# Patient Record
Sex: MALE | Race: WHITE | NOT HISPANIC OR LATINO | Employment: OTHER | URBAN - METROPOLITAN AREA
[De-identification: names, ages, dates, MRNs, and addresses within clinical notes are randomized per-mention and may not be internally consistent; named-entity substitution may affect disease eponyms.]

---

## 2019-07-02 ENCOUNTER — HOSPITAL ENCOUNTER (OUTPATIENT)
Facility: HOSPITAL | Age: 44
Discharge: HOME | DRG: 293 | End: 2019-07-04
Attending: FAMILY MEDICINE | Admitting: INTERNAL MEDICINE
Payer: MEDICAID

## 2019-07-02 DIAGNOSIS — R45.1 AGITATION: ICD-10-CM

## 2019-07-02 DIAGNOSIS — I50.9 ACUTE ON CHRONIC CONGESTIVE HEART FAILURE, UNSPECIFIED HEART FAILURE TYPE: ICD-10-CM

## 2019-07-02 DIAGNOSIS — R09.02 HYPOXEMIA: ICD-10-CM

## 2019-07-02 DIAGNOSIS — I50.9 CHF (CONGESTIVE HEART FAILURE): ICD-10-CM

## 2019-07-02 DIAGNOSIS — R41.0 CONFUSION: Primary | ICD-10-CM

## 2019-07-02 DIAGNOSIS — I50.9 ACUTE ON CHRONIC CONGESTIVE HEART FAILURE: ICD-10-CM

## 2019-07-02 LAB
ALBUMIN SERPL BCP-MCNC: 3.1 G/DL (ref 3.5–5.2)
ALLENS TEST: ABNORMAL
ALP SERPL-CCNC: 85 U/L (ref 55–135)
ALT SERPL W/O P-5'-P-CCNC: 79 U/L (ref 10–44)
AMPHET+METHAMPHET UR QL: NEGATIVE
ANION GAP SERPL CALC-SCNC: 8 MMOL/L (ref 8–16)
AST SERPL-CCNC: 94 U/L (ref 10–40)
BASOPHILS # BLD AUTO: 0.08 K/UL (ref 0–0.2)
BASOPHILS NFR BLD: 0.7 % (ref 0–1.9)
BENZODIAZ UR QL SCN>200 NG/ML: NEGATIVE
BILIRUB SERPL-MCNC: 1.5 MG/DL (ref 0.1–1)
BNP SERPL-MCNC: 525 PG/ML (ref 0–99)
BUN SERPL-MCNC: 27 MG/DL (ref 6–20)
BZE UR QL SCN: NEGATIVE
CALCIUM SERPL-MCNC: 8.5 MG/DL (ref 8.7–10.5)
CANNABINOIDS UR QL SCN: NEGATIVE
CHLORIDE SERPL-SCNC: 102 MMOL/L (ref 95–110)
CO2 SERPL-SCNC: 24 MMOL/L (ref 23–29)
CPAPPEEP: ABNORMAL
CREAT SERPL-MCNC: 1.2 MG/DL (ref 0.5–1.4)
CREAT UR-MCNC: <10 MG/DL (ref 23–375)
DIFFERENTIAL METHOD: ABNORMAL
EOSINOPHIL # BLD AUTO: 0.2 K/UL (ref 0–0.5)
EOSINOPHIL NFR BLD: 1.5 % (ref 0–8)
ERYTHROCYTE [DISTWIDTH] IN BLOOD BY AUTOMATED COUNT: 15.7 % (ref 11.5–14.5)
ERYTHROCYTE [SEDIMENTATION RATE] IN BLOOD BY WESTERGREN METHOD: ABNORMAL MM/H
EST. GFR  (AFRICAN AMERICAN): >60 ML/MIN/1.73 M^2
EST. GFR  (NON AFRICAN AMERICAN): >60 ML/MIN/1.73 M^2
ETHANOL SERPL-MCNC: <5 MG/DL
GLUCOSE SERPL-MCNC: 73 MG/DL (ref 70–110)
HCO3 UR-SCNC: 27.1 MMOL/L (ref 22–26)
HCT VFR BLD AUTO: 43 % (ref 40–54)
HGB BLD-MCNC: 14.2 G/DL (ref 14–18)
IMM GRANULOCYTES # BLD AUTO: 0.03 K/UL (ref 0–0.04)
IMM GRANULOCYTES NFR BLD AUTO: 0.2 % (ref 0–0.5)
LYMPHOCYTES # BLD AUTO: 2.7 K/UL (ref 1–4.8)
LYMPHOCYTES NFR BLD: 22.2 % (ref 18–48)
MCH RBC QN AUTO: 29.2 PG (ref 27–31)
MCHC RBC AUTO-ENTMCNC: 33 G/DL (ref 32–36)
MCV RBC AUTO: 88 FL (ref 82–98)
MODE: ABNORMAL
MONOCYTES # BLD AUTO: 0.9 K/UL (ref 0.3–1)
MONOCYTES NFR BLD: 7.3 % (ref 4–15)
NEUTROPHILS # BLD AUTO: 8.3 K/UL (ref 1.8–7.7)
NEUTROPHILS NFR BLD: 68.1 % (ref 38–73)
NRBC BLD-RTO: 0 /100 WBC
O2DEVICE: ABNORMAL
OPIATES UR QL SCN: NEGATIVE
PCO2 BLDA: 42.7 MMHG (ref 35–45)
PCP UR QL SCN>25 NG/ML: NEGATIVE
PH SMN: 7.42 [PH] (ref 7.35–7.45)
PIP: ABNORMAL
PLATELET # BLD AUTO: 339 K/UL (ref 150–350)
PMV BLD AUTO: 9.7 FL (ref 9.2–12.9)
PO2 BLDA: 59.5 MMHG (ref 75–100)
POC BE: 2.3 MMOL/L (ref -2–2)
POC FIO2: ABNORMAL
POC FLOW: ABNORMAL
POC O2 PERCENT: ABNORMAL %
POC SATURATED O2: 91.9 % (ref 90–100)
POC TCO2: 28.4 MMOL/L (ref 22–28)
POC TEMPERATURE: ABNORMAL C
POTASSIUM SERPL-SCNC: 4.6 MMOL/L (ref 3.5–5.1)
PRESSURE SUPPORT: ABNORMAL
PROT SERPL-MCNC: 7.3 G/DL (ref 6–8.4)
RBC # BLD AUTO: 4.87 M/UL (ref 4.6–6.2)
SITE: ABNORMAL
SODIUM SERPL-SCNC: 134 MMOL/L (ref 136–145)
TOXICOLOGY INFORMATION: ABNORMAL
TROPONIN I SERPL DL<=0.01 NG/ML-MCNC: 0.09 NG/ML (ref 0.02–0.5)
VT: ABNORMAL
WBC # BLD AUTO: 12.26 K/UL (ref 3.9–12.7)

## 2019-07-02 PROCEDURE — 85025 COMPLETE CBC W/AUTO DIFF WBC: CPT

## 2019-07-02 PROCEDURE — 36600 WITHDRAWAL OF ARTERIAL BLOOD: CPT

## 2019-07-02 PROCEDURE — 96374 THER/PROPH/DIAG INJ IV PUSH: CPT

## 2019-07-02 PROCEDURE — 80320 DRUG SCREEN QUANTALCOHOLS: CPT

## 2019-07-02 PROCEDURE — 84484 ASSAY OF TROPONIN QUANT: CPT

## 2019-07-02 PROCEDURE — 71045 XR CHEST AP PORTABLE: ICD-10-PCS | Mod: 26,,, | Performed by: RADIOLOGY

## 2019-07-02 PROCEDURE — 82803 BLOOD GASES ANY COMBINATION: CPT

## 2019-07-02 PROCEDURE — 83880 ASSAY OF NATRIURETIC PEPTIDE: CPT

## 2019-07-02 PROCEDURE — 12000002 HC ACUTE/MED SURGE SEMI-PRIVATE ROOM

## 2019-07-02 PROCEDURE — 25000003 PHARM REV CODE 250: Performed by: FAMILY MEDICINE

## 2019-07-02 PROCEDURE — 99900035 HC TECH TIME PER 15 MIN (STAT)

## 2019-07-02 PROCEDURE — 71045 X-RAY EXAM CHEST 1 VIEW: CPT | Mod: 26,,, | Performed by: RADIOLOGY

## 2019-07-02 PROCEDURE — 82800 BLOOD PH: CPT

## 2019-07-02 PROCEDURE — 71045 X-RAY EXAM CHEST 1 VIEW: CPT | Mod: TC,FY

## 2019-07-02 PROCEDURE — 99291 CRITICAL CARE FIRST HOUR: CPT | Mod: 25

## 2019-07-02 PROCEDURE — 96375 TX/PRO/DX INJ NEW DRUG ADDON: CPT

## 2019-07-02 PROCEDURE — 63600175 PHARM REV CODE 636 W HCPCS: Performed by: FAMILY MEDICINE

## 2019-07-02 PROCEDURE — 96376 TX/PRO/DX INJ SAME DRUG ADON: CPT

## 2019-07-02 PROCEDURE — 80053 COMPREHEN METABOLIC PANEL: CPT

## 2019-07-02 PROCEDURE — G0378 HOSPITAL OBSERVATION PER HR: HCPCS

## 2019-07-02 PROCEDURE — 80307 DRUG TEST PRSMV CHEM ANLYZR: CPT

## 2019-07-02 RX ORDER — FUROSEMIDE 10 MG/ML
60 INJECTION INTRAMUSCULAR; INTRAVENOUS
Status: COMPLETED | OUTPATIENT
Start: 2019-07-02 | End: 2019-07-02

## 2019-07-02 RX ORDER — LORAZEPAM 2 MG/ML
1 INJECTION INTRAMUSCULAR
Status: COMPLETED | OUTPATIENT
Start: 2019-07-02 | End: 2019-07-02

## 2019-07-02 RX ORDER — ENALAPRIL MALEATE 2.5 MG/1
2.5 TABLET ORAL DAILY
Status: ON HOLD | COMMUNITY
End: 2019-08-26 | Stop reason: SDUPTHER

## 2019-07-02 RX ORDER — SPIRONOLACTONE 25 MG/1
25 TABLET ORAL DAILY
Status: ON HOLD | COMMUNITY
End: 2019-08-26 | Stop reason: SDUPTHER

## 2019-07-02 RX ORDER — FUROSEMIDE 20 MG/1
20 TABLET ORAL 2 TIMES DAILY
Status: ON HOLD | COMMUNITY
End: 2019-07-04 | Stop reason: SDUPTHER

## 2019-07-02 RX ORDER — CLONIDINE HYDROCHLORIDE 0.1 MG/1
0.2 TABLET ORAL
Status: COMPLETED | OUTPATIENT
Start: 2019-07-02 | End: 2019-07-02

## 2019-07-02 RX ORDER — CARVEDILOL 3.12 MG/1
3.12 TABLET ORAL 2 TIMES DAILY WITH MEALS
Status: ON HOLD | COMMUNITY
End: 2019-08-26 | Stop reason: SDUPTHER

## 2019-07-02 RX ADMIN — FUROSEMIDE 60 MG: 10 INJECTION, SOLUTION INTRAVENOUS at 08:07

## 2019-07-02 RX ADMIN — LORAZEPAM 1 MG: 2 INJECTION INTRAMUSCULAR; INTRAVENOUS at 10:07

## 2019-07-02 RX ADMIN — LORAZEPAM 1 MG: 2 INJECTION INTRAMUSCULAR; INTRAVENOUS at 09:07

## 2019-07-02 RX ADMIN — CLONIDINE HYDROCHLORIDE 0.2 MG: 0.1 TABLET ORAL at 09:07

## 2019-07-03 PROBLEM — I50.9 ACUTE ON CHRONIC CONGESTIVE HEART FAILURE: Status: ACTIVE | Noted: 2019-07-03

## 2019-07-03 PROBLEM — R41.0 CONFUSION: Status: ACTIVE | Noted: 2019-07-03

## 2019-07-03 LAB
ALBUMIN SERPL BCP-MCNC: 3.2 G/DL (ref 3.5–5.2)
ALP SERPL-CCNC: 84 U/L (ref 55–135)
ALT SERPL W/O P-5'-P-CCNC: 80 U/L (ref 10–44)
AMMONIA PLAS-SCNC: 14 UMOL/L (ref 10–50)
AMPHET+METHAMPHET UR QL: NEGATIVE
ANION GAP SERPL CALC-SCNC: 17 MMOL/L (ref 8–16)
AST SERPL-CCNC: 92 U/L (ref 10–40)
BASOPHILS # BLD AUTO: 0.07 K/UL (ref 0–0.2)
BASOPHILS NFR BLD: 0.5 % (ref 0–1.9)
BENZODIAZ UR QL SCN>200 NG/ML: ABNORMAL
BILIRUB SERPL-MCNC: 1 MG/DL (ref 0.1–1)
BUN SERPL-MCNC: 29 MG/DL (ref 6–20)
BZE UR QL SCN: NEGATIVE
CALCIUM SERPL-MCNC: 8.4 MG/DL (ref 8.7–10.5)
CANNABINOIDS UR QL SCN: NEGATIVE
CHLORIDE SERPL-SCNC: 100 MMOL/L (ref 95–110)
CO2 SERPL-SCNC: 20 MMOL/L (ref 23–29)
CREAT SERPL-MCNC: 1.4 MG/DL (ref 0.5–1.4)
CREAT UR-MCNC: 22.5 MG/DL (ref 23–375)
D DIMER PPP FEU-MCNC: 457 NG/ML (ref 100–400)
DIFFERENTIAL METHOD: ABNORMAL
EOSINOPHIL # BLD AUTO: 0.2 K/UL (ref 0–0.5)
EOSINOPHIL NFR BLD: 1.6 % (ref 0–8)
ERYTHROCYTE [DISTWIDTH] IN BLOOD BY AUTOMATED COUNT: 15.7 % (ref 11.5–14.5)
EST. GFR  (AFRICAN AMERICAN): >60 ML/MIN/1.73 M^2
EST. GFR  (NON AFRICAN AMERICAN): >60 ML/MIN/1.73 M^2
GLUCOSE SERPL-MCNC: 94 MG/DL (ref 70–110)
HCT VFR BLD AUTO: 43.4 % (ref 40–54)
HGB BLD-MCNC: 14.2 G/DL (ref 14–18)
IMM GRANULOCYTES # BLD AUTO: 0.03 K/UL (ref 0–0.04)
IMM GRANULOCYTES NFR BLD AUTO: 0.2 % (ref 0–0.5)
LYMPHOCYTES # BLD AUTO: 3.5 K/UL (ref 1–4.8)
LYMPHOCYTES NFR BLD: 24.3 % (ref 18–48)
MAGNESIUM SERPL-MCNC: 1.9 MG/DL (ref 1.6–2.6)
MCH RBC QN AUTO: 28.9 PG (ref 27–31)
MCHC RBC AUTO-ENTMCNC: 32.7 G/DL (ref 32–36)
MCV RBC AUTO: 88 FL (ref 82–98)
MONOCYTES # BLD AUTO: 0.8 K/UL (ref 0.3–1)
MONOCYTES NFR BLD: 5.9 % (ref 4–15)
NEUTROPHILS # BLD AUTO: 9.6 K/UL (ref 1.8–7.7)
NEUTROPHILS NFR BLD: 67.5 % (ref 38–73)
NRBC BLD-RTO: 0 /100 WBC
OPIATES UR QL SCN: NEGATIVE
PCP UR QL SCN>25 NG/ML: NEGATIVE
PHOSPHATE SERPL-MCNC: 3.6 MG/DL (ref 2.7–4.5)
PLATELET # BLD AUTO: 315 K/UL (ref 150–350)
PMV BLD AUTO: 10.4 FL (ref 9.2–12.9)
POTASSIUM SERPL-SCNC: 4.1 MMOL/L (ref 3.5–5.1)
PROT SERPL-MCNC: 7.4 G/DL (ref 6–8.4)
RBC # BLD AUTO: 4.92 M/UL (ref 4.6–6.2)
SODIUM SERPL-SCNC: 137 MMOL/L (ref 136–145)
TOXICOLOGY INFORMATION: ABNORMAL
TROPONIN I SERPL DL<=0.01 NG/ML-MCNC: 0.08 NG/ML (ref 0.02–0.5)
TROPONIN I SERPL DL<=0.01 NG/ML-MCNC: 0.09 NG/ML (ref 0.02–0.5)
WBC # BLD AUTO: 14.24 K/UL (ref 3.9–12.7)

## 2019-07-03 PROCEDURE — G0378 HOSPITAL OBSERVATION PER HR: HCPCS

## 2019-07-03 PROCEDURE — 25000003 PHARM REV CODE 250: Performed by: FAMILY MEDICINE

## 2019-07-03 PROCEDURE — 21400001 HC TELEMETRY ROOM

## 2019-07-03 PROCEDURE — 94761 N-INVAS EAR/PLS OXIMETRY MLT: CPT

## 2019-07-03 PROCEDURE — 80053 COMPREHEN METABOLIC PANEL: CPT

## 2019-07-03 PROCEDURE — 80307 DRUG TEST PRSMV CHEM ANLYZR: CPT

## 2019-07-03 PROCEDURE — S0028 INJECTION, FAMOTIDINE, 20 MG: HCPCS | Performed by: INTERNAL MEDICINE

## 2019-07-03 PROCEDURE — 63600175 PHARM REV CODE 636 W HCPCS: Performed by: INTERNAL MEDICINE

## 2019-07-03 PROCEDURE — 84484 ASSAY OF TROPONIN QUANT: CPT | Mod: 91

## 2019-07-03 PROCEDURE — 85025 COMPLETE CBC W/AUTO DIFF WBC: CPT

## 2019-07-03 PROCEDURE — 85379 FIBRIN DEGRADATION QUANT: CPT

## 2019-07-03 PROCEDURE — 94760 N-INVAS EAR/PLS OXIMETRY 1: CPT

## 2019-07-03 PROCEDURE — 84484 ASSAY OF TROPONIN QUANT: CPT

## 2019-07-03 PROCEDURE — 27000221 HC OXYGEN, UP TO 24 HOURS

## 2019-07-03 PROCEDURE — 83735 ASSAY OF MAGNESIUM: CPT

## 2019-07-03 PROCEDURE — 36415 COLL VENOUS BLD VENIPUNCTURE: CPT

## 2019-07-03 PROCEDURE — 84100 ASSAY OF PHOSPHORUS: CPT

## 2019-07-03 PROCEDURE — 82140 ASSAY OF AMMONIA: CPT

## 2019-07-03 PROCEDURE — 25000003 PHARM REV CODE 250: Performed by: INTERNAL MEDICINE

## 2019-07-03 RX ORDER — ENOXAPARIN SODIUM 100 MG/ML
40 INJECTION SUBCUTANEOUS EVERY 24 HOURS
Status: DISCONTINUED | OUTPATIENT
Start: 2019-07-03 | End: 2019-07-04 | Stop reason: HOSPADM

## 2019-07-03 RX ORDER — FAMOTIDINE 10 MG/ML
20 INJECTION INTRAVENOUS EVERY 12 HOURS
Status: DISCONTINUED | OUTPATIENT
Start: 2019-07-03 | End: 2019-07-04 | Stop reason: HOSPADM

## 2019-07-03 RX ORDER — CYCLOBENZAPRINE HCL 10 MG
10 TABLET ORAL ONCE
Status: COMPLETED | OUTPATIENT
Start: 2019-07-03 | End: 2019-07-03

## 2019-07-03 RX ORDER — FUROSEMIDE 10 MG/ML
40 INJECTION INTRAMUSCULAR; INTRAVENOUS 2 TIMES DAILY
Status: DISCONTINUED | OUTPATIENT
Start: 2019-07-03 | End: 2019-07-04 | Stop reason: HOSPADM

## 2019-07-03 RX ORDER — CYCLOBENZAPRINE HCL 5 MG
10 TABLET ORAL 2 TIMES DAILY PRN
Status: DISCONTINUED | OUTPATIENT
Start: 2019-07-04 | End: 2019-07-04 | Stop reason: HOSPADM

## 2019-07-03 RX ORDER — SPIRONOLACTONE 25 MG/1
25 TABLET ORAL DAILY
Status: DISCONTINUED | OUTPATIENT
Start: 2019-07-03 | End: 2019-07-04 | Stop reason: HOSPADM

## 2019-07-03 RX ORDER — ACETAMINOPHEN 325 MG/1
650 TABLET ORAL EVERY 4 HOURS PRN
Status: DISCONTINUED | OUTPATIENT
Start: 2019-07-03 | End: 2019-07-04 | Stop reason: HOSPADM

## 2019-07-03 RX ORDER — SODIUM CHLORIDE 0.9 % (FLUSH) 0.9 %
10 SYRINGE (ML) INJECTION
Status: DISCONTINUED | OUTPATIENT
Start: 2019-07-03 | End: 2019-07-04 | Stop reason: HOSPADM

## 2019-07-03 RX ORDER — ONDANSETRON 2 MG/ML
4 INJECTION INTRAMUSCULAR; INTRAVENOUS EVERY 8 HOURS PRN
Status: DISCONTINUED | OUTPATIENT
Start: 2019-07-03 | End: 2019-07-04 | Stop reason: HOSPADM

## 2019-07-03 RX ORDER — OLANZAPINE 5 MG/1
10 TABLET, ORALLY DISINTEGRATING ORAL
Status: COMPLETED | OUTPATIENT
Start: 2019-07-03 | End: 2019-07-03

## 2019-07-03 RX ORDER — CARVEDILOL 3.12 MG/1
3.12 TABLET ORAL 2 TIMES DAILY WITH MEALS
Status: DISCONTINUED | OUTPATIENT
Start: 2019-07-03 | End: 2019-07-04 | Stop reason: HOSPADM

## 2019-07-03 RX ORDER — LISINOPRIL 2.5 MG/1
2.5 TABLET ORAL DAILY
Status: DISCONTINUED | OUTPATIENT
Start: 2019-07-03 | End: 2019-07-04 | Stop reason: HOSPADM

## 2019-07-03 RX ORDER — AMOXICILLIN 250 MG
1 CAPSULE ORAL 2 TIMES DAILY
Status: DISCONTINUED | OUTPATIENT
Start: 2019-07-03 | End: 2019-07-04 | Stop reason: HOSPADM

## 2019-07-03 RX ORDER — CYCLOBENZAPRINE HCL 5 MG
TABLET ORAL
Status: COMPLETED
Start: 2019-07-03 | End: 2019-07-04

## 2019-07-03 RX ADMIN — LISINOPRIL 2.5 MG: 2.5 TABLET ORAL at 02:07

## 2019-07-03 RX ADMIN — CYCLOBENZAPRINE HYDROCHLORIDE 10 MG: 5 TABLET, FILM COATED ORAL at 11:07

## 2019-07-03 RX ADMIN — SPIRONOLACTONE 25 MG: 25 TABLET ORAL at 02:07

## 2019-07-03 RX ADMIN — CARVEDILOL 3.12 MG: 3.12 TABLET, FILM COATED ORAL at 05:07

## 2019-07-03 RX ADMIN — ENOXAPARIN SODIUM 40 MG: 100 INJECTION SUBCUTANEOUS at 05:07

## 2019-07-03 RX ADMIN — SENNOSIDES, DOCUSATE SODIUM 1 TABLET: 50; 8.6 TABLET, FILM COATED ORAL at 09:07

## 2019-07-03 RX ADMIN — FUROSEMIDE 40 MG: 10 INJECTION, SOLUTION INTRAMUSCULAR; INTRAVENOUS at 05:07

## 2019-07-03 RX ADMIN — FAMOTIDINE 20 MG: 10 INJECTION INTRAVENOUS at 09:07

## 2019-07-03 RX ADMIN — OLANZAPINE 10 MG: 5 TABLET, ORALLY DISINTEGRATING ORAL at 12:07

## 2019-07-03 NOTE — ASSESSMENT & PLAN NOTE
07/03/2019:  1.  Admit for congestive heart failure symptoms and continue diuresis.  2.  Repeat labs in the a.m. to include CBC CMP and deep BNP  3.  Treat otherwise symptomatically and as hospital course mandates.  4.  Reportedly this patient has a psychiatric diagnosis and will need monitoring for this for the need for treat

## 2019-07-03 NOTE — ED NOTES
Bernadine, charge RN, brings recliner in ED 2. Patient assisted to recliner. Sitting up in high fowlers, feet elevated. Patient asked to put oxygen mask back on multiple times.

## 2019-07-03 NOTE — PLAN OF CARE
Problem: Adult Inpatient Plan of Care  Goal: Readiness for Transition of Care    Intervention: Mutually Develop Transition Plan     07/03/19 1835 07/03/19 1854   OTHER   Is patient able to care for self after discharge? Yes  --    Who are your caregiver(s) and their phone number(s)? SAM MCCLELLAN [phone]  --    Patient currently receives home health services?  --  No   (RETIRED) Discharge Needs Assessment   How many people do you have in your home that can help with your care after discharge?  --  2   Discharge Needs Assessment   Readmission Within the Last 30 Days previous discharge plan unsuccessful  --    Equipment Currently Used at Home none  --    Transportation Anticipated family or friend will provide  --    Social Work Plan   Patient/Family in Agreement with Plan yes  --    Living Environment   Able to Return to Prior Arrangements yes  --    (RETIRED) Social Work Plan   Patient's perception of discharge disposition home or selfcare  --

## 2019-07-03 NOTE — HPI
Patient is a 43-year-old male that presented to the emergency department complaining of shortness of breath and history of congestive heart failure.  Patient states that he was getting short of breath more as he feels sleep and was dreaming.  He states he would wake up short of breath some very suddenly.  Patient otherwise gives a past medical history significant for acute on chronic congestive heart failure hypertension depression anxiety and osteoarthritis.  Patient denied any fever chills nausea or vomiting but did state that he was very nervous and agitated.  Patient seemed to not be able to keep still while in the emergency department.  Patient required sedation and also treatment for congestive heart failure in the form of diuretics.  Patient recently was admitted to hospital and [place] treated for congestive heart failure.  Patient was noted to have a ejection fraction of 35%.

## 2019-07-03 NOTE — ED NOTES
Patient heard grunting in room. Patient reattached to monitor, nasal cannula reapplied. Dr. Rodriguez informed of patient's condition and ABG results.

## 2019-07-03 NOTE — SUBJECTIVE & OBJECTIVE
Past Medical History:   Diagnosis Date    Anxiety     CHF (congestive heart failure)     Depression     Hypertension        History reviewed. No pertinent surgical history.    Review of patient's allergies indicates:  No Known Allergies    No current facility-administered medications on file prior to encounter.      Current Outpatient Medications on File Prior to Encounter   Medication Sig    carvedilol (COREG) 3.125 MG tablet Take 3.125 mg by mouth 2 (two) times daily with meals.    enalapril (VASOTEC) 2.5 MG tablet Take 2.5 mg by mouth once daily.    furosemide (LASIX) 20 MG tablet Take 20 mg by mouth 2 (two) times daily.    spironolactone (ALDACTONE) 25 MG tablet Take 25 mg by mouth once daily.     Family History     None        Tobacco Use    Smoking status: Former Smoker   Substance and Sexual Activity    Alcohol use: Not Currently    Drug use: Not Currently    Sexual activity: Not Currently     Review of Systems   Constitutional: Positive for activity change and fatigue. Negative for appetite change and fever.   HENT: Positive for ear discharge. Negative for congestion, mouth sores, nosebleeds, rhinorrhea, sinus pressure, sinus pain and tinnitus.    Eyes: Negative.  Negative for pain, redness and itching.   Respiratory: Positive for chest tightness, shortness of breath and wheezing. Negative for apnea, cough, choking and stridor.    Cardiovascular: Positive for palpitations. Negative for chest pain and leg swelling.   Gastrointestinal: Negative for abdominal distention, abdominal pain, anal bleeding, blood in stool, constipation and diarrhea.   Endocrine: Negative.    Genitourinary: Negative for difficulty urinating, flank pain, frequency and urgency.   Musculoskeletal: Positive for arthralgias and myalgias. Negative for back pain and gait problem.   Skin: Positive for pallor. Negative for color change.   Allergic/Immunologic: Negative.    Neurological: Negative for dizziness, facial asymmetry,  weakness, light-headedness and headaches.   Hematological: Negative for adenopathy. Does not bruise/bleed easily.   Psychiatric/Behavioral: The patient is nervous/anxious.      Objective:     Vital Signs (Most Recent):  Temp: (unable to obtain, patient not cooperating ) (07/03/19 1143)  Pulse: (!) 119 (07/03/19 1143)  Resp: 20 (07/03/19 1143)  BP: (!) 118/98 (07/03/19 1143)  SpO2: 100 % (07/03/19 1143) Vital Signs (24h Range):  Temp:  [98.3 °F (36.8 °C)] 98.3 °F (36.8 °C)  Pulse:  [112-133] 119  Resp:  [10-24] 20  SpO2:  [85 %-100 %] 100 %  BP: (108-201)/() 118/98     Weight: 79 kg (174 lb 3.2 oz)  Body mass index is 26.49 kg/m².    Physical Exam   Constitutional: He is oriented to person, place, and time. He appears well-developed and well-nourished.   HENT:   Head: Normocephalic and atraumatic.   Eyes: Pupils are equal, round, and reactive to light. EOM are normal.   Neck: Normal range of motion. Neck supple. No tracheal deviation present. No thyromegaly present.   Cardiovascular: Normal rate and regular rhythm.   Murmur heard.  Pulmonary/Chest: Effort normal. He has wheezes. He has rales.   Abdominal: Soft. Bowel sounds are normal. He exhibits no distension. There is no tenderness. There is no rebound and no guarding.   Musculoskeletal: Normal range of motion.   Lymphadenopathy:     He has no cervical adenopathy.   Neurological: He is alert and oriented to person, place, and time.   Skin: Skin is warm and dry. Capillary refill takes less than 2 seconds.   Psychiatric: He has a normal mood and affect. His behavior is normal. Judgment and thought content normal.   Nursing note and vitals reviewed.        CRANIAL NERVES     CN III, IV, VI   Pupils are equal, round, and reactive to light.  Extraocular motions are normal.        Significant Labs:   Recent Lab Results       07/03/19  0651   07/03/19  0649   07/03/19  0333   07/03/19  0326   07/02/19  2334        POC O2%               Benzodiazepines      Presumptive Positive         Phencyclidine     Negative         Pressure Support               Albumin               Alcohol, Medical, Serum               Alkaline Phosphatase               Allens Test               ALT               Ammonia 14             Amphetamine Screen, Ur     Negative         Anion Gap               AST               Baso #               Basophil%               BILIRUBIN TOTAL               BNP               Site               BUN, Bld               Calcium               Chloride               CO2               Cocaine (Metab.)     Negative         CPAP PEEP               Creatinine               Creatinine, Random Ur     22.5  Comment:  The random urine reference ranges provided were established   for 24 hour urine collections.  No reference ranges exist for  random urine specimens.  Correlate clinically.           D-Dimer   457  Comment:  The quantitative D-dimer assay should be used as an aid in the   diagnosis of   deep vein thrombosis and pulmonary embolism. The result of the test   should   be evaluated in the context of all the clinical and laboratory data   available. In those instances where the laboratory result does not   agree   with the clinical evaluation, additional tests should be performed   accordingly.             Differential Method               eGFR if                eGFR if non                Eos #               Eosinophil%               Glucose               Gran # (ANC)               Gran%               Hematocrit               Hemoglobin               Immature Grans (Abs)               Immature Granulocytes               Lymph #               Lymph%               MCH               MCHC               MCV               Mode               Mono #               Mono%               MPV               nRBC               O2DEVICE         room air     Opiate Scrn, Ur     Negative         PiP               Platelets               POC BE          2.30     POC FIO2         21%     POC Flow               POC HCO3         27.10     POC PCO2         42.7     POC PH         7.42     POC PO2         59.5     POC SATURATED O2         91.9     POC TCO2         28.4     POC Temp               Potassium               PROTEIN TOTAL               Rate               RBC               RDW               Sodium               Marijuana (THC) Metabolite     Negative         Toxicology Information     SEE COMMENT  Comment:  This screen includes the following classes of drugs at the   listed cut-off:  Benzodiazepines                  200 ng/ml  Cocaine metabolite               300 ng/ml  Opiates                         2000 ng/ml  Barbiturates                     200 ng/ml  Amphetamines                    1000 ng/ml  Marijuana metabs (THC)            50 ng/ml  Phencyclidine (PCP)               25 ng/ml  High concentrations of Methylenedioxymethamphetamine (MDMA aka  Ectasy) and other structurally similar compounds may cross-   react with the Amphetamine/Methamphetamine screening   immunoassay giving a false positive result.  Note: This exception list includes only more common   interferants in toxicology screen testing.  Because of many   cross-reactantspositive results on toxicology drug screens   should be confirmed whenever results do not correlate with   clinical presentation.  This report is intended for use in clinical monitoring and  management of patients. It is not intended for use in   employment related drug testing.  Because of any cross-reactants, positive results on toxicology  drug screens should be confirmed whenever results do not  correlate with clinical presentation.  Presumptive positive results are unconfirmed and may be used   only for medical purposes.           Troponin I   0.09   0.08       Vt               WBC                                07/02/19  2236   07/02/19 2038        POC O2%         Benzodiazepines Negative       Phencyclidine Negative        Pressure Support         Albumin   3.1     Alcohol, Medical, Serum   <5     Alkaline Phosphatase   85     Allens Test         ALT   79     Ammonia         Amphetamine Screen, Ur Negative       Anion Gap   8     AST   94     Baso #   0.08     Basophil%   0.7     BILIRUBIN TOTAL   1.5  Comment:  For infants and newborns, interpretation of results should be based  on gestational age, weight and in agreement with clinical  observations.  Premature Infant recommended reference ranges:  Up to 24 hours.............<8.0 mg/dL  Up to 48 hours............<12.0 mg/dL  3-5 days..................<15.0 mg/dL  6-29 days.................<15.0 mg/dL       BNP   525  Comment:  Values of less than 100 pg/ml are consistent with non-CHF populations.     Site         BUN, Bld   27     Calcium   8.5     Chloride   102     CO2   24     Cocaine (Metab.) Negative       CPAP PEEP         Creatinine   1.2     Creatinine, Random Ur <10.0  Comment:  The random urine reference ranges provided were established   for 24 hour urine collections.  No reference ranges exist for  random urine specimens.  Correlate clinically.         D-Dimer         Differential Method   Automated     eGFR if    >60.0     eGFR if non    >60.0  Comment:  Calculation used to obtain the estimated glomerular filtration  rate (eGFR) is the CKD-EPI equation.        Eos #   0.2     Eosinophil%   1.5     Glucose   73     Gran # (ANC)   8.3     Gran%   68.1     Hematocrit   43.0     Hemoglobin   14.2     Immature Grans (Abs)   0.03  Comment:  Mild elevation in immature granulocytes is non specific and   can be seen in a variety of conditions including stress response,   acute inflammation, trauma and pregnancy. Correlation with other   laboratory and clinical findings is essential.       Immature Granulocytes   0.2     Lymph #   2.7     Lymph%   22.2     MCH   29.2     MCHC   33.0     MCV   88     Mode         Mono #   0.9     Mono%   7.3      MPV   9.7     nRBC   0     O2DEVICE         Opiate Scrn, Ur Negative       PiP         Platelets   339     POC BE         POC FIO2         POC Flow         POC HCO3         POC PCO2         POC PH         POC PO2         POC SATURATED O2         POC TCO2         POC Temp         Potassium   4.6     PROTEIN TOTAL   7.3     Rate         RBC   4.87     RDW   15.7     Sodium   134     Marijuana (THC) Metabolite Negative       Toxicology Information SEE COMMENT  Comment:  This screen includes the following classes of drugs at the   listed cut-off:  Benzodiazepines                  200 ng/ml  Cocaine metabolite               300 ng/ml  Opiates                         2000 ng/ml  Barbiturates                     200 ng/ml  Amphetamines                    1000 ng/ml  Marijuana metabs (THC)            50 ng/ml  Phencyclidine (PCP)               25 ng/ml  High concentrations of Methylenedioxymethamphetamine (MDMA aka  Ectasy) and other structurally similar compounds may cross-   react with the Amphetamine/Methamphetamine screening   immunoassay giving a false positive result.  Note: This exception list includes only more common   interferants in toxicology screen testing.  Because of many   cross-reactantspositive results on toxicology drug screens   should be confirmed whenever results do not correlate with   clinical presentation.  This report is intended for use in clinical monitoring and  management of patients. It is not intended for use in   employment related drug testing.  Because of any cross-reactants, positive results on toxicology  drug screens should be confirmed whenever results do not  correlate with clinical presentation.  Presumptive positive results are unconfirmed and may be used   only for medical purposes.         Troponin I   0.09     Vt         WBC   12.26         All pertinent labs within the past 24 hours have been reviewed.    Significant Imaging: I have reviewed and interpreted all pertinent imaging  results/findings within the past 24 hours.

## 2019-07-03 NOTE — NURSING
Pt oriented to person and place only.  Pt mumbling words.  Alert to tactile stimulation only.  Pt is irritable after waking.

## 2019-07-03 NOTE — NURSING
Patient brought to floor via W/C by MAUREEN Holland. Report taken at bedside. Patient in bed at this time with bed alarm placed.  Patient becoming very agitated while vital signs were being taken.  Patient needing tactile stimulation to be awoken.  Patient is oriented to person and place, speech is mumbled.  Respirations even and unlabored at this time.  Will continue to monitor.

## 2019-07-03 NOTE — ED PROVIDER NOTES
Encounter Date: 7/2/2019       History     Chief Complaint   Patient presents with    Shortness of Breath    Congestive Heart Failure     43-year-old male presents ambulatory complaining of feeling short of breath and anxious he was recently discharged from [facility] apparently after a 4 day admission with congestive heart failure, patient is a poor historian and records are not available on line for this admission, fact there is very little information on the patient in the [facility] system, patient will give consent for obtaining discharge instructions and test results from [facility], patient did drive himself here to the hospital and states he thinks he has a follow-up appointment with a cardiologist in [place], [place], patient is quick to deny any psychiatric history        Review of patient's allergies indicates:  No Known Allergies  Past Medical History:   Diagnosis Date    Anxiety     CHF (congestive heart failure)     Depression     Hypertension      History reviewed. No pertinent surgical history.  History reviewed. No pertinent family history.  Social History     Tobacco Use    Smoking status: Former Smoker   Substance Use Topics    Alcohol use: Not Currently    Drug use: Not Currently     Review of Systems   Constitutional: Negative for fever.   HENT: Negative for sore throat.    Respiratory: Positive for choking, chest tightness and shortness of breath. Negative for cough.    Cardiovascular: Positive for leg swelling. Negative for chest pain.   Gastrointestinal: Negative for nausea.   Genitourinary: Negative for dysuria.   Musculoskeletal: Negative for back pain.   Skin: Negative for rash.   Neurological: Negative for weakness.   Hematological: Does not bruise/bleed easily.   Psychiatric/Behavioral: Positive for confusion and decreased concentration. Negative for agitation and behavioral problems. The patient is nervous/anxious.        Physical Exam     Initial Vitals  [07/02/19 2018]   BP Pulse Resp Temp SpO2   (!) 153/114 (!) 122 (!) 24 98.3 °F (36.8 °C) 99 %      MAP       --         Physical Exam    Nursing note and vitals reviewed.  Constitutional: He appears well-developed and well-nourished. He is not diaphoretic. He does not appear ill. No distress.   HENT:   Head: Normocephalic and atraumatic.   Right Ear: External ear normal.   Left Ear: External ear normal.   Nose: Nose normal.   Mouth/Throat: Oropharynx is clear and moist. No oropharyngeal exudate.   Eyes: EOM are normal.   Neck: Normal range of motion. Neck supple. No tracheal deviation present.   Cardiovascular: Normal rate and regular rhythm.   No murmur heard.  Pulmonary/Chest: Effort normal and breath sounds normal. No stridor. No respiratory distress. He has no rales. He exhibits no tenderness.   Abdominal: Soft. He exhibits no distension and no mass. There is no tenderness. There is no rebound.   Musculoskeletal: Normal range of motion. He exhibits no edema.   Lymphadenopathy:     He has no cervical adenopathy.   Neurological: He is alert and oriented to person, place, and time. He has normal strength.   Skin: Skin is warm and dry. Capillary refill takes less than 2 seconds. No pallor.   Psychiatric: He has a normal mood and affect.         ED Course   Procedures  Labs Reviewed   CBC W/ AUTO DIFFERENTIAL - Abnormal; Notable for the following components:       Result Value    RDW 15.7 (*)     Gran # (ANC) 8.3 (*)     All other components within normal limits   COMPREHENSIVE METABOLIC PANEL - Abnormal; Notable for the following components:    Sodium 134 (*)     BUN, Bld 27 (*)     Calcium 8.5 (*)     Albumin 3.1 (*)     Total Bilirubin 1.5 (*)     AST 94 (*)     ALT 79 (*)     All other components within normal limits   B-TYPE NATRIURETIC PEPTIDE - Abnormal; Notable for the following components:     (*)     All other components within normal limits   DRUG SCREEN PANEL, URINE EMERGENCY - Abnormal; Notable  for the following components:    Creatinine, Random Ur <10.0 (*)     All other components within normal limits   TROPONIN I   ALCOHOL,MEDICAL (ETHANOL)   ALCOHOL,MEDICAL (ETHANOL)      EKG Readings: (Independently Interpreted)   Rhythm: Sinus Tachycardia. Heart Rate: 121. ST Segments: Normal ST Segments. T Waves: Normal.      The evaluation, management and treatment of this patient involved Critical Care services  amounting to 70 minutes of direct involvement.  This time was exclusive of any billable procedures.  Imaging Results          X-Ray Chest AP Portable (In process)                  Medical Decision Making:   ED Management:  Patient generally improved here in the ED but seemed to get increasingly intermittently confused and intermittently agitated despite sedation, ABG showed a normal pH 7.4 , CO2  42, O2 sat 60% on room air, chest x-ray showed evidence of cardiomegaly and minimal evidence of CHF, BNP was elevated, drug screen was negative,  Other:   I have discussed this case with another health care provider.       <> Summary of the Discussion: The patient continued to have variable vital signs variable cooperation and was too confused to discharge, case discussed with hospitalist Dr. Esquivel, efforts to obtain prior records [facility] are ongoing, troponin will be repeated here in the ED along with the urine drug screen in case the patient took an identifiable drug just prior to arrival in ED  I have discussed case with incoming ED physician Dr. Gann who will finish care and disposition of patient.                 ED Course as of Jul 03 0431   Wed Jul 03, 2019   0430 Patient's alertness and cooperation continue to cycle; his blood pressure , oxygen saturation also continue to vary    [WK]      ED Course User Index  [WK] Dano Rodriguez MD     Clinical Impression:       ICD-10-CM ICD-9-CM   1. Confusion R41.0 298.9   2. Acute on chronic congestive heart failure, unspecified heart  failure type I50.9 428.0   3. Hypoxemia R09.02 799.02   4. Agitation R45.1 307.9                                Dano Rodriguez MD  07/03/19 0413       Dano Rodriguez MD  07/03/19 0414       Dano Rodriguez MD  07/03/19 0530       Dano Rodriguez MD  07/08/19 0044       Dano Rodriguez MD  07/16/19 1423

## 2019-07-03 NOTE — PLAN OF CARE
Problem: Fall Injury Risk  Goal: Absence of Fall and Fall-Related Injury    Intervention: Identify and Manage Contributors to Fall Injury Risk     07/03/19 1812   Manage Acute Allergic Reaction   Medication Review/Management medications reviewed   Identify and Manage Contributors to Fall Injury Risk   Self-Care Promotion independence encouraged;BADL personal objects within reach;BADL personal routines maintained

## 2019-07-03 NOTE — HOSPITAL COURSE
Will admit to medical-surgical unit for monitoring and treatment of congestive heart failure symptoms.  Patient will also be monitored for psychiatric diagnoses.    07/04/2019:  Patient is doing well having no other signs of mental status change.  Patient is breathing well having no orthopnea no paroxysmal nocturnal dyspnea .  Labs showed normal chemistries sodium 137 potassium 3.7 chloride 103 bicarb 26 GFR greater than 60 and CBC was normal white blood cell count of 9.1 hemoglobin 13.3 hematocrit 40.2 otherwise normal.  Patient is ready for discharge for follow-up with his primary care physician within 1 week.  We will increase Lasix to 3 times daily or 2 in the morning and 1 at in the evening.

## 2019-07-03 NOTE — H&P
[facility] Medicine  History & Physical    Patient Name: Francis Daigle  MRN: [id number]  Admission Date: 7/2/2019  Attending Physician: Ivan Esquivel MD  Primary Care Provider: No primary care provider on file.         Patient information was obtained from patient and ER records.     Subjective:     Principal Problem:Acute on chronic congestive heart failure    Chief Complaint:   Chief Complaint   Patient presents with    Shortness of Breath    Congestive Heart Failure        HPI: Patient is a 43-year-old male that presented to the emergency department complaining of shortness of breath and history of congestive heart failure.  Patient states that he was getting short of breath more as he feels sleep and was dreaming.  He states he would wake up short of breath some very suddenly.  Patient otherwise gives a past medical history significant for acute on chronic congestive heart failure hypertension depression anxiety and osteoarthritis.  Patient denied any fever chills nausea or vomiting but did state that he was very nervous and agitated.  Patient seemed to not be able to keep still while in the emergency department.  Patient required sedation and also treatment for congestive heart failure in the form of diuretics.  Patient recently was admitted to hospital and [place] treated for congestive heart failure.  Patient was noted to have a ejection fraction of 35%.    Past Medical History:   Diagnosis Date    Anxiety     CHF (congestive heart failure)     Depression     Hypertension        History reviewed. No pertinent surgical history.    Review of patient's allergies indicates:  No Known Allergies    No current facility-administered medications on file prior to encounter.      Current Outpatient Medications on File Prior to Encounter   Medication Sig    carvedilol (COREG) 3.125 MG tablet Take 3.125 mg by mouth 2 (two) times daily with meals.    enalapril  (VASOTEC) 2.5 MG tablet Take 2.5 mg by mouth once daily.    furosemide (LASIX) 20 MG tablet Take 20 mg by mouth 2 (two) times daily.    spironolactone (ALDACTONE) 25 MG tablet Take 25 mg by mouth once daily.     Family History     None        Tobacco Use    Smoking status: Former Smoker   Substance and Sexual Activity    Alcohol use: Not Currently    Drug use: Not Currently    Sexual activity: Not Currently     Review of Systems   Constitutional: Positive for activity change and fatigue. Negative for appetite change and fever.   HENT: Positive for ear discharge. Negative for congestion, mouth sores, nosebleeds, rhinorrhea, sinus pressure, sinus pain and tinnitus.    Eyes: Negative.  Negative for pain, redness and itching.   Respiratory: Positive for chest tightness, shortness of breath and wheezing. Negative for apnea, cough, choking and stridor.    Cardiovascular: Positive for palpitations. Negative for chest pain and leg swelling.   Gastrointestinal: Negative for abdominal distention, abdominal pain, anal bleeding, blood in stool, constipation and diarrhea.   Endocrine: Negative.    Genitourinary: Negative for difficulty urinating, flank pain, frequency and urgency.   Musculoskeletal: Positive for arthralgias and myalgias. Negative for back pain and gait problem.   Skin: Positive for pallor. Negative for color change.   Allergic/Immunologic: Negative.    Neurological: Negative for dizziness, facial asymmetry, weakness, light-headedness and headaches.   Hematological: Negative for adenopathy. Does not bruise/bleed easily.   Psychiatric/Behavioral: The patient is nervous/anxious.      Objective:     Vital Signs (Most Recent):  Temp: (unable to obtain, patient not cooperating ) (07/03/19 1143)  Pulse: (!) 119 (07/03/19 1143)  Resp: 20 (07/03/19 1143)  BP: (!) 118/98 (07/03/19 1143)  SpO2: 100 % (07/03/19 1143) Vital Signs (24h Range):  Temp:  [98.3 °F (36.8 °C)] 98.3 °F (36.8 °C)  Pulse:  [112-133] 119  Resp:   [10-24] 20  SpO2:  [85 %-100 %] 100 %  BP: (108-201)/() 118/98     Weight: 79 kg (174 lb 3.2 oz)  Body mass index is 26.49 kg/m².    Physical Exam   Constitutional: He is oriented to person, place, and time. He appears well-developed and well-nourished.   HENT:   Head: Normocephalic and atraumatic.   Eyes: Pupils are equal, round, and reactive to light. EOM are normal.   Neck: Normal range of motion. Neck supple. No tracheal deviation present. No thyromegaly present.   Cardiovascular: Normal rate and regular rhythm.   Murmur heard.  Pulmonary/Chest: Effort normal. He has wheezes. He has rales.   Abdominal: Soft. Bowel sounds are normal. He exhibits no distension. There is no tenderness. There is no rebound and no guarding.   Musculoskeletal: Normal range of motion.   Lymphadenopathy:     He has no cervical adenopathy.   Neurological: He is alert and oriented to person, place, and time.   Skin: Skin is warm and dry. Capillary refill takes less than 2 seconds.   Psychiatric: He has a normal mood and affect. His behavior is normal. Judgment and thought content normal.   Nursing note and vitals reviewed.        CRANIAL NERVES     CN III, IV, VI   Pupils are equal, round, and reactive to light.  Extraocular motions are normal.        Significant Labs:   Recent Lab Results       07/03/19  0651   07/03/19  0649   07/03/19  0333   07/03/19  0326   07/02/19  2334        POC O2%               Benzodiazepines     Presumptive Positive         Phencyclidine     Negative         Pressure Support               Albumin               Alcohol, Medical, Serum               Alkaline Phosphatase               Allens Test               ALT               Ammonia 14             Amphetamine Screen, Ur     Negative         Anion Gap               AST               Baso #               Basophil%               BILIRUBIN TOTAL               BNP               Site               BUN, Bld               Calcium               Chloride                CO2               Cocaine (Metab.)     Negative         CPAP PEEP               Creatinine               Creatinine, Random Ur     22.5  Comment:  The random urine reference ranges provided were established   for 24 hour urine collections.  No reference ranges exist for  random urine specimens.  Correlate clinically.           D-Dimer   457  Comment:  The quantitative D-dimer assay should be used as an aid in the   diagnosis of   deep vein thrombosis and pulmonary embolism. The result of the test   should   be evaluated in the context of all the clinical and laboratory data   available. In those instances where the laboratory result does not   agree   with the clinical evaluation, additional tests should be performed   accordingly.             Differential Method               eGFR if                eGFR if non                Eos #               Eosinophil%               Glucose               Gran # (ANC)               Gran%               Hematocrit               Hemoglobin               Immature Grans (Abs)               Immature Granulocytes               Lymph #               Lymph%               MCH               MCHC               MCV               Mode               Mono #               Mono%               MPV               nRBC               O2DEVICE         room air     Opiate Scrn, Ur     Negative         PiP               Platelets               POC BE         2.30     POC FIO2         21%     POC Flow               POC HCO3         27.10     POC PCO2         42.7     POC PH         7.42     POC PO2         59.5     POC SATURATED O2         91.9     POC TCO2         28.4     POC Temp               Potassium               PROTEIN TOTAL               Rate               RBC               RDW               Sodium               Marijuana (THC) Metabolite     Negative         Toxicology Information     SEE COMMENT  Comment:  This screen includes the following classes of drugs  at the   listed cut-off:  Benzodiazepines                  200 ng/ml  Cocaine metabolite               300 ng/ml  Opiates                         2000 ng/ml  Barbiturates                     200 ng/ml  Amphetamines                    1000 ng/ml  Marijuana metabs (THC)            50 ng/ml  Phencyclidine (PCP)               25 ng/ml  High concentrations of Methylenedioxymethamphetamine (MDMA aka  Ectasy) and other structurally similar compounds may cross-   react with the Amphetamine/Methamphetamine screening   immunoassay giving a false positive result.  Note: This exception list includes only more common   interferants in toxicology screen testing.  Because of many   cross-reactantspositive results on toxicology drug screens   should be confirmed whenever results do not correlate with   clinical presentation.  This report is intended for use in clinical monitoring and  management of patients. It is not intended for use in   employment related drug testing.  Because of any cross-reactants, positive results on toxicology  drug screens should be confirmed whenever results do not  correlate with clinical presentation.  Presumptive positive results are unconfirmed and may be used   only for medical purposes.           Troponin I   0.09   0.08       Vt               WBC                                07/02/19  2236   07/02/19  2038        POC O2%         Benzodiazepines Negative       Phencyclidine Negative       Pressure Support         Albumin   3.1     Alcohol, Medical, Serum   <5     Alkaline Phosphatase   85     Allens Test         ALT   79     Ammonia         Amphetamine Screen, Ur Negative       Anion Gap   8     AST   94     Baso #   0.08     Basophil%   0.7     BILIRUBIN TOTAL   1.5  Comment:  For infants and newborns, interpretation of results should be based  on gestational age, weight and in agreement with clinical  observations.  Premature Infant recommended reference ranges:  Up to 24 hours.............<8.0  mg/dL  Up to 48 hours............<12.0 mg/dL  3-5 days..................<15.0 mg/dL  6-29 days.................<15.0 mg/dL       BNP   525  Comment:  Values of less than 100 pg/ml are consistent with non-CHF populations.     Site         BUN, Bld   27     Calcium   8.5     Chloride   102     CO2   24     Cocaine (Metab.) Negative       CPAP PEEP         Creatinine   1.2     Creatinine, Random Ur <10.0  Comment:  The random urine reference ranges provided were established   for 24 hour urine collections.  No reference ranges exist for  random urine specimens.  Correlate clinically.         D-Dimer         Differential Method   Automated     eGFR if    >60.0     eGFR if non    >60.0  Comment:  Calculation used to obtain the estimated glomerular filtration  rate (eGFR) is the CKD-EPI equation.        Eos #   0.2     Eosinophil%   1.5     Glucose   73     Gran # (ANC)   8.3     Gran%   68.1     Hematocrit   43.0     Hemoglobin   14.2     Immature Grans (Abs)   0.03  Comment:  Mild elevation in immature granulocytes is non specific and   can be seen in a variety of conditions including stress response,   acute inflammation, trauma and pregnancy. Correlation with other   laboratory and clinical findings is essential.       Immature Granulocytes   0.2     Lymph #   2.7     Lymph%   22.2     MCH   29.2     MCHC   33.0     MCV   88     Mode         Mono #   0.9     Mono%   7.3     MPV   9.7     nRBC   0     O2DEVICE         Opiate Scrn, Ur Negative       PiP         Platelets   339     POC BE         POC FIO2         POC Flow         POC HCO3         POC PCO2         POC PH         POC PO2         POC SATURATED O2         POC TCO2         POC Temp         Potassium   4.6     PROTEIN TOTAL   7.3     Rate         RBC   4.87     RDW   15.7     Sodium   134     Marijuana (THC) Metabolite Negative       Toxicology Information SEE COMMENT  Comment:  This screen includes the following classes of  drugs at the   listed cut-off:  Benzodiazepines                  200 ng/ml  Cocaine metabolite               300 ng/ml  Opiates                         2000 ng/ml  Barbiturates                     200 ng/ml  Amphetamines                    1000 ng/ml  Marijuana metabs (THC)            50 ng/ml  Phencyclidine (PCP)               25 ng/ml  High concentrations of Methylenedioxymethamphetamine (MDMA aka  Ectasy) and other structurally similar compounds may cross-   react with the Amphetamine/Methamphetamine screening   immunoassay giving a false positive result.  Note: This exception list includes only more common   interferants in toxicology screen testing.  Because of many   cross-reactantspositive results on toxicology drug screens   should be confirmed whenever results do not correlate with   clinical presentation.  This report is intended for use in clinical monitoring and  management of patients. It is not intended for use in   employment related drug testing.  Because of any cross-reactants, positive results on toxicology  drug screens should be confirmed whenever results do not  correlate with clinical presentation.  Presumptive positive results are unconfirmed and may be used   only for medical purposes.         Troponin I   0.09     Vt         WBC   12.26         All pertinent labs within the past 24 hours have been reviewed.    Significant Imaging: I have reviewed and interpreted all pertinent imaging results/findings within the past 24 hours.    Assessment/Plan:     * Acute on chronic congestive heart failure  07/03/2019:  1.  Admit for congestive heart failure symptoms and continue diuresis.  2.  Repeat labs in the a.m. to include CBC CMP and deep BNP  3.  Treat otherwise symptomatically and as hospital course mandates.  4.  Reportedly this patient has a psychiatric diagnosis and will need monitoring for this for the need for treat        VTE Risk Mitigation (From admission, onward)        Ordered      enoxaparin injection 40 mg  Daily      07/03/19 1204     IP VTE HIGH RISK PATIENT  Once      07/03/19 1204     Place OLIVIA hose  Until discontinued      07/03/19 1131             Ivan Esquivel MD  Department of Hospital Medicine   [facility]

## 2019-07-03 NOTE — ED NOTES
Patient sitting up at bedside. Drowsy, refusing to lay back in bed after being asked multiple times by several members of nursing staff. AAO x4.

## 2019-07-03 NOTE — ED NOTES
Patient noted to have become increasingly anxious. Restless, fidgeting with cardiac monitor cords. MD notified.

## 2019-07-03 NOTE — ED NOTES
Patient resting in recliner. Eyes closed, respirations even and unlabored. Patient tolerating oxygen mask at this time.

## 2019-07-03 NOTE — PLAN OF CARE
07/03/19 1835   Discharge Assessment   Assessment Type Discharge Planning Assessment   Confirmed/corrected address and phone number on facesheet? Yes   Assessment information obtained from? Patient   Prior to hospitilization cognitive status: Alert/Oriented   Prior to hospitalization functional status: Independent   Current cognitive status: Alert/Oriented   Current Functional Status: Needs Assistance   Facility Arrived From: HOME   Lives With other relative(s)   Able to Return to Prior Arrangements yes   Is patient able to care for self after discharge? Yes   Who are your caregiver(s) and their phone number(s)? SAM MCCLELLAN [phone]   Patient's perception of discharge disposition home or selfcare   Readmission Within the Last 30 Days previous discharge plan unsuccessful   If yes, most recent facility name: PT WAS DISCHARGED [facility] ABOUT 3 DAYS AGO WITH THE SAME DIAGNOSIS HE HAS NOW.   Patient currently being followed by outpatient case management? No   Patient currently receives any other outside agency services? No   Equipment Currently Used at Home none   Do you have any problems affording any of your prescribed medications? No  (PT HAS [place] MEDICAID)   Is the patient taking medications as prescribed? yes   Does the patient have transportation home? Yes   Transportation Anticipated family or friend will provide   Dialysis Name and Scheduled days N/A   Does the patient receive services at the Coumadin Clinic? No   Discharge Plan A Home with family   DME Needed Upon Discharge  none   Patient/Family in Agreement with Plan yes   Readmission Questionnaire   At the time of your discharge, did someone talk to you about what your health problems were? Yes   At the time of discharge, did someone talk to you about what to watch out for regarding worsening of your health problem? Yes   At the time of discharge, did someone talk to you about what to do if you experienced worsening of your  health problem? Yes   At the time of discharge, did someone talk to you about which medication to take when you left the hospital and which ones to stop taking? Yes   At the time of discharge, did someone talk to you about when and where to follow up with a doctor after you left the hospital? Yes   What do you believe caused you to be sick enough to be re-admitted? SOB   How often do you need to have someone help you when you read instructions, pamphlets, or other written material from your doctor or pharmacy? Never   Do you have problems taking your medications as prescribed? No   Do you have any problems affording any of  your prescribed medications? No   Do you have problems obtaining/receiving your medications? No   Does the patient have transportation to healthcare appointments? Yes   Living Arrangements house   Does the patient have family/friends to help with healtcare needs after discharge? yes   Does your caregiver provide all the help you need? Yes   Are you currently feeling confused? Yes   Are you currently having problems thinking? Yes   Are you currently having memory problems? No   Have you felt down, depressed, or hopeless? 2   Have you felt little interest or pleasure in doing things? 2   In the last 7 days, my sleep quality was: very poor   PT LIVES IN [facility] WITH HIS COUSIN AND HER , SAM MCCLELLAN. HE MOVED HERE FROM [place] ABOUT A YEAR AGO. HE STILL HAS [place] MEDICAID WHICH HE NEEDS TO CHANGE TO [place] MEDICAID. HE SAYS HE HAS NO DOCTOR HERE. HE HAS 6 SONS IN [place] AND [place]. HE MUMBLES TODAY AND SW CAN BARELY UNDERSTAND HIM. HE DENIES HAVING EVER BEEN IN A PSYCH HOSPITAL OR TAKING PSYCH MEDS. HE DOES NOT GOT TO MENTAL HEALTH HERE. ROBINA WILL CONTACT HIS COUSIN Friday. WILL FOLLOW.

## 2019-07-03 NOTE — ED NOTES
Lyn, house supervisor, notifies RN that a delay in transfer to ICU is required.    Patient sitting up in recliner, continues to refuse to keep mask on.

## 2019-07-03 NOTE — ED NOTES
"RN sees patient falling asleep and leaning forward out of recliner. Immediately goes to bedside, wakes up patient and requests that he sit back in the chair and put his feet up. Patient is now agitated, holding his hand in RN's face. States he needs to use the bedpan. RN brings bedside commode. Patient now refuses bedpan and bedside commode, wanting to walk to the restroom down the [address]. RN explains that he is falling asleep in the chair, confused, unsteady on his feet, etc and is unable to walk to the restroom. Patient replies "fine! Well if I get constipated I'm suing you!" Patient sits back in recliner, puts his feet up, and crosses his arms.   "

## 2019-07-03 NOTE — ED NOTES
Patient noted to be lying on right side. Eyes closed, respirations even and unlabored. Oxygen saturation drops down to 80% while at bedside. Patient placed back on nasal cannula. Patient reporting that he feels better after receiving lasix. Patient slurring words, mumbling, and seems lethargic. Dr. Rodriguez notified.

## 2019-07-04 VITALS
RESPIRATION RATE: 18 BRPM | DIASTOLIC BLOOD PRESSURE: 77 MMHG | SYSTOLIC BLOOD PRESSURE: 109 MMHG | TEMPERATURE: 97 F | OXYGEN SATURATION: 100 % | HEART RATE: 97 BPM | HEIGHT: 68 IN | WEIGHT: 161.13 LBS | BODY MASS INDEX: 24.42 KG/M2

## 2019-07-04 LAB
ANION GAP SERPL CALC-SCNC: 7 MMOL/L (ref 8–16)
AORTIC VALVE CUSP SEPERATION: 2 CM
AV PEAK GRADIENT: 2 MMHG
AV VELOCITY RATIO: 0.86
BASOPHILS # BLD AUTO: 0.06 K/UL (ref 0–0.2)
BASOPHILS NFR BLD: 0.7 % (ref 0–1.9)
BSA FOR ECHO PROCEDURE: 1.94 M2
BUN SERPL-MCNC: 24 MG/DL (ref 6–20)
CALCIUM SERPL-MCNC: 8 MG/DL (ref 8.7–10.5)
CHLORIDE SERPL-SCNC: 103 MMOL/L (ref 95–110)
CO2 SERPL-SCNC: 26 MMOL/L (ref 23–29)
CREAT SERPL-MCNC: 1.2 MG/DL (ref 0.5–1.4)
CV ECHO LV RWT: 0.3 CM
DIFFERENTIAL METHOD: ABNORMAL
DOP CALC AO PEAK VEL: 0.7 M/S
DOP CALC LVOT AREA: 3.8 CM2
DOP CALC LVOT DIAMETER: 2.2 CM
DOP CALC LVOT PEAK VEL: 0.6 M/S
ECHO LV POSTERIOR WALL: 0.9 CM (ref 0.6–1.1)
EOSINOPHIL # BLD AUTO: 0.3 K/UL (ref 0–0.5)
EOSINOPHIL NFR BLD: 3.5 % (ref 0–8)
ERYTHROCYTE [DISTWIDTH] IN BLOOD BY AUTOMATED COUNT: 15.3 % (ref 11.5–14.5)
EST. GFR  (AFRICAN AMERICAN): >60 ML/MIN/1.73 M^2
EST. GFR  (NON AFRICAN AMERICAN): >60 ML/MIN/1.73 M^2
FRACTIONAL SHORTENING: 7 % (ref 28–44)
GLUCOSE SERPL-MCNC: 88 MG/DL (ref 70–110)
HCT VFR BLD AUTO: 40.2 % (ref 40–54)
HGB BLD-MCNC: 13.3 G/DL (ref 14–18)
IMM GRANULOCYTES # BLD AUTO: 0.02 K/UL (ref 0–0.04)
IMM GRANULOCYTES NFR BLD AUTO: 0.2 % (ref 0–0.5)
INTERVENTRICULAR SEPTUM: 1.1 CM (ref 0.6–1.1)
LEFT ATRIUM SIZE: 4.5 CM
LEFT INTERNAL DIMENSION IN SYSTOLE: 5.7 CM (ref 2.1–4)
LEFT VENTRICLE MASS INDEX: 132 G/M2
LEFT VENTRICULAR INTERNAL DIMENSION IN DIASTOLE: 6.1 CM (ref 3.5–6)
LEFT VENTRICULAR MASS: 253.91 G
LYMPHOCYTES # BLD AUTO: 2.5 K/UL (ref 1–4.8)
LYMPHOCYTES NFR BLD: 27.3 % (ref 18–48)
MCH RBC QN AUTO: 28.7 PG (ref 27–31)
MCHC RBC AUTO-ENTMCNC: 33.1 G/DL (ref 32–36)
MCV RBC AUTO: 87 FL (ref 82–98)
MONOCYTES # BLD AUTO: 0.6 K/UL (ref 0.3–1)
MONOCYTES NFR BLD: 7 % (ref 4–15)
MV PEAK GRADIENT: 54 MMHG
MV STENOSIS PRESSURE HALF TIME: 40 MS
MV VALVE AREA P 1/2 METHOD: 5.5 CM2
NEUTROPHILS # BLD AUTO: 5.6 K/UL (ref 1.8–7.7)
NEUTROPHILS NFR BLD: 61.3 % (ref 38–73)
NRBC BLD-RTO: 0 /100 WBC
PISA TR MAX VEL: 2.63 M/S
PLATELET # BLD AUTO: 273 K/UL (ref 150–350)
PMV BLD AUTO: 10.1 FL (ref 9.2–12.9)
POTASSIUM SERPL-SCNC: 3.7 MMOL/L (ref 3.5–5.1)
RA PRESSURE: 3 MMHG
RBC # BLD AUTO: 4.63 M/UL (ref 4.6–6.2)
RIGHT VENTRICULAR END-DIASTOLIC DIMENSION: 4.1 CM
SODIUM SERPL-SCNC: 136 MMOL/L (ref 136–145)
TR MAX PG: 28 MMHG
TV REST PULMONARY ARTERY PRESSURE: 31 MMHG
WBC # BLD AUTO: 9.11 K/UL (ref 3.9–12.7)

## 2019-07-04 PROCEDURE — 63600175 PHARM REV CODE 636 W HCPCS: Performed by: INTERNAL MEDICINE

## 2019-07-04 PROCEDURE — 80048 BASIC METABOLIC PNL TOTAL CA: CPT

## 2019-07-04 PROCEDURE — 97802 MEDICAL NUTRITION INDIV IN: CPT

## 2019-07-04 PROCEDURE — 36415 COLL VENOUS BLD VENIPUNCTURE: CPT

## 2019-07-04 PROCEDURE — G0378 HOSPITAL OBSERVATION PER HR: HCPCS

## 2019-07-04 PROCEDURE — S0028 INJECTION, FAMOTIDINE, 20 MG: HCPCS | Performed by: INTERNAL MEDICINE

## 2019-07-04 PROCEDURE — 85025 COMPLETE CBC W/AUTO DIFF WBC: CPT

## 2019-07-04 PROCEDURE — 25000003 PHARM REV CODE 250: Performed by: INTERNAL MEDICINE

## 2019-07-04 RX ORDER — FUROSEMIDE 20 MG/1
20 TABLET ORAL 3 TIMES DAILY
Qty: 90 TABLET | Refills: 11 | Status: ON HOLD | OUTPATIENT
Start: 2019-07-04 | End: 2019-08-26 | Stop reason: SDUPTHER

## 2019-07-04 RX ADMIN — FAMOTIDINE 20 MG: 10 INJECTION INTRAVENOUS at 08:07

## 2019-07-04 RX ADMIN — CARVEDILOL 3.12 MG: 3.12 TABLET, FILM COATED ORAL at 08:07

## 2019-07-04 RX ADMIN — SPIRONOLACTONE 25 MG: 25 TABLET ORAL at 08:07

## 2019-07-04 RX ADMIN — FUROSEMIDE 40 MG: 10 INJECTION, SOLUTION INTRAMUSCULAR; INTRAVENOUS at 08:07

## 2019-07-04 RX ADMIN — SENNOSIDES, DOCUSATE SODIUM 1 TABLET: 50; 8.6 TABLET, FILM COATED ORAL at 08:07

## 2019-07-04 NOTE — PLAN OF CARE
Problem: Adult Inpatient Plan of Care  Goal: Absence of Hospital-Acquired Illness or Injury    Intervention: Identify and Manage Fall Risk     07/04/19 0133   Optimize Balance and Safe Activity   Safety Promotion/Fall Prevention assistive device/personal item within reach;bed alarm set;side rails raised x 2;nonskid shoes/socks when out of bed;muscle strengthening facilitated;lighting adjusted;medications reviewed;Fall Risk reviewed with patient/family

## 2019-07-04 NOTE — PROGRESS NOTES
"  [facility]  Adult Nutrition  Consult Note    SUMMARY     Recommendations    Recommendation/Intervention: (1) Will provide Cardiac diet  2) Will provide fluids of choice on meal trays)  Goals: (Pt will consume >75% of meals  2) Pt will consume recommended fluid)  Nutrition Goal Status: new    Reason for Assessment    Reason For Assessment: identified at risk by screening criteria  Diagnosis: cardiac disease(CHF)  General Information Comments:     7/4/2019 Pt was discharged from [facility] a few days ago and came here to the ER. Attempted to talk to pt about importance of low na diet and weighing daily. Pt was very distracted. Pt refused written information. I was able to take preferences. Pt stated that he ate most of his  Brk. Pt is from [place] and lives here with cousin and .    Past Medical History:   Diagnosis Date    Anxiety     CHF (congestive heart failure)     Depression     Hypertension      Nutrition Risk Screen    Nutrition Risk Screen: no indicators present    Nutrition/Diet History    Patient Reported Diet/Restrictions/Preferences: heart healthy  Spiritual, Cultural Beliefs, Pentec[employer]alism Practices, Values that Affect Care: no  Food Allergies: NKFA  Factors Affecting Nutritional Intake: None identified at this time    Anthropometrics    Temp: 96.5 °F (35.8 °C)  Height Method: Stated  Height: 5' 8" (172.7 cm)  Height (inches): 68 in  Weight Method: Standard Scale  Weight: 73.1 kg (161 lb 1.6 oz)  Weight (lb): 161.1 lb  Ideal Body Weight (IBW), Male: 154 lb  % Ideal Body Weight, Male (lb): 112.34 lb  BMI (Calculated): 26.4     Lab/Procedures/Meds    Pertinent Labs Reviewed: reviewed  Lab Results   Component Value Date    ALBUMIN 3.2 (L) 07/03/2019     BNP  Recent Labs   Lab 07/02/19 2038   *     Physical Findings/Assessment    Pt appears well nourished  Oswald Score: 23    Estimated/Assessed Needs    Weight Used For Calorie Calculations: 73.1 kg (161 lb 1 " oz)  Energy Calorie Requirements (kcal): 6368-4441 calories  (25-30 kcal/kg)  Energy Need Method: Kcal/kg  Protein Requirements: 73-88 gms  (1.0-1.2)  Weight Used For Protein Calculations: 73.1 kg (161 lb 2.5 oz)     Estimated Fluid Requirement Method: RDA Method  RDA Method (mL): 1826     Nutrition Prescription Ordered    Current Diet Order: Cardiac    Evaluation of Received Nutrient/Fluid Intake    Energy Calories Required: meeting needs  Protein Required: meeting needs  Fluid Required: not meeting needs  Total Fluid Intake (mL/kg): (480 mls)  Tolerance: tolerating  % Intake of Estimated Energy Needs:   % Meal Intake: >75% per pt    Nutrition Risk    Level of Risk/Frequency of Follow-up: low     Assessment and Plan    Nutrition Problem  Difficulty eating    Related to (etiology):   CHF    Signs and Symptoms (as evidenced by):   Shortness of breath affecting intake    Interventions/Recommendations (treatment strategy):  Provide Cardiac Diet/with preferences    Nutrition Diagnosis Status:   Continuing     Monitor and Evaluation    Food and Nutrient Intake: energy intake  Food and Nutrient Adminstration: diet order  Anthropometric Measurements: weight  Nutrition-Focused Physical Findings: overall appearance     Malnutrition Assessment  Malnutrition Type: chronic illness      Nutrition Follow-Up    Yes

## 2019-07-04 NOTE — DISCHARGE SUMMARY
[facility] Hospital Medicine  Discharge Summary      Patient Name: Francis Daigle  MRN: [id number]  Admission Date: 7/2/2019  Hospital Length of Stay: 1 days  Discharge Date and Time:  07/04/2019 12:01 PM  Attending Physician: Ivan Esquivel MD   Discharging Provider: Ivan Esquivel MD  Primary Care Provider: No primary care provider on file.      HPI:   Patient is a 43-year-old male that presented to the emergency department complaining of shortness of breath and history of congestive heart failure.  Patient states that he was getting short of breath more as he feels sleep and was dreaming.  He states he would wake up short of breath some very suddenly.  Patient otherwise gives a past medical history significant for acute on chronic congestive heart failure hypertension depression anxiety and osteoarthritis.  Patient denied any fever chills nausea or vomiting but did state that he was very nervous and agitated.  Patient seemed to not be able to keep still while in the emergency department.  Patient required sedation and also treatment for congestive heart failure in the form of diuretics.  Patient recently was admitted to hospital and [place] treated for congestive heart failure.  Patient was noted to have a ejection fraction of 35%.    * No surgery found *      Hospital Course:   Will admit to medical-surgical unit for monitoring and treatment of congestive heart failure symptoms.  Patient will also be monitored for psychiatric diagnoses.    07/04/2019:  Patient is doing well having no other signs of mental status change.  Patient is breathing well having no orthopnea no paroxysmal nocturnal dyspnea .  Labs showed normal chemistries sodium 137 potassium 3.7 chloride 103 bicarb 26 GFR greater than 60 and CBC was normal white blood cell count of 9.1 hemoglobin 13.3 hematocrit 40.2 otherwise normal.  Patient is ready for discharge for follow-up with his primary care physician  within 1 week.  We will increase Lasix to 3 times daily or 2 in the morning and 1 at in the evening.     Consults:     * Acute on chronic congestive heart failure  07/03/2019:  1.  Admit for congestive heart failure symptoms and continue diuresis.  2.  Repeat labs in the a.m. to include CBC CMP and deep BNP  3.  Treat otherwise symptomatically and as hospital course mandates.  4.  Reportedly this patient has a psychiatric diagnosis and will need monitoring for this for the need for treat    07/04/2019:  1.  Discharge to home  2.  Lasix 20 mg p.o. 3 times daily  3.  Patient may also take Lasix to in the morning and 1 in the evening if more convenient.  4.  Follow-up with primary care physician 1 week discharge.  5.  Patient improved much quicker than expected with exacerbation of CHF.  Patient has no symptoms and states ready to be discharged.        Final Active Diagnoses:    Diagnosis Date Noted POA    PRINCIPAL PROBLEM:  Acute on chronic congestive heart failure [I50.9] 07/03/2019 Unknown    Confusion [R41.0] 07/03/2019 Yes      Problems Resolved During this Admission:       Discharged Condition: good    Disposition:     Follow Up:    Patient Instructions:   No discharge procedures on file.    Significant Diagnostic Studies: Labs: All labs within the past 24 hours have been reviewed    Pending Diagnostic Studies:     None         Medications:  Reconciled Home Medications:      Medication List      CHANGE how you take these medications    furosemide 20 MG tablet  Commonly known as:  LASIX  Take 1 tablet (20 mg total) by mouth 3 (three) times daily.  What changed:  when to take this        CONTINUE taking these medications    carvedilol 3.125 MG tablet  Commonly known as:  COREG  Take 3.125 mg by mouth 2 (two) times daily with meals.     enalapril 2.5 MG tablet  Commonly known as:  VASOTEC  Take 2.5 mg by mouth once daily.     spironolactone 25 MG tablet  Commonly known as:  ALDACTONE  Take 25 mg by mouth once  daily.            Indwelling Lines/Drains at time of discharge:   Lines/Drains/Airways          None          Time spent on the discharge of patient: 45 minutes  Patient was seen and examined on the date of discharge and determined to be suitable for discharge.         Ivan Esquivel MD  Department of Hospital Medicine  [facility]

## 2019-07-04 NOTE — PLAN OF CARE
Problem: Suicide Risk  Goal: Absence of Self-Harm    Intervention: Assess Risk to Self and Maintain Safety     07/04/19 0133   Support Psychosocial Response to Acute Illness   Safety/Security Measures bed alarm set

## 2019-07-04 NOTE — PLAN OF CARE
Problem: Fall Injury Risk  Goal: Absence of Fall and Fall-Related Injury    Intervention: Identify and Manage Contributors to Fall Injury Risk     07/04/19 0132   Manage Acute Allergic Reaction   Medication Review/Management medications reviewed   Identify and Manage Contributors to Fall Injury Risk   Self-Care Promotion independence encouraged

## 2019-07-04 NOTE — NURSING
0800>PT LABILE, AGITATED. WANTS TO GO HOME. TAUGHT PT ABOUT LASIX. IMPULSIVE BUT AGITATED BED ALARM ON.   1245>TRIED TO GO OVER AVS AFTER TAKING OUT PT'S IV. PT USING PROFANITY. REFUSED EDUCATION/NEXT DOSE ON LASIX INFORMATION. ESCORTED BY STAFF TO [facility]

## 2019-07-04 NOTE — ASSESSMENT & PLAN NOTE
07/03/2019:  1.  Admit for congestive heart failure symptoms and continue diuresis.  2.  Repeat labs in the a.m. to include CBC CMP and deep BNP  3.  Treat otherwise symptomatically and as hospital course mandates.  4.  Reportedly this patient has a psychiatric diagnosis and will need monitoring for this for the need for treat    07/04/2019:  1.  Discharge to home  2.  Lasix 20 mg p.o. 3 times daily  3.  Patient may also take Lasix to in the morning and 1 in the evening if more convenient.  4.  Follow-up with primary care physician 1 week discharge.  5.  Patient improved much quicker than expected with exacerbation of CHF.  Patient has no symptoms and states ready to be discharged.

## 2019-07-05 NOTE — PLAN OF CARE
07/05/19 1758   Final Note   Assessment Type Final Discharge Note   Anticipated Discharge Disposition Home   What phone number can be called within the next 1-3 days to see how you are doing after discharge? [phone]   Hospital Follow Up  Appt(s) scheduled?   (PT WILL BE CONTACTED BY ROBINA RE ARRANGING A F/U APPOINTMENT WITH AN MD IN [place] SINCE HE HAS [place] MEDICAID)   Discharge plans and expectations educations in teach back method with documentation complete? Yes   PT DISCHARGED July 4 WHEN ROBINA WAS NOT HERE. ROBINA WILL CONTACT PT ON Monday AND OFFER TO ARRANGE A F/U APPOINTMENT WITH EMILIE WU. PT HAS  [place] MEDICAID. NO OTHER DISCHARGE NEEDS IDENTIFIED AT THIS TIME.

## 2019-07-06 ENCOUNTER — HOSPITAL ENCOUNTER (EMERGENCY)
Facility: HOSPITAL | Age: 44
Discharge: ANOTHER HOSPITAL | End: 2019-07-06
Attending: FAMILY MEDICINE
Payer: MEDICAID

## 2019-07-06 VITALS
SYSTOLIC BLOOD PRESSURE: 119 MMHG | HEART RATE: 112 BPM | BODY MASS INDEX: 25.01 KG/M2 | HEIGHT: 68 IN | WEIGHT: 165 LBS | TEMPERATURE: 98 F | OXYGEN SATURATION: 100 % | DIASTOLIC BLOOD PRESSURE: 90 MMHG | RESPIRATION RATE: 18 BRPM

## 2019-07-06 DIAGNOSIS — R06.02 SOB (SHORTNESS OF BREATH): ICD-10-CM

## 2019-07-06 DIAGNOSIS — R06.00 DYSPNEA, UNSPECIFIED TYPE: ICD-10-CM

## 2019-07-06 DIAGNOSIS — I50.23 ACUTE ON CHRONIC SYSTOLIC CONGESTIVE HEART FAILURE: Primary | ICD-10-CM

## 2019-07-06 DIAGNOSIS — R06.02 SHORTNESS OF BREATH: ICD-10-CM

## 2019-07-06 LAB
ALBUMIN SERPL BCP-MCNC: 3 G/DL (ref 3.5–5.2)
ALP SERPL-CCNC: 75 U/L (ref 55–135)
ALT SERPL W/O P-5'-P-CCNC: 82 U/L (ref 10–44)
AMPHET+METHAMPHET UR QL: NEGATIVE
ANION GAP SERPL CALC-SCNC: 6 MMOL/L (ref 8–16)
AST SERPL-CCNC: 89 U/L (ref 10–40)
BASOPHILS # BLD AUTO: 0.07 K/UL (ref 0–0.2)
BASOPHILS NFR BLD: 0.7 % (ref 0–1.9)
BENZODIAZ UR QL SCN>200 NG/ML: NEGATIVE
BILIRUB SERPL-MCNC: 0.6 MG/DL (ref 0.1–1)
BNP SERPL-MCNC: 397 PG/ML (ref 0–99)
BUN SERPL-MCNC: 22 MG/DL (ref 6–20)
BZE UR QL SCN: NEGATIVE
CALCIUM SERPL-MCNC: 8.2 MG/DL (ref 8.7–10.5)
CANNABINOIDS UR QL SCN: NEGATIVE
CHLORIDE SERPL-SCNC: 108 MMOL/L (ref 95–110)
CO2 SERPL-SCNC: 21 MMOL/L (ref 23–29)
CREAT SERPL-MCNC: 1.1 MG/DL (ref 0.5–1.4)
CREAT UR-MCNC: 91.3 MG/DL (ref 23–375)
DIFFERENTIAL METHOD: ABNORMAL
EOSINOPHIL # BLD AUTO: 0.3 K/UL (ref 0–0.5)
EOSINOPHIL NFR BLD: 2.6 % (ref 0–8)
ERYTHROCYTE [DISTWIDTH] IN BLOOD BY AUTOMATED COUNT: 15.6 % (ref 11.5–14.5)
EST. GFR  (AFRICAN AMERICAN): >60 ML/MIN/1.73 M^2
EST. GFR  (NON AFRICAN AMERICAN): >60 ML/MIN/1.73 M^2
GLUCOSE SERPL-MCNC: 91 MG/DL (ref 70–110)
HCT VFR BLD AUTO: 42.1 % (ref 40–54)
HCT VFR BLD AUTO: 46.1 % (ref 40–54)
HGB BLD-MCNC: 13.6 G/DL (ref 14–18)
HGB BLD-MCNC: 14.8 G/DL (ref 14–18)
IMM GRANULOCYTES # BLD AUTO: 0.03 K/UL (ref 0–0.04)
IMM GRANULOCYTES NFR BLD AUTO: 0.3 % (ref 0–0.5)
LYMPHOCYTES # BLD AUTO: 2.6 K/UL (ref 1–4.8)
LYMPHOCYTES NFR BLD: 24.4 % (ref 18–48)
MCH RBC QN AUTO: 28.6 PG (ref 27–31)
MCHC RBC AUTO-ENTMCNC: 32.3 G/DL (ref 32–36)
MCV RBC AUTO: 89 FL (ref 82–98)
MONOCYTES # BLD AUTO: 0.9 K/UL (ref 0.3–1)
MONOCYTES NFR BLD: 8.2 % (ref 4–15)
NEUTROPHILS # BLD AUTO: 6.8 K/UL (ref 1.8–7.7)
NEUTROPHILS NFR BLD: 63.8 % (ref 38–73)
NRBC BLD-RTO: 0 /100 WBC
OPIATES UR QL SCN: NEGATIVE
PCP UR QL SCN>25 NG/ML: NEGATIVE
PLATELET # BLD AUTO: 281 K/UL (ref 150–350)
PMV BLD AUTO: 10.1 FL (ref 9.2–12.9)
POTASSIUM SERPL-SCNC: 4 MMOL/L (ref 3.5–5.1)
PROT SERPL-MCNC: 7.2 G/DL (ref 6–8.4)
RBC # BLD AUTO: 4.75 M/UL (ref 4.6–6.2)
SODIUM SERPL-SCNC: 135 MMOL/L (ref 136–145)
TOXICOLOGY INFORMATION: NORMAL
TROPONIN I SERPL DL<=0.01 NG/ML-MCNC: 0.04 NG/ML (ref 0.02–0.5)
TROPONIN I SERPL DL<=0.01 NG/ML-MCNC: 0.04 NG/ML (ref 0.02–0.5)
TSH SERPL DL<=0.005 MIU/L-ACNC: 5.5 UIU/ML (ref 0.34–5.6)
WBC # BLD AUTO: 10.72 K/UL (ref 3.9–12.7)

## 2019-07-06 PROCEDURE — 71275 CT ANGIOGRAPHY CHEST: CPT | Mod: TC

## 2019-07-06 PROCEDURE — 83880 ASSAY OF NATRIURETIC PEPTIDE: CPT

## 2019-07-06 PROCEDURE — 80307 DRUG TEST PRSMV CHEM ANLYZR: CPT

## 2019-07-06 PROCEDURE — 71046 X-RAY EXAM CHEST 2 VIEWS: CPT | Mod: 26,,, | Performed by: RADIOLOGY

## 2019-07-06 PROCEDURE — 85018 HEMOGLOBIN: CPT | Mod: 91

## 2019-07-06 PROCEDURE — 96375 TX/PRO/DX INJ NEW DRUG ADDON: CPT | Mod: 59

## 2019-07-06 PROCEDURE — 25000003 PHARM REV CODE 250: Performed by: FAMILY MEDICINE

## 2019-07-06 PROCEDURE — 80053 COMPREHEN METABOLIC PANEL: CPT

## 2019-07-06 PROCEDURE — 25500020 PHARM REV CODE 255: Performed by: FAMILY MEDICINE

## 2019-07-06 PROCEDURE — 71046 X-RAY EXAM CHEST 2 VIEWS: CPT | Mod: TC,FY

## 2019-07-06 PROCEDURE — 71275 CTA CHEST NON CORONARY: ICD-10-PCS | Mod: 26,,, | Performed by: RADIOLOGY

## 2019-07-06 PROCEDURE — 84443 ASSAY THYROID STIM HORMONE: CPT

## 2019-07-06 PROCEDURE — 96374 THER/PROPH/DIAG INJ IV PUSH: CPT

## 2019-07-06 PROCEDURE — 85014 HEMATOCRIT: CPT

## 2019-07-06 PROCEDURE — 63600175 PHARM REV CODE 636 W HCPCS: Performed by: FAMILY MEDICINE

## 2019-07-06 PROCEDURE — 84484 ASSAY OF TROPONIN QUANT: CPT

## 2019-07-06 PROCEDURE — 71275 CT ANGIOGRAPHY CHEST: CPT | Mod: 26,,, | Performed by: RADIOLOGY

## 2019-07-06 PROCEDURE — 99285 EMERGENCY DEPT VISIT HI MDM: CPT | Mod: 25

## 2019-07-06 PROCEDURE — 71046 XR CHEST PA AND LATERAL: ICD-10-PCS | Mod: 26,,, | Performed by: RADIOLOGY

## 2019-07-06 PROCEDURE — 93005 ELECTROCARDIOGRAM TRACING: CPT

## 2019-07-06 PROCEDURE — 85025 COMPLETE CBC W/AUTO DIFF WBC: CPT

## 2019-07-06 RX ORDER — LORAZEPAM 2 MG/ML
1 INJECTION INTRAMUSCULAR
Status: COMPLETED | OUTPATIENT
Start: 2019-07-06 | End: 2019-07-06

## 2019-07-06 RX ORDER — FUROSEMIDE 10 MG/ML
40 INJECTION INTRAMUSCULAR; INTRAVENOUS
Status: COMPLETED | OUTPATIENT
Start: 2019-07-06 | End: 2019-07-06

## 2019-07-06 RX ORDER — METOPROLOL TARTRATE 1 MG/ML
5 INJECTION, SOLUTION INTRAVENOUS
Status: COMPLETED | OUTPATIENT
Start: 2019-07-06 | End: 2019-07-06

## 2019-07-06 RX ADMIN — IOHEXOL 100 ML: 350 INJECTION, SOLUTION INTRAVENOUS at 04:07

## 2019-07-06 RX ADMIN — METOPROLOL TARTRATE 5 MG: 1 INJECTION, SOLUTION INTRAVENOUS at 04:07

## 2019-07-06 RX ADMIN — LORAZEPAM 1 MG: 2 INJECTION INTRAMUSCULAR; INTRAVENOUS at 03:07

## 2019-07-06 RX ADMIN — FUROSEMIDE 40 MG: 10 INJECTION, SOLUTION INTRAMUSCULAR; INTRAVENOUS at 05:07

## 2019-07-06 NOTE — ED PROVIDER NOTES
"Encounter Date: 7/6/2019       History     Chief Complaint   Patient presents with    Shortness of Breath     Patient presents with worsening shortness of breath over the past 12-18 hours.  Patient has history of CHF, recently discharged from hospital here, Lasix was increased to 3 times a day but patient states he has only been taking once a day because he does not like the way the Lasix makes him feel.  Denies any chest pain. Denies any leg swelling. Denies history of coronary artery disease, patient states last angiogram was approximately 1 year ago and "showed no blockage."        Review of patient's allergies indicates:  No Known Allergies  Past Medical History:   Diagnosis Date    Anxiety     CHF (congestive heart failure)     Depression     Hypertension      History reviewed. No pertinent surgical history.  History reviewed. No pertinent family history.  Social History     Tobacco Use    Smoking status: Former Smoker   Substance Use Topics    Alcohol use: Not Currently    Drug use: Not Currently     Review of Systems   Respiratory: Positive for shortness of breath. Negative for cough, chest tightness and wheezing.    Cardiovascular: Negative for chest pain and palpitations.   Gastrointestinal: Negative for nausea.   All other systems reviewed and are negative.      Physical Exam     Initial Vitals [07/06/19 0244]   BP Pulse Resp Temp SpO2   (!) 135/104 (!) 120 (!) 22 97.7 °F (36.5 °C) 98 %      MAP       --         Physical Exam    Nursing note and vitals reviewed.  Constitutional: He appears well-developed and well-nourished. He is not diaphoretic. No distress.   HENT:   Head: Normocephalic and atraumatic.   Eyes: Pupils are equal, round, and reactive to light.   Neck: Normal range of motion. Neck supple.   Cardiovascular: Normal rate, regular rhythm, normal heart sounds and intact distal pulses. Exam reveals no gallop and no friction rub.    No murmur heard.  Pulmonary/Chest: Breath sounds normal. " No respiratory distress. He has no wheezes. He has no rhonchi. He has no rales. He exhibits no tenderness.   Abdominal: Soft. Bowel sounds are normal. He exhibits no distension. There is no tenderness. There is no rebound and no guarding.   Musculoskeletal: Normal range of motion. He exhibits no edema.   Neurological: He is alert and oriented to person, place, and time. He has normal strength.   Skin: Skin is warm and dry. Capillary refill takes less than 2 seconds.   Psychiatric: He has a normal mood and affect. His behavior is normal. Judgment and thought content normal.         ED Course   Procedures  Labs Reviewed   TROPONIN I   B-TYPE NATRIURETIC PEPTIDE   CBC W/ AUTO DIFFERENTIAL   COMPREHENSIVE METABOLIC PANEL     EKG Readings: (Independently Interpreted)   Initial Reading: No STEMI. Rhythm: Sinus Tachycardia. Heart Rate: 119. Ectopy: No Ectopy. Conduction: Normal. Other Findings: Prolonged QT Interval. Other Impression: no old EKG for comparison is available.        Imaging Results          X-Ray Chest PA And Lateral (In process)               Results for orders placed or performed during the hospital encounter of 07/06/19   Troponin I   Result Value Ref Range    Troponin I 0.04 0.02 - 0.50 ng/mL   Brain natriuretic peptide   Result Value Ref Range     (H) 0 - 99 pg/mL   CBC auto differential   Result Value Ref Range    WBC 10.72 3.90 - 12.70 K/uL    RBC 4.75 4.60 - 6.20 M/uL    Hemoglobin 13.6 (L) 14.0 - 18.0 g/dL    Hematocrit 42.1 40.0 - 54.0 %    Mean Corpuscular Volume 89 82 - 98 fL    Mean Corpuscular Hemoglobin 28.6 27.0 - 31.0 pg    Mean Corpuscular Hemoglobin Conc 32.3 32.0 - 36.0 g/dL    RDW 15.6 (H) 11.5 - 14.5 %    Platelets 281 150 - 350 K/uL    MPV 10.1 9.2 - 12.9 fL    Immature Granulocytes 0.3 0.0 - 0.5 %    Gran # (ANC) 6.8 1.8 - 7.7 K/uL    Immature Grans (Abs) 0.03 0.00 - 0.04 K/uL    Lymph # 2.6 1.0 - 4.8 K/uL    Mono # 0.9 0.3 - 1.0 K/uL    Eos # 0.3 0.0 - 0.5 K/uL    Baso # 0.07  0.00 - 0.20 K/uL    nRBC 0 0 /100 WBC    Gran% 63.8 38.0 - 73.0 %    Lymph% 24.4 18.0 - 48.0 %    Mono% 8.2 4.0 - 15.0 %    Eosinophil% 2.6 0.0 - 8.0 %    Basophil% 0.7 0.0 - 1.9 %    Differential Method Automated    Comprehensive metabolic panel   Result Value Ref Range    Sodium 135 (L) 136 - 145 mmol/L    Potassium 4.0 3.5 - 5.1 mmol/L    Chloride 108 95 - 110 mmol/L    CO2 21 (L) 23 - 29 mmol/L    Glucose 91 70 - 110 mg/dL    BUN, Bld 22 (H) 6 - 20 mg/dL    Creatinine 1.1 0.5 - 1.4 mg/dL    Calcium 8.2 (L) 8.7 - 10.5 mg/dL    Total Protein 7.2 6.0 - 8.4 g/dL    Albumin 3.0 (L) 3.5 - 5.2 g/dL    Total Bilirubin 0.6 0.1 - 1.0 mg/dL    Alkaline Phosphatase 75 55 - 135 U/L    AST 89 (H) 10 - 40 U/L    ALT 82 (H) 10 - 44 U/L    Anion Gap 6 (L) 8 - 16 mmol/L    eGFR if African American >60.0 >60 mL/min/1.73 m^2    eGFR if non African American >60.0 >60 mL/min/1.73 m^2   Drug screen panel, emergency   Result Value Ref Range    Benzodiazepines Negative     Cocaine (Metab.) Negative     Opiate Scrn, Ur Negative     Amphetamine Screen, Ur Negative     THC Negative     Phencyclidine Negative     Creatinine, Random Ur 91.3 23.0 - 375.0 mg/dL    Toxicology Information SEE COMMENT    TSH   Result Value Ref Range    TSH 5.502 0.340 - 5.600 uIU/mL   Troponin I   Result Value Ref Range    Troponin I 0.04 0.02 - 0.50 ng/mL   Hematocrit   Result Value Ref Range    Hematocrit 46.1 40.0 - 54.0 %   Hemoglobin   Result Value Ref Range    Hemoglobin 14.8 14.0 - 18.0 g/dL     Imaging Results          CTA Chest Non-Coronary (PE Study) (Final result)  Result time 07/06/19 07:08:07    Final result by Isreal Sanders MD (07/06/19 07:08:07)                 Impression:      1. Motion artifact limits evaluation.  2. No proximal pulmonary embolus.  There is inadequate contrast opacification of the segmental and subsegmental pulmonary arteries to exclude a small pulmonary embolus.  As deemed clinically necessary, further evaluation may be  obtained with a VQ lung scan.  3. Small left pleural effusion with dependent left lower lobe atelectasis.  4. Cardiomegaly.  5. Perihepatic ascites.  The preliminary and final reports are concordant.      Electronically signed by: Isreal Sanders  Date:    07/06/2019  Time:    07:08             Narrative:    EXAMINATION:  CTA CHEST NON CORONARY    CLINICAL HISTORY:  Dyspnea, cardiac origin suspected;    TECHNIQUE:  Low dose axial images, sagittal and coronal reformations were obtained from the thoracic inlet to the lung bases following the IV administration of 100 mL of Omnipaque 350.  Contrast timing was optimized to evaluate the pulmonary arteries.  MIP images were performed.    COMPARISON:  Chest x-ray 07/06/2019.    FINDINGS:  Motion artifact limits evaluation.    The pulmonary trunk and main right and left pulmonary arteries partially opacified with contrast without evidence of intraluminal thrombus.  There is inadequate contrast opacification of the segmental and subsegmental pulmonary arteries to exclude a small pulmonary embolus.    Respiratory motion artifact limits evaluation of the lung parenchyma.  No focal consolidation.  There is a small left pleural effusion measuring to a depth of 1.5 cm.  Dependent discoid atelectasis is present at the left lung base.  There is no right pleural effusion.  No endobronchial lesion.  No significant axillary or intrathoracic lymphadenopathy.  Heart size is enlarged.    Limited evaluation of the upper abdomen demonstrates a small amount of perihepatic ascites.                               X-Ray Chest PA And Lateral (Final result)  Result time 07/06/19 07:00:07    Final result by Isreal Sanders MD (07/06/19 07:00:07)                 Impression:      1. Interval clearing of the interstitial edema.  2. Persistent small left pleural effusion with dependent left lower lobe atelectasis.  3. Cardiomegaly.      Electronically signed by: Isreal  "[place]ange  Date:    07/06/2019  Time:    07:00             Narrative:    EXAMINATION:  XR CHEST PA AND LATERAL    CLINICAL HISTORY:  Shortness of breath    TECHNIQUE:  PA and lateral views of the chest were performed.    COMPARISON:  07/02/2018.    FINDINGS:  The lungs are clear.  There has been interval clearing of the mild interstitial edema.  There is a persistent small left pleural effusion with dependent left lower lobe atelectasis.  No right pleural effusion.    Heart size remains enlarged.  Mediastinal contours unremarkable.  Trachea midline.                                   Medical Decision Making:   Clinical Tests:   Lab Tests: Ordered and Reviewed  Radiological Study: Ordered and Reviewed  Medical Tests: Ordered and Reviewed  ED Management:  0425-pt began to yell out, cannot explain how he is feeling but is very distressed. At this time, heart rate dropped to the 50's with multiple PVC's. Color dusky and distended veins in neck and forehead. Pt inconsolable. Became diaphoretic, urinated on himself. Episode lasted approx. 1-2 minutes then subsided. HR increased back up 10 110's. Denies any chest pain, did state that he had "tightness" in his entire upper body during this episode. EKG repeated, labs sent for repeat troponin and H&H.    0435- patient had a second episode, identical as the previous that lasted about a minute, HR dropped to the 50's then rebounded spontaneously.   Pt to Ct emergently.  Other:   I have discussed this case with another health care provider.       <> Summary of the Discussion: Assumed care from Dr Hare who describes events as recorded above - of episodic self limited shortness of breath with bradycardia, diaphoresis - without noted hypotension     [facility] work up including CTA Chest revealing only CHF   No noted PE or great vessel injury   Cardiac enzymes negative   Reported negative LHC for occlusive disease    Recommend transfer to [facility] for cardiology evaluation   Pt was admitted " for some 4-5 days at [facility] in past two weeks                        ED Course as of Jul 06 0824   Sat Jul 06, 2019   0330 CXR shows no evidence of pulmonary edema, no effusion or infiltrate.     [MD]   0354 Pt still with tachycardia despite medication, diastolic elevated. Will give dose of metoprolol.     [MD]   0517 HR currently 105, pt resting quietly without further incident.    Amphetamine Screen, Ur: Negative [MD]   0553 CTA chest: FINDINGS:  Limitations: Images are affected by patient motion artifact.  Pulmonary arteries: Suboptimal pulmonary artery opacification secondary to decreased cardiac  output and slow flow. No evidence of large or central pulmonary embolism on a technically  suboptimal study.  Aorta: No acute abnormality. No aortic aneurysm or dissection.  Inferior vena cava: Reflux of contrast into IVC and hepatic veins.  Lungs: Minimal to mild interstitial pulmonary edema. Mild left basilar atelectasis or scarring.  Pleural space: Small posterior left pleural effusion.  Heart: Severe cardiomegaly versus cardiomyopathy.  Liver: Small hepatic dome cyst. Heterogeneous liver with periportal edema.  Intraperitoneal space: Upper abdominal ascites. Small contracted gallbladder with pericholecystic  fluid likely on the basis of ascites.  Lymph nodes: No enlarged lymph nodes.  Bones/joints: No acute osseous abnormality. No acute fracture.  Soft tissues: Mild superficial soft tissue edema.    [MD]   0553 IMPRESSION:  1. No evidence of large or central pulmonary embolism on a technically suboptimal study. A small PE  cannot be excluded.  2. Severe cardiomegaly versus cardiomyopathy.  3. Minimal to mild interstitial pulmonary edema.  4. Small posterior left pleural effusion and mild left basilar atelectasis or scarring.  5. Findings suggestive of passive hepatic congestion.  6. Upper abdominal ascites.    [MD]   0663 Regional transfer center contacted regarding patient transfer - preferably to [facility] where recent  work up and care had been performed.       [KG]   0702 RTC reports 0730 will discuss with Hospitalist at [facility]     [KG]      ED Course User Index  [KG] Carlos Paredes MD  [MD] Batsheva Hare MD     Clinical Impression:       ICD-10-CM ICD-9-CM   1. Acute on chronic systolic congestive heart failure I50.23 428.23     428.0   2. SOB (shortness of breath) R06.02 786.05   3. Shortness of breath R06.02 786.05   4. Dyspnea, unspecified type R06.00 786.09         Disposition:   Disposition: Transferred  Condition: Stable                        Carlos Paredes MD  07/06/19 0830

## 2019-07-06 NOTE — ED NOTES
Patient overheard screaming in ED 6. Primary RN, Bernadine aguilar RN, and Dr. Hare to bedside. Patient found sitting up at bedside in tripod position, struggling to breathe. Nasal cannula reapplied, patient assisted back onto stretcher. Patient's oxygen saturation goes from 99% down to 71% and slowly back to high 90s with steady waveform noted. At the same time, the patient's heart rate goes from approximately 118 to 56, then back up to 120. Patient's skin color turns dusky purple then the patient became pale and diaphoretic. Cardiac reassessment performed. Additional ECG performed. Additional IV started. Verbal orders for CT given, primary RN and Dr. Hare go with patient to CT.

## 2019-07-06 NOTE — ED NOTES
Pt here with complaints of shortness of breath. Pt reports recent hospital admission on Tuesday and discharged on Thursday. Hx of CHF. Pt reports only taking Lasix twice yesterday due to it making him feel funny.

## 2019-07-08 NOTE — PHYSICIAN QUERY
"PT Name: Francis Daigle  MR #: [id number]    Physician Query Form - Heart  Condition Clarification     CDS/: Lauren Orantes RN              Contact information:[phone]  This form is a permanent document in the medical record.     Query Date: July 8, 2019    By submitting this query, we are merely seeking further clarification of documentation. Please utilize your independent clinical judgment when addressing the question(s) below.    The medical record contains the following   Indicators     Supporting Clinical Findings Location in Medical Record   x BNP BNP  525->  397   Lab 7/2, 7/6   x EF · Severely decreased left ventricular systolic function. The estimated ejection fraction is 15% TTE 7/3   x Radiology findings 1. Mild congestive heart failure.  2. Small left pleural effusion with dependent left lower lobe atelectasis.   Chest Xray 7/2   x Echo Results · Eccentric left ventricular hypertrophy.  · Mild left ventricular enlargement.  · Severely decreased left ventricular systolic function. The estimated ejection fraction is 15%  · Left ventricular diastolic dysfunction.  · Moderate right ventricular enlargement.  · Mild left atrial enlargement.  · Mild right atrial enlargement.  · Mild-to-moderate mitral regurgitation.  · Mild tricuspid regurgitation.  · Normal central venous pressure (3 mm Hg).  · The estimated PA systolic pressure is 31 mm Hg  · Small pericardial effusion.      TTE 7/3    "Ascites" documented     x "SOB" or "DELACRUZ" documented Patient is a 43-year-old male that presented to  the emergency department complaining of shortness of breath and history of congestive heart failure.   H&P 7/3       Hospital Medicine     "Hypoxia" documented     x Heart Failure documented Acute on chronic congestive heart failure        H&P 7/3       Hospital Medicine    "Edema" documented     x Diuretics/Meds Furosemide 60 mg IV  Furosemide 40 mg IV 2 times daily MAR 7/2 (ED)  MAR 7/3, 7/4    Treatment:      " Other:      Heart failure (HF) can be acute, chronic or both. It is generally further specificed as systolic, diastolic, or combined. Lastly, it is important to identify an underlying etiology if known or suspected.     Common clues to acute exacerbation:  Rapidly progressive symptoms (w/in 2 weeks of presentation), using IV diuretics to treat, using supplemental O2, pulmonary edema on Xray, MI w/in 4 weeks, and/or BNP >500    Systolic Heart Failure: is defined as chart documentation of a left ventricular ejection fraction (LVEF) less than 40%     Diastolic Heart Failure: is defined as a left ventricular ejection fraction (LVEF) greater than 40%   +      Evidence of diastolic dysfunction on echocardiography OR    Right heart catheterization wedge pressure above 12 mm Hg OR    Left heart catheterization left ventricular end diastolic pressure 18 mm Hg or above.    References: *American Heart Association    The clinical guidelines noted below are only system guidelines, and do not replace the providers clinical judgment.     Provider, please specify the diagnosis associated with above clinical findings    Doctor, please specify the type of Acute on chronic congestive heart failure.    [ x  ] Acute on Chronic Combined Systolic and Diastolic Heart Failure                   [   ] Other Type of Heart Failure (please specify type): _________________________  [   ] Other (please specify): ___________________________________  [   ] Clinically Undetermined                          Please document in your progress notes daily for the duration of treatment until resolved and include in your discharge summary.

## 2019-07-09 ENCOUNTER — HOSPITAL ENCOUNTER (OUTPATIENT)
Dept: TELEMEDICINE | Facility: HOSPITAL | Age: 44
Discharge: HOME | End: 2019-07-09
Payer: MEDICAID

## 2019-07-09 PROCEDURE — 99282 EMERGENCY DEPT VISIT SF MDM: CPT | Mod: GT,AF,HB,S$PBB | Performed by: PSYCHIATRY & NEUROLOGY

## 2019-07-09 PROCEDURE — 99282 PR EMERGENCY DEPT VISIT,LEVEL II: ICD-10-PCS | Mod: GT,AF,HB,S$PBB | Performed by: PSYCHIATRY & NEUROLOGY

## 2019-07-10 NOTE — CONSULTS
"[facility]  Psychiatry  Telepsychiatry Consult Note    Please see previous notes:    Patient agreeable to consultation via telepsychiatry.    Tele-Consultation from Psychiatry started: 7/9/2019 at 11:00 PM  The chief complaint leading to psychiatric consultation is: " Depressed and suicidal "  This consultation was requested by Davide Stanley MD, the Emergency Department attending physician.  The location of the consulting psychiatrist is Home.  The patient location is [facility]   The patient arrived at the ED at: See triage notes    Also present with the patient at the time of the consultation: ER Nurse    Patient Identification:   Francis Daigle is a 43 y.o. male.    Patient information was obtained from patient and  ER Staff.  Patient presented involuntarily to the Emergency Department  EMS    Their speakers were not working properly , I was not able to hear patient clearly , was very difficult to interview the patient.    Consults  Subjective:     History of Present Illness:  This is a 43 years old Male with multiple medical problems with depression who was brought into ER for depression, anxiety and SI , admits to feeling depressed ,anxious, tired but denies to SI. Says " I am frustrated ,anxious , tired but I am not suicidal , I have kids , my son doesn't live with me , he has no idea what's going on, he called the police to bring me here. Says he lives with his cousin , and has dog, denies to SI, Intent , any active plans, denies to HI , AVH or paranoia . Says he has six grown-up kids and they have their own lives. 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 Has some sleep issues but eating ok. Says he was given Xanax and Klonopin in the past for anxiety.    Tried to get in touch with hs cousin with no answer .  Psychiatric History:   Previous Psychiatric Hospitalizations: No   Previous Medication Trials: Yes   Previous Suicide " "Attempts: no   History of Violence: No  History of Depression: Yes  History of Mariya: No  History of Auditory/Visual Hallucination No  History of Delusions: No  Outpatient psychiatrist (current & past): No    Substance Abuse History:  Tobacco:Yes  Alcohol: No  Illicit Substances:No  Detox/Rehab: No    Legal History: Past charges/incarcerations: No     Family Psychiatric History:  Unknown      Social History:  Developmental/Childhood:Achieved all developmental milestones timely  *Education:High School Diploma  Employment Status/Finances:Unemployed   Relationship Status/Sexual Orientation: Single:   Children: 6  Housing Status: Home    history:  NO  Access to gun: NO  Sikh: NA  Recreational activities:Time with family    Psychiatric Mental Status Exam:  Arousal: alert  Sensorium/Orientation: oriented to grossly intact  Behavior/Cooperation: normal, cooperative   Speech: normal tone, normal rate, normal pitch, normal volume  Language: grossly intact  Mood: "  Anxious and frustated "   Affect: inappropriate, depressed, anxious and constricted  Thought Process: normal and logical  Thought Content:   Auditory hallucinations: NO  Visual hallucinations: NO  Paranoia: NO  Delusions:  NO  Suicidal ideation: NO  Homicidal ideation: NO  Attention/Concentration:  intact  Memory:    Recent:  Intact   Remote: Intact     Fund of Knowledge: Intact   Insight: Somewhat fair  Judgment: behavior is adequate to circumstances, age appropriate, limited      Past Medical History:   Past Medical History:   Diagnosis Date    Anxiety     CHF (congestive heart failure)     Depression     Hypertension       Laboratory Data: Labs Reviewed - No data to display    Neurological History:  Seizures: No  Head trauma: No    Allergies:   Review of patient's allergies indicates:  No Known Allergies    Medications in ER: Medications - No data to display    Medications at home: Klonopin and Xanax per patient    No new subjective & objective " note has been filed under this hospital service since the last note was generated.      Assessment - Diagnosis - Goals:     Diagnosis/Impression: Depression Unspecified , Anxiety Disorder Unspecified Depressed , anxious but not suicidal , lives with cousin , family was concerned about his safety , was not able to get in touch with his cousin who he claims he lives with , no other contacts, limited family support .      Rec: Will PEC due to DTS, obtain collateral from his cousin , will need to be hospitalized in Inpatient Psych Unit for safety and stabilization unless Cousin takes responsibility to watch him and bring him back if he gets depressed and suicidal.    Time with patient: 30 minutes      More than 50% of the time was spent counseling/coordinating care    Consulting clinician was informed of the encounter and consult note.    Consultation ended: 7/9/2019     Adina Box MD   Psychiatry  [facility]

## 2019-08-22 ENCOUNTER — HOSPITAL ENCOUNTER (OUTPATIENT)
Facility: HOSPITAL | Age: 44
Discharge: HOME | End: 2019-08-26
Attending: EMERGENCY MEDICINE | Admitting: INTERNAL MEDICINE
Payer: MEDICAID

## 2019-08-22 DIAGNOSIS — R07.9 CHEST PAIN: ICD-10-CM

## 2019-08-22 DIAGNOSIS — I50.43 ACUTE ON CHRONIC COMBINED SYSTOLIC AND DIASTOLIC CONGESTIVE HEART FAILURE: Primary | ICD-10-CM

## 2019-08-22 DIAGNOSIS — R06.02 SHORTNESS OF BREATH: ICD-10-CM

## 2019-08-22 LAB
ALBUMIN SERPL BCP-MCNC: 3.1 G/DL (ref 3.5–5.2)
ALP SERPL-CCNC: 93 U/L (ref 55–135)
ALT SERPL W/O P-5'-P-CCNC: 109 U/L (ref 10–44)
ANION GAP SERPL CALC-SCNC: 10 MMOL/L (ref 8–16)
AST SERPL-CCNC: 73 U/L (ref 10–40)
BASOPHILS # BLD AUTO: 0.03 K/UL (ref 0–0.2)
BASOPHILS NFR BLD: 0.2 % (ref 0–1.9)
BILIRUB SERPL-MCNC: 0.7 MG/DL (ref 0.1–1)
BNP SERPL-MCNC: 1635 PG/ML (ref 0–99)
BUN SERPL-MCNC: 29 MG/DL (ref 6–20)
CALCIUM SERPL-MCNC: 9.1 MG/DL (ref 8.7–10.5)
CHLORIDE SERPL-SCNC: 107 MMOL/L (ref 95–110)
CO2 SERPL-SCNC: 22 MMOL/L (ref 23–29)
CREAT SERPL-MCNC: 1 MG/DL (ref 0.5–1.4)
DIFFERENTIAL METHOD: ABNORMAL
EOSINOPHIL # BLD AUTO: 0.4 K/UL (ref 0–0.5)
EOSINOPHIL NFR BLD: 2.7 % (ref 0–8)
ERYTHROCYTE [DISTWIDTH] IN BLOOD BY AUTOMATED COUNT: 17.2 % (ref 11.5–14.5)
EST. GFR  (AFRICAN AMERICAN): >60 ML/MIN/1.73 M^2
EST. GFR  (NON AFRICAN AMERICAN): >60 ML/MIN/1.73 M^2
GLUCOSE SERPL-MCNC: 92 MG/DL (ref 70–110)
HCT VFR BLD AUTO: 40.1 % (ref 40–54)
HGB BLD-MCNC: 13.1 G/DL (ref 14–18)
IMM GRANULOCYTES # BLD AUTO: 0.05 K/UL (ref 0–0.04)
INR PPP: 1.3 (ref 0.8–1.2)
LYMPHOCYTES # BLD AUTO: 2.8 K/UL (ref 1–4.8)
LYMPHOCYTES NFR BLD: 20.5 % (ref 18–48)
MCH RBC QN AUTO: 27.9 PG (ref 27–31)
MCHC RBC AUTO-ENTMCNC: 32.7 G/DL (ref 32–36)
MCV RBC AUTO: 85 FL (ref 82–98)
MONOCYTES # BLD AUTO: 0.9 K/UL (ref 0.3–1)
MONOCYTES NFR BLD: 6.3 % (ref 4–15)
NEUTROPHILS # BLD AUTO: 9.5 K/UL (ref 1.8–7.7)
NEUTROPHILS NFR BLD: 69.9 % (ref 38–73)
NRBC BLD-RTO: 0 /100 WBC
PLATELET # BLD AUTO: 330 K/UL (ref 150–350)
PMV BLD AUTO: 9.7 FL (ref 9.2–12.9)
POTASSIUM SERPL-SCNC: 4.3 MMOL/L (ref 3.5–5.1)
PROT SERPL-MCNC: 7.3 G/DL (ref 6–8.4)
PROTHROMBIN TIME: 13.2 SEC (ref 9–12.5)
RBC # BLD AUTO: 4.7 M/UL (ref 4.6–6.2)
SODIUM SERPL-SCNC: 139 MMOL/L (ref 136–145)
TROPONIN I SERPL DL<=0.01 NG/ML-MCNC: 0.04 NG/ML (ref 0–0.03)
TSH SERPL DL<=0.005 MIU/L-ACNC: 3.17 UIU/ML (ref 0.4–4)
WBC # BLD AUTO: 13.59 K/UL (ref 3.9–12.7)

## 2019-08-22 PROCEDURE — G0378 HOSPITAL OBSERVATION PER HR: HCPCS

## 2019-08-22 PROCEDURE — 83880 ASSAY OF NATRIURETIC PEPTIDE: CPT

## 2019-08-22 PROCEDURE — 96374 THER/PROPH/DIAG INJ IV PUSH: CPT

## 2019-08-22 PROCEDURE — 93005 ELECTROCARDIOGRAM TRACING: CPT

## 2019-08-22 PROCEDURE — 85025 COMPLETE CBC W/AUTO DIFF WBC: CPT

## 2019-08-22 PROCEDURE — 63600175 PHARM REV CODE 636 W HCPCS: Performed by: EMERGENCY MEDICINE

## 2019-08-22 PROCEDURE — 96375 TX/PRO/DX INJ NEW DRUG ADDON: CPT

## 2019-08-22 PROCEDURE — 80053 COMPREHEN METABOLIC PANEL: CPT

## 2019-08-22 PROCEDURE — 84484 ASSAY OF TROPONIN QUANT: CPT

## 2019-08-22 PROCEDURE — 84443 ASSAY THYROID STIM HORMONE: CPT

## 2019-08-22 PROCEDURE — 25000003 PHARM REV CODE 250: Performed by: EMERGENCY MEDICINE

## 2019-08-22 PROCEDURE — 85610 PROTHROMBIN TIME: CPT

## 2019-08-22 PROCEDURE — 99285 EMERGENCY DEPT VISIT HI MDM: CPT | Mod: 25

## 2019-08-22 PROCEDURE — 36415 COLL VENOUS BLD VENIPUNCTURE: CPT

## 2019-08-22 RX ORDER — METOPROLOL TARTRATE 1 MG/ML
5 INJECTION, SOLUTION INTRAVENOUS
Status: COMPLETED | OUTPATIENT
Start: 2019-08-22 | End: 2019-08-22

## 2019-08-22 RX ORDER — FUROSEMIDE 10 MG/ML
40 INJECTION INTRAMUSCULAR; INTRAVENOUS
Status: COMPLETED | OUTPATIENT
Start: 2019-08-22 | End: 2019-08-22

## 2019-08-22 RX ORDER — LORAZEPAM 2 MG/ML
1 INJECTION INTRAMUSCULAR
Status: COMPLETED | OUTPATIENT
Start: 2019-08-22 | End: 2019-08-22

## 2019-08-22 RX ADMIN — METOPROLOL TARTRATE 5 MG: 1 INJECTION, SOLUTION INTRAVENOUS at 10:08

## 2019-08-22 RX ADMIN — FUROSEMIDE 40 MG: 10 INJECTION, SOLUTION INTRAVENOUS at 10:08

## 2019-08-22 RX ADMIN — LORAZEPAM 1 MG: 2 INJECTION INTRAMUSCULAR; INTRAVENOUS at 10:08

## 2019-08-23 PROBLEM — R41.0 CONFUSION: Status: RESOLVED | Noted: 2019-07-03 | Resolved: 2019-08-23

## 2019-08-23 PROBLEM — F41.9 ANXIETY: Status: ACTIVE | Noted: 2019-08-23

## 2019-08-23 PROBLEM — I10 HYPERTENSION: Status: ACTIVE | Noted: 2019-08-23

## 2019-08-23 PROBLEM — I50.43 ACUTE ON CHRONIC COMBINED SYSTOLIC AND DIASTOLIC CONGESTIVE HEART FAILURE: Status: ACTIVE | Noted: 2019-07-03

## 2019-08-23 LAB
ALBUMIN SERPL BCP-MCNC: 3.2 G/DL (ref 3.5–5.2)
ALP SERPL-CCNC: 97 U/L (ref 55–135)
ALT SERPL W/O P-5'-P-CCNC: 110 U/L (ref 10–44)
AMPHET+METHAMPHET UR QL: NEGATIVE
ANION GAP SERPL CALC-SCNC: 11 MMOL/L (ref 8–16)
ANION GAP SERPL CALC-SCNC: 12 MMOL/L (ref 8–16)
AST SERPL-CCNC: 80 U/L (ref 10–40)
BARBITURATES UR QL SCN>200 NG/ML: NEGATIVE
BASOPHILS # BLD AUTO: 0.06 K/UL (ref 0–0.2)
BASOPHILS NFR BLD: 0.4 % (ref 0–1.9)
BENZODIAZ UR QL SCN>200 NG/ML: NEGATIVE
BILIRUB SERPL-MCNC: 0.8 MG/DL (ref 0.1–1)
BUN SERPL-MCNC: 32 MG/DL (ref 6–20)
BUN SERPL-MCNC: 34 MG/DL (ref 6–20)
BZE UR QL SCN: NEGATIVE
CALCIUM SERPL-MCNC: 9.2 MG/DL (ref 8.7–10.5)
CALCIUM SERPL-MCNC: 9.6 MG/DL (ref 8.7–10.5)
CANNABINOIDS UR QL SCN: NEGATIVE
CHLORIDE SERPL-SCNC: 105 MMOL/L (ref 95–110)
CHLORIDE SERPL-SCNC: 106 MMOL/L (ref 95–110)
CHOLEST SERPL-MCNC: 124 MG/DL (ref 120–199)
CHOLEST/HDLC SERPL: 3 {RATIO} (ref 2–5)
CO2 SERPL-SCNC: 21 MMOL/L (ref 23–29)
CO2 SERPL-SCNC: 24 MMOL/L (ref 23–29)
CREAT SERPL-MCNC: 1.1 MG/DL (ref 0.5–1.4)
CREAT SERPL-MCNC: 1.2 MG/DL (ref 0.5–1.4)
CREAT UR-MCNC: 20.3 MG/DL (ref 23–375)
DIFFERENTIAL METHOD: ABNORMAL
EOSINOPHIL # BLD AUTO: 0.3 K/UL (ref 0–0.5)
EOSINOPHIL NFR BLD: 2.3 % (ref 0–8)
ERYTHROCYTE [DISTWIDTH] IN BLOOD BY AUTOMATED COUNT: 17.3 % (ref 11.5–14.5)
EST. GFR  (AFRICAN AMERICAN): >60 ML/MIN/1.73 M^2
EST. GFR  (AFRICAN AMERICAN): >60 ML/MIN/1.73 M^2
EST. GFR  (NON AFRICAN AMERICAN): >60 ML/MIN/1.73 M^2
EST. GFR  (NON AFRICAN AMERICAN): >60 ML/MIN/1.73 M^2
GLUCOSE SERPL-MCNC: 101 MG/DL (ref 70–110)
GLUCOSE SERPL-MCNC: 98 MG/DL (ref 70–110)
HCT VFR BLD AUTO: 42.2 % (ref 40–54)
HDLC SERPL-MCNC: 42 MG/DL (ref 40–75)
HDLC SERPL: 33.9 % (ref 20–50)
HGB BLD-MCNC: 13.7 G/DL (ref 14–18)
IMM GRANULOCYTES # BLD AUTO: 0.04 K/UL (ref 0–0.04)
LDLC SERPL CALC-MCNC: 66.4 MG/DL (ref 63–159)
LYMPHOCYTES # BLD AUTO: 2.2 K/UL (ref 1–4.8)
LYMPHOCYTES NFR BLD: 15 % (ref 18–48)
MAGNESIUM SERPL-MCNC: 1.8 MG/DL (ref 1.6–2.6)
MCH RBC QN AUTO: 28.8 PG (ref 27–31)
MCHC RBC AUTO-ENTMCNC: 32.5 G/DL (ref 32–36)
MCV RBC AUTO: 89 FL (ref 82–98)
METHADONE UR QL SCN>300 NG/ML: NEGATIVE
MONOCYTES # BLD AUTO: 0.9 K/UL (ref 0.3–1)
MONOCYTES NFR BLD: 6.2 % (ref 4–15)
NEUTROPHILS # BLD AUTO: 11.1 K/UL (ref 1.8–7.7)
NEUTROPHILS NFR BLD: 75.8 % (ref 38–73)
NONHDLC SERPL-MCNC: 82 MG/DL
NRBC BLD-RTO: 0 /100 WBC
OPIATES UR QL SCN: NEGATIVE
PCP UR QL SCN>25 NG/ML: NEGATIVE
PHOSPHATE SERPL-MCNC: 4.3 MG/DL (ref 2.7–4.5)
PLATELET # BLD AUTO: 313 K/UL (ref 150–350)
PMV BLD AUTO: 10.2 FL (ref 9.2–12.9)
POTASSIUM SERPL-SCNC: 4.1 MMOL/L (ref 3.5–5.1)
POTASSIUM SERPL-SCNC: 4.6 MMOL/L (ref 3.5–5.1)
PROT SERPL-MCNC: 7.3 G/DL (ref 6–8.4)
RBC # BLD AUTO: 4.75 M/UL (ref 4.6–6.2)
SODIUM SERPL-SCNC: 139 MMOL/L (ref 136–145)
SODIUM SERPL-SCNC: 140 MMOL/L (ref 136–145)
TOXICOLOGY INFORMATION: ABNORMAL
TRIGL SERPL-MCNC: 78 MG/DL (ref 30–150)
TROPONIN I SERPL DL<=0.01 NG/ML-MCNC: 0.04 NG/ML (ref 0–0.03)
TROPONIN I SERPL DL<=0.01 NG/ML-MCNC: 0.04 NG/ML (ref 0–0.03)
WBC # BLD AUTO: 14.65 K/UL (ref 3.9–12.7)

## 2019-08-23 PROCEDURE — 63600175 PHARM REV CODE 636 W HCPCS: Performed by: NURSE PRACTITIONER

## 2019-08-23 PROCEDURE — 63600175 PHARM REV CODE 636 W HCPCS: Performed by: INTERNAL MEDICINE

## 2019-08-23 PROCEDURE — 94761 N-INVAS EAR/PLS OXIMETRY MLT: CPT

## 2019-08-23 PROCEDURE — 80307 DRUG TEST PRSMV CHEM ANLYZR: CPT

## 2019-08-23 PROCEDURE — 96372 THER/PROPH/DIAG INJ SC/IM: CPT

## 2019-08-23 PROCEDURE — G0378 HOSPITAL OBSERVATION PER HR: HCPCS

## 2019-08-23 PROCEDURE — 84484 ASSAY OF TROPONIN QUANT: CPT

## 2019-08-23 PROCEDURE — 96376 TX/PRO/DX INJ SAME DRUG ADON: CPT

## 2019-08-23 PROCEDURE — 85025 COMPLETE CBC W/AUTO DIFF WBC: CPT

## 2019-08-23 PROCEDURE — 83735 ASSAY OF MAGNESIUM: CPT

## 2019-08-23 PROCEDURE — 36415 COLL VENOUS BLD VENIPUNCTURE: CPT

## 2019-08-23 PROCEDURE — 80048 BASIC METABOLIC PNL TOTAL CA: CPT

## 2019-08-23 PROCEDURE — 84100 ASSAY OF PHOSPHORUS: CPT

## 2019-08-23 PROCEDURE — 27000221 HC OXYGEN, UP TO 24 HOURS

## 2019-08-23 PROCEDURE — 25000003 PHARM REV CODE 250: Performed by: NURSE PRACTITIONER

## 2019-08-23 PROCEDURE — 80061 LIPID PANEL: CPT

## 2019-08-23 PROCEDURE — 80053 COMPREHEN METABOLIC PANEL: CPT

## 2019-08-23 RX ORDER — LORAZEPAM 2 MG/ML
1 INJECTION INTRAMUSCULAR ONCE
Status: DISCONTINUED | OUTPATIENT
Start: 2019-08-23 | End: 2019-08-23 | Stop reason: SDUPTHER

## 2019-08-23 RX ORDER — ACETAMINOPHEN 325 MG/1
650 TABLET ORAL EVERY 4 HOURS PRN
Status: DISCONTINUED | OUTPATIENT
Start: 2019-08-23 | End: 2019-08-26 | Stop reason: HOSPADM

## 2019-08-23 RX ORDER — CARVEDILOL 3.12 MG/1
3.12 TABLET ORAL 2 TIMES DAILY WITH MEALS
Status: DISCONTINUED | OUTPATIENT
Start: 2019-08-23 | End: 2019-08-26 | Stop reason: HOSPADM

## 2019-08-23 RX ORDER — FUROSEMIDE 10 MG/ML
40 INJECTION INTRAMUSCULAR; INTRAVENOUS DAILY
Status: DISCONTINUED | OUTPATIENT
Start: 2019-08-23 | End: 2019-08-23

## 2019-08-23 RX ORDER — ONDANSETRON 2 MG/ML
4 INJECTION INTRAMUSCULAR; INTRAVENOUS EVERY 8 HOURS PRN
Status: DISCONTINUED | OUTPATIENT
Start: 2019-08-23 | End: 2019-08-26 | Stop reason: HOSPADM

## 2019-08-23 RX ORDER — ENALAPRIL MALEATE 2.5 MG/1
2.5 TABLET ORAL DAILY
Status: DISCONTINUED | OUTPATIENT
Start: 2019-08-23 | End: 2019-08-26 | Stop reason: HOSPADM

## 2019-08-23 RX ORDER — FUROSEMIDE 10 MG/ML
40 INJECTION INTRAMUSCULAR; INTRAVENOUS 2 TIMES DAILY
Status: DISCONTINUED | OUTPATIENT
Start: 2019-08-23 | End: 2019-08-23

## 2019-08-23 RX ORDER — ENOXAPARIN SODIUM 100 MG/ML
40 INJECTION SUBCUTANEOUS EVERY 24 HOURS
Status: DISCONTINUED | OUTPATIENT
Start: 2019-08-23 | End: 2019-08-26 | Stop reason: HOSPADM

## 2019-08-23 RX ORDER — LORAZEPAM 1 MG/1
1 TABLET ORAL EVERY 4 HOURS PRN
Status: DISCONTINUED | OUTPATIENT
Start: 2019-08-23 | End: 2019-08-26 | Stop reason: HOSPADM

## 2019-08-23 RX ORDER — FUROSEMIDE 10 MG/ML
40 INJECTION INTRAMUSCULAR; INTRAVENOUS DAILY
Status: DISCONTINUED | OUTPATIENT
Start: 2019-08-23 | End: 2019-08-25

## 2019-08-23 RX ORDER — SODIUM CHLORIDE 0.9 % (FLUSH) 0.9 %
10 SYRINGE (ML) INJECTION
Status: DISCONTINUED | OUTPATIENT
Start: 2019-08-23 | End: 2019-08-26 | Stop reason: HOSPADM

## 2019-08-23 RX ORDER — LORAZEPAM 2 MG/ML
1 INJECTION INTRAMUSCULAR
Status: COMPLETED | OUTPATIENT
Start: 2019-08-23 | End: 2019-08-23

## 2019-08-23 RX ORDER — NAPROXEN SODIUM 220 MG/1
81 TABLET, FILM COATED ORAL DAILY
Status: DISCONTINUED | OUTPATIENT
Start: 2019-08-23 | End: 2019-08-26 | Stop reason: HOSPADM

## 2019-08-23 RX ADMIN — ENALAPRIL MALEATE 2.5 MG: 2.5 TABLET ORAL at 08:08

## 2019-08-23 RX ADMIN — FUROSEMIDE 40 MG: 10 INJECTION, SOLUTION INTRAVENOUS at 08:08

## 2019-08-23 RX ADMIN — LORAZEPAM 1 MG: 1 TABLET ORAL at 04:08

## 2019-08-23 RX ADMIN — LORAZEPAM 1 MG: 1 TABLET ORAL at 09:08

## 2019-08-23 RX ADMIN — LORAZEPAM 1 MG: 1 TABLET ORAL at 05:08

## 2019-08-23 RX ADMIN — ASPIRIN 81 MG CHEWABLE TABLET 81 MG: 81 TABLET CHEWABLE at 08:08

## 2019-08-23 RX ADMIN — LORAZEPAM 1 MG: 2 INJECTION, SOLUTION INTRAMUSCULAR; INTRAVENOUS at 11:08

## 2019-08-23 RX ADMIN — LORAZEPAM 1 MG: 1 TABLET ORAL at 08:08

## 2019-08-23 RX ADMIN — CARVEDILOL 3.12 MG: 3.12 TABLET, FILM COATED ORAL at 08:08

## 2019-08-23 RX ADMIN — CARVEDILOL 3.12 MG: 3.12 TABLET, FILM COATED ORAL at 05:08

## 2019-08-23 RX ADMIN — ENOXAPARIN SODIUM 40 MG: 100 INJECTION SUBCUTANEOUS at 05:08

## 2019-08-23 NOTE — ED PROVIDER NOTES
Encounter Date: 8/22/2019    SCRIBE #1 NOTE: I, Margoth Rosado and am scribing for, and in the presence of, German Pimentel MD.       History     Chief Complaint   Patient presents with    Shortness of Breath     Started 5 days ago worse at night, pt reports having anxiety hx     Time seen by provider: 10:19 PM on 08/22/2019    Francis Daigle is a 44 y.o. male with PMHx of HTN, CHF, and anxiety who presents to the ED with an onset of SOB for the past 5-6 days. He reports that the SOB is worse at night. He also reports a pressure on his chest. He reports compliance with his medications and low sodium diet. He denies a history of AFIB or thyroid problems. The patient denies leg swelling or any other symptoms at this time. No PSHx. Patient is a former smoker. No known drug allergies noted.    The history is provided by the patient.     Review of patient's allergies indicates:  No Known Allergies  Past Medical History:   Diagnosis Date    Anxiety     CHF (congestive heart failure)     Depression     Hypertension      No past surgical history on file.  No family history on file.  Social History     Tobacco Use    Smoking status: Former Smoker   Substance Use Topics    Alcohol use: Not Currently    Drug use: Not Currently     Review of Systems   Constitutional: Negative for fever.   HENT: Negative for sore throat.    Respiratory: Positive for shortness of breath.    Cardiovascular: Positive for chest pain (pressure). Negative for leg swelling.   Gastrointestinal: Negative for nausea.   Genitourinary: Negative for dysuria.   Musculoskeletal: Negative for back pain.   Skin: Negative for rash.   Neurological: Negative for weakness.   Hematological: Does not bruise/bleed easily.       Physical Exam     Initial Vitals   BP Pulse Resp Temp SpO2   08/22/19 2205 08/22/19 2256 08/22/19 2205 08/22/19 2205 08/22/19 2205   (!) 142/105 (!) 125 (!) 24 98.6 °F (37 °C) 96 %      MAP       --                Physical  Exam    Nursing note and vitals reviewed.  Constitutional: He appears well-developed and well-nourished. No distress.   HENT:   Head: Normocephalic and atraumatic.   Eyes: Conjunctivae and EOM are normal. Pupils are equal, round, and reactive to light.   Neck: Neck supple. JVD present.   Cardiovascular: Regular rhythm. Tachycardia present.  Exam reveals no friction rub.    Murmur heard.   Systolic murmur is present with a grade of 3/6.  3/6 systolic murmur loudest at the left apex. No peripheral edema.    Pulmonary/Chest: No respiratory distress. He has no wheezes. He has no rhonchi. He has rales.   Subtle crackles at bases.    Abdominal: Soft. Bowel sounds are normal. He exhibits no distension. There is no tenderness.   Musculoskeletal: Normal range of motion. He exhibits no edema or tenderness.   Neurological: He is alert and oriented to person, place, and time.   Skin: Skin is warm and dry.   Psychiatric: He has a normal mood and affect.         ED Course   Procedures  Labs Reviewed   CBC W/ AUTO DIFFERENTIAL - Abnormal; Notable for the following components:       Result Value    WBC 13.59 (*)     Hemoglobin 13.1 (*)     RDW 17.2 (*)     Gran # (ANC) 9.5 (*)     Immature Grans (Abs) 0.05 (*)     All other components within normal limits   COMPREHENSIVE METABOLIC PANEL - Abnormal; Notable for the following components:    CO2 22 (*)     BUN, Bld 29 (*)     Albumin 3.1 (*)     AST 73 (*)      (*)     All other components within normal limits   TROPONIN I - Abnormal; Notable for the following components:    Troponin I 0.037 (*)     All other components within normal limits   B-TYPE NATRIURETIC PEPTIDE - Abnormal; Notable for the following components:    BNP 1,635 (*)     All other components within normal limits   PROTIME-INR - Abnormal; Notable for the following components:    Prothrombin Time 13.2 (*)     INR 1.3 (*)     All other components within normal limits   TSH   DRUG SCREEN PANEL, URINE EMERGENCY      EKG Readings: (Independently Interpreted)   Initial Reading: No STEMI.   Sinus tachycardia at a rate of 127 bpm. Incomplete right bundle branch block. No ST segment elevation or depression.       Imaging Results          X-Ray Chest AP Portable (In process)                  Medical Decision Making:   History:   Old Medical Records: I decided to obtain old medical records.  Independently Interpreted Test(s):   I have ordered and independently interpreted EKG Reading(s) - see prior notes  Clinical Tests:   Lab Tests: Ordered and Reviewed  Radiological Study: Ordered and Reviewed  Medical Tests: Ordered and Reviewed  Will admit for chf given hx of decreased ef to 15% based upon echo from last month and having symptoms.  No severe distress. Elevated troponin likely from htn and chf.  I dont' see signs of acute ischemia on ekg.  Will admit here.             Scribe Attestation:   Scribe #1: I performed the above scribed service and the documentation accurately describes the services I performed. I attest to the accuracy of the note.    I, Dr. German Pimentel personally performed the services described in this documentation. All medical record entries made by the scribe were at my direction and in my presence.  I have reviewed the chart and agree that the record reflects my personal performance and is accurate and complete. German Pimentel MD.  3:29 AM 08/23/2019    DISCLAIMER: This note was prepared with Dragon NaturallySpeaking voice recognition transcription software. Garbled syntax, mangled pronouns, and other bizarre constructions may be attributed to that software system            Clinical Impression:       ICD-10-CM ICD-9-CM   1. Chest pain R07.9 786.50   2. Shortness of breath R06.02 786.05         Disposition:   Disposition: Placed in Observation                        German Pimentel MD  08/23/19 0329

## 2019-08-23 NOTE — ASSESSMENT & PLAN NOTE
Diurese  Continue home medications including: ASA, coreg, enalapril, and aldactone   Start nitrates   Daily weights  Cardiac diet  Consult cardiology  Trend troponin  telemetry

## 2019-08-23 NOTE — NURSING
Pt hollering out instead of using call light. Pt c/o SOB sats 95% on 2L. Pt very anxious. No lung sounds at bases. When asked about pain, pt points to epigastric area.

## 2019-08-23 NOTE — PLAN OF CARE
Problem: Adult Inpatient Plan of Care  Goal: Plan of Care Review  Outcome: Ongoing (interventions implemented as appropriate)  Bed is in low position, call light is within reach, SR up x 2, instructed to use call light for assistance.  Informed patient of fall prevention, fall band placed, patient refused bed alarm. Cardiac monitored ST, VSS, patient complained of chest pain refused Nitroglycerin stated it would give him a headache, informed him I could give him tylenol for the headache, he still refused.  Patient asked for Ativan was feeling anxious, medication given, patient is resting between care, free from falls, safety maintained, will continue to monitor and round.

## 2019-08-23 NOTE — ED NOTES
Pt presents to the ED with complaints of SOB which has been worsening over the past 5 days, primarily at night and requires him to sleep on 3 pillows. Pt also reports chest heaviness. Bed rails are up and call light is within patient reach. Will continue to monitor.

## 2019-08-23 NOTE — ASSESSMENT & PLAN NOTE
Chronic, uncontrolled  Resume home medications of coreg and enalapril  Starting nitrates and diuresing will help as well   Monitor and treat accordingly

## 2019-08-23 NOTE — H&P
[facility] Hospital Medicine  History & Physical    Patient Name: Francis Daigle  MRN: [id number]  Admission Date: 8/22/2019  Attending Physician: Rita James MD   Primary Care Provider: Primary Doctor No         Patient information was obtained from patient, past medical records and ER records.     Subjective:     Principal Problem:Acute on chronic combined systolic and diastolic congestive heart failure    Chief Complaint:   Chief Complaint   Patient presents with    Shortness of Breath     Started 5 days ago worse at night, pt reports having anxiety hx        HPI: Pt presents with c/o SOB, chest pressure and anxiety X 5-6 days. Pt states he is SOB even at rest at times but is especially exacerbated by lying down. Currently reports sleeping on 3 pillows. Also c/o DELACRUZ, recently went to cross a fence and had an acute onset of severe SOB. Chest pressure is constant and substernal in location. No history of CAD. Pt states he has had a LHC in the past in an effort to identify a cause for CHF, but no cause has been found. Chest discomfort does not radiate, denies any nausea or sweating. Denies any cough. Pt attributes anxiety to chest pressure. Denies any palpations or heart racing. Denies any fever or chills. Denies any abdominal pain.     Reviewed echo from July 3, 2019.   · Eccentric left ventricular hypertrophy  · Mild left ventricular enlargement.  · Severely decreased left ventricular systolic function. The estimated ejection fraction is 15%  · Left ventricular diastolic dysfunction.  · Moderate right ventricular enlargement.  · Mild left atrial enlargement.  · Mild right atrial enlargement.  · Mild-to-moderate mitral regurgitation.  · Mild tricuspid regurgitation.  · Normal central venous pressure (3 mm Hg).  · The estimated PA systolic pressure is 31 mm Hg  Small pericardial effusion.    Past Medical History:   Diagnosis Date    Anxiety     CHF (congestive heart failure)      Depression     Hypertension        History reviewed. No pertinent surgical history.    Review of patient's allergies indicates:  No Known Allergies    No current facility-administered medications on file prior to encounter.      Current Outpatient Medications on File Prior to Encounter   Medication Sig    carvedilol (COREG) 3.125 MG tablet Take 3.125 mg by mouth 2 (two) times daily with meals.    enalapril (VASOTEC) 2.5 MG tablet Take 2.5 mg by mouth once daily.    furosemide (LASIX) 20 MG tablet Take 1 tablet (20 mg total) by mouth 3 (three) times daily.    spironolactone (ALDACTONE) 25 MG tablet Take 25 mg by mouth once daily.     Family History     Problem Relation (Age of Onset)    Arthritis Mother    Asthma Sister    Diabetes Sister    Heart disease Mother, Maternal Grandfather    Hyperlipidemia Mother    Hypertension Mother, Father    Kidney disease Mother        Tobacco Use    Smoking status: Former Smoker     Packs/day: 2.00     Years: 20.00     Pack years: 40.00     Types: Cigarettes     Start date: 1999     Last attempt to quit: 2017     Years since quittin.5    Smokeless tobacco: Never Used   Substance and Sexual Activity    Alcohol use: Not Currently    Drug use: Not Currently    Sexual activity: Yes     Partners: Female     Review of Systems   Constitutional: Positive for fatigue. Negative for activity change, appetite change, diaphoresis and fever.   HENT: Negative for congestion and facial swelling.    Eyes: Negative for redness and itching.   Respiratory: Positive for shortness of breath. Negative for cough, chest tightness and wheezing.    Cardiovascular: Positive for chest pain. Negative for palpitations and leg swelling.   Gastrointestinal: Negative for abdominal pain, constipation, diarrhea, nausea and vomiting.   Endocrine: Negative for cold intolerance and heat intolerance.   Genitourinary: Negative for difficulty urinating, dysuria, flank pain, frequency, hematuria and  urgency.   Musculoskeletal: Negative for arthralgias, back pain, joint swelling, myalgias and neck pain.   Neurological: Negative for dizziness, syncope, weakness, light-headedness, numbness and headaches.   Psychiatric/Behavioral: Negative for agitation and confusion. The patient is not nervous/anxious.      Objective:     Vital Signs (Most Recent):  Temp: 97.4 °F (36.3 °C) (08/23/19 0040)  Pulse: (!) 113 (08/23/19 0109)  Resp: 20 (08/23/19 0040)  BP: (!) 138/96 (08/23/19 0109)  SpO2: 95 % (08/23/19 0109) Vital Signs (24h Range):  Temp:  [97.4 °F (36.3 °C)-98.6 °F (37 °C)] 97.4 °F (36.3 °C)  Pulse:  [107-125] 113  Resp:  [20-24] 20  SpO2:  [90 %-100 %] 95 %  BP: (137-197)/() 138/96     Weight: 74.9 kg (165 lb 2 oz)  Body mass index is 25.11 kg/m².    Physical Exam   Constitutional: He is oriented to person, place, and time. He appears well-developed and well-nourished. No distress.   HENT:   Head: Normocephalic and atraumatic.   Mouth/Throat: No oropharyngeal exudate.   Eyes: Conjunctivae are normal. No scleral icterus.   Neck: Normal range of motion. Neck supple. No JVD present. No tracheal deviation present. No thyromegaly present.   Cardiovascular: Normal rate, regular rhythm, normal heart sounds and intact distal pulses. Exam reveals no gallop and no friction rub.   No murmur heard.  Pulmonary/Chest: Effort normal. No respiratory distress. He has no wheezes. He has rales (bibasilar). He exhibits no tenderness.   Abdominal: Soft. Bowel sounds are normal. He exhibits no distension and no mass. There is no tenderness.   Musculoskeletal: He exhibits no edema or tenderness.   Lymphadenopathy:     He has no cervical adenopathy.   Neurological: He is alert and oriented to person, place, and time. No cranial nerve deficit.   Skin: Skin is warm and dry. Capillary refill takes less than 2 seconds. No erythema.   Psychiatric: He has a normal mood and affect. His behavior is normal. Judgment and thought content  normal.   Nursing note and vitals reviewed.          Significant Labs:   CBC:   Recent Labs   Lab 08/22/19 2241 08/23/19  0121   WBC 13.59* 14.65*   HGB 13.1* 13.7*   HCT 40.1 42.2    313     CMP:   Recent Labs   Lab 08/22/19 2241 08/23/19  0121    140   K 4.3 4.1    105   CO2 22* 24   GLU 92 98   BUN 29* 32*   CREATININE 1.0 1.1   CALCIUM 9.1 9.2   PROT 7.3 7.3   ALBUMIN 3.1* 3.2*   BILITOT 0.7 0.8   ALKPHOS 93 97   AST 73* 80*   * 110*   ANIONGAP 10 11   EGFRNONAA >60 >60       Significant Imaging: CXR: I have reviewed all pertinent results/findings within the past 24 hours and my personal findings are:  mild pulmonary congestion     Assessment/Plan:     * Acute on chronic combined systolic and diastolic congestive heart failure  Diurese  Continue home medications including: ASA, coreg, enalapril, and aldactone   Start nitrates   Daily weights  Cardiac diet  Consult cardiology  Trend troponin  telemetry      Anxiety  Acute, associated with SOB and chest pressure  Ativan po prn      Hypertension  Chronic, uncontrolled  Resume home medications of coreg and enalapril  Starting nitrates and diuresing will help as well   Monitor and treat accordingly       Chest pain  Acute  Associated with anxiety   Trend troponin  Start nitro paste   Anxiolytics prn         VTE Risk Mitigation (From admission, onward)        Ordered     enoxaparin injection 40 mg  Daily      08/23/19 0042     IP VTE HIGH RISK PATIENT  Once      08/23/19 0042             Radha Bledsoe NP  Department of Hospital Medicine   [facility]

## 2019-08-23 NOTE — PLAN OF CARE
CM met with pt bedside. Pt verified information as correct on facesheet. Pt denies POA/LW. Pt reports living at listed address alone, reports having friends to help when needed. PCP is Dr. Watson. Pharm is [facility] on [address]. Pt denies any hh/dme. Pt reports being independent with all ADLs and is able to drive himself to appts. DC plan is home with no needs. CM following.     08/23/19 1040   Discharge Assessment   Assessment Type Discharge Planning Assessment   Confirmed/corrected address and phone number on facesheet? Yes   Assessment information obtained from? Patient   Communicated expected length of stay with patient/caregiver yes   Prior to hospitilization cognitive status: Alert/Oriented   Prior to hospitalization functional status: Independent   Current cognitive status: Alert/Oriented   Current Functional Status: Independent   Lives With alone   Able to Return to Prior Arrangements yes   Is patient able to care for self after discharge? Yes   Patient's perception of discharge disposition home or selfcare   Readmission Within the Last 30 Days   ([facility])   Patient currently being followed by outpatient case management? No   Patient currently receives any other outside agency services? No   Equipment Currently Used at Home none   Do you have any problems affording any of your prescribed medications? No   Is the patient taking medications as prescribed? yes   Does the patient have transportation home? Yes   Transportation Anticipated family or friend will provide   Does the patient receive services at the Coumadin Clinic? No   Discharge Plan A Home   DME Needed Upon Discharge  none   Patient/Family in Agreement with Plan yes

## 2019-08-23 NOTE — HPI
Pt presents with c/o SOB, chest pressure and anxiety X 5-6 days. Pt states he is SOB even at rest at times but is especially exacerbated by lying down. Currently reports sleeping on 3 pillows. Also c/o DELACRUZ, recently went to cross a fence and had an acute onset of severe SOB. Chest pressure is constant and substernal in location. No history of CAD. Pt states he has had a LHC in the past in an effort to identify a cause for CHF, but no cause has been found. Chest discomfort does not radiate, denies any nausea or sweating. Denies any cough. Pt attributes anxiety to chest pressure. Denies any palpations or heart racing. Denies any fever or chills. Denies any abdominal pain.     Reviewed echo from July 3, 2019.   · Eccentric left ventricular hypertrophy  · Mild left ventricular enlargement.  · Severely decreased left ventricular systolic function. The estimated ejection fraction is 15%  · Left ventricular diastolic dysfunction.  · Moderate right ventricular enlargement.  · Mild left atrial enlargement.  · Mild right atrial enlargement.  · Mild-to-moderate mitral regurgitation.  · Mild tricuspid regurgitation.  · Normal central venous pressure (3 mm Hg).  · The estimated PA systolic pressure is 31 mm Hg  · Small pericardial effusion.

## 2019-08-23 NOTE — SUBJECTIVE & OBJECTIVE
Past Medical History:   Diagnosis Date    Anxiety     CHF (congestive heart failure)     Depression     Hypertension        History reviewed. No pertinent surgical history.    Review of patient's allergies indicates:  No Known Allergies    No current facility-administered medications on file prior to encounter.      Current Outpatient Medications on File Prior to Encounter   Medication Sig    carvedilol (COREG) 3.125 MG tablet Take 3.125 mg by mouth 2 (two) times daily with meals.    enalapril (VASOTEC) 2.5 MG tablet Take 2.5 mg by mouth once daily.    furosemide (LASIX) 20 MG tablet Take 1 tablet (20 mg total) by mouth 3 (three) times daily.    spironolactone (ALDACTONE) 25 MG tablet Take 25 mg by mouth once daily.     Family History     Problem Relation (Age of Onset)    Arthritis Mother    Asthma Sister    Diabetes Sister    Heart disease Mother, Maternal Grandfather    Hyperlipidemia Mother    Hypertension Mother, Father    Kidney disease Mother        Tobacco Use    Smoking status: Former Smoker     Packs/day: 2.00     Years: 20.00     Pack years: 40.00     Types: Cigarettes     Start date: 1999     Last attempt to quit: 2017     Years since quittin.5    Smokeless tobacco: Never Used   Substance and Sexual Activity    Alcohol use: Not Currently    Drug use: Not Currently    Sexual activity: Yes     Partners: Female     Review of Systems   Constitutional: Positive for fatigue. Negative for activity change, appetite change, diaphoresis and fever.   HENT: Negative for congestion and facial swelling.    Eyes: Negative for redness and itching.   Respiratory: Positive for shortness of breath. Negative for cough, chest tightness and wheezing.    Cardiovascular: Positive for chest pain. Negative for palpitations and leg swelling.   Gastrointestinal: Negative for abdominal pain, constipation, diarrhea, nausea and vomiting.   Endocrine: Negative for cold intolerance and heat intolerance.    Genitourinary: Negative for difficulty urinating, dysuria, flank pain, frequency, hematuria and urgency.   Musculoskeletal: Negative for arthralgias, back pain, joint swelling, myalgias and neck pain.   Neurological: Negative for dizziness, syncope, weakness, light-headedness, numbness and headaches.   Psychiatric/Behavioral: Negative for agitation and confusion. The patient is not nervous/anxious.      Objective:     Vital Signs (Most Recent):  Temp: 97.4 °F (36.3 °C) (08/23/19 0040)  Pulse: (!) 113 (08/23/19 0109)  Resp: 20 (08/23/19 0040)  BP: (!) 138/96 (08/23/19 0109)  SpO2: 95 % (08/23/19 0109) Vital Signs (24h Range):  Temp:  [97.4 °F (36.3 °C)-98.6 °F (37 °C)] 97.4 °F (36.3 °C)  Pulse:  [107-125] 113  Resp:  [20-24] 20  SpO2:  [90 %-100 %] 95 %  BP: (137-197)/() 138/96     Weight: 74.9 kg (165 lb 2 oz)  Body mass index is 25.11 kg/m².    Physical Exam   Constitutional: He is oriented to person, place, and time. He appears well-developed and well-nourished. No distress.   HENT:   Head: Normocephalic and atraumatic.   Mouth/Throat: No oropharyngeal exudate.   Eyes: Conjunctivae are normal. No scleral icterus.   Neck: Normal range of motion. Neck supple. No JVD present. No tracheal deviation present. No thyromegaly present.   Cardiovascular: Normal rate, regular rhythm, normal heart sounds and intact distal pulses. Exam reveals no gallop and no friction rub.   No murmur heard.  Pulmonary/Chest: Effort normal. No respiratory distress. He has no wheezes. He has rales (bibasilar). He exhibits no tenderness.   Abdominal: Soft. Bowel sounds are normal. He exhibits no distension and no mass. There is no tenderness.   Musculoskeletal: He exhibits no edema or tenderness.   Lymphadenopathy:     He has no cervical adenopathy.   Neurological: He is alert and oriented to person, place, and time. No cranial nerve deficit.   Skin: Skin is warm and dry. Capillary refill takes less than 2 seconds. No erythema.    Psychiatric: He has a normal mood and affect. His behavior is normal. Judgment and thought content normal.   Nursing note and vitals reviewed.          Significant Labs:   CBC:   Recent Labs   Lab 08/22/19 2241 08/23/19  0121   WBC 13.59* 14.65*   HGB 13.1* 13.7*   HCT 40.1 42.2    313     CMP:   Recent Labs   Lab 08/22/19 2241 08/23/19  0121    140   K 4.3 4.1    105   CO2 22* 24   GLU 92 98   BUN 29* 32*   CREATININE 1.0 1.1   CALCIUM 9.1 9.2   PROT 7.3 7.3   ALBUMIN 3.1* 3.2*   BILITOT 0.7 0.8   ALKPHOS 93 97   AST 73* 80*   * 110*   ANIONGAP 10 11   EGFRNONAA >60 >60       Significant Imaging: CXR: I have reviewed all pertinent results/findings within the past 24 hours and my personal findings are:  mild pulmonary congestion

## 2019-08-23 NOTE — CONSULTS
Subjective:       Francis Daigle is a 44 y.o. male SOB, chest pressure and anxiety for 5-6 days. Pt states he is SOB even at rest at times but is especially exacerbated by lying down. Currently reports sleeping on 3 pillows.  DELACRUZ. No history of CAD. Pt states he has had LHC 2017 in [facility] found no blockage, CHF was diagnosed since then with EF about 25%, no cause has been found. Chest discomfort does not radiate, denies any nausea or sweating. Denies any cough. Pt attributes anxiety to chest pressure. Denies any palpations or heart racing. Denies any fever or chills, abdominal pain, leg swelling, coughing, n/v, or active bleeding.  Very anxious, received antivan, sleepy.    Past Medical History:   Diagnosis Date    Anxiety     CHF (congestive heart failure)     Depression     Hypertension      Family History   Problem Relation Age of Onset    Arthritis Mother     Heart disease Mother     Hyperlipidemia Mother     Hypertension Mother     Kidney disease Mother     Hypertension Father     Asthma Sister     Diabetes Sister     Heart disease Maternal Grandfather      No current facility-administered medications on file prior to encounter.      Current Outpatient Medications on File Prior to Encounter   Medication Sig Dispense Refill    carvedilol (COREG) 3.125 MG tablet Take 3.125 mg by mouth 2 (two) times daily with meals.      enalapril (VASOTEC) 2.5 MG tablet Take 2.5 mg by mouth once daily.      furosemide (LASIX) 20 MG tablet Take 1 tablet (20 mg total) by mouth 3 (three) times daily. (Patient taking differently: Take 20 mg by mouth 2 (two) times daily. ) 90 tablet 11    spironolactone (ALDACTONE) 25 MG tablet Take 25 mg by mouth once daily.         Review of Systems  12 systems reviewed, negative except mentioned in HPI.     Objective:     Vital Signs (Most Recent):  Temp: 97.7 °F (36.5 °C) (08/23/19 0747)  Pulse: (!) 112 (08/23/19 0813)  Resp: 20 (08/23/19 0813)  BP: (!) 141/105 (08/23/19  0747)  SpO2: 98 % (08/23/19 0813) Vital Signs (24h Range):  Temp:  [97.4 °F (36.3 °C)-98.6 °F (37 °C)] 97.7 °F (36.5 °C)  Pulse:  [107-125] 112  Resp:  [18-24] 20  SpO2:  [90 %-100 %] 98 %  BP: (137-197)/() 141/105       Physical Exam  Gen: Calm, lying on bed, in no distress; drowsy  Skin: warm and dry  Lymph: no lymphadenopathy detected  HEENT: NC/AT  Neck: supple, no JVD/bruit  Chest: no deformity, equal movement b/l  Lung: rales on base b/l  Heart: RRR, S1/S2 +, 3/6 SM, no G/R  Abd: BS+, S, NT/ND  Ext: no deformity, no pretibial edema  Pulse: b/l radial 2+  Neuro: n/a    Cardiographics  ECG 8/22/19: Sinus tachycardia. Possible Left atrial enlargement.   Echo July 3, 2019.   · Eccentric left ventricular hypertrophy  · Mild left ventricular enlargement.  · Severely decreased left ventricular systolic function. The estimated ejection fraction is 15%  · Left ventricular diastolic dysfunction.  · Moderate right ventricular enlargement.  · Mild left atrial enlargement.  · Mild right atrial enlargement.  · Mild-to-moderate mitral regurgitation.  · Mild tricuspid regurgitation.  · The estimated PA systolic pressure is 31 mm Hg   · Small pericardial effusion.    Imaging  Chest x-ray 8/22/19: Mild cardiomegaly.  Blunting of the left lateral costophrenic angle may be secondary to scarring or a trace left pleural effusion.    Lab Review   Lab Results   Component Value Date    WBC 14.65 (H) 08/23/2019    HGB 13.7 (L) 08/23/2019    HCT 42.2 08/23/2019    MCV 89 08/23/2019     08/23/2019     BMP  Lab Results   Component Value Date     08/23/2019    K 4.6 08/23/2019     08/23/2019    CO2 21 (L) 08/23/2019    BUN 34 (H) 08/23/2019    CREATININE 1.2 08/23/2019    CALCIUM 9.6 08/23/2019    ANIONGAP 12 08/23/2019    ESTGFRAFRICA >60 08/23/2019    EGFRNONAA >60 08/23/2019   Results for JANET ANDRADE (MRN [id number]) as of 8/23/2019 09:20   Ref. Range 8/22/2019 22:41 8/23/2019 01:21 8/23/2019 06:27   BNP  Latest Ref Range: 0 - 99 pg/mL 1,635 (H)     Troponin I Latest Ref Range: 0.000 - 0.026 ng/mL 0.037 (H) 0.042 (H) 0.038 (H)       Assessment:   HFrEF, EF15%, NICM per pt (normal coronary via Cath 2017?)  HTN  Anxiety     Plan:   Low salt diet and fluid restriction.  Lasix 40mg iv daily.  Continue Coreg and enalapril.

## 2019-08-24 LAB
ANION GAP SERPL CALC-SCNC: 10 MMOL/L (ref 8–16)
BUN SERPL-MCNC: 35 MG/DL (ref 6–20)
CALCIUM SERPL-MCNC: 9.5 MG/DL (ref 8.7–10.5)
CHLORIDE SERPL-SCNC: 105 MMOL/L (ref 95–110)
CO2 SERPL-SCNC: 26 MMOL/L (ref 23–29)
CREAT SERPL-MCNC: 1.2 MG/DL (ref 0.5–1.4)
ERYTHROCYTE [DISTWIDTH] IN BLOOD BY AUTOMATED COUNT: 17.8 % (ref 11.5–14.5)
EST. GFR  (AFRICAN AMERICAN): >60 ML/MIN/1.73 M^2
EST. GFR  (NON AFRICAN AMERICAN): >60 ML/MIN/1.73 M^2
GLUCOSE SERPL-MCNC: 113 MG/DL (ref 70–110)
HCT VFR BLD AUTO: 44.2 % (ref 40–54)
HGB BLD-MCNC: 14.3 G/DL (ref 14–18)
MCH RBC QN AUTO: 27.6 PG (ref 27–31)
MCHC RBC AUTO-ENTMCNC: 32.4 G/DL (ref 32–36)
MCV RBC AUTO: 85 FL (ref 82–98)
PLATELET # BLD AUTO: 355 K/UL (ref 150–350)
PMV BLD AUTO: 9.5 FL (ref 9.2–12.9)
POTASSIUM SERPL-SCNC: 4.5 MMOL/L (ref 3.5–5.1)
RBC # BLD AUTO: 5.18 M/UL (ref 4.6–6.2)
SODIUM SERPL-SCNC: 141 MMOL/L (ref 136–145)
WBC # BLD AUTO: 13.32 K/UL (ref 3.9–12.7)

## 2019-08-24 PROCEDURE — 27000221 HC OXYGEN, UP TO 24 HOURS

## 2019-08-24 PROCEDURE — 94761 N-INVAS EAR/PLS OXIMETRY MLT: CPT

## 2019-08-24 PROCEDURE — 63600175 PHARM REV CODE 636 W HCPCS: Performed by: NURSE PRACTITIONER

## 2019-08-24 PROCEDURE — 80048 BASIC METABOLIC PNL TOTAL CA: CPT

## 2019-08-24 PROCEDURE — 36415 COLL VENOUS BLD VENIPUNCTURE: CPT

## 2019-08-24 PROCEDURE — G0378 HOSPITAL OBSERVATION PER HR: HCPCS

## 2019-08-24 PROCEDURE — 96376 TX/PRO/DX INJ SAME DRUG ADON: CPT

## 2019-08-24 PROCEDURE — 25000003 PHARM REV CODE 250: Performed by: NURSE PRACTITIONER

## 2019-08-24 PROCEDURE — 96372 THER/PROPH/DIAG INJ SC/IM: CPT

## 2019-08-24 PROCEDURE — 85027 COMPLETE CBC AUTOMATED: CPT

## 2019-08-24 RX ORDER — SPIRONOLACTONE 25 MG/1
25 TABLET ORAL DAILY
Status: DISCONTINUED | OUTPATIENT
Start: 2019-08-24 | End: 2019-08-26 | Stop reason: HOSPADM

## 2019-08-24 RX ADMIN — CARVEDILOL 3.12 MG: 3.12 TABLET, FILM COATED ORAL at 10:08

## 2019-08-24 RX ADMIN — CARVEDILOL 3.12 MG: 3.12 TABLET, FILM COATED ORAL at 05:08

## 2019-08-24 RX ADMIN — ENOXAPARIN SODIUM 40 MG: 100 INJECTION SUBCUTANEOUS at 05:08

## 2019-08-24 RX ADMIN — ASPIRIN 81 MG CHEWABLE TABLET 81 MG: 81 TABLET CHEWABLE at 10:08

## 2019-08-24 RX ADMIN — ENALAPRIL MALEATE 2.5 MG: 2.5 TABLET ORAL at 10:08

## 2019-08-24 RX ADMIN — LORAZEPAM 1 MG: 1 TABLET ORAL at 10:08

## 2019-08-24 RX ADMIN — FUROSEMIDE 40 MG: 10 INJECTION, SOLUTION INTRAVENOUS at 10:08

## 2019-08-24 RX ADMIN — LORAZEPAM 1 MG: 1 TABLET ORAL at 08:08

## 2019-08-24 NOTE — NURSING
Pt stated he needed to discharge today, that he has plans to travel to [place]. Spoke with attending MD who stated if today's CXR looks better and if he is not SOB on ambulation he can discharge home today. Primary nurse informed.

## 2019-08-24 NOTE — PROGRESS NOTES
[facility]  Cardiology  Progress Note    Patient Name: Francis Daigle  MRN: [id number]  Admission Date: 8/22/2019  Hospital Length of Stay: 0 days  Code Status: Full Code   Attending Physician: Ivan Martinez MD   Primary Care Physician: Primary Doctor No  Expected Discharge Date:   Principal Problem:Acute on chronic combined systolic and diastolic congestive heart failure    Subjective:     Hospital Course: Francis Daigle is a 44 y.o. male SOB, chest pressure and anxiety for 5-6 days. Pt states he is SOB even at rest at times but is especially exacerbated by lying down. Currently reports sleeping on 3 pillows.  DELACRUZ.   Pt states he has had LHC 2017 in [facility] found no blockage, CHF was diagnosed since then with EF about 25%, no cause has been found.     Interval History: Reports feeling better, sob improves, no cp. Less anxious.    Review of Systems  Objective:     Vital Signs (Most Recent):  Temp: 97.8 °F (36.6 °C) (08/24/19 0821)  Pulse: (!) 116 (08/24/19 0821)  Resp: 19 (08/24/19 0821)  BP: (!) 128/96 (08/24/19 0821)  SpO2: 95 % (08/24/19 0821) Vital Signs (24h Range):  Temp:  [95 °F (35 °C)-98.1 °F (36.7 °C)] 97.8 °F (36.6 °C)  Pulse:  [107-118] 116  Resp:  [19-24] 19  SpO2:  [95 %-100 %] 95 %  BP: (128-145)/() 128/96   Weight: 70.5 kg (155 lb 6.8 oz)  Body mass index is 23.63 kg/m².  SpO2: 95 %  O2 Device (Oxygen Therapy): (P) nasal cannula    Intake/Output Summary (Last 24 hours) at 8/24/2019 1005  Last data filed at 8/24/2019 0720  Gross per 24 hour   Intake 1200 ml   Output 1250 ml   Net -50 ml     Lines/Drains/Airways     Peripheral Intravenous Line                 Peripheral IV - Single Lumen 08/22/19 2249 18 G Right Antecubital 1 day               Physical Exam  Gen: Comfort, sitting on bed, in no distress  Skin: normal temperature/tugor, no erythema  Lymph: no lymphadenopathy detected  HEENT: NC/AT  Neck: supple, no JVD/bruit  Chest: no deformity, equal movement  b/l  Lung: CTA b/l  Heart: RRR, S1/S2 +, 3/6 SM, no G/R  Abd: BS+, S, NT/ND  Ext: no deformity, no pretibial edema  Pulse: b/l radial 2+, b/l DP 2+  Neuro: AAOx3    Cardiographics  ECG 8/22/19: Sinus tachycardia. Possible Left atrial enlargement.   Echo July 3, 2019.   · Eccentric left ventricular hypertrophy  · Mild left ventricular enlargement.  · Severely decreased left ventricular systolic function. The estimated ejection fraction is 15%  · Left ventricular diastolic dysfunction.  · Moderate right ventricular enlargement.  · Mild left atrial enlargement.  · Mild right atrial enlargement.  · Mild-to-moderate mitral regurgitation.  · Mild tricuspid regurgitation.  · The estimated PA systolic pressure is 31 mm Hg   · Small pericardial effusion.     Imaging  Chest x-ray 8/22/19: Mild cardiomegaly.  Blunting of the left lateral costophrenic angle may be secondary to scarring or a trace left pleural effusion.    Lab Review   Lab Results   Component Value Date    WBC 13.32 (H) 08/24/2019    HGB 14.3 08/24/2019    HCT 44.2 08/24/2019    MCV 85 08/24/2019     (H) 08/24/2019     BMP  Lab Results   Component Value Date     08/24/2019    K 4.5 08/24/2019     08/24/2019    CO2 26 08/24/2019    BUN 35 (H) 08/24/2019    CREATININE 1.2 08/24/2019    CALCIUM 9.5 08/24/2019    ANIONGAP 10 08/24/2019    ESTGFRAFRICA >60 08/24/2019    EGFRNONAA >60 08/24/2019     Recent Labs   Lab 08/23/19  0627   TROPONINI 0.038*     BNP  Recent Labs   Lab 08/22/19  2241   BNP 1,635*         Assessment:   HFrEF, EF15%, NICM per pt (normal coronary via Cath 2017?)  HTN  Anxiety      Plan:   Improving.  Low salt diet and fluid restriction.  Lasix 40mg iv daily; add spironolactone 25mg/d.  Continue Coreg and enalapril.     Neeraj Baugh MD  Cardiology  [facility]

## 2019-08-24 NOTE — PLAN OF CARE
08/24/19 0742   PRE-TX-O2   O2 Device (Oxygen Therapy) nasal cannula w/ humidification   $ Is the patient on Low Flow Oxygen? Yes   Flow (L/min) 2   SpO2 99 %   Pulse Oximetry Type Intermittent   $ Pulse Oximetry - Multiple Charge Pulse Oximetry - Multiple        08/24/19 0742   PRE-TX-O2   O2 Device (Oxygen Therapy) nasal cannula w/ humidification   $ Is the patient on Low Flow Oxygen? Yes   Flow (L/min) 2   SpO2 99 %   Pulse Oximetry Type Intermittent   $ Pulse Oximetry - Multiple Charge Pulse Oximetry - Multiple

## 2019-08-24 NOTE — PLAN OF CARE
Problem: Adult Inpatient Plan of Care  Goal: Plan of Care Review  Outcome: Ongoing (interventions implemented as appropriate)  Pt states he has had no shortness of breath/pain during the night.  Denies needs at this time. Oriented x 4. Pt is very impulsive and refuses to call for assistance to BR or to use urinal at bedside.  Jumps out of bed and stumbles to bathroom.  Bed alarm on and call light within reach. Door open at nurses' station.  Refuses nitro patch.  Instructed on fluid restriction/ low salt diet but needs a lot of reinforcement.  Repeatedly requests more milk.  Will monitor.

## 2019-08-25 LAB
ANION GAP SERPL CALC-SCNC: 9 MMOL/L (ref 8–16)
BUN SERPL-MCNC: 35 MG/DL (ref 6–20)
CALCIUM SERPL-MCNC: 9.1 MG/DL (ref 8.7–10.5)
CHLORIDE SERPL-SCNC: 101 MMOL/L (ref 95–110)
CO2 SERPL-SCNC: 26 MMOL/L (ref 23–29)
CREAT SERPL-MCNC: 1.1 MG/DL (ref 0.5–1.4)
ERYTHROCYTE [DISTWIDTH] IN BLOOD BY AUTOMATED COUNT: 17.2 % (ref 11.5–14.5)
EST. GFR  (AFRICAN AMERICAN): >60 ML/MIN/1.73 M^2
EST. GFR  (NON AFRICAN AMERICAN): >60 ML/MIN/1.73 M^2
GLUCOSE SERPL-MCNC: 96 MG/DL (ref 70–110)
HCT VFR BLD AUTO: 43.9 % (ref 40–54)
HGB BLD-MCNC: 14.2 G/DL (ref 14–18)
MCH RBC QN AUTO: 28 PG (ref 27–31)
MCHC RBC AUTO-ENTMCNC: 32.3 G/DL (ref 32–36)
MCV RBC AUTO: 86 FL (ref 82–98)
PLATELET # BLD AUTO: 375 K/UL (ref 150–350)
PMV BLD AUTO: 9.8 FL (ref 9.2–12.9)
POTASSIUM SERPL-SCNC: 4.2 MMOL/L (ref 3.5–5.1)
RBC # BLD AUTO: 5.08 M/UL (ref 4.6–6.2)
SODIUM SERPL-SCNC: 136 MMOL/L (ref 136–145)
WBC # BLD AUTO: 11 K/UL (ref 3.9–12.7)

## 2019-08-25 PROCEDURE — G0378 HOSPITAL OBSERVATION PER HR: HCPCS

## 2019-08-25 PROCEDURE — 96372 THER/PROPH/DIAG INJ SC/IM: CPT

## 2019-08-25 PROCEDURE — 94761 N-INVAS EAR/PLS OXIMETRY MLT: CPT

## 2019-08-25 PROCEDURE — 80048 BASIC METABOLIC PNL TOTAL CA: CPT

## 2019-08-25 PROCEDURE — 85027 COMPLETE CBC AUTOMATED: CPT

## 2019-08-25 PROCEDURE — 63600175 PHARM REV CODE 636 W HCPCS: Performed by: NURSE PRACTITIONER

## 2019-08-25 PROCEDURE — 25000003 PHARM REV CODE 250: Performed by: NURSE PRACTITIONER

## 2019-08-25 PROCEDURE — 36415 COLL VENOUS BLD VENIPUNCTURE: CPT

## 2019-08-25 PROCEDURE — 96376 TX/PRO/DX INJ SAME DRUG ADON: CPT

## 2019-08-25 PROCEDURE — 27000221 HC OXYGEN, UP TO 24 HOURS

## 2019-08-25 PROCEDURE — 63600175 PHARM REV CODE 636 W HCPCS: Performed by: INTERNAL MEDICINE

## 2019-08-25 RX ORDER — FUROSEMIDE 10 MG/ML
20 INJECTION INTRAMUSCULAR; INTRAVENOUS DAILY
Status: DISCONTINUED | OUTPATIENT
Start: 2019-08-25 | End: 2019-08-25

## 2019-08-25 RX ADMIN — ASPIRIN 81 MG CHEWABLE TABLET 81 MG: 81 TABLET CHEWABLE at 09:08

## 2019-08-25 RX ADMIN — LORAZEPAM 1 MG: 1 TABLET ORAL at 09:08

## 2019-08-25 RX ADMIN — FUROSEMIDE 20 MG: 10 INJECTION, SOLUTION INTRAMUSCULAR; INTRAVENOUS at 09:08

## 2019-08-25 RX ADMIN — CARVEDILOL 3.12 MG: 3.12 TABLET, FILM COATED ORAL at 05:08

## 2019-08-25 RX ADMIN — CARVEDILOL 3.12 MG: 3.12 TABLET, FILM COATED ORAL at 09:08

## 2019-08-25 RX ADMIN — ENOXAPARIN SODIUM 40 MG: 100 INJECTION SUBCUTANEOUS at 05:08

## 2019-08-25 RX ADMIN — ENALAPRIL MALEATE 2.5 MG: 2.5 TABLET ORAL at 09:08

## 2019-08-25 NOTE — SUBJECTIVE & OBJECTIVE
Past Medical History:   Diagnosis Date    Anxiety     CHF (congestive heart failure)     Depression     Hypertension        History reviewed. No pertinent surgical history.    Review of patient's allergies indicates:  No Known Allergies    No current facility-administered medications on file prior to encounter.      Current Outpatient Medications on File Prior to Encounter   Medication Sig    carvedilol (COREG) 3.125 MG tablet Take 3.125 mg by mouth 2 (two) times daily with meals.    enalapril (VASOTEC) 2.5 MG tablet Take 2.5 mg by mouth once daily.    furosemide (LASIX) 20 MG tablet Take 1 tablet (20 mg total) by mouth 3 (three) times daily. (Patient taking differently: Take 20 mg by mouth 2 (two) times daily. )    spironolactone (ALDACTONE) 25 MG tablet Take 25 mg by mouth once daily.     Family History     Problem Relation (Age of Onset)    Arthritis Mother    Asthma Sister    Diabetes Sister    Heart disease Mother, Maternal Grandfather    Hyperlipidemia Mother    Hypertension Mother, Father    Kidney disease Mother        Tobacco Use    Smoking status: Former Smoker     Packs/day: 2.00     Years: 20.00     Pack years: 40.00     Types: Cigarettes     Start date: 1999     Last attempt to quit: 2017     Years since quittin.5    Smokeless tobacco: Never Used   Substance and Sexual Activity    Alcohol use: Not Currently    Drug use: Not Currently    Sexual activity: Yes     Partners: Female     Review of Systems   Constitutional: Positive for fatigue. Negative for activity change, appetite change, diaphoresis and fever.   HENT: Negative for congestion and facial swelling.    Eyes: Negative for redness and itching.   Respiratory: Positive for shortness of breath. Negative for cough, chest tightness and wheezing.    Cardiovascular: Positive for chest pain. Negative for palpitations and leg swelling.   Gastrointestinal: Negative for abdominal pain, constipation, diarrhea, nausea and vomiting.    Endocrine: Negative for cold intolerance and heat intolerance.   Genitourinary: Negative for difficulty urinating, dysuria, flank pain, frequency, hematuria and urgency.   Musculoskeletal: Negative for arthralgias, back pain, joint swelling, myalgias and neck pain.   Neurological: Negative for dizziness, syncope, weakness, light-headedness, numbness and headaches.   Psychiatric/Behavioral: Negative for agitation and confusion. The patient is not nervous/anxious.      Objective:     Vital Signs (Most Recent):  Temp: 96.1 °F (35.6 °C) (08/25/19 0750)  Pulse: 107 (08/25/19 0750)  Resp: 16 (08/25/19 0750)  BP: 131/86 (08/25/19 0750)  SpO2: 100 % (08/25/19 0801) Vital Signs (24h Range):  Temp:  [96 °F (35.6 °C)-98.3 °F (36.8 °C)] 96.1 °F (35.6 °C)  Pulse:  [] 107  Resp:  [16-20] 16  SpO2:  [90 %-100 %] 100 %  BP: (109-131)/(69-89) 131/86     Weight: 70.5 kg (155 lb 6.8 oz)  Body mass index is 23.63 kg/m².    Physical Exam   Constitutional: He is oriented to person, place, and time. He appears well-developed and well-nourished. No distress.   HENT:   Head: Normocephalic and atraumatic.   Mouth/Throat: No oropharyngeal exudate.   Eyes: Conjunctivae are normal. No scleral icterus.   Neck: Normal range of motion. Neck supple. No JVD present. No tracheal deviation present. No thyromegaly present.   Cardiovascular: Normal rate, regular rhythm, normal heart sounds and intact distal pulses. Exam reveals no gallop and no friction rub.   No murmur heard.  Pulmonary/Chest: Effort normal. No respiratory distress. He has no wheezes. He has rales (bibasilar). He exhibits no tenderness.   Abdominal: Soft. Bowel sounds are normal. He exhibits no distension and no mass. There is no tenderness.   Musculoskeletal: He exhibits no edema or tenderness.   Lymphadenopathy:     He has no cervical adenopathy.   Neurological: He is alert and oriented to person, place, and time. No cranial nerve deficit.   Skin: Skin is warm and dry.  Capillary refill takes less than 2 seconds. No erythema.   Psychiatric: He has a normal mood and affect. His behavior is normal. Judgment and thought content normal.   Nursing note and vitals reviewed.          Significant Labs:   CBC:   Recent Labs   Lab 08/24/19  0612 08/25/19  0434   WBC 13.32* 11.00   HGB 14.3 14.2   HCT 44.2 43.9   * 375*     CMP:   Recent Labs   Lab 08/24/19 0612 08/25/19  0434    136   K 4.5 4.2    101   CO2 26 26   * 96   BUN 35* 35*   CREATININE 1.2 1.1   CALCIUM 9.5 9.1   ANIONGAP 10 9   EGFRNONAA >60 >60       Significant Imaging: CXR: I have reviewed all pertinent results/findings within the past 24 hours and my personal findings are:  mild pulmonary congestion

## 2019-08-25 NOTE — PLAN OF CARE
08/25/19 0801   PRE-TX-O2   O2 Device (Oxygen Therapy) room air   SpO2 100 %   Pulse Oximetry Type Intermittent   $ Pulse Oximetry - Multiple Charge Pulse Oximetry - Multiple

## 2019-08-25 NOTE — PROGRESS NOTES
[facility]  Cardiology  Progress Note    Patient Name: Francis Daigle  MRN: [id number]  Admission Date: 8/22/2019  Hospital Length of Stay: 0 days  Code Status: Full Code   Attending Physician: Ivan Martinez MD   Primary Care Physician: Primary Doctor No  Expected Discharge Date:   Principal Problem:Acute on chronic combined systolic and diastolic congestive heart failure    Subjective:     Hospital Course: Francis Daigle is a 44 y.o. male SOB, chest pressure and anxiety for 5-6 days. Pt states he is SOB even at rest at times but is especially exacerbated by lying down. Currently reports sleeping on 3 pillows.  DELACRUZ.   Pt states he has had LHC 2017 in [facility] found no blockage, CHF was diagnosed since then with EF about 25%, no cause has been found.     Interval History: Reports feeling better, sob improves, no cp. UOP 4.9L in 24h.  Review of Systems  Objective:     Vital Signs (Most Recent):  Temp: 96.1 °F (35.6 °C) (08/25/19 0750)  Pulse: 107 (08/25/19 0750)  Resp: 16 (08/25/19 0750)  BP: 131/86 (08/25/19 0750)  SpO2: 100 % (08/25/19 0801) Vital Signs (24h Range):  Temp:  [96 °F (35.6 °C)-98.3 °F (36.8 °C)] 96.1 °F (35.6 °C)  Pulse:  [] 107  Resp:  [16-20] 16  SpO2:  [90 %-100 %] 100 %  BP: (109-131)/(69-89) 131/86   Weight: 70.5 kg (155 lb 6.8 oz)  Body mass index is 23.63 kg/m².  SpO2: 100 %  O2 Device (Oxygen Therapy): room air    Intake/Output Summary (Last 24 hours) at 8/25/2019 1018  Last data filed at 8/25/2019 0600  Gross per 24 hour   Intake 1630 ml   Output 4200 ml   Net -2570 ml     Lines/Drains/Airways     Peripheral Intravenous Line                 Peripheral IV - Single Lumen 08/22/19 2249 18 G Right Antecubital 2 days               Physical Exam  Gen: Comfort, lying on bed, in no distress  Skin: normal temperature/tugor, no erythema  Lymph: no lymphadenopathy detected  HEENT: NC/AT  Neck: supple, no JVD/bruit  Chest: no deformity, equal movement b/l  Lung: CTA  Pt resting, watching TV. Denies any complaints or requests at this time.   b/l  Heart: RRR, S1/S2 +, 3/6 SM, no G/R  Abd: BS+, S, NT/ND  Ext: no deformity, no pretibial edema  Pulse: b/l radial 2+, b/l DP 2+  Neuro: AAOx3    Cardiographics  ECG 8/22/19: Sinus tachycardia. Possible Left atrial enlargement.   Echo July 3, 2019.   · Eccentric left ventricular hypertrophy  · Mild left ventricular enlargement.  · Severely decreased left ventricular systolic function. The estimated ejection fraction is 15%  · Left ventricular diastolic dysfunction.  · Moderate right ventricular enlargement.  · Mild left atrial enlargement.  · Mild right atrial enlargement.  · Mild-to-moderate mitral regurgitation.  · Mild tricuspid regurgitation.  · The estimated PA systolic pressure is 31 mm Hg   · Small pericardial effusion.     Imaging  Chest x-ray 8/22/19: Mild cardiomegaly.  Blunting of the left lateral costophrenic angle may be secondary to scarring or a trace left pleural effusion.    Lab Review   Lab Results   Component Value Date    WBC 11.00 08/25/2019    HGB 14.2 08/25/2019    HCT 43.9 08/25/2019    MCV 86 08/25/2019     (H) 08/25/2019     BMP  Lab Results   Component Value Date     08/25/2019    K 4.2 08/25/2019     08/25/2019    CO2 26 08/25/2019    BUN 35 (H) 08/25/2019    CREATININE 1.1 08/25/2019    CALCIUM 9.1 08/25/2019    ANIONGAP 9 08/25/2019    ESTGFRAFRICA >60 08/25/2019    EGFRNONAA >60 08/25/2019   Results for JANET ANDRADE (MRN [id number]) as of 8/25/2019 10:21   Ref. Range 8/22/2019 22:41 8/23/2019 01:21 8/23/2019 06:27   BNP Latest Ref Range: 0 - 99 pg/mL 1,635 (H)     Troponin I Latest Ref Range: 0.000 - 0.026 ng/mL 0.037 (H) 0.042 (H) 0.038 (H)       Assessment:   HFrEF, EF15%, NICM per pt (normal coronary via Cath 2017?)  HTN  Anxiety      Plan:   Improving.  Low salt diet and fluid restriction.  Hold Lasix today, consider change to po tomorrow; continue spironolactone 25mg/d.  Continue Coreg and enalapril.     Neeraj Baugh MD  Cardiology  [facility]

## 2019-08-25 NOTE — PLAN OF CARE
Problem: Adult Inpatient Plan of Care  Goal: Plan of Care Review  Outcome: Ongoing (interventions implemented as appropriate)  Good urinary output noted during the night.  Pt appears depressed.  Speaks often of family issues and health outlook.  States he was told he only has 3-5 years to live and his children will not come see him.  Anxiety improving but still somber.  Spiritual consult ordered.  Encouraged pt to verbalize feelings and offered emotional support.  Took a long walk with standby assist and pt tolerated well.  Denies shortness of breath or chest pain.  Call light within reach.

## 2019-08-25 NOTE — PROGRESS NOTES
[facility] Medicine  Progress Note    Patient Name: Francis Daigle  MRN: [id number]  Patient Class: OP- Observation   Admission Date: 8/22/2019  Length of Stay: 0 days  Attending Physician: Ivan Martinez MD  Primary Care Provider: Primary Doctor No        Subjective:     Principal Problem:Acute on chronic combined systolic and diastolic congestive heart failure        HPI:  Pt presents with c/o SOB, chest pressure and anxiety X 5-6 days. Pt states he is SOB even at rest at times but is especially exacerbated by lying down. Currently reports sleeping on 3 pillows. Also c/o DELACRUZ, recently went to cross a fence and had an acute onset of severe SOB. Chest pressure is constant and substernal in location. No history of CAD. Pt states he has had a LHC in the past in an effort to identify a cause for CHF, but no cause has been found. Chest discomfort does not radiate, denies any nausea or sweating. Denies any cough. Pt attributes anxiety to chest pressure. Denies any palpations or heart racing. Denies any fever or chills. Denies any abdominal pain.     Reviewed echo from July 3, 2019.   · Eccentric left ventricular hypertrophy  · Mild left ventricular enlargement.  · Severely decreased left ventricular systolic function. The estimated ejection fraction is 15%  · Left ventricular diastolic dysfunction.  · Moderate right ventricular enlargement.  · Mild left atrial enlargement.  · Mild right atrial enlargement.  · Mild-to-moderate mitral regurgitation.  · Mild tricuspid regurgitation.  · Normal central venous pressure (3 mm Hg).  · The estimated PA systolic pressure is 31 mm Hg  Small pericardial effusion.    Overview/Hospital Course:  No notes on file    Past Medical History:   Diagnosis Date    Anxiety     CHF (congestive heart failure)     Depression     Hypertension        History reviewed. No pertinent surgical history.    Review of patient's allergies indicates:  No Known  Allergies    No current facility-administered medications on file prior to encounter.      Current Outpatient Medications on File Prior to Encounter   Medication Sig    carvedilol (COREG) 3.125 MG tablet Take 3.125 mg by mouth 2 (two) times daily with meals.    enalapril (VASOTEC) 2.5 MG tablet Take 2.5 mg by mouth once daily.    furosemide (LASIX) 20 MG tablet Take 1 tablet (20 mg total) by mouth 3 (three) times daily. (Patient taking differently: Take 20 mg by mouth 2 (two) times daily. )    spironolactone (ALDACTONE) 25 MG tablet Take 25 mg by mouth once daily.     Family History     Problem Relation (Age of Onset)    Arthritis Mother    Asthma Sister    Diabetes Sister    Heart disease Mother, Maternal Grandfather    Hyperlipidemia Mother    Hypertension Mother, Father    Kidney disease Mother        Tobacco Use    Smoking status: Former Smoker     Packs/day: 2.00     Years: 20.00     Pack years: 40.00     Types: Cigarettes     Start date: 1999     Last attempt to quit: 2017     Years since quittin.5    Smokeless tobacco: Never Used   Substance and Sexual Activity    Alcohol use: Not Currently    Drug use: Not Currently    Sexual activity: Yes     Partners: Female     Review of Systems   Constitutional: Positive for fatigue. Negative for activity change, appetite change, diaphoresis and fever.   HENT: Negative for congestion and facial swelling.    Eyes: Negative for redness and itching.   Respiratory: Positive for shortness of breath. Negative for cough, chest tightness and wheezing.    Cardiovascular: Positive for chest pain. Negative for palpitations and leg swelling.   Gastrointestinal: Negative for abdominal pain, constipation, diarrhea, nausea and vomiting.   Endocrine: Negative for cold intolerance and heat intolerance.   Genitourinary: Negative for difficulty urinating, dysuria, flank pain, frequency, hematuria and urgency.   Musculoskeletal: Negative for arthralgias, back pain,  joint swelling, myalgias and neck pain.   Neurological: Negative for dizziness, syncope, weakness, light-headedness, numbness and headaches.   Psychiatric/Behavioral: Negative for agitation and confusion. The patient is not nervous/anxious.      Objective:     Vital Signs (Most Recent):  Temp: 96.1 °F (35.6 °C) (08/25/19 0750)  Pulse: 107 (08/25/19 0750)  Resp: 16 (08/25/19 0750)  BP: 131/86 (08/25/19 0750)  SpO2: 100 % (08/25/19 0801) Vital Signs (24h Range):  Temp:  [96 °F (35.6 °C)-98.3 °F (36.8 °C)] 96.1 °F (35.6 °C)  Pulse:  [] 107  Resp:  [16-20] 16  SpO2:  [90 %-100 %] 100 %  BP: (109-131)/(69-89) 131/86     Weight: 70.5 kg (155 lb 6.8 oz)  Body mass index is 23.63 kg/m².    Physical Exam   Constitutional: He is oriented to person, place, and time. He appears well-developed and well-nourished. No distress.   HENT:   Head: Normocephalic and atraumatic.   Mouth/Throat: No oropharyngeal exudate.   Eyes: Conjunctivae are normal. No scleral icterus.   Neck: Normal range of motion. Neck supple. No JVD present. No tracheal deviation present. No thyromegaly present.   Cardiovascular: Normal rate, regular rhythm, normal heart sounds and intact distal pulses. Exam reveals no gallop and no friction rub.   No murmur heard.  Pulmonary/Chest: Effort normal. No respiratory distress. He has no wheezes. He has rales (bibasilar). He exhibits no tenderness.   Abdominal: Soft. Bowel sounds are normal. He exhibits no distension and no mass. There is no tenderness.   Musculoskeletal: He exhibits no edema or tenderness.   Lymphadenopathy:     He has no cervical adenopathy.   Neurological: He is alert and oriented to person, place, and time. No cranial nerve deficit.   Skin: Skin is warm and dry. Capillary refill takes less than 2 seconds. No erythema.   Psychiatric: He has a normal mood and affect. His behavior is normal. Judgment and thought content normal.   Nursing note and vitals reviewed.          Significant Labs:    CBC:   Recent Labs   Lab 08/24/19  0612 08/25/19  0434   WBC 13.32* 11.00   HGB 14.3 14.2   HCT 44.2 43.9   * 375*     CMP:   Recent Labs   Lab 08/24/19  0612 08/25/19  0434    136   K 4.5 4.2    101   CO2 26 26   * 96   BUN 35* 35*   CREATININE 1.2 1.1   CALCIUM 9.5 9.1   ANIONGAP 10 9   EGFRNONAA >60 >60       Significant Imaging: CXR: I have reviewed all pertinent results/findings within the past 24 hours and my personal findings are:  mild pulmonary congestion       Assessment/Plan:      * Acute on chronic combined systolic and diastolic congestive heart failure  Diurese  Continue home medications including: ASA, coreg, enalapril, and aldactone   Start nitrates   Daily weights  Cardiac diet  Consult cardiology  Trend troponin  telemetry      Anxiety  Acute, associated with SOB and chest pressure  Ativan po prn      Hypertension  Chronic, uncontrolled  Resume home medications of coreg and enalapril  Starting nitrates and diuresing will help as well   Monitor and treat accordingly       Chest pain  Acute  Associated with anxiety   Trend troponin  Start nitro paste   Anxiolytics prn         VTE Risk Mitigation (From admission, onward)        Ordered     enoxaparin injection 40 mg  Daily      08/23/19 0042     IP VTE HIGH RISK PATIENT  Once      08/23/19 0042                Ivan Martinez MD  Department of Hospital Medicine   [facility]

## 2019-08-26 VITALS
SYSTOLIC BLOOD PRESSURE: 97 MMHG | RESPIRATION RATE: 18 BRPM | OXYGEN SATURATION: 97 % | HEART RATE: 88 BPM | BODY MASS INDEX: 23.56 KG/M2 | DIASTOLIC BLOOD PRESSURE: 59 MMHG | WEIGHT: 155.44 LBS | TEMPERATURE: 96 F | HEIGHT: 68 IN

## 2019-08-26 LAB
ANION GAP SERPL CALC-SCNC: 9 MMOL/L (ref 8–16)
BUN SERPL-MCNC: 32 MG/DL (ref 6–20)
CALCIUM SERPL-MCNC: 8.8 MG/DL (ref 8.7–10.5)
CHLORIDE SERPL-SCNC: 103 MMOL/L (ref 95–110)
CO2 SERPL-SCNC: 21 MMOL/L (ref 23–29)
CREAT SERPL-MCNC: 1 MG/DL (ref 0.5–1.4)
ERYTHROCYTE [DISTWIDTH] IN BLOOD BY AUTOMATED COUNT: 17 % (ref 11.5–14.5)
EST. GFR  (AFRICAN AMERICAN): >60 ML/MIN/1.73 M^2
EST. GFR  (NON AFRICAN AMERICAN): >60 ML/MIN/1.73 M^2
GLUCOSE SERPL-MCNC: 90 MG/DL (ref 70–110)
HCT VFR BLD AUTO: 43 % (ref 40–54)
HGB BLD-MCNC: 14.2 G/DL (ref 14–18)
MCH RBC QN AUTO: 28.2 PG (ref 27–31)
MCHC RBC AUTO-ENTMCNC: 33 G/DL (ref 32–36)
MCV RBC AUTO: 85 FL (ref 82–98)
PLATELET # BLD AUTO: 393 K/UL (ref 150–350)
PMV BLD AUTO: 9.4 FL (ref 9.2–12.9)
POTASSIUM SERPL-SCNC: 4.7 MMOL/L (ref 3.5–5.1)
RBC # BLD AUTO: 5.04 M/UL (ref 4.6–6.2)
SODIUM SERPL-SCNC: 133 MMOL/L (ref 136–145)
WBC # BLD AUTO: 11.67 K/UL (ref 3.9–12.7)

## 2019-08-26 PROCEDURE — 27000221 HC OXYGEN, UP TO 24 HOURS

## 2019-08-26 PROCEDURE — 25000003 PHARM REV CODE 250: Performed by: NURSE PRACTITIONER

## 2019-08-26 PROCEDURE — 94761 N-INVAS EAR/PLS OXIMETRY MLT: CPT

## 2019-08-26 PROCEDURE — G0378 HOSPITAL OBSERVATION PER HR: HCPCS

## 2019-08-26 PROCEDURE — 36415 COLL VENOUS BLD VENIPUNCTURE: CPT

## 2019-08-26 PROCEDURE — 85027 COMPLETE CBC AUTOMATED: CPT

## 2019-08-26 PROCEDURE — 80048 BASIC METABOLIC PNL TOTAL CA: CPT

## 2019-08-26 RX ORDER — SPIRONOLACTONE 25 MG/1
25 TABLET ORAL DAILY
Qty: 30 TABLET | Refills: 11 | Status: ON HOLD | OUTPATIENT
Start: 2019-08-26 | End: 2019-12-15

## 2019-08-26 RX ORDER — ENALAPRIL MALEATE 2.5 MG/1
2.5 TABLET ORAL DAILY
Qty: 30 TABLET | Refills: 11 | Status: ON HOLD | OUTPATIENT
Start: 2019-08-26 | End: 2019-12-27 | Stop reason: SDUPTHER

## 2019-08-26 RX ORDER — LORAZEPAM 1 MG/1
1 TABLET ORAL EVERY 8 HOURS PRN
Qty: 20 TABLET | Refills: 0 | Status: ON HOLD | OUTPATIENT
Start: 2019-08-26 | End: 2019-12-15

## 2019-08-26 RX ORDER — CARVEDILOL 3.12 MG/1
3.12 TABLET ORAL 2 TIMES DAILY WITH MEALS
Qty: 60 TABLET | Refills: 11 | Status: ON HOLD | OUTPATIENT
Start: 2019-08-26 | End: 2019-12-17 | Stop reason: HOSPADM

## 2019-08-26 RX ORDER — FUROSEMIDE 20 MG/1
20 TABLET ORAL 2 TIMES DAILY
Qty: 60 TABLET | Refills: 11 | Status: ON HOLD | OUTPATIENT
Start: 2019-08-26 | End: 2019-12-17 | Stop reason: HOSPADM

## 2019-08-26 RX ORDER — NAPROXEN SODIUM 220 MG/1
81 TABLET, FILM COATED ORAL DAILY
Refills: 0 | Status: ON HOLD | COMMUNITY
Start: 2019-08-27 | End: 2019-12-15

## 2019-08-26 RX ADMIN — CARVEDILOL 3.12 MG: 3.12 TABLET, FILM COATED ORAL at 08:08

## 2019-08-26 RX ADMIN — LORAZEPAM 1 MG: 1 TABLET ORAL at 05:08

## 2019-08-26 RX ADMIN — ENALAPRIL MALEATE 2.5 MG: 2.5 TABLET ORAL at 08:08

## 2019-08-26 RX ADMIN — ASPIRIN 81 MG CHEWABLE TABLET 81 MG: 81 TABLET CHEWABLE at 08:08

## 2019-08-26 NOTE — ASSESSMENT & PLAN NOTE
Cardiology following.  Held IV Lasix on Sunday based on cardiology recommendations and will reinitiate oral Lasix on Monday.

## 2019-08-26 NOTE — PLAN OF CARE
Problem: Adult Inpatient Plan of Care  Goal: Plan of Care Review  Outcome: Ongoing (interventions implemented as appropriate)  POC reviewed with patient, patient verbalized understanding, AAO X4, up ad obdulia, patient refuses nitro ointment, patient prefers to take ativan instead, tele and safety maintained, no distress noted at this time, will continue to monitor.

## 2019-08-26 NOTE — CARE UPDATE
08/26/19 0726   Patient Assessment/Suction   Level of Consciousness (AVPU) alert   PRE-TX-O2   O2 Device (Oxygen Therapy) nasal cannula   $ Is the patient on Low Flow Oxygen? Yes   Flow (L/min) 2   SpO2 99 %   Pulse Oximetry Type Intermittent   $ Pulse Oximetry - Multiple Charge Pulse Oximetry - Multiple

## 2019-08-26 NOTE — PROGRESS NOTES
[facility]  Cardiology  Progress Note    Patient Name: Francis Daigle  MRN: [id number]  Admission Date: 8/22/2019  Hospital Length of Stay: 0 days  Code Status: Full Code   Attending Physician: Bar Blanton MD   Primary Care Physician: Primary Doctor No  Expected Discharge Date:   Principal Problem:Acute on chronic combined systolic and diastolic congestive heart failure    Subjective:     Hospital Course: Francis Daigle is a 44 y.o. male SOB, chest pressure and anxiety for 5-6 days. Pt states he is SOB even at rest at times but is especially exacerbated by lying down. Currently reports sleeping on 3 pillows.  DELACRUZ.   Pt states he has had LHC 2017 in [facility] found no blockage, CHF was diagnosed since then with EF about 25%, no cause has been found.     Interval History: Having breakfast. SOB resolved, no cp. UOP 3.4L in 24h w/o diuretics.  Review of Systems  Objective:     Vital Signs (Most Recent):  Temp: 97.9 °F (36.6 °C) (08/26/19 0719)  Pulse: 106 (08/26/19 0719)  Resp: 16 (08/26/19 0719)  BP: 122/80 (08/26/19 0719)  SpO2: 97 % (08/26/19 0719) Vital Signs (24h Range):  Temp:  [96.6 °F (35.9 °C)-98.7 °F (37.1 °C)] 97.9 °F (36.6 °C)  Pulse:  [] 106  Resp:  [16-20] 16  SpO2:  [94 %-99 %] 97 %  BP: (112-142)/(72-85) 122/80   Weight: 70.5 kg (155 lb 6.8 oz)  Body mass index is 23.63 kg/m².  SpO2: 97 %  O2 Device (Oxygen Therapy): room air    Intake/Output Summary (Last 24 hours) at 8/26/2019 0854  Last data filed at 8/26/2019 0400  Gross per 24 hour   Intake --   Output 3100 ml   Net -3100 ml     Lines/Drains/Airways     Peripheral Intravenous Line                 Peripheral IV - Single Lumen 08/22/19 2249 18 G Right Antecubital 3 days               Physical Exam  Gen: Comfort, sitting on bed, in no distress  Skin: normal temperature/tugor, no erythema  Lymph: no lymphadenopathy detected  HEENT: NC/AT  Neck: supple, no JVD/bruit  Chest: no deformity, equal movement b/l  Lung: CTA  b/l  Heart: RRR, S1/S2 +, 3/6 SM, no G/R  Abd: BS+, S, NT/ND  Ext: no deformity, no pretibial edema  Pulse: b/l radial 2+  Neuro: AAOx3    Cardiographics  ECG 8/22/19: Sinus tachycardia. Possible Left atrial enlargement.   Echo July 3, 2019.   · Eccentric left ventricular hypertrophy  · Mild left ventricular enlargement.  · Severely decreased left ventricular systolic function. The estimated ejection fraction is 15%  · Left ventricular diastolic dysfunction.  · Moderate right ventricular enlargement.  · Mild left atrial enlargement.  · Mild right atrial enlargement.  · Mild-to-moderate mitral regurgitation.  · Mild tricuspid regurgitation.  · The estimated PA systolic pressure is 31 mm Hg   · Small pericardial effusion.     Imaging  Chest x-ray 8/22/19: Mild cardiomegaly.  Blunting of the left lateral costophrenic angle may be secondary to scarring or a trace left pleural effusion.    Lab Review   Lab Results   Component Value Date    WBC 11.67 08/26/2019    HGB 14.2 08/26/2019    HCT 43.0 08/26/2019    MCV 85 08/26/2019     (H) 08/26/2019     BMP  Lab Results   Component Value Date     (L) 08/26/2019    K 4.7 08/26/2019     08/26/2019    CO2 21 (L) 08/26/2019    BUN 32 (H) 08/26/2019    CREATININE 1.0 08/26/2019    CALCIUM 8.8 08/26/2019    ANIONGAP 9 08/26/2019    ESTGFRAFRICA >60 08/26/2019    EGFRNONAA >60 08/26/2019   Results for AJNET ANDRADE (MRN [id number]) as of 8/25/2019 10:21   Ref. Range 8/22/2019 22:41 8/23/2019 01:21 8/23/2019 06:27   BNP Latest Ref Range: 0 - 99 pg/mL 1,635 (H)     Troponin I Latest Ref Range: 0.000 - 0.026 ng/mL 0.037 (H) 0.042 (H) 0.038 (H)       Assessment:   HFrEF, EF15%, NICM per pt (normal coronary via Cath 2017?)  HTN  Anxiety      Plan:   Asymptomatic.  Low salt diet and fluid restriction.  Consider resume Lasix po tomorrow; continue spironolactone 25mg/d.  Continue Coreg and enalapril.     Neeraj Baugh MD  Cardiology  [facility]

## 2019-08-26 NOTE — NURSING
Discharge instructions reviewed with Pt.Verbalized understanding. PIV d/c'ed, catheter intact. Tele removed and returned. Pt ambulated off unit in stable condition accompanied by staff.

## 2019-08-26 NOTE — PROGRESS NOTES
[facility] Hospital Medicine  Progress Note    Patient Name: Francis Daigle  MRN: [id number]  Patient Class: OP- Observation   Admission Date: 8/22/2019  Length of Stay: 0 days  Attending Physician: Ivan Martinez MD  Primary Care Provider: Primary Doctor No        Subjective:     Principal Problem:Acute on chronic combined systolic and diastolic congestive heart failure        HPI:  Pt presents with c/o SOB, chest pressure and anxiety X 5-6 days. Pt states he is SOB even at rest at times but is especially exacerbated by lying down. Currently reports sleeping on 3 pillows. Also c/o DELACRUZ, recently went to cross a fence and had an acute onset of severe SOB. Chest pressure is constant and substernal in location. No history of CAD. Pt states he has had a LHC in the past in an effort to identify a cause for CHF, but no cause has been found. Chest discomfort does not radiate, denies any nausea or sweating. Denies any cough. Pt attributes anxiety to chest pressure. Denies any palpations or heart racing. Denies any fever or chills. Denies any abdominal pain.     Reviewed echo from July 3, 2019.   · Eccentric left ventricular hypertrophy  · Mild left ventricular enlargement.  · Severely decreased left ventricular systolic function. The estimated ejection fraction is 15%  · Left ventricular diastolic dysfunction.  · Moderate right ventricular enlargement.  · Mild left atrial enlargement.  · Mild right atrial enlargement.  · Mild-to-moderate mitral regurgitation.  · Mild tricuspid regurgitation.  · Normal central venous pressure (3 mm Hg).  · The estimated PA systolic pressure is 31 mm Hg  Small pericardial effusion.    Overview/Hospital Course:  No notes on file    Interval History:  Improvement    Review of Systems   Respiratory: Negative for shortness of breath.    Cardiovascular: Negative for chest pain.   Gastrointestinal: Negative for abdominal pain.   Genitourinary: Negative for dysuria.      Objective:     Vital Signs (Most Recent):  Temp: 96.6 °F (35.9 °C) (08/26/19 0419)  Pulse: 98 (08/25/19 2328)  Resp: 18 (08/26/19 0419)  BP: 115/76 (08/26/19 0419)  SpO2: 96 % (08/26/19 0419) Vital Signs (24h Range):  Temp:  [96.1 °F (35.6 °C)-98.7 °F (37.1 °C)] 96.6 °F (35.9 °C)  Pulse:  [] 98  Resp:  [16-20] 18  SpO2:  [94 %-100 %] 96 %  BP: (112-142)/(72-86) 115/76     Weight: 70.5 kg (155 lb 6.8 oz)  Body mass index is 23.63 kg/m².    Intake/Output Summary (Last 24 hours) at 8/26/2019 0636  Last data filed at 8/25/2019 2300  Gross per 24 hour   Intake --   Output 300 ml   Net -300 ml      Physical Exam   Neck: Neck supple. No tracheal deviation present.   Cardiovascular: Normal rate, regular rhythm and normal heart sounds.   Pulmonary/Chest: Effort normal. No respiratory distress. He has rales.   Musculoskeletal: Normal range of motion. He exhibits no edema.       Significant Labs: All pertinent labs within the past 24 hours have been reviewed.    Significant Imaging: I have reviewed all pertinent imaging results/findings within the past 24 hours.      Assessment/Plan:      * Acute on chronic combined systolic and diastolic congestive heart failure  Cardiology following.  Held IV Lasix on Sunday based on cardiology recommendations and will reinitiate oral Lasix on Monday.    Anxiety  Acute, associated with SOB and chest pressure  Ativan po prn      Hypertension  Chronic, uncontrolled  Resume home medications of coreg and enalapril  Starting nitrates and diuresing will help as well   Monitor and treat accordingly       Chest pain  Acute  Associated with anxiety   Trend troponin  Start nitro paste   Anxiolytics prn         VTE Risk Mitigation (From admission, onward)        Ordered     enoxaparin injection 40 mg  Daily      08/23/19 0042     IP VTE HIGH RISK PATIENT  Once      08/23/19 0042                Ivan Martinez MD  Department of Hospital Medicine   [facility]

## 2019-08-26 NOTE — SUBJECTIVE & OBJECTIVE
Interval History:  Improvement    Review of Systems   Respiratory: Negative for shortness of breath.    Cardiovascular: Negative for chest pain.   Gastrointestinal: Negative for abdominal pain.   Genitourinary: Negative for dysuria.     Objective:     Vital Signs (Most Recent):  Temp: 96.6 °F (35.9 °C) (08/26/19 0419)  Pulse: 98 (08/25/19 2328)  Resp: 18 (08/26/19 0419)  BP: 115/76 (08/26/19 0419)  SpO2: 96 % (08/26/19 0419) Vital Signs (24h Range):  Temp:  [96.1 °F (35.6 °C)-98.7 °F (37.1 °C)] 96.6 °F (35.9 °C)  Pulse:  [] 98  Resp:  [16-20] 18  SpO2:  [94 %-100 %] 96 %  BP: (112-142)/(72-86) 115/76     Weight: 70.5 kg (155 lb 6.8 oz)  Body mass index is 23.63 kg/m².    Intake/Output Summary (Last 24 hours) at 8/26/2019 0636  Last data filed at 8/25/2019 2300  Gross per 24 hour   Intake --   Output 300 ml   Net -300 ml      Physical Exam   Neck: Neck supple. No tracheal deviation present.   Cardiovascular: Normal rate, regular rhythm and normal heart sounds.   Pulmonary/Chest: Effort normal. No respiratory distress. He has rales.   Musculoskeletal: Normal range of motion. He exhibits no edema.       Significant Labs: All pertinent labs within the past 24 hours have been reviewed.    Significant Imaging: I have reviewed all pertinent imaging results/findings within the past 24 hours.

## 2019-08-27 NOTE — HOSPITAL COURSE
Patient was treated for heart failure decompensation.  He was given intravenous furosmide.  He responded well to it.  Cardiologist provided consultation and agree with management.  After losing water weight, the Lasix was switched to oral formulation.  His condition improved, he was then discharged home.  He was educated on heart failure.    Gen:  Alert.  Anxious.  Crying.  Neck: Neck supple. No tracheal deviation present.   Cardiovascular: Normal rate, regular rhythm and normal heart sounds.   Pulmonary/Chest: Effort normal. No respiratory distress. He has no rales.  Musculoskeletal: Normal range of motion. He exhibits no edema.

## 2019-08-27 NOTE — DISCHARGE SUMMARY
[facility] Hospital Medicine  Discharge Summary      Patient Name: Francis Daigle  MRN: [id number]  Admission Date: 8/22/2019  Hospital Length of Stay: 0 days  Discharge Date and Time: 8/26/2019  4:24 PM  Attending Physician: No att. providers found   Discharging Provider: Bar Blanton MD  Primary Care Provider: Primary Doctor No      HPI:   Pt presents with c/o SOB, chest pressure and anxiety X 5-6 days. Pt states he is SOB even at rest at times but is especially exacerbated by lying down. Currently reports sleeping on 3 pillows. Also c/o DELACRUZ, recently went to cross a fence and had an acute onset of severe SOB. Chest pressure is constant and substernal in location. No history of CAD. Pt states he has had a LHC in the past in an effort to identify a cause for CHF, but no cause has been found. Chest discomfort does not radiate, denies any nausea or sweating. Denies any cough. Pt attributes anxiety to chest pressure. Denies any palpations or heart racing. Denies any fever or chills. Denies any abdominal pain.     Reviewed echo from July 3, 2019.   · Eccentric left ventricular hypertrophy  · Mild left ventricular enlargement.  · Severely decreased left ventricular systolic function. The estimated ejection fraction is 15%  · Left ventricular diastolic dysfunction.  · Moderate right ventricular enlargement.  · Mild left atrial enlargement.  · Mild right atrial enlargement.  · Mild-to-moderate mitral regurgitation.  · Mild tricuspid regurgitation.  · Normal central venous pressure (3 mm Hg).  · The estimated PA systolic pressure is 31 mm Hg  Small pericardial effusion.    * No surgery found *      Hospital Course:   Patient was treated for heart failure decompensation.  He was given intravenous furosmide.  He responded well to it.  Cardiologist provided consultation and agree with management.  After losing water weight, the Lasix was switched to oral formulation.  His condition improved, he was  then discharged home.  He was educated on heart failure.    Gen:  Alert.  Anxious.  Crying.  Neck: Neck supple. No tracheal deviation present.   Cardiovascular: Normal rate, regular rhythm and normal heart sounds.   Pulmonary/Chest: Effort normal. No respiratory distress. He has no rales.  Musculoskeletal: Normal range of motion. He exhibits no edema.       Consults:   Consults (From admission, onward)        Status Ordering Provider     Inpatient consult to Cardiology  Once     Provider:  Neeraj Baugh MD    Completed YANA LUNDBERG          No new Assessment & Plan notes have been filed under this hospital service since the last note was generated.  Service: Hospital Medicine    Final Active Diagnoses:    Diagnosis Date Noted POA    PRINCIPAL PROBLEM:  Acute on chronic combined systolic and diastolic congestive heart failure [I50.43] 07/03/2019 Yes    Hypertension [I10] 08/23/2019 Yes    Anxiety [F41.9] 08/23/2019 Yes    Chest pain [R07.9] 08/22/2019 Yes      Problems Resolved During this Admission:       Discharged Condition: stable    Disposition: Home or Self Care    Follow Up:  Follow-up Information     Try seeing your primary doctor in a week or two.               Patient Instructions:      Diet Cardiac   Order Comments: Fluid restriction of 1500 ml per 24 hours (equivalent to 48 ounces)     Activity as tolerated       Significant Diagnostic Studies:   BMP  Lab Results   Component Value Date     (L) 08/26/2019    K 4.7 08/26/2019     08/26/2019    CO2 21 (L) 08/26/2019    BUN 32 (H) 08/26/2019    CREATININE 1.0 08/26/2019    CALCIUM 8.8 08/26/2019    ANIONGAP 9 08/26/2019    ESTGFRAFRICA >60 08/26/2019    EGFRNONAA >60 08/26/2019     Lab Results   Component Value Date    WBC 11.67 08/26/2019    HGB 14.2 08/26/2019    HCT 43.0 08/26/2019    MCV 85 08/26/2019     (H) 08/26/2019         Pending Diagnostic Studies:     None         Medications:  Reconciled Home Medications:       Medication List      START taking these medications    aspirin 81 MG Chew  Take 1 tablet (81 mg total) by mouth once daily.  Start taking on:  8/27/2019     LORazepam 1 MG tablet  Commonly known as:  ATIVAN  Take 1 tablet (1 mg total) by mouth every 8 (eight) hours as needed for Anxiety.        CONTINUE taking these medications    carvedilol 3.125 MG tablet  Commonly known as:  COREG  Take 1 tablet (3.125 mg total) by mouth 2 (two) times daily with meals.     enalapril 2.5 MG tablet  Commonly known as:  VASOTEC  Take 1 tablet (2.5 mg total) by mouth once daily.     furosemide 20 MG tablet  Commonly known as:  LASIX  Take 1 tablet (20 mg total) by mouth 2 (two) times daily.     spironolactone 25 MG tablet  Commonly known as:  ALDACTONE  Take 1 tablet (25 mg total) by mouth once daily.            Indwelling Lines/Drains at time of discharge:   Lines/Drains/Airways          None          Time spent on the discharge of patient: 26 minutes  Patient was seen and examined on the date of discharge and determined to be suitable for discharge.         Bar Balnton MD  Department of Hospital Medicine  [facility]

## 2019-08-27 NOTE — PLAN OF CARE
08/27/19 1144   Final Note   Assessment Type Final Discharge Note   Anticipated Discharge Disposition Home

## 2019-09-23 ENCOUNTER — PATIENT OUTREACH (OUTPATIENT)
Dept: ADMINISTRATIVE | Facility: CLINIC | Age: 44
End: 2019-09-23

## 2019-10-26 ENCOUNTER — HOSPITAL ENCOUNTER (INPATIENT)
Facility: HOSPITAL | Age: 44
LOS: 4 days | Discharge: LEFT BEFORE DISCHARGE | DRG: 291 | End: 2019-10-30
Attending: EMERGENCY MEDICINE | Admitting: INTERNAL MEDICINE
Payer: MEDICAID

## 2019-10-26 DIAGNOSIS — I50.9 CHF (CONGESTIVE HEART FAILURE): ICD-10-CM

## 2019-10-26 DIAGNOSIS — R06.02 SHORTNESS OF BREATH: ICD-10-CM

## 2019-10-26 DIAGNOSIS — R00.0 TACHYCARDIA: ICD-10-CM

## 2019-10-26 PROBLEM — R79.89 TROPONIN LEVEL ELEVATED: Status: ACTIVE | Noted: 2019-10-26

## 2019-10-26 PROBLEM — M54.9 BACK PAIN: Status: ACTIVE | Noted: 2019-10-26

## 2019-10-26 PROBLEM — D72.829 ELEVATED WHITE BLOOD CELL COUNT: Status: ACTIVE | Noted: 2019-10-26

## 2019-10-26 LAB
ALBUMIN SERPL BCP-MCNC: 2.9 G/DL (ref 3.5–5.2)
ALLENS TEST: ABNORMAL
ALP SERPL-CCNC: 130 U/L (ref 55–135)
ALT SERPL W/O P-5'-P-CCNC: 277 U/L (ref 10–44)
AMPHET+METHAMPHET UR QL: NORMAL
ANION GAP SERPL CALC-SCNC: 13 MMOL/L (ref 8–16)
ANION GAP SERPL CALC-SCNC: 8 MMOL/L (ref 8–16)
AST SERPL-CCNC: 167 U/L (ref 10–40)
BARBITURATES UR QL SCN>200 NG/ML: NEGATIVE
BASOPHILS # BLD AUTO: 0.03 K/UL (ref 0–0.2)
BASOPHILS # BLD AUTO: 0.04 K/UL (ref 0–0.2)
BASOPHILS NFR BLD: 0.2 % (ref 0–1.9)
BASOPHILS NFR BLD: 0.2 % (ref 0–1.9)
BENZODIAZ UR QL SCN>200 NG/ML: NEGATIVE
BILIRUB SERPL-MCNC: 1.7 MG/DL (ref 0.1–1)
BNP SERPL-MCNC: 1211 PG/ML (ref 0–99)
BUN SERPL-MCNC: 24 MG/DL (ref 6–20)
BUN SERPL-MCNC: 26 MG/DL (ref 6–20)
BZE UR QL SCN: NEGATIVE
CALCIUM SERPL-MCNC: 8.1 MG/DL (ref 8.7–10.5)
CALCIUM SERPL-MCNC: 8.4 MG/DL (ref 8.7–10.5)
CANNABINOIDS UR QL SCN: NEGATIVE
CHLORIDE SERPL-SCNC: 102 MMOL/L (ref 95–110)
CHLORIDE SERPL-SCNC: 106 MMOL/L (ref 95–110)
CO2 SERPL-SCNC: 16 MMOL/L (ref 23–29)
CO2 SERPL-SCNC: 18 MMOL/L (ref 23–29)
CREAT SERPL-MCNC: 0.8 MG/DL (ref 0.5–1.4)
CREAT SERPL-MCNC: 0.9 MG/DL (ref 0.5–1.4)
CREAT UR-MCNC: 33.7 MG/DL (ref 23–375)
DELSYS: ABNORMAL
DIFFERENTIAL METHOD: ABNORMAL
DIFFERENTIAL METHOD: ABNORMAL
EOSINOPHIL # BLD AUTO: 0.1 K/UL (ref 0–0.5)
EOSINOPHIL # BLD AUTO: 0.2 K/UL (ref 0–0.5)
EOSINOPHIL NFR BLD: 0.5 % (ref 0–8)
EOSINOPHIL NFR BLD: 0.8 % (ref 0–8)
ERYTHROCYTE [DISTWIDTH] IN BLOOD BY AUTOMATED COUNT: 17.7 % (ref 11.5–14.5)
ERYTHROCYTE [DISTWIDTH] IN BLOOD BY AUTOMATED COUNT: 17.7 % (ref 11.5–14.5)
EST. GFR  (AFRICAN AMERICAN): >60 ML/MIN/1.73 M^2
EST. GFR  (AFRICAN AMERICAN): >60 ML/MIN/1.73 M^2
EST. GFR  (NON AFRICAN AMERICAN): >60 ML/MIN/1.73 M^2
EST. GFR  (NON AFRICAN AMERICAN): >60 ML/MIN/1.73 M^2
GLUCOSE SERPL-MCNC: 205 MG/DL (ref 70–110)
GLUCOSE SERPL-MCNC: 82 MG/DL (ref 70–110)
HCO3 UR-SCNC: 18.3 MMOL/L (ref 24–28)
HCT VFR BLD AUTO: 42.6 % (ref 40–54)
HCT VFR BLD AUTO: 43.2 % (ref 40–54)
HGB BLD-MCNC: 14.2 G/DL (ref 14–18)
HGB BLD-MCNC: 14.6 G/DL (ref 14–18)
IMM GRANULOCYTES # BLD AUTO: 0.05 K/UL (ref 0–0.04)
IMM GRANULOCYTES # BLD AUTO: 0.09 K/UL (ref 0–0.04)
LACTATE SERPL-SCNC: 0.9 MMOL/L (ref 0.5–2.2)
LACTATE SERPL-SCNC: 2.7 MMOL/L (ref 0.5–2.2)
LYMPHOCYTES # BLD AUTO: 1 K/UL (ref 1–4.8)
LYMPHOCYTES # BLD AUTO: 1.8 K/UL (ref 1–4.8)
LYMPHOCYTES NFR BLD: 6.3 % (ref 18–48)
LYMPHOCYTES NFR BLD: 8.9 % (ref 18–48)
MAGNESIUM SERPL-MCNC: 1.5 MG/DL (ref 1.6–2.6)
MCH RBC QN AUTO: 27.4 PG (ref 27–31)
MCH RBC QN AUTO: 27.7 PG (ref 27–31)
MCHC RBC AUTO-ENTMCNC: 33.3 G/DL (ref 32–36)
MCHC RBC AUTO-ENTMCNC: 33.8 G/DL (ref 32–36)
MCV RBC AUTO: 82 FL (ref 82–98)
MCV RBC AUTO: 82 FL (ref 82–98)
METHADONE UR QL SCN>300 NG/ML: NEGATIVE
MONOCYTES # BLD AUTO: 0.7 K/UL (ref 0.3–1)
MONOCYTES # BLD AUTO: 0.9 K/UL (ref 0.3–1)
MONOCYTES NFR BLD: 4.4 % (ref 4–15)
MONOCYTES NFR BLD: 4.7 % (ref 4–15)
NEUTROPHILS # BLD AUTO: 14.4 K/UL (ref 1.8–7.7)
NEUTROPHILS # BLD AUTO: 16.8 K/UL (ref 1.8–7.7)
NEUTROPHILS NFR BLD: 84.9 % (ref 38–73)
NEUTROPHILS NFR BLD: 88.3 % (ref 38–73)
NRBC BLD-RTO: 0 /100 WBC
NRBC BLD-RTO: 0 /100 WBC
OPIATES UR QL SCN: NORMAL
PCO2 BLDA: 31.7 MMHG (ref 35–45)
PCP UR QL SCN>25 NG/ML: NEGATIVE
PH SMN: 7.37 [PH] (ref 7.35–7.45)
PHOSPHATE SERPL-MCNC: 4.2 MG/DL (ref 2.7–4.5)
PLATELET # BLD AUTO: 240 K/UL (ref 150–350)
PLATELET # BLD AUTO: 275 K/UL (ref 150–350)
PMV BLD AUTO: 9.2 FL (ref 9.2–12.9)
PMV BLD AUTO: 9.7 FL (ref 9.2–12.9)
PO2 BLDA: 75 MMHG (ref 80–100)
POC BE: -7 MMOL/L
POC SATURATED O2: 95 % (ref 95–100)
POC TCO2: 19 MMOL/L (ref 23–27)
POTASSIUM SERPL-SCNC: 3.8 MMOL/L (ref 3.5–5.1)
POTASSIUM SERPL-SCNC: 4.5 MMOL/L (ref 3.5–5.1)
PROT SERPL-MCNC: 7.8 G/DL (ref 6–8.4)
RBC # BLD AUTO: 5.18 M/UL (ref 4.6–6.2)
RBC # BLD AUTO: 5.28 M/UL (ref 4.6–6.2)
SAMPLE: ABNORMAL
SITE: ABNORMAL
SODIUM SERPL-SCNC: 131 MMOL/L (ref 136–145)
SODIUM SERPL-SCNC: 132 MMOL/L (ref 136–145)
TOXICOLOGY INFORMATION: NORMAL
TROPONIN I SERPL DL<=0.01 NG/ML-MCNC: 0.04 NG/ML (ref 0–0.03)
TROPONIN I SERPL DL<=0.01 NG/ML-MCNC: 0.05 NG/ML (ref 0–0.03)
TROPONIN I SERPL DL<=0.01 NG/ML-MCNC: 0.07 NG/ML (ref 0–0.03)
WBC # BLD AUTO: 16.29 K/UL (ref 3.9–12.7)
WBC # BLD AUTO: 19.74 K/UL (ref 3.9–12.7)

## 2019-10-26 PROCEDURE — 84100 ASSAY OF PHOSPHORUS: CPT

## 2019-10-26 PROCEDURE — 36600 WITHDRAWAL OF ARTERIAL BLOOD: CPT

## 2019-10-26 PROCEDURE — 84484 ASSAY OF TROPONIN QUANT: CPT

## 2019-10-26 PROCEDURE — 96365 THER/PROPH/DIAG IV INF INIT: CPT

## 2019-10-26 PROCEDURE — 85025 COMPLETE CBC W/AUTO DIFF WBC: CPT

## 2019-10-26 PROCEDURE — 96375 TX/PRO/DX INJ NEW DRUG ADDON: CPT

## 2019-10-26 PROCEDURE — 96376 TX/PRO/DX INJ SAME DRUG ADON: CPT

## 2019-10-26 PROCEDURE — 83605 ASSAY OF LACTIC ACID: CPT

## 2019-10-26 PROCEDURE — 11000001 HC ACUTE MED/SURG PRIVATE ROOM

## 2019-10-26 PROCEDURE — 83605 ASSAY OF LACTIC ACID: CPT | Mod: 91

## 2019-10-26 PROCEDURE — 25000003 PHARM REV CODE 250: Performed by: INTERNAL MEDICINE

## 2019-10-26 PROCEDURE — 96366 THER/PROPH/DIAG IV INF ADDON: CPT

## 2019-10-26 PROCEDURE — 96372 THER/PROPH/DIAG INJ SC/IM: CPT | Mod: 59

## 2019-10-26 PROCEDURE — G0378 HOSPITAL OBSERVATION PER HR: HCPCS

## 2019-10-26 PROCEDURE — 82803 BLOOD GASES ANY COMBINATION: CPT

## 2019-10-26 PROCEDURE — 94761 N-INVAS EAR/PLS OXIMETRY MLT: CPT

## 2019-10-26 PROCEDURE — 36415 COLL VENOUS BLD VENIPUNCTURE: CPT

## 2019-10-26 PROCEDURE — 63600175 PHARM REV CODE 636 W HCPCS: Performed by: EMERGENCY MEDICINE

## 2019-10-26 PROCEDURE — 96368 THER/DIAG CONCURRENT INF: CPT

## 2019-10-26 PROCEDURE — 93005 ELECTROCARDIOGRAM TRACING: CPT

## 2019-10-26 PROCEDURE — 80307 DRUG TEST PRSMV CHEM ANLYZR: CPT

## 2019-10-26 PROCEDURE — 25000003 PHARM REV CODE 250: Performed by: EMERGENCY MEDICINE

## 2019-10-26 PROCEDURE — 99285 EMERGENCY DEPT VISIT HI MDM: CPT | Mod: 25

## 2019-10-26 PROCEDURE — 80053 COMPREHEN METABOLIC PANEL: CPT

## 2019-10-26 PROCEDURE — 63600175 PHARM REV CODE 636 W HCPCS: Performed by: INTERNAL MEDICINE

## 2019-10-26 PROCEDURE — 96367 TX/PROPH/DG ADDL SEQ IV INF: CPT

## 2019-10-26 PROCEDURE — 99900035 HC TECH TIME PER 15 MIN (STAT)

## 2019-10-26 PROCEDURE — 83880 ASSAY OF NATRIURETIC PEPTIDE: CPT

## 2019-10-26 PROCEDURE — 83735 ASSAY OF MAGNESIUM: CPT

## 2019-10-26 PROCEDURE — 84484 ASSAY OF TROPONIN QUANT: CPT | Mod: 91

## 2019-10-26 PROCEDURE — 80048 BASIC METABOLIC PNL TOTAL CA: CPT

## 2019-10-26 PROCEDURE — 87040 BLOOD CULTURE FOR BACTERIA: CPT | Mod: 59

## 2019-10-26 RX ORDER — LORAZEPAM 1 MG/1
TABLET ORAL
Status: DISPENSED
Start: 2019-10-26 | End: 2019-10-27

## 2019-10-26 RX ORDER — ENALAPRIL MALEATE 2.5 MG/1
2.5 TABLET ORAL DAILY
Status: DISCONTINUED | OUTPATIENT
Start: 2019-10-27 | End: 2019-10-29

## 2019-10-26 RX ORDER — ACETAMINOPHEN 325 MG/1
650 TABLET ORAL EVERY 6 HOURS PRN
Status: DISCONTINUED | OUTPATIENT
Start: 2019-10-26 | End: 2019-10-27

## 2019-10-26 RX ORDER — SODIUM CHLORIDE 0.9 % (FLUSH) 0.9 %
10 SYRINGE (ML) INJECTION
Status: DISCONTINUED | OUTPATIENT
Start: 2019-10-26 | End: 2019-10-30 | Stop reason: HOSPADM

## 2019-10-26 RX ORDER — ENOXAPARIN SODIUM 100 MG/ML
40 INJECTION SUBCUTANEOUS EVERY 24 HOURS
Status: DISCONTINUED | OUTPATIENT
Start: 2019-10-26 | End: 2019-10-30 | Stop reason: HOSPADM

## 2019-10-26 RX ORDER — MORPHINE SULFATE 2 MG/ML
6 INJECTION, SOLUTION INTRAMUSCULAR; INTRAVENOUS
Status: COMPLETED | OUTPATIENT
Start: 2019-10-26 | End: 2019-10-26

## 2019-10-26 RX ORDER — ENOXAPARIN SODIUM 100 MG/ML
30 INJECTION SUBCUTANEOUS EVERY 24 HOURS
Status: DISCONTINUED | OUTPATIENT
Start: 2019-10-26 | End: 2019-10-26 | Stop reason: DRUGHIGH

## 2019-10-26 RX ORDER — HYDRALAZINE HYDROCHLORIDE 20 MG/ML
20 INJECTION INTRAMUSCULAR; INTRAVENOUS EVERY 6 HOURS PRN
Status: DISCONTINUED | OUTPATIENT
Start: 2019-10-26 | End: 2019-10-30 | Stop reason: HOSPADM

## 2019-10-26 RX ORDER — FUROSEMIDE 10 MG/ML
20 INJECTION INTRAMUSCULAR; INTRAVENOUS
Status: COMPLETED | OUTPATIENT
Start: 2019-10-26 | End: 2019-10-26

## 2019-10-26 RX ORDER — MAGNESIUM SULFATE 500 MG/ML
2 INJECTION, SOLUTION INTRAMUSCULAR; INTRAVENOUS ONCE
Status: DISCONTINUED | OUTPATIENT
Start: 2019-10-26 | End: 2019-10-26 | Stop reason: SDUPTHER

## 2019-10-26 RX ORDER — FUROSEMIDE 10 MG/ML
40 INJECTION INTRAMUSCULAR; INTRAVENOUS ONCE
Status: COMPLETED | OUTPATIENT
Start: 2019-10-26 | End: 2019-10-26

## 2019-10-26 RX ORDER — LORAZEPAM 1 MG/1
1 TABLET ORAL EVERY 8 HOURS PRN
Status: DISCONTINUED | OUTPATIENT
Start: 2019-10-26 | End: 2019-10-30 | Stop reason: HOSPADM

## 2019-10-26 RX ORDER — LORAZEPAM 2 MG/ML
1 INJECTION INTRAMUSCULAR ONCE
Status: COMPLETED | OUTPATIENT
Start: 2019-10-26 | End: 2019-10-26

## 2019-10-26 RX ORDER — MAGNESIUM SULFATE HEPTAHYDRATE 40 MG/ML
2 INJECTION, SOLUTION INTRAVENOUS ONCE
Status: COMPLETED | OUTPATIENT
Start: 2019-10-26 | End: 2019-10-26

## 2019-10-26 RX ORDER — ACETAMINOPHEN 500 MG
1000 TABLET ORAL
Status: COMPLETED | OUTPATIENT
Start: 2019-10-26 | End: 2019-10-26

## 2019-10-26 RX ORDER — NAPROXEN SODIUM 220 MG/1
81 TABLET, FILM COATED ORAL DAILY
Status: DISCONTINUED | OUTPATIENT
Start: 2019-10-27 | End: 2019-10-30 | Stop reason: HOSPADM

## 2019-10-26 RX ADMIN — LORAZEPAM 1 MG: 1 TABLET ORAL at 04:10

## 2019-10-26 RX ADMIN — LORAZEPAM 1 MG: 2 INJECTION, SOLUTION INTRAMUSCULAR; INTRAVENOUS at 05:10

## 2019-10-26 RX ADMIN — FUROSEMIDE 40 MG: 10 INJECTION, SOLUTION INTRAMUSCULAR; INTRAVENOUS at 08:10

## 2019-10-26 RX ADMIN — MORPHINE SULFATE 6 MG: 2 INJECTION, SOLUTION INTRAMUSCULAR; INTRAVENOUS at 02:10

## 2019-10-26 RX ADMIN — ENOXAPARIN SODIUM 40 MG: 100 INJECTION SUBCUTANEOUS at 04:10

## 2019-10-26 RX ADMIN — FUROSEMIDE 20 MG: 10 INJECTION, SOLUTION INTRAMUSCULAR; INTRAVENOUS at 03:10

## 2019-10-26 RX ADMIN — AZITHROMYCIN MONOHYDRATE 500 MG: 500 INJECTION, POWDER, LYOPHILIZED, FOR SOLUTION INTRAVENOUS at 04:10

## 2019-10-26 RX ADMIN — ACETAMINOPHEN 1000 MG: 500 TABLET ORAL at 02:10

## 2019-10-26 RX ADMIN — MAGNESIUM SULFATE 2 G: 2 INJECTION INTRAVENOUS at 08:10

## 2019-10-26 RX ADMIN — CEFTRIAXONE 1 G: 1 INJECTION, SOLUTION INTRAVENOUS at 04:10

## 2019-10-26 NOTE — SIGNIFICANT EVENT
Results for JANET ANDRADE (MRN [id number]) as of 10/26/2019 18:19   Ref. Range 10/26/2019 18:13   POC PH Latest Ref Range: 7.35 - 7.45  7.369   POC PCO2 Latest Ref Range: 35 - 45 mmHg 31.7 (L)   POC PO2 Latest Ref Range: 80 - 100 mmHg 75 (L)   POC BE Latest Ref Range: -2 to 2 mmol/L -7   POC HCO3 Latest Ref Range: 24 - 28 mmol/L 18.3 (L)   POC SATURATED O2 Latest Ref Range: 95 - 100 % 95   POC TCO2 Latest Ref Range: 23 - 27 mmol/L 19 (L)   Sample Unknown ARTERIAL   DelSys Unknown Room Air   Allens Test Unknown Pass   Site Unknown RR

## 2019-10-26 NOTE — NURSING
Pt arrived from ER via wheelchair actively moaning, SOB, and visibly distressed. Pt states he feels like he can't catch his breath because he is having an anxiety attack. Pt stood up from wheelchair refused to sit down. Pt screaming loudly in room. O2 sat on room air 95% . Pt refused to have BP taken or cardiac monitor placed. Pt received PRN PO Ativan around 1600 according to MAR when pt came into the ED.  Contacted Dr. Beckman regarding above and ordered to give IV 0.5mg Ativan once. Ativan administered, pt now allowing telemetry monitor to be placed. HR still elevated 135. Pt now intermittently sleeping and when awake resumes loudly moaning. /88 . Notified Dr. Beckman of elevated HR, and received order to obtain STAT EKG.  1740- STAT EKG obtained, Dr. Beckman received and assessed EKG strip and seen pt at bedside. Pt HR maintaining 130's while asleep. Dr. Beckman aware of 'S said pt is having sinus tachycardia and that we will hold off on treating HR at this time due to pt asymptomatic.

## 2019-10-26 NOTE — ASSESSMENT & PLAN NOTE
White count elevated to 16. With presentation and recent URI symptoms will cover for pneumonia.   Ceftriaxone and azithormycin.  However did have an admission in 08/2019 and if worsens will broaden to Hcap coverage.   As of now not requiring any pressors or ventilatory support . No fluids for now with end stage decompensated cardiomyopathy   Monitor closely\  Repeat lactate

## 2019-10-26 NOTE — ASSESSMENT & PLAN NOTE
Patient presenting with symptoms consistent with heart failure. Though BNP is slightly lower than before , symptomatically has heart failure.   Bedside echo with IVC 2.2 cm and VTI low. This would explain his tachycardic response.   Also has a white count and had runny nose recently . CXR cannot rule out pneumonia. This could be possible trigger.   Received IV ceftriazone and IV azithormycin dose. Blood cultures sent from ED  Got lasix 20 mg IV-> monitor response and if redose with lasix to maintain goal net negative 1.5 litre /day   Fluid restriction to 1500 cc.   Hold beta blocker and aldactone for now. Continue ACE. Will benefit from lower BP for afterload with his Low EF.   If does not improve just with diuresis or worsens will consider inotropic support.  Echo ordered   Will consider cardiology consult and assistance for end stage cardiomyopathy .

## 2019-10-26 NOTE — HPI
Patient is a 43 y/o male with a h/o NICM with Ef < 15 % on the last echo in 07/2019 , RV enlargement , ( reported history of LHC in 2017 which was non obstuctive  But that's based on notes), who presented to the ED with shortness of breath since the last 2-3 days with worsening LE edema and back pain since last night. Did not  endorse any chest pain. Overall he can walk about a block and gets tired and that's his usual exercise capacity. He also endorses orthopnea and PND.   In terms of his cardiovascular history his last echo showed severely reduced Ef with eccentric hypertrophy. Moderate RV enlargement with mild to moderate MR and mild to moderate TR.   Endorses compliance to medications and a low salt diet. Denies active alcohol use or h/o smoking at present. Denies family history of sudden cardiac death.

## 2019-10-26 NOTE — ASSESSMENT & PLAN NOTE
Likely secondary to CHF exacerbation , tachycardia and possible infection   Does not appear to be ACS at this time .   Treatment of heart failure and sepsis.

## 2019-10-26 NOTE — ED NOTES
Patient AAOx4, skin warm/dry, respirations even/unlabored.  Appears in mild distress.  Bilateral LE edema noted.  Found on cardiac/BP/O2 sat monitors.  Narrow-complex tachycardia on monitor.

## 2019-10-26 NOTE — H&P
[facility] Hospital Medicine  History & Physical    Patient Name: Francis Daigle  MRN: [id number]  Admission Date: 10/26/2019  Attending Physician: Carlos Beckman MD   Primary Care Provider: Primary Doctor No         Patient information was obtained from Record review and Patient interview      Subjective:     Principal Problem:<principal problem not specified>    Chief Complaint:   Chief Complaint   Patient presents with    Shortness of Breath    Leg Swelling        HPI: Patient is a 43 y/o male with a h/o NICM with Ef < 15 % on the last echo in 07/2019 , RV enlargement , ( reported history of LHC in 2017 which was non obstuctive  But that's based on notes), who presented to the ED with shortness of breath since the last 2-3 days with worsening LE edema and back pain since last night. Did not  endorse any chest pain. Overall he can walk about a block and gets tired and that's his usual exercise capacity. He also endorses orthopnea and PND.   In terms of his cardiovascular history his last echo showed severely reduced Ef with eccentric hypertrophy. Moderate RV enlargement with mild to moderate MR and mild to moderate TR.   Endorses compliance to medications and a low salt diet. Denies active alcohol use or h/o smoking at present. Denies family history of sudden cardiac death.     No new subjective & objective note has been filed under this hospital service since the last note was generated.    Past Medical History:   Diagnosis Date    Anxiety      CHF (congestive heart failure)      Depression      Hypertension          Family History      Problem Relation (Age of Onset)     Arthritis Mother     Asthma Sister     Diabetes Sister     Heart disease Mother, Maternal Grandfather     Hyperlipidemia Mother     Hypertension Mother, Father     Kidney disease Mother                Tobacco Use    Smoking status: Former Smoker       Packs/day: 2.00       Years: 20.00       Pack years: 40.00        Types: Cigarettes       Start date: 1999       Last attempt to quit: 2017       Years since quittin.5    Smokeless tobacco: Never Used   Substance and Sexual Activity    Alcohol use: Not Currently    Drug use: Not Currently    Sexual activity: Yes       Partners: Female      Review of Systems   Constitutional: Positive for fatigue. Negative for activity change, appetite change, diaphoresis and fever.   HENT: Negative for congestion and facial swelling.    Eyes: Negative for redness and itching.   Respiratory: Positive for shortness of breath and cough , chest tightness and wheezing.    Cardiovascular:. Negative for chest pain, palpitations and leg swelling.   Gastrointestinal: Negative for abdominal pain, constipation, diarrhea, nausea and vomiting.   Endocrine: Negative for cold intolerance and heat intolerance.   Genitourinary: Negative for difficulty urinating, dysuria, flank pain, frequency, hematuria and urgency.   Musculoskeletal: Positive for back pain. Negative for arthralgias, joint swelling, myalgias and neck pain.   Neurological: Negative for dizziness, syncope, weakness, light-headedness, numbness and headaches.   Psychiatric/Behavioral: Negative for agitation and confusion. The patient is not nervous/anxious.       Physical Exam   Constitutional: He is oriented to person, place, and time. He appears well-developed and well-nourished. No distress.   HENT:   Head: Normocephalic and atraumatic.   Mouth/Throat: No oropharyngeal exudate.   Eyes: Conjunctivae are normal. No scleral icterus.   Neck: Normal range of motion. Neck supple. No JVD present. No tracheal deviation present. No thyromegaly present.   Cardiovascular: Tachycardic . Soft systolic murmber at the apex / and intact distal pulses. Exam reveals no gallop and no friction rub.   No murmur heard.  Pulmonary/Chest: Effort normal. No respiratory distress. He has no wheezes. He has rales (bibasilar). He exhibits no tenderness.    Abdominal: Soft. Bowel sounds are normal. He exhibits no distension and no mass. There is no tenderness.   Musculoskeletal: He exhibits no edema or tenderness.   Lymphadenopathy:     He has no cervical adenopathy.   Neurological: He is alert and oriented to person, place, and time. No cranial nerve deficit.   Skin: Skin is warm and dry. Capillary refill takes less than 2 seconds. No erythema.   Psychiatric: He has a normal mood and affect. His behavior is normal. Judgment and thought content normal.     Assessment/Plan:       Acute on chronic combined systolic and diastolic congestive heart failure  Patient presenting with symptoms consistent with heart failure. Though BNP is slightly lower than before , symptomatically has heart failure.   Bedside echo with IVC 2.2 cm and VTI low. This would explain his tachycardic response.   Also has a white count and had runny nose recently . CXR cannot rule out pneumonia. This could be possible trigger.   Received IV ceftriazone and IV azithormycin dose. Blood cultures sent from ED  Got lasix 20 mg IV-> monitor response and if redose with lasix to maintain goal net negative 1.5 litre /day   Fluid restriction to 1500 cc.   Hold beta blocker and aldactone for now. Continue ACE. Will benefit from lower BP for afterload with his Low EF.   If does not improve just with diuresis or worsens will consider inotropic support.  Echo ordered   Will consider cardiology consult and assistance for end stage cardiomyopathy .        Elevated white blood cell count  White count elevated to 16. With presentation and recent URI symptoms will cover for pneumonia.   Ceftriaxone and azithormycin.  However did have an admission in 08/2019 and if worsens will broaden to Hcap coverage.   As of now not requiring any pressors or ventilatory support . No fluids for now with end stage decompensated cardiomyopathy   Monitor closely\  Repeat lactate     Back pain  Appears to be musculoskeletal in nature.    Pain control prn   Check lfts with cmp      Troponin level elevated  Likely secondary to CHF exacerbation , tachycardia and possible infection   Does not appear to be ACS at this time .   Treatment of heart failure and sepsis.         Shortness of breath  Plan as above       Anxiety  Continue lorazepam prn      Hypertension    Continue ACE for now. Can titrate up;. Hold off on beta blocker with concerns of decompensated heart failure and infection   Hydralazine prn for SBP > 160.          VTE Risk Mitigation (From admission, onward)         Ordered     enoxaparin injection 40 mg  Daily      10/26/19 1615     IP VTE HIGH RISK PATIENT  Once      10/26/19 1602                   Carlos Beckman MD  Department of Hospital Medicine   [facility]

## 2019-10-26 NOTE — PROGRESS NOTES
Pharmacist Renal Dose Adjustment Note    Francis Daigle is a 44 y.o. male being treated with the medication Lovenox.     Patient Data:    Vital Signs (Most Recent):  Temp: 99.2 °F (37.3 °C) (10/26/19 1539)  Pulse: (!) 133 (10/26/19 1539)  Resp: (!) 22 (10/26/19 1539)  BP: (!) 147/101 (10/26/19 1539)  SpO2: 96 % (10/26/19 1539)   Vital Signs (72h Range):  Temp:  [96.5 °F (35.8 °C)-99.2 °F (37.3 °C)]   Pulse:  [133-136]   Resp:  [22-24]   BP: (147-164)/(101-109)   SpO2:  [96 %-99 %]      Recent Labs   Lab 10/26/19  1340   CREATININE 0.8     Serum creatinine: 0.8 mg/dL 10/26/19 1340  Estimated creatinine clearance: 111.6 mL/min    Lovenox 30 mg daily will be changed to Lovenox 40 mg daily.     Pharmacist's Name: Jc Brody  Pharmacist's Extension: [phone]

## 2019-10-26 NOTE — ASSESSMENT & PLAN NOTE
Continue ACE for now. Can titrate up;. Hold off on beta blocker with concerns of decompensated heart failure and infection   Hydralazine prn for SBP > 160.

## 2019-10-26 NOTE — ED PROVIDER NOTES
Encounter Date: 10/26/2019    SCRIBE #1 NOTE: I, Dawn Ferris, am scribing for, and in the presence of, Juan Garcia MD.       History     Chief Complaint   Patient presents with    Shortness of Breath    Leg Swelling       Time seen by provider: 12:46 PM on 10/26/2019    Francis Daigle is a 44 y.o. male with a PMHx of lymphadenopathy, CHF, and volume overload who presents to the ED with an onset of bilateral leg swelling and SOB, which began a few days ago. Pt reports he is more SOB than usual and it was worse last night. He states it hurts to breathe. Pt reports a cough and chest wall pain as well. He states he believes he has pneumonia because the chest wall pain is similar to when he had pneumonia. Pt states he has gained weight over the past 3 days. He is compliant with his lasix. Pt denies changes in medicine or diet. He states he has been urinating like normal and that his stools have been light in appearance. The patient denies fever, nausea, vomiting, blood in stool, or any other symptoms at this time. No pertinent PSHx. No known drug allergies.    The history is provided by the patient.     Review of patient's allergies indicates:  No Known Allergies  Past Medical History:   Diagnosis Date    Anxiety     CHF (congestive heart failure)     Depression     Hypertension      No past surgical history on file.  Family History   Problem Relation Age of Onset    Arthritis Mother     Heart disease Mother     Hyperlipidemia Mother     Hypertension Mother     Kidney disease Mother     Hypertension Father     Asthma Sister     Diabetes Sister     Heart disease Maternal Grandfather      Social History     Tobacco Use    Smoking status: Former Smoker     Packs/day: 2.00     Years: 20.00     Pack years: 40.00     Types: Cigarettes     Start date: 1999     Last attempt to quit: 2017     Years since quittin.7    Smokeless tobacco: Never Used   Substance Use Topics    Alcohol  use: Not Currently    Drug use: Not Currently     Review of Systems   Constitutional: Positive for unexpected weight change (increased). Negative for fever.   HENT: Negative for sore throat.    Respiratory: Positive for cough and shortness of breath.    Cardiovascular: Positive for leg swelling. Negative for chest pain.   Gastrointestinal: Negative for blood in stool, nausea and vomiting.   Genitourinary: Negative for dysuria.   Musculoskeletal: Positive for arthralgias (chest wall). Negative for back pain.   Skin: Negative for rash.   Neurological: Negative for weakness.   Hematological: Does not bruise/bleed easily.       Physical Exam     Initial Vitals [10/26/19 1239]   BP Pulse Resp Temp SpO2   (!) 164/107 (!) 136 (!) 24 96.5 °F (35.8 °C) 98 %      MAP       --         Physical Exam    Nursing note and vitals reviewed.  Constitutional: He appears well-developed and well-nourished. He is not diaphoretic. No distress.   HENT:   Head: Normocephalic and atraumatic.   Mouth/Throat: Oropharynx is clear and moist.   Eyes: Conjunctivae are normal.   Neck: Neck supple.   Cardiovascular: Normal rate, regular rhythm, normal heart sounds and intact distal pulses. Exam reveals no gallop and no friction rub.    No murmur heard.  Pulses:       Dorsalis pedis pulses are 2+ on the right side, and 2+ on the left side.        Posterior tibial pulses are 2+ on the right side, and 2+ on the left side.   Pulmonary/Chest: No accessory muscle usage. Tachypnea noted. He has no wheezes. He has no rhonchi. He has rales.   No retractions. Equal breath sounds. Scant rales at the bases. Equal right lateral thoracic and lateral chest wall pain.    Abdominal: Soft. He exhibits no distension. There is no tenderness.   Musculoskeletal: Normal range of motion.        Cervical back: He exhibits no tenderness.        Thoracic back: He exhibits no tenderness.        Lumbar back: He exhibits no tenderness.        Right lower leg: He exhibits no  tenderness.        Left lower leg: He exhibits no tenderness.   No midline verterbral tenderness. No crepitus. 2+ pitting edema to bilateral kness. Legs are symetric and non-tender.   Neurological: He is alert and oriented to person, place, and time.   Skin: No rash noted. No erythema.         ED Course   Procedures  Labs Reviewed   CBC W/ AUTO DIFFERENTIAL - Abnormal; Notable for the following components:       Result Value    WBC 16.29 (*)     RDW 17.7 (*)     Gran # (ANC) 14.4 (*)     Immature Grans (Abs) 0.05 (*)     Gran% 88.3 (*)     Lymph% 6.3 (*)     All other components within normal limits   BASIC METABOLIC PANEL - Abnormal; Notable for the following components:    Sodium 132 (*)     CO2 18 (*)     BUN, Bld 24 (*)     Calcium 8.4 (*)     All other components within normal limits   TROPONIN I - Abnormal; Notable for the following components:    Troponin I 0.041 (*)     All other components within normal limits   LACTIC ACID, PLASMA - Abnormal; Notable for the following components:    Lactate (Lactic Acid) 2.7 (*)     All other components within normal limits   B-TYPE NATRIURETIC PEPTIDE - Abnormal; Notable for the following components:    BNP 1,211 (*)     All other components within normal limits   CULTURE, BLOOD   CULTURE, BLOOD          Imaging Results          X-Ray Chest 1 View (Final result)  Result time 10/26/19 13:38:55    Final result by Fer Rivera MD (10/26/19 13:38:55)                 Impression:      Abnormal chest radiograph.  Differential considerations include pneumonia, aspiration, and cardiac decompensation/heart failure.  Please correlate clinically.      Electronically signed by: Azar Rivera MD  Date:    10/26/2019  Time:    13:38             Narrative:    EXAMINATION:  XR CHEST 1 VIEW    CLINICAL HISTORY:  SOB;    TECHNIQUE:  A portable upright AP view of the chest was acquired.    COMPARISON:  Chest x-ray- 08/24/2019    FINDINGS:  The cardiac silhouette is enlarged.  There is  obscuration of the right hemidiaphragm and there is airspace opacity present in the right lower lung zone, possibly a combination of pleural fluid and associated atelectasis/consolidation.  There is linear atelectasis present in the left midlung zone.  No pneumothorax.  There is minimal blunting of the left costophrenic angle.  There is degenerative change of the left acromioclavicular joint                            (radiology reading, visualized by me)       Medical Decision Making:   History:   Old Medical Records: I decided to obtain old medical records.  Independently Interpreted Test(s):   I have ordered and independently interpreted EKG Reading(s) - see prior notes  Clinical Tests:   Lab Tests: Ordered and Reviewed  Radiological Study: Ordered and Reviewed  Medical Tests: Ordered and Reviewed            Scribe Attestation:   Scribe #1: I performed the above scribed service and the documentation accurately describes the services I performed. I attest to the accuracy of the note.    I, Dr. Juan Garcia, personally performed the services described in this documentation. All medical record entries made by the scribe were at my direction and in my presence.  I have reviewed the chart and agree that the record reflects my personal performance and is accurate and complete. Juan Garcia MD.  11:20 PM 10/26/2019    Francis Daigle is a 44 y.o. male presenting with orthopnea with leg swelling and dyspnea in the setting of known CHF most consistent with CHF exacerbation.  Infiltrate is somewhat asymmetric but consistent with pulmonary edema. Less likely pneumonia.  Patient has persistent tachycardia with mildly elevated lactic acid.  Sepsis is much less likely.  Aggressive fluid resuscitation would be counter to more likely scenario of volume overload.  He does not require BiPAP or intubation at this point.  IV furosemide given in the emergency department.  I discussed with hospital medicine who  requested IV antibiotics that were initiated in case of pneumonia.  I have very low suspicion for PE at this point.  He will be admitted to a monitored bed.  Troponin may be trended for further rule out of ACS although much less likely as well.        ED Course as of Oct 26 1549   Sat Oct 26, 2019   1301 EKG:  Sinus tachycardia, rate of 134, normal intervals and axis.  Old incomplete RBBB morphology similar to prior without significant change from prior.  There are no acute ST or T wave changes suggestive of acute ischemia or infarction.  Normal axis.      [MR]   1452 Aggressive fluid resuscitation not undertaken despite pneumonia/sepsis considered since CHF more likely and this would likely worsen patient's condition, particularly with known hx low EF.    [MR]      ED Course User Index  [MR] Juan Garcia MD     Clinical Impression:       ICD-10-CM ICD-9-CM   1. Shortness of breath R06.02 786.05         Disposition:   Disposition: Admitted                        Juan Garcia MD  10/26/19 6603

## 2019-10-27 LAB
ALBUMIN SERPL BCP-MCNC: 2.6 G/DL (ref 3.5–5.2)
ALP SERPL-CCNC: 114 U/L (ref 55–135)
ALT SERPL W/O P-5'-P-CCNC: 228 U/L (ref 10–44)
ANION GAP SERPL CALC-SCNC: 9 MMOL/L (ref 8–16)
ANION GAP SERPL CALC-SCNC: 9 MMOL/L (ref 8–16)
AST SERPL-CCNC: 133 U/L (ref 10–40)
BASOPHILS # BLD AUTO: 0.03 K/UL (ref 0–0.2)
BASOPHILS NFR BLD: 0.2 % (ref 0–1.9)
BILIRUB SERPL-MCNC: 1.8 MG/DL (ref 0.1–1)
BUN SERPL-MCNC: 21 MG/DL (ref 6–20)
BUN SERPL-MCNC: 21 MG/DL (ref 6–20)
CALCIUM SERPL-MCNC: 8.1 MG/DL (ref 8.7–10.5)
CALCIUM SERPL-MCNC: 8.1 MG/DL (ref 8.7–10.5)
CHLORIDE SERPL-SCNC: 100 MMOL/L (ref 95–110)
CHLORIDE SERPL-SCNC: 100 MMOL/L (ref 95–110)
CO2 SERPL-SCNC: 27 MMOL/L (ref 23–29)
CO2 SERPL-SCNC: 27 MMOL/L (ref 23–29)
CREAT SERPL-MCNC: 0.9 MG/DL (ref 0.5–1.4)
CREAT SERPL-MCNC: 0.9 MG/DL (ref 0.5–1.4)
DIFFERENTIAL METHOD: ABNORMAL
EOSINOPHIL # BLD AUTO: 0.3 K/UL (ref 0–0.5)
EOSINOPHIL NFR BLD: 2.4 % (ref 0–8)
ERYTHROCYTE [DISTWIDTH] IN BLOOD BY AUTOMATED COUNT: 17.6 % (ref 11.5–14.5)
EST. GFR  (AFRICAN AMERICAN): >60 ML/MIN/1.73 M^2
EST. GFR  (AFRICAN AMERICAN): >60 ML/MIN/1.73 M^2
EST. GFR  (NON AFRICAN AMERICAN): >60 ML/MIN/1.73 M^2
EST. GFR  (NON AFRICAN AMERICAN): >60 ML/MIN/1.73 M^2
GLUCOSE SERPL-MCNC: 88 MG/DL (ref 70–110)
GLUCOSE SERPL-MCNC: 88 MG/DL (ref 70–110)
HCT VFR BLD AUTO: 40.3 % (ref 40–54)
HGB BLD-MCNC: 13.2 G/DL (ref 14–18)
IMM GRANULOCYTES # BLD AUTO: 0.04 K/UL (ref 0–0.04)
LYMPHOCYTES # BLD AUTO: 1.4 K/UL (ref 1–4.8)
LYMPHOCYTES NFR BLD: 10.2 % (ref 18–48)
MCH RBC QN AUTO: 27 PG (ref 27–31)
MCHC RBC AUTO-ENTMCNC: 32.8 G/DL (ref 32–36)
MCV RBC AUTO: 82 FL (ref 82–98)
MONOCYTES # BLD AUTO: 1.2 K/UL (ref 0.3–1)
MONOCYTES NFR BLD: 8.2 % (ref 4–15)
NEUTROPHILS # BLD AUTO: 11 K/UL (ref 1.8–7.7)
NEUTROPHILS NFR BLD: 78.7 % (ref 38–73)
NRBC BLD-RTO: 0 /100 WBC
PLATELET # BLD AUTO: 254 K/UL (ref 150–350)
PMV BLD AUTO: 9.6 FL (ref 9.2–12.9)
POTASSIUM SERPL-SCNC: 3.5 MMOL/L (ref 3.5–5.1)
POTASSIUM SERPL-SCNC: 3.5 MMOL/L (ref 3.5–5.1)
PROT SERPL-MCNC: 7 G/DL (ref 6–8.4)
RBC # BLD AUTO: 4.89 M/UL (ref 4.6–6.2)
SODIUM SERPL-SCNC: 136 MMOL/L (ref 136–145)
SODIUM SERPL-SCNC: 136 MMOL/L (ref 136–145)
TROPONIN I SERPL DL<=0.01 NG/ML-MCNC: 0.04 NG/ML (ref 0–0.03)
WBC # BLD AUTO: 13.98 K/UL (ref 3.9–12.7)

## 2019-10-27 PROCEDURE — 94761 N-INVAS EAR/PLS OXIMETRY MLT: CPT

## 2019-10-27 PROCEDURE — 63600175 PHARM REV CODE 636 W HCPCS: Performed by: INTERNAL MEDICINE

## 2019-10-27 PROCEDURE — 84484 ASSAY OF TROPONIN QUANT: CPT

## 2019-10-27 PROCEDURE — 80053 COMPREHEN METABOLIC PANEL: CPT

## 2019-10-27 PROCEDURE — 96372 THER/PROPH/DIAG INJ SC/IM: CPT | Mod: 59

## 2019-10-27 PROCEDURE — 11000001 HC ACUTE MED/SURG PRIVATE ROOM

## 2019-10-27 PROCEDURE — 36415 COLL VENOUS BLD VENIPUNCTURE: CPT

## 2019-10-27 PROCEDURE — G0378 HOSPITAL OBSERVATION PER HR: HCPCS

## 2019-10-27 PROCEDURE — 63600175 PHARM REV CODE 636 W HCPCS: Performed by: EMERGENCY MEDICINE

## 2019-10-27 PROCEDURE — 25000003 PHARM REV CODE 250: Performed by: INTERNAL MEDICINE

## 2019-10-27 PROCEDURE — 96376 TX/PRO/DX INJ SAME DRUG ADON: CPT

## 2019-10-27 PROCEDURE — 25000003 PHARM REV CODE 250: Performed by: NURSE PRACTITIONER

## 2019-10-27 PROCEDURE — 85025 COMPLETE CBC W/AUTO DIFF WBC: CPT

## 2019-10-27 RX ORDER — HYDROCODONE BITARTRATE AND ACETAMINOPHEN 5; 325 MG/1; MG/1
1 TABLET ORAL EVERY 6 HOURS PRN
Status: DISCONTINUED | OUTPATIENT
Start: 2019-10-27 | End: 2019-10-30 | Stop reason: HOSPADM

## 2019-10-27 RX ORDER — FUROSEMIDE 10 MG/ML
40 INJECTION INTRAMUSCULAR; INTRAVENOUS DAILY
Status: DISCONTINUED | OUTPATIENT
Start: 2019-10-27 | End: 2019-10-29

## 2019-10-27 RX ADMIN — LORAZEPAM 1 MG: 1 TABLET ORAL at 05:10

## 2019-10-27 RX ADMIN — ASPIRIN 81 MG CHEWABLE TABLET 81 MG: 81 TABLET CHEWABLE at 07:10

## 2019-10-27 RX ADMIN — HYDROCODONE BITARTRATE AND ACETAMINOPHEN 1 TABLET: 5; 325 TABLET ORAL at 06:10

## 2019-10-27 RX ADMIN — AZITHROMYCIN MONOHYDRATE 500 MG: 500 INJECTION, POWDER, LYOPHILIZED, FOR SOLUTION INTRAVENOUS at 04:10

## 2019-10-27 RX ADMIN — LORAZEPAM 1 MG: 1 TABLET ORAL at 06:10

## 2019-10-27 RX ADMIN — LORAZEPAM 1 MG: 1 TABLET ORAL at 01:10

## 2019-10-27 RX ADMIN — FUROSEMIDE 40 MG: 10 INJECTION, SOLUTION INTRAMUSCULAR; INTRAVENOUS at 10:10

## 2019-10-27 RX ADMIN — ENOXAPARIN SODIUM 40 MG: 100 INJECTION SUBCUTANEOUS at 04:10

## 2019-10-27 RX ADMIN — ENALAPRIL MALEATE 2.5 MG: 2.5 TABLET ORAL at 07:10

## 2019-10-27 RX ADMIN — CEFTRIAXONE 1 G: 1 INJECTION, SOLUTION INTRAVENOUS at 03:10

## 2019-10-27 RX ADMIN — HYDROCODONE BITARTRATE AND ACETAMINOPHEN 1 TABLET: 5; 325 TABLET ORAL at 01:10

## 2019-10-27 NOTE — ASSESSMENT & PLAN NOTE
Likely secondary to CHF exacerbation , tachycardia and possible infection   Peaked and then downtrended.   Does not appear to be ACS at this time .   Treatment of heart failure and sepsis.

## 2019-10-27 NOTE — SUBJECTIVE & OBJECTIVE
Interval History:Feels better this morning. LE edema has improved and breathing is better.     Review of Systems  Objective:     Vital Signs (Most Recent):  Temp: 98.3 °F (36.8 °C) (10/27/19 0726)  Pulse: (!) 125 (10/27/19 0726)  Resp: 15 (10/27/19 0726)  BP: 120/74 (10/27/19 0726)  SpO2: 96 % (10/27/19 0851) Vital Signs (24h Range):  Temp:  [96.4 °F (35.8 °C)-99.2 °F (37.3 °C)] 98.3 °F (36.8 °C)  Pulse:  [115-141] 125  Resp:  [15-24] 15  SpO2:  [92 %-99 %] 96 %  BP: (120-170)/() 120/74     Weight: 80.8 kg (178 lb 2.1 oz)  Body mass index is 27.08 kg/m².    Intake/Output Summary (Last 24 hours) at 10/27/2019 1111  Last data filed at 10/27/2019 0900  Gross per 24 hour   Intake 1180 ml   Output 3200 ml   Net -2020 ml      Physical Exam    Significant Labs: All pertinent labs within the past 24 hours have been reviewed.    Significant Imaging: I have reviewed all pertinent imaging results/findings within the past 24 hours.

## 2019-10-27 NOTE — PROGRESS NOTES
[facility] Hospital Medicine  Progress Note    Patient Name: Francis Daigle  MRN: [id number]  Patient Class: OP- Observation   Admission Date: 10/26/2019  Length of Stay: 0 days  Attending Physician: Carlos Beckman MD  Primary Care Provider: Primary Doctor No        Subjective:     Principal Problem:<principal problem not specified>        HPI:  Patient is a 45 y/o male with a h/o NICM with Ef < 15 % on the last echo in 07/2019 , RV enlargement , ( reported history of LHC in 2017 which was non obstuctive  But that's based on notes), who presented to the ED with shortness of breath since the last 2-3 days with worsening LE edema and back pain since last night. Did not  endorse any chest pain. Overall he can walk about a block and gets tired and that's his usual exercise capacity. He also endorses orthopnea and PND.   In terms of his cardiovascular history his last echo showed severely reduced Ef with eccentric hypertrophy. Moderate RV enlargement with mild to moderate MR and mild to moderate TR.   Endorses compliance to medications and a low salt diet. Denies active alcohol use or h/o smoking at present. Denies family history of sudden cardiac death.     Overview/Hospital Course:  Patient is a 45 y/o male with a h/o NICM with Ef < 15 % on the last echo in 07/2019 , RV enlargement , ( reported history of LHC in 2017 which was non obstuctive  But that's based on notes), who presented to the ED with shortness of breath since the last 2-3 days with worsening LE edema and back pain since last night. Did not  endorse any chest pain. Overall he can walk about a block and gets tired and that's his usual exercise capacity. He also endorses orthopnea and PND.   In terms of his cardiovascular history his last echo showed severely reduced Ef with eccentric hypertrophy. Moderate RV enlargement with mild to moderate MR and mild to moderate TR.   Endorses compliance to medications and a low salt diet.  Denies active alcohol use or h/o smoking at present. Denies family history of sudden cardiac death.   After being diuresed he feels improved today and his breathing is better. His LFT's are mildly elevated. His troponin peaked.     Interval History:Feels better this morning. LE edema has improved and breathing is better.     Review of Systems  Objective:     Vital Signs (Most Recent):  Temp: 98.3 °F (36.8 °C) (10/27/19 0726)  Pulse: (!) 125 (10/27/19 0726)  Resp: 15 (10/27/19 0726)  BP: 120/74 (10/27/19 0726)  SpO2: 96 % (10/27/19 0851) Vital Signs (24h Range):  Temp:  [96.4 °F (35.8 °C)-99.2 °F (37.3 °C)] 98.3 °F (36.8 °C)  Pulse:  [115-141] 125  Resp:  [15-24] 15  SpO2:  [92 %-99 %] 96 %  BP: (120-170)/() 120/74     Weight: 80.8 kg (178 lb 2.1 oz)  Body mass index is 27.08 kg/m².    Intake/Output Summary (Last 24 hours) at 10/27/2019 1111  Last data filed at 10/27/2019 0900  Gross per 24 hour   Intake 1180 ml   Output 3200 ml   Net -2020 ml      Physical Exam   Constitutional: He is oriented to person, place, and time. He appears well-developed and well-nourished. No distress.   HENT:   Head: Normocephalic and atraumatic.   Mouth/Throat: No oropharyngeal exudate.   Eyes: Conjunctivae are normal. No scleral icterus.   Neck: Normal range of motion. Neck supple. No JVD present. No tracheal deviation present. No thyromegaly present.   Cardiovascular: Normal rate, regular rhythm,normal heart sounds and intact distal pulses.   No murmur heard.  Pulmonary/Chest: Effort normal. No respiratory distress. He has no wheezes. He has rales (bibasilar). He exhibits no tenderness.   Abdominal: Soft. Bowel sounds are normal. He exhibits no distension and no mass. There is no tenderness.   Musculoskeletal: He exhibits no edema or tenderness.   Lymphadenopathy:     He has no cervical adenopathy.   Neurological: He is alert and oriented to person, place, and time. No cranial nerve deficit.   Skin: Skin is warm and dry.  Capillary refill takes less than 2 seconds. No erythema.   Psychiatric: He has a normal mood and affect. His behavior is normal. Judgment and thought content normal.   Significant Labs: All pertinent labs within the past 24 hours have been reviewed.    Significant Imaging: I have reviewed all pertinent imaging results/findings within the past 24 hours.      Assessment/Plan:      Back pain  Appears to be musculoskeletal in nature.   Pain control prn   LFts mildly elevated- could be low flow and are improving. Will get liver ultrasound to assess morphologically      Troponin level elevated  Likely secondary to CHF exacerbation , tachycardia and possible infection   Peaked and then downtrended.   Does not appear to be ACS at this time .   Treatment of heart failure and sepsis.       Elevated white blood cell count  White count elevated to 16. With presentation and recent URI symptoms will cover for pneumonia.   Ceftriaxone and azithormycin.Continue antibiotics as cannot rule out pneumonia as possible trigger.   As of now not requiring any pressors or ventilatory support . No fluids for now with end stage decompensated cardiomyopathy   Monitor closely\  Repeat lactate normalized.     Shortness of breath  Plan as above        Anxiety  Continue lorazepam prn       Hypertension    Continue ACE for now. Can titrate up;. Hold off on beta blocker with concerns of decompensated heart failure and infection   Hydralazine prn for SBP > 160.  Pressure improved today.       Acute on chronic combined systolic and diastolic congestive heart failure  Patient presenting with symptoms consistent with heart failure. Though BNP is slightly lower than before , symptomatically has heart failure.   Bedside echo with IVC 2.2 cm and VTI low. This would explain his tachycardic response.   Also has a white count and had runny nose recently . CXR cannot rule out pneumonia. This could be possible trigger.  Received IV ceftriazone and IV  azithormycin dose. Blood cultures sent from ED. NGTD  Responded well to lasix 40 mg IV. Will continue IV diuresis today.   Fluid restriction to 1500 cc.   Hold beta blocker and aldactone for now. Continue ACE. Will benefit from lower BP for afterload with his Low EF. Will restart beta blocker with evening dose.  Echo ordered   consider routine cardiology consult and assistance for end stage cardiomyopathy .          VTE Risk Mitigation (From admission, onward)         Ordered     IP VTE LOW RISK PATIENT  Once      10/26/19 1712     enoxaparin injection 40 mg  Daily      10/26/19 1615                      Carlos Beckman MD  Department of Hospital Medicine   [facility]

## 2019-10-27 NOTE — ASSESSMENT & PLAN NOTE
Patient presenting with symptoms consistent with heart failure. Though BNP is slightly lower than before , symptomatically has heart failure.   Bedside echo with IVC 2.2 cm and VTI low. This would explain his tachycardic response.   Also has a white count and had runny nose recently . CXR cannot rule out pneumonia. This could be possible trigger.  Received IV ceftriazone and IV azithormycin dose. Blood cultures sent from ED. NGTD  Responded well to lasix 40 mg IV. Will continue IV diuresis today.   Fluid restriction to 1500 cc.   Hold beta blocker and aldactone for now. Continue ACE. Will benefit from lower BP for afterload with his Low EF. Will restart beta blocker with evening dose.  Echo ordered   consider routine cardiology consult and assistance for end stage cardiomyopathy .

## 2019-10-27 NOTE — NURSING
Pt getting verry anxious and aggitated, PRN ativan not due for next dose for a couple more hours. Contacted Dr. Beckman whom ordered to give the ordered PRN PO Ativan 1mg dose now.

## 2019-10-27 NOTE — PLAN OF CARE
Pt very anxious. Order noted that pt cannot receive prn ativan at this time. Dr. Beckman notified and stated ok to give dose early. Instructed pt to call nurses before getting up on his own to prevent fall, pt verbalized understanding.

## 2019-10-27 NOTE — ASSESSMENT & PLAN NOTE
Continue ACE for now. Can titrate up;. Hold off on beta blocker with concerns of decompensated heart failure and infection   Hydralazine prn for SBP > 160.  Pressure improved today.

## 2019-10-27 NOTE — ASSESSMENT & PLAN NOTE
Appears to be musculoskeletal in nature.   Pain control prn   LFts mildly elevated- could be low flow and are improving. Will get liver ultrasound to assess morphologically

## 2019-10-27 NOTE — HOSPITAL COURSE
Patient is a 43 y/o male with a h/o NICM with Ef < 15 % on the last echo in 07/2019 , RV enlargement , ( reported history of LHC in 2017 which was non obstuctive  But that's based on notes), who presented to the ED with shortness of breath since the last 2-3 days with worsening LE edema and back pain since last night. Did not  endorse any chest pain. Overall he can walk about a block and gets tired and that's his usual exercise capacity. He also endorses orthopnea and PND.   In terms of his cardiovascular history his last echo showed severely reduced Ef with eccentric hypertrophy. Moderate RV enlargement with mild to moderate MR and mild to moderate TR.   Endorses compliance to medications and a low salt diet. Denies active alcohol use or h/o smoking at present. Denies family history of sudden cardiac death.   After being diuresed he feels improved today and his breathing is better. His LFT's are mildly elevated. His troponin peaked.

## 2019-10-27 NOTE — PROGRESS NOTES
SW met with patient to complete a discharge planning assessment. Patient presents as alert and oriented x 4, pleasant, good eye contact, well groomed, recall good, concentration/judgement good, average intelligence, calm, communicative, cooperative and asking and answering questions appropriately. ROBINA verified all information on demographic sheet is correct. Patient reports living with cousin and that he is independent with ADLs at this time and does drive. Patient reports cousin will transport pt home.    Patient reports does not have home health.  Patient reports that he is not receiving outside resources.  Patient reports having no medical equipment. Patient reports Primary Doctor No as pt's PCP and has [facility] Medicaid as their insurance. Patient reports receiving medications from [facility] on [address] [facility] and reports that he is able to afford medications at this time and at time of discharge. Patient reports that he is not on dialysis at this time.  Patient reports that he is not receiving services with the coumadin clinic. Patient reports has not been admitted to the hospital within the last 30 days.    Patient reports does not have a Living Will or Healthcare Power of . Patient indicates mental health issues. Pt reports current issues with anxiety and depression. Pt reports that he recently lost his significant other.    Patient requested information regarding mental health providers in his area. SW provided pt with information. SW remains available.         10/27/19 1511   Discharge Assessment   Assessment Type Discharge Planning Assessment   Confirmed/corrected address and phone number on facesheet? Yes   Assessment information obtained from? Patient   Prior to hospitilization cognitive status: Alert/Oriented   Prior to hospitalization functional status: Independent   Current cognitive status: Alert/Oriented   Current Functional Status: Independent   Lives With other relative(s)  (Pt reports living  with his cousin)   Able to Return to Prior Arrangements yes   Is patient able to care for self after discharge? Yes   Who are your caregiver(s) and their phone number(s)? Estevan Linares (cousin) [phone]   Patient's perception of discharge disposition home or selfcare   Readmission Within the Last 30 Days no previous admission in last 30 days   Patient currently being followed by outpatient case management? No   Patient currently receives any other outside agency services? No   Equipment Currently Used at Home none   Do you have any problems affording any of your prescribed medications? No   Is the patient taking medications as prescribed? yes   Does the patient have transportation home? Yes   Transportation Anticipated car, drives self;family or friend will provide   Dialysis Name and Scheduled days N/A   Does the patient receive services at the Coumadin Clinic? No   Discharge Plan A Home   DME Needed Upon Discharge  none   Patient/Family in Agreement with Plan yes

## 2019-10-27 NOTE — ASSESSMENT & PLAN NOTE
White count elevated to 16. With presentation and recent URI symptoms will cover for pneumonia.   Ceftriaxone and azithormycin.Continue antibiotics as cannot rule out pneumonia as possible trigger.   As of now not requiring any pressors or ventilatory support . No fluids for now with end stage decompensated cardiomyopathy   Monitor closely\  Repeat lactate normalized.

## 2019-10-27 NOTE — PLAN OF CARE
Plan of care reviewed with pt. Expressed verbal understanding, although remains lethargic from Ativan given earlier by day shift, Is up out of bed frequently since received IV Lasix, setting off bed alarm each time, forgets to call for assistance, Instruct pt each time to use call light and not get up by self, No other needs or c/o, NAD, will cont to monitor.

## 2019-10-28 PROBLEM — R79.89 ABNORMAL LFTS: Status: ACTIVE | Noted: 2019-10-28

## 2019-10-28 LAB
ALBUMIN SERPL BCP-MCNC: 2.5 G/DL (ref 3.5–5.2)
ALP SERPL-CCNC: 108 U/L (ref 55–135)
ALT SERPL W/O P-5'-P-CCNC: 191 U/L (ref 10–44)
ANION GAP SERPL CALC-SCNC: 9 MMOL/L (ref 8–16)
AORTIC ROOT ANNULUS: 3.23 CM
AORTIC VALVE CUSP SEPERATION: 2.46 CM
ASCENDING AORTA: 3.34 CM
AST SERPL-CCNC: 115 U/L (ref 10–40)
AV INDEX (PROSTH): 1.03
AV MEAN GRADIENT: 3 MMHG
AV PEAK GRADIENT: 6 MMHG
AV VALVE AREA: 3.26 CM2
AV VELOCITY RATIO: 0.81
BASOPHILS # BLD AUTO: 0.06 K/UL (ref 0–0.2)
BASOPHILS NFR BLD: 0.4 % (ref 0–1.9)
BILIRUB SERPL-MCNC: 1.4 MG/DL (ref 0.1–1)
BSA FOR ECHO PROCEDURE: 1.88 M2
BUN SERPL-MCNC: 25 MG/DL (ref 6–20)
CALCIUM SERPL-MCNC: 8.4 MG/DL (ref 8.7–10.5)
CHLORIDE SERPL-SCNC: 100 MMOL/L (ref 95–110)
CHOLEST SERPL-MCNC: 105 MG/DL (ref 120–199)
CHOLEST/HDLC SERPL: 3.2 {RATIO} (ref 2–5)
CO2 SERPL-SCNC: 26 MMOL/L (ref 23–29)
CREAT SERPL-MCNC: 1 MG/DL (ref 0.5–1.4)
CV ECHO LV RWT: 0.41 CM
DIFFERENTIAL METHOD: ABNORMAL
DOP CALC AO PEAK VEL: 1.27 M/S
DOP CALC AO VTI: 17.62 CM
DOP CALC LVOT AREA: 3.2 CM2
DOP CALC LVOT DIAMETER: 2.01 CM
DOP CALC LVOT PEAK VEL: 1.03 M/S
DOP CALC LVOT STROKE VOLUME: 57.37 CM3
DOP CALCLVOT PEAK VEL VTI: 18.09 CM
E WAVE DECELERATION TIME: 102.8 MSEC
E/A RATIO: 1.48
E/E' RATIO: 10.92 M/S
ECHO LV POSTERIOR WALL: 1.1 CM (ref 0.6–1.1)
EOSINOPHIL # BLD AUTO: 0.5 K/UL (ref 0–0.5)
EOSINOPHIL NFR BLD: 3.3 % (ref 0–8)
ERYTHROCYTE [DISTWIDTH] IN BLOOD BY AUTOMATED COUNT: 18 % (ref 11.5–14.5)
EST. GFR  (AFRICAN AMERICAN): >60 ML/MIN/1.73 M^2
EST. GFR  (NON AFRICAN AMERICAN): >60 ML/MIN/1.73 M^2
FRACTIONAL SHORTENING: 6 % (ref 28–44)
GLUCOSE SERPL-MCNC: 99 MG/DL (ref 70–110)
HCT VFR BLD AUTO: 41.1 % (ref 40–54)
HDLC SERPL-MCNC: 33 MG/DL (ref 40–75)
HDLC SERPL: 31.4 % (ref 20–50)
HGB BLD-MCNC: 13.8 G/DL (ref 14–18)
IMM GRANULOCYTES # BLD AUTO: 0.05 K/UL (ref 0–0.04)
INTERVENTRICULAR SEPTUM: 1.19 CM (ref 0.6–1.1)
IVRT: 0.11 MSEC
LDLC SERPL CALC-MCNC: 56.6 MG/DL (ref 63–159)
LEFT ATRIUM SIZE: 3.99 CM
LEFT INTERNAL DIMENSION IN SYSTOLE: 5.05 CM (ref 2.1–4)
LEFT VENTRICLE MASS INDEX: 130 G/M2
LEFT VENTRICULAR INTERNAL DIMENSION IN DIASTOLE: 5.35 CM (ref 3.5–6)
LEFT VENTRICULAR MASS: 244.22 G
LV LATERAL E/E' RATIO: 10.14 M/S
LV SEPTAL E/E' RATIO: 11.83 M/S
LYMPHOCYTES # BLD AUTO: 1.6 K/UL (ref 1–4.8)
LYMPHOCYTES NFR BLD: 11 % (ref 18–48)
MCH RBC QN AUTO: 27.4 PG (ref 27–31)
MCHC RBC AUTO-ENTMCNC: 33.6 G/DL (ref 32–36)
MCV RBC AUTO: 82 FL (ref 82–98)
MONOCYTES # BLD AUTO: 1.3 K/UL (ref 0.3–1)
MONOCYTES NFR BLD: 9.5 % (ref 4–15)
MV A" WAVE DURATION": 87 MSEC
MV PEAK A VEL: 0.48 M/S
MV PEAK E VEL: 0.71 M/S
MV STENOSIS PRESSURE HALF TIME: 30 MS
MV VALVE AREA P 1/2 METHOD: 7.33 CM2
NEUTROPHILS # BLD AUTO: 10.6 K/UL (ref 1.8–7.7)
NEUTROPHILS NFR BLD: 75.4 % (ref 38–73)
NONHDLC SERPL-MCNC: 72 MG/DL
NRBC BLD-RTO: 0 /100 WBC
PISA TR MAX VEL: 2.83 M/S
PLATELET # BLD AUTO: 280 K/UL (ref 150–350)
PMV BLD AUTO: 9.6 FL (ref 9.2–12.9)
POTASSIUM SERPL-SCNC: 4.2 MMOL/L (ref 3.5–5.1)
PROT SERPL-MCNC: 7.2 G/DL (ref 6–8.4)
PULM VEIN A" WAVE DURATION": 97 MSEC
PV PEAK VELOCITY: 0.78 CM/S
RA PRESSURE: 3 MMHG
RBC # BLD AUTO: 5.04 M/UL (ref 4.6–6.2)
RIGHT VENTRICULAR END-DIASTOLIC DIMENSION: 4.33 CM
SODIUM SERPL-SCNC: 135 MMOL/L (ref 136–145)
TDI LATERAL: 0.07 M/S
TDI SEPTAL: 0.06 M/S
TDI: 0.07 M/S
TR MAX PG: 32 MMHG
TRICUSPID ANNULAR PLANE SYSTOLIC EXCURSION: 1.6 CM
TRIGL SERPL-MCNC: 77 MG/DL (ref 30–150)
TSH SERPL DL<=0.005 MIU/L-ACNC: 3.67 UIU/ML (ref 0.4–4)
TV REST PULMONARY ARTERY PRESSURE: 35 MMHG
WBC # BLD AUTO: 14.06 K/UL (ref 3.9–12.7)

## 2019-10-28 PROCEDURE — 85025 COMPLETE CBC W/AUTO DIFF WBC: CPT

## 2019-10-28 PROCEDURE — 63600175 PHARM REV CODE 636 W HCPCS: Performed by: INTERNAL MEDICINE

## 2019-10-28 PROCEDURE — 25000003 PHARM REV CODE 250: Performed by: SPECIALIST

## 2019-10-28 PROCEDURE — 80074 ACUTE HEPATITIS PANEL: CPT

## 2019-10-28 PROCEDURE — 80061 LIPID PANEL: CPT

## 2019-10-28 PROCEDURE — 36415 COLL VENOUS BLD VENIPUNCTURE: CPT

## 2019-10-28 PROCEDURE — 80053 COMPREHEN METABOLIC PANEL: CPT

## 2019-10-28 PROCEDURE — 25000003 PHARM REV CODE 250: Performed by: INTERNAL MEDICINE

## 2019-10-28 PROCEDURE — 63600175 PHARM REV CODE 636 W HCPCS: Performed by: EMERGENCY MEDICINE

## 2019-10-28 PROCEDURE — 11000001 HC ACUTE MED/SURG PRIVATE ROOM

## 2019-10-28 PROCEDURE — 25000003 PHARM REV CODE 250: Performed by: NURSE PRACTITIONER

## 2019-10-28 PROCEDURE — 84443 ASSAY THYROID STIM HORMONE: CPT

## 2019-10-28 PROCEDURE — 94761 N-INVAS EAR/PLS OXIMETRY MLT: CPT

## 2019-10-28 PROCEDURE — 96376 TX/PRO/DX INJ SAME DRUG ADON: CPT

## 2019-10-28 RX ORDER — SPIRONOLACTONE 25 MG/1
25 TABLET ORAL DAILY
Status: DISCONTINUED | OUTPATIENT
Start: 2019-10-28 | End: 2019-10-30 | Stop reason: HOSPADM

## 2019-10-28 RX ORDER — METOPROLOL TARTRATE 50 MG/1
50 TABLET ORAL 2 TIMES DAILY
Status: DISCONTINUED | OUTPATIENT
Start: 2019-10-28 | End: 2019-10-29

## 2019-10-28 RX ORDER — CARVEDILOL 3.12 MG/1
3.12 TABLET ORAL 2 TIMES DAILY WITH MEALS
Status: DISCONTINUED | OUTPATIENT
Start: 2019-10-28 | End: 2019-10-28

## 2019-10-28 RX ORDER — METOLAZONE 5 MG/1
5 TABLET ORAL DAILY
Status: COMPLETED | OUTPATIENT
Start: 2019-10-28 | End: 2019-10-28

## 2019-10-28 RX ORDER — THIAMINE HCL 100 MG
100 TABLET ORAL DAILY
Status: DISCONTINUED | OUTPATIENT
Start: 2019-10-28 | End: 2019-10-30 | Stop reason: HOSPADM

## 2019-10-28 RX ORDER — DIGOXIN 125 MCG
0.25 TABLET ORAL DAILY
Status: DISCONTINUED | OUTPATIENT
Start: 2019-10-29 | End: 2019-10-30 | Stop reason: HOSPADM

## 2019-10-28 RX ORDER — DIGOXIN 125 MCG
0.38 TABLET ORAL DAILY
Status: COMPLETED | OUTPATIENT
Start: 2019-10-28 | End: 2019-10-28

## 2019-10-28 RX ADMIN — METOLAZONE 5 MG: 5 TABLET ORAL at 01:10

## 2019-10-28 RX ADMIN — DIGOXIN 0.38 MG: 125 TABLET ORAL at 01:10

## 2019-10-28 RX ADMIN — CEFTRIAXONE 1 G: 1 INJECTION, SOLUTION INTRAVENOUS at 04:10

## 2019-10-28 RX ADMIN — Medication 100 MG: at 01:10

## 2019-10-28 RX ADMIN — LORAZEPAM 1 MG: 1 TABLET ORAL at 08:10

## 2019-10-28 RX ADMIN — FUROSEMIDE 40 MG: 10 INJECTION, SOLUTION INTRAMUSCULAR; INTRAVENOUS at 09:10

## 2019-10-28 RX ADMIN — METOPROLOL TARTRATE 50 MG: 50 TABLET ORAL at 08:10

## 2019-10-28 RX ADMIN — CARVEDILOL 3.12 MG: 3.12 TABLET, FILM COATED ORAL at 10:10

## 2019-10-28 RX ADMIN — SPIRONOLACTONE 25 MG: 25 TABLET ORAL at 10:10

## 2019-10-28 RX ADMIN — AZITHROMYCIN MONOHYDRATE 500 MG: 500 INJECTION, POWDER, LYOPHILIZED, FOR SOLUTION INTRAVENOUS at 06:10

## 2019-10-28 RX ADMIN — LORAZEPAM 1 MG: 1 TABLET ORAL at 07:10

## 2019-10-28 RX ADMIN — MAGNESIUM OXIDE TAB 400 MG (241.3 MG ELEMENTAL MG) 200 MG: 400 (241.3 MG) TAB at 01:10

## 2019-10-28 RX ADMIN — ENOXAPARIN SODIUM 40 MG: 100 INJECTION SUBCUTANEOUS at 05:10

## 2019-10-28 RX ADMIN — ENALAPRIL MALEATE 2.5 MG: 2.5 TABLET ORAL at 09:10

## 2019-10-28 RX ADMIN — HYDROCODONE BITARTRATE AND ACETAMINOPHEN 1 TABLET: 5; 325 TABLET ORAL at 07:10

## 2019-10-28 RX ADMIN — ASPIRIN 81 MG CHEWABLE TABLET 81 MG: 81 TABLET CHEWABLE at 09:10

## 2019-10-28 RX ADMIN — HYDROCODONE BITARTRATE AND ACETAMINOPHEN 1 TABLET: 5; 325 TABLET ORAL at 08:10

## 2019-10-28 NOTE — SUBJECTIVE & OBJECTIVE
Interval History:  Patient is reporting improving shortness of breath with excellent diuretic response.  However patient remains tachycardic this morning.  Patient admits recent use of amphetamine use when his girlfriend accidentally overdosed herself.    Review of Systems   Constitutional: Positive for fatigue.   Respiratory: Positive for cough and shortness of breath.      Objective:     Vital Signs (Most Recent):  Temp: 97.6 °F (36.4 °C) (10/28/19 0750)  Pulse: (!) 120 (10/28/19 0750)  Resp: (!) 22 (10/28/19 0750)  BP: (!) 131/96 (10/28/19 0750)  SpO2: 98 % (10/28/19 0750) Vital Signs (24h Range):  Temp:  [97 °F (36.1 °C)-99.2 °F (37.3 °C)] 97.6 °F (36.4 °C)  Pulse:  [114-134] 120  Resp:  [17-22] 22  SpO2:  [94 %-98 %] 98 %  BP: (118-136)/(75-96) 131/96     Weight: 74.2 kg (163 lb 9.3 oz)  Body mass index is 24.87 kg/m².    Intake/Output Summary (Last 24 hours) at 10/28/2019 0953  Last data filed at 10/28/2019 0600  Gross per 24 hour   Intake 150 ml   Output 3825 ml   Net -3675 ml      Physical Exam   Constitutional: He appears well-developed.   HENT:   Head: Normocephalic and atraumatic.   Nose: Nose normal. No septal deviation.   Mouth/Throat: Oropharynx is clear and moist.   Eyes: Pupils are equal, round, and reactive to light. Conjunctivae are normal.   Neck: No JVD present. No tracheal tenderness present. No tracheal deviation present. No thyroid mass present.   Cardiovascular: Normal rate, regular rhythm, S1 normal and S2 normal. Exam reveals no gallop and no friction rub.   No murmur heard.  Pulmonary/Chest: Effort normal and breath sounds normal. He has no decreased breath sounds. He has no wheezes. He has no rhonchi. He has no rales.   Bibasal inspiratory fine rales.   Abdominal: Soft. Normal appearance and bowel sounds are normal. He exhibits no distension and no mass. There is no hepatosplenomegaly, splenomegaly or hepatomegaly. There is no tenderness.   Neurological: He is alert. He has normal  strength. No cranial nerve deficit or sensory deficit.   Skin: No rash noted. No cyanosis.   Nursing note and vitals reviewed.      Significant Labs:   BMP:   Recent Labs   Lab 10/26/19  1742  10/28/19  0524   *   < > 99   *   < > 135*   K 3.8   < > 4.2      < > 100   CO2 16*   < > 26   BUN 26*   < > 25*   CREATININE 0.9   < > 1.0   CALCIUM 8.1*   < > 8.4*   MG 1.5*  --   --     < > = values in this interval not displayed.     CBC:   Recent Labs   Lab 10/26/19  1742 10/27/19  0514 10/28/19  0524   WBC 19.74* 13.98* 14.06*   HGB 14.6 13.2* 13.8*   HCT 43.2 40.3 41.1    254 280     CMP:   Recent Labs   Lab 10/26/19  1742 10/27/19  0515 10/28/19  0524   * 136  136 135*   K 3.8 3.5  3.5 4.2    100  100 100   CO2 16* 27  27 26   * 88  88 99   BUN 26* 21*  21* 25*   CREATININE 0.9 0.9  0.9 1.0   CALCIUM 8.1* 8.1*  8.1* 8.4*   PROT 7.8 7.0 7.2   ALBUMIN 2.9* 2.6* 2.5*   BILITOT 1.7* 1.8* 1.4*   ALKPHOS 130 114 108   * 133* 115*   * 228* 191*   ANIONGAP 13 9  9 9   EGFRNONAA >60 >60  >60 >60     Coagulation: No results for input(s): PT, INR, APTT in the last 48 hours.    Significant Imaging:  RUQ abdominal US:  1. Hepatomegaly.  No definite fatty infiltration of the liver or evidence of biliary obstruction.  2. Contracted gallbladder with no shadowing gallstones or gallbladder inflammatory change noted.  3. Small volume of right basilar pleural fluid at the right lung base.  4. Apparent mild right hydronephrosis.    CXR:  Abnormal chest radiograph.  Differential considerations include pneumonia, aspiration, and cardiac decompensation/heart failure.  Please correlate clinically.    ECHO ():  · Eccentric left ventricular hypertrophy.  · Mild left ventricular enlargement.  · Severely decreased left ventricular systolic function. The estimated ejection fraction is 15%  · Left ventricular diastolic dysfunction.  · Moderate right ventricular  enlargement.  · Mild left atrial enlargement.  · Mild right atrial enlargement.  · Mild-to-moderate mitral regurgitation.  · Mild tricuspid regurgitation.  · Normal central venous pressure (3 mm Hg).  · The estimated PA systolic pressure is 31 mm Hg  · Small pericardial effusion.

## 2019-10-28 NOTE — PROGRESS NOTES
[facility] Medicine  Progress Note    Patient Name: Francis Daigle  MRN: [id number]  Patient Class: OP- Observation   Admission Date: 10/26/2019  Length of Stay: 0 days  Attending Physician: Jeffrey Porras MD  Primary Care Provider: Primary Doctor No        Subjective:     Principal Problem:<principal problem not specified>        HPI:  Patient is a 45 y/o male with a h/o NICM with Ef < 15 % on the last echo in 07/2019 , RV enlargement , ( reported history of LHC in 2017 which was non obstuctive  But that's based on notes), who presented to the ED with shortness of breath since the last 2-3 days with worsening LE edema and back pain since last night. Did not  endorse any chest pain. Overall he can walk about a block and gets tired and that's his usual exercise capacity. He also endorses orthopnea and PND.   In terms of his cardiovascular history his last echo showed severely reduced Ef with eccentric hypertrophy. Moderate RV enlargement with mild to moderate MR and mild to moderate TR.   Endorses compliance to medications and a low salt diet. Denies active alcohol use or h/o smoking at present. Denies family history of sudden cardiac death.     Overview/Hospital Course:  Patient is a 45 y/o male with a h/o NICM with Ef < 15 % on the last echo in 07/2019 , RV enlargement , ( reported history of LHC in 2017 which was non obstuctive  But that's based on notes), who presented to the ED with shortness of breath since the last 2-3 days with worsening LE edema and back pain since last night. Did not  endorse any chest pain. Overall he can walk about a block and gets tired and that's his usual exercise capacity. He also endorses orthopnea and PND.   In terms of his cardiovascular history his last echo showed severely reduced Ef with eccentric hypertrophy. Moderate RV enlargement with mild to moderate MR and mild to moderate TR.   Endorses compliance to medications and a low salt diet. Denies  active alcohol use or h/o smoking at present. Denies family history of sudden cardiac death.   After being diuresed he feels improved today and his breathing is better. His LFT's are mildly elevated. His troponin peaked.     Interval History:  Patient is reporting improving shortness of breath with excellent diuretic response.  However patient remains tachycardic this morning.  Patient admits recent use of amphetamine use when his girlfriend accidentally overdosed herself.    Review of Systems   Constitutional: Positive for fatigue.   Respiratory: Positive for cough and shortness of breath.      Objective:     Vital Signs (Most Recent):  Temp: 97.6 °F (36.4 °C) (10/28/19 0750)  Pulse: (!) 120 (10/28/19 0750)  Resp: (!) 22 (10/28/19 0750)  BP: (!) 131/96 (10/28/19 0750)  SpO2: 98 % (10/28/19 0750) Vital Signs (24h Range):  Temp:  [97 °F (36.1 °C)-99.2 °F (37.3 °C)] 97.6 °F (36.4 °C)  Pulse:  [114-134] 120  Resp:  [17-22] 22  SpO2:  [94 %-98 %] 98 %  BP: (118-136)/(75-96) 131/96     Weight: 74.2 kg (163 lb 9.3 oz)  Body mass index is 24.87 kg/m².    Intake/Output Summary (Last 24 hours) at 10/28/2019 0953  Last data filed at 10/28/2019 0600  Gross per 24 hour   Intake 150 ml   Output 3825 ml   Net -3675 ml      Physical Exam   Constitutional: He appears well-developed.   HENT:   Head: Normocephalic and atraumatic.   Nose: Nose normal. No septal deviation.   Mouth/Throat: Oropharynx is clear and moist.   Eyes: Pupils are equal, round, and reactive to light. Conjunctivae are normal.   Neck: No JVD present. No tracheal tenderness present. No tracheal deviation present. No thyroid mass present.   Cardiovascular: Normal rate, regular rhythm, S1 normal and S2 normal. Exam reveals no gallop and no friction rub.   No murmur heard.  Pulmonary/Chest: Effort normal and breath sounds normal. He has no decreased breath sounds. He has no wheezes. He has no rhonchi. He has no rales.   Bibasal inspiratory fine rales.   Abdominal:  Soft. Normal appearance and bowel sounds are normal. He exhibits no distension and no mass. There is no hepatosplenomegaly, splenomegaly or hepatomegaly. There is no tenderness.   Neurological: He is alert. He has normal strength. No cranial nerve deficit or sensory deficit.   Skin: No rash noted. No cyanosis.   Nursing note and vitals reviewed.      Significant Labs:   BMP:   Recent Labs   Lab 10/26/19  1742  10/28/19  0524   *   < > 99   *   < > 135*   K 3.8   < > 4.2      < > 100   CO2 16*   < > 26   BUN 26*   < > 25*   CREATININE 0.9   < > 1.0   CALCIUM 8.1*   < > 8.4*   MG 1.5*  --   --     < > = values in this interval not displayed.     CBC:   Recent Labs   Lab 10/26/19  1742 10/27/19  0514 10/28/19  0524   WBC 19.74* 13.98* 14.06*   HGB 14.6 13.2* 13.8*   HCT 43.2 40.3 41.1    254 280     CMP:   Recent Labs   Lab 10/26/19  1742 10/27/19  0515 10/28/19  0524   * 136  136 135*   K 3.8 3.5  3.5 4.2    100  100 100   CO2 16* 27  27 26   * 88  88 99   BUN 26* 21*  21* 25*   CREATININE 0.9 0.9  0.9 1.0   CALCIUM 8.1* 8.1*  8.1* 8.4*   PROT 7.8 7.0 7.2   ALBUMIN 2.9* 2.6* 2.5*   BILITOT 1.7* 1.8* 1.4*   ALKPHOS 130 114 108   * 133* 115*   * 228* 191*   ANIONGAP 13 9  9 9   EGFRNONAA >60 >60  >60 >60     Coagulation: No results for input(s): PT, INR, APTT in the last 48 hours.    Significant Imaging:  RUQ abdominal US:  1. Hepatomegaly.  No definite fatty infiltration of the liver or evidence of biliary obstruction.  2. Contracted gallbladder with no shadowing gallstones or gallbladder inflammatory change noted.  3. Small volume of right basilar pleural fluid at the right lung base.  4. Apparent mild right hydronephrosis.    CXR:  Abnormal chest radiograph.  Differential considerations include pneumonia, aspiration, and cardiac decompensation/heart failure.  Please correlate clinically.    ECHO ():  · Eccentric left ventricular  hypertrophy.  · Mild left ventricular enlargement.  · Severely decreased left ventricular systolic function. The estimated ejection fraction is 15%  · Left ventricular diastolic dysfunction.  · Moderate right ventricular enlargement.  · Mild left atrial enlargement.  · Mild right atrial enlargement.  · Mild-to-moderate mitral regurgitation.  · Mild tricuspid regurgitation.  · Normal central venous pressure (3 mm Hg).  · The estimated PA systolic pressure is 31 mm Hg  · Small pericardial effusion.      Assessment/Plan:      Abnormal LFTs  Likely related to fatty liver and passive congestion of liver.  Trend liver enzymes and check hepatitis panel.      Back pain  Appears to be musculoskeletal in nature.   Pain control prn   LFts mildly elevated- could be low flow and are improving. Will get liver ultrasound to assess morphologically      Troponin level elevated  Likely secondary to CHF exacerbation , tachycardia and possible infection   Peaked and then downtrended.   Does not appear to be ACS at this time .   Treatment of heart failure and sepsis.       Elevated white blood cell count  White count elevated to 16. With presentation and recent URI symptoms will cover for pneumonia.   Ceftriaxone and azithormycin.Continue antibiotics as cannot rule out pneumonia as possible trigger.   As of now not requiring any pressors or ventilatory support . No fluids for now with end stage decompensated cardiomyopathy   Monitor closely.    Shortness of breath  Plan as above        Anxiety  Continue lorazepam prn       Hypertension  Continue ACE for now. Resumed BB.  Hydralazine prn for SBP > 160.        Acute on chronic combined systolic and diastolic congestive heart failure  Patient presenting with symptoms consistent with heart failure. Though BNP is slightly lower than before , symptomatically has heart failure.   Also has a white count and had runny nose recently . CXR cannot rule out pneumonia. This could be possible  trigger.  Received IV ceftriazone and IV azithormycin dose.   Responded well to lasix 40 mg IV. Will continue IV diuresis.  Follow Cardiology recommendations.  Patient counseled to avoid use of stimulants/amphetamine use.  Fluid restriction to 1500 cc.   Resume BB and ACE-I.   Follow ECHO results.           VTE Risk Mitigation (From admission, onward)         Ordered     IP VTE LOW RISK PATIENT  Once      10/26/19 1712     enoxaparin injection 40 mg  Daily      10/26/19 1613                      Jeffrey Porras MD  Department of Hospital Medicine   [facility]

## 2019-10-28 NOTE — ASSESSMENT & PLAN NOTE
Likely related to fatty liver and passive congestion of liver.  Trend liver enzymes and check hepatitis panel.

## 2019-10-28 NOTE — PLAN OF CARE
Mostly uneventful night, At 4am BP did come up to 136/95 and heart rate remains tachycardic in the 120s, and did go up to 130s along with his BP after ambulating to the bathroom. Once back in bed and resting BP back down to 118/77, Went all night without requesting pain meds.

## 2019-10-28 NOTE — PLAN OF CARE
10/27/19 2003   PRE-TX-O2   O2 Device (Oxygen Therapy) room air   SpO2 95 %   Pulse Oximetry Type Intermittent

## 2019-10-28 NOTE — PLAN OF CARE
Alert, appetite is good , fall prevention ongoing, bp and chf management in progress. Med teaching in progress. Ambulates to bathroom independently strict I/o in progress

## 2019-10-28 NOTE — ASSESSMENT & PLAN NOTE
White count elevated to 16. With presentation and recent URI symptoms will cover for pneumonia.   Ceftriaxone and azithormycin.Continue antibiotics as cannot rule out pneumonia as possible trigger.   As of now not requiring any pressors or ventilatory support . No fluids for now with end stage decompensated cardiomyopathy   Monitor closely.

## 2019-10-28 NOTE — ASSESSMENT & PLAN NOTE
Patient presenting with symptoms consistent with heart failure. Though BNP is slightly lower than before , symptomatically has heart failure.   Also has a white count and had runny nose recently . CXR cannot rule out pneumonia. This could be possible trigger.  Received IV ceftriazone and IV azithormycin dose.   Responded well to lasix 40 mg IV. Will continue IV diuresis.  Follow Cardiology recommendations.  Patient counseled to avoid use of stimulants/amphetamine use.  Fluid restriction to 1500 cc.   Resume BB and ACE-I.   Follow ECHO results.

## 2019-10-28 NOTE — CONSULTS
[facility]  Cardiology  Consult Note    Patient Name: Francis Daigle  MRN: [id number]  Admission Date: 10/26/2019  Hospital Length of Stay: 0 days  Code Status: Full Code   Attending Provider: Jeffrey Porras MD   Consulting Provider: Rolando Bustos MD  Primary Care Physician: Primary Doctor No  Principal Problem:<principal problem not specified>    Patient information was obtained from patient and ER records.     Consults  Subjective:     Chief Complaint:  Sob      HPI:   Pt with 3 year hx of Cardiomyopathy  chf ,. He denies ETOH; [place]-  Cath no  Cad, no aicd recommended . Pt on medicaid and does not see cardiologist . He use methamphetamines in  His 30'3.  Hes on  Lo na diet and takes  Meds.     Past Medical History:   Diagnosis Date    Anxiety     CHF (congestive heart failure)     Depression     Hypertension        History reviewed. No pertinent surgical history.    Review of patient's allergies indicates:  No Known Allergies    No current facility-administered medications on file prior to encounter.      Current Outpatient Medications on File Prior to Encounter   Medication Sig    aspirin 81 MG Chew Take 1 tablet (81 mg total) by mouth once daily.    carvedilol (COREG) 3.125 MG tablet Take 1 tablet (3.125 mg total) by mouth 2 (two) times daily with meals.    enalapril (VASOTEC) 2.5 MG tablet Take 1 tablet (2.5 mg total) by mouth once daily.    furosemide (LASIX) 20 MG tablet Take 1 tablet (20 mg total) by mouth 2 (two) times daily.    LORazepam (ATIVAN) 1 MG tablet Take 1 tablet (1 mg total) by mouth every 8 (eight) hours as needed for Anxiety.    spironolactone (ALDACTONE) 25 MG tablet Take 1 tablet (25 mg total) by mouth once daily.     Family History     Problem Relation (Age of Onset)    Arthritis Mother    Asthma Sister    Diabetes Sister    Heart disease Mother, Maternal Grandfather    Hyperlipidemia Mother    Hypertension Mother, Father    Kidney  disease Mother        Tobacco Use    Smoking status: Former Smoker     Packs/day: 2.00     Years: 20.00     Pack years: 40.00     Types: Cigarettes     Start date: 1999     Last attempt to quit: 2017     Years since quittin.7    Smokeless tobacco: Never Used   Substance and Sexual Activity    Alcohol use: Not Currently    Drug use: Not Currently    Sexual activity: Yes     Partners: Female     Review of Systems   Constitution: Positive for weight gain.   HENT: Positive for hoarse voice.         Hoarse with cold meds    Cardiovascular: Positive for dyspnea on exertion, irregular heartbeat, leg swelling, orthopnea and palpitations. Negative for syncope.   Respiratory: Positive for cough, shortness of breath, sleep disturbances due to breathing and wheezing.    Endocrine: Negative.    Musculoskeletal: Negative.    Gastrointestinal: Positive for bloating.          When his heart failure  Worse    Genitourinary: Negative.    Neurological: Negative.    Psychiatric/Behavioral: Positive for depression.     Objective:     Vital Signs (Most Recent):  Temp: 97 °F (36.1 °C) (10/28/19 1114)  Pulse: (!) 124 (10/28/19 1114)  Resp: 16 (10/28/19 1114)  BP: (!) 134/96 (10/28/19 1114)  SpO2: 95 % (10/28/19 1114) Vital Signs (24h Range):  Temp:  [97 °F (36.1 °C)-99.2 °F (37.3 °C)] 97 °F (36.1 °C)  Pulse:  [114-134] 124  Resp:  [16-22] 16  SpO2:  [94 %-98 %] 95 %  BP: (118-136)/(75-96) 134/96     Weight: 74.2 kg (163 lb 9.3 oz)  Body mass index is 24.87 kg/m².    SpO2: 95 %  O2 Device (Oxygen Therapy): room air      Intake/Output Summary (Last 24 hours) at 10/28/2019 1206  Last data filed at 10/28/2019 0600  Gross per 24 hour   Intake --   Output 3400 ml   Net -3400 ml       Lines/Drains/Airways     Peripheral Intravenous Line                 Peripheral IV - Single Lumen 10/26/19 1330 20 G Left Forearm 1 day                Physical Exam    120/80 bilat standing  Hr 120   Lungs decreased bs r post chest  With rales     cor  ++s3  2/6 holosys m  Lsb, summation  Gallop   2/6 holosys m  [place]   abd sligt distend   Legs + 1 fem pulses   Significant Labs:   CMP   Recent Labs   Lab 10/26/19  1742 10/27/19  0515 10/28/19  0524   * 136  136 135*   K 3.8 3.5  3.5 4.2    100  100 100   CO2 16* 27  27 26   * 88  88 99   BUN 26* 21*  21* 25*   CREATININE 0.9 0.9  0.9 1.0   CALCIUM 8.1* 8.1*  8.1* 8.4*   PROT 7.8 7.0 7.2   ALBUMIN 2.9* 2.6* 2.5*   BILITOT 1.7* 1.8* 1.4*   ALKPHOS 130 114 108   * 133* 115*   * 228* 191*   ANIONGAP 13 9  9 9   ESTGFRAFRICA >60 >60  >60 >60   EGFRNONAA >60 >60  >60 >60   , CBC   Recent Labs   Lab 10/26/19  1742 10/27/19  0514 10/28/19  0524   WBC 19.74* 13.98* 14.06*   HGB 14.6 13.2* 13.8*   HCT 43.2 40.3 41.1    254 280    and Troponin   Recent Labs   Lab 10/26/19  1742 10/26/19  1939 10/27/19  0135   TROPONINI 0.072* 0.047* 0.035*   I personally reviewed chart and imaging studies ,examined patient, and discussed with the patient her illness and treatment including diet and follow-up care. This consult was prolonged and required approximately 50% time for review and 50% time examining patient and writing notes and orders     Significant Imaging: EKG: Incomplete right bundle branch block  Assessment and Plan:   1) severe bivent HF due to  Dilated CM and  Tachycardia  With neg cath for  Cad   Plan- HR must be controlled,  Change meds   Active Diagnoses:    Diagnosis Date Noted POA    Abnormal LFTs [R94.5] 10/28/2019 Yes    Shortness of breath [R06.02] 10/26/2019 Yes    Elevated white blood cell count [D72.829] 10/26/2019 Unknown    Troponin level elevated [R79.89] 10/26/2019 Unknown    Back pain [M54.9] 10/26/2019 Unknown    Anxiety [F41.9] 08/23/2019 Yes    Hypertension [I10] 08/23/2019 Yes    Acute on chronic combined systolic and diastolic congestive heart failure [I50.43] 07/03/2019 Yes      Problems Resolved During this Admission:       VTE Risk  Mitigation (From admission, onward)         Ordered     IP VTE LOW RISK PATIENT  Once      10/26/19 1712     enoxaparin injection 40 mg  Daily      10/26/19 1615                Thank you for your consult.     Rolando Bustos MD  Cardiology   [facility]

## 2019-10-29 LAB
ALBUMIN SERPL BCP-MCNC: 2.6 G/DL (ref 3.5–5.2)
ALP SERPL-CCNC: 115 U/L (ref 55–135)
ALT SERPL W/O P-5'-P-CCNC: 220 U/L (ref 10–44)
ANION GAP SERPL CALC-SCNC: 9 MMOL/L (ref 8–16)
AST SERPL-CCNC: 172 U/L (ref 10–40)
BASOPHILS # BLD AUTO: 0.07 K/UL (ref 0–0.2)
BASOPHILS NFR BLD: 0.6 % (ref 0–1.9)
BILIRUB SERPL-MCNC: 1.2 MG/DL (ref 0.1–1)
BUN SERPL-MCNC: 21 MG/DL (ref 6–20)
CALCIUM SERPL-MCNC: 9.1 MG/DL (ref 8.7–10.5)
CHLORIDE SERPL-SCNC: 96 MMOL/L (ref 95–110)
CO2 SERPL-SCNC: 25 MMOL/L (ref 23–29)
CREAT SERPL-MCNC: 0.9 MG/DL (ref 0.5–1.4)
DIFFERENTIAL METHOD: ABNORMAL
EOSINOPHIL # BLD AUTO: 0.4 K/UL (ref 0–0.5)
EOSINOPHIL NFR BLD: 3.5 % (ref 0–8)
ERYTHROCYTE [DISTWIDTH] IN BLOOD BY AUTOMATED COUNT: 17.9 % (ref 11.5–14.5)
EST. GFR  (AFRICAN AMERICAN): >60 ML/MIN/1.73 M^2
EST. GFR  (NON AFRICAN AMERICAN): >60 ML/MIN/1.73 M^2
GLUCOSE SERPL-MCNC: 93 MG/DL (ref 70–110)
HAV IGM SERPL QL IA: NEGATIVE
HBV CORE IGM SERPL QL IA: NEGATIVE
HBV SURFACE AG SERPL QL IA: NEGATIVE
HCT VFR BLD AUTO: 42.8 % (ref 40–54)
HCV AB SERPL QL IA: POSITIVE
HGB BLD-MCNC: 13.8 G/DL (ref 14–18)
IMM GRANULOCYTES # BLD AUTO: 0.05 K/UL (ref 0–0.04)
LYMPHOCYTES # BLD AUTO: 1.7 K/UL (ref 1–4.8)
LYMPHOCYTES NFR BLD: 13.8 % (ref 18–48)
MCH RBC QN AUTO: 26.6 PG (ref 27–31)
MCHC RBC AUTO-ENTMCNC: 32.2 G/DL (ref 32–36)
MCV RBC AUTO: 83 FL (ref 82–98)
MONOCYTES # BLD AUTO: 1.2 K/UL (ref 0.3–1)
MONOCYTES NFR BLD: 9.1 % (ref 4–15)
NEUTROPHILS # BLD AUTO: 9.1 K/UL (ref 1.8–7.7)
NEUTROPHILS NFR BLD: 72.6 % (ref 38–73)
NRBC BLD-RTO: 0 /100 WBC
PLATELET # BLD AUTO: 275 K/UL (ref 150–350)
PMV BLD AUTO: 9.2 FL (ref 9.2–12.9)
POTASSIUM SERPL-SCNC: 4.4 MMOL/L (ref 3.5–5.1)
PROT SERPL-MCNC: 7.7 G/DL (ref 6–8.4)
RBC # BLD AUTO: 5.18 M/UL (ref 4.6–6.2)
SODIUM SERPL-SCNC: 130 MMOL/L (ref 136–145)
WBC # BLD AUTO: 12.59 K/UL (ref 3.9–12.7)

## 2019-10-29 PROCEDURE — 25000003 PHARM REV CODE 250: Performed by: NURSE PRACTITIONER

## 2019-10-29 PROCEDURE — 63600175 PHARM REV CODE 636 W HCPCS: Performed by: EMERGENCY MEDICINE

## 2019-10-29 PROCEDURE — 80053 COMPREHEN METABOLIC PANEL: CPT

## 2019-10-29 PROCEDURE — 25000003 PHARM REV CODE 250: Performed by: SPECIALIST

## 2019-10-29 PROCEDURE — 11000001 HC ACUTE MED/SURG PRIVATE ROOM

## 2019-10-29 PROCEDURE — 25000003 PHARM REV CODE 250: Performed by: INTERNAL MEDICINE

## 2019-10-29 PROCEDURE — 85025 COMPLETE CBC W/AUTO DIFF WBC: CPT

## 2019-10-29 PROCEDURE — 63600175 PHARM REV CODE 636 W HCPCS: Performed by: INTERNAL MEDICINE

## 2019-10-29 PROCEDURE — 36415 COLL VENOUS BLD VENIPUNCTURE: CPT

## 2019-10-29 RX ORDER — METOPROLOL TARTRATE 50 MG/1
100 TABLET ORAL 2 TIMES DAILY
Status: DISCONTINUED | OUTPATIENT
Start: 2019-10-29 | End: 2019-10-30 | Stop reason: HOSPADM

## 2019-10-29 RX ORDER — ENALAPRIL MALEATE 5 MG/1
5 TABLET ORAL DAILY
Status: DISCONTINUED | OUTPATIENT
Start: 2019-10-30 | End: 2019-10-30 | Stop reason: HOSPADM

## 2019-10-29 RX ORDER — FUROSEMIDE 40 MG/1
40 TABLET ORAL DAILY
Status: DISCONTINUED | OUTPATIENT
Start: 2019-10-29 | End: 2019-10-30 | Stop reason: HOSPADM

## 2019-10-29 RX ORDER — MAG HYDROX/ALUMINUM HYD/SIMETH 200-200-20
SUSPENSION, ORAL (FINAL DOSE FORM) ORAL
Status: DISPENSED
Start: 2019-10-29 | End: 2019-10-29

## 2019-10-29 RX ORDER — MAG HYDROX/ALUMINUM HYD/SIMETH 200-200-20
30 SUSPENSION, ORAL (FINAL DOSE FORM) ORAL
Status: DISCONTINUED | OUTPATIENT
Start: 2019-10-29 | End: 2019-10-29

## 2019-10-29 RX ORDER — MAG HYDROX/ALUMINUM HYD/SIMETH 200-200-20
30 SUSPENSION, ORAL (FINAL DOSE FORM) ORAL EVERY 6 HOURS PRN
Status: DISCONTINUED | OUTPATIENT
Start: 2019-10-29 | End: 2019-10-30 | Stop reason: HOSPADM

## 2019-10-29 RX ADMIN — METOPROLOL TARTRATE 100 MG: 50 TABLET ORAL at 11:10

## 2019-10-29 RX ADMIN — LORAZEPAM 1 MG: 1 TABLET ORAL at 08:10

## 2019-10-29 RX ADMIN — ASPIRIN 81 MG CHEWABLE TABLET 81 MG: 81 TABLET CHEWABLE at 11:10

## 2019-10-29 RX ADMIN — Medication 100 MG: at 11:10

## 2019-10-29 RX ADMIN — DIGOXIN 0.25 MG: 125 TABLET ORAL at 11:10

## 2019-10-29 RX ADMIN — MAGNESIUM OXIDE TAB 400 MG (241.3 MG ELEMENTAL MG) 200 MG: 400 (241.3 MG) TAB at 11:10

## 2019-10-29 RX ADMIN — AZITHROMYCIN MONOHYDRATE 500 MG: 500 INJECTION, POWDER, LYOPHILIZED, FOR SOLUTION INTRAVENOUS at 05:10

## 2019-10-29 RX ADMIN — ENOXAPARIN SODIUM 40 MG: 100 INJECTION SUBCUTANEOUS at 05:10

## 2019-10-29 RX ADMIN — HYDROCODONE BITARTRATE AND ACETAMINOPHEN 1 TABLET: 5; 325 TABLET ORAL at 05:10

## 2019-10-29 RX ADMIN — CEFTRIAXONE 1 G: 1 INJECTION, SOLUTION INTRAVENOUS at 04:10

## 2019-10-29 RX ADMIN — METOPROLOL TARTRATE 100 MG: 50 TABLET ORAL at 08:10

## 2019-10-29 RX ADMIN — SPIRONOLACTONE 25 MG: 25 TABLET ORAL at 11:10

## 2019-10-29 RX ADMIN — ALUMINUM HYDROXIDE, MAGNESIUM HYDROXIDE, AND SIMETHICONE 30 ML: 200; 200; 20 SUSPENSION ORAL at 08:10

## 2019-10-29 RX ADMIN — FUROSEMIDE 40 MG: 40 TABLET ORAL at 11:10

## 2019-10-29 RX ADMIN — HYDROCODONE BITARTRATE AND ACETAMINOPHEN 1 TABLET: 5; 325 TABLET ORAL at 08:10

## 2019-10-29 NOTE — SUBJECTIVE & OBJECTIVE
Interval History:  Patient is feeling better.  Patient is less tachycardic than before.  Patient is anxious to go home.  Oral digoxin therapy added by Dr. Bustos.    Review of Systems   Constitutional: Positive for fatigue.   Respiratory: Positive for cough and shortness of breath.      Objective:     Vital Signs (Most Recent):  Temp: 97.6 °F (36.4 °C) (10/29/19 0728)  Pulse: 88 (10/29/19 1333)  Resp: 16 (10/29/19 1333)  BP: 131/81 (10/29/19 1333)  SpO2: 98 % (10/29/19 1333) Vital Signs (24h Range):  Temp:  [96 °F (35.6 °C)-97.6 °F (36.4 °C)] 97.6 °F (36.4 °C)  Pulse:  [] 88  Resp:  [16-18] 16  SpO2:  [93 %-98 %] 98 %  BP: (107-131)/(67-90) 131/81     Weight: 70.7 kg (155 lb 13.8 oz)  Body mass index is 23.7 kg/m².    Intake/Output Summary (Last 24 hours) at 10/29/2019 1531  Last data filed at 10/29/2019 0500  Gross per 24 hour   Intake 1560 ml   Output 4525 ml   Net -2965 ml      Physical Exam   Constitutional: He appears well-developed.   HENT:   Head: Normocephalic and atraumatic.   Nose: Nose normal. No septal deviation.   Mouth/Throat: Oropharynx is clear and moist.   Eyes: Pupils are equal, round, and reactive to light. Conjunctivae are normal.   Neck: No JVD present. No tracheal tenderness present. No tracheal deviation present. No thyroid mass present.   Cardiovascular: Normal rate, regular rhythm, S1 normal and S2 normal. Exam reveals no gallop and no friction rub.   No murmur heard.  Pulmonary/Chest: Effort normal and breath sounds normal. He has no decreased breath sounds. He has no wheezes. He has no rhonchi. He has no rales.   Bibasal inspiratory fine rales.   Abdominal: Soft. Normal appearance and bowel sounds are normal. He exhibits no distension and no mass. There is no hepatosplenomegaly, splenomegaly or hepatomegaly. There is no tenderness.   Neurological: He is alert. He has normal strength. No cranial nerve deficit or sensory deficit.   Skin: No rash noted. No cyanosis.   Nursing note and  vitals reviewed.      Significant Labs:   BMP:   Recent Labs   Lab 10/29/19  0524   GLU 93   *   K 4.4   CL 96   CO2 25   BUN 21*   CREATININE 0.9   CALCIUM 9.1     CBC:   Recent Labs   Lab 10/28/19  0524 10/29/19  0524   WBC 14.06* 12.59   HGB 13.8* 13.8*   HCT 41.1 42.8    275     CMP:   Recent Labs   Lab 10/28/19  0524 10/29/19  0524   * 130*   K 4.2 4.4    96   CO2 26 25   GLU 99 93   BUN 25* 21*   CREATININE 1.0 0.9   CALCIUM 8.4* 9.1   PROT 7.2 7.7   ALBUMIN 2.5* 2.6*   BILITOT 1.4* 1.2*   ALKPHOS 108 115   * 172*   * 220*   ANIONGAP 9 9   EGFRNONAA >60 >60     Coagulation: No results for input(s): PT, INR, APTT in the last 48 hours.    Significant Imaging:  RUQ abdominal US:  1. Hepatomegaly.  No definite fatty infiltration of the liver or evidence of biliary obstruction.  2. Contracted gallbladder with no shadowing gallstones or gallbladder inflammatory change noted.  3. Small volume of right basilar pleural fluid at the right lung base.  4. Apparent mild right hydronephrosis.    CXR:  Abnormal chest radiograph.  Differential considerations include pneumonia, aspiration, and cardiac decompensation/heart failure.  Please correlate clinically.    ECHO ():  · Eccentric left ventricular hypertrophy.  · Mild left ventricular enlargement.  · Severely decreased left ventricular systolic function. The estimated ejection fraction is 15%  · Left ventricular diastolic dysfunction.  · Moderate right ventricular enlargement.  · Mild left atrial enlargement.  · Mild right atrial enlargement.  · Mild-to-moderate mitral regurgitation.  · Mild tricuspid regurgitation.  · Normal central venous pressure (3 mm Hg).  · The estimated PA systolic pressure is 31 mm Hg  · Small pericardial effusion.

## 2019-10-29 NOTE — PLAN OF CARE
Pt sitting up in bed. He reports some pain in his legs during the shift. Pain treated with PRN pain medication. Bed in lowest position with wheels locked, and side rails up x2. Safety maintained. Plan of care reviewed and pt verbalized understanding. Call light in reach. No further requests at this time. Will continue to monitor.

## 2019-10-29 NOTE — CONSULTS
[facility]  Cardiology  Progress Note    Patient Name: Francis Daigle  MRN: [id number]  Admission Date: 10/26/2019  Hospital Length of Stay: 1 days  Code Status: Full Code   Attending Physician: Jeffrey Porras MD   Primary Care Physician: Primary Doctor No  Expected Discharge Date:   Principal Problem:<principal problem not specified>    Subjective:     Hospital Course:feesl better chf better     Interval History:  hrt still > 100  ROS  Objective:     Vital Signs (Most Recent):  Temp: 97.6 °F (36.4 °C) (10/29/19 0728)  Pulse: 106 (10/29/19 0847)  Resp: 18 (10/29/19 0728)  BP: (!) 125/90 (10/29/19 0847)  SpO2: (!) 93 % (10/29/19 0728) Vital Signs (24h Range):  Temp:  [96 °F (35.6 °C)-97.6 °F (36.4 °C)] 97.6 °F (36.4 °C)  Pulse:  [102-129] 106  Resp:  [18] 18  SpO2:  [93 %-95 %] 93 %  BP: (107-125)/(67-90) 125/90     Weight: 70.7 kg (155 lb 13.8 oz)  Body mass index is 23.7 kg/m².    SpO2: (!) 93 %  O2 Device (Oxygen Therapy): room air      Intake/Output Summary (Last 24 hours) at 10/29/2019 1131  Last data filed at 10/29/2019 0500  Gross per 24 hour   Intake 1560 ml   Output 4525 ml   Net -2965 ml       Lines/Drains/Airways     Peripheral Intravenous Line                 Peripheral IV - Single Lumen 10/26/19 1330 20 G Left Forearm 2 days                Physical Exam 100/80 hr 105   Lungs clear er    Cor s3 1/6 sys m lsb     Significant Labs:   CMP   Recent Labs   Lab 10/28/19  0524 10/29/19  0524   * 130*   K 4.2 4.4    96   CO2 26 25   GLU 99 93   BUN 25* 21*   CREATININE 1.0 0.9   CALCIUM 8.4* 9.1   PROT 7.2 7.7   ALBUMIN 2.5* 2.6*   BILITOT 1.4* 1.2*   ALKPHOS 108 115   * 172*   * 220*   ANIONGAP 9 9   ESTGFRAFRICA >60 >60   EGFRNONAA >60 >60   , CBC   Recent Labs   Lab 10/28/19  0524 10/29/19  0524   WBC 14.06* 12.59   HGB 13.8* 13.8*   HCT 41.1 42.8    275    and Troponin No results for input(s): TROPONINI in the last 48 hours.    · Significant  ImagingConcentric left ventricular hypertrophy.  · Moderate left ventricular enlargement.  · Severely decreased left ventricular systolic function. The estimated ejection fraction is 11%  · Grade III (severe) left ventricular diastolic dysfunction consistent with restrictive physiology.  · Moderate global hypokinetic wall motion.  · Normal right ventricular systolic function.  · Mild left atrial enlargement.  · Moderate right atrial enlargement.  · Mild aortic regurgitation.  · Mild-to-moderate mitral regurgitation.  · Moderate tricuspid regurgitation.  · Mild to moderate pulmonary hypertension present.  · Normal central venous pressure (3 mm Hg).  · The estimated PA systolic pressure is 35 mm Hg      Mean left atrial pressure =16 mmhg,   HR of 115 recorded      Assessment and Plan:   Non ischemic dil cm  With chf better   Goal  HR ~ 70   Plan   Increase BB watch 1 more day   May be candidate for ivabradine to  Slow sinus node   Brief HPI:     Active Diagnoses:    Diagnosis Date Noted POA    Abnormal LFTs [R94.5] 10/28/2019 Yes    Shortness of breath [R06.02] 10/26/2019 Yes    Elevated white blood cell count [D72.829] 10/26/2019 Unknown    Troponin level elevated [R79.89] 10/26/2019 Unknown    Back pain [M54.9] 10/26/2019 Unknown    Anxiety [F41.9] 08/23/2019 Yes    Hypertension [I10] 08/23/2019 Yes    Acute on chronic combined systolic and diastolic congestive heart failure [I50.43] 07/03/2019 Yes      Problems Resolved During this Admission:       VTE Risk Mitigation (From admission, onward)         Ordered     IP VTE LOW RISK PATIENT  Once      10/26/19 1712     enoxaparin injection 40 mg  Daily      10/26/19 1610                Rolando Bustos MD  Cardiology  [facility]

## 2019-10-29 NOTE — NURSING
"screaming "Im dying" every breath, then food arrived in room 2nd breakfast tray that  Was ordered he immediately sat up on side of bed and began eating. All yelling and profanity stopped  "

## 2019-10-29 NOTE — NURSING
"Angry, yelling out loud, "im going home" yelling outloud curse words. Complaining about the spice packet he added to his breakfast tray, causing burning maalox administered.   "

## 2019-10-29 NOTE — PROGRESS NOTES
[facility] Hospital Medicine  Progress Note    Patient Name: Francis Daigle  MRN: [id number]  Patient Class: IP- Inpatient   Admission Date: 10/26/2019  Length of Stay: 1 days  Attending Physician: Jeffrey Porras MD  Primary Care Provider: Primary Doctor No        Subjective:     Principal Problem:<principal problem not specified>        HPI:  Patient is a 43 y/o male with a h/o NICM with Ef < 15 % on the last echo in 07/2019 , RV enlargement , ( reported history of LHC in 2017 which was non obstuctive  But that's based on notes), who presented to the ED with shortness of breath since the last 2-3 days with worsening LE edema and back pain since last night. Did not  endorse any chest pain. Overall he can walk about a block and gets tired and that's his usual exercise capacity. He also endorses orthopnea and PND.   In terms of his cardiovascular history his last echo showed severely reduced Ef with eccentric hypertrophy. Moderate RV enlargement with mild to moderate MR and mild to moderate TR.   Endorses compliance to medications and a low salt diet. Denies active alcohol use or h/o smoking at present. Denies family history of sudden cardiac death.     Overview/Hospital Course:  Patient is a 43 y/o male with a h/o NICM with Ef < 15 % on the last echo in 07/2019 , RV enlargement , ( reported history of LHC in 2017 which was non obstuctive  But that's based on notes), who presented to the ED with shortness of breath since the last 2-3 days with worsening LE edema and back pain since last night. Did not  endorse any chest pain. Overall he can walk about a block and gets tired and that's his usual exercise capacity. He also endorses orthopnea and PND.   In terms of his cardiovascular history his last echo showed severely reduced Ef with eccentric hypertrophy. Moderate RV enlargement with mild to moderate MR and mild to moderate TR.   Endorses compliance to medications and a low salt diet. Denies  active alcohol use or h/o smoking at present. Denies family history of sudden cardiac death.   After being diuresed he feels improved today and his breathing is better. His LFT's are mildly elevated. His troponin peaked.     Interval History:  Patient is feeling better.  Patient is less tachycardic than before.  Patient is anxious to go home.  Oral digoxin therapy added by Dr. Bustos.    Review of Systems   Constitutional: Positive for fatigue.   Respiratory: Positive for cough and shortness of breath.      Objective:     Vital Signs (Most Recent):  Temp: 97.6 °F (36.4 °C) (10/29/19 0728)  Pulse: 88 (10/29/19 1333)  Resp: 16 (10/29/19 1333)  BP: 131/81 (10/29/19 1333)  SpO2: 98 % (10/29/19 1333) Vital Signs (24h Range):  Temp:  [96 °F (35.6 °C)-97.6 °F (36.4 °C)] 97.6 °F (36.4 °C)  Pulse:  [] 88  Resp:  [16-18] 16  SpO2:  [93 %-98 %] 98 %  BP: (107-131)/(67-90) 131/81     Weight: 70.7 kg (155 lb 13.8 oz)  Body mass index is 23.7 kg/m².    Intake/Output Summary (Last 24 hours) at 10/29/2019 1531  Last data filed at 10/29/2019 0500  Gross per 24 hour   Intake 1560 ml   Output 4525 ml   Net -2965 ml      Physical Exam   Constitutional: He appears well-developed.   HENT:   Head: Normocephalic and atraumatic.   Nose: Nose normal. No septal deviation.   Mouth/Throat: Oropharynx is clear and moist.   Eyes: Pupils are equal, round, and reactive to light. Conjunctivae are normal.   Neck: No JVD present. No tracheal tenderness present. No tracheal deviation present. No thyroid mass present.   Cardiovascular: Normal rate, regular rhythm, S1 normal and S2 normal. Exam reveals no gallop and no friction rub.   No murmur heard.  Pulmonary/Chest: Effort normal and breath sounds normal. He has no decreased breath sounds. He has no wheezes. He has no rhonchi. He has no rales.   Bibasal inspiratory fine rales.   Abdominal: Soft. Normal appearance and bowel sounds are normal. He exhibits no distension and no mass. There is no  hepatosplenomegaly, splenomegaly or hepatomegaly. There is no tenderness.   Neurological: He is alert. He has normal strength. No cranial nerve deficit or sensory deficit.   Skin: No rash noted. No cyanosis.   Nursing note and vitals reviewed.      Significant Labs:   BMP:   Recent Labs   Lab 10/29/19  0524   GLU 93   *   K 4.4   CL 96   CO2 25   BUN 21*   CREATININE 0.9   CALCIUM 9.1     CBC:   Recent Labs   Lab 10/28/19  0524 10/29/19  0524   WBC 14.06* 12.59   HGB 13.8* 13.8*   HCT 41.1 42.8    275     CMP:   Recent Labs   Lab 10/28/19  0524 10/29/19  0524   * 130*   K 4.2 4.4    96   CO2 26 25   GLU 99 93   BUN 25* 21*   CREATININE 1.0 0.9   CALCIUM 8.4* 9.1   PROT 7.2 7.7   ALBUMIN 2.5* 2.6*   BILITOT 1.4* 1.2*   ALKPHOS 108 115   * 172*   * 220*   ANIONGAP 9 9   EGFRNONAA >60 >60     Coagulation: No results for input(s): PT, INR, APTT in the last 48 hours.    Significant Imaging:  RUQ abdominal US:  1. Hepatomegaly.  No definite fatty infiltration of the liver or evidence of biliary obstruction.  2. Contracted gallbladder with no shadowing gallstones or gallbladder inflammatory change noted.  3. Small volume of right basilar pleural fluid at the right lung base.  4. Apparent mild right hydronephrosis.    CXR:  Abnormal chest radiograph.  Differential considerations include pneumonia, aspiration, and cardiac decompensation/heart failure.  Please correlate clinically.    ECHO ():  · Eccentric left ventricular hypertrophy.  · Mild left ventricular enlargement.  · Severely decreased left ventricular systolic function. The estimated ejection fraction is 15%  · Left ventricular diastolic dysfunction.  · Moderate right ventricular enlargement.  · Mild left atrial enlargement.  · Mild right atrial enlargement.  · Mild-to-moderate mitral regurgitation.  · Mild tricuspid regurgitation.  · Normal central venous pressure (3 mm Hg).  · The estimated PA systolic pressure is 31  mm Hg  · Small pericardial effusion.      Assessment/Plan:      Abnormal LFTs  Likely related to fatty liver and passive congestion of liver.  Trend liver enzymes and acute hepatitis panel is negative.      Back pain  Appears to be musculoskeletal in nature.   Pain control prn   LFts mildly elevated- could be low flow and are improving. Will get liver ultrasound to assess morphologically      Troponin level elevated  Likely secondary to CHF exacerbation , tachycardia and possible infection   Peaked and then downtrended.   Does not appear to be ACS at this time .   Treatment of heart failure and sepsis.       Elevated white blood cell count  White count elevated to 16. With presentation and recent URI symptoms will cover for pneumonia.   Ceftriaxone and azithormycin.Continue antibiotics as cannot rule out pneumonia as possible trigger.   As of now not requiring any pressors or ventilatory support . No fluids for now with end stage decompensated cardiomyopathy   Monitor closely.    Shortness of breath  Plan as above        Anxiety  Continue lorazepam prn       Hypertension  Continue ACE for now. Resumed BB.  Hydralazine prn for SBP > 160.        Acute on chronic combined systolic and diastolic congestive heart failure  Patient presenting with symptoms consistent with heart failure. Though BNP is slightly lower than before , symptomatically has heart failure.   Also has a white count and had runny nose recently . CXR cannot rule out pneumonia. This could be possible trigger.  Received IV ceftriazone and IV azithormycin dose.   Responded well to lasix 40 mg IV. Will continue IV diuresis.  Follow Cardiology recommendations.  Patient counseled to avoid use of stimulants/amphetamine use.  Fluid restriction to 1500 cc.   Resume BB and ACE-I.   Follow ECHO results.     VTE Risk Mitigation (From admission, onward)         Ordered     IP VTE LOW RISK PATIENT  Once      10/26/19 1712     enoxaparin injection 40 mg  Daily       10/26/19 1615                      Jeffrey Porras MD  Department of Hospital Medicine   [facility]

## 2019-10-29 NOTE — PLAN OF CARE
Alert, oriented, very angry and upset today, requesting multiple milks, cokes candy . Fall prevention ongoing. Does not call for help ambulated in hallway and room. Cont of urine. Strict I/o in progress. Daily wt

## 2019-10-29 NOTE — NURSING
After screaming at me and drawing back his hand with cell phone as if he would throw it at me, I asked patient if I cound get him ativan or a pain pill and he said yes I want it all iv. Patient informed I can only administer as ordered by mouth he agreed to take both.

## 2019-10-29 NOTE — NURSING
"Instructed again on fluid restriction, patient got angry stated"I will go ama and drink all the milk I want". Milk provided to patient  "

## 2019-10-30 VITALS
HEART RATE: 101 BPM | SYSTOLIC BLOOD PRESSURE: 107 MMHG | BODY MASS INDEX: 23.32 KG/M2 | RESPIRATION RATE: 20 BRPM | DIASTOLIC BLOOD PRESSURE: 76 MMHG | WEIGHT: 153.88 LBS | OXYGEN SATURATION: 95 % | TEMPERATURE: 99 F | HEIGHT: 68 IN

## 2019-10-30 LAB
ALBUMIN SERPL BCP-MCNC: 2.6 G/DL (ref 3.5–5.2)
ALP SERPL-CCNC: 116 U/L (ref 55–135)
ALT SERPL W/O P-5'-P-CCNC: 196 U/L (ref 10–44)
ANION GAP SERPL CALC-SCNC: 9 MMOL/L (ref 8–16)
AST SERPL-CCNC: 137 U/L (ref 10–40)
BASOPHILS # BLD AUTO: 0.06 K/UL (ref 0–0.2)
BASOPHILS NFR BLD: 0.5 % (ref 0–1.9)
BILIRUB SERPL-MCNC: 1.2 MG/DL (ref 0.1–1)
BUN SERPL-MCNC: 27 MG/DL (ref 6–20)
CALCIUM SERPL-MCNC: 9.3 MG/DL (ref 8.7–10.5)
CHLORIDE SERPL-SCNC: 95 MMOL/L (ref 95–110)
CO2 SERPL-SCNC: 24 MMOL/L (ref 23–29)
CREAT SERPL-MCNC: 0.9 MG/DL (ref 0.5–1.4)
DIFFERENTIAL METHOD: ABNORMAL
EOSINOPHIL # BLD AUTO: 0.3 K/UL (ref 0–0.5)
EOSINOPHIL NFR BLD: 2.2 % (ref 0–8)
ERYTHROCYTE [DISTWIDTH] IN BLOOD BY AUTOMATED COUNT: 17.3 % (ref 11.5–14.5)
EST. GFR  (AFRICAN AMERICAN): >60 ML/MIN/1.73 M^2
EST. GFR  (NON AFRICAN AMERICAN): >60 ML/MIN/1.73 M^2
GLUCOSE SERPL-MCNC: 100 MG/DL (ref 70–110)
HCT VFR BLD AUTO: 40.9 % (ref 40–54)
HGB BLD-MCNC: 13.4 G/DL (ref 14–18)
IMM GRANULOCYTES # BLD AUTO: 0.07 K/UL (ref 0–0.04)
LYMPHOCYTES # BLD AUTO: 1.8 K/UL (ref 1–4.8)
LYMPHOCYTES NFR BLD: 13.9 % (ref 18–48)
MCH RBC QN AUTO: 26.5 PG (ref 27–31)
MCHC RBC AUTO-ENTMCNC: 32.8 G/DL (ref 32–36)
MCV RBC AUTO: 81 FL (ref 82–98)
MONOCYTES # BLD AUTO: 1.2 K/UL (ref 0.3–1)
MONOCYTES NFR BLD: 9.8 % (ref 4–15)
NEUTROPHILS # BLD AUTO: 9.3 K/UL (ref 1.8–7.7)
NEUTROPHILS NFR BLD: 73 % (ref 38–73)
NRBC BLD-RTO: 0 /100 WBC
PLATELET # BLD AUTO: 331 K/UL (ref 150–350)
PMV BLD AUTO: 9.9 FL (ref 9.2–12.9)
POTASSIUM SERPL-SCNC: 4.6 MMOL/L (ref 3.5–5.1)
PROT SERPL-MCNC: 7.9 G/DL (ref 6–8.4)
RBC # BLD AUTO: 5.05 M/UL (ref 4.6–6.2)
SODIUM SERPL-SCNC: 128 MMOL/L (ref 136–145)
WBC # BLD AUTO: 12.71 K/UL (ref 3.9–12.7)

## 2019-10-30 PROCEDURE — 85025 COMPLETE CBC W/AUTO DIFF WBC: CPT

## 2019-10-30 PROCEDURE — 25000003 PHARM REV CODE 250: Performed by: SPECIALIST

## 2019-10-30 PROCEDURE — 25000003 PHARM REV CODE 250: Performed by: INTERNAL MEDICINE

## 2019-10-30 PROCEDURE — 80053 COMPREHEN METABOLIC PANEL: CPT

## 2019-10-30 PROCEDURE — 36415 COLL VENOUS BLD VENIPUNCTURE: CPT

## 2019-10-30 RX ADMIN — FUROSEMIDE 40 MG: 40 TABLET ORAL at 10:10

## 2019-10-30 RX ADMIN — ENALAPRIL MALEATE 5 MG: 5 TABLET ORAL at 10:10

## 2019-10-30 RX ADMIN — LORAZEPAM 1 MG: 1 TABLET ORAL at 10:10

## 2019-10-30 RX ADMIN — ASPIRIN 81 MG CHEWABLE TABLET 81 MG: 81 TABLET CHEWABLE at 10:10

## 2019-10-30 RX ADMIN — METOPROLOL TARTRATE 100 MG: 50 TABLET ORAL at 10:10

## 2019-10-30 RX ADMIN — DIGOXIN 0.25 MG: 125 TABLET ORAL at 10:10

## 2019-10-30 RX ADMIN — MAGNESIUM OXIDE TAB 400 MG (241.3 MG ELEMENTAL MG) 200 MG: 400 (241.3 MG) TAB at 10:10

## 2019-10-30 NOTE — PLAN OF CARE
Pt laying in bed. He reports pain 0/10 during shift. He is not compliant with plan of care. He ambulates with standby assist. Bed in lowest position with wheels locked, and side rails up x2. Safety maintained. Plan of care reviewed. Call light in reach. No further requests at this time. Will continue to monitor.

## 2019-10-30 NOTE — NURSING
Educated pt on fluid restriction. Pt states he does not care and will drink what he wants. Educated pt on fall risk, bed alarm, and call light. Pt refused bed alarm and said he will get up whenever he needs.

## 2019-10-30 NOTE — PLAN OF CARE
Pt last seen in his room at 10:30am when primary nurse SUZANNE Farrell giving morning meds. Pt was wearing navy blue pants and a gray shirt with glasses. Dr. Bustos to see pt around 10:55 and pt not in room. Security notified. Pt still not found. Notified [place] PD of pt being off unit with positive history of methamphetamine use and IV not removed from pt. 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 Primary nurse notified [employer] of above. House Supervisor notified.

## 2019-10-30 NOTE — PROGRESS NOTES
Nurse notified by MAUREEN Pal that Dr. Bustos would like to see pt for cardiology consult but patient not in room or bedroom.  Nurse went to ICU to look for pt in vending machine area; unable to locate pt.  Charge nurse and security notified.

## 2019-10-30 NOTE — PROGRESS NOTES
"Nurse at pt bedside for medication administration.  Pt sitting at edge of bed, looking forward and not establishing eye contact.  Pt states "I need to get out of here; I don't want to lose my job." nurse offered letter for return to work; pt declined.  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 Porras normally rounds between 11 am and lunch time so close to 12 noon , possibly 12:30.  Pt stated "I hope he lets me go." pt asked for anxiety medication to help him wait for doctor. Nurse left room to retrieve PRN anxiety medication.    "

## 2019-10-31 NOTE — CARE UPDATE
"10/31/2019 0950 - Called Mr. Daigle at [phone] to follow up on his AMA departure from hospital with an intact IV.  A male answered.  When asked if this was Mr. Daigle, he replied "yes it is" and then hung up while I was identifying myself as a nurse from [facility].  I called back immediately and the call went directly to voicemail.  Message left for Mr. Daigle to return call.  "

## 2019-10-31 NOTE — DISCHARGE SUMMARY
[facility] Hospital Medicine  Discharge Summary      Patient Name: Francis Daigle  MRN: [id number]  Admission Date: 10/26/2019  Hospital Length of Stay: 2 days  Discharge Date and Time:  10/31/2019 5:17 PM  Attending Physician: No att. providers found   Discharging Provider: Jeffrey Porras MD  Primary Care Provider: Primary Doctor Cece      HPI:   Patient is a 45 y/o male with a h/o NICM with Ef < 15 % on the last echo in 07/2019 , RV enlargement , ( reported history of LHC in 2017 which was non obstuctive  But that's based on notes), who presented to the ED with shortness of breath since the last 2-3 days with worsening LE edema and back pain since last night. Did not  endorse any chest pain. Overall he can walk about a block and gets tired and that's his usual exercise capacity. He also endorses orthopnea and PND.   In terms of his cardiovascular history his last echo showed severely reduced Ef with eccentric hypertrophy. Moderate RV enlargement with mild to moderate MR and mild to moderate TR.   Endorses compliance to medications and a low salt diet. Denies active alcohol use or h/o smoking at present. Denies family history of sudden cardiac death.     * No surgery found *      Hospital Course:   Patient is a 45 y/o male with a h/o NICM with EF < 15 % on the last echo in 07/2019 , RV enlargement , ( reported history of LHC in 2017 which was non obstuctive  But that's based on notes), who presented to the ED with shortness of breath since the last 2-3 days with worsening LE edema and back pain since last night. Did not  endorse any chest pain. Overall he can walk about a block and gets tired and that's his usual exercise capacity. He also endorses orthopnea and PND. In terms of his cardiovascular history his last echo showed severely reduced Ef with eccentric hypertrophy. Moderate RV enlargement with mild to moderate MR and mild to moderate TR.   Endorses compliance to medications and a low salt  diet. Denies active alcohol use or h/o smoking at present. Denied family history of sudden cardiac death.   Patient's symptoms improved with diuretic therapy.  During hospital stay patient was counseled regarding avoidance of using illicit substances such as amphetamine which he admitted using.  Patient was also getting intravenous antibiotic for bronchitis/pneumonitis.  Later patient eloped without any warning.  Patient was noted to have hepatitis-C antibody positive.  Attempted to reach patient to notify him and discuss management plan, prevention therapy of hepatitis-C who is not responding via cell phone.    Consults: Dr. Bustos    Final Active Diagnoses:    Diagnosis Date Noted POA    Abnormal LFTs [R94.5] 10/28/2019 Yes    Shortness of breath [R06.02] 10/26/2019 Yes    Elevated white blood cell count [D72.829] 10/26/2019 Unknown    Troponin level elevated [R79.89] 10/26/2019 Unknown    Back pain [M54.9] 10/26/2019 Unknown    Anxiety [F41.9] 08/23/2019 Yes    Hypertension [I10] 08/23/2019 Yes    Acute on chronic combined systolic and diastolic congestive heart failure [I50.43] 07/03/2019 Yes      Problems Resolved During this Admission:       Discharged Condition: good    Disposition: Eloped    Follow Up:    Patient Instructions:   No discharge procedures on file.    Significant Diagnostic Studies: Labs:   CMP   Recent Labs   Lab 10/30/19  0514   *   K 4.6   CL 95   CO2 24      BUN 27*   CREATININE 0.9   CALCIUM 9.3   PROT 7.9   ALBUMIN 2.6*   BILITOT 1.2*   ALKPHOS 116   *   *   ANIONGAP 9   ESTGFRAFRICA >60   EGFRNONAA >60    and CBC   Recent Labs   Lab 10/30/19  0514   WBC 12.71*   HGB 13.4*   HCT 40.9        Medications:  Reconciled Home Medications:      Medication List      ASK your doctor about these medications    aspirin 81 MG Chew  Take 1 tablet (81 mg total) by mouth once daily.     carvedilol 3.125 MG tablet  Commonly known as:  COREG  Take 1 tablet (3.125 mg  total) by mouth 2 (two) times daily with meals.     enalapril 2.5 MG tablet  Commonly known as:  VASOTEC  Take 1 tablet (2.5 mg total) by mouth once daily.     furosemide 20 MG tablet  Commonly known as:  LASIX  Take 1 tablet (20 mg total) by mouth 2 (two) times daily.     LORazepam 1 MG tablet  Commonly known as:  ATIVAN  Take 1 tablet (1 mg total) by mouth every 8 (eight) hours as needed for Anxiety.     spironolactone 25 MG tablet  Commonly known as:  ALDACTONE  Take 1 tablet (25 mg total) by mouth once daily.          Time spent on the discharge of patient: 25 minutes  Patient was seen and examined on the date of discharge and determined to be suitable for discharge.         Jeffrey Porras MD  Department of Hospital Medicine  [facility]

## 2019-10-31 NOTE — PLAN OF CARE
10/31/19 1203   Final Note   Assessment Type Final Discharge Note   Anticipated Discharge Disposition Eloped

## 2019-11-01 LAB
BACTERIA BLD CULT: NORMAL
BACTERIA BLD CULT: NORMAL

## 2019-12-09 ENCOUNTER — HOSPITAL ENCOUNTER (EMERGENCY)
Facility: HOSPITAL | Age: 44
Discharge: ANOTHER HOSPITAL | End: 2019-12-10
Attending: EMERGENCY MEDICINE
Payer: MEDICAID

## 2019-12-09 DIAGNOSIS — R74.01 TRANSAMINITIS: ICD-10-CM

## 2019-12-09 DIAGNOSIS — E87.20 LACTIC ACIDOSIS: ICD-10-CM

## 2019-12-09 DIAGNOSIS — E80.6 HYPERBILIRUBINEMIA: ICD-10-CM

## 2019-12-09 DIAGNOSIS — A41.9 SEPSIS, DUE TO UNSPECIFIED ORGANISM, UNSPECIFIED WHETHER ACUTE ORGAN DYSFUNCTION PRESENT: ICD-10-CM

## 2019-12-09 DIAGNOSIS — I50.9 ACUTE ON CHRONIC CONGESTIVE HEART FAILURE, UNSPECIFIED HEART FAILURE TYPE: ICD-10-CM

## 2019-12-09 DIAGNOSIS — F19.90 IVDU (INTRAVENOUS DRUG USER): ICD-10-CM

## 2019-12-09 DIAGNOSIS — J18.9 PNEUMONIA OF RIGHT LOWER LOBE DUE TO INFECTIOUS ORGANISM: Primary | ICD-10-CM

## 2019-12-09 DIAGNOSIS — B18.2 CHRONIC HEPATITIS C WITHOUT HEPATIC COMA: ICD-10-CM

## 2019-12-09 DIAGNOSIS — R06.02 SHORTNESS OF BREATH: ICD-10-CM

## 2019-12-09 LAB
ALBUMIN SERPL BCP-MCNC: 2 G/DL (ref 3.5–5.2)
ALP SERPL-CCNC: 120 U/L (ref 55–135)
ALT SERPL W/O P-5'-P-CCNC: 73 U/L (ref 10–44)
AMPHET+METHAMPHET UR QL: NORMAL
ANION GAP SERPL CALC-SCNC: 10 MMOL/L (ref 8–16)
AST SERPL-CCNC: 100 U/L (ref 10–40)
BARBITURATES UR QL SCN>200 NG/ML: NEGATIVE
BASOPHILS # BLD AUTO: 0.07 K/UL (ref 0–0.2)
BASOPHILS NFR BLD: 0.3 % (ref 0–1.9)
BENZODIAZ UR QL SCN>200 NG/ML: NEGATIVE
BILIRUB SERPL-MCNC: 4 MG/DL (ref 0.1–1)
BILIRUB UR QL STRIP: ABNORMAL
BNP SERPL-MCNC: 740 PG/ML (ref 0–99)
BUN SERPL-MCNC: 31 MG/DL (ref 6–20)
BZE UR QL SCN: NEGATIVE
CALCIUM SERPL-MCNC: 7.6 MG/DL (ref 8.7–10.5)
CANNABINOIDS UR QL SCN: NEGATIVE
CHLORIDE SERPL-SCNC: 96 MMOL/L (ref 95–110)
CLARITY UR: CLEAR
CO2 SERPL-SCNC: 20 MMOL/L (ref 23–29)
COLOR UR: YELLOW
CREAT SERPL-MCNC: 0.8 MG/DL (ref 0.5–1.4)
CREAT UR-MCNC: 48.2 MG/DL (ref 23–375)
DIFFERENTIAL METHOD: ABNORMAL
EOSINOPHIL # BLD AUTO: 0.1 K/UL (ref 0–0.5)
EOSINOPHIL NFR BLD: 0.3 % (ref 0–8)
ERYTHROCYTE [DISTWIDTH] IN BLOOD BY AUTOMATED COUNT: 25.3 % (ref 11.5–14.5)
EST. GFR  (AFRICAN AMERICAN): >60 ML/MIN/1.73 M^2
EST. GFR  (NON AFRICAN AMERICAN): >60 ML/MIN/1.73 M^2
GLUCOSE SERPL-MCNC: 107 MG/DL (ref 70–110)
GLUCOSE UR QL STRIP: NEGATIVE
HCT VFR BLD AUTO: 44.6 % (ref 40–54)
HGB BLD-MCNC: 13.8 G/DL (ref 14–18)
HGB UR QL STRIP: NEGATIVE
IMM GRANULOCYTES # BLD AUTO: 0.19 K/UL (ref 0–0.04)
IMM GRANULOCYTES NFR BLD AUTO: 0.7 % (ref 0–0.5)
INFLUENZA A, MOLECULAR: NEGATIVE
INFLUENZA B, MOLECULAR: NEGATIVE
KETONES UR QL STRIP: NEGATIVE
LACTATE SERPL-SCNC: 3.2 MMOL/L (ref 0.5–2.2)
LACTATE SERPL-SCNC: 3.5 MMOL/L (ref 0.5–2.2)
LEUKOCYTE ESTERASE UR QL STRIP: NEGATIVE
LYMPHOCYTES # BLD AUTO: 1.5 K/UL (ref 1–4.8)
LYMPHOCYTES NFR BLD: 6 % (ref 18–48)
MAGNESIUM SERPL-MCNC: 1.7 MG/DL (ref 1.6–2.6)
MCH RBC QN AUTO: 26.1 PG (ref 27–31)
MCHC RBC AUTO-ENTMCNC: 30.9 G/DL (ref 32–36)
MCV RBC AUTO: 84 FL (ref 82–98)
MONOCYTES # BLD AUTO: 1 K/UL (ref 0.3–1)
MONOCYTES NFR BLD: 4.1 % (ref 4–15)
NEUTROPHILS # BLD AUTO: 22.5 K/UL (ref 1.8–7.7)
NEUTROPHILS NFR BLD: 88.6 % (ref 38–73)
NITRITE UR QL STRIP: NEGATIVE
NRBC BLD-RTO: 0 /100 WBC
OPIATES UR QL SCN: NEGATIVE
PCP UR QL SCN>25 NG/ML: NEGATIVE
PH UR STRIP: 6 [PH] (ref 5–8)
PLATELET # BLD AUTO: 183 K/UL (ref 150–350)
PMV BLD AUTO: 10.1 FL (ref 9.2–12.9)
POTASSIUM SERPL-SCNC: 4.7 MMOL/L (ref 3.5–5.1)
PROT SERPL-MCNC: 7.2 G/DL (ref 6–8.4)
PROT UR QL STRIP: NEGATIVE
RBC # BLD AUTO: 5.29 M/UL (ref 4.6–6.2)
SODIUM SERPL-SCNC: 126 MMOL/L (ref 136–145)
SP GR UR STRIP: 1.01 (ref 1–1.03)
SPECIMEN SOURCE: NORMAL
TOXICOLOGY INFORMATION: NORMAL
TROPONIN I SERPL DL<=0.01 NG/ML-MCNC: 0.04 NG/ML (ref 0.02–0.5)
URN SPEC COLLECT METH UR: ABNORMAL
UROBILINOGEN UR STRIP-ACNC: 1 EU/DL
WBC # BLD AUTO: 25.39 K/UL (ref 3.9–12.7)

## 2019-12-09 PROCEDURE — 80307 DRUG TEST PRSMV CHEM ANLYZR: CPT

## 2019-12-09 PROCEDURE — 36410 VNPNXR 3YR/> PHY/QHP DX/THER: CPT | Mod: 59

## 2019-12-09 PROCEDURE — 96365 THER/PROPH/DIAG IV INF INIT: CPT

## 2019-12-09 PROCEDURE — 96361 HYDRATE IV INFUSION ADD-ON: CPT

## 2019-12-09 PROCEDURE — 85025 COMPLETE CBC W/AUTO DIFF WBC: CPT

## 2019-12-09 PROCEDURE — 93005 ELECTROCARDIOGRAM TRACING: CPT

## 2019-12-09 PROCEDURE — 84145 PROCALCITONIN (PCT): CPT

## 2019-12-09 PROCEDURE — 87502 INFLUENZA DNA AMP PROBE: CPT

## 2019-12-09 PROCEDURE — 71045 XR CHEST AP PORTABLE: ICD-10-PCS | Mod: 26,,, | Performed by: RADIOLOGY

## 2019-12-09 PROCEDURE — 96374 THER/PROPH/DIAG INJ IV PUSH: CPT | Mod: XS

## 2019-12-09 PROCEDURE — 36415 COLL VENOUS BLD VENIPUNCTURE: CPT

## 2019-12-09 PROCEDURE — 76705 ECHO EXAM OF ABDOMEN: CPT | Mod: TC

## 2019-12-09 PROCEDURE — 84484 ASSAY OF TROPONIN QUANT: CPT

## 2019-12-09 PROCEDURE — S0030 INJECTION, METRONIDAZOLE: HCPCS | Performed by: EMERGENCY MEDICINE

## 2019-12-09 PROCEDURE — 96367 TX/PROPH/DG ADDL SEQ IV INF: CPT

## 2019-12-09 PROCEDURE — 93010 ELECTROCARDIOGRAM REPORT: CPT | Mod: ,,, | Performed by: INTERNAL MEDICINE

## 2019-12-09 PROCEDURE — 80053 COMPREHEN METABOLIC PANEL: CPT

## 2019-12-09 PROCEDURE — 71045 X-RAY EXAM CHEST 1 VIEW: CPT | Mod: 26,,, | Performed by: RADIOLOGY

## 2019-12-09 PROCEDURE — 63600175 PHARM REV CODE 636 W HCPCS: Performed by: EMERGENCY MEDICINE

## 2019-12-09 PROCEDURE — 76705 ECHO EXAM OF ABDOMEN: CPT | Mod: 26,,, | Performed by: RADIOLOGY

## 2019-12-09 PROCEDURE — 81003 URINALYSIS AUTO W/O SCOPE: CPT | Mod: 59

## 2019-12-09 PROCEDURE — 83605 ASSAY OF LACTIC ACID: CPT | Mod: 91

## 2019-12-09 PROCEDURE — 76705 US ABDOMEN LIMITED: ICD-10-PCS | Mod: 26,,, | Performed by: RADIOLOGY

## 2019-12-09 PROCEDURE — 99291 CRITICAL CARE FIRST HOUR: CPT | Mod: 25

## 2019-12-09 PROCEDURE — 25000003 PHARM REV CODE 250: Performed by: EMERGENCY MEDICINE

## 2019-12-09 PROCEDURE — 71045 X-RAY EXAM CHEST 1 VIEW: CPT | Mod: TC,FY

## 2019-12-09 PROCEDURE — 96366 THER/PROPH/DIAG IV INF ADDON: CPT

## 2019-12-09 PROCEDURE — 99292 CRITICAL CARE ADDL 30 MIN: CPT

## 2019-12-09 PROCEDURE — 93010 EKG 12-LEAD: ICD-10-PCS | Mod: ,,, | Performed by: INTERNAL MEDICINE

## 2019-12-09 PROCEDURE — 83735 ASSAY OF MAGNESIUM: CPT

## 2019-12-09 PROCEDURE — 83880 ASSAY OF NATRIURETIC PEPTIDE: CPT

## 2019-12-09 PROCEDURE — 87040 BLOOD CULTURE FOR BACTERIA: CPT | Mod: 59

## 2019-12-09 RX ORDER — FUROSEMIDE 10 MG/ML
40 INJECTION INTRAMUSCULAR; INTRAVENOUS
Status: COMPLETED | OUTPATIENT
Start: 2019-12-09 | End: 2019-12-09

## 2019-12-09 RX ORDER — METRONIDAZOLE 500 MG/100ML
500 INJECTION, SOLUTION INTRAVENOUS
Status: COMPLETED | OUTPATIENT
Start: 2019-12-09 | End: 2019-12-09

## 2019-12-09 RX ADMIN — SODIUM CHLORIDE 2244 ML: 0.9 INJECTION, SOLUTION INTRAVENOUS at 03:12

## 2019-12-09 RX ADMIN — FUROSEMIDE 40 MG: 10 INJECTION, SOLUTION INTRAMUSCULAR; INTRAVENOUS at 02:12

## 2019-12-09 RX ADMIN — CEFTRIAXONE 1 G: 1 INJECTION, SOLUTION INTRAVENOUS at 03:12

## 2019-12-09 RX ADMIN — METRONIDAZOLE 500 MG: 500 INJECTION, SOLUTION INTRAVENOUS at 05:12

## 2019-12-09 NOTE — ED PROVIDER NOTES
Encounter Date: 2019       History     Chief Complaint   Patient presents with    Leg Swelling     bilateral. history of CHF     43yo male with pmh anxiety/depression, CHF, and HTN presents to ED for emergent evaluation of intermittent dyspnea and bilateral lower extremity swelling. States he takes 20mg lasix daily but has tripled his dose over the last few days due to noted edema and dyspnea. Denies chest pain, palpitations, diaphoresis. Denies recent illness, fever, chills.           Review of patient's allergies indicates:  No Known Allergies  Past Medical History:   Diagnosis Date    Anxiety     CHF (congestive heart failure)     Depression     Hypertension      History reviewed. No pertinent surgical history.  Family History   Problem Relation Age of Onset    Arthritis Mother     Heart disease Mother     Hyperlipidemia Mother     Hypertension Mother     Kidney disease Mother     Hypertension Father     Asthma Sister     Diabetes Sister     Heart disease Maternal Grandfather      Social History     Tobacco Use    Smoking status: Former Smoker     Packs/day: 2.00     Years: 20.00     Pack years: 40.00     Types: Cigarettes     Start date: 1999     Last attempt to quit: 2017     Years since quittin.8    Smokeless tobacco: Never Used   Substance Use Topics    Alcohol use: Not Currently    Drug use: Not Currently     Review of Systems   Constitutional: Negative for appetite change, chills, diaphoresis, fatigue and fever.   HENT: Negative for congestion, ear pain, rhinorrhea, sinus pressure, sinus pain, sore throat and tinnitus.    Eyes: Negative for photophobia and visual disturbance.   Respiratory: Positive for shortness of breath. Negative for cough, chest tightness and wheezing.    Cardiovascular: Positive for leg swelling. Negative for chest pain and palpitations.   Gastrointestinal: Negative for abdominal pain, constipation, diarrhea, nausea and vomiting.   Endocrine:  Negative for cold intolerance, heat intolerance, polydipsia, polyphagia and polyuria.   Genitourinary: Negative for decreased urine volume, difficulty urinating, dysuria, flank pain, frequency, hematuria and urgency.   Musculoskeletal: Negative for arthralgias, back pain, gait problem, joint swelling, myalgias, neck pain and neck stiffness.   Skin: Positive for color change. Negative for pallor, rash and wound.   Allergic/Immunologic: Negative for immunocompromised state.   Neurological: Negative for dizziness, syncope, weakness, light-headedness, numbness and headaches.   Hematological: Negative for adenopathy. Does not bruise/bleed easily.   Psychiatric/Behavioral: Negative for decreased concentration, dysphoric mood and sleep disturbance. The patient is not nervous/anxious.    All other systems reviewed and are negative.      Physical Exam     Initial Vitals   BP Pulse Resp Temp SpO2   12/09/19 1347 12/09/19 1341 12/09/19 1341 12/09/19 1341 12/09/19 1341   (!) 142/83 (!) 127 (!) 26 97.5 °F (36.4 °C) 100 %      MAP       --                Physical Exam    Nursing note and vitals reviewed.  Constitutional: He appears well-developed and well-nourished. He is not diaphoretic. No distress.   HENT:   Head: Normocephalic and atraumatic.   Right Ear: External ear normal.   Left Ear: External ear normal.   Nose: Nose normal.   Mouth/Throat: Oropharynx is clear and moist.   Eyes: Conjunctivae are normal. Pupils are equal, round, and reactive to light. No scleral icterus.   Neck: Normal range of motion. Neck supple. JVD present.   Cardiovascular: Regular rhythm, normal heart sounds and intact distal pulses. Tachycardia present.    Pulmonary/Chest: Breath sounds normal. No respiratory distress. He has no wheezes. He has no rhonchi. He has no rales. He exhibits no tenderness.   Abdominal: Soft. Bowel sounds are normal. He exhibits no distension. There is no tenderness. There is no rebound and no guarding.   Musculoskeletal:  Normal range of motion. He exhibits edema. He exhibits no tenderness.   Pitting edema  Weeping lower extremities   Lymphadenopathy:     He has no cervical adenopathy.   Neurological: He is alert and oriented to person, place, and time. GCS score is 15. GCS eye subscore is 4. GCS verbal subscore is 5. GCS motor subscore is 6.   Skin: Skin is warm and dry. Capillary refill takes less than 2 seconds. No rash and no abscess noted. There is erythema. No pallor.   Bilateral lower extremity weeping   Psychiatric: His speech is normal. Judgment and thought content normal. His mood appears anxious. He is agitated. Cognition and memory are normal.         ED Course   External Jugular IV  Date/Time: 12/9/2019 3:46 PM  Performed by: Sobia Grey MD  Authorized by: Sobia Grey MD   Location (Ext Jugular): Left.  Area Prepped With: Chlorohexidine.  Catheter Size: 18 ga.  Number of attempts: 1  Fixation/Dressing: Tegaderm and Sterile Dressing.  Patient tolerance: Patient tolerated the procedure well with no immediate complications    Critical Care  Date/Time: 12/9/2019 1:45 PM  Performed by: Sobia Grey MD  Authorized by: Sobia Grey MD   Direct patient critical care time: 155 minutes  Additional history critical care time: 20 minutes  Ordering / reviewing critical care time: 30 minutes  Documentation critical care time: 30 minutes  Consulting other physicians critical care time: 25 minutes  Consult with family critical care time: 15 minutes  Total critical care time (exclusive of procedural time) : 275 minutes  Critical care time was exclusive of separately billable procedures and treating other patients and teaching time.  Critical care was necessary to treat or prevent imminent or life-threatening deterioration of the following conditions: cardiac failure, sepsis and shock.  Critical care was time spent personally by me on the following activities: discussions with consultants, development of treatment plan  with patient or surrogate, interpretation of cardiac output measurements, examination of patient, ordering and performing treatments and interventions, ordering and review of radiographic studies, re-evaluation of patient's condition, vascular access procedures, review of old charts, pulse oximetry, ordering and review of laboratory studies, obtaining history from patient or surrogate and evaluation of patient's response to treatment.        Labs Reviewed   CBC W/ AUTO DIFFERENTIAL - Abnormal; Notable for the following components:       Result Value    WBC 25.39 (*)     Hemoglobin 13.8 (*)     Mean Corpuscular Hemoglobin 26.1 (*)     Mean Corpuscular Hemoglobin Conc 30.9 (*)     RDW 25.3 (*)     Immature Granulocytes 0.7 (*)     Gran # (ANC) 22.5 (*)     Immature Grans (Abs) 0.19 (*)     Gran% 88.6 (*)     Lymph% 6.0 (*)     All other components within normal limits   COMPREHENSIVE METABOLIC PANEL - Abnormal; Notable for the following components:    Sodium 126 (*)     CO2 20 (*)     BUN, Bld 31 (*)     Calcium 7.6 (*)     Albumin 2.0 (*)     Total Bilirubin 4.0 (*)      (*)     ALT 73 (*)     All other components within normal limits   B-TYPE NATRIURETIC PEPTIDE - Abnormal; Notable for the following components:     (*)     All other components within normal limits   URINALYSIS, REFLEX TO URINE CULTURE - Abnormal; Notable for the following components:    Bilirubin (UA) 1+ (*)     All other components within normal limits    Narrative:     Preferred Collection Type->Urine, Clean Catch   LACTIC ACID, PLASMA - Abnormal; Notable for the following components:    Lactate (Lactic Acid) 3.5 (*)     All other components within normal limits    Narrative:       result(s) called and verbal readback obtained from   hitesh brady @ 1559 nh by [facility] 12/09/2019 15:52   LACTIC ACID, PLASMA - Abnormal; Notable for the following components:    Lactate (Lactic Acid) 3.2 (*)     All other components within normal limits    INFLUENZA A & B BY MOLECULAR   CULTURE, BLOOD   CULTURE, BLOOD   MAGNESIUM   TROPONIN I   DRUG SCREEN PANEL, URINE EMERGENCY    Narrative:     Preferred Collection Type->Urine, Clean Catch   PROCALCITONIN     EKG Readings: (Independently Interpreted)   Initial Reading: No STEMI. Rhythm: Sinus Tachycardia. Heart Rate: 127. Ectopy: No Ectopy. Conduction: Normal. ST Segments: Normal ST Segments. T Waves: Normal. Axis: Normal. Clinical Impression: Sinus Tachycardia       Imaging Results          X-Ray Chest AP Portable (Final result)  Result time 12/09/19 14:27:42    Final result by Isreal Sanders MD (12/09/19 14:27:42)                 Impression:      1. Increased markings within the right lower lobe consistent with a right lower lobe pulmonary infiltrate.  Correlate clinically with possible fever and/or elevated white count.  This should be followed until clear to exclude underlying suspicious pulmonary lesion.  2. Small right pleural effusion.  3. Cardiomegaly.      Electronically signed by: Isreal Sanders  Date:    12/09/2019  Time:    14:27             Narrative:    EXAMINATION:  XR CHEST AP PORTABLE    CLINICAL HISTORY:  Shortness of breath    TECHNIQUE:  Single frontal view of the chest was performed.    COMPARISON:  10/26/2019.    FINDINGS:  There are increased markings within the right lower lobe consistent with a right lower lobe pulmonary infiltrate.  There is a small right pleural effusion.  Minimal dependent atelectasis is present at the left lung base.    Heart size is enlarged.  Mediastinal contours unremarkable.  Trachea midline.    Bony thorax intact.                              X-Rays:   Independently Interpreted Readings:   Chest X-Ray: Cardiomegaly present.  Increased vascular markings consistent with CHF are present. There is an infiltrate in the RLL.     Medical Decision Making:   Differential Diagnosis:   Sepsis, illicit drug abuse, cellulitis, acute on chronic congestive heart failure,  pneumonia, influenza, acute myocardial infarction, acute cholecystitis  ED Management:  Presents for presumed acute CHF exacerbation with possible cellulitis vs venous stasis of bilateral lower extremities, found to have significantly elevated WBCs. Sepsis workup and aggressive IVF hydration initiated with positive lactic.   Bilirubin elevated at 4, Gallbladder US without CBD dilatation; however, he will require further imaging modality not available at [facility] - MRCP vs ERCP. Not an ideal surgical candidate.   Cardiogenic shock vs sepsis secondary to pneumonia/cellulitis/cholecystitis.     Case discussed with Dr. Devaughn Yoo, who recommends MRCP.    Case then discussed with  hospitalist - Dr. Chow, [place] [facility] [place]                                  Clinical Impression:       ICD-10-CM ICD-9-CM   1. Pneumonia of right lower lobe due to infectious organism J18.1 486   2. Shortness of breath R06.02 786.05   3. Sepsis, due to unspecified organism, unspecified whether acute organ dysfunction present A41.9 038.9     995.91   4. Acute on chronic congestive heart failure, unspecified heart failure type I50.9 428.0   5. Lactic acidosis E87.2 276.2   6. IVDU (intravenous drug user) F19.90 305.90   7. Transaminitis R74.0 790.4   8. Hyperbilirubinemia E80.6 782.4   9. Chronic hepatitis C without hepatic coma B18.2 070.54         Disposition:   Disposition: Transferred  Condition: Serious                     Sobia Grey MD  12/10/19 0706

## 2019-12-10 ENCOUNTER — ANESTHESIA EVENT (OUTPATIENT)
Dept: INTENSIVE CARE | Facility: HOSPITAL | Age: 44
DRG: 871 | End: 2019-12-10
Payer: MEDICAID

## 2019-12-10 ENCOUNTER — HOSPITAL ENCOUNTER (INPATIENT)
Facility: HOSPITAL | Age: 44
LOS: 18 days | Discharge: HOME | DRG: 871 | End: 2019-12-28
Attending: HOSPITALIST | Admitting: HOSPITALIST
Payer: MEDICAID

## 2019-12-10 ENCOUNTER — ANESTHESIA (OUTPATIENT)
Dept: INTENSIVE CARE | Facility: HOSPITAL | Age: 44
DRG: 871 | End: 2019-12-10
Payer: MEDICAID

## 2019-12-10 VITALS
OXYGEN SATURATION: 97 % | DIASTOLIC BLOOD PRESSURE: 98 MMHG | TEMPERATURE: 98 F | RESPIRATION RATE: 24 BRPM | WEIGHT: 165 LBS | HEIGHT: 68 IN | SYSTOLIC BLOOD PRESSURE: 132 MMHG | HEART RATE: 130 BPM | BODY MASS INDEX: 25.01 KG/M2

## 2019-12-10 DIAGNOSIS — I50.43 ACUTE ON CHRONIC COMBINED SYSTOLIC AND DIASTOLIC HEART FAILURE: ICD-10-CM

## 2019-12-10 DIAGNOSIS — I10 ESSENTIAL HYPERTENSION: ICD-10-CM

## 2019-12-10 DIAGNOSIS — R74.01 TRANSAMINITIS: ICD-10-CM

## 2019-12-10 DIAGNOSIS — I42.8 NON-ISCHEMIC CARDIOMYOPATHY: ICD-10-CM

## 2019-12-10 DIAGNOSIS — I46.9 CARDIAC ARREST: ICD-10-CM

## 2019-12-10 DIAGNOSIS — R07.9 CHEST PAIN: ICD-10-CM

## 2019-12-10 DIAGNOSIS — I50.9 HEART FAILURE: ICD-10-CM

## 2019-12-10 DIAGNOSIS — I26.99 ACUTE MASSIVE PULMONARY EMBOLISM: ICD-10-CM

## 2019-12-10 DIAGNOSIS — I82.4Y9 ACUTE DEEP VEIN THROMBOSIS (DVT) OF PROXIMAL VEIN OF LOWER EXTREMITY, UNSPECIFIED LATERALITY: ICD-10-CM

## 2019-12-10 DIAGNOSIS — R10.9 ABDOMINAL PAIN, UNSPECIFIED ABDOMINAL LOCATION: Primary | ICD-10-CM

## 2019-12-10 DIAGNOSIS — Z74.09 IMPAIRED MOBILITY AND ADLS: ICD-10-CM

## 2019-12-10 DIAGNOSIS — B18.2 CHRONIC HEPATITIS C WITHOUT HEPATIC COMA: ICD-10-CM

## 2019-12-10 DIAGNOSIS — R00.0 TACHYCARDIA: ICD-10-CM

## 2019-12-10 DIAGNOSIS — J96.01 ACUTE HYPOXEMIC RESPIRATORY FAILURE: ICD-10-CM

## 2019-12-10 DIAGNOSIS — J90 PLEURAL EFFUSION: ICD-10-CM

## 2019-12-10 DIAGNOSIS — A41.9 SEPSIS DUE TO UNDETERMINED ORGANISM: ICD-10-CM

## 2019-12-10 DIAGNOSIS — Z78.9 IMPAIRED MOBILITY AND ADLS: ICD-10-CM

## 2019-12-10 DIAGNOSIS — R53.81 DEBILITY: ICD-10-CM

## 2019-12-10 DIAGNOSIS — F41.9 ANXIETY: ICD-10-CM

## 2019-12-10 DIAGNOSIS — J18.9 COMMUNITY ACQUIRED PNEUMONIA OF RIGHT LOWER LOBE OF LUNG: ICD-10-CM

## 2019-12-10 DIAGNOSIS — J86.9 EMPYEMA: ICD-10-CM

## 2019-12-10 DIAGNOSIS — R04.2 HEMOPTYSIS: ICD-10-CM

## 2019-12-10 DIAGNOSIS — F41.8 OTHER SPECIFIED ANXIETY DISORDERS: ICD-10-CM

## 2019-12-10 DIAGNOSIS — I82.4Y3 ACUTE DEEP VEIN THROMBOSIS (DVT) OF PROXIMAL VEIN OF BOTH LOWER EXTREMITIES: ICD-10-CM

## 2019-12-10 PROBLEM — R23.9 ALTERATION IN SKIN INTEGRITY: Status: ACTIVE | Noted: 2019-12-10

## 2019-12-10 PROBLEM — E87.20 LACTIC ACIDOSIS: Status: ACTIVE | Noted: 2019-12-10

## 2019-12-10 PROBLEM — E87.1 HYPONATREMIA: Status: ACTIVE | Noted: 2019-12-10

## 2019-12-10 PROBLEM — E80.6 HYPERBILIRUBINEMIA: Status: ACTIVE | Noted: 2019-12-10

## 2019-12-10 PROBLEM — F19.10 INTRAVENOUS DRUG ABUSE: Status: ACTIVE | Noted: 2019-12-10

## 2019-12-10 LAB
ABO + RH BLD: NORMAL
ALBUMIN SERPL BCP-MCNC: 1.6 G/DL (ref 3.5–5.2)
ALBUMIN SERPL BCP-MCNC: 1.7 G/DL (ref 3.5–5.2)
ALBUMIN SERPL BCP-MCNC: 1.7 G/DL (ref 3.5–5.2)
ALBUMIN SERPL BCP-MCNC: 1.9 G/DL (ref 3.5–5.2)
ALBUMIN SERPL BCP-MCNC: 1.9 G/DL (ref 3.5–5.2)
ALLENS TEST: ABNORMAL
ALP SERPL-CCNC: 138 U/L (ref 55–135)
ALP SERPL-CCNC: 149 U/L (ref 55–135)
ALP SERPL-CCNC: 149 U/L (ref 55–135)
ALP SERPL-CCNC: 164 U/L (ref 55–135)
ALP SERPL-CCNC: 166 U/L (ref 55–135)
ALT SERPL W/O P-5'-P-CCNC: 66 U/L (ref 10–44)
ALT SERPL W/O P-5'-P-CCNC: 73 U/L (ref 10–44)
ALT SERPL W/O P-5'-P-CCNC: 74 U/L (ref 10–44)
AMPHET+METHAMPHET UR QL: ABNORMAL
ANION GAP SERPL CALC-SCNC: 10 MMOL/L (ref 8–16)
ANION GAP SERPL CALC-SCNC: 20 MMOL/L (ref 8–16)
ANION GAP SERPL CALC-SCNC: 9 MMOL/L (ref 8–16)
ANISOCYTOSIS BLD QL SMEAR: SLIGHT
ANISOCYTOSIS BLD QL SMEAR: SLIGHT
APTT BLDCRRT: 37.1 SEC (ref 21–32)
APTT BLDCRRT: 41.2 SEC (ref 21–32)
ASCENDING AORTA: 3.02 CM
AST SERPL-CCNC: 101 U/L (ref 10–40)
AST SERPL-CCNC: 103 U/L (ref 10–40)
AST SERPL-CCNC: 107 U/L (ref 10–40)
AST SERPL-CCNC: 107 U/L (ref 10–40)
AST SERPL-CCNC: 94 U/L (ref 10–40)
AV INDEX (PROSTH): 0.74
AV MEAN GRADIENT: 2 MMHG
AV PEAK GRADIENT: 3 MMHG
AV VALVE AREA: 2.93 CM2
AV VELOCITY RATIO: 0.88
BARBITURATES UR QL SCN>200 NG/ML: NEGATIVE
BASO STIPL BLD QL SMEAR: ABNORMAL
BASOPHILS # BLD AUTO: 0.05 K/UL (ref 0–0.2)
BASOPHILS # BLD AUTO: 0.07 K/UL (ref 0–0.2)
BASOPHILS # BLD AUTO: 0.07 K/UL (ref 0–0.2)
BASOPHILS NFR BLD: 0.2 % (ref 0–1.9)
BASOPHILS NFR BLD: 0.3 % (ref 0–1.9)
BASOPHILS NFR BLD: 0.3 % (ref 0–1.9)
BENZODIAZ UR QL SCN>200 NG/ML: NEGATIVE
BILIRUB SERPL-MCNC: 4 MG/DL (ref 0.1–1)
BILIRUB SERPL-MCNC: 4.4 MG/DL (ref 0.1–1)
BILIRUB SERPL-MCNC: 4.7 MG/DL (ref 0.1–1)
BLD GP AB SCN CELLS X3 SERPL QL: NORMAL
BNP SERPL-MCNC: 3300 PG/ML (ref 0–99)
BSA FOR ECHO PROCEDURE: 2 M2
BUN SERPL-MCNC: 30 MG/DL (ref 6–20)
BUN SERPL-MCNC: 34 MG/DL (ref 6–20)
BUN SERPL-MCNC: 35 MG/DL (ref 6–20)
BUN SERPL-MCNC: 35 MG/DL (ref 6–20)
BUN SERPL-MCNC: 38 MG/DL (ref 6–20)
BURR CELLS BLD QL SMEAR: ABNORMAL
BURR CELLS BLD QL SMEAR: ABNORMAL
BZE UR QL SCN: NEGATIVE
CALCIUM SERPL-MCNC: 7.3 MG/DL (ref 8.7–10.5)
CALCIUM SERPL-MCNC: 7.5 MG/DL (ref 8.7–10.5)
CALCIUM SERPL-MCNC: 7.5 MG/DL (ref 8.7–10.5)
CALCIUM SERPL-MCNC: 8 MG/DL (ref 8.7–10.5)
CALCIUM SERPL-MCNC: 8 MG/DL (ref 8.7–10.5)
CANNABINOIDS UR QL SCN: NEGATIVE
CHLORIDE SERPL-SCNC: 100 MMOL/L (ref 95–110)
CHLORIDE SERPL-SCNC: 99 MMOL/L (ref 95–110)
CHLORIDE SERPL-SCNC: 99 MMOL/L (ref 95–110)
CO2 SERPL-SCNC: 20 MMOL/L (ref 23–29)
CO2 SERPL-SCNC: 21 MMOL/L (ref 23–29)
CO2 SERPL-SCNC: 22 MMOL/L (ref 23–29)
CO2 SERPL-SCNC: 22 MMOL/L (ref 23–29)
CO2 SERPL-SCNC: 9 MMOL/L (ref 23–29)
CREAT SERPL-MCNC: 1 MG/DL (ref 0.5–1.4)
CREAT SERPL-MCNC: 1.2 MG/DL (ref 0.5–1.4)
CREAT SERPL-MCNC: 1.2 MG/DL (ref 0.5–1.4)
CREAT SERPL-MCNC: 1.3 MG/DL (ref 0.5–1.4)
CREAT SERPL-MCNC: 1.4 MG/DL (ref 0.5–1.4)
CREAT UR-MCNC: 17 MG/DL (ref 23–375)
CV ECHO LV RWT: 0.36 CM
DELSYS: ABNORMAL
DIFFERENTIAL METHOD: ABNORMAL
DOP CALC AO PEAK VEL: 0.84 M/S
DOP CALC AO VTI: 11.71 CM
DOP CALC LVOT AREA: 3.9 CM2
DOP CALC LVOT DIAMETER: 2.24 CM
DOP CALC LVOT PEAK VEL: 0.74 M/S
DOP CALC LVOT STROKE VOLUME: 34.31 CM3
DOP CALCLVOT PEAK VEL VTI: 8.71 CM
E/E' RATIO: 14.53 M/S
ECHO LV POSTERIOR WALL: 1.11 CM (ref 0.6–1.1)
EOSINOPHIL # BLD AUTO: 0 K/UL (ref 0–0.5)
EOSINOPHIL # BLD AUTO: 0.1 K/UL (ref 0–0.5)
EOSINOPHIL # BLD AUTO: 0.2 K/UL (ref 0–0.5)
EOSINOPHIL NFR BLD: 0 % (ref 0–8)
EOSINOPHIL NFR BLD: 0.4 % (ref 0–8)
EOSINOPHIL NFR BLD: 0.8 % (ref 0–8)
ERYTHROCYTE [DISTWIDTH] IN BLOOD BY AUTOMATED COUNT: 23.9 % (ref 11.5–14.5)
ERYTHROCYTE [DISTWIDTH] IN BLOOD BY AUTOMATED COUNT: 25.2 % (ref 11.5–14.5)
ERYTHROCYTE [DISTWIDTH] IN BLOOD BY AUTOMATED COUNT: 25.2 % (ref 11.5–14.5)
ERYTHROCYTE [SEDIMENTATION RATE] IN BLOOD BY WESTERGREN METHOD: 22 MM/H
EST. GFR  (AFRICAN AMERICAN): >60 ML/MIN/1.73 M^2
EST. GFR  (NON AFRICAN AMERICAN): 59.8 ML/MIN/1.73 M^2
EST. GFR  (NON AFRICAN AMERICAN): >60 ML/MIN/1.73 M^2
ETHANOL UR-MCNC: <10 MG/DL
FIO2: 100
FIO2: 100
FIO2: 30
FIO2: 30
FRACTIONAL SHORTENING: 11 % (ref 28–44)
GIANT PLATELETS BLD QL SMEAR: PRESENT
GLUCOSE SERPL-MCNC: 100 MG/DL (ref 70–110)
GLUCOSE SERPL-MCNC: 100 MG/DL (ref 70–110)
GLUCOSE SERPL-MCNC: 103 MG/DL (ref 70–110)
GLUCOSE SERPL-MCNC: 115 MG/DL (ref 70–110)
GLUCOSE SERPL-MCNC: 122 MG/DL (ref 70–110)
HAV IGM SERPL QL IA: NEGATIVE
HBV CORE IGM SERPL QL IA: NEGATIVE
HBV SURFACE AG SERPL QL IA: NEGATIVE
HCO3 UR-SCNC: 10.8 MMOL/L (ref 24–28)
HCO3 UR-SCNC: 17.6 MMOL/L (ref 24–28)
HCO3 UR-SCNC: 18.7 MMOL/L (ref 24–28)
HCO3 UR-SCNC: 20.4 MMOL/L (ref 24–28)
HCO3 UR-SCNC: 22.9 MMOL/L (ref 24–28)
HCT VFR BLD AUTO: 38.4 % (ref 40–54)
HCT VFR BLD AUTO: 43.4 % (ref 40–54)
HCT VFR BLD AUTO: 44.3 % (ref 40–54)
HCT VFR BLD CALC: 50 %PCV (ref 36–54)
HCV AB SERPL QL IA: POSITIVE
HGB BLD-MCNC: 12.6 G/DL (ref 14–18)
HGB BLD-MCNC: 13.2 G/DL (ref 14–18)
HGB BLD-MCNC: 13.4 G/DL (ref 14–18)
HIV 1+2 AB+HIV1 P24 AG SERPL QL IA: NEGATIVE
HOWELL-JOLLY BOD BLD QL SMEAR: ABNORMAL
HYPOCHROMIA BLD QL SMEAR: ABNORMAL
HYPOCHROMIA BLD QL SMEAR: ABNORMAL
IMM GRANULOCYTES # BLD AUTO: 0.24 K/UL (ref 0–0.04)
IMM GRANULOCYTES # BLD AUTO: 0.43 K/UL (ref 0–0.04)
IMM GRANULOCYTES # BLD AUTO: 0.51 K/UL (ref 0–0.04)
IMM GRANULOCYTES NFR BLD AUTO: 0.9 % (ref 0–0.5)
IMM GRANULOCYTES NFR BLD AUTO: 1.6 % (ref 0–0.5)
IMM GRANULOCYTES NFR BLD AUTO: 1.8 % (ref 0–0.5)
INR PPP: 1.9 (ref 0.8–1.2)
INR PPP: 2.2 (ref 0.8–1.2)
INR PPP: 2.6 (ref 0.8–1.2)
INTERVENTRICULAR SEPTUM: 0.96 CM (ref 0.6–1.1)
LA MAJOR: 6.78 CM
LA MINOR: 6.64 CM
LA WIDTH: 5.16 CM
LACTATE SERPL-SCNC: 2.1 MMOL/L (ref 0.5–2.2)
LACTATE SERPL-SCNC: 3 MMOL/L (ref 0.5–2.2)
LDH SERPL L TO P-CCNC: 12.52 MMOL/L (ref 0.36–1.25)
LEFT ATRIUM SIZE: 3.52 CM
LEFT ATRIUM VOLUME INDEX: 52.6 ML/M2
LEFT ATRIUM VOLUME: 103.58 CM3
LEFT INTERNAL DIMENSION IN SYSTOLE: 5.5 CM (ref 2.1–4)
LEFT VENTRICLE DIASTOLIC VOLUME INDEX: 72.02 ML/M2
LEFT VENTRICLE DIASTOLIC VOLUME: 141.74 ML
LEFT VENTRICLE MASS INDEX: 139 G/M2
LEFT VENTRICLE SYSTOLIC VOLUME INDEX: 57.7 ML/M2
LEFT VENTRICLE SYSTOLIC VOLUME: 113.47 ML
LEFT VENTRICULAR INTERNAL DIMENSION IN DIASTOLE: 6.2 CM (ref 3.5–6)
LEFT VENTRICULAR MASS: 272.9 G
LV LATERAL E/E' RATIO: 13.63 M/S
LV SEPTAL E/E' RATIO: 15.57 M/S
LYMPHOCYTES # BLD AUTO: 1 K/UL (ref 1–4.8)
LYMPHOCYTES # BLD AUTO: 1.9 K/UL (ref 1–4.8)
LYMPHOCYTES # BLD AUTO: 3.7 K/UL (ref 1–4.8)
LYMPHOCYTES NFR BLD: 14 % (ref 18–48)
LYMPHOCYTES NFR BLD: 3.4 % (ref 18–48)
LYMPHOCYTES NFR BLD: 6.9 % (ref 18–48)
MAGNESIUM SERPL-MCNC: 1.8 MG/DL (ref 1.6–2.6)
MAGNESIUM SERPL-MCNC: 2.2 MG/DL (ref 1.6–2.6)
MCH RBC QN AUTO: 25.5 PG (ref 27–31)
MCH RBC QN AUTO: 26.1 PG (ref 27–31)
MCH RBC QN AUTO: 26.4 PG (ref 27–31)
MCHC RBC AUTO-ENTMCNC: 30.2 G/DL (ref 32–36)
MCHC RBC AUTO-ENTMCNC: 30.4 G/DL (ref 32–36)
MCHC RBC AUTO-ENTMCNC: 32.8 G/DL (ref 32–36)
MCV RBC AUTO: 81 FL (ref 82–98)
MCV RBC AUTO: 84 FL (ref 82–98)
MCV RBC AUTO: 86 FL (ref 82–98)
METHADONE UR QL SCN>300 NG/ML: NEGATIVE
MODE: ABNORMAL
MONOCYTES # BLD AUTO: 1.1 K/UL (ref 0.3–1)
MONOCYTES # BLD AUTO: 1.2 K/UL (ref 0.3–1)
MONOCYTES # BLD AUTO: 1.2 K/UL (ref 0.3–1)
MONOCYTES NFR BLD: 3.9 % (ref 4–15)
MONOCYTES NFR BLD: 4.3 % (ref 4–15)
MONOCYTES NFR BLD: 4.4 % (ref 4–15)
MV PEAK E VEL: 1.09 M/S
NEUTROPHILS # BLD AUTO: 21 K/UL (ref 1.8–7.7)
NEUTROPHILS # BLD AUTO: 23.9 K/UL (ref 1.8–7.7)
NEUTROPHILS # BLD AUTO: 25 K/UL (ref 1.8–7.7)
NEUTROPHILS NFR BLD: 79 % (ref 38–73)
NEUTROPHILS NFR BLD: 87.6 % (ref 38–73)
NEUTROPHILS NFR BLD: 90.2 % (ref 38–73)
NRBC BLD-RTO: 0 /100 WBC
OPIATES UR QL SCN: NEGATIVE
OVALOCYTES BLD QL SMEAR: ABNORMAL
PAPPENHEIMER BOD BLD QL SMEAR: PRESENT
PCO2 BLDA: 33.9 MMHG (ref 35–45)
PCO2 BLDA: 39 MMHG (ref 35–45)
PCO2 BLDA: 40.7 MMHG (ref 35–45)
PCO2 BLDA: 46.7 MMHG (ref 35–45)
PCO2 BLDA: 63.5 MMHG (ref 35–45)
PCP UR QL SCN>25 NG/ML: NEGATIVE
PEEP: 5
PH SMN: 6.97 [PH] (ref 7.35–7.45)
PH SMN: 7.05 [PH] (ref 7.35–7.45)
PH SMN: 7.27 [PH] (ref 7.35–7.45)
PH SMN: 7.38 [PH] (ref 7.35–7.45)
PH SMN: 7.39 [PH] (ref 7.35–7.45)
PHOSPHATE SERPL-MCNC: 3.1 MG/DL (ref 2.7–4.5)
PHOSPHATE SERPL-MCNC: 6.7 MG/DL (ref 2.7–4.5)
PISA TR MAX VEL: 2.77 M/S
PLATELET # BLD AUTO: 167 K/UL (ref 150–350)
PLATELET # BLD AUTO: 187 K/UL (ref 150–350)
PLATELET # BLD AUTO: 206 K/UL (ref 150–350)
PLATELET BLD QL SMEAR: ABNORMAL
PLATELET BLD QL SMEAR: ABNORMAL
PMV BLD AUTO: 10.2 FL (ref 9.2–12.9)
PMV BLD AUTO: 10.6 FL (ref 9.2–12.9)
PMV BLD AUTO: 9.4 FL (ref 9.2–12.9)
PO2 BLDA: 119 MMHG (ref 80–100)
PO2 BLDA: 129 MMHG (ref 80–100)
PO2 BLDA: 184 MMHG (ref 80–100)
PO2 BLDA: 368 MMHG (ref 80–100)
PO2 BLDA: 64 MMHG (ref 40–60)
POC BE: -13 MMOL/L
POC BE: -2 MMOL/L
POC BE: -21 MMOL/L
POC BE: -5 MMOL/L
POC BE: -8 MMOL/L
POC IONIZED CALCIUM: 1.05 MMOL/L (ref 1.06–1.42)
POC SATURATED O2: 100 % (ref 95–100)
POC SATURATED O2: 80 % (ref 95–100)
POC SATURATED O2: 99 % (ref 95–100)
POC TCO2: 12 MMOL/L (ref 23–27)
POC TCO2: 20 MMOL/L (ref 23–27)
POC TCO2: 20 MMOL/L (ref 24–29)
POC TCO2: 21 MMOL/L (ref 23–27)
POC TCO2: 24 MMOL/L (ref 23–27)
POCT GLUCOSE: 112 MG/DL (ref 70–110)
POIKILOCYTOSIS BLD QL SMEAR: SLIGHT
POIKILOCYTOSIS BLD QL SMEAR: SLIGHT
POLYCHROMASIA BLD QL SMEAR: ABNORMAL
POLYCHROMASIA BLD QL SMEAR: ABNORMAL
POTASSIUM BLD-SCNC: 5 MMOL/L (ref 3.5–5.1)
POTASSIUM SERPL-SCNC: 3.7 MMOL/L (ref 3.5–5.1)
POTASSIUM SERPL-SCNC: 3.7 MMOL/L (ref 3.5–5.1)
POTASSIUM SERPL-SCNC: 3.8 MMOL/L (ref 3.5–5.1)
POTASSIUM SERPL-SCNC: 4.6 MMOL/L (ref 3.5–5.1)
POTASSIUM SERPL-SCNC: 5.2 MMOL/L (ref 3.5–5.1)
PROCALCITONIN SERPL IA-MCNC: 1.04 NG/ML
PROT SERPL-MCNC: 6.6 G/DL (ref 6–8.4)
PROT SERPL-MCNC: 6.9 G/DL (ref 6–8.4)
PROT SERPL-MCNC: 6.9 G/DL (ref 6–8.4)
PROT SERPL-MCNC: 7.7 G/DL (ref 6–8.4)
PROT SERPL-MCNC: 7.8 G/DL (ref 6–8.4)
PROTHROMBIN TIME: 18.9 SEC (ref 9–12.5)
PROTHROMBIN TIME: 21.5 SEC (ref 9–12.5)
PROTHROMBIN TIME: 25 SEC (ref 9–12.5)
RA MAJOR: 6.54 CM
RA WIDTH: 5.39 CM
RBC # BLD AUTO: 4.77 M/UL (ref 4.6–6.2)
RBC # BLD AUTO: 5.14 M/UL (ref 4.6–6.2)
RBC # BLD AUTO: 5.17 M/UL (ref 4.6–6.2)
RIGHT VENTRICULAR END-DIASTOLIC DIMENSION: 4.99 CM
RV TISSUE DOPPLER FREE WALL SYSTOLIC VELOCITY 1 (APICAL 4 CHAMBER VIEW): 8.65 CM/S
SAMPLE: ABNORMAL
SCHISTOCYTES BLD QL SMEAR: ABNORMAL
SINUS: 3.39 CM
SITE: ABNORMAL
SODIUM BLD-SCNC: 131 MMOL/L (ref 136–145)
SODIUM SERPL-SCNC: 129 MMOL/L (ref 136–145)
SODIUM SERPL-SCNC: 131 MMOL/L (ref 136–145)
SODIUM SERPL-SCNC: 131 MMOL/L (ref 136–145)
SP02: 99
STJ: 2.7 CM
TARGETS BLD QL SMEAR: ABNORMAL
TARGETS BLD QL SMEAR: ABNORMAL
TDI LATERAL: 0.08 M/S
TDI SEPTAL: 0.07 M/S
TDI: 0.08 M/S
TOXICOLOGY INFORMATION: ABNORMAL
TR MAX PG: 31 MMHG
TRICUSPID ANNULAR PLANE SYSTOLIC EXCURSION: 1.47 CM
TROPONIN I SERPL DL<=0.01 NG/ML-MCNC: 0.07 NG/ML (ref 0–0.03)
TROPONIN I SERPL DL<=0.01 NG/ML-MCNC: 0.13 NG/ML (ref 0–0.03)
TROPONIN I SERPL DL<=0.01 NG/ML-MCNC: 0.16 NG/ML (ref 0–0.03)
VT: 420
WBC # BLD AUTO: 26.6 K/UL (ref 3.9–12.7)
WBC # BLD AUTO: 27.3 K/UL (ref 3.9–12.7)
WBC # BLD AUTO: 27.69 K/UL (ref 3.9–12.7)

## 2019-12-10 PROCEDURE — 84484 ASSAY OF TROPONIN QUANT: CPT | Mod: 91

## 2019-12-10 PROCEDURE — 84100 ASSAY OF PHOSPHORUS: CPT

## 2019-12-10 PROCEDURE — 25000003 PHARM REV CODE 250: Performed by: STUDENT IN AN ORGANIZED HEALTH CARE EDUCATION/TRAINING PROGRAM

## 2019-12-10 PROCEDURE — 83605 ASSAY OF LACTIC ACID: CPT

## 2019-12-10 PROCEDURE — 87070 CULTURE OTHR SPECIMN AEROBIC: CPT

## 2019-12-10 PROCEDURE — 87040 BLOOD CULTURE FOR BACTERIA: CPT

## 2019-12-10 PROCEDURE — 84100 ASSAY OF PHOSPHORUS: CPT | Mod: 91

## 2019-12-10 PROCEDURE — 63600175 PHARM REV CODE 636 W HCPCS

## 2019-12-10 PROCEDURE — 80053 COMPREHEN METABOLIC PANEL: CPT

## 2019-12-10 PROCEDURE — 94003 VENT MGMT INPAT SUBQ DAY: CPT

## 2019-12-10 PROCEDURE — 83735 ASSAY OF MAGNESIUM: CPT | Mod: 91

## 2019-12-10 PROCEDURE — 80307 DRUG TEST PRSMV CHEM ANLYZR: CPT

## 2019-12-10 PROCEDURE — 63600175 PHARM REV CODE 636 W HCPCS: Performed by: STUDENT IN AN ORGANIZED HEALTH CARE EDUCATION/TRAINING PROGRAM

## 2019-12-10 PROCEDURE — 63600175 PHARM REV CODE 636 W HCPCS: Mod: JG | Performed by: NURSE PRACTITIONER

## 2019-12-10 PROCEDURE — 86703 HIV-1/HIV-2 1 RESULT ANTBDY: CPT

## 2019-12-10 PROCEDURE — 99900026 HC AIRWAY MAINTENANCE (STAT)

## 2019-12-10 PROCEDURE — 80053 COMPREHEN METABOLIC PANEL: CPT | Mod: 91

## 2019-12-10 PROCEDURE — 86901 BLOOD TYPING SEROLOGIC RH(D): CPT

## 2019-12-10 PROCEDURE — 85610 PROTHROMBIN TIME: CPT

## 2019-12-10 PROCEDURE — 99291 CRITICAL CARE FIRST HOUR: CPT | Mod: 25,,, | Performed by: NURSE PRACTITIONER

## 2019-12-10 PROCEDURE — 85025 COMPLETE CBC W/AUTO DIFF WBC: CPT | Mod: 91

## 2019-12-10 PROCEDURE — 99292 CRITICAL CARE ADDL 30 MIN: CPT | Mod: 25,,, | Performed by: INTERNAL MEDICINE

## 2019-12-10 PROCEDURE — 85610 PROTHROMBIN TIME: CPT | Mod: 91

## 2019-12-10 PROCEDURE — 84132 ASSAY OF SERUM POTASSIUM: CPT

## 2019-12-10 PROCEDURE — 36415 COLL VENOUS BLD VENIPUNCTURE: CPT

## 2019-12-10 PROCEDURE — 85730 THROMBOPLASTIN TIME PARTIAL: CPT

## 2019-12-10 PROCEDURE — 87040 BLOOD CULTURE FOR BACTERIA: CPT | Mod: 59

## 2019-12-10 PROCEDURE — 82330 ASSAY OF CALCIUM: CPT

## 2019-12-10 PROCEDURE — 99292 PR CRITICAL CARE, ADDL 30 MIN: ICD-10-PCS | Mod: 25,,, | Performed by: INTERNAL MEDICINE

## 2019-12-10 PROCEDURE — 94002 VENT MGMT INPAT INIT DAY: CPT

## 2019-12-10 PROCEDURE — 83605 ASSAY OF LACTIC ACID: CPT | Mod: 91

## 2019-12-10 PROCEDURE — 87449 NOS EACH ORGANISM AG IA: CPT

## 2019-12-10 PROCEDURE — C9113 INJ PANTOPRAZOLE SODIUM, VIA: HCPCS | Performed by: INTERNAL MEDICINE

## 2019-12-10 PROCEDURE — 31500 INSERT EMERGENCY AIRWAY: CPT

## 2019-12-10 PROCEDURE — 83880 ASSAY OF NATRIURETIC PEPTIDE: CPT

## 2019-12-10 PROCEDURE — 36620 INSERTION CATHETER ARTERY: CPT | Mod: ,,, | Performed by: NURSE PRACTITIONER

## 2019-12-10 PROCEDURE — 82800 BLOOD PH: CPT

## 2019-12-10 PROCEDURE — 87077 CULTURE AEROBIC IDENTIFY: CPT

## 2019-12-10 PROCEDURE — 85730 THROMBOPLASTIN TIME PARTIAL: CPT | Mod: 91

## 2019-12-10 PROCEDURE — 99291 PR CRITICAL CARE, E/M 30-74 MINUTES: ICD-10-PCS | Mod: 25,,, | Performed by: NURSE PRACTITIONER

## 2019-12-10 PROCEDURE — 31500 INSERT EMERGENCY AIRWAY: CPT | Performed by: STUDENT IN AN ORGANIZED HEALTH CARE EDUCATION/TRAINING PROGRAM

## 2019-12-10 PROCEDURE — 20000000 HC ICU ROOM

## 2019-12-10 PROCEDURE — 94761 N-INVAS EAR/PLS OXIMETRY MLT: CPT

## 2019-12-10 PROCEDURE — 82803 BLOOD GASES ANY COMBINATION: CPT

## 2019-12-10 PROCEDURE — S0030 INJECTION, METRONIDAZOLE: HCPCS | Performed by: STUDENT IN AN ORGANIZED HEALTH CARE EDUCATION/TRAINING PROGRAM

## 2019-12-10 PROCEDURE — 84484 ASSAY OF TROPONIN QUANT: CPT

## 2019-12-10 PROCEDURE — 83735 ASSAY OF MAGNESIUM: CPT

## 2019-12-10 PROCEDURE — 85014 HEMATOCRIT: CPT

## 2019-12-10 PROCEDURE — 84295 ASSAY OF SERUM SODIUM: CPT

## 2019-12-10 PROCEDURE — 99900035 HC TECH TIME PER 15 MIN (STAT)

## 2019-12-10 PROCEDURE — 25000003 PHARM REV CODE 250: Performed by: FAMILY MEDICINE

## 2019-12-10 PROCEDURE — 36620 PR INSERT CATH,ART,PERCUT,SHORTTERM: ICD-10-PCS | Mod: ,,, | Performed by: NURSE PRACTITIONER

## 2019-12-10 PROCEDURE — 87205 SMEAR GRAM STAIN: CPT

## 2019-12-10 PROCEDURE — 87186 SC STD MICRODIL/AGAR DIL: CPT

## 2019-12-10 PROCEDURE — 27000221 HC OXYGEN, UP TO 24 HOURS

## 2019-12-10 PROCEDURE — 37799 UNLISTED PX VASCULAR SURGERY: CPT

## 2019-12-10 PROCEDURE — 63600175 PHARM REV CODE 636 W HCPCS: Performed by: INTERNAL MEDICINE

## 2019-12-10 PROCEDURE — 80074 ACUTE HEPATITIS PANEL: CPT

## 2019-12-10 RX ORDER — LORAZEPAM 0.5 MG/1
1 TABLET ORAL EVERY 8 HOURS PRN
Status: DISCONTINUED | OUTPATIENT
Start: 2019-12-10 | End: 2019-12-10

## 2019-12-10 RX ORDER — MUPIROCIN 20 MG/G
OINTMENT TOPICAL DAILY
Status: DISCONTINUED | OUTPATIENT
Start: 2019-12-11 | End: 2019-12-17

## 2019-12-10 RX ORDER — SODIUM CHLORIDE 0.9 % (FLUSH) 0.9 %
10 SYRINGE (ML) INJECTION
Status: DISCONTINUED | OUTPATIENT
Start: 2019-12-10 | End: 2019-12-14

## 2019-12-10 RX ORDER — NOREPINEPHRINE BITARTRATE/D5W 4MG/250ML
0.02 PLASTIC BAG, INJECTION (ML) INTRAVENOUS CONTINUOUS
Status: DISCONTINUED | OUTPATIENT
Start: 2019-12-10 | End: 2019-12-10

## 2019-12-10 RX ORDER — SPIRONOLACTONE 25 MG/1
25 TABLET ORAL DAILY
Status: DISCONTINUED | OUTPATIENT
Start: 2019-12-10 | End: 2019-12-10

## 2019-12-10 RX ORDER — HYDROCODONE BITARTRATE AND ACETAMINOPHEN 10; 325 MG/1; MG/1
1 TABLET ORAL EVERY 6 HOURS PRN
Status: DISCONTINUED | OUTPATIENT
Start: 2019-12-10 | End: 2019-12-10

## 2019-12-10 RX ORDER — PROPOFOL 10 MG/ML
10 INJECTION, EMULSION INTRAVENOUS CONTINUOUS
Status: DISCONTINUED | OUTPATIENT
Start: 2019-12-10 | End: 2019-12-12

## 2019-12-10 RX ORDER — IPRATROPIUM BROMIDE AND ALBUTEROL SULFATE 2.5; .5 MG/3ML; MG/3ML
3 SOLUTION RESPIRATORY (INHALATION) EVERY 6 HOURS PRN
Status: DISCONTINUED | OUTPATIENT
Start: 2019-12-10 | End: 2019-12-28 | Stop reason: HOSPADM

## 2019-12-10 RX ORDER — FUROSEMIDE 10 MG/ML
60 INJECTION INTRAMUSCULAR; INTRAVENOUS 2 TIMES DAILY
Status: DISCONTINUED | OUTPATIENT
Start: 2019-12-10 | End: 2019-12-10

## 2019-12-10 RX ORDER — SODIUM CHLORIDE 0.9 % (FLUSH) 0.9 %
10 SYRINGE (ML) INJECTION
Status: DISCONTINUED | OUTPATIENT
Start: 2019-12-10 | End: 2019-12-28 | Stop reason: HOSPADM

## 2019-12-10 RX ORDER — PROPOFOL 10 MG/ML
INJECTION, EMULSION INTRAVENOUS
Status: COMPLETED
Start: 2019-12-10 | End: 2019-12-10

## 2019-12-10 RX ORDER — CHLORHEXIDINE GLUCONATE ORAL RINSE 1.2 MG/ML
15 SOLUTION DENTAL 2 TIMES DAILY
Status: DISCONTINUED | OUTPATIENT
Start: 2019-12-10 | End: 2019-12-12

## 2019-12-10 RX ORDER — PANTOPRAZOLE SODIUM 40 MG/10ML
40 INJECTION, POWDER, LYOPHILIZED, FOR SOLUTION INTRAVENOUS DAILY
Status: DISCONTINUED | OUTPATIENT
Start: 2019-12-10 | End: 2019-12-14

## 2019-12-10 RX ORDER — ACETAMINOPHEN 325 MG/1
650 TABLET ORAL EVERY 4 HOURS PRN
Status: DISCONTINUED | OUTPATIENT
Start: 2019-12-10 | End: 2019-12-10

## 2019-12-10 RX ORDER — HYDROCODONE BITARTRATE AND ACETAMINOPHEN 5; 325 MG/1; MG/1
1 TABLET ORAL
Status: COMPLETED | OUTPATIENT
Start: 2019-12-10 | End: 2019-12-10

## 2019-12-10 RX ORDER — METRONIDAZOLE 500 MG/100ML
500 INJECTION, SOLUTION INTRAVENOUS
Status: DISCONTINUED | OUTPATIENT
Start: 2019-12-10 | End: 2019-12-11

## 2019-12-10 RX ORDER — FAMOTIDINE 10 MG/ML
20 INJECTION INTRAVENOUS 2 TIMES DAILY
Status: DISCONTINUED | OUTPATIENT
Start: 2019-12-10 | End: 2019-12-10

## 2019-12-10 RX ORDER — TALC
6 POWDER (GRAM) TOPICAL NIGHTLY PRN
Status: DISCONTINUED | OUTPATIENT
Start: 2019-12-10 | End: 2019-12-10

## 2019-12-10 RX ORDER — CARVEDILOL 3.12 MG/1
3.12 TABLET ORAL 2 TIMES DAILY WITH MEALS
Status: DISCONTINUED | OUTPATIENT
Start: 2019-12-10 | End: 2019-12-10

## 2019-12-10 RX ORDER — HYDROCODONE BITARTRATE AND ACETAMINOPHEN 5; 325 MG/1; MG/1
1 TABLET ORAL EVERY 6 HOURS PRN
Status: DISCONTINUED | OUTPATIENT
Start: 2019-12-10 | End: 2019-12-10

## 2019-12-10 RX ORDER — ENALAPRIL MALEATE 2.5 MG/1
2.5 TABLET ORAL DAILY
Status: DISCONTINUED | OUTPATIENT
Start: 2019-12-10 | End: 2019-12-10

## 2019-12-10 RX ORDER — ONDANSETRON 8 MG/1
8 TABLET, ORALLY DISINTEGRATING ORAL EVERY 8 HOURS PRN
Status: DISCONTINUED | OUTPATIENT
Start: 2019-12-10 | End: 2019-12-17

## 2019-12-10 RX ADMIN — VANCOMYCIN HYDROCHLORIDE 1500 MG: 1.5 INJECTION, POWDER, LYOPHILIZED, FOR SOLUTION INTRAVENOUS at 11:12

## 2019-12-10 RX ADMIN — CEFTRIAXONE 2 G: 2 INJECTION, SOLUTION INTRAVENOUS at 05:12

## 2019-12-10 RX ADMIN — ALTEPLASE 100 MG: KIT at 07:12

## 2019-12-10 RX ADMIN — PROPOFOL 30 MCG/KG/MIN: 10 INJECTION, EMULSION INTRAVENOUS at 07:12

## 2019-12-10 RX ADMIN — FUROSEMIDE 60 MG: 10 INJECTION, SOLUTION INTRAMUSCULAR; INTRAVENOUS at 04:12

## 2019-12-10 RX ADMIN — METRONIDAZOLE 500 MG: 500 INJECTION, SOLUTION INTRAVENOUS at 08:12

## 2019-12-10 RX ADMIN — METRONIDAZOLE 500 MG: 500 INJECTION, SOLUTION INTRAVENOUS at 04:12

## 2019-12-10 RX ADMIN — SPIRONOLACTONE 25 MG: 25 TABLET ORAL at 08:12

## 2019-12-10 RX ADMIN — SODIUM CHLORIDE, SODIUM LACTATE, POTASSIUM CHLORIDE, AND CALCIUM CHLORIDE 500 ML: .6; .31; .03; .02 INJECTION, SOLUTION INTRAVENOUS at 08:12

## 2019-12-10 RX ADMIN — PROPOFOL 40 MCG/KG/MIN: 10 INJECTION, EMULSION INTRAVENOUS at 08:12

## 2019-12-10 RX ADMIN — CHLORHEXIDINE GLUCONATE 0.12% ORAL RINSE 15 ML: 1.2 LIQUID ORAL at 08:12

## 2019-12-10 RX ADMIN — HYDROCODONE BITARTRATE AND ACETAMINOPHEN 1 TABLET: 5; 325 TABLET ORAL at 12:12

## 2019-12-10 RX ADMIN — CHLORHEXIDINE GLUCONATE 0.12% ORAL RINSE 15 ML: 1.2 LIQUID ORAL at 11:12

## 2019-12-10 RX ADMIN — METRONIDAZOLE 500 MG: 500 INJECTION, SOLUTION INTRAVENOUS at 02:12

## 2019-12-10 RX ADMIN — AZITHROMYCIN MONOHYDRATE 500 MG: 500 INJECTION, POWDER, LYOPHILIZED, FOR SOLUTION INTRAVENOUS at 05:12

## 2019-12-10 RX ADMIN — PROPOFOL 40 MCG/KG/MIN: 10 INJECTION, EMULSION INTRAVENOUS at 05:12

## 2019-12-10 RX ADMIN — ENALAPRIL MALEATE 2.5 MG: 2.5 TABLET ORAL at 08:12

## 2019-12-10 RX ADMIN — PANTOPRAZOLE SODIUM 40 MG: 40 INJECTION, POWDER, FOR SOLUTION INTRAVENOUS at 11:12

## 2019-12-10 NOTE — ASSESSMENT & PLAN NOTE
-History of non-ischemic cardiomyopathy diagnosed in 11/2017 at [facility].   -C in 12/2017 with no evidence of angiographically significant coronary artery disease.   -Most recent ECHO in 10/2019 concerning for EF of 35%, grade II diastolic dysfunction, and global hypokinesis with a restrictive physiology.   -Home regimen: Carvedilol, Enalapril, Lasix 20 mg BID, and Spironolactone.   -No outpatient follow-up.   -Exam on admission concerning for bilateral lower extremity edema, abdominal distension with ascites, and JVD, however without evidence of pulmonary edema,  respiratory compromise, or poor perfusion.   -Labs with associated hyponatremia likely from hypervolemia.   -Suspect presentation from worsening right sided heart failure.     Plan:   -Initiate IV diuresis BID.   -Continue home regimen with exception of beta-blocker.   -Strict I/O's and daily standing weights.   -Telemetry ordered.   -Needs evaluation for ICD vs CRT.   -Trend renal function with diuresis; goal K >4 and Mg >2.

## 2019-12-10 NOTE — NURSING
Tele sitter called stating pt was kneeling on the floor banging on the bed with his hands and head, went to room found pt kneeling on the floor screaming. Myself and other staff attempted to help pt to the bed at which pt eyes rolled to back of head and a foam appeared to mouth. This nurse called out to pt with no response sternal rub attempted no response at which time a code was called and initiated.

## 2019-12-10 NOTE — CHAPLAIN
Spiritual Care Encounter  Please contact the Department of Spiritual Care, your Unit  or the On-call  at any time if any needs are brought to your attention. i.e. Anytime you note spiritual, existential or emotional distress or there is a new diagnosis and/or prognosis.      Purpose:  To provide emotional, spiritual and/or existential support to the patient and/or their loved one's during a time of crisis, trauma, or death.  Visit Type: Initial Visit  Visit Category: Critical Care, Code Blue  Visited With: Health care provider, Patient not available  Number of Family Visited: 0  Length of Visit: 66 Minutes  Continue Visiting: Yes  Referral From: Nurse  Referral To:      Assessment/Intervention:  Responded to Code Blue for patient on third floor. Patient regained ROSC and was transferred to CVICU. Took patients belongings to new room. Assisted in looking through patients chart in an effort to find any family that could be contacted. After looking through past admissions found a Social work note stating the friend listed was actually the patients cousin. Passed this information onto care members present and updated demographic information. Encouraged staff to contact spiritual care department should he begin to actively code again and stated I would pass off events that had taken place. Due to the nature of the patients health condition I was unable to complete a spiritual assessment at the time of my visit.           Patient Spiritual Encounters  Care Provided: Compassionate presence  Patient Coping: Other (Comment)(TAMEKA, Patient in critical Condition)  Comments - Patient: Patient in critical condition, ROSC achieved   Family Spiritual Encounters  Care Provided: Other care (specify)(No family/supports present)  Family Coping: Other (Comment)(No family/supports present)  Comments - Family: Family to be contacted    Stated Evangelical: Unable to Assess at This Time  Evangelical Given By:Unable to  Assess   Importance of Celine Values to Patient: Unable to Assess  Importance of Specific Holiness Values to Patient: Unable to Assess   EPIC Charted Latter-day: No Latter-day          OF NOTE:  Spiritual, Cultural Beliefs, Holiness Practices, Values that Affect Care: no                    Plan of Care/Goals:  Follow-up is required due to the patient being, in no way alert/oriented or rousable at the time of attempted visit. Being that there was no family/support(s) available the time of attempted visit, I was therefore unable to complete a spiritual assessment and a follow-up will be attempted at a later time.  Please contact the Department of Spiritual Care, your Unit  or the On-call  at any time when you have concerns brought to your attention prior to our follow-up. i.e. Anytime you note spiritual, existential or emotional distress or there is a new diagnosis and/or prognosis.       Malika Barrientos    Office: [phone]  On-Call 24/7: [phone]  Department of Spiritual Care - [facility] [place]

## 2019-12-10 NOTE — PROGRESS NOTES
Pharmacokinetic Initial Assessment: IV Vancomycin    Assessment/Plan:    Initiate intravenous vancomycin 1250 mg once.   Desired empiric serum trough concentration is 15 to 20 mcg/mL  Draw vancomycin random level on 12/10 with morning labs.  BIENVENIDO with SrCr increased from 0.8 > 1.0 > 1.4  Pharmacy will continue to follow and monitor vancomycin.      Please contact pharmacy at extension [phone] with any questions regarding this assessment.     Thank you for the consult,   Jm Burt       Patient brief summary:  Francis Daigle is a 44 y.o. male initiated on antimicrobial therapy with IV Vancomycin for treatment of suspected lower respiratory infection    Drug Allergies:   Review of patient's allergies indicates:  No Known Allergies    Actual Body Weight:   83.3 kg    Renal Function:   Estimated Creatinine Clearance: 69.4 mL/min (based on SCr of 1.4 mg/dL).,     Dialysis Method (if applicable):  N/A    CBC (last 72 hours):  Recent Labs   Lab Result Units 12/09/19  1440 12/10/19  0435 12/10/19  0705   WBC K/uL 25.39* 27.30* 26.60*   Hemoglobin g/dL 13.8* 13.2* 13.4*   Hematocrit % 44.6 43.4 44.3   Platelets K/uL 183 167 206   Gran% % 88.6* 87.6* 79.0*   Lymph% % 6.0* 6.9* 14.0*   Mono% % 4.1 3.9* 4.4   Eosinophil% % 0.3 0.4 0.8   Basophil% % 0.3 0.3 0.2   Differential Method  Automated Automated Automated       Metabolic Panel (last 72 hours):  Recent Labs   Lab Result Units 12/09/19  1440 12/09/19  1519 12/10/19  0435 12/10/19  0705   Sodium mmol/L 126*  --  129* 129*   Potassium mmol/L 4.7  --  4.6 5.2*   Chloride mmol/L 96  --  99 100   CO2 mmol/L 20*  --  20* 9*   Glucose mg/dL 107  --  103 122*   Glucose, UA   --  Negative  --   --    BUN, Bld mg/dL 31*  --  30* 34*   Creatinine mg/dL 0.8  --  1.0 1.4   Creatinine, Random Ur mg/dL  --  48.2  --   --    Albumin g/dL 2.0*  --  1.9* 1.9*   Total Bilirubin mg/dL 4.0*  --  4.7* 4.4*   Alkaline Phosphatase U/L 120  --  166* 164*   AST U/L 100*  --  101* 103*   ALT  U/L 73*  --  74* 73*   Magnesium mg/dL 1.7  --  1.8 2.2   Phosphorus mg/dL  --   --  3.1 6.7*       Drug levels (last 3 results):  No results for input(s): VANCOMYCINRA, VANCOMYCINPE, VANCOMYCINTR in the last 72 hours.    Microbiologic Results:  Microbiology Results (last 7 days)     Procedure Component Value Units Date/Time    Culture, Respiratory with Gram Stain [[id number]]     Order Status:  No result Specimen:  Respiratory     Blood culture [[id number]] Collected:  12/10/19 0740    Order Status:  Sent Specimen:  Blood from Line, Jugular, Internal Left Updated:  12/10/19 0813    Blood culture [[id number]] Collected:  12/10/19 0720    Order Status:  Sent Specimen:  Blood from Line, Jugular, Internal Right Updated:  12/10/19 0735    Blood culture (site 1) [[id number]] Collected:  12/10/19 0435    Order Status:  Sent Specimen:  Blood Updated:  12/10/19 0513    Blood culture (site 2) [[id number]] Collected:  12/10/19 0435    Order Status:  Sent Specimen:  Blood Updated:  12/10/19 0513

## 2019-12-10 NOTE — CODE/ RAPID DOCUMENTATION
Rapid Response Respiratory Therapy Code Blue Note      Code Status: Full Code   : 1975  Age: 44 y.o.  Weight:   Wt Readings from Last 1 Encounters:   12/10/19 83.3 kg (183 lb 10.3 oz)     Sex: male  Race: White   Bed: 89638 CVICU/85499 CVICU A:   MRN: [id number]  Time page Received:0632  Time Rapid Response RT at Bedside:0635  Time Rapid Response RT left Bedside: 0652  Report given to: Shanice Mccormick, RRT    SITUATION     Evaluated patient for: Code Blue    BACKGROUND     Patient has a past medical history of Anxiety, Arthritis, CHF (congestive heart failure), Coronary artery disease, Depression, and Hypertension.    ASSESSMENT     Pulse: 0  Temperature: Temp: 98.4 °F (36.9 °C) BP: BP: (!) 128/99 SpO2: 88%  Level of Consciousness: Level of Consciousness (AVPU): alert  All Lung Field Breath Sounds: LLL Breath Sounds: wheezes, inspiratory  RLL Breath Sounds: diminished   O2 Device/Concentration: Ambu bag  Most recent blood gas: No results for input(s): PH, PCO2, PO2, HCO3, POCSATURATED, BE in the last 72 hours.  Current Respiratory Care Orders:   Unscheduled  Inhalation Treatment Q6H PRN Every 6 hours PRN (0 of 09201 released)    Release    12/10/19 0408   Unscheduled  POCT ARTERIAL BLOOD GAS Blood Gas Use PRN (0 of 43516 released)    Release   Comments: Notify Physician if: see parameters below.   Question: Component: Answer: Blood Gas    12/10/19 0655     12/10/19 0505  albuterol-ipratropium 2.5 mg-0.5 mg/3 mL nebulizer solution 3 mL (albuterol-ipratropium (DUO-NEB) 0.5 - 3 mg (2.5 mg base)/ 3 mL nebulizer panel)    Discontinue    -- Verified NEBULIZATION Every 6 hours PRN 12/10/19 0408         NIPPV: No  Surgical airway: Yes, Type: ETT Size: 7.5 24cm @ lips  ETCO2 monitored:  24mmHg  Ambu at bedside:  Yes    INTERVENTIONS/RECOMMENDATIONS     When arrived to bedside, pt found coding and on bag mask ventilation. Pt intubated at the bedside with size 7.5 ETT 24 cm @ the lips. Pt easy to ventilate, ROSC  achieved. Pt transferred to SICU.     Please see Code Blue Documentation for more details.    OUTCOME     ROSC obtained at 0648.     PHYSICIAN ESCALATION     Orders received and case discussed with Shanice Mccormick, RRT     Disposition: Tx in ICU bed 34794.    FOLLOW-UP       Please call back the Rapid Response RT, Oralia Torres, ROSALINO at x [phone] for any questions or concerns.

## 2019-12-10 NOTE — NURSING
Patient arrived on unit via stretcher accompanied by EMT staff x2. Appears to be in no distress at this time. Oriented to the room including how to use the call bell fall precautions, aspiration precautions, skin precautions, and how to use the call bell to turn the TV on. Patient verbalized and demonstrated understanding. Noted that bilateral lower extremities severely reddened and warm, areas of skin excoriation noted.     MD contacted via beeper at [phone] for admission orders, waiting on return call, will continue to monitor.

## 2019-12-10 NOTE — ANESTHESIA PROCEDURE NOTES
Ad Hoc Intubation  Performed by: Bk Mccracken MD  Authorized by: Bk Mccracken MD     Indications:  Respiratory distress  Diagnosis:  PEA  Patient Location:  Floor  Timeout:  12/10/2019 6:35 AM  Procedure Start Time:  12/10/2019 6:35 AM  Procedure End Time:  12/10/2019 6:40 AM  Staff:     Other Anesthesia Staff:  Skyler Tapia MD    patient identified, IV checked, site marked, risks and benefits discussed, surgical consent, monitors and equipment checked, pre-op evaluation, timeout performed and anesthesia consent      Anesthesiologist Present: No    Intubation:     Induction:  Intravenous    Intubated:  Postinduction    Mask Ventilation:  Easy mask    Attempts:  1    Attempted By:  Resident anesthesiologist    Method of Intubation:  Video laryngoscopy    Blade:  Gamble 4    Laryngeal View Grade: Grade I - full view of chords      Difficult Airway Encountered?: No      Complications:  None    Airway Device:  Oral endotracheal tube    Airway Device Size:  7.5    Style/Cuff Inflation:  Cuffed    Inflation Amount (mL):  7    Tube secured:  24    Secured at:  The lips    Placement Verified By:  Capnometry    Complicating Factors:  None    Findings Post-Intubation:  BS equal bilateral

## 2019-12-10 NOTE — HPI
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 He has a PMH significant for non-ischemic cardiomyopathy with combined CHF (EF 11% without ICD or CRT device) and on-going IVDU. He reports a two week duration of progressively worsening bilateral lower extremity swelling and redness of the anterior shins. This is associated with bilateral lower extremity pain with ambulation. He also reports noticing symptom association with worsening of baseline SOB with exertion as well as new abdominal distension and pain, within the same time frame. He describes abdominal pain as a generalized soreness that occurs approximately one hour after eating meals and spontaneously resolves without intervention or medication. He is unsure of how long the discomfort lasts before resolving. Additionally, he reports a two week duration of occasional episodes of pale semi-formed stools. He attempted symptom relief with increasing dose of daily Lasix, however without effect, prompting presentation to ED on 12/09.     He denies symptom association with fevers, chills, nausea, vomiting, history of prior abdominal surgeries, ETOH consumption, chest pain, pain with inspiration, lightheadedness, or blurry vision. He reports a on-going non-productive cough intermittent cough since CHF diagnosis that is unchanged. He does not weigh himself daily and is unsure of any weight loss or gain. He does follow with cardiology outpatient.     Of note, he also reports continued daily use of IV methamphetamines with most recent usage on 12/08.     ED course: He initially presented to [facility] ED on 12/09. Lab were significant for leukocytosis (25), hyponatremia (126), lactic acidosis (3.5), transaminitis (, ALT 73), hyperbilirubinemia (4.5), and toxicology positive for amphetamines. CXR was suggestive of right lower lobe pneumonia. Abdominal U/S was suggestive of acalculous cholecystitis. He was given  IVF, Ceftriaxone, and Lasix. Case was discussed with transfer center and AES here; recommended transfer for possible MRCP.

## 2019-12-10 NOTE — CARE UPDATE
Pt arrived from 350 to 87775 intubated being bagged by rapid team. Pt ETT secured, ambu at bedside, and placed on mechanical ventilation at documented settings. Will continue to monitor.

## 2019-12-10 NOTE — SIGNIFICANT EVENT
[facility] Rapid Response Nurse Note     Admit Date: 12/10/2019  LOS: 0  Code Status: Full Code   Date of Consult: 12/10/2019  : 1975  Age: 44 y.o.  Weight:   Wt Readings from Last 1 Encounters:   12/10/19 83.3 kg (183 lb 10.3 oz)     Sex: male  Race: White   Bed: 59903 CVICU/43819 CVICU A:   MRN: [id number]  Time Rapid Response Team page Received: 0640  Time Rapid Response Team at Bedside: 0641  Time Rapid Response Team left Bedside: 0655  Was the patient discharged from an ICU this admission?   no  Was the patient discharged from a PACU within last 24 hours?  no  Did the patient receive conscious sedation/general anesthesia within last 24 hours?  no  Was the patient in the ED within the past 24 hours?  no  Was the patient started on NIPPV within the past 24 hours?  no  Did this progress into an ARC or CPA: Cardio Pulmonary Arrest  Attending Physician: Celeste Van MD  Primary Service: Networked reference to record PCT   Consult Requested By: Celeste Van MD     SITUATION     Reason for Call: Code Blue  Called to evaluate the patient for Circulatory    BACKGROUND     Why is the patient in the hospital?:     Pt transfer from [facility] for LE swelling and dyspnea over past two weeks. Pt also endorsed abdominal pain worsening after eating. Pertinent history of IVDU and non-ischemic cardiomyopathy.     ASSESSMENT     What did you find: On arrival to bedside CPR in progress, board in place, pads on patient. PEA to monitor. Pt being bagged by CSU RNs on arrival. RRNs assumed compressions and code cart. Pt received x2 epi, x1 .4mg narcan. Intubated per anesthesia. ROSC achieved. Tx to SICU.      Time of CPR initiation: 0640  Time of first shock: NA  Time of first Epinephrine dose: 0642  ETCO2 utilized to guide CPR: Yes    Please see Code Blue Documentation for more details.    OUTCOME     ROSC obtained at 0647.     Disposition: Tx in ICU bed 40692.    CODE TEAM MEMBERS     : WILLIS Ramey    RRN:  Patrice/Jazzy    Bedside RN: Ioana Grissom RN: Keiko    RRT: Oralia    Additional staff: blake Preston supervisor

## 2019-12-10 NOTE — HOSPITAL COURSE
Transferred to ICU following cardiac arrest on 12/10/19. Emergently intubated during PEA arrest, ROSC achieved after 1 round of CPR. Central line and arterial line placed. Patient initially requiring pressor support, cyanotic, hypoxic on blood gas. Bedside ultrasound with signs of RV strain. Patient on pressors at the time, requiring 100% FiO2 to oxygenate. Given TPA emergently.  12/11/2019 Patient started on heparin ggt, bronchoscopy today revealing relatively normal airways. Continuing BS Abx.   12/12/2019 Patient with scant hemoptysis x1 occurrence, held heparin ggt overnight. Tolerated extubation well.   12/13/2019 Patient restarted on heparin ggt. Restarted patients diet. Nausea and vomiting with scant 1cc of blood vs flex. Patient stable for stepdown to hospital medicine.

## 2019-12-10 NOTE — NURSING
"Pt hr recorded in the 130s spoke to dede Ramirez Parent told to monitor pt had just received Ativan for what the pt describes as a "Panic Attack"  "

## 2019-12-10 NOTE — PROGRESS NOTES
Wound care consult for BLEs.     PMH: Hep C, CHF    Patient s/p PEA arrest. BLEs cellulitis present. Legs edemtous but family states they are significantly less swollen.   Scattered full thickness excoriations noted the BLEs. Healing blisters to the feet.   Discussed with Dr Dickson, recommend bactroban BID to the open area.  Sacral area and heels are intact. Patient on EHOB overlay. Heel offloading boots in place.     Wound care to follow PRN.  Anupama Carrillo RN CN [facility]   [phone]

## 2019-12-10 NOTE — PLAN OF CARE
CM met with patient's family at the bedside to discuss D/C POC needs. Patient AAO x's 3 and able to verify demographics in the chart are correct. CM name and contact number listed on the patient's white board.  CM provided explanation of discharge plan process. CM left blue folder at the bedside with explanation of qualification for placement and facility resources. Patient's sonItz expressed understanding. CM remains available for any further patient needs or concerns.     Primary Doctor No      [id number] - [place], [place] - [address]  [address]  [place] [address]  Phone: [phone] Fax: [phone]    [facility] #[id number] - [facility] [address] [facility] [address] & [facility]  [address] [facility] [address]  Phone: [phone] Fax: [phone]      Extended Emergency Contact Information  Primary Emergency Contact: Itz Daigle  Mobile Phone: [phone]  Relation: Son   needed? No  Secondary Emergency Contact: Estevan Linares  Mobile Phone: [phone]  Relation: Relative       12/10/19 1420   Discharge Assessment   Assessment Type Discharge Planning Assessment   Confirmed/corrected address and phone number on facesheet? Yes  (son)   Assessment information obtained from? Other  (sonItz)   Prior to hospitilization cognitive status: Alert/Oriented   Prior to hospitalization functional status: Independent   Current cognitive status: Coma/Sedated/Intubated   Current Functional Status: Completely Dependent   Facility Arrived From: [facility] With friend(s)   Able to Return to Prior Arrangements other (see comments)  (TBD)   Is patient able to care for self after discharge? Unable to determine at this time (comments)   Who are your caregiver(s) and their phone number(s)? sonItz [phone]; friendEstevan [phone]   Patient currently being followed by outpatient case management? Unable to  determine (comments)   Patient currently receives any other outside agency services? No   Equipment Currently Used at Home none   Do you have any problems affording any of your prescribed medications? No   Is the patient taking medications as prescribed?   (unknown)   Does the patient have transportation home? Yes   Transportation Anticipated family or friend will provide   Does the patient receive services at the [facility]? No   Discharge Plan A Home with family   Patient/Family in Agreement with Plan unable to assess       Mario Hensley RN MSN  Critical Care-   Ext. [phone]

## 2019-12-10 NOTE — SUBJECTIVE & OBJECTIVE
Past Medical History:   Diagnosis Date    Anxiety     Arthritis     CHF (congestive heart failure)     Coronary artery disease     Depression     Hypertension        History reviewed. No pertinent surgical history.    Review of patient's allergies indicates:  No Known Allergies    Current Facility-Administered Medications on File Prior to Encounter   Medication    [COMPLETED] cefTRIAXone (ROCEPHIN) 1 g in dextrose 5 % 50 mL IVPB    [COMPLETED] furosemide injection 40 mg    [COMPLETED] HYDROcodone-acetaminophen 5-325 mg per tablet 1 tablet    [COMPLETED] metronidazole IVPB 500 mg    [COMPLETED] sodium chloride 0.9% bolus 2,244 mL     Current Outpatient Medications on File Prior to Encounter   Medication Sig    aspirin 81 MG Chew Take 1 tablet (81 mg total) by mouth once daily.    carvedilol (COREG) 3.125 MG tablet Take 1 tablet (3.125 mg total) by mouth 2 (two) times daily with meals.    furosemide (LASIX) 20 MG tablet Take 1 tablet (20 mg total) by mouth 2 (two) times daily.    spironolactone (ALDACTONE) 25 MG tablet Take 1 tablet (25 mg total) by mouth once daily.    enalapril (VASOTEC) 2.5 MG tablet Take 1 tablet (2.5 mg total) by mouth once daily.    LORazepam (ATIVAN) 1 MG tablet Take 1 tablet (1 mg total) by mouth every 8 (eight) hours as needed for Anxiety.     Family History     Problem Relation (Age of Onset)    Arthritis Mother    Asthma Sister    Diabetes Sister    Heart disease Mother, Maternal Grandfather    Hyperlipidemia Mother    Hypertension Mother, Father    Kidney disease Mother        Tobacco Use    Smoking status: Former Smoker     Packs/day: 2.00     Years: 20.00     Pack years: 40.00     Types: Cigarettes     Start date: 1999     Last attempt to quit: 2017     Years since quittin.8    Smokeless tobacco: Never Used   Substance and Sexual Activity    Alcohol use: Not Currently    Drug use: Yes     Frequency: 7.0 times per week     Types: Amphetamines    Sexual  activity: Not Currently     Partners: Female     Review of Systems   Constitutional: Negative for appetite change, chills and fever.   HENT: Negative for congestion, sore throat and trouble swallowing.    Eyes: Negative for photophobia and visual disturbance.   Respiratory: Positive for cough and shortness of breath.    Cardiovascular: Positive for leg swelling. Negative for chest pain and palpitations.   Gastrointestinal: Positive for abdominal distention and abdominal pain. Negative for anal bleeding, blood in stool, constipation, diarrhea, nausea, rectal pain and vomiting.   Genitourinary: Negative for difficulty urinating.   Musculoskeletal: Positive for gait problem and joint swelling. Negative for back pain, myalgias and neck pain.   Skin: Negative for pallor and rash.   Neurological: Negative for dizziness, weakness, light-headedness, numbness and headaches.     Objective:     Vital Signs (Most Recent):  Temp: 98.4 °F (36.9 °C) (12/10/19 0406)  Pulse: (!) 130 (12/10/19 0406)  Resp: 16 (12/10/19 0406)  BP: (!) 128/99 (12/10/19 0406)  SpO2: 97 % (12/10/19 0406) Vital Signs (24h Range):  Temp:  [97.5 °F (36.4 °C)-98.4 °F (36.9 °C)] 98.4 °F (36.9 °C)  Pulse:  [126-135] 130  Resp:  [] 16  SpO2:  [89 %-100 %] 97 %  BP: (120-142)/() 128/99     Weight: 83.3 kg (183 lb 10.3 oz)  Body mass index is 27.92 kg/m².    Physical Exam   Constitutional: He is oriented to person, place, and time. He appears well-developed and well-nourished. He has a sickly appearance. No distress.   HENT:   Head: Normocephalic and atraumatic.   Mouth/Throat: Oropharynx is clear and moist and mucous membranes are normal.   Eyes: Pupils are equal, round, and reactive to light. Conjunctivae are normal. Scleral icterus is present.   Neck: JVD present.   IV line inserted in left neck.    Cardiovascular: Normal rate, regular rhythm and normal pulses. Exam reveals gallop.   No murmur heard.  Pulmonary/Chest: Effort normal. No respiratory  distress. He has decreased breath sounds in the right lower field. He has wheezes in the left lower field.   Abdominal: Soft. Normal appearance and bowel sounds are normal. He exhibits distension and ascites. There is no hepatomegaly. There is no tenderness. There is no CVA tenderness.   Musculoskeletal:   There is significant edema of the bilateral lower extremities with pronounced erythema of the bilateral skins and ankles.    Neurological: He is alert and oriented to person, place, and time. He displays no Babinski's sign on the right side. He displays no Babinski's sign on the left side.   Skin: Skin is warm and dry. No bruising and no rash noted.   There are track marks of the bilateral wrists.          CRANIAL NERVES     CN III, IV, VI   Pupils are equal, round, and reactive to light.       Significant Labs: All pertinent labs within the past 24 hours have been reviewed.    Significant Imaging: I have reviewed all pertinent imaging results/findings within the past 24 hours.

## 2019-12-10 NOTE — PLAN OF CARE
[facility] Transfer Acceptance Note    FOR [facility] ABDON BERGER -  Please call extension [phone] (if nobody answers, this will flip to a beeper, so put in your call back number) upon patient arrival to floor for Hospital Medicine admit team assignment and for additional admit orders for the patient.  Do not page the attending, staff physician associate with the patient on arrival (may not be in-house at the time of arrival).  Rather, always call [phone] to reach the triage physician for orders and team assignment.     Transferring Facility/Hospital: [facility] ED    Referring Provider/Specialty giving report: Dr. Sobia Grey emergency med    Accepting Physician for admission to hospital: Thom Chow MD    Date of acceptance:  12/9/2019   6:25 PM    Patients name: Francis Daigle     Allergies:Review of patient's allergies indicates:  No Known Allergies     Reason for transfer:  higher level of care_subspecialty consults_Cards/AES/Surgery     Overview/ Report from Physician/Mid-Level Provider:    HPI:  43 y/o hx of NICM, last EF 11%, amphetamine abuse, HTN, Anxiety, unclear medication adherence presented to ED today with complaint of bilateral LE swelling, with reported circumferential erythema with weeping edema and dyspnea.  Patient reported taking po 20mg lasix at home and increasing dosages at home w/o improvement in symptoms.   No chest pain.     Patient's lab workup raised concern for sepsis alert as patient with WBC 25, lactic acid 3.5, patient tachycardic and CMP had mild abnormal transaminases but a Bilirubin of 4.0, Alk Phos is normal.   A RUQ u/s performed, commented on wall thickening, no stones, no pericholecystic fluid, no report of biliary ductal abnormality, ?acalculous cholecytitis.   Call to transfer center was originally focused on AES/Biliary consultation for potential ERCP prior to any cholecystectomy that may be needed.  AES recommends an MRCP to help  elucidate biliary anatomy     EKG - Sinus tachycardia, q-waves, T-WI in inferior leads.     Med hx - CHF ef 11% Grade 3 LV diastolic dysfunction with restrictive physiology.  Moderate global hypokinetic    MEDS - Ceftriaxone 1gm IV,  lasix 40mg IV x 1.  30cc/kg bolus - 2.4L     VS: Temp:  [97.5 °F (36.4 °C)]   Pulse:  [126-129]   Resp:  []   BP: (125-142)/(83-99)   SpO2:  [89 %-100 %]     Labs:  Lab Results   Component Value Date    LACTATE 3.2 (H) 12/09/2019    LACTATE 3.5 (HH) 12/09/2019     BMP  Lab Results   Component Value Date     (L) 12/09/2019    K 4.7 12/09/2019    CL 96 12/09/2019    CO2 20 (L) 12/09/2019    BUN 31 (H) 12/09/2019    CREATININE 0.8 12/09/2019    CALCIUM 7.6 (L) 12/09/2019    ANIONGAP 10 12/09/2019    ESTGFRAFRICA >60.0 12/09/2019    EGFRNONAA >60.0 12/09/2019       Results for JANET ANDRADE (MRN [id number]) as of 12/9/2019 18:28   Ref. Range 12/9/2019 14:40   Albumin Latest Ref Range: 3.5 - 5.2 g/dL 2.0 (L)   BILIRUBIN TOTAL Latest Ref Range: 0.1 - 1.0 mg/dL 4.0 (H)   AST Latest Ref Range: 10 - 40 U/L 100 (H)   ALT Latest Ref Range: 10 - 44 U/L 73 (H)       Diagnostic Tests/Radiographs:   RUQ Ultrasound     Impression       1. Ascites.  2. Circumferential gallbladder wall thickening with positive sonographic Reyes's sign without definite cholelithiasis.  These changes may be related to either acute or chronic acalculous cholecystitis.  Differential diagnosis includes gallbladder wall thickening secondary to underlying hepatic disease or ascites.  Further evaluation with nuclear medicine HIDA scan as deemed clinically necessary.  3. Mild right-sided hydronephrosis.  4. Poor evaluation of the pancreas.         Newest xray far Left.     To Do List upon arrival:    1. Obtain UZT-Yheotul-EQHO protocol    >Per Recs with Dr. Gallegos,     2. Keep on Npo or Clear Liquids     3. Was given sepsis fluid bolus 2.2 L but also got lasix. Report that patient feeling better after lasix,   Further chart review shows recent admit for CHF to [facility], at this time patient also had a leukocytosis, had elvated AST/ALT and Bilirubin was at 1.8 improved with diuresis.   Given significant reduction in EF and tox screen last admit and this admit that reveal amphetamine positive.      Suspicion that patients abnormal hepatic markers/elevated lactic acid could be all related or partially related to acute decompensated heart failure and maybe due to pts severe Right heart failure possible he has developed an acalculous cholecystitis.     -Continue to follow blood cultures, can keep patient on empiric antibiotics for cholecystitis     _Would treat CHF more aggresively with diuretics based on morphology described by ED physician - LUIS, circumferential swelling of legs with weeping and edema     _Amphetamine abuse - discuss with patient route of drug use - PO/INhaled/IV? This is likely driving his tachycardia and certainly could precipitate heart failure.       Would Repeat lactic acid_CMP_BNP on arrival and CXR given pt got IV fluids.  IF new O2 requirement or increase work of breathing - get ABG and add non-invasive ventilation, if any hypotension or signs pt is lethargic/cool extremities consult CCU to admit and eval for cardiogenic shock and if pt might need inotropic agents.     -f/u procalcitonin.          Thom Chow M.D.  Attending Physician  Hospital Medicine Dept.  Pager: [phone]

## 2019-12-10 NOTE — SUBJECTIVE & OBJECTIVE
Past Medical History:   Diagnosis Date    Anxiety     Arthritis     CHF (congestive heart failure)     Coronary artery disease     Depression     Hypertension        History reviewed. No pertinent surgical history.    Review of patient's allergies indicates:  No Known Allergies    Family History     Problem Relation (Age of Onset)    Arthritis Mother    Asthma Sister    Diabetes Sister    Heart disease Mother, Maternal Grandfather    Hyperlipidemia Mother    Hypertension Mother, Father    Kidney disease Mother        Tobacco Use    Smoking status: Former Smoker     Packs/day: 2.00     Years: 20.00     Pack years: 40.00     Types: Cigarettes     Start date: 1999     Last attempt to quit: 2017     Years since quittin.8    Smokeless tobacco: Never Used   Substance and Sexual Activity    Alcohol use: Not Currently    Drug use: Yes     Frequency: 7.0 times per week     Types: Amphetamines    Sexual activity: Not Currently     Partners: Female      Review of Systems   Unable to perform ROS: Intubated     Objective:     Vital Signs (Most Recent):  Temp: 97.4 °F (36.3 °C) (12/10/19 1500)  Pulse: 102 (12/10/19 1714)  Resp: (!) 22 (12/10/19 1714)  BP: 118/87 (12/10/19 1700)  SpO2: 98 % (12/10/19 1714) Vital Signs (24h Range):  Temp:  [97.4 °F (36.3 °C)-98.4 °F (36.9 °C)] 97.4 °F (36.3 °C)  Pulse:  [] 102  Resp:  [0-41] 22  SpO2:  [89 %-100 %] 98 %  BP: ()/() 118/87  Arterial Line BP: ()/(65-99) 121/85   Weight: 83 kg (183 lb)  Body mass index is 27.83 kg/m².      Intake/Output Summary (Last 24 hours) at 12/10/2019 1745  Last data filed at 12/10/2019 1700  Gross per 24 hour   Intake 989 ml   Output 965 ml   Net 24 ml       Physical Exam   Constitutional:   Intubated and sedated   HENT:   Head: Normocephalic and atraumatic.   Mouth/Throat: No oropharyngeal exudate.   Eyes: Pupils are equal, round, and reactive to light. EOM are normal. Scleral icterus is present.   Neck: Normal  range of motion. Neck supple. JVD present.   Left EJ line in place  Left IJ site with oozing from site   Cardiovascular: Normal rate, regular rhythm, normal heart sounds and intact distal pulses. Exam reveals no friction rub.   No murmur heard.  Pulmonary/Chest: No stridor. He has no wheezes. He has no rales.   ET tube with bloody secretions in suction tubing  Mechanical breath sounds   Abdominal: Soft. Bowel sounds are normal. He exhibits no distension. There is no tenderness. There is no guarding.   Musculoskeletal: Normal range of motion. He exhibits edema (bilateral lower extremity edema, chronic venous stasis changes noted, skin breakdown with weeping).   Lymphadenopathy:     He has no cervical adenopathy.   Neurological:   Moving all extremities spontaneously, withdrawing from stimuli, purposeful movements observed   Skin: Skin is warm and dry.   Nursing note and vitals reviewed.      Vents:  Vent Mode: A/C (12/10/19 1714)  Ventilator Initiated: Yes (12/10/19 0656)  Set Rate: 22 bmp (12/10/19 1714)  Vt Set: 420 mL (12/10/19 1714)  PEEP/CPAP: 5 cmH20 (12/10/19 1714)  Oxygen Concentration (%): 30 (12/10/19 1714)  Peak Airway Pressure: 20 cmH2O (12/10/19 1714)  Plateau Pressure: 0 cmH20 (12/10/19 1714)  Total Ve: 9.27 mL (12/10/19 1714)  F/VT Ratio<105 (RSBI): (!) 47.93 (12/10/19 1714)  Lines/Drains/Airways     Central Venous Catheter Line                 Percutaneous Central Line Insertion/Assessment - triple lumen  12/10/19 0730 right internal jugular less than 1 day          Drain                 NG/OG Tube 12/10/19 less than 1 day         Urethral Catheter 12/10/19 0743 less than 1 day          Airway                 Airway - Non-Surgical 12/10/19 0600 Endotracheal Tube less than 1 day         Oral Airway 12/10/19 0650 less than 1 day          Arterial Line                 Arterial Line 12/10/19 Right Radial less than 1 day          Peripheral Intravenous Line                 External Jugular IV 12/09/19 1544  1 day              Significant Labs:    CBC/Anemia Profile:  Recent Labs   Lab 12/10/19  0435 12/10/19  0705 12/10/19  0708 12/10/19  1100   WBC 27.30* 26.60*  --  27.69*   HGB 13.2* 13.4*  --  12.6*   HCT 43.4 44.3 50 38.4*    206  --  187   MCV 84 86  --  81*   RDW 25.2* 25.2*  --  23.9*        Chemistries:  Recent Labs   Lab 12/09/19  1440 12/10/19  0435 12/10/19  0705 12/10/19  1100 12/10/19  1648   * 129* 129* 131*  131* 129*   K 4.7 4.6 5.2* 3.7  3.7 3.8   CL 96 99 100 100  100 99   CO2 20* 20* 9* 22*  22* 21*   BUN 31* 30* 34* 35*  35* 38*   CREATININE 0.8 1.0 1.4 1.2  1.2 1.3   CALCIUM 7.6* 8.0* 8.0* 7.5*  7.5* 7.3*   ALBUMIN 2.0* 1.9* 1.9* 1.7*  1.7* 1.6*   PROT 7.2 7.7 7.8 6.9  6.9 6.6   BILITOT 4.0* 4.7* 4.4* 4.4*  4.4* 4.0*   ALKPHOS 120 166* 164* 149*  149* 138*   ALT 73* 74* 73* 73*  73* 66*   * 101* 103* 107*  107* 94*   MG 1.7 1.8 2.2  --   --    PHOS  --  3.1 6.7*  --   --        ABGs:   Recent Labs   Lab 12/10/19  1108   PH 7.377   PCO2 39.0   HCO3 22.9*   POCSATURATED 99   BE -2     Blood Culture:   Recent Labs   Lab 12/10/19  0435 12/10/19  0720 12/10/19  0740   LABBLOO No Growth to date  No Growth to date No Growth to date No Growth to date     Cardiac Markers: No results for input(s): CKMB, TROPONINT, MYOGLOBIN in the last 48 hours.  Coagulation:   Recent Labs   Lab 12/10/19  1545   INR 2.6*   APTT 37.1*     Lactic Acid:   Recent Labs   Lab 12/09/19  1732 12/10/19  0435 12/10/19  1100   LACTATE 3.2* 3.0* 2.1     Troponin:   Recent Labs   Lab 12/10/19  0705 12/10/19  1100 12/10/19  1648   TROPONINI 0.070* 0.131* 0.156*     All pertinent labs within the past 24 hours have been reviewed.    Significant Imaging: I have reviewed all pertinent imaging results/findings within the past 24 hours.     Amilcar Heard III, MD 12/10/2019 STAT      Narrative     EXAMINATION:  XR CHEST 1 VIEW    CLINICAL HISTORY:  line placement;    FINDINGS:  One view: Tubes and lines are  appropriate.  There is cardiomegaly edema right greater than left and right pleural fluid.  There may be a small left pleural effusion lungs appear slightly worse.      Impression       Pulmonary edema pneumonia aspiration or sepsis.      Electronically signed by: Amilcar Heard MD  Date: 12/10/2019  Time: 08:01

## 2019-12-10 NOTE — PROGRESS NOTES
12/10/19 1601        Wound 12/10/19 1400 Other (comment) lower Leg   Date First Assessed/Time First Assessed: 12/10/19 1400   Pre-existing: Yes  Primary Wound Type: (c) Other (comment)  Side: Right  Orientation: lower  Location: Leg   Wound Image    Wound WDL ex   Dressing Appearance Open to air   Drainage Amount Scant   Drainage Characteristics/Odor Serosanguineous   Appearance Red  (excoriated)   Tissue loss description Full thickness   Periwound Area Excoriated  (erythema)        Wound 12/10/19 1400 Other (comment) lower Leg   Date First Assessed/Time First Assessed: 12/10/19 1400   Pre-existing: Yes  Primary Wound Type: (c) Other (comment)  Side: Left  Orientation: lower  Location: Leg   Wound Image    Wound WDL ex   Dressing Appearance Open to air   Drainage Amount Small   Drainage Characteristics/Odor Serous   Appearance Other (see comments)  (Excoriated, )   Tissue loss description Full thickness   Periwound Area   (erythema)

## 2019-12-10 NOTE — H&P
[facility] Hospital Medicine  History & Physical    Patient Name: Francis Daigle  MRN: [id number]  Admission Date: 12/10/2019  Attending Physician: David Potts MD   Primary Care Provider: Primary Doctor [facility] Medicine Team: [facility] HOSP MED 1 Huber Corona MD     Patient information was obtained from patient, past medical records and ER records.     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 He has a PMH significant for non-ischemic cardiomyopathy with combined CHF (EF 11% without ICD or CRT device) and on-going IVDU. He reports a two week duration of progressively worsening bilateral lower extremity swelling and redness of the anterior shins. This is associated with bilateral lower extremity pain with ambulation. He also reports noticing symptom association with worsening of baseline SOB with exertion as well as new abdominal distension and pain, within the same time frame. He describes abdominal pain as a generalized soreness that occurs approximately one hour after eating meals and spontaneously resolves without intervention or medication. He is unsure of how long the discomfort lasts before resolving. Additionally, he reports a two week duration of occasional episodes of pale semi-formed stools. He attempted symptom relief with increasing dose of daily Lasix, however without effect, prompting presentation to ED on 12/09.     He denies symptom association with fevers, chills, nausea, vomiting, history of prior abdominal surgeries, ETOH consumption, chest pain, pain with inspiration, lightheadedness, or blurry vision. He reports a on-going non-productive cough intermittent cough since CHF diagnosis that is unchanged. He does not weigh himself daily and is unsure  of any weight loss or gain. He does follow with cardiology outpatient.     Of note, he also reports continued daily use of IV methamphetamines with most recent usage on 12/08.     ED course: He initially presented to [facility] ED on 12/09. Lab were significant for leukocytosis (25), hyponatremia (126), lactic acidosis (3.5), transaminitis (, ALT 73), hyperbilirubinemia (4.5), and toxicology positive for amphetamines. CXR was suggestive of right lower lobe pneumonia. Abdominal U/S was suggestive of acalculous cholecystitis. He was given IVF, Ceftriaxone, and Lasix. Case was discussed with transfer center and AES here; recommended transfer for possible MRCP.     Past Medical History:   Diagnosis Date    Anxiety     Arthritis     CHF (congestive heart failure)     Coronary artery disease     Depression     Hypertension        Past Surgical History: None    Allergies: No Known Allergies    Current Outpatient Medications on File Prior to Encounter   Medication Sig    aspirin 81 MG Chew Take 1 tablet (81 mg total) by mouth once daily.    carvedilol (COREG) 3.125 MG tablet Take 1 tablet (3.125 mg total) by mouth 2 (two) times daily with meals.    furosemide (LASIX) 20 MG tablet Take 1 tablet (20 mg total) by mouth 2 (two) times daily.    spironolactone (ALDACTONE) 25 MG tablet Take 1 tablet (25 mg total) by mouth once daily.    enalapril (VASOTEC) 2.5 MG tablet Take 1 tablet (2.5 mg total) by mouth once daily.    LORazepam (ATIVAN) 1 MG tablet Take 1 tablet (1 mg total) by mouth every 8 (eight) hours as needed for Anxiety.     Family History     Problem Relation (Age of Onset)    Arthritis Mother    Asthma Sister    Diabetes Sister    Heart disease Mother, Maternal Grandfather    Hyperlipidemia Mother    Hypertension Mother, Father    Kidney disease Mother        Tobacco Use    Smoking status: Former Smoker     Packs/day: 2.00     Years: 20.00     Pack years: 40.00     Types: Cigarettes     Start date:  1999     Last attempt to quit: 2017     Years since quittin.8    Smokeless tobacco: Never Used   Substance and Sexual Activity    Alcohol use: Not Currently    Drug use: Yes     Frequency: 7.0 times per week     Types: Amphetamines    Sexual activity: Not Currently     Partners: Female     Review of Systems   Constitutional: Negative for appetite change, chills and fever.   HENT: Negative for congestion, sore throat and trouble swallowing.    Eyes: Negative for photophobia and visual disturbance.   Respiratory: Positive for cough and shortness of breath.    Cardiovascular: Positive for leg swelling. Negative for chest pain and palpitations.   Gastrointestinal: Positive for abdominal distention and abdominal pain. Negative for anal bleeding, blood in stool, constipation, diarrhea, nausea, rectal pain and vomiting.   Genitourinary: Negative for difficulty urinating.   Musculoskeletal: Positive for gait problem and joint swelling. Negative for back pain, myalgias and neck pain.   Skin: Negative for pallor and rash.   Neurological: Negative for dizziness, weakness, light-headedness, numbness and headaches.     Objective:     Vital Signs (Most Recent):  Temp: 98.4 °F (36.9 °C) (12/10/19 0406)  Pulse: (!) 130 (12/10/19 0406)  Resp: 16 (12/10/19 0406)  BP: (!) 128/99 (12/10/19 0406)  SpO2: 97 % (12/10/19 0406) Vital Signs (24h Range):  Temp:  [97.5 °F (36.4 °C)-98.4 °F (36.9 °C)] 98.4 °F (36.9 °C)  Pulse:  [126-135] 130  Resp:  [] 16  SpO2:  [89 %-100 %] 97 %  BP: (120-142)/() 128/99     Weight: 83.3 kg (183 lb 10.3 oz)  Body mass index is 27.92 kg/m².    Physical Exam   Constitutional: He is oriented to person, place, and time. He appears well-developed and well-nourished. He has a sickly appearance. No distress.   HENT:   Head: Normocephalic and atraumatic.   Mouth/Throat: Oropharynx is clear and moist and mucous membranes are normal.   Eyes: Pupils are equal, round, and reactive to light.  Conjunctivae are normal. Scleral icterus is present.   Neck: JVD present.   IV line inserted in left neck.    Cardiovascular: Normal rate, regular rhythm and normal pulses. Exam reveals gallop.   No murmur heard.  Pulmonary/Chest: Effort normal. No respiratory distress. He has decreased breath sounds in the right lower field. He has wheezes in the left lower field.   Abdominal: Soft. Normal appearance and bowel sounds are normal. He exhibits distension and ascites. There is no hepatomegaly. There is no tenderness. There is no CVA tenderness.   Musculoskeletal:   There is significant edema of the bilateral lower extremities with pronounced erythema of the bilateral skins and ankles.    Neurological: He is alert and oriented to person, place, and time. He displays no Babinski's sign on the right side. He displays no Babinski's sign on the left side.   Skin: Skin is warm and dry. No bruising and no rash noted.   There are track marks of the bilateral wrists.          CRANIAL NERVES     CN III, IV, VI   Pupils are equal, round, and reactive to light.       Significant Labs: All pertinent labs within the past 24 hours have been reviewed.    Significant Imaging: I have reviewed all pertinent imaging results/findings within the past 24 hours.    Assessment/Plan:     * Sepsis due to undetermined organism  This is a 44 year old male with PMH significant for non-ischemic cardiomyopathy (EF 11%), IVDU, and Hepatitis C who is presenting on 12/10 with two week duration of worsening lower extremity swelling and erythema associated with same duration of post-prandial abdominal pain and distension with intermittent pale colored stools with work-up thus far concerning for tachycardia (120's), leukocytosis (25), lactic acidosis, transaminitis, hyperbilirubinemia, CXR suggestive of right lower lobe pneumonia, and abdominal U/S suggestive of possible acalculous cholecystitis. He has no symptoms of underlying respiratory infection and  on chart review CXR abnormalities are similar to those in 08/2019 during admission for CHF exacerbation. UA is unremarkable. Negative for influenza. Suspect bilateral lower extremity edema and erythema more a manifestation of CHF instead of bilateral cellulitis. Differential diagnoses include biliary etiology vs bacteremia in the setting of on-going IVDU vs possible CAP.     Plan:   -Initiate antibiotic coverage for suspected biliary etiology and CAP with Azithromycin, Ceftriaxone, and Flagyl.   -Blood cultures ordered.   -Repeat lactic acid ordered.   -AES consulted for consideration of MRCP.   -Trend WBC and liver function daily.   -Judiciously administer an IVF resuscitation necessary.     Hyponatremia  -See assessment for CHF exacerbation.       Non-ischemic cardiomyopathy  -See assessment for sepsis.       Hyperbilirubinemia  -See assessment for sepsis.       Transaminitis  -See assessment for sepsis.       Intravenous drug abuse  -On-going use of methamphetamines.   -Prior hepatitis screening positive for HepC in 10/2019.     Plan:   -Counseled on importance of cessation.   -Screening for HIV ordered.   -Monitor for signs of withdrawal for other substances.       Community acquired pneumonia of right lower lobe of lung  -See assessment for sepsis.       Acute on chronic combined systolic and diastolic heart failure  -History of non-ischemic cardiomyopathy diagnosed in 11/2017 at [facility].   -[facility] in 12/2017 with no evidence of angiographically significant coronary artery disease.   -Most recent ECHO in 10/2019 concerning for EF of 35%, grade II diastolic dysfunction, and global hypokinesis with a restrictive physiology.   -Home regimen: Carvedilol, Enalapril, Lasix 20 mg BID, and Spironolactone.   -No outpatient follow-up.   -Exam on admission concerning for bilateral lower extremity edema, abdominal distension with ascites, and JVD, however without evidence of pulmonary edema,  respiratory compromise, or poor  perfusion.   -Labs with associated hyponatremia likely from hypervolemia.   -Suspect presentation from worsening right sided heart failure.     Plan:   -Initiate IV diuresis BID.   -Continue home regimen with exception of beta-blocker.   -Strict I/O's and daily standing weights.   -Telemetry ordered.   -Needs evaluation for ICD vs CRT.   -Trend renal function with diuresis; goal K >4 and Mg >2.         Other specified anxiety disorders  -Continue home Ativan PRN.       Essential hypertension  -See assessment for CHF exacerbation.       VTE Risk Mitigation (From admission, onward)         Ordered     IP VTE HIGH RISK PATIENT  Once      12/10/19 0408                   Huber Corona MD  Department of Hospital Medicine   [facility]

## 2019-12-10 NOTE — ASSESSMENT & PLAN NOTE
This is a 44 year old male with PMH significant for non-ischemic cardiomyopathy (EF 11%), IVDU, and Hepatitis C who is presenting on 12/10 with two week duration of worsening lower extremity swelling and erythema associated with same duration of post-prandial abdominal pain and distension with intermittent pale colored stools with work-up thus far concerning for tachycardia (120's), leukocytosis (25), lactic acidosis, transaminitis, hyperbilirubinemia, CXR suggestive of right lower lobe pneumonia, and abdominal U/S suggestive of possible acalculous cholecystitis. He has no symptoms of underlying respiratory infection and on chart review CXR abnormalities are similar to those in 08/2019 during admission for CHF exacerbation. UA is unremarkable. Negative for influenza. Suspect bilateral lower extremity edema and erythema more a manifestation of CHF instead of bilateral cellulitis. Differential diagnoses include biliary etiology vs bacteremia in the setting of on-going IVDU vs possible CAP.     Plan:   -Initiate antibiotic coverage for suspected biliary etiology and CAP with Azithromycin, Ceftriaxone, and Flagyl.   -Blood cultures ordered.   -Repeat lactic acid ordered.   -AES consulted for consideration of MRCP.   -Trend WBC and liver function daily.   -Judiciously administer an IVF resuscitation necessary.

## 2019-12-10 NOTE — NURSING
Pt arrived to unit via strecther  With paramedics from [facility],Pt  is alert and oriented x4. Pt is verbal and has no complaints of pain at this time and appears to be in no distress. Pt v/s stable he was orieinted to room and changed into gown with telemetry placed.

## 2019-12-10 NOTE — HPI
"Mr. Daigle is a 44 year old female with a history significant for non-ischemic cardiomyopathy with combined CHF (EF 11% without ICD or CRT device) and on-going IVDU who presented to [facility] ED with complaints of abdominal pain and bilateral leg swelling. Per chart review: "He reports a two week duration of progressively worsening bilateral lower extremity swelling and redness of the anterior shins. He was accompanied by his cousin whom he lives with who provides history. The abdominal pain had been present for several weeks, improved with milk. He has chronic lower extremity swelling that was worsening over the past few weeks. He reported 2 weeks of skin changes and weeping in bilateral legs which is new. Additionally, he reports a two week duration of occasional episodes of pale semi-formed stools. He attempted symptom relief with increasing dose of daily Lasix, however without effect, prompting presentation to ED on 12/09.   He denied symptom association with fevers, chills, nausea, vomiting, history of prior abdominal surgeries, ETOH consumption, chest pain, pain with inspiration, lightheadedness, or blurry vision. He reports a on-going non-productive cough intermittent cough since CHF diagnosis that is unchanged. He does not weigh himself daily and is unsure of any weight loss or gain. He does follow with cardiology outpatient.   He initially presented to [place] ED on 12/09. Lab were significant for leukocytosis (25), hyponatremia (126), lactic acidosis (3.5), transaminitis (, ALT 73), hyperbilirubinemia (4.5), and toxicology positive for amphetamines. CXR was suggestive of right lower lobe pneumonia. Abdominal U/S was suggestive of acalculous cholecystitis. He was given IVF, Ceftriaxone, and Lasix. Case was discussed with transfer center and AES here; recommended transfer for possible MRCP."     Patient was admitted to hospital medicine, treated for CAP with flagyl for possible intra-abdominal " etiology. Lactic initially elevated, fluid resuscitation was held as patient has baseline cardiomyopathy and reduced EF. Overnight patient agitated, received dose of ativan for anxiety (Per verbal report, not documented in chart as being given) and was found to be cyanotic. Pulse was lost and code blue was called. Rhythm was PEA, patient received 1 round of ACLS with ROSC. He was transferred to the MICU for further care following cardiac arrest.

## 2019-12-10 NOTE — PROCEDURES
"Francis Daigle is a 44 y.o. male patient.    Temp: 98.4 °F (36.9 °C) (12/10/19 0406)  Pulse: (!) 122 (12/10/19 0715)  Resp: (!) 39 (12/10/19 0715)  BP: (!) 134/57 (12/10/19 0715)  SpO2: 97 % (12/10/19 0406)  Weight: 83.3 kg (183 lb 10.3 oz) (12/10/19 0500)  Height: 5' 8" (172.7 cm) (12/10/19 0316)       Arterial Line  Date/Time: 12/10/2019 7:31 AM  Location procedure was performed: [facility] SURGICAL ICU (SICU)  Performed by: Genia Ramey NP  Authorized by: Genia Ramey NP   Pre-op Diagnosis: cardiac arrest  Consent Done: Emergent Situation  Preparation: Patient was prepped and draped in the usual sterile fashion.  Indications: multiple ABGs, respiratory failure and hemodynamic monitoring  Location: right radial  Patient sedated: no  Vinod's test normal: yes  Needle gauge: 20  Seldinger technique: Seldinger technique used  Number of attempts: 1  Complications: No  Estimated blood loss (mL): 0  Specimens: No  Implants: No  Post-procedure: line sutured and dressing applied  Post-procedure CMS: unchanged  Patient tolerance: Patient tolerated the procedure well with no immediate complications          Genia Ramey  12/10/2019  "

## 2019-12-10 NOTE — PROGRESS NOTES
44 yom with PMH of chronic systolic CHF who presented 12/10 with chief complaint of leg swelling & dyspnea. Family reports cough x2 weeks, initially with bloody sputum though this later cleared. He also had been having abdominal pain for 4 months that was relieved by drinking milk. He was admitted to hospital medicine. He was agitated & was administered some ativan. He suffered a PEA cardiac arrest early this morning. CXR shows BNP was 740 yesterday. CXR yesterday RLL infiltrate. CXR today shows haziness throughout the right lung.    1) PEA cardiac arrest. Suspect respiratory failure as the underlying etiology. Considerations include pna, CHF, and PE. TPA was started empirically by the overnight team. Stop TPA now. No STEMI on ECG. Initial troponin is negative. Pt is exhibiting purposeful movements of all extremities on my exam; TTM is not indicated. Follow up stat echo.  2) RLL pneumonia. Tx with vanc, ceftriaxone, & azithromycin.  3) Hepatic coagulopathy. Monitor.  4) Hepatitis C.  5) Suspected hepatic cirrhosis with decompensation. Will see if there is adequate ascites to tap.  6) GB wall thickening vs cholecystitis. History is not very suggestive of cholecystitis. Reyes sign is negative on my exam. Empiric abx for now.  7) Acute hypoxemic & hypercapnic respiratory failure. Support with the ventilator today.  8) Ascites. Pocket is a little too small to tap today. Monitor for now.  9) Acute on chronic systolic & diastolic CHF. Follow up on echo. Hold off on diuresis for now. Trend lactate.  10) Amphetamine abuse. Counseling once recovered from acute illness.    Critical Care Time: 100 minutes  Critical care was time spent personally by me on the following activities: evaluating this patient's organ dysfunction, development of treatment plan, discussing treatment plan with patient or surrogate and bedside caregivers, discussions with consultants, evaluation of patient's response to treatment, examination of  patient, ordering and performing treatments and interventions, ordering and review of laboratory studies, ordering and review of radiographic studies, re-evaluation of patient's condition. This critical care time did not overlap with that of any other provider or involve time for any procedures.    Elijah Strong MD  [facility]

## 2019-12-10 NOTE — PROCEDURES
"Francis Daigle is a 44 y.o. male patient.    Temp: 98.4 °F (36.9 °C) (12/10/19 0406)  Pulse: 96 (12/10/19 0720)  Resp: (!) 26 (12/10/19 0720)  BP: 128/69 (12/10/19 0720)  SpO2: (!) 94 % (12/10/19 0720)  Weight: 83.3 kg (183 lb 10.3 oz) (12/10/19 0500)  Height: 5' 8" (172.7 cm) (12/10/19 0316)       Central Line  Date/Time: 12/10/2019 8:39 AM  Location procedure was performed: [facility] CRITICAL CARE MEDICINE  Performed by: Carlos Newby MD  Pre-operative Diagnosis: Cardiac arrest  Post-operative diagnosis: Cardiac arrest  Consent Done: Emergent Situation  Time out: Immediately prior to procedure a "time out" was called to verify the correct patient, procedure, equipment, support staff and site/side marked as required.  Indications: vascular access and med administration    Anesthesia:  Local anesthesia used: no  Preparation: skin prepped with ChloraPrep  Skin prep agent dried: skin prep agent completely dried prior to procedure  Sterile barriers: all five maximum sterile barriers used - cap, mask, sterile gown, sterile gloves, and large sterile sheet  Hand hygiene: hand hygiene performed prior to central venous catheter insertion  Location details: right internal jugular  Catheter type: triple lumen  Ultrasound guidance: yes  Vessel Caliber: large, patent, compressibility normal  Needle advanced into vessel with real time Ultrasound guidance.  Guidewire confirmed in vessel.  Sterile sheath used.  Manometry: Yes  Number of attempts: 1  Assessment: placement verified by x-ray  Complications: none  Estimated blood loss (mL): 0  Specimens: No  Implants: No  Post-procedure: line sutured,  chlorhexidine patch,  sterile dressing applied and blood return through all ports  Complications: No          Carlos Newby  12/10/2019  "

## 2019-12-10 NOTE — CODE DOCUMENTATION
CODE SUMMARY  Critical Care Medicine    Admit Date: 12/10/2019  LOS: 0    CC: Sepsis due to undetermined organism    Code Status: Full Code   Code Date: 12/10/2019    CODE Metrics:     Location of CODE: 19395 CVICU/49562 CVICU A  Patient Age: 44 y.o.  MRN: [id number]  Was the patient discharged from an ICU this admission (include date)? no  Was the patient discharged from a PACU within last 24 hours? no  Did the patient receive conscious sedation/general anesthesia within last 24 hours? no  Was the patient in the ED within the past 24 hours? no  Was the patient started on NIPPV within the past 24 hours? no  Attending Physician: Elijah Strong MD  Primary Service: [facility] CRITICAL CARE MEDICINE  Primary Service Notified?: yes    CODE Category (Acute Respiratory Compromise or Cardiopulmonary Arrest): 06:38  Time of need for chest compressions or airway support: 06:38  Time CPR initiated: 06:38  Time CODE Page Received: 06:38  Time CODE team arrived: 06:39  First documented rhythm: PEA  Time to first epinephrine given: 06:41  Time to first defibrillation: n/a  Time to intubation: 06:53  ROSC? (if yes provide time): 06:53  Post cardiac arrest hypothermia considered?: yes  Disposition (transferred to ICU, , etc): transferred to ICU    CODE Team Members:  [facility] Resident/Fellow: Robyn [facility] MD  Cardiology Fellow: n/a  Anesthesia Resident: Imtiaz Tapia MD  [facility] APC: Genia Ramey NP  CODE : Genia Ramey NP  CODE Team Recorder: CSU RN  CNICU Nurse #1: Diego Ibrahim RN  CNICU Nurse #2: Mckenna Cuenca RN    SUBJECTIVE:     Events preceding cardiopulmonary arrest: Patient transferred to [facility] for management of pneumonia and cholecystitis. Patient developed anxiety ~ 05:00 and received ativan. Nursing states patient became cyanotic during assessment and went into cardiac arrest.     Objective:     Physical Exam:  Last Vitals:  Vitals:    12/10/19 0715   BP: (!) 134/57   Pulse: (!) 122   Resp: (!) 39   Temp:       GA: Comatose, unresponsive.  HEENT: No scleral icterus or JVD.   Pulmonary: Apneic. Clear to auscultation A/P/L.   Cardiac: Pulseless. No chest deformities.   Abdominal: No visible abdominal lesions. No appreciable hepatosplenomegaly.  Neuro:  --GCS: E1 V1 M1  --Mental Status:  unresponsive    IV Access PTA::           External Jugular IV 12/09/19 1544 (Active)   Site Assessment Clean;Dry;Intact;No redness;No swelling 12/10/2019  3:18 AM   Line Status Blood return noted;Flushed;Saline locked 12/9/2019  3:44 PM   Dressing Status Clean;Dry;Intact 12/10/2019  3:18 AM   Dressing Intervention Other (Comment) 12/10/2019  3:18 AM   Dressing Change Due 12/14/19 12/10/2019  3:18 AM   Reason Not Rotated Not due 12/10/2019  3:18 AM       Labs:  ABG:   Recent Labs   Lab 12/10/19  0704   PH 6.973*   PO2 184*   PCO2 46.7*   HCO3 10.8*   POCSATURATED 99   BE -21     BMP:  Recent Labs   Lab 12/10/19  0435   *   K 4.6   CL 99   CO2 20*   BUN 30*   CREATININE 1.0      MG 1.8   PHOS 3.1     LFT:   Lab Results   Component Value Date     (H) 12/10/2019    ALT 74 (H) 12/10/2019    ALKPHOS 166 (H) 12/10/2019    BILITOT 4.7 (H) 12/10/2019    ALBUMIN 1.9 (L) 12/10/2019    PROT 7.7 12/10/2019     CBC:   Lab Results   Component Value Date    WBC 27.30 (H) 12/10/2019    HGB 13.2 (L) 12/10/2019    HCT 50 12/10/2019    MCV 84 12/10/2019     12/10/2019       CODE Timeline: Time/Event     Please see nursing documentation for specifics regarding time of medications. Patient received 2 doses epi and 1 dose narcan prior to achieving ROSC. Only rhythm noted during code was PEA.     Following transfer to ICU, central and arterial lines were placed. Bedside cardiac US shows septal bowing into LV concerning for PE. Decision made to tx for massive PE and administer alteplase.     Pt's emergency contact Estevan Lemonjose juan (cousin) notified of event. He will notify pt's sons. Family instructed to present to hospital if able as pt  is in critical condition.         Critical Care Time (uninterrupted) 45 minutes    Genia Ramey NP  Critical Care Medicine

## 2019-12-11 PROBLEM — I26.99 ACUTE MASSIVE PULMONARY EMBOLISM: Status: ACTIVE | Noted: 2019-12-11

## 2019-12-11 LAB
ACID FAST MOD KINY STN SPEC: NORMAL
ALBUMIN SERPL BCP-MCNC: 1.6 G/DL (ref 3.5–5.2)
ALLENS TEST: ABNORMAL
ALP SERPL-CCNC: 137 U/L (ref 55–135)
ALT SERPL W/O P-5'-P-CCNC: 66 U/L (ref 10–44)
ANION GAP SERPL CALC-SCNC: 10 MMOL/L (ref 8–16)
ANISOCYTOSIS BLD QL SMEAR: SLIGHT
APPEARANCE FLD: NORMAL
APTT BLDCRRT: 32.6 SEC (ref 21–32)
APTT BLDCRRT: 38.2 SEC (ref 21–32)
APTT BLDCRRT: 97.9 SEC (ref 21–32)
AST SERPL-CCNC: 97 U/L (ref 10–40)
BASOPHILS # BLD AUTO: 0.03 K/UL (ref 0–0.2)
BASOPHILS # BLD AUTO: 0.04 K/UL (ref 0–0.2)
BASOPHILS NFR BLD: 0.1 % (ref 0–1.9)
BASOPHILS NFR BLD: 0.2 % (ref 0–1.9)
BILIRUB SERPL-MCNC: 3.1 MG/DL (ref 0.1–1)
BODY FLD TYPE: NORMAL
BUN SERPL-MCNC: 40 MG/DL (ref 6–20)
BURR CELLS BLD QL SMEAR: ABNORMAL
CALCIUM SERPL-MCNC: 7.6 MG/DL (ref 8.7–10.5)
CHLORIDE SERPL-SCNC: 101 MMOL/L (ref 95–110)
CO2 SERPL-SCNC: 21 MMOL/L (ref 23–29)
COLOR FLD: NORMAL
CREAT SERPL-MCNC: 1.4 MG/DL (ref 0.5–1.4)
DACRYOCYTES BLD QL SMEAR: ABNORMAL
DELSYS: ABNORMAL
DIFFERENTIAL METHOD: ABNORMAL
DIFFERENTIAL METHOD: ABNORMAL
EOSINOPHIL # BLD AUTO: 0 K/UL (ref 0–0.5)
EOSINOPHIL # BLD AUTO: 0.1 K/UL (ref 0–0.5)
EOSINOPHIL NFR BLD: 0.1 % (ref 0–8)
EOSINOPHIL NFR BLD: 0.3 % (ref 0–8)
ERYTHROCYTE [DISTWIDTH] IN BLOOD BY AUTOMATED COUNT: 24.3 % (ref 11.5–14.5)
ERYTHROCYTE [DISTWIDTH] IN BLOOD BY AUTOMATED COUNT: 24.5 % (ref 11.5–14.5)
ERYTHROCYTE [SEDIMENTATION RATE] IN BLOOD BY WESTERGREN METHOD: 22 MM/H
EST. GFR  (AFRICAN AMERICAN): >60 ML/MIN/1.73 M^2
EST. GFR  (NON AFRICAN AMERICAN): 59.8 ML/MIN/1.73 M^2
FIO2: 30
GLUCOSE SERPL-MCNC: 78 MG/DL (ref 70–110)
HCO3 UR-SCNC: 21.1 MMOL/L (ref 24–28)
HCT VFR BLD AUTO: 38.6 % (ref 40–54)
HCT VFR BLD AUTO: 40 % (ref 40–54)
HGB BLD-MCNC: 12.9 G/DL (ref 14–18)
HGB BLD-MCNC: 12.9 G/DL (ref 14–18)
HYPOCHROMIA BLD QL SMEAR: ABNORMAL
IMM GRANULOCYTES # BLD AUTO: 0.19 K/UL (ref 0–0.04)
IMM GRANULOCYTES # BLD AUTO: 0.2 K/UL (ref 0–0.04)
IMM GRANULOCYTES NFR BLD AUTO: 0.9 % (ref 0–0.5)
IMM GRANULOCYTES NFR BLD AUTO: 0.9 % (ref 0–0.5)
INR PPP: 1.9 (ref 0.8–1.2)
INR PPP: 2 (ref 0.8–1.2)
KOH PREP SPEC: NORMAL
LYMPHOCYTES # BLD AUTO: 1.1 K/UL (ref 1–4.8)
LYMPHOCYTES # BLD AUTO: 1.3 K/UL (ref 1–4.8)
LYMPHOCYTES NFR BLD: 5 % (ref 18–48)
LYMPHOCYTES NFR BLD: 5.7 % (ref 18–48)
LYMPHOCYTES NFR FLD MANUAL: 16 %
MAGNESIUM SERPL-MCNC: 1.8 MG/DL (ref 1.6–2.6)
MCH RBC QN AUTO: 25.8 PG (ref 27–31)
MCH RBC QN AUTO: 26.4 PG (ref 27–31)
MCHC RBC AUTO-ENTMCNC: 32.3 G/DL (ref 32–36)
MCHC RBC AUTO-ENTMCNC: 33.4 G/DL (ref 32–36)
MCV RBC AUTO: 79 FL (ref 82–98)
MCV RBC AUTO: 80 FL (ref 82–98)
MODE: ABNORMAL
MONOCYTES # BLD AUTO: 1.2 K/UL (ref 0.3–1)
MONOCYTES # BLD AUTO: 1.3 K/UL (ref 0.3–1)
MONOCYTES NFR BLD: 5.5 % (ref 4–15)
MONOCYTES NFR BLD: 5.7 % (ref 4–15)
MONOS+MACROS NFR FLD MANUAL: 1 %
NEUTROPHILS # BLD AUTO: 19.5 K/UL (ref 1.8–7.7)
NEUTROPHILS # BLD AUTO: 20.4 K/UL (ref 1.8–7.7)
NEUTROPHILS NFR BLD: 87.3 % (ref 38–73)
NEUTROPHILS NFR BLD: 88.3 % (ref 38–73)
NEUTROPHILS NFR FLD MANUAL: 83 %
NRBC BLD-RTO: 0 /100 WBC
NRBC BLD-RTO: 0 /100 WBC
OVALOCYTES BLD QL SMEAR: ABNORMAL
PCO2 BLDA: 34.3 MMHG (ref 35–45)
PEEP: 5
PH SMN: 7.4 [PH] (ref 7.35–7.45)
PHOSPHATE SERPL-MCNC: 4.7 MG/DL (ref 2.7–4.5)
PLATELET # BLD AUTO: 253 K/UL (ref 150–350)
PLATELET # BLD AUTO: 256 K/UL (ref 150–350)
PMV BLD AUTO: 9.3 FL (ref 9.2–12.9)
PMV BLD AUTO: 9.8 FL (ref 9.2–12.9)
PO2 BLDA: 110 MMHG (ref 80–100)
POC BE: -4 MMOL/L
POC SATURATED O2: 98 % (ref 95–100)
POC TCO2: 22 MMOL/L (ref 23–27)
POIKILOCYTOSIS BLD QL SMEAR: SLIGHT
POLYCHROMASIA BLD QL SMEAR: ABNORMAL
POTASSIUM SERPL-SCNC: 4.4 MMOL/L (ref 3.5–5.1)
PROT SERPL-MCNC: 6.7 G/DL (ref 6–8.4)
PROTHROMBIN TIME: 18.8 SEC (ref 9–12.5)
PROTHROMBIN TIME: 19.2 SEC (ref 9–12.5)
RBC # BLD AUTO: 4.89 M/UL (ref 4.6–6.2)
RBC # BLD AUTO: 5 M/UL (ref 4.6–6.2)
SAMPLE: ABNORMAL
SITE: ABNORMAL
SODIUM SERPL-SCNC: 132 MMOL/L (ref 136–145)
TARGETS BLD QL SMEAR: ABNORMAL
VANCOMYCIN SERPL-MCNC: 15.4 UG/ML
VT: 420
WBC # BLD AUTO: 22.12 K/UL (ref 3.9–12.7)
WBC # BLD AUTO: 23.36 K/UL (ref 3.9–12.7)
WBC # FLD: 3060 /CU MM

## 2019-12-11 PROCEDURE — 31624 DX BRONCHOSCOPE/LAVAGE: CPT | Mod: RT,,, | Performed by: INTERNAL MEDICINE

## 2019-12-11 PROCEDURE — 87015 SPECIMEN INFECT AGNT CONCNTJ: CPT

## 2019-12-11 PROCEDURE — 99900026 HC AIRWAY MAINTENANCE (STAT)

## 2019-12-11 PROCEDURE — 87206 SMEAR FLUORESCENT/ACID STAI: CPT

## 2019-12-11 PROCEDURE — 83735 ASSAY OF MAGNESIUM: CPT

## 2019-12-11 PROCEDURE — 87116 MYCOBACTERIA CULTURE: CPT

## 2019-12-11 PROCEDURE — C9113 INJ PANTOPRAZOLE SODIUM, VIA: HCPCS | Performed by: INTERNAL MEDICINE

## 2019-12-11 PROCEDURE — 93005 ELECTROCARDIOGRAM TRACING: CPT

## 2019-12-11 PROCEDURE — 27000221 HC OXYGEN, UP TO 24 HOURS

## 2019-12-11 PROCEDURE — 99900035 HC TECH TIME PER 15 MIN (STAT)

## 2019-12-11 PROCEDURE — S0030 INJECTION, METRONIDAZOLE: HCPCS | Performed by: STUDENT IN AN ORGANIZED HEALTH CARE EDUCATION/TRAINING PROGRAM

## 2019-12-11 PROCEDURE — 85730 THROMBOPLASTIN TIME PARTIAL: CPT | Mod: 91

## 2019-12-11 PROCEDURE — 87102 FUNGUS ISOLATION CULTURE: CPT

## 2019-12-11 PROCEDURE — 87070 CULTURE OTHR SPECIMN AEROBIC: CPT

## 2019-12-11 PROCEDURE — 37799 UNLISTED PX VASCULAR SURGERY: CPT

## 2019-12-11 PROCEDURE — 25000003 PHARM REV CODE 250

## 2019-12-11 PROCEDURE — 87206 SMEAR FLUORESCENT/ACID STAI: CPT | Mod: 91

## 2019-12-11 PROCEDURE — 63600175 PHARM REV CODE 636 W HCPCS: Performed by: STUDENT IN AN ORGANIZED HEALTH CARE EDUCATION/TRAINING PROGRAM

## 2019-12-11 PROCEDURE — 87210 SMEAR WET MOUNT SALINE/INK: CPT

## 2019-12-11 PROCEDURE — 87205 SMEAR GRAM STAIN: CPT

## 2019-12-11 PROCEDURE — 84100 ASSAY OF PHOSPHORUS: CPT

## 2019-12-11 PROCEDURE — 93010 EKG 12-LEAD: ICD-10-PCS | Mod: ,,, | Performed by: INTERNAL MEDICINE

## 2019-12-11 PROCEDURE — 94761 N-INVAS EAR/PLS OXIMETRY MLT: CPT

## 2019-12-11 PROCEDURE — 63600175 PHARM REV CODE 636 W HCPCS: Performed by: INTERNAL MEDICINE

## 2019-12-11 PROCEDURE — 80053 COMPREHEN METABOLIC PANEL: CPT

## 2019-12-11 PROCEDURE — 82800 BLOOD PH: CPT

## 2019-12-11 PROCEDURE — 99291 PR CRITICAL CARE, E/M 30-74 MINUTES: ICD-10-PCS | Mod: 25,,, | Performed by: INTERNAL MEDICINE

## 2019-12-11 PROCEDURE — 85025 COMPLETE CBC W/AUTO DIFF WBC: CPT

## 2019-12-11 PROCEDURE — 87186 SC STD MICRODIL/AGAR DIL: CPT

## 2019-12-11 PROCEDURE — 87106 FUNGI IDENTIFICATION YEAST: CPT

## 2019-12-11 PROCEDURE — 89051 BODY FLUID CELL COUNT: CPT

## 2019-12-11 PROCEDURE — 31624 PR BRONCHOSCOPY,DIAG2STIC W LAVAGE: ICD-10-PCS | Mod: RT,,, | Performed by: INTERNAL MEDICINE

## 2019-12-11 PROCEDURE — 25000003 PHARM REV CODE 250: Performed by: STUDENT IN AN ORGANIZED HEALTH CARE EDUCATION/TRAINING PROGRAM

## 2019-12-11 PROCEDURE — 93010 ELECTROCARDIOGRAM REPORT: CPT | Mod: ,,, | Performed by: INTERNAL MEDICINE

## 2019-12-11 PROCEDURE — 82803 BLOOD GASES ANY COMBINATION: CPT

## 2019-12-11 PROCEDURE — 80202 ASSAY OF VANCOMYCIN: CPT

## 2019-12-11 PROCEDURE — 31622 DX BRONCHOSCOPE/WASH: CPT

## 2019-12-11 PROCEDURE — 87077 CULTURE AEROBIC IDENTIFY: CPT

## 2019-12-11 PROCEDURE — 94003 VENT MGMT INPAT SUBQ DAY: CPT

## 2019-12-11 PROCEDURE — 85610 PROTHROMBIN TIME: CPT

## 2019-12-11 PROCEDURE — 85610 PROTHROMBIN TIME: CPT | Mod: 91

## 2019-12-11 PROCEDURE — 99900025 HC BRONCHOSCOPY-ASST (STAT)

## 2019-12-11 PROCEDURE — 99291 CRITICAL CARE FIRST HOUR: CPT | Mod: 25,,, | Performed by: INTERNAL MEDICINE

## 2019-12-11 PROCEDURE — 25000003 PHARM REV CODE 250: Performed by: INTERNAL MEDICINE

## 2019-12-11 PROCEDURE — 20000000 HC ICU ROOM

## 2019-12-11 RX ORDER — LIDOCAINE HYDROCHLORIDE 10 MG/ML
10 INJECTION INFILTRATION; PERINEURAL ONCE
Status: COMPLETED | OUTPATIENT
Start: 2019-12-11 | End: 2019-12-11

## 2019-12-11 RX ORDER — DEXMEDETOMIDINE HYDROCHLORIDE 4 UG/ML
0.2 INJECTION, SOLUTION INTRAVENOUS CONTINUOUS
Status: DISCONTINUED | OUTPATIENT
Start: 2019-12-11 | End: 2019-12-11

## 2019-12-11 RX ORDER — FENTANYL CITRATE-0.9 % NACL/PF 10 MCG/ML
PLASTIC BAG, INJECTION (ML) INTRAVENOUS CONTINUOUS
Status: DISCONTINUED | OUTPATIENT
Start: 2019-12-11 | End: 2019-12-12

## 2019-12-11 RX ORDER — LIDOCAINE HYDROCHLORIDE 10 MG/ML
INJECTION, SOLUTION EPIDURAL; INFILTRATION; INTRACAUDAL; PERINEURAL
Status: COMPLETED
Start: 2019-12-11 | End: 2019-12-11

## 2019-12-11 RX ORDER — VANCOMYCIN HCL IN 5 % DEXTROSE 1G/250ML
1000 PLASTIC BAG, INJECTION (ML) INTRAVENOUS
Status: DISCONTINUED | OUTPATIENT
Start: 2019-12-11 | End: 2019-12-12

## 2019-12-11 RX ORDER — HEPARIN SODIUM,PORCINE/D5W 25000/250
18 INTRAVENOUS SOLUTION INTRAVENOUS CONTINUOUS
Status: DISCONTINUED | OUTPATIENT
Start: 2019-12-11 | End: 2019-12-12

## 2019-12-11 RX ORDER — SODIUM CHLORIDE, SODIUM LACTATE, POTASSIUM CHLORIDE, CALCIUM CHLORIDE 600; 310; 30; 20 MG/100ML; MG/100ML; MG/100ML; MG/100ML
INJECTION, SOLUTION INTRAVENOUS CONTINUOUS
Status: ACTIVE | OUTPATIENT
Start: 2019-12-11 | End: 2019-12-11

## 2019-12-11 RX ADMIN — MUPIROCIN: 20 OINTMENT TOPICAL at 09:12

## 2019-12-11 RX ADMIN — PIPERACILLIN AND TAZOBACTAM 4.5 G: 4; .5 INJECTION, POWDER, FOR SOLUTION INTRAVENOUS at 01:12

## 2019-12-11 RX ADMIN — METRONIDAZOLE 500 MG: 500 INJECTION, SOLUTION INTRAVENOUS at 04:12

## 2019-12-11 RX ADMIN — PROPOFOL 10 MCG/KG/MIN: 10 INJECTION, EMULSION INTRAVENOUS at 08:12

## 2019-12-11 RX ADMIN — LIDOCAINE HYDROCHLORIDE 10 ML: 10 INJECTION, SOLUTION EPIDURAL; INFILTRATION; INTRACAUDAL; PERINEURAL at 02:12

## 2019-12-11 RX ADMIN — CHLORHEXIDINE GLUCONATE 0.12% ORAL RINSE 15 ML: 1.2 LIQUID ORAL at 09:12

## 2019-12-11 RX ADMIN — SODIUM CHLORIDE, SODIUM LACTATE, POTASSIUM CHLORIDE, AND CALCIUM CHLORIDE: .6; .31; .03; .02 INJECTION, SOLUTION INTRAVENOUS at 01:12

## 2019-12-11 RX ADMIN — LIDOCAINE HYDROCHLORIDE 10 ML: 10 INJECTION INFILTRATION; PERINEURAL at 02:12

## 2019-12-11 RX ADMIN — PROPOFOL 40 MCG/KG/MIN: 10 INJECTION, EMULSION INTRAVENOUS at 08:12

## 2019-12-11 RX ADMIN — HEPARIN SODIUM 18 UNITS/KG/HR: 10000 INJECTION, SOLUTION INTRAVENOUS at 02:12

## 2019-12-11 RX ADMIN — PANTOPRAZOLE SODIUM 40 MG: 40 INJECTION, POWDER, FOR SOLUTION INTRAVENOUS at 09:12

## 2019-12-11 RX ADMIN — AZITHROMYCIN MONOHYDRATE 500 MG: 500 INJECTION, POWDER, LYOPHILIZED, FOR SOLUTION INTRAVENOUS at 04:12

## 2019-12-11 RX ADMIN — VANCOMYCIN HYDROCHLORIDE 1000 MG: 1 INJECTION, POWDER, LYOPHILIZED, FOR SOLUTION INTRAVENOUS at 11:12

## 2019-12-11 RX ADMIN — PROPOFOL 40 MCG/KG/MIN: 10 INJECTION, EMULSION INTRAVENOUS at 07:12

## 2019-12-11 RX ADMIN — CHLORHEXIDINE GLUCONATE 0.12% ORAL RINSE 15 ML: 1.2 LIQUID ORAL at 08:12

## 2019-12-11 RX ADMIN — HEPARIN SODIUM 18 UNITS/KG/HR: 10000 INJECTION, SOLUTION INTRAVENOUS at 09:12

## 2019-12-11 RX ADMIN — DEXMEDETOMIDINE HYDROCHLORIDE 0.2 MCG/KG/HR: 4 INJECTION, SOLUTION INTRAVENOUS at 05:12

## 2019-12-11 RX ADMIN — MAGNESIUM SULFATE HEPTAHYDRATE 1 G: 500 INJECTION, SOLUTION INTRAMUSCULAR; INTRAVENOUS at 05:12

## 2019-12-11 NOTE — ASSESSMENT & PLAN NOTE
Patient with questionable gallbladder wall thickening, prompting transfer for concern for acalculous cholecystitis. Also with focal consolidation on CXR at time of admit. Being treated empirically for CAP.  Bedside ultrasound with some thickening, but this may be related to chronic cirrhosis. Prior U/S with similar findings    - Continuing antibiotic therapy for CAP, with flagyl added for intra-abdominal coverage  - Blood and respiratory cultures drawn, results pending  - US liver doppler ordered to assess gallbladder as well as any clot burden given DVT's and presumed massive PE

## 2019-12-11 NOTE — H&P
"[facility]  Critical Care Medicine  History & Physical    Patient Name: Francis Daigle  MRN: [id number]  Admission Date: 12/10/2019  Hospital Length of Stay: 0 days  Code Status: Full Code  Attending Physician: Elijah Strong MD   Primary Care Provider: Primary Doctor No   Principal Problem: Cardiac arrest    Subjective:     HPI:  Mr. Daigle is a 44 year old female with a history significant for non-ischemic cardiomyopathy with combined CHF (EF 11% without ICD or CRT device) and on-going IVDU who presented to [facility] ED with complaints of abdominal pain and bilateral leg swelling. Per chart review: "He reports a two week duration of progressively worsening bilateral lower extremity swelling and redness of the anterior shins. He was accompanied by his cousin whom he lives with who provides history. The abdominal pain had been present for several weeks, improved with milk. He has chronic lower extremity swelling that was worsening over the past few weeks. He reported 2 weeks of skin changes and weeping in bilateral legs which is new. Additionally, he reports a two week duration of occasional episodes of pale semi-formed stools. He attempted symptom relief with increasing dose of daily Lasix, however without effect, prompting presentation to ED on 12/09.   He denied symptom association with fevers, chills, nausea, vomiting, history of prior abdominal surgeries, ETOH consumption, chest pain, pain with inspiration, lightheadedness, or blurry vision. He reports a on-going non-productive cough intermittent cough since CHF diagnosis that is unchanged. He does not weigh himself daily and is unsure of any weight loss or gain. He does follow with cardiology outpatient.   He initially presented to [facility] ED on 12/09. Lab were significant for leukocytosis (25), hyponatremia (126), lactic acidosis (3.5), transaminitis (, ALT 73), hyperbilirubinemia (4.5), and toxicology positive for " "amphetamines. CXR was suggestive of right lower lobe pneumonia. Abdominal U/S was suggestive of acalculous cholecystitis. He was given IVF, Ceftriaxone, and Lasix. Case was discussed with transfer center and [facility] here; recommended transfer for possible MRCP."     Patient was admitted to hospital medicine, treated for CAP with flagyl for possible intra-abdominal etiology. Lactic initially elevated, fluid resuscitation was held as patient has baseline cardiomyopathy and reduced EF. Overnight patient agitated, received dose of ativan for anxiety (Per verbal report, not documented in chart as being given) and was found to be cyanotic. Pulse was lost and code blue was called. Rhythm was PEA, patient received 1 round of ACLS with ROSC. He was transferred to the MICU for further care following cardiac arrest.         Hospital/ICU Course:  Transferred to ICU following cardiac arrest on 12/10/19. Emergently intubated during PEA arrest, ROSC achieved after 1 round of CPR. Central line and arterial line placed. Patient initially requiring pressor support, cyanotic, hypoxic on blood gas. Bedside ultrasound with signs of RV strain. Patient on pressors at the time, requiring 100% FiO2 to oxygenate. Given TPA emergently.     Past Medical History:   Diagnosis Date    Anxiety     Arthritis     CHF (congestive heart failure)     Coronary artery disease     Depression     Hypertension        History reviewed. No pertinent surgical history.    Review of patient's allergies indicates:  No Known Allergies    Family History     Problem Relation (Age of Onset)    Arthritis Mother    Asthma Sister    Diabetes Sister    Heart disease Mother, Maternal Grandfather    Hyperlipidemia Mother    Hypertension Mother, Father    Kidney disease Mother        Tobacco Use    Smoking status: Former Smoker     Packs/day: 2.00     Years: 20.00     Pack years: 40.00     Types: Cigarettes     Start date: 1/23/1999     Last attempt to quit: 1/23/2017    "  Years since quittin.8    Smokeless tobacco: Never Used   Substance and Sexual Activity    Alcohol use: Not Currently    Drug use: Yes     Frequency: 7.0 times per week     Types: Amphetamines    Sexual activity: Not Currently     Partners: Female      Review of Systems   Unable to perform ROS: Intubated     Objective:     Vital Signs (Most Recent):  Temp: 97.4 °F (36.3 °C) (12/10/19 1500)  Pulse: 102 (12/10/19 1714)  Resp: (!) 22 (12/10/19 171)  BP: 118/87 (12/10/19 1700)  SpO2: 98 % (12/10/19 1714) Vital Signs (24h Range):  Temp:  [97.4 °F (36.3 °C)-98.4 °F (36.9 °C)] 97.4 °F (36.3 °C)  Pulse:  [] 102  Resp:  [0-41] 22  SpO2:  [89 %-100 %] 98 %  BP: ()/() 118/87  Arterial Line BP: ()/(65-99) 121/85   Weight: 83 kg (183 lb)  Body mass index is 27.83 kg/m².      Intake/Output Summary (Last 24 hours) at 12/10/2019 1745  Last data filed at 12/10/2019 1700  Gross per 24 hour   Intake 989 ml   Output 965 ml   Net 24 ml       Physical Exam   Constitutional:   Intubated and sedated   HENT:   Head: Normocephalic and atraumatic.   Mouth/Throat: No oropharyngeal exudate.   Eyes: Pupils are equal, round, and reactive to light. EOM are normal. Scleral icterus is present.   Neck: Normal range of motion. Neck supple. JVD present.   Left EJ line in place  Left IJ site with oozing from site   Cardiovascular: Normal rate, regular rhythm, normal heart sounds and intact distal pulses. Exam reveals no friction rub.   No murmur heard.  Pulmonary/Chest: No stridor. He has no wheezes. He has no rales.   ET tube with bloody secretions in suction tubing  Mechanical breath sounds   Abdominal: Soft. Bowel sounds are normal. He exhibits no distension. There is no tenderness. There is no guarding.   Musculoskeletal: Normal range of motion. He exhibits edema (bilateral lower extremity edema, chronic venous stasis changes noted, skin breakdown with weeping).   Lymphadenopathy:     He has no cervical adenopathy.    Neurological:   Moving all extremities spontaneously, withdrawing from stimuli, purposeful movements observed   Skin: Skin is warm and dry.   Nursing note and vitals reviewed.      Vents:  Vent Mode: A/C (12/10/19 1714)  Ventilator Initiated: Yes (12/10/19 0656)  Set Rate: 22 bmp (12/10/19 1714)  Vt Set: 420 mL (12/10/19 1714)  PEEP/CPAP: 5 cmH20 (12/10/19 1714)  Oxygen Concentration (%): 30 (12/10/19 1714)  Peak Airway Pressure: 20 cmH2O (12/10/19 1714)  Plateau Pressure: 0 cmH20 (12/10/19 1714)  Total Ve: 9.27 mL (12/10/19 1714)  F/VT Ratio<105 (RSBI): (!) 47.93 (12/10/19 1714)  Lines/Drains/Airways     Central Venous Catheter Line                 Percutaneous Central Line Insertion/Assessment - triple lumen  12/10/19 0730 right internal jugular less than 1 day          Drain                 NG/OG Tube 12/10/19 less than 1 day         Urethral Catheter 12/10/19 0743 less than 1 day          Airway                 Airway - Non-Surgical 12/10/19 0600 Endotracheal Tube less than 1 day         Oral Airway 12/10/19 0650 less than 1 day          Arterial Line                 Arterial Line 12/10/19 Right Radial less than 1 day          Peripheral Intravenous Line                 External Jugular IV 12/09/19 1544 1 day              Significant Labs:    CBC/Anemia Profile:  Recent Labs   Lab 12/10/19  0435 12/10/19  0705 12/10/19  0708 12/10/19  1100   WBC 27.30* 26.60*  --  27.69*   HGB 13.2* 13.4*  --  12.6*   HCT 43.4 44.3 50 38.4*    206  --  187   MCV 84 86  --  81*   RDW 25.2* 25.2*  --  23.9*        Chemistries:  Recent Labs   Lab 12/09/19  1440 12/10/19  0435 12/10/19  0705 12/10/19  1100 12/10/19  1648   * 129* 129* 131*  131* 129*   K 4.7 4.6 5.2* 3.7  3.7 3.8   CL 96 99 100 100  100 99   CO2 20* 20* 9* 22*  22* 21*   BUN 31* 30* 34* 35*  35* 38*   CREATININE 0.8 1.0 1.4 1.2  1.2 1.3   CALCIUM 7.6* 8.0* 8.0* 7.5*  7.5* 7.3*   ALBUMIN 2.0* 1.9* 1.9* 1.7*  1.7* 1.6*   PROT 7.2 7.7 7.8 6.9   "6.9 6.6   BILITOT 4.0* 4.7* 4.4* 4.4*  4.4* 4.0*   ALKPHOS 120 166* 164* 149*  149* 138*   ALT 73* 74* 73* 73*  73* 66*   * 101* 103* 107*  107* 94*   MG 1.7 1.8 2.2  --   --    PHOS  --  3.1 6.7*  --   --        ABGs:   Recent Labs   Lab 12/10/19  1108   PH 7.377   PCO2 39.0   HCO3 22.9*   POCSATURATED 99   BE -2     Blood Culture:   Recent Labs   Lab 12/10/19  0435 12/10/19  0720 12/10/19  0740   LABBLOO No Growth to date  No Growth to date No Growth to date No Growth to date     Cardiac Markers: No results for input(s): CKMB, TROPONINT, MYOGLOBIN in the last 48 hours.  Coagulation:   Recent Labs   Lab 12/10/19  1545   INR 2.6*   APTT 37.1*     Lactic Acid:   Recent Labs   Lab 12/09/19  1732 12/10/19  0435 12/10/19  1100   LACTATE 3.2* 3.0* 2.1     Troponin:   Recent Labs   Lab 12/10/19  0705 12/10/19  1100 12/10/19  1648   TROPONINI 0.070* 0.131* 0.156*     All pertinent labs within the past 24 hours have been reviewed.    Significant Imaging: I have reviewed all pertinent imaging results/findings within the past 24 hours.     Amilcar Heard III, MD 12/10/2019 STAT      Narrative     EXAMINATION:  XR CHEST 1 VIEW    CLINICAL HISTORY:  line placement;    FINDINGS:  One view: Tubes and lines are appropriate.  There is cardiomegaly edema right greater than left and right pleural fluid.  There may be a small left pleural effusion lungs appear slightly worse.      Impression       Pulmonary edema pneumonia aspiration or sepsis.      Electronically signed by: Amilcar Heard MD  Date: 12/10/2019  Time: 08:01         Assessment/Plan:     Pulmonary  Acute hypoxemic respiratory failure  Please see "cardiac arrest" above    Community acquired pneumonia of right lower lobe of lung  Please see "sepsis" above    Cardiac/Vascular  * Cardiac arrest  Patient transferred to MICU after PEA arrest on AM of 12/10/19. Patient observed to be cyanotic and acutely hypoxic prior to event. Patient did received dose of ativan " (verbally reported, not documented on MAR) for agitation prior to decompensation. Received ACLS with 2x rounds of epi with ROSC. Following arrest patient not moving right side, however moving all extremities and observed purposeful movements following arrest. No indication for TTM. Patient initially requiring epinephrine for pressor support following arrest however has not required any since.  Bedside ultrasound concerning for RV strain, septal bowing. Patient cyanotic and difficult to oxygenate on 100% FiO2. Decision made to push TPA.  U/S lower extremities positive for significant DVT's.  Patient with acute liver failure, elevated INR    - Patient s/p TPA, holding antocoagulation given blood in ET tube deep suction, will start 12/11/19  - CT head pending, prior CT head without any intracranial pathology  - Intubated and sedated, observed making meaningful movements  - Family updated, desire full code at this time      Acute on chronic combined systolic and diastolic heart failure  Repeat echo with EF of 15% and associated right ventricular failure as well.    - Pending clinical course, patient would likely benefit from ICD placement    Essential hypertension  Holding home BP meds in setting of prior arrest    Renal/  Lactic acidosis  Secondary to sepsis, now s/p cardiac arrest    ID  Sepsis due to undetermined organism  Patient with questionable gallbladder wall thickening, prompting transfer for concern for acalculous cholecystitis. Also with focal consolidation on CXR at time of admit. Being treated empirically for CAP.  Bedside ultrasound with some thickening, but this may be related to chronic cirrhosis. Prior U/S with similar findings    - Continuing antibiotic therapy for CAP, with flagyl added for intra-abdominal coverage  - Blood and respiratory cultures drawn, results pending  - US liver doppler ordered to assess gallbladder as well as any clot burden given DVT's and presumed massive  "PE      Hematology  Acute deep vein thrombosis (DVT) of proximal vein of lower extremity  Please see "cardiac arrest" above    GI  Chronic hepatitis C without hepatic coma  Hep C Ab positive in past    - Never treated, viral load ordered  - Patient will need follow up with hepatology for treatment pending clinical course        Critical Care Daily Checklist:    A: Awake: RASS Goal/Actual Goal:    Actual: Nath Agitation Sedation Scale (RASS): Light sedation   B: Spontaneous Breathing Trial Performed?     C: SAT & SBT Coordinated?  Yes                      D: Delirium: CAM-ICU Overall CAM-ICU: Negative   E: Early Mobility Performed? No   F: Feeding Goal:    Status:     Current Diet Order   Procedures    Diet NPO      AS: Analgesia/Sedation Fentanyl   T: Thromboembolic Prophylaxis Will start heparin gtt in AM   H: HOB > 300 Yes   U: Stress Ulcer Prophylaxis (if needed) Famotidine   G: Glucose Control SSI   B: Bowel Function     I: Indwelling Catheter (Lines & Munoz) Necessity RIJ triple lumen, arterial line, munoz     D: De-escalation of Antimicrobials/Pharmacotherapies Continuing CAP and intra abdominal coverage    Plan for the day/ETD Continue supportive care, CT head    Code Status:  Family/Goals of Care: Full Code  Plan discussed with family at bedside       Critical secondary to Patient has a condition that poses threat to life and bodily function: Cardiac arrest, acute renal failure, shock     Critical care was time spent personally by me on the following activities: development of treatment plan with patient or surrogate and bedside caregivers, discussions with consultants, evaluation of patient's response to treatment, examination of patient, ordering and performing treatments and interventions, ordering and review of laboratory studies, ordering and review of radiographic studies, pulse oximetry, re-evaluation of patient's condition. This critical care time did not overlap with that of any other provider " or involve time for any procedures.     Carlos Newby MD  Critical Care Medicine  [facility] [address]

## 2019-12-11 NOTE — ASSESSMENT & PLAN NOTE
Repeat echo with EF of 15% and associated right ventricular failure as well.    - Pending clinical course, patient would likely benefit from ICD placement

## 2019-12-11 NOTE — PLAN OF CARE
Pt free from falls and injury this shift. VSS at this time. SpO2 @ % on vent settings: AC/VC, 30% and 5. MAPs maintained >65, no issues. Pt remains only on Propofol continuously. Pt increasingly tachycardic throughout shift, Critical care resident made aware. STAT ekg obtained, resulting sinus tach. No other orders received at that time. No BM this shift. Asif remains intact, 30-45cc/hr. No other significant events this shift. Will continue to monitor closely.

## 2019-12-11 NOTE — PROGRESS NOTES
Pharmacokinetic Assessment Follow Up: IV Vancomycin    Vancomycin serum concentration assessment(s):    Vancomycin random level resulted at 15.4 mcg/mL approximately 16 hours after the loading dose. Renal function remains stable.     Drug levels (last 3 results):  Recent Labs   Lab Result Units 12/11/19  0307   Vancomycin, Random ug/mL 15.4     Vancomycin Regimen Plan:    Schedule vancomycin 1000 mg IV every 24 hours with next serum trough concentration measured on 12/13 1030, prior to 3rd dose of new regimen.     Pharmacy will continue to follow and monitor vancomycin.    Please contact pharmacy at extension [phone] for questions regarding this assessment.    Thank you for the consult,   Corina Sandy, PharmD, BCCCP             Patient brief summary:  Francis Daigle is a 44 y.o. male initiated on antimicrobial therapy with IV vancomycin for treatment of lower respiratory infection    Drug Allergies:   Review of patient's allergies indicates:  No Known Allergies    Actual Body Weight:   83 kg     Renal Function:   Estimated Creatinine Clearance: 69.2 mL/min (based on SCr of 1.4 mg/dL).    Dialysis Method (if applicable):  N/A    CBC (last 72 hours):  Recent Labs   Lab Result Units 12/09/19  1440 12/10/19  0435 12/10/19  0705 12/10/19  1100 12/11/19  0307 12/11/19  0915   WBC K/uL 25.39* 27.30* 26.60* 27.69* 23.36* 22.12*   Hemoglobin g/dL 13.8* 13.2* 13.4* 12.6* 12.9* 12.9*   Hematocrit % 44.6 43.4 44.3 38.4* 40.0 38.6*   Platelets K/uL 183 167 206 187 256 253   Gran% % 88.6* 87.6* 79.0* 90.2* 87.3* 88.3*   Lymph% % 6.0* 6.9* 14.0* 3.4* 5.7* 5.0*   Mono% % 4.1 3.9* 4.4 4.3 5.7 5.5   Eosinophil% % 0.3 0.4 0.8 0.0 0.3 0.1   Basophil% % 0.3 0.3 0.2 0.3 0.1 0.2   Differential Method  Automated Automated Automated Automated Automated Automated       Metabolic Panel (last 72 hours):  Recent Labs   Lab Result Units 12/09/19  1440 12/09/19  1519 12/10/19  0435 12/10/19  0705 12/10/19  1100 12/10/19  1648  12/11/19  0307   Sodium mmol/L 126*  --  129* 129* 131*  131* 129* 132*   Potassium mmol/L 4.7  --  4.6 5.2* 3.7  3.7 3.8 4.4   Chloride mmol/L 96  --  99 100 100  100 99 101   CO2 mmol/L 20*  --  20* 9* 22*  22* 21* 21*   Glucose mg/dL 107  --  103 122* 100  100 115* 78   Glucose, UA   --  Negative  --   --   --   --   --    BUN, Bld mg/dL 31*  --  30* 34* 35*  35* 38* 40*   Creatinine mg/dL 0.8  --  1.0 1.4 1.2  1.2 1.3 1.4   Creatinine, Random Ur mg/dL  --  48.2  --   --  17.0*  --   --    Albumin g/dL 2.0*  --  1.9* 1.9* 1.7*  1.7* 1.6* 1.6*   Total Bilirubin mg/dL 4.0*  --  4.7* 4.4* 4.4*  4.4* 4.0* 3.1*   Alkaline Phosphatase U/L 120  --  166* 164* 149*  149* 138* 137*   AST U/L 100*  --  101* 103* 107*  107* 94* 97*   ALT U/L 73*  --  74* 73* 73*  73* 66* 66*   Magnesium mg/dL 1.7  --  1.8 2.2  --   --  1.8   Phosphorus mg/dL  --   --  3.1 6.7*  --   --  4.7*       Vancomycin Administrations:  vancomycin given in the last 96 hours                   vancomycin in dextrose 5 % 1 gram/250 mL IVPB 1,000 mg (mg) 1,000 mg New Bag 12/11/19 1131    vancomycin 1.5 g in dextrose 5 % 250 mL IVPB (ready to mix) (mg) 1,500 mg New Bag 12/10/19 1126                Microbiologic Results:  Microbiology Results (last 7 days)     Procedure Component Value Units Date/Time    JESSENIA prep [[id number]] Collected:  12/11/19 1441    Order Status:  Sent Specimen:  Bronchial Alveolar Lavage from Lung, L Updated:  12/11/19 1454    Fungus culture [[id number]] Collected:  12/11/19 1441    Order Status:  Sent Specimen:  Bronchial Alveolar Lavage from Lung, RML Updated:  12/11/19 1453    Direct AFB stain [[id number]] Collected:  12/11/19 1441    Order Status:  Sent Specimen:  Bronchial Alveolar Lavage from Lung, RML Updated:  12/11/19 1453    AFB Culture & Smear [[id number]] Collected:  12/11/19 1441    Order Status:  Sent Specimen:  Bronchial Alveolar Lavage from Lung, RML Updated:  12/11/19 1452    Culture, Respiratory [[id number]]  Collected:  12/11/19 1441    Order Status:  Sent Specimen:  Respiratory from Bronchial Wash Updated:  12/11/19 1451    Gram stain [[id number]] Collected:  12/11/19 1441    Order Status:  Canceled Specimen:  Bronchial Alveolar Lavage from Lung, RML     Culture, Respiratory with Gram Stain [[id number]]  (Abnormal) Collected:  12/10/19 1130    Order Status:  Completed Specimen:  Respiratory from Endotracheal Aspirate Updated:  12/11/19 1241     Respiratory Culture ENTEROBACTER CLOACAE COMPLEX  Few  Susceptibility pending       Gram Stain (Respiratory) <10 epithelial cells per low power field.     Gram Stain (Respiratory) Rare WBC's     Gram Stain (Respiratory) No organisms seen    Blood culture [[id number]] Collected:  12/10/19 0740    Order Status:  Completed Specimen:  Blood from Line, Jugular, Internal Left Updated:  12/11/19 1012     Blood Culture, Routine No Growth to date      No Growth to date    Blood culture (site 1) [[id number]] Collected:  12/10/19 0435    Order Status:  Completed Specimen:  Blood Updated:  12/11/19 0812     Blood Culture, Routine No Growth to date      No Growth to date    Narrative:       Site # 1, aerobic and anaerobic    Blood culture [[id number]] Collected:  12/10/19 0720    Order Status:  Completed Specimen:  Blood from Line, Jugular, Internal Right Updated:  12/11/19 0812     Blood Culture, Routine No Growth to date      No Growth to date    Blood culture (site 2) [[id number]] Collected:  12/10/19 0435    Order Status:  Completed Specimen:  Blood Updated:  12/11/19 0812     Blood Culture, Routine No Growth to date      No Growth to date    Narrative:       Site # 2, aerobic only

## 2019-12-11 NOTE — PROCEDURES
[facility]  Bronchoscopy   Procedure Note    SUMMARY     Date of Procedure: 12/11/2019    Procedure: Bronchoscopy, Diagnostic  Bronchoscopy, Therapeutic  Bronchoalveolar lavage, BAL    Provider: Yonny García MD    Assisting Provider: Dr. Elijah Strong    Pre-Procedure Diagnosis: Hemoptysis. Acute hypoxemic respiratory failure    Post-Procedure Diagnosis: hemoptysis. Acute hypoxemic respiratory failure    Indication: hemoptysis. Pneumonia.    Anesthesia: Moderate Sedation    Technical Procedures Used: portable bronchoscopy via endotracheal tube    Description of the Findings of the Procedure:     Patient was consented for the procedure with all risk and benefit of the procedure explained in detail.  Patient was given the opportunity to ask questions and all concerns were answered.  The bronchocope was inserted into the main airway via the endotracheal tube. An anatomical survey was done of the main airways and the subsegmental bronchus. There was dried blood at the shashi that was suctions. Left lung was visualized with no acute findings or blood in the LUIS E, lingula nor LLL. Right lung was then visualized also with no acute findings or blood seen. A RML bronchialalveolar lavage was performed using aliquots of normal saline instilled into the airways then aspirated back. 60 cc was instilled and 25 cc aspirated. Lavage was clear, white fluid with a couple small red clots.    Significant Surgical Tasks Conducted by the Assistant(s), if Applicable: none    Complications: None; patient tolerated the procedure well.    Estimated Blood Loss (EBL): Minimal           Implants: none    Specimens: Sent serosanguinous fluid       Condition: Good        Disposition: ICU - intubated and hemodynamically stable.    I saw & examined the patient. I personally reviewed all pertinent data in Epic. I discussed the patient and management with the resident. I reviewed the residents note and agree with the documented  findings and plan of care.     Elijah Strong MD

## 2019-12-11 NOTE — ASSESSMENT & PLAN NOTE
Hep C Ab positive in past    - Never treated, viral load ordered  - Patient will need follow up with hepatology for treatment pending clinical course

## 2019-12-11 NOTE — PLAN OF CARE
"Dx: Cardiac arrest  Shift Events: CT head without contrast, Bilat US extremities, Abdominal US  Neuro: Arouses to Voice, Follows Commands and Moves All Extremities  Vital Signs: BP (!) 126/95   Pulse 101   Temp 97.4 °F (36.3 °C) (Oral)   Resp (!) 22   Ht 5' 8" (1.727 m)   Wt 83 kg (183 lb)   SpO2 98%   BMI 27.83 kg/m²   Afebrile  Vent settings weaned to 30% 5 PEEP  Diet: NPO  Gtts: Propfol  Urine Output: Urinary Catheter  cc/hour  Restraints   Bilat wrist restraints in place- no new redness or breakdown noted   Labs/Accuchecks: Labs sent throughout shift, communicated with ROSALIE WU         "

## 2019-12-11 NOTE — PLAN OF CARE
Critical Care Medicine Plan of Care Update:   Patient with extensive clot bilateral DVTs and suspected PE leading to cardiac arrest on the floor this AM.     Patient received TPA this AM and subsequently had moderate blood upon suctioning of ET tube in addition to supratherapeutic INR.     Plan:  -Re-evaluated bleeding from ET tube this afternoon. Bleeding has persisted (though appears to be a little less). Reassess tomorrow AM, if persisting then IVC filter. If cleared, then heparin infusion.              Ej Dickson MD  Resident Physician - PGY3

## 2019-12-11 NOTE — ASSESSMENT & PLAN NOTE
Patient transferred to MICU after PEA arrest on AM of 12/10/19. Patient observed to be cyanotic and acutely hypoxic prior to event. Patient did received dose of ativan (verbally reported, not documented on MAR) for agitation prior to decompensation. Received ACLS with 2x rounds of epi with ROSC. Following arrest patient not moving right side, however moving all extremities and observed purposeful movements following arrest. No indication for TTM. Patient initially requiring epinephrine for pressor support following arrest however has not required any since.  Bedside ultrasound concerning for RV strain, septal bowing. Patient cyanotic and difficult to oxygenate on 100% FiO2. Decision made to push TPA.  U/S lower extremities positive for significant DVT's.  Patient with acute liver failure, elevated INR    - Patient s/p TPA, holding antocoagulation given blood in ET tube deep suction, will start 12/11/19  - CT head pending, prior CT head without any intracranial pathology  - Intubated and sedated, observed making meaningful movements  - Family updated, desire full code at this time

## 2019-12-12 LAB
ALBUMIN SERPL BCP-MCNC: 1.4 G/DL (ref 3.5–5.2)
ALLENS TEST: ABNORMAL
ALP SERPL-CCNC: 112 U/L (ref 55–135)
ALT SERPL W/O P-5'-P-CCNC: 47 U/L (ref 10–44)
ANION GAP SERPL CALC-SCNC: 6 MMOL/L (ref 8–16)
APTT BLDCRRT: 46.2 SEC (ref 21–32)
APTT BLDCRRT: 54 SEC (ref 21–32)
AST SERPL-CCNC: 63 U/L (ref 10–40)
BACTERIA SPEC AEROBE CULT: ABNORMAL
BASOPHILS # BLD AUTO: 0.02 K/UL (ref 0–0.2)
BASOPHILS # BLD AUTO: 0.02 K/UL (ref 0–0.2)
BASOPHILS NFR BLD: 0.1 % (ref 0–1.9)
BASOPHILS NFR BLD: 0.1 % (ref 0–1.9)
BILIRUB SERPL-MCNC: 2.9 MG/DL (ref 0.1–1)
BUN SERPL-MCNC: 33 MG/DL (ref 6–20)
CALCIUM SERPL-MCNC: 7.5 MG/DL (ref 8.7–10.5)
CHLORIDE SERPL-SCNC: 103 MMOL/L (ref 95–110)
CO2 SERPL-SCNC: 25 MMOL/L (ref 23–29)
CREAT SERPL-MCNC: 1.2 MG/DL (ref 0.5–1.4)
DIFFERENTIAL METHOD: ABNORMAL
DIFFERENTIAL METHOD: ABNORMAL
EOSINOPHIL # BLD AUTO: 0.1 K/UL (ref 0–0.5)
EOSINOPHIL # BLD AUTO: 0.1 K/UL (ref 0–0.5)
EOSINOPHIL NFR BLD: 0.4 % (ref 0–8)
EOSINOPHIL NFR BLD: 0.4 % (ref 0–8)
ERYTHROCYTE [DISTWIDTH] IN BLOOD BY AUTOMATED COUNT: 24.6 % (ref 11.5–14.5)
ERYTHROCYTE [DISTWIDTH] IN BLOOD BY AUTOMATED COUNT: 24.6 % (ref 11.5–14.5)
EST. GFR  (AFRICAN AMERICAN): >60 ML/MIN/1.73 M^2
EST. GFR  (NON AFRICAN AMERICAN): >60 ML/MIN/1.73 M^2
GLUCOSE SERPL-MCNC: 123 MG/DL (ref 70–110)
GRAM STN SPEC: ABNORMAL
HCO3 UR-SCNC: 26.6 MMOL/L (ref 24–28)
HCT VFR BLD AUTO: 37.2 % (ref 40–54)
HCT VFR BLD AUTO: 37.2 % (ref 40–54)
HGB BLD-MCNC: 11.6 G/DL (ref 14–18)
HGB BLD-MCNC: 11.6 G/DL (ref 14–18)
IMM GRANULOCYTES # BLD AUTO: 0.15 K/UL (ref 0–0.04)
IMM GRANULOCYTES # BLD AUTO: 0.15 K/UL (ref 0–0.04)
IMM GRANULOCYTES NFR BLD AUTO: 0.7 % (ref 0–0.5)
IMM GRANULOCYTES NFR BLD AUTO: 0.7 % (ref 0–0.5)
INR PPP: 1.6 (ref 0.8–1.2)
LYMPHOCYTES # BLD AUTO: 1.3 K/UL (ref 1–4.8)
LYMPHOCYTES # BLD AUTO: 1.3 K/UL (ref 1–4.8)
LYMPHOCYTES NFR BLD: 6 % (ref 18–48)
LYMPHOCYTES NFR BLD: 6 % (ref 18–48)
MAGNESIUM SERPL-MCNC: 2 MG/DL (ref 1.6–2.6)
MCH RBC QN AUTO: 25.2 PG (ref 27–31)
MCH RBC QN AUTO: 25.2 PG (ref 27–31)
MCHC RBC AUTO-ENTMCNC: 31.2 G/DL (ref 32–36)
MCHC RBC AUTO-ENTMCNC: 31.2 G/DL (ref 32–36)
MCV RBC AUTO: 81 FL (ref 82–98)
MCV RBC AUTO: 81 FL (ref 82–98)
MONOCYTES # BLD AUTO: 1.1 K/UL (ref 0.3–1)
MONOCYTES # BLD AUTO: 1.1 K/UL (ref 0.3–1)
MONOCYTES NFR BLD: 5.3 % (ref 4–15)
MONOCYTES NFR BLD: 5.3 % (ref 4–15)
NEUTROPHILS # BLD AUTO: 18.5 K/UL (ref 1.8–7.7)
NEUTROPHILS # BLD AUTO: 18.5 K/UL (ref 1.8–7.7)
NEUTROPHILS NFR BLD: 87.5 % (ref 38–73)
NEUTROPHILS NFR BLD: 87.5 % (ref 38–73)
NRBC BLD-RTO: 0 /100 WBC
NRBC BLD-RTO: 0 /100 WBC
PCO2 BLDA: 39.3 MMHG (ref 35–45)
PH SMN: 7.44 [PH] (ref 7.35–7.45)
PHOSPHATE SERPL-MCNC: 3.4 MG/DL (ref 2.7–4.5)
PLATELET # BLD AUTO: 219 K/UL (ref 150–350)
PLATELET # BLD AUTO: 219 K/UL (ref 150–350)
PMV BLD AUTO: 9.3 FL (ref 9.2–12.9)
PMV BLD AUTO: 9.3 FL (ref 9.2–12.9)
PO2 BLDA: 100 MMHG (ref 80–100)
POC BE: 2 MMOL/L
POC SATURATED O2: 98 % (ref 95–100)
POC TCO2: 28 MMOL/L (ref 23–27)
POTASSIUM SERPL-SCNC: 3.6 MMOL/L (ref 3.5–5.1)
PROT SERPL-MCNC: 5.9 G/DL (ref 6–8.4)
PROTHROMBIN TIME: 15.4 SEC (ref 9–12.5)
RBC # BLD AUTO: 4.6 M/UL (ref 4.6–6.2)
RBC # BLD AUTO: 4.6 M/UL (ref 4.6–6.2)
SAMPLE: ABNORMAL
SITE: ABNORMAL
SODIUM SERPL-SCNC: 134 MMOL/L (ref 136–145)
WBC # BLD AUTO: 21.14 K/UL (ref 3.9–12.7)
WBC # BLD AUTO: 21.14 K/UL (ref 3.9–12.7)

## 2019-12-12 PROCEDURE — 99291 PR CRITICAL CARE, E/M 30-74 MINUTES: ICD-10-PCS | Mod: ,,, | Performed by: INTERNAL MEDICINE

## 2019-12-12 PROCEDURE — 25000003 PHARM REV CODE 250: Performed by: STUDENT IN AN ORGANIZED HEALTH CARE EDUCATION/TRAINING PROGRAM

## 2019-12-12 PROCEDURE — 85025 COMPLETE CBC W/AUTO DIFF WBC: CPT

## 2019-12-12 PROCEDURE — 20000000 HC ICU ROOM

## 2019-12-12 PROCEDURE — 99291 CRITICAL CARE FIRST HOUR: CPT | Mod: ,,, | Performed by: INTERNAL MEDICINE

## 2019-12-12 PROCEDURE — 94761 N-INVAS EAR/PLS OXIMETRY MLT: CPT

## 2019-12-12 PROCEDURE — 85730 THROMBOPLASTIN TIME PARTIAL: CPT | Mod: 91

## 2019-12-12 PROCEDURE — 63600175 PHARM REV CODE 636 W HCPCS: Performed by: STUDENT IN AN ORGANIZED HEALTH CARE EDUCATION/TRAINING PROGRAM

## 2019-12-12 PROCEDURE — 99900035 HC TECH TIME PER 15 MIN (STAT)

## 2019-12-12 PROCEDURE — 63600175 PHARM REV CODE 636 W HCPCS: Performed by: INTERNAL MEDICINE

## 2019-12-12 PROCEDURE — 85610 PROTHROMBIN TIME: CPT

## 2019-12-12 PROCEDURE — 99900026 HC AIRWAY MAINTENANCE (STAT)

## 2019-12-12 PROCEDURE — 83735 ASSAY OF MAGNESIUM: CPT

## 2019-12-12 PROCEDURE — 36600 WITHDRAWAL OF ARTERIAL BLOOD: CPT

## 2019-12-12 PROCEDURE — 80053 COMPREHEN METABOLIC PANEL: CPT

## 2019-12-12 PROCEDURE — 84100 ASSAY OF PHOSPHORUS: CPT

## 2019-12-12 PROCEDURE — 82803 BLOOD GASES ANY COMBINATION: CPT

## 2019-12-12 PROCEDURE — 85730 THROMBOPLASTIN TIME PARTIAL: CPT

## 2019-12-12 PROCEDURE — C9113 INJ PANTOPRAZOLE SODIUM, VIA: HCPCS | Performed by: INTERNAL MEDICINE

## 2019-12-12 PROCEDURE — 27000221 HC OXYGEN, UP TO 24 HOURS

## 2019-12-12 RX ORDER — DEXMEDETOMIDINE HYDROCHLORIDE 4 UG/ML
0.2 INJECTION, SOLUTION INTRAVENOUS CONTINUOUS
Status: DISCONTINUED | OUTPATIENT
Start: 2019-12-12 | End: 2019-12-13

## 2019-12-12 RX ORDER — POTASSIUM CHLORIDE 20 MEQ/15ML
40 SOLUTION ORAL ONCE
Status: COMPLETED | OUTPATIENT
Start: 2019-12-12 | End: 2019-12-12

## 2019-12-12 RX ORDER — HEPARIN SODIUM,PORCINE/D5W 25000/250
16 INTRAVENOUS SOLUTION INTRAVENOUS CONTINUOUS
Status: DISCONTINUED | OUTPATIENT
Start: 2019-12-12 | End: 2019-12-14

## 2019-12-12 RX ADMIN — CHLORHEXIDINE GLUCONATE 0.12% ORAL RINSE 15 ML: 1.2 LIQUID ORAL at 08:12

## 2019-12-12 RX ADMIN — VANCOMYCIN HYDROCHLORIDE 1000 MG: 1 INJECTION, POWDER, LYOPHILIZED, FOR SOLUTION INTRAVENOUS at 11:12

## 2019-12-12 RX ADMIN — HEPARIN SODIUM 14 UNITS/KG/HR: 10000 INJECTION, SOLUTION INTRAVENOUS at 08:12

## 2019-12-12 RX ADMIN — PROPOFOL 25 MCG/KG/MIN: 10 INJECTION, EMULSION INTRAVENOUS at 03:12

## 2019-12-12 RX ADMIN — POTASSIUM CHLORIDE 40 MEQ: 20 SOLUTION ORAL at 06:12

## 2019-12-12 RX ADMIN — PIPERACILLIN AND TAZOBACTAM 4.5 G: 4; .5 INJECTION, POWDER, FOR SOLUTION INTRAVENOUS at 08:12

## 2019-12-12 RX ADMIN — SODIUM CHLORIDE, SODIUM LACTATE, POTASSIUM CHLORIDE, AND CALCIUM CHLORIDE 1000 ML: .6; .31; .03; .02 INJECTION, SOLUTION INTRAVENOUS at 01:12

## 2019-12-12 RX ADMIN — PANTOPRAZOLE SODIUM 40 MG: 40 INJECTION, POWDER, FOR SOLUTION INTRAVENOUS at 08:12

## 2019-12-12 RX ADMIN — PIPERACILLIN AND TAZOBACTAM 4.5 G: 4; .5 INJECTION, POWDER, FOR SOLUTION INTRAVENOUS at 05:12

## 2019-12-12 RX ADMIN — PIPERACILLIN AND TAZOBACTAM 4.5 G: 4; .5 INJECTION, POWDER, FOR SOLUTION INTRAVENOUS at 01:12

## 2019-12-12 RX ADMIN — DEXMEDETOMIDINE HYDROCHLORIDE 0.2 MCG/KG/HR: 4 INJECTION, SOLUTION INTRAVENOUS at 11:12

## 2019-12-12 RX ADMIN — HEPARIN SODIUM 18 UNITS/KG/HR: 10000 INJECTION, SOLUTION INTRAVENOUS at 10:12

## 2019-12-12 RX ADMIN — AZITHROMYCIN MONOHYDRATE 500 MG: 500 INJECTION, POWDER, LYOPHILIZED, FOR SOLUTION INTRAVENOUS at 05:12

## 2019-12-12 RX ADMIN — MUPIROCIN: 20 OINTMENT TOPICAL at 08:12

## 2019-12-12 NOTE — ASSESSMENT & PLAN NOTE
Plan:  -Monitor LFTs daily  -will need hepatology eval for HCV once stepped down from ICU  -INR resolved to 1.9 w/o intervention

## 2019-12-12 NOTE — SUBJECTIVE & OBJECTIVE
Interval History/Significant Events: Patient started on heparin ggt, bronchoscopy today revealing relatively normal airways. Continuing BS Abx.     Review of Systems   Unable to perform ROS: Intubated     Objective:     Vital Signs (Most Recent):  Temp: 97.2 °F (36.2 °C) (12/11/19 1920)  Pulse: 107 (12/11/19 2000)  Resp: (!) 24 (12/11/19 2000)  BP: (!) 91/54 (12/11/19 2000)  SpO2: 97 % (12/11/19 2000) Vital Signs (24h Range):  Temp:  [96.3 °F (35.7 °C)-99.3 °F (37.4 °C)] 97.2 °F (36.2 °C)  Pulse:  [102-124] 107  Resp:  [0-34] 24  SpO2:  [96 %-100 %] 97 %  BP: ()/(50-75) 91/54  Arterial Line BP: ()/(62-76) 90/62   Weight: 83 kg (183 lb)  Body mass index is 27.83 kg/m².      Intake/Output Summary (Last 24 hours) at 12/11/2019 2012  Last data filed at 12/11/2019 2000  Gross per 24 hour   Intake 2110 ml   Output 1275 ml   Net 835 ml       Physical Exam   Constitutional: He appears well-developed.   Intubated and sedated   HENT:   Head: Normocephalic.   ET Tube in place   Eyes: No scleral icterus.   Neck:   Triple lumen in place     Cardiovascular:   Sinus tach 120s, S1/S2, no rub or gallop    Pulmonary/Chest:   Coarse b/s bilaterally, mechanically ventilated breath sounds    Abdominal: Soft. Bowel sounds are normal. He exhibits no distension.   Musculoskeletal: He exhibits edema.   Neurological:   Withdrawing to pain, spontaneously moving all 4 extremities, NFND   Skin: Skin is warm and dry.   Vitals reviewed.      Vents:  Vent Mode: A/C (12/11/19 1920)  Ventilator Initiated: Yes (12/10/19 0656)  Set Rate: 18 bmp (12/11/19 1920)  Vt Set: 450 mL (12/11/19 1920)  Pressure Support: 5 cmH20 (12/11/19 1656)  PEEP/CPAP: 5 cmH20 (12/11/19 1920)  Oxygen Concentration (%): 30 (12/11/19 2000)  Peak Airway Pressure: 20 cmH2O (12/11/19 1920)  Plateau Pressure: 19 cmH20 (12/11/19 1920)  Total Ve: 10.6 mL (12/11/19 1920)  F/VT Ratio<105 (RSBI): (!) 49.78 (12/11/19 1920)  Lines/Drains/Airways     Central Venous Catheter  Line                 Percutaneous Central Line Insertion/Assessment - triple lumen  12/10/19 0730 right internal jugular 1 day          Drain                 NG/OG Tube 12/10/19 1 day         Urethral Catheter 12/10/19 0743 1 day          Airway                 Airway - Non-Surgical 12/10/19 0600 Endotracheal Tube 1 day         Oral Airway 12/10/19 0650 1 day          Peripheral Intravenous Line                 External Jugular IV 12/09/19 1544 2 days         Peripheral IV - Single Lumen 12/11/19 1630 18 G Anterior;Left Forearm less than 1 day              Significant Labs:    CBC/Anemia Profile:  Recent Labs   Lab 12/10/19  1100 12/11/19  0307 12/11/19  0915   WBC 27.69* 23.36* 22.12*   HGB 12.6* 12.9* 12.9*   HCT 38.4* 40.0 38.6*    256 253   MCV 81* 80* 79*   RDW 23.9* 24.5* 24.3*        Chemistries:  Recent Labs   Lab 12/10/19  0435 12/10/19  0705 12/10/19  1100 12/10/19  1648 12/11/19  0307   * 129* 131*  131* 129* 132*   K 4.6 5.2* 3.7  3.7 3.8 4.4   CL 99 100 100  100 99 101   CO2 20* 9* 22*  22* 21* 21*   BUN 30* 34* 35*  35* 38* 40*   CREATININE 1.0 1.4 1.2  1.2 1.3 1.4   CALCIUM 8.0* 8.0* 7.5*  7.5* 7.3* 7.6*   ALBUMIN 1.9* 1.9* 1.7*  1.7* 1.6* 1.6*   PROT 7.7 7.8 6.9  6.9 6.6 6.7   BILITOT 4.7* 4.4* 4.4*  4.4* 4.0* 3.1*   ALKPHOS 166* 164* 149*  149* 138* 137*   ALT 74* 73* 73*  73* 66* 66*   * 103* 107*  107* 94* 97*   MG 1.8 2.2  --   --  1.8   PHOS 3.1 6.7*  --   --  4.7*       All pertinent labs within the past 24 hours have been reviewed.    Significant Imaging:  I have reviewed and interpreted all pertinent imaging results/findings within the past 24 hours.

## 2019-12-12 NOTE — PROGRESS NOTES
"[facility]  Critical Care Medicine  Progress Note    Patient Name: Francis Daigle  MRN: [id number]  Admission Date: 12/10/2019  Hospital Length of Stay: 1 days  Code Status: Full Code  Attending Provider: Elijah Strong MD  Primary Care Provider: Primary Doctor No   Principal Problem: Cardiac arrest    Subjective:     HPI:  Mr. Daigle is a 44 year old female with a history significant for non-ischemic cardiomyopathy with combined CHF (EF 11% without ICD or CRT device) and on-going IVDU who presented to [facility] ED with complaints of abdominal pain and bilateral leg swelling. Per chart review: "He reports a two week duration of progressively worsening bilateral lower extremity swelling and redness of the anterior shins. He was accompanied by his cousin whom he lives with who provides history. The abdominal pain had been present for several weeks, improved with milk. He has chronic lower extremity swelling that was worsening over the past few weeks. He reported 2 weeks of skin changes and weeping in bilateral legs which is new. Additionally, he reports a two week duration of occasional episodes of pale semi-formed stools. He attempted symptom relief with increasing dose of daily Lasix, however without effect, prompting presentation to ED on 12/09.   He denied symptom association with fevers, chills, nausea, vomiting, history of prior abdominal surgeries, ETOH consumption, chest pain, pain with inspiration, lightheadedness, or blurry vision. He reports a on-going non-productive cough intermittent cough since CHF diagnosis that is unchanged. He does not weigh himself daily and is unsure of any weight loss or gain. He does follow with cardiology outpatient.   He initially presented to [facility] ED on 12/09. Lab were significant for leukocytosis (25), hyponatremia (126), lactic acidosis (3.5), transaminitis (, ALT 73), hyperbilirubinemia (4.5), and toxicology positive for amphetamines. CXR " "was suggestive of right lower lobe pneumonia. Abdominal U/S was suggestive of acalculous cholecystitis. He was given IVF, Ceftriaxone, and Lasix. Case was discussed with transfer center and AES here; recommended transfer for possible MRCP."     Patient was admitted to hospital medicine, treated for CAP with flagyl for possible intra-abdominal etiology. Lactic initially elevated, fluid resuscitation was held as patient has baseline cardiomyopathy and reduced EF. Overnight patient agitated, received dose of ativan for anxiety (Per verbal report, not documented in chart as being given) and was found to be cyanotic. Pulse was lost and code blue was called. Rhythm was PEA, patient received 1 round of ACLS with ROSC. He was transferred to the MICU for further care following cardiac arrest.         Hospital/ICU Course:  Transferred to ICU following cardiac arrest on 12/10/19. Emergently intubated during PEA arrest, ROSC achieved after 1 round of CPR. Central line and arterial line placed. Patient initially requiring pressor support, cyanotic, hypoxic on blood gas. Bedside ultrasound with signs of RV strain. Patient on pressors at the time, requiring 100% FiO2 to oxygenate. Given TPA emergently.  12/11/2019 Patient started on heparin ggt, bronchoscopy today revealing relatively normal airways. Continuing BS Abx.     Interval History/Significant Events: Patient started on heparin ggt, bronchoscopy today revealing relatively normal airways. Continuing BS Abx.     Review of Systems   Unable to perform ROS: Intubated     Objective:     Vital Signs (Most Recent):  Temp: 97.2 °F (36.2 °C) (12/11/19 1920)  Pulse: 107 (12/11/19 2000)  Resp: (!) 24 (12/11/19 2000)  BP: (!) 91/54 (12/11/19 2000)  SpO2: 97 % (12/11/19 2000) Vital Signs (24h Range):  Temp:  [96.3 °F (35.7 °C)-99.3 °F (37.4 °C)] 97.2 °F (36.2 °C)  Pulse:  [102-124] 107  Resp:  [0-34] 24  SpO2:  [96 %-100 %] 97 %  BP: ()/(50-75) 91/54  Arterial Line BP: " ()/(62-76) 90/62   Weight: 83 kg (183 lb)  Body mass index is 27.83 kg/m².      Intake/Output Summary (Last 24 hours) at 12/11/2019 2012  Last data filed at 12/11/2019 2000  Gross per 24 hour   Intake 2110 ml   Output 1275 ml   Net 835 ml       Physical Exam   Constitutional: He appears well-developed.   Intubated and sedated   HENT:   Head: Normocephalic.   ET Tube in place   Eyes: No scleral icterus.   Neck:   Triple lumen in place     Cardiovascular:   Sinus tach 120s, S1/S2, no rub or gallop    Pulmonary/Chest:   Coarse b/s bilaterally, mechanically ventilated breath sounds    Abdominal: Soft. Bowel sounds are normal. He exhibits no distension.   Musculoskeletal: He exhibits edema.   Neurological:   Withdrawing to pain, spontaneously moving all 4 extremities, NFND   Skin: Skin is warm and dry.   Vitals reviewed.      Vents:  Vent Mode: A/C (12/11/19 1920)  Ventilator Initiated: Yes (12/10/19 0656)  Set Rate: 18 bmp (12/11/19 1920)  Vt Set: 450 mL (12/11/19 1920)  Pressure Support: 5 cmH20 (12/11/19 1656)  PEEP/CPAP: 5 cmH20 (12/11/19 1920)  Oxygen Concentration (%): 30 (12/11/19 2000)  Peak Airway Pressure: 20 cmH2O (12/11/19 1920)  Plateau Pressure: 19 cmH20 (12/11/19 1920)  Total Ve: 10.6 mL (12/11/19 1920)  F/VT Ratio<105 (RSBI): (!) 49.78 (12/11/19 1920)  Lines/Drains/Airways     Central Venous Catheter Line                 Percutaneous Central Line Insertion/Assessment - triple lumen  12/10/19 0730 right internal jugular 1 day          Drain                 NG/OG Tube 12/10/19 1 day         Urethral Catheter 12/10/19 0743 1 day          Airway                 Airway - Non-Surgical 12/10/19 0600 Endotracheal Tube 1 day         Oral Airway 12/10/19 0650 1 day          Peripheral Intravenous Line                 External Jugular IV 12/09/19 1544 2 days         Peripheral IV - Single Lumen 12/11/19 1630 18 G Anterior;Left Forearm less than 1 day              Significant Labs:    CBC/Anemia  "Profile:  Recent Labs   Lab 12/10/19  1100 12/11/19  0307 12/11/19  0915   WBC 27.69* 23.36* 22.12*   HGB 12.6* 12.9* 12.9*   HCT 38.4* 40.0 38.6*    256 253   MCV 81* 80* 79*   RDW 23.9* 24.5* 24.3*        Chemistries:  Recent Labs   Lab 12/10/19  0435 12/10/19  0705 12/10/19  1100 12/10/19  1648 12/11/19  0307   * 129* 131*  131* 129* 132*   K 4.6 5.2* 3.7  3.7 3.8 4.4   CL 99 100 100  100 99 101   CO2 20* 9* 22*  22* 21* 21*   BUN 30* 34* 35*  35* 38* 40*   CREATININE 1.0 1.4 1.2  1.2 1.3 1.4   CALCIUM 8.0* 8.0* 7.5*  7.5* 7.3* 7.6*   ALBUMIN 1.9* 1.9* 1.7*  1.7* 1.6* 1.6*   PROT 7.7 7.8 6.9  6.9 6.6 6.7   BILITOT 4.7* 4.4* 4.4*  4.4* 4.0* 3.1*   ALKPHOS 166* 164* 149*  149* 138* 137*   ALT 74* 73* 73*  73* 66* 66*   * 103* 107*  107* 94* 97*   MG 1.8 2.2  --   --  1.8   PHOS 3.1 6.7*  --   --  4.7*       All pertinent labs within the past 24 hours have been reviewed.    Significant Imaging:  I have reviewed and interpreted all pertinent imaging results/findings within the past 24 hours.      ABG  Recent Labs   Lab 12/11/19  0234   PH 7.397   PO2 110*   PCO2 34.3*   HCO3 21.1*   BE -4     Assessment/Plan:     Psychiatric  Intravenous drug abuse  Plan:  -Addiction psych once appropriate     Pulmonary  Acute hypoxemic respiratory failure  Please see "cardiac arrest" above    Community acquired pneumonia of right lower lobe of lung  Please see "sepsis" above    Cardiac/Vascular  * Cardiac arrest  Patient transferred to MICU after PEA arrest on AM of 12/10/19. Patient observed to be cyanotic and acutely hypoxic prior to event. Patient did received dose of ativan (verbally reported, not documented on MAR) for agitation prior to decompensation. Received ACLS with 2x rounds of epi with ROSC. Following arrest patient not moving right side, however moving all extremities and observed purposeful movements following arrest. No indication for TTM. Patient initially requiring epinephrine for " "pressor support following arrest however has not required any since.  Bedside ultrasound concerning for RV strain, septal bowing. Patient cyanotic and difficult to oxygenate on 100% FiO2. Decision made to push TPA.  U/S lower extremities positive for significant DVT's.  Patient with acute liver failure, elevated INR (now resolved)  - Patient s/p TPA, holding antocoagulation given blood in ET tube deep suction, will start 12/11/19  - CT head (-), prior CT head without any intracranial pathology  - Intubated and sedated, observed making meaningful movements    Plan:  -Heparin ggt w/o loading bolus for DVT/PE  -Will need long term anticoag  -continue w/ mechanical ventilation at this time, SBT in AM        Acute on chronic combined systolic and diastolic heart failure  Repeat echo with EF of 15% and associated right ventricular failure as well.    - Pending clinical course, patient would likely benefit from ICD placement    Essential hypertension  Holding home BP meds in setting borderline hypotension     Renal/  Hyponatremia  Resolving    Lactic acidosis  Secondary to sepsis, now s/p cardiac arrest    ID  Sepsis due to undetermined organism  Patient with questionable gallbladder wall thickening, prompting transfer for concern for acalculous cholecystitis. Also with focal consolidation on CXR at time of admit. Being treated empirically for CAP.  Bedside ultrasound with some thickening, but this may be related to chronic cirrhosis. Prior U/S with similar findings      Plan:  -Vanc/Zosyn   - Blood and respiratory cultures drawn, NGTD  - 500cc LR bolus today      Hematology  Acute massive pulmonary embolism  Patient with acute hypoxic PEA arrest w/ septal bowing on bedside echo and hypoxic.   -received emergent TPA  -US lower extremities showing extensive bilateral DVTs     Plan:  -heparin ggt high intensity       Acute deep vein thrombosis (DVT) of proximal vein of lower extremity  Please see "cardiac arrest" " above    GI  Chronic hepatitis C without hepatic coma  Hep C Ab positive in past    - Never treated, viral load ordered  - Patient will need follow up with hepatology for treatment pending clinical course    Transaminitis  Plan:  -Monitor LFTs daily  -will need hepatology eval for HCV once stepped down from ICU  -INR resolved to 1.9 w/o intervention       Critical care was time spent personally by me on the following activities: development of treatment plan with patient or surrogate and bedside caregivers, discussions with consultants, evaluation of patient's response to treatment, examination of patient, ordering and performing treatments and interventions, ordering and review of laboratory studies, ordering and review of radiographic studies, pulse oximetry, re-evaluation of patient's condition. This critical care time did not overlap with that of any other provider or involve time for any procedures.     Ej Dickson MD  Critical Care Medicine  [facility]

## 2019-12-12 NOTE — ASSESSMENT & PLAN NOTE
Patient with questionable gallbladder wall thickening, prompting transfer for concern for acalculous cholecystitis. Also with focal consolidation on CXR at time of admit. Being treated empirically for CAP.  Bedside ultrasound with some thickening, but this may be related to chronic cirrhosis. Prior U/S with similar findings      Plan:  -Vanc/Zosyn   - Blood and respiratory cultures drawn, NGTD  - 500cc LR bolus today

## 2019-12-12 NOTE — ASSESSMENT & PLAN NOTE
Patient transferred to MICU after PEA arrest on AM of 12/10/19. Patient observed to be cyanotic and acutely hypoxic prior to event. Patient did received dose of ativan (verbally reported, not documented on MAR) for agitation prior to decompensation. Received ACLS with 2x rounds of epi with ROSC. Following arrest patient not moving right side, however moving all extremities and observed purposeful movements following arrest. No indication for TTM. Patient initially requiring epinephrine for pressor support following arrest however has not required any since.  Bedside ultrasound concerning for RV strain, septal bowing. Patient cyanotic and difficult to oxygenate on 100% FiO2. Decision made to push TPA.  U/S lower extremities positive for significant DVT's.  Patient with acute liver failure, elevated INR (now resolved)  - Patient s/p TPA, holding antocoagulation given blood in ET tube deep suction, will start 12/11/19  - CT head (-), prior CT head without any intracranial pathology  - Intubated and sedated, observed making meaningful movements    Plan:  -Heparin ggt w/o loading bolus for DVT/PE  -Will need long term anticoag  -continue w/ mechanical ventilation at this time, SBT in AM

## 2019-12-12 NOTE — PLAN OF CARE
Pt free from falls and injury this shift. VSS at this time. SpO2 @ % on vent settings: AC/VC, 30% and 5. MAPs maintained >65, no issues, SBPs 80s-90s. Pt only on Propofol and heparin continuously. Abdomen remains soft/nontender, TF started @ goal of 20cc/hr per critical care team. No BM this shift. Asif remains intact, uop 40-75cc/hr. BLEs remain reddened and weeping. Labs reviewed, critical care MD notified of findings. No new orders. No other significant events this shift. Will continue to monitor closely.

## 2019-12-12 NOTE — PROGRESS NOTES
Heparin gtt stopped and to remain off for remainder of day d/t blood-tinged mucus suctioned from ET tube per Dr. Strong. Pt extubated to NC, SpO2 98%. Will continue to monitor.

## 2019-12-12 NOTE — PLAN OF CARE
No acute events per shift, all VSS at this time  On vent AC FiO2 30%, PEEP 5, follows commands/moves all extremities  MAP maintained > 65  Remains in sinus tach, team aware  Gtts: propofol, precedex, heparin, MIVF  No BM per shift   UOP 40-75 mL/hr, concentrated  Liver/gallbladder US done today  Bronchoscopy done today, resp cx sent  B/L lower extremities excoriated, wound care per orders, pulses remain weak but palpable   Plan to wean propofol, titrate precedex gtt for sedation to prepare for SBT possible extubation in AM  Plan of care reviewed with pt and family at bedside  Will continue to monitor

## 2019-12-12 NOTE — ASSESSMENT & PLAN NOTE
Patient with acute hypoxic PEA arrest w/ septal bowing on bedside echo and hypoxic.   -received emergent TPA  -US lower extremities showing extensive bilateral DVTs     Plan:  -heparin ggt high intensity

## 2019-12-12 NOTE — CARE UPDATE
Patient extubated to 2L NC per Dr. Strong. Tolerated well, O2 Sat 98% at this time, will continue to monitor.

## 2019-12-13 LAB
ALBUMIN SERPL BCP-MCNC: 1.6 G/DL (ref 3.5–5.2)
ALP SERPL-CCNC: 112 U/L (ref 55–135)
ALT SERPL W/O P-5'-P-CCNC: 47 U/L (ref 10–44)
ANION GAP SERPL CALC-SCNC: 7 MMOL/L (ref 8–16)
ANISOCYTOSIS BLD QL SMEAR: SLIGHT
APTT BLDCRRT: 57.2 SEC (ref 21–32)
APTT BLDCRRT: 57.2 SEC (ref 21–32)
APTT BLDCRRT: 74.7 SEC (ref 21–32)
AST SERPL-CCNC: 71 U/L (ref 10–40)
BASOPHILS # BLD AUTO: 0.03 K/UL (ref 0–0.2)
BASOPHILS NFR BLD: 0.1 % (ref 0–1.9)
BILIRUB SERPL-MCNC: 4.2 MG/DL (ref 0.1–1)
BUN SERPL-MCNC: 25 MG/DL (ref 6–20)
BURR CELLS BLD QL SMEAR: ABNORMAL
CALCIUM SERPL-MCNC: 7.7 MG/DL (ref 8.7–10.5)
CHLORIDE SERPL-SCNC: 99 MMOL/L (ref 95–110)
CO2 SERPL-SCNC: 23 MMOL/L (ref 23–29)
CREAT SERPL-MCNC: 1 MG/DL (ref 0.5–1.4)
DIFFERENTIAL METHOD: ABNORMAL
EOSINOPHIL # BLD AUTO: 0.1 K/UL (ref 0–0.5)
EOSINOPHIL NFR BLD: 0.4 % (ref 0–8)
ERYTHROCYTE [DISTWIDTH] IN BLOOD BY AUTOMATED COUNT: 24.9 % (ref 11.5–14.5)
EST. GFR  (AFRICAN AMERICAN): >60 ML/MIN/1.73 M^2
EST. GFR  (NON AFRICAN AMERICAN): >60 ML/MIN/1.73 M^2
GLUCOSE SERPL-MCNC: 74 MG/DL (ref 70–110)
HCT VFR BLD AUTO: 38.2 % (ref 40–54)
HGB BLD-MCNC: 11.9 G/DL (ref 14–18)
HYPOCHROMIA BLD QL SMEAR: ABNORMAL
IMM GRANULOCYTES # BLD AUTO: 0.14 K/UL (ref 0–0.04)
IMM GRANULOCYTES NFR BLD AUTO: 0.6 % (ref 0–0.5)
INR PPP: 1.6 (ref 0.8–1.2)
L PNEUMO AG UR QL IA: NOT DETECTED
LYMPHOCYTES # BLD AUTO: 1.6 K/UL (ref 1–4.8)
LYMPHOCYTES NFR BLD: 7.1 % (ref 18–48)
MAGNESIUM SERPL-MCNC: 1.8 MG/DL (ref 1.6–2.6)
MCH RBC QN AUTO: 25.2 PG (ref 27–31)
MCHC RBC AUTO-ENTMCNC: 31.2 G/DL (ref 32–36)
MCV RBC AUTO: 81 FL (ref 82–98)
MONOCYTES # BLD AUTO: 1.4 K/UL (ref 0.3–1)
MONOCYTES NFR BLD: 6.2 % (ref 4–15)
NEUTROPHILS # BLD AUTO: 19.2 K/UL (ref 1.8–7.7)
NEUTROPHILS NFR BLD: 85.6 % (ref 38–73)
NRBC BLD-RTO: 0 /100 WBC
PHOSPHATE SERPL-MCNC: 2.6 MG/DL (ref 2.7–4.5)
PLATELET # BLD AUTO: 263 K/UL (ref 150–350)
PLATELET BLD QL SMEAR: ABNORMAL
PMV BLD AUTO: 9.2 FL (ref 9.2–12.9)
POIKILOCYTOSIS BLD QL SMEAR: SLIGHT
POLYCHROMASIA BLD QL SMEAR: ABNORMAL
POTASSIUM SERPL-SCNC: 4.2 MMOL/L (ref 3.5–5.1)
PROT SERPL-MCNC: 6.8 G/DL (ref 6–8.4)
PROTHROMBIN TIME: 16 SEC (ref 9–12.5)
RBC # BLD AUTO: 4.72 M/UL (ref 4.6–6.2)
SODIUM SERPL-SCNC: 129 MMOL/L (ref 136–145)
TARGETS BLD QL SMEAR: ABNORMAL
TOXIC GRANULES BLD QL SMEAR: PRESENT
WBC # BLD AUTO: 22.41 K/UL (ref 3.9–12.7)
WBC TOXIC VACUOLES BLD QL SMEAR: PRESENT

## 2019-12-13 PROCEDURE — 92610 EVALUATE SWALLOWING FUNCTION: CPT

## 2019-12-13 PROCEDURE — 99233 SBSQ HOSP IP/OBS HIGH 50: CPT | Mod: ,,, | Performed by: INTERNAL MEDICINE

## 2019-12-13 PROCEDURE — 84100 ASSAY OF PHOSPHORUS: CPT

## 2019-12-13 PROCEDURE — 85025 COMPLETE CBC W/AUTO DIFF WBC: CPT

## 2019-12-13 PROCEDURE — 94761 N-INVAS EAR/PLS OXIMETRY MLT: CPT

## 2019-12-13 PROCEDURE — 25000003 PHARM REV CODE 250: Performed by: STUDENT IN AN ORGANIZED HEALTH CARE EDUCATION/TRAINING PROGRAM

## 2019-12-13 PROCEDURE — 85610 PROTHROMBIN TIME: CPT

## 2019-12-13 PROCEDURE — 99233 PR SUBSEQUENT HOSPITAL CARE,LEVL III: ICD-10-PCS | Mod: ,,, | Performed by: INTERNAL MEDICINE

## 2019-12-13 PROCEDURE — 20600001 HC STEP DOWN PRIVATE ROOM

## 2019-12-13 PROCEDURE — 63600175 PHARM REV CODE 636 W HCPCS: Performed by: STUDENT IN AN ORGANIZED HEALTH CARE EDUCATION/TRAINING PROGRAM

## 2019-12-13 PROCEDURE — 80053 COMPREHEN METABOLIC PANEL: CPT

## 2019-12-13 PROCEDURE — 83735 ASSAY OF MAGNESIUM: CPT

## 2019-12-13 PROCEDURE — C9113 INJ PANTOPRAZOLE SODIUM, VIA: HCPCS | Performed by: INTERNAL MEDICINE

## 2019-12-13 PROCEDURE — 63600175 PHARM REV CODE 636 W HCPCS: Performed by: INTERNAL MEDICINE

## 2019-12-13 PROCEDURE — 85730 THROMBOPLASTIN TIME PARTIAL: CPT

## 2019-12-13 RX ORDER — PSEUDOEPHEDRINE/ACETAMINOPHEN 30MG-500MG
100 TABLET ORAL
Status: COMPLETED | OUTPATIENT
Start: 2019-12-13 | End: 2019-12-13

## 2019-12-13 RX ORDER — IBUPROFEN 200 MG
800 TABLET ORAL EVERY 6 HOURS PRN
Status: DISCONTINUED | OUTPATIENT
Start: 2019-12-13 | End: 2019-12-28 | Stop reason: HOSPADM

## 2019-12-13 RX ORDER — FUROSEMIDE 10 MG/ML
40 INJECTION INTRAMUSCULAR; INTRAVENOUS ONCE
Status: COMPLETED | OUTPATIENT
Start: 2019-12-13 | End: 2019-12-13

## 2019-12-13 RX ORDER — SYRING-NEEDL,DISP,INSUL,0.3 ML 29 G X1/2"
296 SYRINGE, EMPTY DISPOSABLE MISCELLANEOUS
Status: COMPLETED | OUTPATIENT
Start: 2019-12-13 | End: 2019-12-13

## 2019-12-13 RX ORDER — TALC
6 POWDER (GRAM) TOPICAL NIGHTLY PRN
Status: DISCONTINUED | OUTPATIENT
Start: 2019-12-14 | End: 2019-12-28 | Stop reason: HOSPADM

## 2019-12-13 RX ORDER — OLANZAPINE 5 MG/1
5 TABLET ORAL ONCE
Status: COMPLETED | OUTPATIENT
Start: 2019-12-13 | End: 2019-12-13

## 2019-12-13 RX ORDER — MIRTAZAPINE 15 MG/1
15 TABLET, ORALLY DISINTEGRATING ORAL NIGHTLY
Status: DISCONTINUED | OUTPATIENT
Start: 2019-12-13 | End: 2019-12-19

## 2019-12-13 RX ADMIN — OLANZAPINE 5 MG: 5 TABLET, FILM COATED ORAL at 06:12

## 2019-12-13 RX ADMIN — FUROSEMIDE 40 MG: 10 INJECTION, SOLUTION INTRAMUSCULAR; INTRAVENOUS at 08:12

## 2019-12-13 RX ADMIN — IBUPROFEN 800 MG: 400 TABLET ORAL at 04:12

## 2019-12-13 RX ADMIN — PIPERACILLIN AND TAZOBACTAM 4.5 G: 4; .5 INJECTION, POWDER, FOR SOLUTION INTRAVENOUS at 01:12

## 2019-12-13 RX ADMIN — IBUPROFEN 800 MG: 400 TABLET ORAL at 09:12

## 2019-12-13 RX ADMIN — MUPIROCIN: 20 OINTMENT TOPICAL at 08:12

## 2019-12-13 RX ADMIN — SODIUM CHLORIDE 500 ML: 0.9 INJECTION, SOLUTION INTRAVENOUS at 06:12

## 2019-12-13 RX ADMIN — MAGNESIUM CITRATE 296 ML: 1.75 LIQUID ORAL at 06:12

## 2019-12-13 RX ADMIN — PROMETHAZINE HYDROCHLORIDE 12.5 MG: 25 INJECTION INTRAMUSCULAR; INTRAVENOUS at 03:12

## 2019-12-13 RX ADMIN — PIPERACILLIN AND TAZOBACTAM 4.5 G: 4; .5 INJECTION, POWDER, FOR SOLUTION INTRAVENOUS at 08:12

## 2019-12-13 RX ADMIN — Medication 6 MG: at 11:12

## 2019-12-13 RX ADMIN — PANTOPRAZOLE SODIUM 40 MG: 40 INJECTION, POWDER, FOR SOLUTION INTRAVENOUS at 08:12

## 2019-12-13 RX ADMIN — MIRTAZAPINE 15 MG: 15 TABLET, ORALLY DISINTEGRATING ORAL at 04:12

## 2019-12-13 RX ADMIN — ONDANSETRON 8 MG: 8 TABLET, ORALLY DISINTEGRATING ORAL at 12:12

## 2019-12-13 RX ADMIN — PIPERACILLIN AND TAZOBACTAM 4.5 G: 4; .5 INJECTION, POWDER, FOR SOLUTION INTRAVENOUS at 05:12

## 2019-12-13 RX ADMIN — Medication 100 ML: at 05:12

## 2019-12-13 RX ADMIN — ONDANSETRON 8 MG: 8 TABLET, ORALLY DISINTEGRATING ORAL at 09:12

## 2019-12-13 NOTE — PLAN OF CARE
Problem: SLP Goal  Goal: SLP Goal  Description  Short Term Goals:   1. Pt will tolerate a regular diet and thin liquids with no overt s/s of aspiration.      Outcome: Ongoing, Progressing   Bedside swallow study completed. Recommend: dental soft det, thin liquids, whole po medications. Patient okay to upgrade to regular diet upon presence of dentures. ST will follow up to ensure tolerance of diet and compliance with aspiration precautions.   FALLON Llyod., CCC-SLP  12/13/2019

## 2019-12-13 NOTE — PT/OT/SLP EVAL
Speech Language Pathology Evaluation  Bedside Swallow    Patient Name:  Francis Daigle   MRN:  [id number]   [id number] CVICU/[id number] CVICU A    Admitting Diagnosis: Cardiac arrest    Recommendations:                 General Recommendations:  diet follow up  Diet recommendations:  Dental Soft, Regular((regular upon presence of dentures)), Thin   Aspiration Precautions: 1 bite/sip at a time, HOB to 90 degrees, Monitor for s/s of aspiration and Standard aspiration precautions   General Precautions: Standard, aspiration  Communication strategies:  none    History:     Past Medical History:   Diagnosis Date    Anxiety     Arthritis     CHF (congestive heart failure)     Coronary artery disease     Depression     Hypertension        History reviewed. No pertinent surgical history.      Prior Intubation HX:  12/10-12/13 (extubated ~12 AM per family member)    Chest X-Rays 12/10: Pulmonary edema pneumonia aspiration or sepsis.    Prior diet: reg/thin with dentures present      Subjective     Patient awake and cooperative. Family member present in room. Communicated with RN prior to entry.     Objective:     Oral Musculature Evaluation  · Oral Musculature: WFL  · Dentition: uses dentures to eat, edentulous(family to bring dentures today)  · Mucosal Quality: good  · Mandibular Strength and Mobility: WFL  · Oral Labial Strength and Mobility: WFL  · Lingual Strength and Mobility: WFL  · Velar Elevation: WFL  · Buccal Strength and Mobility: WFL  · Volitional Cough: elicited  · Volitional Swallow: timely  · Voice Prior to PO Intake: clear, hoarse, dysphonic; patient reports inconsistent dysphonia for 'a while.' Family reports hoarse vocal quality for ~2 years    Bedside Swallow Eval:   Consistencies Assessed:  · Thin liquids ice chip x1, teaspoon sip water x1, cup sip of water x2, straw sips of water x4 ounces  · Puree teaspoon bites of apple sauce xentire cup  · Solids bites of mayur cracker x3     Oral Phase:    · WFL    Pharyngeal Phase:   · no overt clinical signs/symptoms of aspiration  · no overt clinical signs/symptoms of pharyngeal dysphagia   · O2 sats remained consistent    Compensatory Strategies  · None    Treatment: SLP provided education on SLP recommendations, SLP role, s/s and risks of aspiration, safe swallow precautions, and POC. Patient v/u of all discussed and is in agreement with POC. SLP communicated recommendations with RN. White board updated.       Assessment:     Francis Daigle is a 44 y.o. male with an SLP diagnosis of Dysphonia.  He presents with no overt s/s of aspiration with all consistencies trialed. ST will follow up to assess tolerance and ensure compliance with safe swallowing precautions.     Goals:   Multidisciplinary Problems     SLP Goals        Problem: SLP Goal    Goal Priority Disciplines Outcome   SLP Goal     SLP Ongoing, Progressing   Description:  Short Term Goals:   1. Pt will tolerate a regular diet and thin liquids with no overt s/s of aspiration.                       Plan:     · Patient to be seen:  4 x/week   · Plan of Care expires:     · Plan of Care reviewed with:  patient, family   · SLP Follow-Up:  Yes       Discharge recommendations:  (tbd)   Barriers to Discharge:  None    Time Tracking:     SLP Treatment Date:   12/13/19  Speech Start Time:  0742  Speech Stop Time:  0757     Speech Total Time (min):  15 min    Billable Minutes: Eval Swallow and Oral Function 15    SCOTT Mcintosh, CCC-SLP  12/13/2019

## 2019-12-13 NOTE — PROGRESS NOTES
Dr. Ocampo notified scant blood tinged sputum noted c occassional coughing. APTT due @ 0500. Will continue to monitor.

## 2019-12-13 NOTE — NURSING
Resident notified of continued abdominal pain, 's, kub results back. New orders received for enema and will put transfer orders in.

## 2019-12-13 NOTE — ASSESSMENT & PLAN NOTE
Patient with questionable gallbladder wall thickening, prompting transfer for concern for acalculous cholecystitis. Also with focal consolidation on CXR at time of admit. Being treated empirically for CAP.  Bedside ultrasound with some thickening, but this may be related to chronic cirrhosis. Prior U/S with similar findings  -suspected PNA as source    Plan:  -Vanc/Zosyn (Azithro x3 days for CAP)  - Blood and respiratory cultures drawn, NGTD  - 500cc LR bolus today

## 2019-12-13 NOTE — Clinical Note
No acute events per shift, all VSS at this time  Extubated today to NC, on 1 L NC, SpO2 > 95%  MAP maintained > 65  Normal sinus to sinus tach  I L LR bolus admin today  No BM per shift  UOP 30-75 mL/hr  B/L lower extremities excoriated/weeping, wound care per orders, pulses remain weak but palpable  Plan of care reviewed with pt  Will continue monitor

## 2019-12-13 NOTE — RESIDENT HANDOFF
Handoff     Primary Team: [facility] CRITICAL CARE MEDICINE Room Number: [id number] CVICU/[id number] CVICU A     Patient Name: Francis Daigle MRN: [id number]     Date of Birth: [birth year] Allergies: Patient has no known allergies.     Age: 44 y.o. Admit Date: 12/10/2019     Sex: male  BMI: Body mass index is 27.83 kg/m².     Code Status: Full Code        Illness Level (current clinical status): Watcher - Yes, stable post extubation for past 24-48 hours. Post arrest patient.      Reason for Admission: Cardiac arrest    Brief HPI (pertinent PMH and diagnosis or differential diagnosis): Mr. Daigle is a 44 year old female with a history significant for non-ischemic cardiomyopathy with combined CHF (EF 11% without ICD or CRT device) and on-going IVDU who presented to [facility] ED with complaints of abdominal pain and bilateral leg swelling. Patient with PEA arrest secondary to DVT/PE. Patient extubated on 12/12.         Hospital Course (updated, brief assessment by system or problem, significant events): Transferred to ICU following cardiac arrest on 12/10/19. Emergently intubated during PEA arrest, ROSC achieved after 1 round of CPR. Central line and arterial line placed. Patient initially requiring pressor support, cyanotic, hypoxic on blood gas. Bedside ultrasound with signs of RV strain. Patient on pressors at the time, requiring 100% FiO2 to oxygenate. Given TPA emergently.  12/11/2019 Patient started on heparin ggt for tx of extensive bilateral DVTs and PE, bronchoscopy revealing relatively normal airways. BS abx for possible aspiration PNA vs CAP.     Tasks (specific, using if-then statements):   -Heparin ggt will need to either be transitioned to oral anticoag vs IVC filter if anticoag not tolerated  -Optimization of heart failure with GDMT  -Addiction psychiatry       Contingency Plan (special circumstances anticipated and plan): re consult critical care if patient were to decompensate     Estimated Discharge Date: tbd    M[place]ored  By: Elijah carey

## 2019-12-13 NOTE — NURSING
"CC Resident notified of nausea and vomiting x3, zofran given no relief, persistent abdominal pain, pt states feels like "a bunch of gas" and points to epigastric region. Blood present in all episodes. New orders received for KUB, phenergan and hold heparin gtt. See orders for details.   "

## 2019-12-13 NOTE — PROGRESS NOTES
Pt c/o bilat feet soreness, rates 5/10, and is unable to sleep, would like something to help. CC MD notified. No signs of bleeding/hemoptysis noted at this time. Will continue to monitor.

## 2019-12-13 NOTE — PLAN OF CARE
No acute events per shift, all VSS at this time, AAOx4  Extubated to NC, currently on 1 L NC, SpO2 > 95%  MAP maintained > 65  UOP 30-75 mL/hr, concentrated  No BM per shift   B/L lower extremities excoriated, wound care done per orders, pulses remain weak but palpable  Remains NPO, ice chips ok   Heparin stopped for remainder of day per Dr. Strong  Plan of care reviewed with pt  Will continue to monitor

## 2019-12-13 NOTE — PROGRESS NOTES
"[facility]  Critical Care Medicine  Progress Note    Patient Name: Francis Daigle  MRN: [id number]  Admission Date: 12/10/2019  Hospital Length of Stay: 3 days  Code Status: Full Code  Attending Provider: Elijah Strong MD  Primary Care Provider: Primary Doctor No   Principal Problem: Cardiac arrest    Subjective:     HPI:  Mr. Daigle is a 44 year old female with a history significant for non-ischemic cardiomyopathy with combined CHF (EF 11% without ICD or CRT device) and on-going IVDU who presented to [facility] ED with complaints of abdominal pain and bilateral leg swelling. Per chart review: "He reports a two week duration of progressively worsening bilateral lower extremity swelling and redness of the anterior shins. He was accompanied by his cousin whom he lives with who provides history. The abdominal pain had been present for several weeks, improved with milk. He has chronic lower extremity swelling that was worsening over the past few weeks. He reported 2 weeks of skin changes and weeping in bilateral legs which is new. Additionally, he reports a two week duration of occasional episodes of pale semi-formed stools. He attempted symptom relief with increasing dose of daily Lasix, however without effect, prompting presentation to ED on 12/09.   He denied symptom association with fevers, chills, nausea, vomiting, history of prior abdominal surgeries, ETOH consumption, chest pain, pain with inspiration, lightheadedness, or blurry vision. He reports a on-going non-productive cough intermittent cough since CHF diagnosis that is unchanged. He does not weigh himself daily and is unsure of any weight loss or gain. He does follow with cardiology outpatient.   He initially presented to [facility] ED on 12/09. Lab were significant for leukocytosis (25), hyponatremia (126), lactic acidosis (3.5), transaminitis (, ALT 73), hyperbilirubinemia (4.5), and toxicology positive for amphetamines. CXR " "was suggestive of right lower lobe pneumonia. Abdominal U/S was suggestive of acalculous cholecystitis. He was given IVF, Ceftriaxone, and Lasix. Case was discussed with transfer center and [facility] here; recommended transfer for possible MRCP."     Patient was admitted to hospital medicine, treated for CAP with flagyl for possible intra-abdominal etiology. Lactic initially elevated, fluid resuscitation was held as patient has baseline cardiomyopathy and reduced EF. Overnight patient agitated, received dose of ativan for anxiety (Per verbal report, not documented in chart as being given) and was found to be cyanotic. Pulse was lost and code blue was called. Rhythm was PEA, patient received 1 round of ACLS with ROSC. He was transferred to the MICU for further care following cardiac arrest.         Hospital/ICU Course:  Transferred to ICU following cardiac arrest on 12/10/19. Emergently intubated during PEA arrest, ROSC achieved after 1 round of CPR. Central line and arterial line placed. Patient initially requiring pressor support, cyanotic, hypoxic on blood gas. Bedside ultrasound with signs of RV strain. Patient on pressors at the time, requiring 100% FiO2 to oxygenate. Given TPA emergently.  12/11/2019 Patient started on heparin ggt, bronchoscopy today revealing relatively normal airways. Continuing BS Abx.     Interval History/Significant Events: Extubated today, doing well. On BS abx.     Review of Systems   Unable to perform ROS: Other     Objective:     Vital Signs (Most Recent):  Temp: 97.1 °F (36.2 °C) (12/13/19 0300)  Pulse: (!) 115 (12/13/19 0515)  Resp: (!) 25 (12/13/19 0515)  BP: 112/77 (12/13/19 0500)  SpO2: 98 % (12/13/19 0515) Vital Signs (24h Range):  Temp:  [97.1 °F (36.2 °C)-100 °F (37.8 °C)] 97.1 °F (36.2 °C)  Pulse:  [] 115  Resp:  [18-37] 25  SpO2:  [92 %-100 %] 98 %  BP: ()/(53-78) 112/77   Weight: 83 kg (183 lb)  Body mass index is 27.83 kg/m².      Intake/Output Summary (Last 24 hours) " at 12/13/2019 0559  Last data filed at 12/13/2019 0500  Gross per 24 hour   Intake 2467.9 ml   Output 1260 ml   Net 1207.9 ml       Physical Exam   Constitutional: He appears well-developed.   NAD   HENT:   Head: Normocephalic.   ET Tube in place   Eyes: No scleral icterus.   Neck:   Triple lumen in place     Cardiovascular:   Sinus tach 120s, S1/S2, no rub or gallop    Pulmonary/Chest: No respiratory distress.   Coarse b/s bilaterally   Abdominal: Soft. Bowel sounds are normal. He exhibits no distension.   Musculoskeletal: He exhibits edema.   Neurological:   Alert, NFND   Skin: Skin is warm and dry.   Vitals reviewed.      Vents:  Vent Mode: Spont (12/12/19 1131)  Ventilator Initiated: Yes (12/10/19 0656)  Set Rate: 18 bmp (12/12/19 0954)  Vt Set: 450 mL (12/12/19 0954)  Pressure Support: 10 cmH20 (12/12/19 1131)  PEEP/CPAP: 5 cmH20 (12/12/19 1131)  Oxygen Concentration (%): 24 (12/12/19 1910)  Peak Airway Pressure: 16 cmH2O (12/12/19 1131)  Plateau Pressure: 19 cmH20 (12/12/19 1131)  Total Ve: 12 mL (12/12/19 1131)  F/VT Ratio<105 (RSBI): (!) 68.24 (12/12/19 1131)  Lines/Drains/Airways     Central Venous Catheter Line                 Percutaneous Central Line Insertion/Assessment - triple lumen  12/10/19 0730 right internal jugular 2 days          Drain                 Urethral Catheter 12/10/19 0743 2 days          Peripheral Intravenous Line                 External Jugular IV 12/09/19 1544 3 days         Peripheral IV - Single Lumen 12/11/19 1630 18 G Anterior;Left Forearm 1 day              Significant Labs:    CBC/Anemia Profile:  Recent Labs   Lab 12/11/19  0915 12/12/19  0348 12/13/19  0347   WBC 22.12* 21.14*  21.14* 22.41*   HGB 12.9* 11.6*  11.6* 11.9*   HCT 38.6* 37.2*  37.2* 38.2*    219  219 263   MCV 79* 81*  81* 81*   RDW 24.3* 24.6*  24.6* 24.9*        Chemistries:  Recent Labs   Lab 12/12/19  0348 12/13/19  0347   * 129*   K 3.6 4.2    99   CO2 25 23   BUN 33* 25*  "  CREATININE 1.2 1.0   CALCIUM 7.5* 7.7*   ALBUMIN 1.4* 1.6*   PROT 5.9* 6.8   BILITOT 2.9* 4.2*   ALKPHOS 112 112   ALT 47* 47*   AST 63* 71*   MG 2.0 1.8   PHOS 3.4 2.6*       All pertinent labs within the past 24 hours have been reviewed.    Significant Imaging:  I have reviewed and interpreted all pertinent imaging results/findings within the past 24 hours.      ABG  Recent Labs   Lab 12/12/19  0603   PH 7.438   PO2 100   PCO2 39.3   HCO3 26.6   BE 2     Assessment/Plan:     Psychiatric  Intravenous drug abuse  Plan:  -Addiction psych once appropriate     Pulmonary  Acute hypoxemic respiratory failure  Please see "cardiac arrest" above    Community acquired pneumonia of right lower lobe of lung  Please see "sepsis" above    Cardiac/Vascular  * Cardiac arrest  Patient transferred to MICU after PEA arrest on AM of 12/10/19. Patient observed to be cyanotic and acutely hypoxic prior to event. Patient did received dose of ativan (verbally reported, not documented on MAR) for agitation prior to decompensation. Received ACLS with 2x rounds of epi with ROSC. Following arrest patient not moving right side, however moving all extremities and observed purposeful movements following arrest. No indication for TTM. Patient initially requiring epinephrine for pressor support following arrest however has not required any since.  Bedside ultrasound concerning for RV strain, septal bowing. Patient cyanotic and difficult to oxygenate on 100% FiO2. Decision made to push TPA.  U/S lower extremities positive for significant DVT's.  Patient with acute liver failure, elevated INR (now resolved)  - Patient s/p TPA  - CT head (-), prior CT head without any intracranial pathology  - Extubated 12/12/19    Plan:  -Heparin ggt w/o loading bolus for DVT/PE, holding 2/2 to intermittent scant bleeding from ET tube  -Will need long term anticoag vs IVC filter. Will eval in AM        Acute on chronic combined systolic and diastolic heart " "failure  Repeat echo with EF of 15% and associated right ventricular failure as well.    - Pending clinical course, patient would likely benefit from ICD placement    Essential hypertension  Holding home BP meds in setting borderline hypotension     Renal/  Hyponatremia  Resolving    Lactic acidosis  Secondary to sepsis, now s/p cardiac arrest    ID  Sepsis due to undetermined organism  Patient with questionable gallbladder wall thickening, prompting transfer for concern for acalculous cholecystitis. Also with focal consolidation on CXR at time of admit. Being treated empirically for CAP.  Bedside ultrasound with some thickening, but this may be related to chronic cirrhosis. Prior U/S with similar findings  -suspected PNA as source    Plan:  -Vanc/Zosyn (Azithro x3 days for CAP)  - Blood and respiratory cultures drawn, NGTD  - 500cc LR bolus today      Hematology  Acute massive pulmonary embolism  Patient with acute hypoxic PEA arrest w/ septal bowing on bedside echo and hypoxic.   -received emergent TPA  -US lower extremities showing extensive bilateral DVTs     Plan:  -heparin ggt high intensity, Holding. re eval in AM as patient had scant bleeding from ET tube today   - if bleeding present will need IVC filter       Acute deep vein thrombosis (DVT) of proximal vein of lower extremity  Please see "cardiac arrest" above    GI  Chronic hepatitis C without hepatic coma  Hep C Ab positive in past    - Never treated, viral load ordered  - Patient will need follow up with hepatology for treatment pending clinical course    Transaminitis  Plan:  -Monitor LFTs daily  -will need hepatology eval for HCV once stepped down from ICU  -INR resolved to 1.9 w/o intervention         Critical care was time spent personally by me on the following activities: development of treatment plan with patient or surrogate and bedside caregivers, discussions with consultants, evaluation of patient's response to treatment, examination of " patient, ordering and performing treatments and interventions, ordering and review of laboratory studies, ordering and review of radiographic studies, pulse oximetry, re-evaluation of patient's condition. This critical care time did not overlap with that of any other provider or involve time for any procedures.     Ej Dickson MD  Critical Care Medicine  [facility]

## 2019-12-13 NOTE — ASSESSMENT & PLAN NOTE
Patient with acute hypoxic PEA arrest w/ septal bowing on bedside echo and hypoxic.   -received emergent TPA  -US lower extremities showing extensive bilateral DVTs     Plan:  -heparin ggt high intensity, Holding. re eval in AM as patient had scant bleeding from ET tube today   - if bleeding present will need IVC filter

## 2019-12-13 NOTE — PLAN OF CARE
Pt in bed awake, oriented x4, agitated, restless, yelling out. Having abdominal pain majority of the day with nausea and vomiting. MD updated several times on pt status. KUB completed showing constipation, zofran and phenergan prn for nausea management. Poor effectiveness, Enema ordered. Pt afebrile, ST on monitor 100-120's, SBP mildly elevated 140's-150's. Difficult to redirect and soothe pt, music offered, cold towel offered. Pt states he his tired and needs to sleep. Order for zyprexa, see MAR for details.  Heparin gtt on hold at this time due to blood in emesis. Wound care saw pt today for BLE edema and weeping, see wound care notes. Pt BLE now wrapped with kerlex. Asif intact and patent, draining dark yellow urine with sediment. Pt began with low urine output this morning and lasix given per order. Output adequate at this time approx 40-60ml/hr. Pt to be transferred to step down room once bed available. Safety maintained, call light in reach.

## 2019-12-13 NOTE — ASSESSMENT & PLAN NOTE
Patient transferred to MICU after PEA arrest on AM of 12/10/19. Patient observed to be cyanotic and acutely hypoxic prior to event. Patient did received dose of ativan (verbally reported, not documented on MAR) for agitation prior to decompensation. Received ACLS with 2x rounds of epi with ROSC. Following arrest patient not moving right side, however moving all extremities and observed purposeful movements following arrest. No indication for TTM. Patient initially requiring epinephrine for pressor support following arrest however has not required any since.  Bedside ultrasound concerning for RV strain, septal bowing. Patient cyanotic and difficult to oxygenate on 100% FiO2. Decision made to push TPA.  U/S lower extremities positive for significant DVT's.  Patient with acute liver failure, elevated INR (now resolved)  - Patient s/p TPA  - CT head (-), prior CT head without any intracranial pathology  - Extubated 12/12/19    Plan:  -Heparin ggt w/o loading bolus for DVT/PE, holding 2/2 to intermittent scant bleeding from ET tube  -Will need long term anticoag vs IVC filter. Will eval in AM

## 2019-12-13 NOTE — SUBJECTIVE & OBJECTIVE
Interval History/Significant Events: Patient restarted on heparin ggt. Restarted patients diet. Patient stable for stepdown to hospital medicine.   Continuing BS Abx.    Review of Systems   Constitutional: Positive for activity change.   HENT: Negative.    Eyes: Negative.    Respiratory: Negative for choking and shortness of breath.    Cardiovascular: Positive for leg swelling. Negative for chest pain.   Gastrointestinal: Positive for nausea. Negative for abdominal distention and abdominal pain.   Genitourinary: Negative.    Musculoskeletal: Negative.    Neurological: Negative.    Psychiatric/Behavioral: Negative.      Objective:     Vital Signs (Most Recent):  Temp: 97.5 °F (36.4 °C) (12/13/19 0730)  Pulse: (!) 116 (12/13/19 1030)  Resp: (!) 29 (12/13/19 1030)  BP: (!) 128/96 (12/13/19 1030)  SpO2: 98 % (12/13/19 1030) Vital Signs (24h Range):  Temp:  [97.1 °F (36.2 °C)-98.1 °F (36.7 °C)] 97.5 °F (36.4 °C)  Pulse:  [] 116  Resp:  [12-37] 29  SpO2:  [92 %-100 %] 98 %  BP: ()/(57-96) 128/96   Weight: 83 kg (183 lb)  Body mass index is 27.83 kg/m².      Intake/Output Summary (Last 24 hours) at 12/13/2019 1256  Last data filed at 12/13/2019 1100  Gross per 24 hour   Intake 2347.9 ml   Output 1140 ml   Net 1207.9 ml       Physical Exam   Constitutional: He is oriented to person, place, and time. No distress.   HENT:   Head: Atraumatic.   Eyes: EOM are normal.   Neck: Normal range of motion. Neck supple. No thyromegaly present.   Cardiovascular: Normal rate and regular rhythm.   Murmur heard.  Pulmonary/Chest: Effort normal and breath sounds normal. No respiratory distress.   Abdominal: Soft. Bowel sounds are normal. He exhibits no distension.   Musculoskeletal: He exhibits edema.   Neurological: He is alert and oriented to person, place, and time. No cranial nerve deficit.   Skin: He is not diaphoretic.   Vitals reviewed.      Vents:  Vent Mode: Spont (12/12/19 1131)  Ventilator Initiated: Yes (12/10/19  0656)  Set Rate: 18 bmp (12/12/19 0954)  Vt Set: 450 mL (12/12/19 0954)  Pressure Support: 10 cmH20 (12/12/19 1131)  PEEP/CPAP: 5 cmH20 (12/12/19 1131)  Oxygen Concentration (%): 24 (12/12/19 1910)  Peak Airway Pressure: 16 cmH2O (12/12/19 1131)  Plateau Pressure: 19 cmH20 (12/12/19 1131)  Total Ve: 12 mL (12/12/19 1131)  F/VT Ratio<105 (RSBI): (!) 68.24 (12/12/19 1131)  Lines/Drains/Airways     Central Venous Catheter Line                 Percutaneous Central Line Insertion/Assessment - triple lumen  12/10/19 0730 right internal jugular 3 days          Drain                 Urethral Catheter 12/10/19 0743 3 days          Peripheral Intravenous Line                 Peripheral IV - Single Lumen 12/11/19 1630 18 G Anterior;Left Forearm 1 day              Significant Labs:    CBC/Anemia Profile:  Recent Labs   Lab 12/12/19 0348 12/13/19 0347   WBC 21.14*  21.14* 22.41*   HGB 11.6*  11.6* 11.9*   HCT 37.2*  37.2* 38.2*     219 263   MCV 81*  81* 81*   RDW 24.6*  24.6* 24.9*        Chemistries:  Recent Labs   Lab 12/12/19 0348 12/13/19 0347   * 129*   K 3.6 4.2    99   CO2 25 23   BUN 33* 25*   CREATININE 1.2 1.0   CALCIUM 7.5* 7.7*   ALBUMIN 1.4* 1.6*   PROT 5.9* 6.8   BILITOT 2.9* 4.2*   ALKPHOS 112 112   ALT 47* 47*   AST 63* 71*   MG 2.0 1.8   PHOS 3.4 2.6*       All pertinent labs within the past 24 hours have been reviewed.    Significant Imaging:  I have reviewed and interpreted all pertinent imaging results/findings within the past 24 hours.

## 2019-12-13 NOTE — PLAN OF CARE
Pt remains AAO x4. Follows commands. RA. O2 sat >95%. BP stable. Diminished breath sounds. Abd soft, nontender. 1 small bm. UO >30 ml/hr. 2+ pulses in BLE. Swelling c tenderness noted. Heparin gtt restarted, scant blood tinged sputum. MD notified. Pt and son updated on POC. See chart and doc flowsheet for details.

## 2019-12-13 NOTE — PROGRESS NOTES
Pharmacokinetic Assessment Follow Up: IV Vancomycin    Therapy with vancomycin complete and consult discontinued by provider.  Pharmacy will sign off, please re-consult as needed.    Corina Sandy, PharmD, BCCCP  [id number]

## 2019-12-13 NOTE — SUBJECTIVE & OBJECTIVE
Interval History/Significant Events: Extubated today, doing well. On BS abx.     Review of Systems   Unable to perform ROS: Other     Objective:     Vital Signs (Most Recent):  Temp: 97.1 °F (36.2 °C) (12/13/19 0300)  Pulse: (!) 115 (12/13/19 0515)  Resp: (!) 25 (12/13/19 0515)  BP: 112/77 (12/13/19 0500)  SpO2: 98 % (12/13/19 0515) Vital Signs (24h Range):  Temp:  [97.1 °F (36.2 °C)-100 °F (37.8 °C)] 97.1 °F (36.2 °C)  Pulse:  [] 115  Resp:  [18-37] 25  SpO2:  [92 %-100 %] 98 %  BP: ()/(53-78) 112/77   Weight: 83 kg (183 lb)  Body mass index is 27.83 kg/m².      Intake/Output Summary (Last 24 hours) at 12/13/2019 0559  Last data filed at 12/13/2019 0500  Gross per 24 hour   Intake 2467.9 ml   Output 1260 ml   Net 1207.9 ml       Physical Exam   Constitutional: He appears well-developed.   NAD   HENT:   Head: Normocephalic.   ET Tube in place   Eyes: No scleral icterus.   Neck:   Triple lumen in place     Cardiovascular:   Sinus tach 120s, S1/S2, no rub or gallop    Pulmonary/Chest: No respiratory distress.   Coarse b/s bilaterally   Abdominal: Soft. Bowel sounds are normal. He exhibits no distension.   Musculoskeletal: He exhibits edema.   Neurological:   Alert, NFND   Skin: Skin is warm and dry.   Vitals reviewed.      Vents:  Vent Mode: Spont (12/12/19 1131)  Ventilator Initiated: Yes (12/10/19 0656)  Set Rate: 18 bmp (12/12/19 0954)  Vt Set: 450 mL (12/12/19 0954)  Pressure Support: 10 cmH20 (12/12/19 1131)  PEEP/CPAP: 5 cmH20 (12/12/19 1131)  Oxygen Concentration (%): 24 (12/12/19 1910)  Peak Airway Pressure: 16 cmH2O (12/12/19 1131)  Plateau Pressure: 19 cmH20 (12/12/19 1131)  Total Ve: 12 mL (12/12/19 1131)  F/VT Ratio<105 (RSBI): (!) 68.24 (12/12/19 1131)  Lines/Drains/Airways     Central Venous Catheter Line                 Percutaneous Central Line Insertion/Assessment - triple lumen  12/10/19 0730 right internal jugular 2 days          Drain                 Urethral Catheter 12/10/19 0743 2  days          Peripheral Intravenous Line                 External Jugular IV 12/09/19 1544 3 days         Peripheral IV - Single Lumen 12/11/19 1630 18 G Anterior;Left Forearm 1 day              Significant Labs:    CBC/Anemia Profile:  Recent Labs   Lab 12/11/19 0915 12/12/19 0348 12/13/19 0347   WBC 22.12* 21.14*  21.14* 22.41*   HGB 12.9* 11.6*  11.6* 11.9*   HCT 38.6* 37.2*  37.2* 38.2*    219  219 263   MCV 79* 81*  81* 81*   RDW 24.3* 24.6*  24.6* 24.9*        Chemistries:  Recent Labs   Lab 12/12/19 0348 12/13/19 0347   * 129*   K 3.6 4.2    99   CO2 25 23   BUN 33* 25*   CREATININE 1.2 1.0   CALCIUM 7.5* 7.7*   ALBUMIN 1.4* 1.6*   PROT 5.9* 6.8   BILITOT 2.9* 4.2*   ALKPHOS 112 112   ALT 47* 47*   AST 63* 71*   MG 2.0 1.8   PHOS 3.4 2.6*       All pertinent labs within the past 24 hours have been reviewed.    Significant Imaging:  I have reviewed and interpreted all pertinent imaging results/findings within the past 24 hours.

## 2019-12-14 PROBLEM — R06.2 WHEEZING: Status: ACTIVE | Noted: 2019-12-14

## 2019-12-14 LAB
ALBUMIN SERPL BCP-MCNC: 1.6 G/DL (ref 3.5–5.2)
ALP SERPL-CCNC: 102 U/L (ref 55–135)
ALT SERPL W/O P-5'-P-CCNC: 44 U/L (ref 10–44)
ANION GAP SERPL CALC-SCNC: 7 MMOL/L (ref 8–16)
APTT BLDCRRT: 53.8 SEC (ref 21–32)
AST SERPL-CCNC: 62 U/L (ref 10–40)
BACTERIA SPEC AEROBE CULT: ABNORMAL
BASOPHILS # BLD AUTO: 0.02 K/UL (ref 0–0.2)
BASOPHILS NFR BLD: 0.1 % (ref 0–1.9)
BILIRUB SERPL-MCNC: 4.1 MG/DL (ref 0.1–1)
BUN SERPL-MCNC: 29 MG/DL (ref 6–20)
CALCIUM SERPL-MCNC: 7.9 MG/DL (ref 8.7–10.5)
CHLORIDE SERPL-SCNC: 99 MMOL/L (ref 95–110)
CO2 SERPL-SCNC: 26 MMOL/L (ref 23–29)
CREAT SERPL-MCNC: 1.2 MG/DL (ref 0.5–1.4)
DIFFERENTIAL METHOD: ABNORMAL
EOSINOPHIL # BLD AUTO: 0.1 K/UL (ref 0–0.5)
EOSINOPHIL NFR BLD: 0.6 % (ref 0–8)
ERYTHROCYTE [DISTWIDTH] IN BLOOD BY AUTOMATED COUNT: 24.2 % (ref 11.5–14.5)
EST. GFR  (AFRICAN AMERICAN): >60 ML/MIN/1.73 M^2
EST. GFR  (NON AFRICAN AMERICAN): >60 ML/MIN/1.73 M^2
GLUCOSE SERPL-MCNC: 71 MG/DL (ref 70–110)
GRAM STN SPEC: ABNORMAL
GRAM STN SPEC: ABNORMAL
HCT VFR BLD AUTO: 38.5 % (ref 40–54)
HGB BLD-MCNC: 12.4 G/DL (ref 14–18)
IMM GRANULOCYTES # BLD AUTO: 0.13 K/UL (ref 0–0.04)
IMM GRANULOCYTES NFR BLD AUTO: 0.6 % (ref 0–0.5)
INR PPP: 1.6 (ref 0.8–1.2)
LYMPHOCYTES # BLD AUTO: 1.9 K/UL (ref 1–4.8)
LYMPHOCYTES NFR BLD: 9.3 % (ref 18–48)
MAGNESIUM SERPL-MCNC: 1.9 MG/DL (ref 1.6–2.6)
MCH RBC QN AUTO: 26.2 PG (ref 27–31)
MCHC RBC AUTO-ENTMCNC: 32.2 G/DL (ref 32–36)
MCV RBC AUTO: 81 FL (ref 82–98)
MONOCYTES # BLD AUTO: 1.4 K/UL (ref 0.3–1)
MONOCYTES NFR BLD: 7.1 % (ref 4–15)
NEUTROPHILS # BLD AUTO: 16.5 K/UL (ref 1.8–7.7)
NEUTROPHILS NFR BLD: 82.3 % (ref 38–73)
NRBC BLD-RTO: 0 /100 WBC
PHOSPHATE SERPL-MCNC: 3.1 MG/DL (ref 2.7–4.5)
PLATELET # BLD AUTO: 305 K/UL (ref 150–350)
PMV BLD AUTO: 9.1 FL (ref 9.2–12.9)
POTASSIUM SERPL-SCNC: 4 MMOL/L (ref 3.5–5.1)
PROT SERPL-MCNC: 6.8 G/DL (ref 6–8.4)
PROTHROMBIN TIME: 15.3 SEC (ref 9–12.5)
RBC # BLD AUTO: 4.73 M/UL (ref 4.6–6.2)
SODIUM SERPL-SCNC: 132 MMOL/L (ref 136–145)
WBC # BLD AUTO: 20.09 K/UL (ref 3.9–12.7)

## 2019-12-14 PROCEDURE — 99233 PR SUBSEQUENT HOSPITAL CARE,LEVL III: ICD-10-PCS | Mod: ,,, | Performed by: HOSPITALIST

## 2019-12-14 PROCEDURE — 84100 ASSAY OF PHOSPHORUS: CPT

## 2019-12-14 PROCEDURE — 63600175 PHARM REV CODE 636 W HCPCS: Performed by: INTERNAL MEDICINE

## 2019-12-14 PROCEDURE — 25000242 PHARM REV CODE 250 ALT 637 W/ HCPCS: Performed by: STUDENT IN AN ORGANIZED HEALTH CARE EDUCATION/TRAINING PROGRAM

## 2019-12-14 PROCEDURE — 25000003 PHARM REV CODE 250: Performed by: STUDENT IN AN ORGANIZED HEALTH CARE EDUCATION/TRAINING PROGRAM

## 2019-12-14 PROCEDURE — 85730 THROMBOPLASTIN TIME PARTIAL: CPT

## 2019-12-14 PROCEDURE — 94761 N-INVAS EAR/PLS OXIMETRY MLT: CPT

## 2019-12-14 PROCEDURE — 85025 COMPLETE CBC W/AUTO DIFF WBC: CPT

## 2019-12-14 PROCEDURE — 94640 AIRWAY INHALATION TREATMENT: CPT

## 2019-12-14 PROCEDURE — 85610 PROTHROMBIN TIME: CPT

## 2019-12-14 PROCEDURE — 83735 ASSAY OF MAGNESIUM: CPT

## 2019-12-14 PROCEDURE — 99233 SBSQ HOSP IP/OBS HIGH 50: CPT | Mod: ,,, | Performed by: HOSPITALIST

## 2019-12-14 PROCEDURE — C9113 INJ PANTOPRAZOLE SODIUM, VIA: HCPCS | Performed by: INTERNAL MEDICINE

## 2019-12-14 PROCEDURE — 63600175 PHARM REV CODE 636 W HCPCS: Performed by: STUDENT IN AN ORGANIZED HEALTH CARE EDUCATION/TRAINING PROGRAM

## 2019-12-14 PROCEDURE — 20600001 HC STEP DOWN PRIVATE ROOM

## 2019-12-14 PROCEDURE — 86703 HIV-1/HIV-2 1 RESULT ANTBDY: CPT

## 2019-12-14 PROCEDURE — 80053 COMPREHEN METABOLIC PANEL: CPT

## 2019-12-14 RX ORDER — CALCIUM CARBONATE 200(500)MG
500 TABLET,CHEWABLE ORAL ONCE
Status: COMPLETED | OUTPATIENT
Start: 2019-12-14 | End: 2019-12-14

## 2019-12-14 RX ORDER — IPRATROPIUM BROMIDE 0.5 MG/2.5ML
0.5 SOLUTION RESPIRATORY (INHALATION) EVERY 6 HOURS
Status: DISCONTINUED | OUTPATIENT
Start: 2019-12-14 | End: 2019-12-20

## 2019-12-14 RX ORDER — SPIRONOLACTONE 25 MG/1
25 TABLET ORAL DAILY
Status: DISCONTINUED | OUTPATIENT
Start: 2019-12-14 | End: 2019-12-28 | Stop reason: HOSPADM

## 2019-12-14 RX ORDER — FUROSEMIDE 10 MG/ML
40 INJECTION INTRAMUSCULAR; INTRAVENOUS ONCE
Status: COMPLETED | OUTPATIENT
Start: 2019-12-14 | End: 2019-12-14

## 2019-12-14 RX ORDER — LEVOFLOXACIN 750 MG/1
750 TABLET ORAL DAILY
Status: DISCONTINUED | OUTPATIENT
Start: 2019-12-14 | End: 2019-12-15

## 2019-12-14 RX ORDER — PANTOPRAZOLE SODIUM 40 MG/1
40 TABLET, DELAYED RELEASE ORAL DAILY
Status: DISCONTINUED | OUTPATIENT
Start: 2019-12-15 | End: 2019-12-28 | Stop reason: HOSPADM

## 2019-12-14 RX ORDER — ENALAPRIL MALEATE 2.5 MG/1
2.5 TABLET ORAL DAILY
Status: DISCONTINUED | OUTPATIENT
Start: 2019-12-14 | End: 2019-12-28 | Stop reason: HOSPADM

## 2019-12-14 RX ORDER — FLUCONAZOLE 100 MG/1
200 TABLET ORAL DAILY
Status: DISCONTINUED | OUTPATIENT
Start: 2019-12-15 | End: 2019-12-15

## 2019-12-14 RX ORDER — FUROSEMIDE 10 MG/ML
40 INJECTION INTRAMUSCULAR; INTRAVENOUS DAILY
Status: DISCONTINUED | OUTPATIENT
Start: 2019-12-15 | End: 2019-12-18

## 2019-12-14 RX ADMIN — PIPERACILLIN AND TAZOBACTAM 4.5 G: 4; .5 INJECTION, POWDER, FOR SOLUTION INTRAVENOUS at 12:12

## 2019-12-14 RX ADMIN — ENALAPRIL MALEATE 2.5 MG: 2.5 TABLET ORAL at 12:12

## 2019-12-14 RX ADMIN — HEPARIN SODIUM 16 UNITS/KG/HR: 10000 INJECTION, SOLUTION INTRAVENOUS at 12:12

## 2019-12-14 RX ADMIN — IPRATROPIUM BROMIDE 0.5 MG: 0.5 SOLUTION RESPIRATORY (INHALATION) at 01:12

## 2019-12-14 RX ADMIN — PANTOPRAZOLE SODIUM 40 MG: 40 INJECTION, POWDER, FOR SOLUTION INTRAVENOUS at 09:12

## 2019-12-14 RX ADMIN — APIXABAN 5 MG: 5 TABLET, FILM COATED ORAL at 11:12

## 2019-12-14 RX ADMIN — SPIRONOLACTONE 25 MG: 25 TABLET ORAL at 11:12

## 2019-12-14 RX ADMIN — APIXABAN 10 MG: 5 TABLET, FILM COATED ORAL at 09:12

## 2019-12-14 RX ADMIN — PIPERACILLIN AND TAZOBACTAM 4.5 G: 4; .5 INJECTION, POWDER, FOR SOLUTION INTRAVENOUS at 09:12

## 2019-12-14 RX ADMIN — FUROSEMIDE 40 MG: 10 INJECTION, SOLUTION INTRAMUSCULAR; INTRAVENOUS at 11:12

## 2019-12-14 RX ADMIN — CALCIUM CARBONATE (ANTACID) CHEW TAB 500 MG 500 MG: 500 CHEW TAB at 09:12

## 2019-12-14 RX ADMIN — ONDANSETRON 8 MG: 8 TABLET, ORALLY DISINTEGRATING ORAL at 04:12

## 2019-12-14 RX ADMIN — LEVOFLOXACIN 750 MG: 750 TABLET, FILM COATED ORAL at 05:12

## 2019-12-14 RX ADMIN — MUPIROCIN: 20 OINTMENT TOPICAL at 12:12

## 2019-12-14 RX ADMIN — IPRATROPIUM BROMIDE 0.5 MG: 0.5 SOLUTION RESPIRATORY (INHALATION) at 07:12

## 2019-12-14 RX ADMIN — MIRTAZAPINE 15 MG: 15 TABLET, ORALLY DISINTEGRATING ORAL at 09:12

## 2019-12-14 NOTE — PLAN OF CARE
I have seen the patient and reviewed the resident's note & assessment and plan. I have personally interviewed and examined the patient at bedside, and I agree with the note as stated above, with the following comments/updates:    44-year-old man with complex medical history briefly admitted to Hospital Medicine, then stepped up to MICU following PEA Arrest with ROSC. Intubated during code blue.  Given tPA for Acute Massive PE due to right heart strain on echo and bilateral lower extremity DVTs on ultrasound. Initially started on heparin drip. Found to have RLL CAP - respiratory culture now growing Enterobacter Cloacae and Candida Albicans  Successfully extubated in MICU and step-down Hospital Medicine.    - Acute Massive PE, Bilateral LE DVTs: Received tPA. Transition from heparin drip to Eliquis p.o. Checking RUE U/S to rule out DVT given swelling on PE. No further episodes of hemoptysis.  - Acute Hypoxic Respiratory Failure:  Patient extubated and successfully weaned to room air  - RLL CAP (Enterobacter Cloacae and Candida Albicans):  ABX narrowed to Levaquin p.o., started fluconazole p.o. Leukocytosis improving, BCxs NGTD  - Acute on Chronic Systolic HF (EF: 11%): Diuresis with Lasix 40 IV daily, closely monitor UOP and daily weights. BNP: 3,300 (though in the setting of massive PE). Restarting OMT - enalapril 2.5 mg daily and Aldactone 25 mg daily. Will add back beta-blocker pending further diuresis. Cardiology consulted for consideration of life vest versus AICD given PEA arrest hx.  - Polysubstance Abuse (Methamphetamines, c/f Alcohol Abuse):  UDS positive for amphetamines. Continuing to monitor closely, will consider addiction Psychiatry consult early next week  - Acute Liver Injury: RUQ U/S repeated and with no evidence of cirrhosis and negative sonographic Reyes's Sign. Patient has ascites on exam, possibly secondary to ADHF?  AST/ALT and INR improving. HCV AB-positive, will follow-up RNA VL.  Follow up  PETH.    Awaiting repeat PT/OT evaluation to determine appropriate placement, pending medical stability    Active Hospital Problems    Diagnosis  POA    *Sepsis due to undetermined organism [A41.9]  Yes    Wheezing [R06.2]  Unknown    Acute massive pulmonary embolism [I26.99]  Unknown    Hemoptysis [R04.2]  No    Acute on chronic combined systolic and diastolic heart failure [I50.43]  Yes    Community acquired pneumonia of right lower lobe of lung [J18.1]  Yes    Intravenous drug abuse [F19.10]  Yes    Lactic acidosis [E87.2]  Yes    Transaminitis [R74.0]  Yes    Hyperbilirubinemia [E80.6]  Yes    Non-ischemic cardiomyopathy [I42.8]  Yes    Chronic hepatitis C without hepatic coma [B18.2]  Yes    Hyponatremia [E87.1]  Yes    Alteration in skin integrity [R23.9]  Yes    Cardiac arrest [I46.9]  No    Acute deep vein thrombosis (DVT) of proximal vein of lower extremity [I82.4Y9]  Yes    Acute hypoxemic respiratory failure [J96.01]  Yes    Essential hypertension [I10]  Yes    Other specified anxiety disorders [F41.8]  No      Resolved Hospital Problems   No resolved problems to display.        David Potts M.D.  Hospital Medicine Staff  [facility]   Pager: [phone]

## 2019-12-14 NOTE — NURSING
Called MD at 2333 to verify Heparin drip. Spoke to Krzysztof Meza MD to verify heparin drip order. MD stated to continue with heparin drip and to call back if pt starts to cough up blood again.

## 2019-12-14 NOTE — PLAN OF CARE
POC reviewed with pt. VSS. A&Ox4. Pt has anxiety and get very anxious about being alone in his room. The anxiety was starting to affect his sleep. MD called and ordered melatonin to help him get some sleep. Pt arrived with heparin gtt running. ICU nurse's report stated the heparin gtt was held due to the pt coughing up blood. However, all of the MD notes stated that the heparin was to be restarted. Called the MD to clarify order, MD was ok with restarting the heparin gtt. Will continue to monitor.

## 2019-12-14 NOTE — NURSING TRANSFER
Nursing Transfer Note      12/13/2019     Transfer To: [id number] from [id number]     Transfer via bed    Transfer with cardiac monitoring    Transported by TATO Caldwell RN and PCT     Medicines sent: yes    Chart send with patient: Yes    Notified: son    Patient reassessed at: 12/13/19 1900   (date, time)    Upon arrival to floor: cardiac monitor applied, patient oriented to room, call bell in reach and bed in lowest position

## 2019-12-14 NOTE — ASSESSMENT & PLAN NOTE
-See assessment for sepsis.   MELD-Na score: 22 at 12/14/2019  6:21 AM  MELD score: 19 at 12/14/2019  6:21 AM  Calculated from:  Serum Creatinine: 1.2 mg/dL at 12/14/2019  6:21 AM  Serum Sodium: 132 mmol/L at 12/14/2019  6:21 AM  Total Bilirubin: 4.1 mg/dL at 12/14/2019  6:21 AM  INR(ratio): 1.6 at 12/14/2019  6:21 AM  Age: 44 years

## 2019-12-14 NOTE — ASSESSMENT & PLAN NOTE
-History of non-ischemic cardiomyopathy diagnosed in 11/2017 at [facility].   -C in 12/2017 with no evidence of angiographically significant coronary artery disease.   -Most recent ECHO in 10/2019 concerning for EF of 35%, grade II diastolic dysfunction, and global hypokinesis with a restrictive physiology.   -Home regimen: Carvedilol, Enalapril, Lasix 20 mg BID, and Spironolactone.   -No outpatient follow-up.   -Exam on admission concerning for bilateral lower extremity edema, abdominal distension with ascites, and JVD, however without evidence of pulmonary edema,  respiratory compromise, or poor perfusion.   -Labs with associated hyponatremia likely from hypervolemia.   -Suspect presentation from worsening right sided heart failure.     Plan:   -Initiate IV diuresis 40mg daily   -Continue home regimen with exception of beta-blocker. On aldactone and ACE  -Strict I/O's and daily standing weights.   -Telemetry ordered.   -Needs evaluation for ICD vs CRT. Need to call back cardiology on Monday   -Trend renal function with diuresis; goal K >4 and Mg >2.

## 2019-12-14 NOTE — SUBJECTIVE & OBJECTIVE
Interval History: NAEON, patient denied hemoptysis. Cough fever nausea vomiting. Patient able to tolerate meal during interview. Patient complain about abdominal pain. Denied fever chill nausea vomiting and urinary difficulty, and constipation. Patient's LFT trending down and US did not reveal acute infectious process or cirrhotic liver. Right arm swelling noticed.      Review of Systems   Constitutional: Negative for appetite change, chills and fever.   HENT: Negative for congestion, sore throat and trouble swallowing.    Eyes: Negative for photophobia and visual disturbance.   Respiratory: Positive for shortness of breath. Negative for cough.    Cardiovascular: Positive for chest pain (2/2 CPR ) and leg swelling. Negative for palpitations.   Gastrointestinal: Positive for abdominal pain. Negative for abdominal distention, anal bleeding, blood in stool, constipation, diarrhea, nausea, rectal pain and vomiting.   Genitourinary: Negative for difficulty urinating.   Musculoskeletal: Positive for gait problem and joint swelling. Negative for back pain, myalgias and neck pain.   Skin: Negative for pallor and rash.   Neurological: Negative for dizziness, weakness, light-headedness, numbness and headaches.   Psychiatric/Behavioral: Positive for decreased concentration. Negative for agitation and confusion.     Objective:     Vital Signs (Most Recent):  Temp: 97.5 °F (36.4 °C) (12/14/19 1141)  Pulse: (!) 118 (12/14/19 1314)  Resp: 16 (12/14/19 1314)  BP: 117/89 (12/14/19 1141)  SpO2: 98 % (12/14/19 1314) Vital Signs (24h Range):  Temp:  [97.4 °F (36.3 °C)-98.2 °F (36.8 °C)] 97.5 °F (36.4 °C)  Pulse:  [118-128] 118  Resp:  [16-33] 16  SpO2:  [93 %-98 %] 98 %  BP: (117-153)/() 117/89     Weight: 83 kg (183 lb)  Body mass index is 27.83 kg/m².    Intake/Output Summary (Last 24 hours) at 12/14/2019 1344  Last data filed at 12/14/2019 1220  Gross per 24 hour   Intake 1064.08 ml   Output 1700 ml   Net -635.92 ml       Physical Exam   Constitutional: He is oriented to person, place, and time. He appears well-developed and well-nourished. He has a sickly appearance. No distress.   HENT:   Head: Normocephalic and atraumatic.   Mouth/Throat: Oropharynx is clear and moist and mucous membranes are normal.   Eyes: Pupils are equal, round, and reactive to light. Conjunctivae are normal. Scleral icterus is present.   Neck: JVD present.   Trialysis line inserted in right neck.    Cardiovascular: Normal rate, regular rhythm and normal pulses. Exam reveals gallop.   No murmur heard.  Pulmonary/Chest: Effort normal. No respiratory distress. He has decreased breath sounds in the right lower field. He has wheezes in the left lower field.   Right lower and mid crackle with wheezing    Abdominal: Soft. Normal appearance and bowel sounds are normal. He exhibits distension. There is no hepatomegaly. There is tenderness in the right upper quadrant, right lower quadrant and epigastric area. There is no CVA tenderness.   Musculoskeletal: He exhibits edema. He exhibits no tenderness or deformity.   There is significant edema of the bilateral lower extremities with pronounced erythema of the bilateral skins and ankles. Right arm swelling compare to left arm.    Neurological: He is alert and oriented to person, place, and time. He displays no Babinski's sign on the right side. He displays no Babinski's sign on the left side.   Skin: Skin is warm and dry. No bruising and no rash noted.   There are track marks of the bilateral wrists.        Significant Labs:   CBC:   Recent Labs   Lab 12/13/19  0347 12/14/19  0621   WBC 22.41* 20.09*   HGB 11.9* 12.4*   HCT 38.2* 38.5*    305     CMP:   Recent Labs   Lab 12/13/19  0347 12/14/19  0621   * 132*   K 4.2 4.0   CL 99 99   CO2 23 26   GLU 74 71   BUN 25* 29*   CREATININE 1.0 1.2   CALCIUM 7.7* 7.9*   PROT 6.8 6.8   ALBUMIN 1.6* 1.6*   BILITOT 4.2* 4.1*   ALKPHOS 112 102   AST 71* 62*   ALT 47* 44    ANIONGAP 7* 7*   EGFRNONAA >60.0 >60.0     All pertinent labs within the past 24 hours have been reviewed.    Significant Imaging: U/S: I have reviewed all pertinent results/findings within the past 24 hours and my personal findings are:   Liver vasculature demonstrates appropriate waveform and direction of flow.  Mild nonspecific gallbladder wall thickening.  No cholelithiasis or sonographic evidence for acute cholecystitis.  Moderate volume abdominopelvic ascites.    I have reviewed all pertinent imaging results/findings within the past 24 hours.

## 2019-12-14 NOTE — PROGRESS NOTES
"[facility]  Critical Care Medicine  Progress Note    Patient Name: Francis Daigle  MRN: [id number]  Admission Date: 12/10/2019  Hospital Length of Stay: 3 days  Code Status: Full Code  Attending Provider: Elijah Strong MD  Primary Care Provider: Primary Doctor No   Principal Problem: Cardiac arrest    Subjective:     HPI:  Mr. Daigle is a 44 year old female with a history significant for non-ischemic cardiomyopathy with combined CHF (EF 11% without ICD or CRT device) and on-going IVDU who presented to [facility] ED with complaints of abdominal pain and bilateral leg swelling. Per chart review: "He reports a two week duration of progressively worsening bilateral lower extremity swelling and redness of the anterior shins. He was accompanied by his cousin whom he lives with who provides history. The abdominal pain had been present for several weeks, improved with milk. He has chronic lower extremity swelling that was worsening over the past few weeks. He reported 2 weeks of skin changes and weeping in bilateral legs which is new. Additionally, he reports a two week duration of occasional episodes of pale semi-formed stools. He attempted symptom relief with increasing dose of daily Lasix, however without effect, prompting presentation to ED on 12/09.   He denied symptom association with fevers, chills, nausea, vomiting, history of prior abdominal surgeries, ETOH consumption, chest pain, pain with inspiration, lightheadedness, or blurry vision. He reports a on-going non-productive cough intermittent cough since CHF diagnosis that is unchanged. He does not weigh himself daily and is unsure of any weight loss or gain. He does follow with cardiology outpatient.   He initially presented to [place] ED on 12/09. Lab were significant for leukocytosis (25), hyponatremia (126), lactic acidosis (3.5), transaminitis (, ALT 73), hyperbilirubinemia (4.5), and toxicology positive for amphetamines. CXR " "was suggestive of right lower lobe pneumonia. Abdominal U/S was suggestive of acalculous cholecystitis. He was given IVF, Ceftriaxone, and Lasix. Case was discussed with transfer center and AES here; recommended transfer for possible MRCP."     Patient was admitted to hospital medicine, treated for CAP with flagyl for possible intra-abdominal etiology. Lactic initially elevated, fluid resuscitation was held as patient has baseline cardiomyopathy and reduced EF. Overnight patient agitated, received dose of ativan for anxiety (Per verbal report, not documented in chart as being given) and was found to be cyanotic. Pulse was lost and code blue was called. Rhythm was PEA, patient received 1 round of ACLS with ROSC. He was transferred to the MICU for further care following cardiac arrest.         Hospital/ICU Course:  Transferred to ICU following cardiac arrest on 12/10/19. Emergently intubated during PEA arrest, ROSC achieved after 1 round of CPR. Central line and arterial line placed. Patient initially requiring pressor support, cyanotic, hypoxic on blood gas. Bedside ultrasound with signs of RV strain. Patient on pressors at the time, requiring 100% FiO2 to oxygenate. Given TPA emergently.  12/11/2019 Patient started on heparin ggt, bronchoscopy today revealing relatively normal airways. Continuing BS Abx.   12/12/2019 Patient with scant hemoptysis x1 occurrence, held heparin ggt overnight. Tolerated extubation well.   12/13/2019 Patient restarted on heparin ggt. Restarted patients diet. Nausea and vomiting with scant 1cc of blood vs flex. Patient stable for stepdown to hospital medicine.     Interval History/Significant Events: Patient restarted on heparin ggt. Restarted patients diet. Patient stable for stepdown to hospital medicine.   Continuing BS Abx.    Review of Systems   Constitutional: Positive for activity change.   HENT: Negative.    Eyes: Negative.    Respiratory: Negative for choking and shortness of " breath.    Cardiovascular: Positive for leg swelling. Negative for chest pain.   Gastrointestinal: Positive for nausea. Negative for abdominal distention and abdominal pain.   Genitourinary: Negative.    Musculoskeletal: Negative.    Neurological: Negative.    Psychiatric/Behavioral: Negative.      Objective:     Vital Signs (Most Recent):  Temp: 97.5 °F (36.4 °C) (12/13/19 0730)  Pulse: (!) 116 (12/13/19 1030)  Resp: (!) 29 (12/13/19 1030)  BP: (!) 128/96 (12/13/19 1030)  SpO2: 98 % (12/13/19 1030) Vital Signs (24h Range):  Temp:  [97.1 °F (36.2 °C)-98.1 °F (36.7 °C)] 97.5 °F (36.4 °C)  Pulse:  [] 116  Resp:  [12-37] 29  SpO2:  [92 %-100 %] 98 %  BP: ()/(57-96) 128/96   Weight: 83 kg (183 lb)  Body mass index is 27.83 kg/m².      Intake/Output Summary (Last 24 hours) at 12/13/2019 1256  Last data filed at 12/13/2019 1100  Gross per 24 hour   Intake 2347.9 ml   Output 1140 ml   Net 1207.9 ml       Physical Exam   Constitutional: He is oriented to person, place, and time. No distress.   HENT:   Head: Atraumatic.   Eyes: EOM are normal.   Neck: Normal range of motion. Neck supple. No thyromegaly present.   Cardiovascular: Normal rate and regular rhythm.   Murmur heard.  Pulmonary/Chest: Effort normal and breath sounds normal. No respiratory distress.   Abdominal: Soft. Bowel sounds are normal. He exhibits no distension.   Musculoskeletal: He exhibits edema.   Neurological: He is alert and oriented to person, place, and time. No cranial nerve deficit.   Skin: He is not diaphoretic.   Vitals reviewed.      Vents:  Vent Mode: Spont (12/12/19 1131)  Ventilator Initiated: Yes (12/10/19 0656)  Set Rate: 18 bmp (12/12/19 0954)  Vt Set: 450 mL (12/12/19 0954)  Pressure Support: 10 cmH20 (12/12/19 1131)  PEEP/CPAP: 5 cmH20 (12/12/19 1131)  Oxygen Concentration (%): 24 (12/12/19 1910)  Peak Airway Pressure: 16 cmH2O (12/12/19 1131)  Plateau Pressure: 19 cmH20 (12/12/19 1131)  Total Ve: 12 mL (12/12/19 1131)  F/VT  "Ratio<105 (RSBI): (!) 68.24 (12/12/19 1131)  Lines/Drains/Airways     Central Venous Catheter Line                 Percutaneous Central Line Insertion/Assessment - triple lumen  12/10/19 0730 right internal jugular 3 days          Drain                 Urethral Catheter 12/10/19 0743 3 days          Peripheral Intravenous Line                 Peripheral IV - Single Lumen 12/11/19 1630 18 G Anterior;Left Forearm 1 day              Significant Labs:    CBC/Anemia Profile:  Recent Labs   Lab 12/12/19 0348 12/13/19  0347   WBC 21.14*  21.14* 22.41*   HGB 11.6*  11.6* 11.9*   HCT 37.2*  37.2* 38.2*     219 263   MCV 81*  81* 81*   RDW 24.6*  24.6* 24.9*        Chemistries:  Recent Labs   Lab 12/12/19 0348 12/13/19  0347   * 129*   K 3.6 4.2    99   CO2 25 23   BUN 33* 25*   CREATININE 1.2 1.0   CALCIUM 7.5* 7.7*   ALBUMIN 1.4* 1.6*   PROT 5.9* 6.8   BILITOT 2.9* 4.2*   ALKPHOS 112 112   ALT 47* 47*   AST 63* 71*   MG 2.0 1.8   PHOS 3.4 2.6*       All pertinent labs within the past 24 hours have been reviewed.    Significant Imaging:  I have reviewed and interpreted all pertinent imaging results/findings within the past 24 hours.      ABG  Recent Labs   Lab 12/12/19  0603   PH 7.438   PO2 100   PCO2 39.3   HCO3 26.6   BE 2     Assessment/Plan:     Psychiatric  Intravenous drug abuse  Plan:  -Addiction psych once appropriate     Pulmonary  Acute hypoxemic respiratory failure  Please see "cardiac arrest" above    Community acquired pneumonia of right lower lobe of lung  Please see "sepsis" above    Cardiac/Vascular  * Cardiac arrest  Patient transferred to MICU after PEA arrest on AM of 12/10/19. Patient observed to be cyanotic and acutely hypoxic prior to event. Patient did received dose of ativan (verbally reported, not documented on MAR) for agitation prior to decompensation. Received ACLS with 2x rounds of epi with ROSC. Following arrest patient not moving right side, however moving all " extremities and observed purposeful movements following arrest. No indication for TTM. Patient initially requiring epinephrine for pressor support following arrest however has not required any since.  Bedside ultrasound concerning for RV strain, septal bowing. Patient cyanotic and difficult to oxygenate on 100% FiO2. Decision made to push TPA.  U/S lower extremities positive for significant DVT's.  Patient with acute liver failure, elevated INR (now resolved)  - Patient s/p TPA  - CT head (-), prior CT head without any intracranial pathology  - Extubated 12/12/19    Plan:  -Heparin ggt w/o loading bolus for DVT/PE, holding 2/2 to intermittent scant bleeding from ET tube  -Will need long term anticoag vs IVC filter. Will eval in AM        Acute on chronic combined systolic and diastolic heart failure  Repeat echo with EF of 15% and associated right ventricular failure as well.    - Pending clinical course, patient would likely benefit from ICD placement    Essential hypertension  Holding home BP meds in setting borderline hypotension     Renal/  Hyponatremia  Resolving    Lactic acidosis  Secondary to sepsis, now s/p cardiac arrest    ID  Sepsis due to undetermined organism  Patient with questionable gallbladder wall thickening, prompting transfer for concern for acalculous cholecystitis. Also with focal consolidation on CXR at time of admit. Being treated empirically for CAP.  Bedside ultrasound with some thickening, but this may be related to chronic cirrhosis. Prior U/S with similar findings  -suspected PNA as source    Plan:  -Vanc/Zosyn (Azithro x3 days for CAP)  - Blood and respiratory cultures drawn, NGTD  - 500cc LR bolus today      Hematology  Acute massive pulmonary embolism  Patient with acute hypoxic PEA arrest w/ septal bowing on bedside echo and hypoxic.   -received emergent TPA  -US lower extremities showing extensive bilateral DVTs     Plan:  - heparin ggt, will need transition to oral agent  - if  "unable to tolerate anticoag, will need IVC filter       Acute deep vein thrombosis (DVT) of proximal vein of lower extremity  Please see "cardiac arrest" above    GI  Chronic hepatitis C without hepatic coma  Hep C Ab positive in past    - Never treated, viral load ordered  - Patient will need follow up with hepatology for treatment pending clinical course    Transaminitis  Plan:  -Monitor LFTs daily  -will need hepatology eval for HCV once stepped down from ICU  -INR resolved to 1.9 w/o intervention      Stepdown to hospital medicine.     Critical care was time spent personally by me on the following activities: development of treatment plan with patient or surrogate and bedside caregivers, discussions with consultants, evaluation of patient's response to treatment, examination of patient, ordering and performing treatments and interventions, ordering and review of laboratory studies, ordering and review of radiographic studies, pulse oximetry, re-evaluation of patient's condition. This critical care time did not overlap with that of any other provider or involve time for any procedures.     Ej Dickson MD  Critical Care Medicine  [facility]  "

## 2019-12-14 NOTE — HOSPITAL COURSE
Francis Daigle 44 y.o male presented from OSH with cholilithiasis and cholecystitis. Patient transfer to [facility] [place] [facility] evaluation. When patient got admitted to hospital medicine team the patient was transferred to ICU following cardiac arrest on 12/10/19. Emergently intubated during PEA arrest, ROSC achieved after 1 round of CPR. Central line and arterial line placed. Patient initially requiring pressor support, cyanotic, hypoxic on blood gas. Bedside ultrasound with signs of RV strain with DVT in lower extremity. Patient on pressors at the time, requiring 100% FiO2 to oxygenate. Given TPA emergently. Patient started on heparin ggt, bronchoscopy today revealing relatively normal airways and extubated. Most recent ultrasound did not show liver cirrhosis or cholilthiasis or cholecystitis. Patient with RLL PNA getting treated with Zosyn and later switch to Levo. Patient transitioned to Heparin gtt to 10mg BID eliquis. Cardiology will need to consulted for possible ICD or lifevest up on discharge on Monday.   Spoke with cardiology who states that he will likely need an ICD but he can follow up outpatient with cardiology for its placement.  Patient continues to state that he feels better each day.  He does endorse increased anxiety over the last day.  He states that he used to be on treatment in the past.  Started patient on citalopram and instructed him that he will need to request follow up with psychiatry once he is discharged. Patient has continued midepigastric pain that is associated with meals. Ultrasound for mesenteric ischemia showed no signs of occlusion or stenosis.  Ordered CT scan of the abdomen with contast showing bilateral PE and empyema vs abcess. Pulmonary consulted and plan to CT tomorrow, NPO at Midnight.  Chest tube was preformed today and fluids sent for cultures and cytology.  Patient noted to be tachycardic overnight up to 130s; ECG in the AM noted sinus tachycardia.  His HR slowed to 98 by lunch  time and a small 250 mL bolus of LR.  Re-started heparin drip following the chest tube placement per pulmonology recommendations.   Patient has become more anxious and uncooperative today; pulling out IV lines and thrashing about room, demanding norco and IV ativan.  Addressed patient's concerns and provided 1g PO ativan q8 hours.  On 12/22, patient noted to have a spike in his WBC (42k.), trending down. Blood cultures were drawn as well as a lactate.  His antibitoics were broadened to Vancomycin and Zosyn IV.  His CT tube was noted to put out an additional 1L. Curiously, his abdominal pain has improved after lactulose gave him a bowel movement, but complains of it all day, CT abdomen with contrast showed no ileus or obstruction, empyema improved per pulm will remove the chest tube when the output less than 150 cc/24hrs. Psych consulted on 12/23, recommending to gabapentin 100mg TID PRN for anxiety and Gabapentin 300mg qhs. WBC continuing to improve. ID recommended continus same Abx until chest tube remove then switch to Augmentin. Chest tube removed 12/27. Patient for discharged today, medications to be delivered at bedside. Referrals placed for Cardiology, pulmonology, hepatology (to assess patient for treatment of confirmed chronic hepatitis-C), and PCP.

## 2019-12-14 NOTE — ASSESSMENT & PLAN NOTE
History of 40PK year. Mild wheeze throughout lung field. Sating well in room air  - Starting Ipotroprium Neb Q8H for wheezing

## 2019-12-14 NOTE — ASSESSMENT & PLAN NOTE
DVT lower extremity probable origin of PE. Patient was started on heparin gtt    Plan  Transition to DVT dosing of 10mg BID eliquis   - F/U right upper extremity arm US

## 2019-12-14 NOTE — ASSESSMENT & PLAN NOTE
This is a 44 year old male with PMH significant for non-ischemic cardiomyopathy (EF 11%), IVDU, and Hepatitis C who is presenting on 12/10 with two week duration of worsening lower extremity swelling and erythema associated with same duration of post-prandial abdominal pain and distension with intermittent pale colored stools with work-up thus far concerning for tachycardia (120's), leukocytosis (25), lactic acidosis, transaminitis, hyperbilirubinemia, CXR suggestive of right lower lobe pneumonia, and abdominal U/S suggestive of possible acalculous cholecystitis. He has no symptoms of underlying respiratory infection and on chart review CXR abnormalities are similar to those in 08/2019 during admission for CHF exacerbation. UA is unremarkable. Negative for influenza. Suspect bilateral lower extremity edema and erythema more a manifestation of CHF instead of bilateral cellulitis. Differential diagnoses include biliary etiology vs bacteremia in the setting of on-going IVDU vs possible CAP.     Plan:   -Initiated antibiotic coverage for suspected biliary etiology and CAP with Azithromycin, Ceftriaxone, and Flagyl then transition to Levo PO for PNA  -Blood cultures NGTD  -Respiratory culture growing GNR and Candida with persistently elevated WBC.   -Trend WBC and liver function daily. Trending down but Clint staying the same   -Judiciously administer an IVF resuscitation necessary.

## 2019-12-14 NOTE — PROGRESS NOTES
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 He has a PMH significant for non-ischemic cardiomyopathy with combined CHF (EF 11% without ICD or CRT device) and on-going IVDU. He reports a two week duration of progressively worsening bilateral lower extremity swelling and redness of the anterior shins. This is associated with bilateral lower extremity pain with ambulation. He also reports noticing symptom association with worsening of baseline SOB with exertion as well as new abdominal distension and pain, within the same time frame. He describes abdominal pain as a generalized soreness that occurs approximately one hour after eating meals and spontaneously resolves without intervention or medication. He is unsure of how long the discomfort lasts before resolving. Additionally, he reports a two week duration of occasional episodes of pale semi-formed stools. He attempted symptom relief with increasing dose of daily Lasix, however without effect, prompting presentation to ED on 12/09.     He denies symptom association with fevers, chills, nausea, vomiting, history of prior abdominal surgeries, ETOH consumption, chest pain, pain with inspiration, lightheadedness, or blurry vision. He reports a on-going non-productive cough intermittent cough since CHF diagnosis that is unchanged. He does not weigh himself daily and is unsure of any weight loss or gain. He does follow with cardiology outpatient.     Of note, he also reports continued daily use of IV  methamphetamines with most recent usage on 12/08.     ED course: He initially presented to [place] ED on 12/09. Lab were significant for leukocytosis (25), hyponatremia (126), lactic acidosis (3.5), transaminitis (, ALT 73), hyperbilirubinemia (4.5), and toxicology positive for amphetamines. CXR was suggestive of right lower lobe pneumonia. Abdominal U/S was suggestive of acalculous cholecystitis. He was given IVF, Ceftriaxone, and Lasix. Case was discussed with transfer center and AES here; recommended transfer for possible MRCP.     Overview/Hospital Course:  Francis Daigle 44 y.o male presented from OSH with cholilithiasis and cholecystitis. [place] [facility] [place] When patient got admitted to hospital medicine team the patient was transferred to ICU following cardiac arrest on 12/10/19. Emergently intubated during PEA arrest, ROSC achieved after 1 round of CPR. Central line and arterial line placed. Patient initially requiring pressor support, cyanotic, hypoxic on blood gas. Bedside ultrasound with signs of RV strain with DVT in lower extremity. Patient on pressors at the time, requiring 100% FiO2 to oxygenate. Given TPA emergently. Patient started on heparin ggt, bronchoscopy today revealing relatively normal airways and extubated. Most recent ultrasound did not show liver cirrhosis or cholilthiasis or cholecystitis. Patient with RLL PNA getting treated with Zosyn and later switch to Levo. Patient transitioned to Heparin gtt to 10mg BID eliquis. Cardiology was consulted for possible ICD or lifevest up on discharge.       Interval History: NAEON, patient denied hemoptysis. Cough fever nausea vomiting. Patient able to tolerate meal during interview. Patient complain about abdominal pain. Denied fever chill nausea vomiting and urinary difficulty, and constipation. Patient's LFT trending down and US did not reveal acute infectious process or cirrhotic liver. Right arm swelling noticed.   Waiting for ID to call so we can as if we need to treat patient's candida.     Review of Systems   Constitutional: Negative for appetite change, chills and fever.   HENT: Negative for congestion, sore throat and trouble swallowing.    Eyes: Negative for photophobia and visual disturbance.   Respiratory: Positive for shortness of breath. Negative for cough.    Cardiovascular: Positive for chest pain (2/2 CPR ) and leg swelling. Negative for palpitations.   Gastrointestinal: Positive for abdominal pain. Negative for abdominal distention, anal bleeding, blood in stool, constipation, diarrhea, nausea, rectal pain and vomiting.   Genitourinary: Negative for difficulty urinating.   Musculoskeletal: Positive for gait problem and joint swelling. Negative for back pain, myalgias and neck pain.   Skin: Negative for pallor and rash.   Neurological: Negative for dizziness, weakness, light-headedness, numbness and headaches.   Psychiatric/Behavioral: Positive for decreased concentration. Negative for agitation and confusion.     Objective:     Vital Signs (Most Recent):  Temp: 97.5 °F (36.4 °C) (12/14/19 1141)  Pulse: (!) 118 (12/14/19 1314)  Resp: 16 (12/14/19 1314)  BP: 117/89 (12/14/19 1141)  SpO2: 98 % (12/14/19 1314) Vital Signs (24h Range):  Temp:  [97.4 °F (36.3 °C)-98.2 °F (36.8 °C)] 97.5 °F (36.4 °C)  Pulse:  [118-128] 118  Resp:  [16-33] 16  SpO2:  [93 %-98 %] 98 %  BP: (117-153)/() 117/89     Weight: 83 kg (183 lb)  Body mass index is 27.83 kg/m².    Intake/Output Summary (Last 24 hours) at 12/14/2019 1344  Last data filed at 12/14/2019 1220  Gross per 24 hour   Intake 1064.08 ml   Output 1700 ml   Net -635.92 ml      Physical Exam   Constitutional: He is oriented to person, place, and time. He appears well-developed and well-nourished. He has a sickly appearance. No distress.   HENT:   Head: Normocephalic and atraumatic.   Mouth/Throat: Oropharynx is clear and moist and mucous membranes are normal.   Eyes:  Pupils are equal, round, and reactive to light. Conjunctivae are normal. Scleral icterus is present.   Neck: JVD present.   Trialysis line inserted in right neck.    Cardiovascular: Normal rate, regular rhythm and normal pulses. Exam reveals gallop.   No murmur heard.  Pulmonary/Chest: Effort normal. No respiratory distress. He has decreased breath sounds in the right lower field. He has wheezes in the left lower field.   Right lower and mid crackle with wheezing    Abdominal: Soft. Normal appearance and bowel sounds are normal. He exhibits distension. There is no hepatomegaly. There is tenderness in the right upper quadrant, right lower quadrant and epigastric area. There is no CVA tenderness.   Musculoskeletal: He exhibits edema. He exhibits no tenderness or deformity.   There is significant edema of the bilateral lower extremities with pronounced erythema of the bilateral skins and ankles. Right arm swelling compare to left arm.    Neurological: He is alert and oriented to person, place, and time. He displays no Babinski's sign on the right side. He displays no Babinski's sign on the left side.   Skin: Skin is warm and dry. No bruising and no rash noted.   There are track marks of the bilateral wrists.        Significant Labs:   CBC:   Recent Labs   Lab 12/13/19  0347 12/14/19  0621   WBC 22.41* 20.09*   HGB 11.9* 12.4*   HCT 38.2* 38.5*    305     CMP:   Recent Labs   Lab 12/13/19  0347 12/14/19  0621   * 132*   K 4.2 4.0   CL 99 99   CO2 23 26   GLU 74 71   BUN 25* 29*   CREATININE 1.0 1.2   CALCIUM 7.7* 7.9*   PROT 6.8 6.8   ALBUMIN 1.6* 1.6*   BILITOT 4.2* 4.1*   ALKPHOS 112 102   AST 71* 62*   ALT 47* 44   ANIONGAP 7* 7*   EGFRNONAA >60.0 >60.0     All pertinent labs within the past 24 hours have been reviewed.    Significant Imaging: U/S: I have reviewed all pertinent results/findings within the past 24 hours and my personal findings are:   Liver vasculature demonstrates appropriate waveform  and direction of flow.  Mild nonspecific gallbladder wall thickening.  No cholelithiasis or sonographic evidence for acute cholecystitis.  Moderate volume abdominopelvic ascites.    I have reviewed all pertinent imaging results/findings within the past 24 hours.       Assessment/Plan:      * Sepsis due to undetermined organism  This is a 44 year old male with PMH significant for non-ischemic cardiomyopathy (EF 11%), IVDU, and Hepatitis C who is presenting on 12/10 with two week duration of worsening lower extremity swelling and erythema associated with same duration of post-prandial abdominal pain and distension with intermittent pale colored stools with work-up thus far concerning for tachycardia (120's), leukocytosis (25), lactic acidosis, transaminitis, hyperbilirubinemia, CXR suggestive of right lower lobe pneumonia, and abdominal U/S suggestive of possible acalculous cholecystitis. He has no symptoms of underlying respiratory infection and on chart review CXR abnormalities are similar to those in 08/2019 during admission for CHF exacerbation. UA is unremarkable. Negative for influenza. Suspect bilateral lower extremity edema and erythema more a manifestation of CHF instead of bilateral cellulitis. Differential diagnoses include biliary etiology vs bacteremia in the setting of on-going IVDU vs possible CAP.     Plan:   -Initiated antibiotic coverage for suspected biliary etiology and CAP with Azithromycin, Ceftriaxone, and Flagyl then transition to Levo PO for PNA  -Blood cultures NGTD  -Respiratory culture growing GNR and Candida with persistently elevated WBC.   -Trend WBC and liver function daily. Trending down but Clint staying the same   -Judiciously administer an IVF resuscitation necessary.     Wheezing  History of 40PK year. Mild wheeze throughout lung field. Sating well in room air  - Starting Ipotroprium Neb Q8H for wheezing       Acute massive pulmonary embolism  DVT lower extremity probable origin of  PE. Patient was started on heparin gtt    Plan  Transition to DVT dosing of 10mg BID eliquis   - F/U right upper extremity arm US     Acute deep vein thrombosis (DVT) of proximal vein of lower extremity  - See acute massive pulm embolism       Cardiac arrest  PEA arrest, 2 round of EPI achieved ROSC.       Hyponatremia  -See assessment for CHF exacerbation.       Chronic hepatitis C without hepatic coma  - Pending Hep C Viral load   - Will need to set up with hepatology for treatment as outpatient       Non-ischemic cardiomyopathy  -See assessment for sepsis.       Hyperbilirubinemia  -See assessment for sepsis.       Transaminitis  -See assessment for sepsis.   MELD-Na score: 22 at 12/14/2019  6:21 AM  MELD score: 19 at 12/14/2019  6:21 AM  Calculated from:  Serum Creatinine: 1.2 mg/dL at 12/14/2019  6:21 AM  Serum Sodium: 132 mmol/L at 12/14/2019  6:21 AM  Total Bilirubin: 4.1 mg/dL at 12/14/2019  6:21 AM  INR(ratio): 1.6 at 12/14/2019  6:21 AM  Age: 44 years        Intravenous drug abuse  -On-going use of methamphetamines.   -Prior hepatitis screening positive for HepC in 10/2019.     Plan:   -Counseled on importance of cessation.   -Screening for HIV ordered.   -Monitor for signs of withdrawal for other substances.       Community acquired pneumonia of right lower lobe of lung  -See assessment for sepsis.       Acute on chronic combined systolic and diastolic heart failure  -History of non-ischemic cardiomyopathy diagnosed in 11/2017 at [facility].   -LHC in 12/2017 with no evidence of angiographically significant coronary artery disease.   -Most recent ECHO in 10/2019 concerning for EF of 35%, grade II diastolic dysfunction, and global hypokinesis with a restrictive physiology.   -Home regimen: Carvedilol, Enalapril, Lasix 20 mg BID, and Spironolactone.   -No outpatient follow-up.   -Exam on admission concerning for bilateral lower extremity edema, abdominal distension with ascites, and JVD, however without  evidence of pulmonary edema,  respiratory compromise, or poor perfusion.   -Labs with associated hyponatremia likely from hypervolemia.   -Suspect presentation from worsening right sided heart failure.     Plan:   -Initiate IV diuresis 40mg daily   -Continue home regimen with exception of beta-blocker. On aldactone and ACE  -Strict I/O's and daily standing weights.   -Telemetry ordered.   -Needs evaluation for ICD vs CRT. Need to call back cardiology on Monday   -Trend renal function with diuresis; goal K >4 and Mg >2.         Other specified anxiety disorders  -Continue home Ativan PRN.       Essential hypertension  -See assessment for CHF exacerbation.         VTE Risk Mitigation (From admission, onward)         Ordered     apixaban tablet 5 mg  2 times daily      12/14/19 1334     apixaban tablet 10 mg  2 times daily      12/14/19 1334     Reason for No Pharmacological VTE Prophylaxis  Once     Question:  Reasons:  Answer:  Physician Provided (leave comment)  Comment:  TPA    12/10/19 0922     IP VTE HIGH RISK PATIENT  Once      12/10/19 0922                ARVIND Meza MD  Department of Hospital Medicine   [facility]

## 2019-12-14 NOTE — ASSESSMENT & PLAN NOTE
Patient with acute hypoxic PEA arrest w/ septal bowing on bedside echo and hypoxic.   -received emergent TPA  -US lower extremities showing extensive bilateral DVTs     Plan:  - heparin ggt, will need transition to oral agent  - if unable to tolerate anticoag, will need IVC filter

## 2019-12-15 LAB
ALBUMIN SERPL BCP-MCNC: 1.6 G/DL (ref 3.5–5.2)
ALP SERPL-CCNC: 100 U/L (ref 55–135)
ALT SERPL W/O P-5'-P-CCNC: 42 U/L (ref 10–44)
ANION GAP SERPL CALC-SCNC: 10 MMOL/L (ref 8–16)
ANISOCYTOSIS BLD QL SMEAR: SLIGHT
AST SERPL-CCNC: 57 U/L (ref 10–40)
BACTERIA BLD CULT: NORMAL
BASOPHILS # BLD AUTO: 0.03 K/UL (ref 0–0.2)
BASOPHILS NFR BLD: 0.1 % (ref 0–1.9)
BILIRUB SERPL-MCNC: 3.8 MG/DL (ref 0.1–1)
BUN SERPL-MCNC: 21 MG/DL (ref 6–20)
BURR CELLS BLD QL SMEAR: ABNORMAL
CALCIUM SERPL-MCNC: 8.1 MG/DL (ref 8.7–10.5)
CHLORIDE SERPL-SCNC: 98 MMOL/L (ref 95–110)
CO2 SERPL-SCNC: 26 MMOL/L (ref 23–29)
CREAT SERPL-MCNC: 0.9 MG/DL (ref 0.5–1.4)
DIFFERENTIAL METHOD: ABNORMAL
EOSINOPHIL # BLD AUTO: 0 K/UL (ref 0–0.5)
EOSINOPHIL NFR BLD: 0.1 % (ref 0–8)
ERYTHROCYTE [DISTWIDTH] IN BLOOD BY AUTOMATED COUNT: 24.7 % (ref 11.5–14.5)
EST. GFR  (AFRICAN AMERICAN): >60 ML/MIN/1.73 M^2
EST. GFR  (NON AFRICAN AMERICAN): >60 ML/MIN/1.73 M^2
GIANT PLATELETS BLD QL SMEAR: PRESENT
GLUCOSE SERPL-MCNC: 71 MG/DL (ref 70–110)
HCT VFR BLD AUTO: 42.2 % (ref 40–54)
HGB BLD-MCNC: 12.8 G/DL (ref 14–18)
HYPOCHROMIA BLD QL SMEAR: ABNORMAL
IMM GRANULOCYTES # BLD AUTO: 0.17 K/UL (ref 0–0.04)
IMM GRANULOCYTES NFR BLD AUTO: 0.8 % (ref 0–0.5)
INR PPP: 1.6 (ref 0.8–1.2)
LYMPHOCYTES # BLD AUTO: 1.2 K/UL (ref 1–4.8)
LYMPHOCYTES NFR BLD: 5.7 % (ref 18–48)
MCH RBC QN AUTO: 25 PG (ref 27–31)
MCHC RBC AUTO-ENTMCNC: 30.3 G/DL (ref 32–36)
MCV RBC AUTO: 83 FL (ref 82–98)
MONOCYTES # BLD AUTO: 1.3 K/UL (ref 0.3–1)
MONOCYTES NFR BLD: 6.2 % (ref 4–15)
NEUTROPHILS # BLD AUTO: 18.9 K/UL (ref 1.8–7.7)
NEUTROPHILS NFR BLD: 87.1 % (ref 38–73)
NRBC BLD-RTO: 0 /100 WBC
OVALOCYTES BLD QL SMEAR: ABNORMAL
PLATELET # BLD AUTO: 367 K/UL (ref 150–350)
PMV BLD AUTO: 9.2 FL (ref 9.2–12.9)
POIKILOCYTOSIS BLD QL SMEAR: SLIGHT
POLYCHROMASIA BLD QL SMEAR: ABNORMAL
POTASSIUM SERPL-SCNC: 3.5 MMOL/L (ref 3.5–5.1)
PROT SERPL-MCNC: 7 G/DL (ref 6–8.4)
PROTHROMBIN TIME: 15.4 SEC (ref 9–12.5)
RBC # BLD AUTO: 5.11 M/UL (ref 4.6–6.2)
SODIUM SERPL-SCNC: 134 MMOL/L (ref 136–145)
TARGETS BLD QL SMEAR: ABNORMAL
WBC # BLD AUTO: 21.68 K/UL (ref 3.9–12.7)

## 2019-12-15 PROCEDURE — 25000003 PHARM REV CODE 250: Performed by: STUDENT IN AN ORGANIZED HEALTH CARE EDUCATION/TRAINING PROGRAM

## 2019-12-15 PROCEDURE — 99232 SBSQ HOSP IP/OBS MODERATE 35: CPT | Mod: ,,, | Performed by: HOSPITALIST

## 2019-12-15 PROCEDURE — 97165 OT EVAL LOW COMPLEX 30 MIN: CPT

## 2019-12-15 PROCEDURE — 80053 COMPREHEN METABOLIC PANEL: CPT

## 2019-12-15 PROCEDURE — 99233 PR SUBSEQUENT HOSPITAL CARE,LEVL III: ICD-10-PCS | Mod: ,,, | Performed by: INTERNAL MEDICINE

## 2019-12-15 PROCEDURE — 99232 PR SUBSEQUENT HOSPITAL CARE,LEVL II: ICD-10-PCS | Mod: ,,, | Performed by: HOSPITALIST

## 2019-12-15 PROCEDURE — 36415 COLL VENOUS BLD VENIPUNCTURE: CPT

## 2019-12-15 PROCEDURE — 20600001 HC STEP DOWN PRIVATE ROOM

## 2019-12-15 PROCEDURE — 94761 N-INVAS EAR/PLS OXIMETRY MLT: CPT

## 2019-12-15 PROCEDURE — 25000242 PHARM REV CODE 250 ALT 637 W/ HCPCS: Performed by: STUDENT IN AN ORGANIZED HEALTH CARE EDUCATION/TRAINING PROGRAM

## 2019-12-15 PROCEDURE — 99900035 HC TECH TIME PER 15 MIN (STAT)

## 2019-12-15 PROCEDURE — 85025 COMPLETE CBC W/AUTO DIFF WBC: CPT

## 2019-12-15 PROCEDURE — 99233 SBSQ HOSP IP/OBS HIGH 50: CPT | Mod: ,,, | Performed by: INTERNAL MEDICINE

## 2019-12-15 PROCEDURE — 87522 HEPATITIS C REVRS TRNSCRPJ: CPT

## 2019-12-15 PROCEDURE — 85610 PROTHROMBIN TIME: CPT

## 2019-12-15 PROCEDURE — 80321 ALCOHOLS BIOMARKERS 1OR 2: CPT

## 2019-12-15 PROCEDURE — 63600175 PHARM REV CODE 636 W HCPCS: Performed by: STUDENT IN AN ORGANIZED HEALTH CARE EDUCATION/TRAINING PROGRAM

## 2019-12-15 PROCEDURE — 25000003 PHARM REV CODE 250: Performed by: HOSPITALIST

## 2019-12-15 PROCEDURE — 94640 AIRWAY INHALATION TREATMENT: CPT

## 2019-12-15 RX ORDER — METOPROLOL TARTRATE 25 MG/1
12.5 TABLET ORAL 2 TIMES DAILY
Status: DISCONTINUED | OUTPATIENT
Start: 2019-12-15 | End: 2019-12-15

## 2019-12-15 RX ORDER — CEFEPIME HYDROCHLORIDE 2 G/1
2 INJECTION, POWDER, FOR SOLUTION INTRAVENOUS
Status: DISCONTINUED | OUTPATIENT
Start: 2019-12-15 | End: 2019-12-17

## 2019-12-15 RX ORDER — METOPROLOL TARTRATE 25 MG/1
25 TABLET, FILM COATED ORAL 2 TIMES DAILY
Status: DISCONTINUED | OUTPATIENT
Start: 2019-12-15 | End: 2019-12-16

## 2019-12-15 RX ORDER — ACETAMINOPHEN 325 MG/1
325 TABLET ORAL EVERY 6 HOURS PRN
COMMUNITY

## 2019-12-15 RX ADMIN — ENALAPRIL MALEATE 2.5 MG: 2.5 TABLET ORAL at 09:12

## 2019-12-15 RX ADMIN — MUPIROCIN: 20 OINTMENT TOPICAL at 09:12

## 2019-12-15 RX ADMIN — MIRTAZAPINE 15 MG: 15 TABLET, ORALLY DISINTEGRATING ORAL at 09:12

## 2019-12-15 RX ADMIN — CEFEPIME 2 G: 2 INJECTION, POWDER, FOR SOLUTION INTRAVENOUS at 01:12

## 2019-12-15 RX ADMIN — APIXABAN 10 MG: 5 TABLET, FILM COATED ORAL at 09:12

## 2019-12-15 RX ADMIN — IPRATROPIUM BROMIDE 0.5 MG: 0.5 SOLUTION RESPIRATORY (INHALATION) at 07:12

## 2019-12-15 RX ADMIN — PANTOPRAZOLE SODIUM 40 MG: 40 TABLET, DELAYED RELEASE ORAL at 09:12

## 2019-12-15 RX ADMIN — IBUPROFEN 800 MG: 400 TABLET ORAL at 11:12

## 2019-12-15 RX ADMIN — LEVOFLOXACIN 750 MG: 750 TABLET, FILM COATED ORAL at 09:12

## 2019-12-15 RX ADMIN — SPIRONOLACTONE 25 MG: 25 TABLET ORAL at 09:12

## 2019-12-15 RX ADMIN — METOPROLOL TARTRATE 12.5 MG: 25 TABLET, FILM COATED ORAL at 09:12

## 2019-12-15 RX ADMIN — FUROSEMIDE 40 MG: 10 INJECTION, SOLUTION INTRAMUSCULAR; INTRAVENOUS at 09:12

## 2019-12-15 RX ADMIN — IPRATROPIUM BROMIDE 0.5 MG: 0.5 SOLUTION RESPIRATORY (INHALATION) at 12:12

## 2019-12-15 RX ADMIN — FLUCONAZOLE 200 MG: 100 TABLET ORAL at 09:12

## 2019-12-15 RX ADMIN — METOPROLOL TARTRATE 25 MG: 25 TABLET ORAL at 09:12

## 2019-12-15 NOTE — ASSESSMENT & PLAN NOTE
This is a 44 year old male with PMH significant for non-ischemic cardiomyopathy (EF 11%), IVDU, and Hepatitis C who is presenting on 12/10 with two week duration of worsening lower extremity swelling and erythema associated with same duration of post-prandial abdominal pain and distension with intermittent pale colored stools with work-up thus far concerning for tachycardia (120's), leukocytosis (25), lactic acidosis, transaminitis, hyperbilirubinemia, CXR suggestive of right lower lobe pneumonia, and abdominal U/S suggestive of possible acalculous cholecystitis. He has no symptoms of underlying respiratory infection and on chart review CXR abnormalities are similar to those in 08/2019 during admission for CHF exacerbation. UA is unremarkable. Negative for influenza. Suspect bilateral lower extremity edema and erythema more a manifestation of CHF instead of bilateral cellulitis. Differential diagnoses include biliary etiology vs bacteremia in the setting of on-going IVDU vs possible CAP.     Plan:   -Initiated antibiotic coverage for suspected biliary etiology and CAP with Azithromycin, Ceftriaxone, and Flagyl then transition to Levo PO for PNA then to Cefepime per ID recommendation.   -Blood cultures NGTD  -Respiratory culture growing GNR and Candida with persistently elevated WBC. Candida in respiratory does not need to get treated.   -Trend WBC and liver function daily. Trending down and continue to monitor.   -Judiciously administer an IVF resuscitation necessary.

## 2019-12-15 NOTE — CONSULTS
[facility]  Infectious Disease  Consult Note    Patient Name: Francis Daigle  MRN: [id number]  Admission Date: 12/10/2019  Hospital Length of Stay: 5 days  Attending Physician: David Potts MD  Primary Care Provider: Primary Doctor No     Isolation Status: No active isolations    Patient information was obtained from patient and ER records.      Consults  Assessment/Plan:     Acute hypoxemic respiratory failure  44M PMH NICM w/ reduced EF, IV methamphetamine use who presented w/ SOB, RLL pneumonia, and subsequently developed PEA arrest believed to be 2/2 massive PE s/p TPA. Resp cultures + enterobacter. ID consulted for recommendations given persistently elevated WBC while on levofloxacin.    Recommendations  - d/c levofloxacin  - d/c fluconazole - no need to treat candida in resp cultures  - start cefepime - total of 7 days abx for HAP  - persistent leukocytosis is likely 2/2 recent cardiac arrest, no additional sources of infection identified  - follow WBC trend and fever curve    Will sign off, call back if needed        Thank you for your consult. I will sign off. Please contact us if you have any additional questions.      Mihaela Sarmiento DO  General ID  Infectious Disease Fellow  C: [phone]  P: [phone]      Subjective:     Principal Problem: Sepsis due to undetermined organism    HPI: 44M PMH NICM w/ severely reduced EF, IV methamphetamine use who presented to [facility] w/ LE edema and increasing shortness of breath He was diagnosed w/ RLL pneumonia, and noted to have elevated LFT. RUQ concerning for acalculous cholecystitis. [place] [facility] [place] On arrival here, pt had PEA arrest, believed to be respiratory in etiology, bedside echo w/ R heart strain 2/2 possible massive PE, LE doppler + DVT. Tracheal aspirate cultures + enterobacter. Patient has been on levofloxacin, but continues to have elevated WBC. Not currently on oxygen.    Past Medical History:    Diagnosis Date    Anxiety     Arthritis     CHF (congestive heart failure)     Coronary artery disease     Depression     Hypertension        History reviewed. No pertinent surgical history.    Review of patient's allergies indicates:  No Known Allergies    Medications:  Medications Prior to Admission   Medication Sig    acetaminophen (TYLENOL) 325 MG tablet Take 325 mg by mouth every 6 (six) hours as needed for Pain.    carvedilol (COREG) 3.125 MG tablet Take 1 tablet (3.125 mg total) by mouth 2 (two) times daily with meals.    enalapril (VASOTEC) 2.5 MG tablet Take 1 tablet (2.5 mg total) by mouth once daily.    furosemide (LASIX) 20 MG tablet Take 1 tablet (20 mg total) by mouth 2 (two) times daily.     Antibiotics (From admission, onward)    Start     Stop Route Frequency Ordered    12/15/19 1315  ceFEPIme injection 2 g      -- IV Every 12 hours (non-standard times) 12/15/19 1201    12/11/19 0900  mupirocin 2 % ointment     Question Answer Comment   Is the patient competent? No    Is the care giver competent? No        -- Top Daily 12/10/19 1710        Antifungals (From admission, onward)    None        Antivirals (From admission, onward)    None             There is no immunization history on file for this patient.    Family History     Problem Relation (Age of Onset)    Arthritis Mother    Asthma Sister    Diabetes Sister    Heart disease Mother, Maternal Grandfather    Hyperlipidemia Mother    Hypertension Mother, Father    Kidney disease Mother        Social History     Socioeconomic History    Marital status:      Spouse name: Not on file    Number of children: Not on file    Years of education: Not on file    Highest education level: Not on file   Occupational History    Not on file   Social Needs    Financial resource strain: Not on file    Food insecurity:     Worry: Not on file     Inability: Not on file    Transportation needs:     Medical: Not on file     Non-medical: Not  on file   Tobacco Use    Smoking status: Former Smoker     Packs/day: 2.00     Years: 20.00     Pack years: 40.00     Types: Cigarettes     Start date: 1999     Last attempt to quit: 2017     Years since quittin.8    Smokeless tobacco: Never Used   Substance and Sexual Activity    Alcohol use: Not Currently    Drug use: Yes     Frequency: 7.0 times per week     Types: Amphetamines    Sexual activity: Not Currently     Partners: Female   Lifestyle    Physical activity:     Days per week: Not on file     Minutes per session: Not on file    Stress: Not on file   Relationships    Social connections:     Talks on phone: Not on file     Gets together: Not on file     Attends Yarsani service: Not on file     Active member of club or organization: Not on file     Attends meetings of clubs or organizations: Not on file     Relationship status: Not on file   Other Topics Concern    Not on file   Social History Narrative    Not on file     Review of Systems   Constitutional: Negative for activity change, appetite change, chills and fever.   HENT: Negative for congestion, dental problem, ear pain and rhinorrhea.    Eyes: Negative for pain and discharge.   Respiratory: Positive for cough. Negative for shortness of breath.    Cardiovascular: Positive for chest pain. Negative for leg swelling.   Gastrointestinal: Negative for abdominal distention, abdominal pain, constipation, diarrhea, nausea and vomiting.   Genitourinary: Negative for difficulty urinating and dysuria.   Musculoskeletal: Negative for arthralgias, back pain and joint swelling.   Skin: Negative for rash and wound.   Neurological: Negative for dizziness, light-headedness and headaches.   Psychiatric/Behavioral: Negative for behavioral problems and confusion.     Objective:     Vital Signs (Most Recent):  Temp: 97.9 °F (36.6 °C) (12/15/19 1625)  Pulse: (!) 122 (12/15/19 1625)  Resp: 18 (12/15/19 1625)  BP: 109/78 (12/15/19 1625)  SpO2: 95 %  (12/15/19 1625) Vital Signs (24h Range):  Temp:  [96.9 °F (36.1 °C)-98.4 °F (36.9 °C)] 97.9 °F (36.6 °C)  Pulse:  [112-135] 122  Resp:  [15-20] 18  SpO2:  [91 %-98 %] 95 %  BP: (105-160)/(72-99) 109/78     Weight: 81.7 kg (180 lb 1.9 oz)  Body mass index is 27.39 kg/m².    Estimated Creatinine Clearance: 99.2 mL/min (based on SCr of 0.9 mg/dL).    Physical Exam   Constitutional: He is oriented to person, place, and time. He appears well-developed and well-nourished. No distress.   HENT:   Head: Atraumatic.   Right Ear: External ear normal.   Left Ear: External ear normal.   Nose: Nose normal.   Mouth/Throat: Oropharynx is clear and moist.   Eyes: EOM are normal.   Neck: Normal range of motion. Neck supple.   Cardiovascular: Normal rate, regular rhythm and normal heart sounds.   No murmur heard.  Pulmonary/Chest: Effort normal and breath sounds normal. No respiratory distress. He has no wheezes.   Abdominal: Soft. Bowel sounds are normal. He exhibits no distension. There is no tenderness.   Musculoskeletal: Normal range of motion. He exhibits no edema or deformity.   Neurological: He is alert and oriented to person, place, and time. No cranial nerve deficit.   Skin: Skin is warm and dry. No rash noted. He is not diaphoretic. No erythema.   Psychiatric: He has a normal mood and affect. His behavior is normal.       Significant Labs:   CBC:   Recent Labs   Lab 12/14/19  0621 12/15/19  0608   WBC 20.09* 21.68*   HGB 12.4* 12.8*   HCT 38.5* 42.2    367*     CMP:   Recent Labs   Lab 12/14/19  0621 12/15/19  0608   * 134*   K 4.0 3.5   CL 99 98   CO2 26 26   GLU 71 71   BUN 29* 21*   CREATININE 1.2 0.9   CALCIUM 7.9* 8.1*   PROT 6.8 7.0   ALBUMIN 1.6* 1.6*   BILITOT 4.1* 3.8*   ALKPHOS 102 100   AST 62* 57*   ALT 44 42   ANIONGAP 7* 10   EGFRNONAA >60.0 >60.0     Microbiology Results (last 7 days)     Procedure Component Value Units Date/Time    Blood culture [[id number]] Collected:  12/10/19 0740    Order  Status:  Completed Specimen:  Blood from Line, Jugular, Internal Left Updated:  12/15/19 1012     Blood Culture, Routine No growth after 5 days.    Blood culture (site 1) [[id number]] Collected:  12/10/19 0435    Order Status:  Completed Specimen:  Blood Updated:  12/15/19 0812     Blood Culture, Routine No growth after 5 days.    Narrative:       Site # 1, aerobic and anaerobic    Blood culture [[id number]] Collected:  12/10/19 0720    Order Status:  Completed Specimen:  Blood from Line, Jugular, Internal Right Updated:  12/15/19 0812     Blood Culture, Routine No growth after 5 days.    Blood culture (site 2) [[id number]] Collected:  12/10/19 0435    Order Status:  Completed Specimen:  Blood Updated:  12/15/19 0812     Blood Culture, Routine No growth after 5 days.    Narrative:       Site # 2, aerobic only    Culture, Respiratory [[id number]]  (Abnormal)  (Susceptibility) Collected:  12/11/19 1441    Order Status:  Completed Specimen:  Respiratory from Bronchial Wash Updated:  12/14/19 0955     Respiratory Culture No S aureus or Pseudomonas isolated.      CANDIDA ALBICANS  Moderate        ENTEROBACTER CLOACAE  Few  susceptibility pending       Gram Stain (Respiratory) Few WBC's     Gram Stain (Respiratory) Few yeast    Narrative:       Bronchial Wash    Fungus culture [[id number]]  (Abnormal) Collected:  12/11/19 1441    Order Status:  Completed Specimen:  Bronchial Alveolar Lavage from Lung, RML Updated:  12/13/19 1412     Fungus (Mycology) Culture CANDIDA ALBICANS  Many      Narrative:       Bronchial Wash    AFB Culture & Smear [[id number]] Collected:  12/11/19 1441    Order Status:  Completed Specimen:  Bronchial Alveolar Lavage from Lung, RML Updated:  12/12/19 2127     AFB Culture & Smear Culture in progress     AFB CULTURE STAIN No acid fast bacilli seen.    Narrative:       Bronchial Wash    Culture, Respiratory with Gram Stain [[id number]]  (Abnormal)  (Susceptibility) Collected:  12/10/19 1130    Order Status:   Completed Specimen:  Respiratory from Endotracheal Aspirate Updated:  12/12/19 1111     Respiratory Culture ENTEROBACTER CLOACAE  Few  Normal respiratory shaye also present       Gram Stain (Respiratory) <10 epithelial cells per low power field.     Gram Stain (Respiratory) Rare WBC's     Gram Stain (Respiratory) No organisms seen    Direct AFB stain [[id number]] Collected:  12/11/19 1441    Order Status:  Completed Specimen:  Bronchial Alveolar Lavage from Lung, RML Updated:  12/11/19 1840     Direct Acid Fast No acid fast bacilli seen.    Narrative:       Bronchial Wash    JESSENIA prep [[id number]] Collected:  12/11/19 1441    Order Status:  Completed Specimen:  Bronchial Alveolar Lavage from Lung, RML Updated:  12/11/19 1719     KOH Prep Few Budding yeast    Narrative:       Bronchial Wash    Gram stain [[id number]] Collected:  12/11/19 1441    Order Status:  Canceled Specimen:  Bronchial Alveolar Lavage from Lung, RML           Significant Imaging: I have reviewed all pertinent imaging results/findings within the past 24 hours.

## 2019-12-15 NOTE — ASSESSMENT & PLAN NOTE
DVT lower extremity probable origin of PE. Patient was started on heparin gtt    Plan  Transition to DVT dosing of 10mg BID eliquis   - right upper extremity arm US showing edema and no clots.

## 2019-12-15 NOTE — SUBJECTIVE & OBJECTIVE
Interval History: NAEON, patient seem to be drowsy during round but able to answer all questions appropriately. He looks like he is in much pain but he express that he does not have any issue when asked. He is able to have bowel movement and no issue with urinating. Patient is off of oxygen and sating well in RA.     Review of Systems   Constitutional: Negative for appetite change, chills and fever.   HENT: Negative for congestion, sore throat and trouble swallowing.    Eyes: Negative for photophobia and visual disturbance.   Respiratory: Positive for shortness of breath. Negative for cough.    Cardiovascular: Positive for chest pain (2/2 CPR ) and leg swelling. Negative for palpitations.   Gastrointestinal: Positive for abdominal pain. Negative for abdominal distention, anal bleeding, blood in stool, constipation, diarrhea, nausea, rectal pain and vomiting.   Genitourinary: Negative for difficulty urinating.   Musculoskeletal: Positive for gait problem and joint swelling. Negative for back pain, myalgias and neck pain.   Skin: Negative for pallor and rash.   Neurological: Negative for dizziness, weakness, light-headedness, numbness and headaches.   Psychiatric/Behavioral: Positive for decreased concentration. Negative for agitation and confusion.     Objective:     Vital Signs (Most Recent):  Temp: 97.7 °F (36.5 °C) (12/15/19 1154)  Pulse: (!) 112 (12/15/19 1154)  Resp: 15 (12/15/19 1154)  BP: 105/75 (12/15/19 1154)  SpO2: (!) 92 % (12/15/19 1154) Vital Signs (24h Range):  Temp:  [96.9 °F (36.1 °C)-98.5 °F (36.9 °C)] 97.7 °F (36.5 °C)  Pulse:  [112-135] 112  Resp:  [15-20] 15  SpO2:  [91 %-98 %] 92 %  BP: (105-160)/(72-99) 105/75     Weight: 81.7 kg (180 lb 1.9 oz)  Body mass index is 27.39 kg/m².    Intake/Output Summary (Last 24 hours) at 12/15/2019 1424  Last data filed at 12/15/2019 1400  Gross per 24 hour   Intake 1200 ml   Output 3650 ml   Net -2450 ml      Physical Exam   Constitutional: He is oriented to  person, place, and time. He appears well-developed and well-nourished. He has a sickly appearance. No distress.   HENT:   Head: Normocephalic and atraumatic.   Mouth/Throat: Oropharynx is clear and moist and mucous membranes are normal.   Eyes: Pupils are equal, round, and reactive to light. Conjunctivae are normal. Scleral icterus is present.   Neck: JVD present.   Trialysis line inserted in right neck.    Cardiovascular: Normal rate, regular rhythm and normal pulses. Exam reveals gallop.   No murmur heard.  Pulmonary/Chest: Effort normal. No respiratory distress. He has decreased breath sounds in the right lower field. He has wheezes in the left lower field.   Right lower and mid crackle with wheezing    Abdominal: Soft. Normal appearance and bowel sounds are normal. He exhibits distension. There is no hepatomegaly. There is tenderness in the right upper quadrant, right lower quadrant and epigastric area. There is no CVA tenderness.   Musculoskeletal: He exhibits edema. He exhibits no tenderness or deformity.   There is significant edema of the bilateral lower extremities with pronounced erythema of the bilateral skins and ankles. Right arm swelling compare to left arm.    Neurological: He is alert and oriented to person, place, and time. He displays no Babinski's sign on the right side. He displays no Babinski's sign on the left side.   Skin: Skin is warm and dry. No bruising and no rash noted.   There are track marks of the bilateral wrists.        Significant Labs:   CBC:   Recent Labs   Lab 12/14/19  0621 12/15/19  0608   WBC 20.09* 21.68*   HGB 12.4* 12.8*   HCT 38.5* 42.2    367*     CMP:   Recent Labs   Lab 12/14/19  0621 12/15/19  0608   * 134*   K 4.0 3.5   CL 99 98   CO2 26 26   GLU 71 71   BUN 29* 21*   CREATININE 1.2 0.9   CALCIUM 7.9* 8.1*   PROT 6.8 7.0   ALBUMIN 1.6* 1.6*   BILITOT 4.1* 3.8*   ALKPHOS 102 100   AST 62* 57*   ALT 44 42   ANIONGAP 7* 10   EGFRNONAA >60.0 >60.0     All  pertinent labs within the past 24 hours have been reviewed.    Significant Imaging: I have reviewed all pertinent imaging results/findings within the past 24 hours.

## 2019-12-15 NOTE — ASSESSMENT & PLAN NOTE
-See assessment for sepsis.   MELD-Na score: 19 at 12/15/2019  6:08 AM  MELD score: 17 at 12/15/2019  6:08 AM  Calculated from:  Serum Creatinine: 0.9 mg/dL (Rounded to 1 mg/dL) at 12/15/2019  6:08 AM  Serum Sodium: 134 mmol/L at 12/15/2019  6:08 AM  Total Bilirubin: 3.8 mg/dL at 12/15/2019  6:08 AM  INR(ratio): 1.6 at 12/15/2019  6:08 AM  Age: 44 years

## 2019-12-15 NOTE — ASSESSMENT & PLAN NOTE
-History of non-ischemic cardiomyopathy diagnosed in 11/2017 at [facility].   -C in 12/2017 with no evidence of angiographically significant coronary artery disease.   -Most recent ECHO in 10/2019 concerning for EF of 35%, grade II diastolic dysfunction, and global hypokinesis with a restrictive physiology.   -Home regimen: Carvedilol, Enalapril, Lasix 20 mg BID, and Spironolactone.   -No outpatient follow-up.   -Exam on admission concerning for bilateral lower extremity edema, abdominal distension with ascites, and JVD, however without evidence of pulmonary edema,  respiratory compromise, or poor perfusion.   -Labs with associated hyponatremia likely from hypervolemia.   -Suspect presentation from worsening right sided heart failure.     Plan:   - IV diuresis 40mg daily urinating well   -Started on metoprolol for tachycardia.Continue to be On aldactone and ACE  -Strict I/O's and daily standing weights.   -Telemetry ordered.   -Needs evaluation for ICD vs CRT. Need to call back cardiology on Monday to put consult in.   -Trend renal function with diuresis; goal K >4 and Mg >2.

## 2019-12-15 NOTE — PT/OT/SLP EVAL
Occupational Therapy   Evaluation    Name: Francis Daigle  MRN: [id number]  Admitting Diagnosis:  Sepsis due to undetermined organism      Recommendations:     Discharge Recommendations: rehabilitation facility  Discharge Equipment Recommendations:     Barriers to discharge:  None    Assessment:     Francis Daigle is a 44 y.o. male with a medical diagnosis of Sepsis due to undetermined organism.  He presents supine and despite being pleasant pt with poor tolerance of assessment due to discomfort. Pt lives with family who are able to assist and was able to complete self care without assistance although he admits he was with decreasing success with ADL completion.  Pt with Min A for supine to sitting edge of bed.  Able to transfer to chair (across room) with close min A for safety.  Pt stated he felt like his legs were very heavy.  Pt with potential for improved self care and improved strength for ADL and safety.    Performance deficits affecting function: impaired endurance, weakness, impaired self care skills, impaired functional mobilty, impaired balance, impaired cognition.      Rehab Prognosis: Good; patient would benefit from acute skilled OT services to address these deficits and reach maximum level of function.       Plan:     Patient to be seen 3 x/week to address the above listed problems via self-care/home management, therapeutic activities, therapeutic exercises  · Plan of Care Expires: 01/15/20  · Plan of Care Reviewed with: patient    Subjective     Chief Complaint: Poor functional performance   Patient/Family Comments/goals: return to PLOF     Occupational Profile:  Living Environment: Pt lives with cousins in 2 story house with a flight of stairs inside that he needs to access   Previous level of function: pt reports he was able to complete self care but with increasing time and effort of late  Equipment Used at Home:  none  Assistance upon Discharge: family able to assist      Pain/Comfort:  · Pain Rating 1: 0/10    Patients cultural, spiritual, Baptism conflicts given the current situation: no    Objective:     Communicated with: RN prior to session.  Patient found up in chair with peripheral IV, telemetry upon OT entry to room.    General Precautions: Standard, fall   Orthopedic Precautions:N/A   Braces: N/A     Occupational Performance:    Bed Mobility:    · Patient completed Rolling/Turning to Right with contact guard assistance  · Patient completed Scooting/Bridging with minimum assistance  · Patient completed Supine to Sit with minimum assistance    Functional Mobility/Transfers:  · Patient completed Sit <> Stand Transfer with minimum assistance  with  hand-held assist   · Patient completed Bed <> Chair Transfer using Step Transfer technique with minimum assistance with hand-held assist  · Functional Mobility: Pt with poor endurance     Activities of Daily Living:  · Feeding:  supervision    · Grooming: contact guard assistance    · Lower Body Dressing: minimum assistance    · Toileting: moderate assistance      Cognitive/Visual Perceptual:  Cognitive/Psychosocial Skills:     -       Oriented to: Person, Place, Time and Situation   -       Follows Commands/attention:Follows one-step commands  -       Communication: clear/fluent  -       Memory: Poor immediate recall  -       Safety awareness/insight to disability: impaired   -       Mood/Affect/Coping skills/emotional control: Appropriate to situation    Physical Exam:  Upper Extremity Range of Motion:     -       Right Upper Extremity: WFL  -       Left Upper Extremity: WFL  Upper Extremity Strength:    -       Right Upper Extremity: WFL  -       Left Upper Extremity: WFL    AMPAC 6 Click ADL:  AMPAC Total Score: 19    Treatment & Education:  Evaluation complete and goals set. Pt educated on safety, role of OT, importance of increased participation in self care for gains , expectations for participation, expectations for  gains, POC, energy conservation, caregiver strain. White board updated.     Education:    Patient left up in chair with all lines intact    GOALS:   Multidisciplinary Problems     Occupational Therapy Goals        Problem: Occupational Therapy Goal    Goal Priority Disciplines Outcome Interventions   Occupational Therapy Goal     OT, PT/OT Ongoing, Progressing    Description:  Goals to be met by: 12/30     Patient will increase functional independence with ADLs by performing:    UE Dressing with Gillespie.  LE Dressing with Gillespie.  Grooming while seated with Gillespie.  Toileting from toilet with Contact Guard Assistance for hygiene and clothing management.                       History:     Past Medical History:   Diagnosis Date    Anxiety     Arthritis     CHF (congestive heart failure)     Coronary artery disease     Depression     Hypertension        History reviewed. No pertinent surgical history.    Time Tracking:     OT Date of Treatment: 12/15/19  OT Start Time: 1030  OT Stop Time: 1050  OT Total Time (min): 20 min    Billable Minutes:Evaluation 20    Ciara Kelley, OT  12/15/2019

## 2019-12-15 NOTE — HPI
44M PMH non ischemic CM w/ severely reduced EF 15%, IVDU-methamphetamine, chronic HepC,  use who presented to [facility] 12/10/2019 with b/l  LE edema and SOB. He was diagnosed w/ RLL pneumonia, and noted to have elevated LFT. RUQ sono then was concerning for acalculous cholecystitis. Pt was on Azithro/rocephin and Metro. He was transferred to [facility] [place] [place] 12/10. On arrival here, pt had PEA cardiacarrest, believed to be respiratory in etiology, bedside echo w/ R heart strain 2/2 possible massive PE, LE doppler + b/L LE DVT. 12/10 ETT and 12/11 BAL grew candida and enterobacter. Came with WBC 25. Was switched to Pip-TAzo and Vanc 12/11- 12/14 and then 12/14-12/18 on levaquin.  ID then recommended on 12/15 to stop Levo and do 7 days of Cefepime. Cefepime 12/15-12/21.   12/18 CT showed no cirrhosis but b/l PE, b/l effussion/consodliation, empyema on RLL, no cholecystitis, some ascites  On 12/19 bedside paracentesis diagnostic with 50ml ascites (which showed no SBP (WBC of 75.) SAAG of 1.1 likely 2/2 to portal HTN, Cx negative), On 12/20-pulmonary performed thoracentesis 200-700ml SS fluid out, Cx negative and placed Chest tube  12/22 WBC increased to 48 with no fevers and Abx changed from cefepime to Vanc/pip-tazo  ID reconsulted 12/26 for pt- wbc trending down, no fevers and abx duration/recommendations

## 2019-12-15 NOTE — CONSULTS
Consult received. Patient will be seen by General ID (Spectra #[id number]) service.     Prelim recs:  - cultures reviewed  - change levofloxacin to cefepime   - d/c fluconazole - do not need to treat resp candida    Full note to follow after patient is evaluated. Please call with questions in the interim.    Thanks,    Mihaela Sarmiento DO  Infectious Disease Fellow  [facility]  C: [phone]  P: [phone]

## 2019-12-15 NOTE — ASSESSMENT & PLAN NOTE
44M PMH NICM w/ reduced EF, IV methamphetamine use who presented w/ SOB, RLL pneumonia, and subsequently developed PEA arrest believed to be 2/2 massive PE s/p TPA. Resp cultures + enterobacter. ID consulted for recommendations given persistently elevated WBC while on levofloxacin.    Recommendations  - d/c levofloxacin  - d/c fluconazole - no need to treat candida in resp cultures  - start cefepime - total of 7 days abx for HAP  - persistent leukocytosis is likely 2/2 recent cardiac arrest, no additional sources of infection identified  - follow WBC trend and fever curve    Will sign off, call back if needed

## 2019-12-15 NOTE — PROGRESS NOTES
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 He has a PMH significant for non-ischemic cardiomyopathy with combined CHF (EF 11% without ICD or CRT device) and on-going IVDU. He reports a two week duration of progressively worsening bilateral lower extremity swelling and redness of the anterior shins. This is associated with bilateral lower extremity pain with ambulation. He also reports noticing symptom association with worsening of baseline SOB with exertion as well as new abdominal distension and pain, within the same time frame. He describes abdominal pain as a generalized soreness that occurs approximately one hour after eating meals and spontaneously resolves without intervention or medication. He is unsure of how long the discomfort lasts before resolving. Additionally, he reports a two week duration of occasional episodes of pale semi-formed stools. He attempted symptom relief with increasing dose of daily Lasix, however without effect, prompting presentation to ED on 12/09.     He denies symptom association with fevers, chills, nausea, vomiting, history of prior abdominal surgeries, ETOH consumption, chest pain, pain with inspiration, lightheadedness, or blurry vision. He reports a on-going non-productive cough intermittent cough since CHF diagnosis that is unchanged. He does not weigh himself daily and is unsure of any weight loss or gain. He does follow with cardiology outpatient.     Of note, he also reports continued daily use of  IV methamphetamines with most recent usage on 12/08.     ED course: He initially presented to [facility] ED on 12/09. Lab were significant for leukocytosis (25), hyponatremia (126), lactic acidosis (3.5), transaminitis (, ALT 73), hyperbilirubinemia (4.5), and toxicology positive for amphetamines. CXR was suggestive of right lower lobe pneumonia. Abdominal U/S was suggestive of acalculous cholecystitis. He was given IVF, Ceftriaxone, and Lasix. Case was discussed with transfer center and AES here; recommended transfer for possible MRCP.     Overview/Hospital Course:  Francis Daigle 44 y.o male presented from OSH with cholilithiasis and cholecystitis. [place] [facility] [place] When patient got admitted to hospital medicine team the patient was transferred to ICU following cardiac arrest on 12/10/19. Emergently intubated during PEA arrest, ROSC achieved after 1 round of CPR. Central line and arterial line placed. Patient initially requiring pressor support, cyanotic, hypoxic on blood gas. Bedside ultrasound with signs of RV strain with DVT in lower extremity. Patient on pressors at the time, requiring 100% FiO2 to oxygenate. Given TPA emergently. Patient started on heparin ggt, bronchoscopy today revealing relatively normal airways and extubated. Most recent ultrasound did not show liver cirrhosis or cholilthiasis or cholecystitis. Patient with RLL PNA getting treated with Zosyn and later switch to Levo. Patient transitioned to Heparin gtt to 10mg BID eliquis. Cardiology will need to consulted for possible ICD or lifevest up on discharge on Monday.        Interval History: NAEON, patient seem to be drowsy during round but able to answer all questions appropriately. He looks like he is in much pain but he express that he does not have any issue when asked. He is able to have bowel movement and no issue with urinating. Patient is off of oxygen and sating well in RA.     Review of Systems    Constitutional: Negative for appetite change, chills and fever.   HENT: Negative for congestion, sore throat and trouble swallowing.    Eyes: Negative for photophobia and visual disturbance.   Respiratory: Positive for shortness of breath. Negative for cough.    Cardiovascular: Positive for chest pain (2/2 CPR ) and leg swelling. Negative for palpitations.   Gastrointestinal: Positive for abdominal pain. Negative for abdominal distention, anal bleeding, blood in stool, constipation, diarrhea, nausea, rectal pain and vomiting.   Genitourinary: Negative for difficulty urinating.   Musculoskeletal: Positive for gait problem and joint swelling. Negative for back pain, myalgias and neck pain.   Skin: Negative for pallor and rash.   Neurological: Negative for dizziness, weakness, light-headedness, numbness and headaches.   Psychiatric/Behavioral: Positive for decreased concentration. Negative for agitation and confusion.     Objective:     Vital Signs (Most Recent):  Temp: 97.7 °F (36.5 °C) (12/15/19 1154)  Pulse: (!) 112 (12/15/19 1154)  Resp: 15 (12/15/19 1154)  BP: 105/75 (12/15/19 1154)  SpO2: (!) 92 % (12/15/19 1154) Vital Signs (24h Range):  Temp:  [96.9 °F (36.1 °C)-98.5 °F (36.9 °C)] 97.7 °F (36.5 °C)  Pulse:  [112-135] 112  Resp:  [15-20] 15  SpO2:  [91 %-98 %] 92 %  BP: (105-160)/(72-99) 105/75     Weight: 81.7 kg (180 lb 1.9 oz)  Body mass index is 27.39 kg/m².    Intake/Output Summary (Last 24 hours) at 12/15/2019 1424  Last data filed at 12/15/2019 1400  Gross per 24 hour   Intake 1200 ml   Output 3650 ml   Net -2450 ml      Physical Exam   Constitutional: He is oriented to person, place, and time. He appears well-developed and well-nourished. He has a sickly appearance. No distress.   HENT:   Head: Normocephalic and atraumatic.   Mouth/Throat: Oropharynx is clear and moist and mucous membranes are normal.   Eyes: Pupils are equal, round, and reactive to light. Conjunctivae are normal. Scleral icterus is  present.   Neck: JVD present.   Trialysis line inserted in right neck.    Cardiovascular: Normal rate, regular rhythm and normal pulses. Exam reveals gallop.   No murmur heard.  Pulmonary/Chest: Effort normal. No respiratory distress. He has decreased breath sounds in the right lower field. He has wheezes in the left lower field.   Right lower and mid crackle with wheezing    Abdominal: Soft. Normal appearance and bowel sounds are normal. He exhibits distension. There is no hepatomegaly. There is tenderness in the right upper quadrant, right lower quadrant and epigastric area. There is no CVA tenderness.   Musculoskeletal: He exhibits edema. He exhibits no tenderness or deformity.   There is significant edema of the bilateral lower extremities with pronounced erythema of the bilateral skins and ankles. Right arm swelling compare to left arm.    Neurological: He is alert and oriented to person, place, and time. He displays no Babinski's sign on the right side. He displays no Babinski's sign on the left side.   Skin: Skin is warm and dry. No bruising and no rash noted.   There are track marks of the bilateral wrists.        Significant Labs:   CBC:   Recent Labs   Lab 12/14/19  0621 12/15/19  0608   WBC 20.09* 21.68*   HGB 12.4* 12.8*   HCT 38.5* 42.2    367*     CMP:   Recent Labs   Lab 12/14/19  0621 12/15/19  0608   * 134*   K 4.0 3.5   CL 99 98   CO2 26 26   GLU 71 71   BUN 29* 21*   CREATININE 1.2 0.9   CALCIUM 7.9* 8.1*   PROT 6.8 7.0   ALBUMIN 1.6* 1.6*   BILITOT 4.1* 3.8*   ALKPHOS 102 100   AST 62* 57*   ALT 44 42   ANIONGAP 7* 10   EGFRNONAA >60.0 >60.0     All pertinent labs within the past 24 hours have been reviewed.    Significant Imaging: I have reviewed all pertinent imaging results/findings within the past 24 hours.      Assessment/Plan:      * Sepsis due to undetermined organism  This is a 44 year old male with PMH significant for non-ischemic cardiomyopathy (EF 11%), IVDU, and Hepatitis  C who is presenting on 12/10 with two week duration of worsening lower extremity swelling and erythema associated with same duration of post-prandial abdominal pain and distension with intermittent pale colored stools with work-up thus far concerning for tachycardia (120's), leukocytosis (25), lactic acidosis, transaminitis, hyperbilirubinemia, CXR suggestive of right lower lobe pneumonia, and abdominal U/S suggestive of possible acalculous cholecystitis. He has no symptoms of underlying respiratory infection and on chart review CXR abnormalities are similar to those in 08/2019 during admission for CHF exacerbation. UA is unremarkable. Negative for influenza. Suspect bilateral lower extremity edema and erythema more a manifestation of CHF instead of bilateral cellulitis. Differential diagnoses include biliary etiology vs bacteremia in the setting of on-going IVDU vs possible CAP.     Plan:   -Initiated antibiotic coverage for suspected biliary etiology and CAP with Azithromycin, Ceftriaxone, and Flagyl then transition to Levo PO for PNA then to Cefepime per ID recommendation.   -Blood cultures NGTD  -Respiratory culture growing GNR and Candida with persistently elevated WBC. Candida in respiratory does not need to get treated.   -Trend WBC and liver function daily. Trending down and continue to monitor.   -Judiciously administer an IVF resuscitation necessary.     Wheezing  History of 40PK year. Mild wheeze throughout lung field. Sating well in room air  - Starting Ipotroprium Neb Q8H for wheezing       Acute massive pulmonary embolism  DVT lower extremity probable origin of PE. Patient was started on heparin gtt    Plan  Transition to DVT dosing of 10mg BID eliquis   - right upper extremity arm US showing edema and no clots.     Acute hypoxemic respiratory failure        Acute deep vein thrombosis (DVT) of proximal vein of lower extremity  - See acute massive pulm embolism       Cardiac arrest  PEA arrest, 2 round  of EPI achieved ROSC.       Hyponatremia  -See assessment for CHF exacerbation.       Chronic hepatitis C without hepatic coma  - F/U Pending Hep C Viral load   - Will need to set up with hepatology for treatment as outpatient       Non-ischemic cardiomyopathy  -See assessment for sepsis.       Hyperbilirubinemia  -See assessment for sepsis.       Transaminitis  -See assessment for sepsis.   MELD-Na score: 19 at 12/15/2019  6:08 AM  MELD score: 17 at 12/15/2019  6:08 AM  Calculated from:  Serum Creatinine: 0.9 mg/dL (Rounded to 1 mg/dL) at 12/15/2019  6:08 AM  Serum Sodium: 134 mmol/L at 12/15/2019  6:08 AM  Total Bilirubin: 3.8 mg/dL at 12/15/2019  6:08 AM  INR(ratio): 1.6 at 12/15/2019  6:08 AM  Age: 44 years        Intravenous drug abuse  -On-going use of methamphetamines.   -Prior hepatitis screening positive for HepC in 10/2019.     Plan:   -Counseled on importance of cessation.   -Screening for HIV ordered.   -Monitor for signs of withdrawal for other substances.       Community acquired pneumonia of right lower lobe of lung  -See assessment for sepsis.       Acute on chronic combined systolic and diastolic heart failure  -History of non-ischemic cardiomyopathy diagnosed in 11/2017 at [facility].   -C in 12/2017 with no evidence of angiographically significant coronary artery disease.   -Most recent ECHO in 10/2019 concerning for EF of 35%, grade II diastolic dysfunction, and global hypokinesis with a restrictive physiology.   -Home regimen: Carvedilol, Enalapril, Lasix 20 mg BID, and Spironolactone.   -No outpatient follow-up.   -Exam on admission concerning for bilateral lower extremity edema, abdominal distension with ascites, and JVD, however without evidence of pulmonary edema,  respiratory compromise, or poor perfusion.   -Labs with associated hyponatremia likely from hypervolemia.   -Suspect presentation from worsening right sided heart failure.     Plan:   - IV diuresis 40mg daily urinating well    -Started on metoprolol for tachycardia.Continue to be On aldactone and ACE  -Strict I/O's and daily standing weights.   -Telemetry ordered.   -Needs evaluation for ICD vs CRT. Need to call back cardiology on Monday to put consult in.   -Trend renal function with diuresis; goal K >4 and Mg >2.         Other specified anxiety disorders  -Continue home Ativan PRN.       Essential hypertension  -See assessment for CHF exacerbation.         VTE Risk Mitigation (From admission, onward)         Ordered     apixaban tablet 5 mg  2 times daily      12/14/19 1334     apixaban tablet 10 mg  2 times daily      12/14/19 1334     Reason for No Pharmacological VTE Prophylaxis  Once     Question:  Reasons:  Answer:  Physician Provided (leave comment)  Comment:  TPA    12/10/19 0922     IP VTE HIGH RISK PATIENT  Once      12/10/19 0922                ARVIND Meza MD  Department of Hospital Medicine   [facility]

## 2019-12-15 NOTE — ASSESSMENT & PLAN NOTE
- F/U Pending Hep C Viral load   - Will need to set up with hepatology for treatment as outpatient

## 2019-12-15 NOTE — PHARMACY MED REC
"Admission Medication Reconciliation - Pharmacy Consult Note    The home medication history was taken by Mikayla Ellis, Pharmacy Technician.  Based on information gathered and subsequent review by the clinical pharmacist, the items below may need attention.     You may go to "Admission" then "Reconcile Home Medications" tabs to review and/or act upon these items.     Potentially problematic discrepancies with current MAR  o Patient IS NOT taking the following which was ordered upon admit  o Aspirin 81 mg PO daily  o Spironolactone 25 mg PO daily     Potential issues to be addressed PRIOR TO DISCHARGE  · Medication non-adherence; last fill date of all medications was 8/26/19 for 30-day supply    Please address this information as you see fit.  Feel free to contact us if you have any questions or require assistance.    Corina Bearden, PharmD, BCPS  [phone]     .    .            "

## 2019-12-15 NOTE — PLAN OF CARE
Evaluation complete and goals set.  Cont with POC  Ciara Kelley OT  12/15/2019      Problem: Occupational Therapy Goal  Goal: Occupational Therapy Goal  Description  Goals to be met by: 12/30     Patient will increase functional independence with ADLs by performing:    UE Dressing with [place].  LE Dressing with [place].  Grooming while seated with [place].  Toileting from toilet with Contact Guard Assistance for hygiene and clothing management.      Outcome: Ongoing, Progressing

## 2019-12-16 PROBLEM — Z78.9 IMPAIRED MOBILITY AND ADLS: Status: ACTIVE | Noted: 2019-12-16

## 2019-12-16 PROBLEM — Z74.09 IMPAIRED MOBILITY AND ADLS: Status: ACTIVE | Noted: 2019-12-16

## 2019-12-16 LAB
ALBUMIN SERPL BCP-MCNC: 1.6 G/DL (ref 3.5–5.2)
ALP SERPL-CCNC: 87 U/L (ref 55–135)
ALT SERPL W/O P-5'-P-CCNC: 34 U/L (ref 10–44)
ANION GAP SERPL CALC-SCNC: 7 MMOL/L (ref 8–16)
AST SERPL-CCNC: 45 U/L (ref 10–40)
BASOPHILS # BLD AUTO: 0.03 K/UL (ref 0–0.2)
BASOPHILS NFR BLD: 0.2 % (ref 0–1.9)
BILIRUB SERPL-MCNC: 3.1 MG/DL (ref 0.1–1)
BUN SERPL-MCNC: 20 MG/DL (ref 6–20)
CALCIUM SERPL-MCNC: 8 MG/DL (ref 8.7–10.5)
CHLORIDE SERPL-SCNC: 97 MMOL/L (ref 95–110)
CO2 SERPL-SCNC: 28 MMOL/L (ref 23–29)
CREAT SERPL-MCNC: 0.9 MG/DL (ref 0.5–1.4)
DIFFERENTIAL METHOD: ABNORMAL
EOSINOPHIL # BLD AUTO: 0.1 K/UL (ref 0–0.5)
EOSINOPHIL NFR BLD: 0.7 % (ref 0–8)
ERYTHROCYTE [DISTWIDTH] IN BLOOD BY AUTOMATED COUNT: 24.6 % (ref 11.5–14.5)
EST. GFR  (AFRICAN AMERICAN): >60 ML/MIN/1.73 M^2
EST. GFR  (NON AFRICAN AMERICAN): >60 ML/MIN/1.73 M^2
GLUCOSE SERPL-MCNC: 86 MG/DL (ref 70–110)
HCT VFR BLD AUTO: 39.4 % (ref 40–54)
HGB BLD-MCNC: 12.1 G/DL (ref 14–18)
HIV 1+2 AB+HIV1 P24 AG SERPL QL IA: NEGATIVE
IMM GRANULOCYTES # BLD AUTO: 0.14 K/UL (ref 0–0.04)
IMM GRANULOCYTES NFR BLD AUTO: 0.8 % (ref 0–0.5)
INR PPP: 1.7 (ref 0.8–1.2)
LIPASE SERPL-CCNC: 26 U/L (ref 4–60)
LYMPHOCYTES # BLD AUTO: 2 K/UL (ref 1–4.8)
LYMPHOCYTES NFR BLD: 11.3 % (ref 18–48)
MCH RBC QN AUTO: 25.2 PG (ref 27–31)
MCHC RBC AUTO-ENTMCNC: 30.7 G/DL (ref 32–36)
MCV RBC AUTO: 82 FL (ref 82–98)
MONOCYTES # BLD AUTO: 1.4 K/UL (ref 0.3–1)
MONOCYTES NFR BLD: 7.5 % (ref 4–15)
NEUTROPHILS # BLD AUTO: 14.3 K/UL (ref 1.8–7.7)
NEUTROPHILS NFR BLD: 79.5 % (ref 38–73)
NRBC BLD-RTO: 0 /100 WBC
PLATELET # BLD AUTO: 365 K/UL (ref 150–350)
PMV BLD AUTO: 9 FL (ref 9.2–12.9)
POTASSIUM SERPL-SCNC: 4.2 MMOL/L (ref 3.5–5.1)
PROT SERPL-MCNC: 6.4 G/DL (ref 6–8.4)
PROTHROMBIN TIME: 16.2 SEC (ref 9–12.5)
RBC # BLD AUTO: 4.81 M/UL (ref 4.6–6.2)
SODIUM SERPL-SCNC: 132 MMOL/L (ref 136–145)
WBC # BLD AUTO: 18.03 K/UL (ref 3.9–12.7)

## 2019-12-16 PROCEDURE — 20600001 HC STEP DOWN PRIVATE ROOM

## 2019-12-16 PROCEDURE — 97116 GAIT TRAINING THERAPY: CPT

## 2019-12-16 PROCEDURE — 99232 PR SUBSEQUENT HOSPITAL CARE,LEVL II: ICD-10-PCS | Mod: ,,, | Performed by: HOSPITALIST

## 2019-12-16 PROCEDURE — 99232 SBSQ HOSP IP/OBS MODERATE 35: CPT | Mod: ,,, | Performed by: NURSE PRACTITIONER

## 2019-12-16 PROCEDURE — 25000003 PHARM REV CODE 250: Performed by: STUDENT IN AN ORGANIZED HEALTH CARE EDUCATION/TRAINING PROGRAM

## 2019-12-16 PROCEDURE — 94640 AIRWAY INHALATION TREATMENT: CPT

## 2019-12-16 PROCEDURE — 25000003 PHARM REV CODE 250: Performed by: HOSPITALIST

## 2019-12-16 PROCEDURE — 99233 SBSQ HOSP IP/OBS HIGH 50: CPT | Mod: ,,, | Performed by: INTERNAL MEDICINE

## 2019-12-16 PROCEDURE — 85025 COMPLETE CBC W/AUTO DIFF WBC: CPT

## 2019-12-16 PROCEDURE — 85610 PROTHROMBIN TIME: CPT

## 2019-12-16 PROCEDURE — 99233 PR SUBSEQUENT HOSPITAL CARE,LEVL III: ICD-10-PCS | Mod: ,,, | Performed by: INTERNAL MEDICINE

## 2019-12-16 PROCEDURE — 94761 N-INVAS EAR/PLS OXIMETRY MLT: CPT

## 2019-12-16 PROCEDURE — 99232 PR SUBSEQUENT HOSPITAL CARE,LEVL II: ICD-10-PCS | Mod: ,,, | Performed by: NURSE PRACTITIONER

## 2019-12-16 PROCEDURE — 99232 SBSQ HOSP IP/OBS MODERATE 35: CPT | Mod: ,,, | Performed by: HOSPITALIST

## 2019-12-16 PROCEDURE — 99900035 HC TECH TIME PER 15 MIN (STAT)

## 2019-12-16 PROCEDURE — 83690 ASSAY OF LIPASE: CPT

## 2019-12-16 PROCEDURE — 80053 COMPREHEN METABOLIC PANEL: CPT

## 2019-12-16 PROCEDURE — 97161 PT EVAL LOW COMPLEX 20 MIN: CPT

## 2019-12-16 PROCEDURE — 97530 THERAPEUTIC ACTIVITIES: CPT

## 2019-12-16 PROCEDURE — 25000242 PHARM REV CODE 250 ALT 637 W/ HCPCS: Performed by: STUDENT IN AN ORGANIZED HEALTH CARE EDUCATION/TRAINING PROGRAM

## 2019-12-16 PROCEDURE — 63600175 PHARM REV CODE 636 W HCPCS: Performed by: STUDENT IN AN ORGANIZED HEALTH CARE EDUCATION/TRAINING PROGRAM

## 2019-12-16 RX ORDER — OXYCODONE HYDROCHLORIDE 5 MG/1
5 TABLET ORAL ONCE AS NEEDED
Status: COMPLETED | OUTPATIENT
Start: 2019-12-16 | End: 2019-12-16

## 2019-12-16 RX ORDER — METOPROLOL TARTRATE 25 MG/1
50 TABLET, FILM COATED ORAL 2 TIMES DAILY
Status: DISCONTINUED | OUTPATIENT
Start: 2019-12-16 | End: 2019-12-18

## 2019-12-16 RX ADMIN — ONDANSETRON 8 MG: 8 TABLET, ORALLY DISINTEGRATING ORAL at 01:12

## 2019-12-16 RX ADMIN — IPRATROPIUM BROMIDE 0.5 MG: 0.5 SOLUTION RESPIRATORY (INHALATION) at 07:12

## 2019-12-16 RX ADMIN — SPIRONOLACTONE 25 MG: 25 TABLET ORAL at 09:12

## 2019-12-16 RX ADMIN — IBUPROFEN 800 MG: 400 TABLET ORAL at 08:12

## 2019-12-16 RX ADMIN — OXYCODONE HYDROCHLORIDE 5 MG: 5 TABLET ORAL at 01:12

## 2019-12-16 RX ADMIN — CEFEPIME 2 G: 2 INJECTION, POWDER, FOR SOLUTION INTRAVENOUS at 01:12

## 2019-12-16 RX ADMIN — ENALAPRIL MALEATE 2.5 MG: 2.5 TABLET ORAL at 09:12

## 2019-12-16 RX ADMIN — Medication 6 MG: at 08:12

## 2019-12-16 RX ADMIN — PANTOPRAZOLE SODIUM 40 MG: 40 TABLET, DELAYED RELEASE ORAL at 09:12

## 2019-12-16 RX ADMIN — METOPROLOL TARTRATE 25 MG: 25 TABLET ORAL at 09:12

## 2019-12-16 RX ADMIN — MUPIROCIN: 20 OINTMENT TOPICAL at 09:12

## 2019-12-16 RX ADMIN — APIXABAN 10 MG: 5 TABLET, FILM COATED ORAL at 09:12

## 2019-12-16 RX ADMIN — FUROSEMIDE 40 MG: 10 INJECTION, SOLUTION INTRAMUSCULAR; INTRAVENOUS at 09:12

## 2019-12-16 RX ADMIN — MIRTAZAPINE 15 MG: 15 TABLET, ORALLY DISINTEGRATING ORAL at 08:12

## 2019-12-16 RX ADMIN — APIXABAN 10 MG: 5 TABLET, FILM COATED ORAL at 08:12

## 2019-12-16 RX ADMIN — METOPROLOL TARTRATE 50 MG: 25 TABLET ORAL at 08:12

## 2019-12-16 NOTE — ASSESSMENT & PLAN NOTE
-See assessment for sepsis.   MELD-Na score: 21 at 12/16/2019  6:15 AM  MELD score: 17 at 12/16/2019  6:15 AM  Calculated from:  Serum Creatinine: 0.9 mg/dL (Rounded to 1 mg/dL) at 12/16/2019  6:15 AM  Serum Sodium: 132 mmol/L at 12/16/2019  6:15 AM  Total Bilirubin: 3.1 mg/dL at 12/16/2019  6:15 AM  INR(ratio): 1.7 at 12/16/2019  6:15 AM  Age: 44 years

## 2019-12-16 NOTE — ASSESSMENT & PLAN NOTE
PEA arrest, 2 round of EPI and CPR achieved ROSC.     Plan:  -obtained CXR on 12/16 as patient c/o mid sternal pain that is tender to palpation.  Likely could be 2/2 to chest compression.  Will also obtain a lipase level to ensure he has developed pancreatitis.

## 2019-12-16 NOTE — CONSULTS
[facility]  Physical Medicine & Rehab  Consult Note    Patient Name: Francis Daigle  MRN: [id number]  Admission Date: 12/10/2019  Hospital Length of Stay: 6 days  Attending Physician: David Potts MD     Inpatient consult to Physical Medicine & Rehabilitation  Consult performed by: Kimberly Snider NP  Consult requested by:  David Potts MD    Collaborating Physician: Marina Brooks MD  Reason for Consult:  Rehab evaluation     Consults  Subjective:     Principal Problem: Sepsis due to undetermined organism    HPI: Francis Daigle is a 44-year-old male with PMHx of HTN, CAD, CHF, non-ischemic cardiomyopathy, chronic hepatitis C, anxiety, depression, arthritis, and polysubstance abuse with IVDU.  Patient presented to [facility] for progressively worsening BLE swelling x 2 weeks with worsening SOB and abdominal pain.  Found to be septic with RLL pneumonia, acute on chronic CHF, acute liver injury, and abdominal US concerning for acalculous cholecystitis.  Transferred to [facility] on 12/10/19 for further evaluation and management.  Following admission, patient developed PEA arrest and acute hypoxemic respiratory failure on 12/10/19 requiring intubation and 1 round of CPR.  Found to have acute massive pulmonary embolism s/p tPA and extensive BLE DVTs treated with heparin infusion with transition to Eliquis.  ID consulted and persistent leukocytosis likely related to recent cardiac arrest.  Cardiology consulted for acute on chronic heart failure s/p cardiac arrest and recommended increasing Lopressor and outpatient follow-up for AICD placement/lifevest.      Functional History: Patient lives in [place] with his cousins in a single story home with flight of stairs to enter.  Prior to admission, he was (I) with ADLs and mobility.  DME: none.    Hospital Course: 12/15/19:  Evaluated by OT.  PT evaluations pending.  Bed mobility CGA-Zachery.  Sit to stand and transfers Zachery.  Grooming CGA, LBD  Zachery, and toileting modA.     Past Medical History:   Diagnosis Date    Anxiety     Arthritis     CHF (congestive heart failure)     Coronary artery disease     Depression     Hypertension      History reviewed. No pertinent surgical history.  Review of patient's allergies indicates:  No Known Allergies    Scheduled Medications:    apixaban  10 mg Oral BID    Followed by    [START ON 2019] apixaban  5 mg Oral BID    ceFEPime (MAXIPIME) IVPB  2 g Intravenous Q12H    enalapril  2.5 mg Oral Daily    furosemide  40 mg Intravenous Daily    ipratropium  0.5 mg Nebulization Q6H    metoprolol tartrate  25 mg Oral BID    mirtazapine  15 mg Oral Nightly    mupirocin   Topical (Top) Daily    pantoprazole  40 mg Oral Daily    spironolactone  25 mg Oral Daily       PRN Medications: albuterol-ipratropium, ibuprofen, melatonin, ondansetron, sodium chloride 0.9%    Family History     Problem Relation (Age of Onset)    Arthritis Mother    Asthma Sister    Diabetes Sister    Heart disease Mother, Maternal Grandfather    Hyperlipidemia Mother    Hypertension Mother, Father    Kidney disease Mother        Tobacco Use    Smoking status: Former Smoker     Packs/day: 2.00     Years: 20.00     Pack years: 40.00     Types: Cigarettes     Start date: 1999     Last attempt to quit: 2017     Years since quittin.8    Smokeless tobacco: Never Used   Substance and Sexual Activity    Alcohol use: Not Currently    Drug use: Yes     Frequency: 7.0 times per week     Types: Amphetamines    Sexual activity: Not Currently     Partners: Female     Review of Systems   Constitutional: Positive for activity change and fatigue. Negative for chills.   HENT: Positive for dental problem. Negative for trouble swallowing and voice change.    Eyes: Negative for pain and visual disturbance.   Respiratory: Negative for cough and shortness of breath.    Cardiovascular: Positive for leg swelling. Negative for chest pain and  palpitations.   Gastrointestinal: Positive for abdominal pain. Negative for nausea and vomiting.   Genitourinary: Negative for difficulty urinating and flank pain.   Musculoskeletal: Positive for gait problem. Negative for arthralgias and myalgias.   Skin: Positive for color change and wound. Negative for rash.   Neurological: Positive for weakness. Negative for numbness and headaches.   Psychiatric/Behavioral: Negative for agitation. The patient is not nervous/anxious.      Objective:     Vital Signs (Most Recent):  Temp: 97.6 °F (36.4 °C) (12/16/19 0737)  Pulse: 103 (12/16/19 1123)  Resp: 20 (12/16/19 0737)  BP: 120/75 (12/16/19 1013)  SpO2: 96 % (12/16/19 1013)    Vital Signs (24h Range):  Temp:  [97.6 °F (36.4 °C)-98.5 °F (36.9 °C)] 97.6 °F (36.4 °C)  Pulse:  [101-124] 103  Resp:  [18-20] 20  SpO2:  [92 %-96 %] 96 %  BP: (109-139)/() 120/75     Body mass index is 26.28 kg/m².    Physical Exam   Constitutional: He is oriented to person, place, and time. He appears ill. No distress.   Appears older than stated age   HENT:   Head: Normocephalic and atraumatic.   Right Ear: External ear normal.   Left Ear: External ear normal.   Nose: Nose normal.   Mouth/Throat: Abnormal dentition.   Eyes: Right eye exhibits no discharge. Left eye exhibits no discharge. Scleral icterus is present.   Neck: Normal range of motion.   Cardiovascular: Intact distal pulses. Tachycardia present.   Pulmonary/Chest: Effort normal. No respiratory distress.   Abdominal: He exhibits distension. There is no tenderness.   Musculoskeletal: He exhibits edema and tenderness.   Neurological: He is alert and oriented to person, place, and time. No sensory deficit. He exhibits normal muscle tone.   Skin: Skin is warm and dry. Bruising noted.   BLE discoloration and edema with dressings CDI   Psychiatric: His behavior is normal. He exhibits a depressed mood.   Vitals reviewed.    Diagnostic Results:   Labs: Reviewed  X-Ray: Reviewed  CT:  Reviewed    Assessment/Plan:     * Sepsis due to undetermined organism  -  Treated with antibiotics for suspected biliary etiology and PNA  -  Management per primary team    Acute massive pulmonary embolism  Acute deep vein thrombosis (DVT) of proximal vein of lower extremity  -  On apixaban  -  Management per primary team    Cardiac arrest  Acute hypoxemic respiratory failure  -  PEA arrest and acute hypoxemic respiratory failure on 12/10/19 requiring intubation and 1 round of CPR  -  Found to have PE and BLE DVT s/p tPA  -  Extubated 12/12/19  -  Cardiology consulted   -  Management per primary team    Impaired mobility and ADLs  -  (I) with mobility and ADLs at baseline   -  Related to prolonged and acute hospitalization, impaired endurance, weakness, impaired self care skills, impaired functional mobilty, impaired balance, impaired cognition  See hospital course for functional status.    Recommendations  -  Encourage mobility, OOB in chair, and early ambulation as appropriate  -  PT/OT evaluate and treat  -  Pain management  -  DVT prophylaxis if appropriate   -  Monitor for and prevent skin breakdown and pressure ulcers  · Early mobility, repositioning/weight shifting every 20-30 minutes when sitting, turn patient every 2 hours, proper mattress/overlay and chair cushioning, pressure relief/heel protector boots    Post-acute recommendation: pending therapy progress and medical stability- PT evaluation pending     Thank you for your consult.     IRIS Lazo  Department of Physical Medicine & Rehab  [facility]

## 2019-12-16 NOTE — HPI
44 year old gentleman with past medical history significant for recent PEA arrest requiring 1 rounds of epi and tPA (presumed massive PE), HFrEF (15%), non-ishcemic cardiomyopathy, IVDU (meth 12/08 last use) and bilateral DVTs on apixiban who presented initally to [facility] ED for shortness of breath with exertion/leg pain/swelling/abdominal pain found to have cholelithiasis/cholecystitis transferred to [facility] [place] [facility] eval. On admission to [facility], patient had a PEA arrest, requiring 1 round before ROSC was achieved. PEA arrest presumed to be from bilateral lower DVTs resulting in VTE.   Cardiology consulted for live vest vs AICD placement.     Investigations:  ECHO 12/10 - EF 15%, right and left ventricular enlargement/ moderately reduced right ventricular systolic function/severe biatrial enlargement.   Trop 0156 on admisison  BNP 3300 on admission

## 2019-12-16 NOTE — ASSESSMENT & PLAN NOTE
-  (I) with mobility and ADLs at baseline   -  Related to prolonged and acute hospitalization, impaired endurance, weakness, impaired self care skills, impaired functional mobilty, impaired balance, impaired cognition  See hospital course for functional status.    Recommendations  -  Encourage mobility, OOB in chair, and early ambulation as appropriate  -  PT/OT evaluate and treat  -  Pain management  -  DVT prophylaxis if appropriate   -  Monitor for and prevent skin breakdown and pressure ulcers  · Early mobility, repositioning/weight shifting every 20-30 minutes when sitting, turn patient every 2 hours, proper mattress/overlay and chair cushioning, pressure relief/heel protector boots

## 2019-12-16 NOTE — ASSESSMENT & PLAN NOTE
-  PEA arrest and acute hypoxemic respiratory failure on 12/10/19 requiring intubation and 1 round of CPR  -  Found to have PE and BLE DVT s/p tPA  -  Extubated 12/12/19  -  Cardiology consulted   -  Management per primary team

## 2019-12-16 NOTE — PT/OT/SLP EVAL
"Physical Therapy Evaluation    Patient Name:  Francis Daigle   MRN:  [id number]    Recommendations:     Discharge Recommendations:  home health PT   Discharge Equipment Recommendations: cane, straight   Barriers to discharge: Inaccessible home , room on 2nd story    Assessment:     Francis Daigle is a 44 y.o. male admitted with a medical diagnosis of Sepsis due to undetermined organism.  He presents with the following impairments/functional limitations:  weakness, impaired endurance, impaired functional mobilty, gait instability, impaired balance, decreased safety awareness, pain, edema . Patient moaning complaining of upon entry to the room. Agreeable to therapy with moderate encouragement. SBA bed mobility. CGA sit to stand, gait and stairs with RW. Patient performed toileting and handwashing with SBA. Patient appeared to be self limiting from fear of pain as mobility and activity tolerance quickly improved once patient realized it was not causing more pain. Patient is safe to return home when medically ready and would benefit from home health physical therapy in order to improve safety and independence in the home environment.  Improved mobility since OT evaluation. Change of rec's discussed with OT and OT in agreement.     Rehab Prognosis: Good; patient would benefit from acute skilled PT services to address these deficits and reach maximum level of function.    Recent Surgery: * No surgery found *      Plan:     During this hospitalization, patient to be seen 3 x/week to address the identified rehab impairments via gait training, therapeutic activities, therapeutic exercises, neuromuscular re-education and progress toward the following goals:    · Plan of Care Expires:  01/16/20    Subjective     Chief Complaint: abdominal pain  Patient/Family Comments/goals: "I am gonna be done for the rest of the day after this"  "I do feel better after walking"  Pain/Comfort:  · Pain Rating 1: 6/10  · Location - " Orientation 1: generalized  · Location 1: abdomen  · Pain Addressed 1: Reposition, Distraction, Nurse notified  · Pain Rating Post-Intervention 1: 5/10    Patients cultural, spiritual, Uatsdin conflicts given the current situation: no    Living Environment:  With cousin and their spouse in a 2SH, bedroom on 2nd floor.   Prior to admission, patients level of function was Ind with ADLs.  Equipment used at home: none.  DME owned (not currently used): none.  Upon discharge, patient will have assistance from cousin.    Objective:     Communicated with nurse prior to session.  Patient found up in chair with telemetry  upon PT entry to room.    General Precautions: Standard, fall   Orthopedic Precautions:N/A   Braces: N/A     Exams:  · RLE ROM: WFL  · RLE Strength: WFL  · LLE ROM: WFL  · LLE Strength: WFL    Functional Mobility:  · Bed Mobility:     · Supine to Sit: stand by assistance  · Sit to Supine: stand by assistance  · Transfers:  Sit to Stand:  contact guard assistance with rolling walker  · Bed to Chair: contact guard assistance with  rolling walker  using  Step Transfer  · Gait: 100', CGA, RW, decreased gait speed, v/t cues to stay within RW and maintain erect posture. No LOB or near LOB  · Stairs:  Pt ascended/descended 5 stair(s) with No Assistive Device with right handrail with Contact Guard Assistance. , reciprocal pattern, increased pain in knees      Therapeutic Activities and Exercises:   Pt educated on importance of OOB activity to decrease the risks associated with bed rest. Patient educated on role of PT in acute care setting. Educated on benefits of AP, walking and mobility to help decrease LE edema. Pt educated on plan and goals with physical therapy.      AM-PAC 6 CLICK MOBILITY  Total Score:20     Patient left up in chair with call button in reach.    GOALS:   Multidisciplinary Problems     Physical Therapy Goals     Not on file                History:     Past Medical History:   Diagnosis Date     Anxiety     Arthritis     CHF (congestive heart failure)     Coronary artery disease     Depression     Hypertension        History reviewed. No pertinent surgical history.    Time Tracking:     PT Received On: 12/16/19  PT Start Time: 0914     PT Stop Time: 0945  PT Total Time (min): 31 min     Billable Minutes: Evaluation 9, Gait Training 10 and Therapeutic Activity 11      Marnie Mcbride, PT  12/16/2019

## 2019-12-16 NOTE — PLAN OF CARE
12/16/19 1410   Post-Acute Status   Post-Acute Authorization Placement  ([facility])   Post-Acute Placement Status Referrals Sent   Discharge Delays None known at this time

## 2019-12-16 NOTE — ASSESSMENT & PLAN NOTE
44 year old man with non-ischemic cardiomyopathy, HFrEF (EF 15%), IVDU presents initially with shortness of breath/leg swelling and abdominal pain. Status post PEA arrest, requiring tPA and one round of resuscitation to achieve ROSC. Patient also found to have     Assessment  Acute on Chronic heart failure    Plan  Recommend increasing lopressor 50 for better goal directed therapy  Will need outpatient follow up with EP for AICD placment/life vest. Do not generally place AICD inpatient and will need to be medically stable before.

## 2019-12-16 NOTE — HOSPITAL COURSE
12/15/19:  Evaluated by OT.  PT evaluations pending.  Bed mobility CGA-Zachery.  Sit to stand and transfers Zachery.  Grooming CGA, LBD Zachery, and toileting modA.   12/16/19:  Evaluated by PT.  Bed mobility SBA.  Sit to stand CGA with RW.  Ambulated 100 ft CGA with RW.  Ascended and descended 5 stairs CGA.

## 2019-12-16 NOTE — CONSULTS
[facility]  Cardiology  Consult Note    Patient Name: Francis Daigle  MRN: [id number]  Admission Date: 12/10/2019  Hospital Length of Stay: 6 days  Code Status: Full Code   Attending Provider: David Potts MD   Consulting Provider: Marito Sierra MD  Primary Care Physician: Primary Doctor No  Principal Problem:Sepsis due to undetermined organism    Patient information was obtained from patient and ER records.     Inpatient consult to Cardiology  Consult performed by: Marito Sierra MD  Consult ordered by: Bradly Guzman MD        Subjective:     Chief Complaint:  Status post PEA arrest, EF of 15%     HPI:   44 year old gentleman with past medical history significant for recent PEA arrest requiring 1 rounds of epi and tPA (presumed massive PE), HFrEF (15%), non-ishcemic cardiomyopathy, IVDU (meth 12/08 last use) and bilateral DVTs on apixiban who presented initally to [facility] ED for shortness of breath with exertion/leg pain/swelling/abdominal pain found to have cholelithiasis/cholecystitis transferred to [facility] for AES eval. On admission to [facility], patient had a PEA arrest, requiring 1 round before ROSC was achieved. PEA arrest presumed to be from bilateral lower DVTs resulting in VTE.   Cardiology consulted for live vest vs AICD placement.     Investigations:  ECHO 12/10 - EF 15%, right and left ventricular enlargement/ moderately reduced right ventricular systolic function/severe biatrial enlargement.   Trop 0156 on admisison  BNP 3300 on admission          Past Medical History:   Diagnosis Date    Anxiety     Arthritis     CHF (congestive heart failure)     Coronary artery disease     Depression     Hypertension        History reviewed. No pertinent surgical history.    Review of patient's allergies indicates:  No Known Allergies    No current facility-administered medications on file prior to encounter.      Current Outpatient Medications on File Prior to Encounter   Medication Sig     acetaminophen (TYLENOL) 325 MG tablet Take 325 mg by mouth every 6 (six) hours as needed for Pain.    carvedilol (COREG) 3.125 MG tablet Take 1 tablet (3.125 mg total) by mouth 2 (two) times daily with meals.    enalapril (VASOTEC) 2.5 MG tablet Take 1 tablet (2.5 mg total) by mouth once daily.    furosemide (LASIX) 20 MG tablet Take 1 tablet (20 mg total) by mouth 2 (two) times daily.     Family History     Problem Relation (Age of Onset)    Arthritis Mother    Asthma Sister    Diabetes Sister    Heart disease Mother, Maternal Grandfather    Hyperlipidemia Mother    Hypertension Mother, Father    Kidney disease Mother        Tobacco Use    Smoking status: Former Smoker     Packs/day: 2.00     Years: 20.00     Pack years: 40.00     Types: Cigarettes     Start date: 1999     Last attempt to quit: 2017     Years since quittin.8    Smokeless tobacco: Never Used   Substance and Sexual Activity    Alcohol use: Not Currently    Drug use: Yes     Frequency: 7.0 times per week     Types: Amphetamines    Sexual activity: Not Currently     Partners: Female     Review of Systems   Reason unable to perform ROS: limited ROS, patient did not want to talk about how he was doing.    Constitution: Positive for malaise/fatigue.   Gastrointestinal: Positive for abdominal pain.     Objective:     Vital Signs (Most Recent):  Temp: 97.6 °F (36.4 °C) (19)  Pulse: (!) 115 (19)  Resp: 20 (19)  BP: (!) 139/101 (19)  SpO2: 95 % (19) Vital Signs (24h Range):  Temp:  [97.6 °F (36.4 °C)-98.5 °F (36.9 °C)] 97.6 °F (36.4 °C)  Pulse:  [101-124] 115  Resp:  [15-20] 20  SpO2:  [92 %-95 %] 95 %  BP: (105-139)/() 139/101     Weight: 78.4 kg (172 lb 13.5 oz)  Body mass index is 26.28 kg/m².    SpO2: 95 %  O2 Device (Oxygen Therapy): room air      Intake/Output Summary (Last 24 hours) at 2019 0923  Last data filed at 2019 0600  Gross per 24 hour   Intake 1080  ml   Output 2750 ml   Net -1670 ml       Lines/Drains/Airways     Peripheral Intravenous Line                 Peripheral IV - Single Lumen 12/16/19 0015 22 G Left;Posterior Hand less than 1 day                Physical Exam   Constitutional: He is oriented to person, place, and time. He appears well-developed. No distress.   HENT:   Head: Normocephalic and atraumatic.   Mouth/Throat: No oropharyngeal exudate.   Eyes: Pupils are equal, round, and reactive to light. Conjunctivae are normal. Right eye exhibits no discharge. Left eye exhibits no discharge. Scleral icterus is present.   Neck: Normal range of motion. Neck supple. No tracheal deviation present. No thyromegaly present.   Cardiovascular:   Murmur heard.  Tachycardic.    Pulmonary/Chest: He has wheezes. He exhibits no tenderness.   Abdominal: Soft. He exhibits distension. There is tenderness.   Musculoskeletal: Normal range of motion. He exhibits edema (2+ right leg worse than left). He exhibits no tenderness.   Neurological: He is alert and oriented to person, place, and time. No cranial nerve deficit. Coordination normal.   Skin: Skin is warm and dry. No rash noted. He is not diaphoretic. No erythema.       Significant Labs'  Recent Results (from the past 24 hour(s))   CBC auto differential    Collection Time: 12/16/19  6:15 AM   Result Value Ref Range    WBC 18.03 (H) 3.90 - 12.70 K/uL    RBC 4.81 4.60 - 6.20 M/uL    Hemoglobin 12.1 (L) 14.0 - 18.0 g/dL    Hematocrit 39.4 (L) 40.0 - 54.0 %    Mean Corpuscular Volume 82 82 - 98 fL    Mean Corpuscular Hemoglobin 25.2 (L) 27.0 - 31.0 pg    Mean Corpuscular Hemoglobin Conc 30.7 (L) 32.0 - 36.0 g/dL    RDW 24.6 (H) 11.5 - 14.5 %    Platelets 365 (H) 150 - 350 K/uL    MPV 9.0 (L) 9.2 - 12.9 fL    Immature Granulocytes 0.8 (H) 0.0 - 0.5 %    Gran # (ANC) 14.3 (H) 1.8 - 7.7 K/uL    Immature Grans (Abs) 0.14 (H) 0.00 - 0.04 K/uL    Lymph # 2.0 1.0 - 4.8 K/uL    Mono # 1.4 (H) 0.3 - 1.0 K/uL    Eos # 0.1 0.0 - 0.5  K/uL    Baso # 0.03 0.00 - 0.20 K/uL    nRBC 0 0 /100 WBC    Gran% 79.5 (H) 38.0 - 73.0 %    Lymph% 11.3 (L) 18.0 - 48.0 %    Mono% 7.5 4.0 - 15.0 %    Eosinophil% 0.7 0.0 - 8.0 %    Basophil% 0.2 0.0 - 1.9 %    Differential Method Automated    Protime-INR    Collection Time: 12/16/19  6:15 AM   Result Value Ref Range    Prothrombin Time 16.2 (H) 9.0 - 12.5 sec    INR 1.7 (H) 0.8 - 1.2   Comprehensive metabolic panel    Collection Time: 12/16/19  6:15 AM   Result Value Ref Range    Sodium 132 (L) 136 - 145 mmol/L    Potassium 4.2 3.5 - 5.1 mmol/L    Chloride 97 95 - 110 mmol/L    CO2 28 23 - 29 mmol/L    Glucose 86 70 - 110 mg/dL    BUN, Bld 20 6 - 20 mg/dL    Creatinine 0.9 0.5 - 1.4 mg/dL    Calcium 8.0 (L) 8.7 - 10.5 mg/dL    Total Protein 6.4 6.0 - 8.4 g/dL    Albumin 1.6 (L) 3.5 - 5.2 g/dL    Total Bilirubin 3.1 (H) 0.1 - 1.0 mg/dL    Alkaline Phosphatase 87 55 - 135 U/L    AST 45 (H) 10 - 40 U/L    ALT 34 10 - 44 U/L    Anion Gap 7 (L) 8 - 16 mmol/L    eGFR if African American >60.0 >60 mL/min/1.73 m^2    eGFR if non African American >60.0 >60 mL/min/1.73 m^2     Microbiology Results (last 7 days)     Procedure Component Value Units Date/Time    Blood culture [[id number]] Collected:  12/10/19 0740    Order Status:  Completed Specimen:  Blood from Line, Jugular, Internal Left Updated:  12/15/19 1012     Blood Culture, Routine No growth after 5 days.    Blood culture (site 1) [[id number]] Collected:  12/10/19 0435    Order Status:  Completed Specimen:  Blood Updated:  12/15/19 0812     Blood Culture, Routine No growth after 5 days.    Narrative:       Site # 1, aerobic and anaerobic    Blood culture [[id number]] Collected:  12/10/19 0720    Order Status:  Completed Specimen:  Blood from Line, Jugular, Internal Right Updated:  12/15/19 0812     Blood Culture, Routine No growth after 5 days.    Blood culture (site 2) [[id number]] Collected:  12/10/19 0435    Order Status:  Completed Specimen:  Blood Updated:  12/15/19  0812     Blood Culture, Routine No growth after 5 days.    Narrative:       Site # 2, aerobic only    Culture, Respiratory [[id number]]  (Abnormal)  (Susceptibility) Collected:  12/11/19 1441    Order Status:  Completed Specimen:  Respiratory from Bronchial Wash Updated:  12/14/19 0955     Respiratory Culture No S aureus or Pseudomonas isolated.      CANDIDA ALBICANS  Moderate        ENTEROBACTER CLOACAE  Few  susceptibility pending       Gram Stain (Respiratory) Few WBC's     Gram Stain (Respiratory) Few yeast    Narrative:       Bronchial Wash    Fungus culture [[id number]]  (Abnormal) Collected:  12/11/19 1441    Order Status:  Completed Specimen:  Bronchial Alveolar Lavage from Lung, L Updated:  12/13/19 1412     Fungus (Mycology) Culture CANDIDA ALBICANS  Many      Narrative:       Bronchial Wash    AFB Culture & Smear [[id number]] Collected:  12/11/19 1441    Order Status:  Completed Specimen:  Bronchial Alveolar Lavage from Lung, L Updated:  12/12/19 2127     AFB Culture & Smear Culture in progress     AFB CULTURE STAIN No acid fast bacilli seen.    Narrative:       Bronchial Wash    Culture, Respiratory with Gram Stain [[id number]]  (Abnormal)  (Susceptibility) Collected:  12/10/19 1130    Order Status:  Completed Specimen:  Respiratory from Endotracheal Aspirate Updated:  12/12/19 1111     Respiratory Culture ENTEROBACTER CLOACAE  Few  Normal respiratory shaye also present       Gram Stain (Respiratory) <10 epithelial cells per low power field.     Gram Stain (Respiratory) Rare WBC's     Gram Stain (Respiratory) No organisms seen    Direct AFB stain [[id number]] Collected:  12/11/19 1441    Order Status:  Completed Specimen:  Bronchial Alveolar Lavage from Lung, L Updated:  12/11/19 1840     Direct Acid Fast No acid fast bacilli seen.    Narrative:       Bronchial Wash    JESSENIA prep [[id number]] Collected:  12/11/19 1441    Order Status:  Completed Specimen:  Bronchial Alveolar Lavage from Lung, [facility] Updated:   12/11/19 1719     KOH Prep Few Budding yeast    Narrative:       Bronchial Wash    Gram stain [[id number]] Collected:  12/11/19 1441    Order Status:  Canceled Specimen:  Bronchial Alveolar Lavage from Lung, RML             Significant Imaging:          Assessment and Plan:     Acute deep vein thrombosis (DVT) of proximal vein of lower extremity  Bilateral DVTs. Presumed source of PEA arrest.     Recommendations  -continue with abixiban for treatment of DVTs. No immediate contradictions     Cardiac arrest  See acute on chronic heart failure.     Acute on chronic combined systolic and diastolic heart failure  44 year old man with non-ischemic cardiomyopathy in 2017- underwent a left heart cath: no evidence of angiographically significant coronary artery disease, elevated right and left heart filling pressure, HFrEF (EF 15%), IVDU presents initially with shortness of breath/leg swelling and abdominal pain. Status post PEA arrest, requiring tPA and one round of resuscitation to achieve ROSC. Patient also found to have     Assessment  Acute on Chronic heart failure    Plan  Recommend increasing lopressor 50 mg bid for better goal directed therapy, with uptitration to keep up with heart rate < 70 bpd. This can be done in the outpatient setting .Would convert to toprol on discharge.    Will need outpatient follow up with cardiology for assessment for AICD placment/life vest.    Essential hypertension  Continue with enalapril 2.5 and sprinolactone 25. Increase metoprolol to 50 mg bid      VTE Risk Mitigation (From admission, onward)         Ordered     apixaban tablet 5 mg  2 times daily      12/14/19 1334     apixaban tablet 10 mg  2 times daily      12/14/19 1334     Reason for No Pharmacological VTE Prophylaxis  Once     Question:  Reasons:  Answer:  Physician Provided (leave comment)  Comment:  TPA    12/10/19 0922     IP VTE HIGH RISK PATIENT  Once      12/10/19 0922                Thank you for your consult. I will  sign off. Please contact us if you have any additional questions.    Marito Sierra MD  Cardiology   [facility]

## 2019-12-16 NOTE — ASSESSMENT & PLAN NOTE
-  Treated with antibiotics for suspected biliary etiology and PNA  -  Management per primary team

## 2019-12-16 NOTE — ASSESSMENT & PLAN NOTE
Bilateral DVTs. Presumed source of PEA arrest.     Recommendations  -continue with abixiban for treatment of DVTs. No immediate contradictions

## 2019-12-16 NOTE — SUBJECTIVE & OBJECTIVE
Interval History: NAEON.  Patient states that he feels the best that he has since admission.   He endorses mild abdominal pain that is substernal.  The pain is tender to palpation but is not associated with any other symptoms.  The pain does not radiate.  It is not associated with meals.     Review of Systems   Constitutional: Negative for chills and fever.   Respiratory: Negative for cough and shortness of breath.    Gastrointestinal: Positive for abdominal pain. Negative for abdominal distention and constipation.   Genitourinary: Negative for dysuria.     Objective:     Vital Signs (Most Recent):  Temp: 97.6 °F (36.4 °C) (12/16/19 1301)  Pulse: 102 (12/16/19 1301)  Resp: 18 (12/16/19 1301)  BP: 108/69 (12/16/19 1301)  SpO2: 96 % (12/16/19 1301) Vital Signs (24h Range):  Temp:  [97.6 °F (36.4 °C)-98.5 °F (36.9 °C)] 97.6 °F (36.4 °C)  Pulse:  [101-124] 102  Resp:  [18-20] 18  SpO2:  [92 %-96 %] 96 %  BP: (108-139)/() 108/69     Weight: 78.4 kg (172 lb 13.5 oz)  Body mass index is 26.28 kg/m².    Intake/Output Summary (Last 24 hours) at 12/16/2019 1348  Last data filed at 12/16/2019 0600  Gross per 24 hour   Intake 600 ml   Output 1350 ml   Net -750 ml      Physical Exam   Constitutional: He is oriented to person, place, and time.   Patient is a 43 yo M who appears alert, anxious in NAD.    HENT:   Head: Normocephalic and atraumatic.   Eyes: Pupils are equal, round, and reactive to light. EOM are normal.   Neck: Normal range of motion.   Cardiovascular: Normal rate, regular rhythm and normal heart sounds.   Pulmonary/Chest: Effort normal and breath sounds normal. No respiratory distress.   Abdominal: Bowel sounds are normal. He exhibits no distension.   Tender to palpation substernally.    Musculoskeletal: Normal range of motion.   Bandages covering venous stasis wounds in BL LE.    Neurological: He is alert and oriented to person, place, and time.   Skin: Skin is warm and dry.   Psychiatric: He has a normal  mood and affect.     Significant Labs: All pertinent labs within the past 24 hours have been reviewed.     Recent Labs   Lab 12/14/19  0621 12/15/19  0608 12/16/19  0615   WBC 20.09* 21.68* 18.03*   HGB 12.4* 12.8* 12.1*   HCT 38.5* 42.2 39.4*    367* 365*   MCV 81* 83 82   RDW 24.2* 24.7* 24.6*   * 134* 132*   K 4.0 3.5 4.2   CL 99 98 97   CO2 26 26 28   BUN 29* 21* 20   CREATININE 1.2 0.9 0.9   GLU 71 71 86   PROT 6.8 7.0 6.4   ALBUMIN 1.6* 1.6* 1.6*   BILITOT 4.1* 3.8* 3.1*   AST 62* 57* 45*   ALKPHOS 102 100 87   ALT 44 42 34       Significant Imaging: I have reviewed all pertinent imaging results/findings within the past 24 hours.

## 2019-12-16 NOTE — ASSESSMENT & PLAN NOTE
History of non-ischemic cardiomyopathy diagnosed in 11/2017 at [facility].  [facility] in 12/2017 with no evidence of angiographically significant coronary artery disease.  Most recent ECHO in 10/2019 concerning for EF of 35%, grade II diastolic dysfunction, and global hypokinesis with a restrictive physiology. HOME regimen: Carvedilol, Enalapril, Lasix 20 mg BID, and Spironolactone.   .-Suspect presentation from worsening right sided heart failure.     Plan:   - continue HOME regimen: Carvedilol, Enalapril, and Spironolactone  - IV diuresis 40mg daily urinating well   - Increased metoprolol to 50mg BID per cardiology's recommendations for best optimization of his CHF medications.   -Strict I/O's and daily standing weights.   -Telemetry ordered.   -Cardiology recommends outpatient EP follow up for ICD placement for primary prevention.  They do not recommending placing a life vest or ICD inpatient as patient's cardiac arrest during hospitalization was due to a PE and not a shockable rhythm.  -Trend renal function with diuresis; goal K >4 and Mg >2.

## 2019-12-16 NOTE — SUBJECTIVE & OBJECTIVE
Past Medical History:   Diagnosis Date    Anxiety     Arthritis     CHF (congestive heart failure)     Coronary artery disease     Depression     Hypertension        History reviewed. No pertinent surgical history.    Review of patient's allergies indicates:  No Known Allergies    No current facility-administered medications on file prior to encounter.      Current Outpatient Medications on File Prior to Encounter   Medication Sig    acetaminophen (TYLENOL) 325 MG tablet Take 325 mg by mouth every 6 (six) hours as needed for Pain.    carvedilol (COREG) 3.125 MG tablet Take 1 tablet (3.125 mg total) by mouth 2 (two) times daily with meals.    enalapril (VASOTEC) 2.5 MG tablet Take 1 tablet (2.5 mg total) by mouth once daily.    furosemide (LASIX) 20 MG tablet Take 1 tablet (20 mg total) by mouth 2 (two) times daily.     Family History     Problem Relation (Age of Onset)    Arthritis Mother    Asthma Sister    Diabetes Sister    Heart disease Mother, Maternal Grandfather    Hyperlipidemia Mother    Hypertension Mother, Father    Kidney disease Mother        Tobacco Use    Smoking status: Former Smoker     Packs/day: 2.00     Years: 20.00     Pack years: 40.00     Types: Cigarettes     Start date: 1999     Last attempt to quit: 2017     Years since quittin.8    Smokeless tobacco: Never Used   Substance and Sexual Activity    Alcohol use: Not Currently    Drug use: Yes     Frequency: 7.0 times per week     Types: Amphetamines    Sexual activity: Not Currently     Partners: Female     Review of Systems   Reason unable to perform ROS: limited ROS, patient did not want to talk about how he was doing.    Constitution: Positive for malaise/fatigue.   Gastrointestinal: Positive for abdominal pain.     Objective:     Vital Signs (Most Recent):  Temp: 97.6 °F (36.4 °C) (19)  Pulse: (!) 115 (19)  Resp: 20 (19)  BP: (!) 139/101 (19)  SpO2: 95 % (19  0737) Vital Signs (24h Range):  Temp:  [97.6 °F (36.4 °C)-98.5 °F (36.9 °C)] 97.6 °F (36.4 °C)  Pulse:  [101-124] 115  Resp:  [15-20] 20  SpO2:  [92 %-95 %] 95 %  BP: (105-139)/() 139/101     Weight: 78.4 kg (172 lb 13.5 oz)  Body mass index is 26.28 kg/m².    SpO2: 95 %  O2 Device (Oxygen Therapy): room air      Intake/Output Summary (Last 24 hours) at 12/16/2019 0923  Last data filed at 12/16/2019 0600  Gross per 24 hour   Intake 1080 ml   Output 2750 ml   Net -1670 ml       Lines/Drains/Airways     Peripheral Intravenous Line                 Peripheral IV - Single Lumen 12/16/19 0015 22 G Left;Posterior Hand less than 1 day                Physical Exam   Constitutional: He is oriented to person, place, and time. He appears well-developed. No distress.   HENT:   Head: Normocephalic and atraumatic.   Mouth/Throat: No oropharyngeal exudate.   Eyes: Pupils are equal, round, and reactive to light. Conjunctivae are normal. Right eye exhibits no discharge. Left eye exhibits no discharge. Scleral icterus is present.   Neck: Normal range of motion. Neck supple. No tracheal deviation present. No thyromegaly present.   Cardiovascular:   Murmur heard.  Tachycardic.    Pulmonary/Chest: He has wheezes. He exhibits no tenderness.   Abdominal: Soft. He exhibits distension. There is tenderness.   Musculoskeletal: Normal range of motion. He exhibits edema (2+ right leg worse than left). He exhibits no tenderness.   Neurological: He is alert and oriented to person, place, and time. No cranial nerve deficit. Coordination normal.   Skin: Skin is warm and dry. No rash noted. He is not diaphoretic. No erythema.       Significant Labs'  Recent Results (from the past 24 hour(s))   CBC auto differential    Collection Time: 12/16/19  6:15 AM   Result Value Ref Range    WBC 18.03 (H) 3.90 - 12.70 K/uL    RBC 4.81 4.60 - 6.20 M/uL    Hemoglobin 12.1 (L) 14.0 - 18.0 g/dL    Hematocrit 39.4 (L) 40.0 - 54.0 %    Mean Corpuscular Volume 82  82 - 98 fL    Mean Corpuscular Hemoglobin 25.2 (L) 27.0 - 31.0 pg    Mean Corpuscular Hemoglobin Conc 30.7 (L) 32.0 - 36.0 g/dL    RDW 24.6 (H) 11.5 - 14.5 %    Platelets 365 (H) 150 - 350 K/uL    MPV 9.0 (L) 9.2 - 12.9 fL    Immature Granulocytes 0.8 (H) 0.0 - 0.5 %    Gran # (ANC) 14.3 (H) 1.8 - 7.7 K/uL    Immature Grans (Abs) 0.14 (H) 0.00 - 0.04 K/uL    Lymph # 2.0 1.0 - 4.8 K/uL    Mono # 1.4 (H) 0.3 - 1.0 K/uL    Eos # 0.1 0.0 - 0.5 K/uL    Baso # 0.03 0.00 - 0.20 K/uL    nRBC 0 0 /100 WBC    Gran% 79.5 (H) 38.0 - 73.0 %    Lymph% 11.3 (L) 18.0 - 48.0 %    Mono% 7.5 4.0 - 15.0 %    Eosinophil% 0.7 0.0 - 8.0 %    Basophil% 0.2 0.0 - 1.9 %    Differential Method Automated    Protime-INR    Collection Time: 12/16/19  6:15 AM   Result Value Ref Range    Prothrombin Time 16.2 (H) 9.0 - 12.5 sec    INR 1.7 (H) 0.8 - 1.2   Comprehensive metabolic panel    Collection Time: 12/16/19  6:15 AM   Result Value Ref Range    Sodium 132 (L) 136 - 145 mmol/L    Potassium 4.2 3.5 - 5.1 mmol/L    Chloride 97 95 - 110 mmol/L    CO2 28 23 - 29 mmol/L    Glucose 86 70 - 110 mg/dL    BUN, Bld 20 6 - 20 mg/dL    Creatinine 0.9 0.5 - 1.4 mg/dL    Calcium 8.0 (L) 8.7 - 10.5 mg/dL    Total Protein 6.4 6.0 - 8.4 g/dL    Albumin 1.6 (L) 3.5 - 5.2 g/dL    Total Bilirubin 3.1 (H) 0.1 - 1.0 mg/dL    Alkaline Phosphatase 87 55 - 135 U/L    AST 45 (H) 10 - 40 U/L    ALT 34 10 - 44 U/L    Anion Gap 7 (L) 8 - 16 mmol/L    eGFR if African American >60.0 >60 mL/min/1.73 m^2    eGFR if non African American >60.0 >60 mL/min/1.73 m^2     Microbiology Results (last 7 days)     Procedure Component Value Units Date/Time    Blood culture [[id number]] Collected:  12/10/19 0740    Order Status:  Completed Specimen:  Blood from Line, Jugular, Internal Left Updated:  12/15/19 1012     Blood Culture, Routine No growth after 5 days.    Blood culture (site 1) [[id number]] Collected:  12/10/19 0435    Order Status:  Completed Specimen:  Blood Updated:  12/15/19  0812     Blood Culture, Routine No growth after 5 days.    Narrative:       Site # 1, aerobic and anaerobic    Blood culture [[id number]] Collected:  12/10/19 0720    Order Status:  Completed Specimen:  Blood from Line, Jugular, Internal Right Updated:  12/15/19 0812     Blood Culture, Routine No growth after 5 days.    Blood culture (site 2) [[id number]] Collected:  12/10/19 0435    Order Status:  Completed Specimen:  Blood Updated:  12/15/19 0812     Blood Culture, Routine No growth after 5 days.    Narrative:       Site # 2, aerobic only    Culture, Respiratory [[id number]]  (Abnormal)  (Susceptibility) Collected:  12/11/19 1441    Order Status:  Completed Specimen:  Respiratory from Bronchial Wash Updated:  12/14/19 0955     Respiratory Culture No S aureus or Pseudomonas isolated.      CANDIDA ALBICANS  Moderate        ENTEROBACTER CLOACAE  Few  susceptibility pending       Gram Stain (Respiratory) Few WBC's     Gram Stain (Respiratory) Few yeast    Narrative:       Bronchial Wash    Fungus culture [[id number]]  (Abnormal) Collected:  12/11/19 1441    Order Status:  Completed Specimen:  Bronchial Alveolar Lavage from Lung, RML Updated:  12/13/19 1412     Fungus (Mycology) Culture CANDIDA ALBICANS  Many      Narrative:       Bronchial Wash    AFB Culture & Smear [[id number]] Collected:  12/11/19 1441    Order Status:  Completed Specimen:  Bronchial Alveolar Lavage from Lung, RML Updated:  12/12/19 2127     AFB Culture & Smear Culture in progress     AFB CULTURE STAIN No acid fast bacilli seen.    Narrative:       Bronchial Wash    Culture, Respiratory with Gram Stain [[id number]]  (Abnormal)  (Susceptibility) Collected:  12/10/19 1130    Order Status:  Completed Specimen:  Respiratory from Endotracheal Aspirate Updated:  12/12/19 1111     Respiratory Culture ENTEROBACTER CLOACAE  Few  Normal respiratory shaye also present       Gram Stain (Respiratory) <10 epithelial cells per low power field.     Gram Stain  (Respiratory) Rare WBC's     Gram Stain (Respiratory) No organisms seen    Direct AFB stain [[id number]] Collected:  12/11/19 1441    Order Status:  Completed Specimen:  Bronchial Alveolar Lavage from Lung, RML Updated:  12/11/19 1840     Direct Acid Fast No acid fast bacilli seen.    Narrative:       Bronchial Wash    JESSENIA prep [[id number]] Collected:  12/11/19 1441    Order Status:  Completed Specimen:  Bronchial Alveolar Lavage from Lung, RML Updated:  12/11/19 1719     KOH Prep Few Budding yeast    Narrative:       Bronchial Wash    Gram stain [[id number]] Collected:  12/11/19 1441    Order Status:  Canceled Specimen:  Bronchial Alveolar Lavage from Lung, RML             Significant Imaging:

## 2019-12-16 NOTE — ASSESSMENT & PLAN NOTE
History of 40PK year. Mild wheeze throughout lung field. Sating well in room air  - continue Ipotroprium Neb Q8H for wheezing

## 2019-12-16 NOTE — HPI
Francis Daigle is a 44-year-old male with PMHx of HTN, CAD, CHF, non-ischemic cardiomyopathy, chronic hepatitis C, anxiety, depression, arthritis, and polysubstance abuse with IVDU.  Patient presented to [facility] for progressively worsening BLE swelling x 2 weeks with worsening SOB and abdominal pain.  Found to be septic with RLL pneumonia, acute on chronic CHF, acute liver injury, and abdominal US concerning for acalculous cholecystitis.  Transferred to [facility] on 12/10/19 for further evaluation and management.  Following admission, patient developed PEA arrest and acute hypoxemic respiratory failure on 12/10/19 requiring intubation and 1 round of CPR.  Found to have acute massive pulmonary embolism s/p tPA and extensive BLE DVTs treated with heparin infusion with transition to Eliquis.  ID consulted and persistent leukocytosis likely related to recent cardiac arrest.  Cardiology consulted for acute on chronic heart failure s/p cardiac arrest and recommended increasing Lopressor and outpatient follow-up for AICD placement/lifevest.      Functional History: Patient lives in [place] with his cousins in a single story home with flight of stairs to enter.  Prior to admission, he was (I) with ADLs and mobility.  DME: none.

## 2019-12-16 NOTE — PLAN OF CARE
Rested comfortably most of night, VSS, NAD. C/o pain to abd and b legs, pain med given. No other change in status

## 2019-12-16 NOTE — CONSULTS
Inpatient consult to Physical Medicine Rehab  Consult performed by: IRIS Soliman  Consult ordered by: David Potts MD  Reason for consult: rehab evaluation       Consult received.  Reviewed patient history and current admission.  Rehab team following.  Full consult to follow.    MARYCHUY Soliman, IRIS-C  Physical Medicine & Rehabilitation   12/16/2019  Spectralink: [id number]

## 2019-12-16 NOTE — PROGRESS NOTES
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 He has a PMH significant for non-ischemic cardiomyopathy with combined CHF (EF 11% without ICD or CRT device) and on-going IVDU. He reports a two week duration of progressively worsening bilateral lower extremity swelling and redness of the anterior shins. This is associated with bilateral lower extremity pain with ambulation. He also reports noticing symptom association with worsening of baseline SOB with exertion as well as new abdominal distension and pain, within the same time frame. He describes abdominal pain as a generalized soreness that occurs approximately one hour after eating meals and spontaneously resolves without intervention or medication. He is unsure of how long the discomfort lasts before resolving. Additionally, he reports a two week duration of occasional episodes of pale semi-formed stools. He attempted symptom relief with increasing dose of daily Lasix, however without effect, prompting presentation to ED on 12/09.     He denies symptom association with fevers, chills, nausea, vomiting, history of prior abdominal surgeries, ETOH consumption, chest pain, pain with inspiration, lightheadedness, or blurry vision. He reports a on-going non-productive cough intermittent cough since CHF diagnosis that is unchanged. He does not weigh himself daily and is unsure of any weight loss or gain. He does follow with cardiology outpatient.     Of note, he also reports continued daily use of  IV methamphetamines with most recent usage on 12/08.     ED course: He initially presented to [place] ED on 12/09. Lab were significant for leukocytosis (25), hyponatremia (126), lactic acidosis (3.5), transaminitis (, ALT 73), hyperbilirubinemia (4.5), and toxicology positive for amphetamines. CXR was suggestive of right lower lobe pneumonia. Abdominal U/S was suggestive of acalculous cholecystitis. He was given IVF, Ceftriaxone, and Lasix. Case was discussed with transfer center and AES here; recommended transfer for possible MRCP.     Overview/Hospital Course:  Francis Daigle 44 y.o male presented from OSH with cholilithiasis and cholecystitis. Patient transfer to [facility] for AES evaluation. When patient got admitted to hospital medicine team the patient was transferred to ICU following cardiac arrest on 12/10/19. Emergently intubated during PEA arrest, ROSC achieved after 1 round of CPR. Central line and arterial line placed. Patient initially requiring pressor support, cyanotic, hypoxic on blood gas. Bedside ultrasound with signs of RV strain with DVT in lower extremity. Patient on pressors at the time, requiring 100% FiO2 to oxygenate. Given TPA emergently. Patient started on heparin ggt, bronchoscopy today revealing relatively normal airways and extubated. Most recent ultrasound did not show liver cirrhosis or cholilthiasis or cholecystitis. Patient with RLL PNA getting treated with Zosyn and later switch to Levo. Patient transitioned to Heparin gtt to 10mg BID eliquis. Cardiology will need to consulted for possible ICD or lifevest up on discharge on Monday.   Spoke with cardiology who states that he will likely need an ICD but he can follow up outpatient with cardiology for its placement.      Interval History: NAEON.  Patient states that he feels the best that he has since admission.   He endorses mild abdominal pain that is substernal.  The pain is tender to palpation but is not associated with any  other symptoms.  The pain does not radiate.  It is not associated with meals.     Review of Systems   Constitutional: Negative for chills and fever.   Respiratory: Negative for cough and shortness of breath.    Gastrointestinal: Positive for abdominal pain. Negative for abdominal distention and constipation.   Genitourinary: Negative for dysuria.     Objective:     Vital Signs (Most Recent):  Temp: 97.6 °F (36.4 °C) (12/16/19 1301)  Pulse: 102 (12/16/19 1301)  Resp: 18 (12/16/19 1301)  BP: 108/69 (12/16/19 1301)  SpO2: 96 % (12/16/19 1301) Vital Signs (24h Range):  Temp:  [97.6 °F (36.4 °C)-98.5 °F (36.9 °C)] 97.6 °F (36.4 °C)  Pulse:  [101-124] 102  Resp:  [18-20] 18  SpO2:  [92 %-96 %] 96 %  BP: (108-139)/() 108/69     Weight: 78.4 kg (172 lb 13.5 oz)  Body mass index is 26.28 kg/m².    Intake/Output Summary (Last 24 hours) at 12/16/2019 1348  Last data filed at 12/16/2019 0600  Gross per 24 hour   Intake 600 ml   Output 1350 ml   Net -750 ml      Physical Exam   Constitutional: He is oriented to person, place, and time.   Patient is a 43 yo M who appears alert, anxious in NAD.    HENT:   Head: Normocephalic and atraumatic.   Eyes: Pupils are equal, round, and reactive to light. EOM are normal.   Neck: Normal range of motion.   Cardiovascular: Normal rate, regular rhythm and normal heart sounds.   Pulmonary/Chest: Effort normal and breath sounds normal. No respiratory distress.   Abdominal: Bowel sounds are normal. He exhibits no distension.   Tender to palpation substernally.    Musculoskeletal: Normal range of motion.   Bandages covering venous stasis wounds in BL LE.    Neurological: He is alert and oriented to person, place, and time.   Skin: Skin is warm and dry.   Psychiatric: He has a normal mood and affect.     Significant Labs: All pertinent labs within the past 24 hours have been reviewed.     Recent Labs   Lab 12/14/19  0621 12/15/19  0608 12/16/19  0615   WBC 20.09* 21.68* 18.03*   HGB 12.4*  12.8* 12.1*   HCT 38.5* 42.2 39.4*    367* 365*   MCV 81* 83 82   RDW 24.2* 24.7* 24.6*   * 134* 132*   K 4.0 3.5 4.2   CL 99 98 97   CO2 26 26 28   BUN 29* 21* 20   CREATININE 1.2 0.9 0.9   GLU 71 71 86   PROT 6.8 7.0 6.4   ALBUMIN 1.6* 1.6* 1.6*   BILITOT 4.1* 3.8* 3.1*   AST 62* 57* 45*   ALKPHOS 102 100 87   ALT 44 42 34       Significant Imaging: I have reviewed all pertinent imaging results/findings within the past 24 hours.         Assessment/Plan:      * Sepsis due to undetermined organism  This is a 44 year old male with PMH significant for non-ischemic cardiomyopathy (EF 11%), IVDU, and Hepatitis C who is presenting on 12/10 with two week duration of worsening lower extremity swelling and erythema associated with same duration of post-prandial abdominal pain and distension with intermittent pale colored stools with work-up thus far concerning for tachycardia (120's), leukocytosis (25), lactic acidosis, transaminitis, hyperbilirubinemia, CXR suggestive of right lower lobe pneumonia, and abdominal U/S suggestive of possible acalculous cholecystitis. He has no symptoms of underlying respiratory infection and on chart review CXR abnormalities are similar to those in 08/2019 during admission for CHF exacerbation. UA is unremarkable. Negative for influenza. Suspect bilateral lower extremity edema and erythema more a manifestation of CHF instead of bilateral cellulitis. Differential diagnoses include biliary etiology vs bacteremia in the setting of on-going IVDU vs possible CAP.     Plan:   -Initiated antibiotic coverage for suspected biliary etiology and CAP with Azithromycin, Ceftriaxone, and Flagyl then transition to Levo PO for PNA then to Cefepime per ID recommendation.   -Blood cultures NGTD  -Respiratory culture growing GNR and Candida with persistently elevated WBC. Candida in respiratory does not need to get treated.   -Trend WBC and liver function daily. Trending down and continue to  monitor.   -Judiciously administer an IVF resuscitation necessary.     Wheezing  History of 40PK year. Mild wheeze throughout lung field. Sating well in room air  - continue Ipotroprium Neb Q8H for wheezing       Acute massive pulmonary embolism  DVT lower extremity probable origin of PE. Patient was started on heparin gtt    Plan  Transition to DVT dosing of 10mg BID eliquis   - right upper extremity arm US showing edema and no clots.     Acute hypoxemic respiratory failure        Acute deep vein thrombosis (DVT) of proximal vein of lower extremity  - See acute massive pulm embolism       Cardiac arrest  PEA arrest, 2 round of EPI and CPR achieved ROSC.     Plan:  -obtained CXR on 12/16 as patient c/o mid sternal pain that is tender to palpation.  Likely could be 2/2 to chest compression.  Will also obtain a lipase level to ensure he has developed pancreatitis.      Hyponatremia  -See assessment for CHF exacerbation.       Chronic hepatitis C without hepatic coma  - F/U Pending Hep C Viral load   - Will need to set up with hepatology for treatment as outpatient       Non-ischemic cardiomyopathy  -See assessment for sepsis.       Hyperbilirubinemia  -See assessment for sepsis.       Transaminitis  -See assessment for sepsis.   MELD-Na score: 21 at 12/16/2019  6:15 AM  MELD score: 17 at 12/16/2019  6:15 AM  Calculated from:  Serum Creatinine: 0.9 mg/dL (Rounded to 1 mg/dL) at 12/16/2019  6:15 AM  Serum Sodium: 132 mmol/L at 12/16/2019  6:15 AM  Total Bilirubin: 3.1 mg/dL at 12/16/2019  6:15 AM  INR(ratio): 1.7 at 12/16/2019  6:15 AM  Age: 44 years        Intravenous drug abuse  -On-going use of methamphetamines.   -Prior hepatitis screening positive for HepC in 10/2019.     Plan:   -Counseled on importance of cessation.   -Screening for HIV ordered.   -Monitor for signs of withdrawal for other substances.       Community acquired pneumonia of right lower lobe of lung  -See assessment for sepsis.       Acute on  chronic combined systolic and diastolic heart failure  History of non-ischemic cardiomyopathy diagnosed in 11/2017 at [facility].  [facility] in 12/2017 with no evidence of angiographically significant coronary artery disease.  Most recent ECHO in 10/2019 concerning for EF of 35%, grade II diastolic dysfunction, and global hypokinesis with a restrictive physiology. HOME regimen: Carvedilol, Enalapril, Lasix 20 mg BID, and Spironolactone.   .-Suspect presentation from worsening right sided heart failure.     Plan:   - continue HOME regimen: Carvedilol, Enalapril, and Spironolactone  - IV diuresis 40mg daily urinating well   - Increased metoprolol to 50mg BID per cardiology's recommendations for best optimization of his CHF medications.   -Strict I/O's and daily standing weights.   -Telemetry ordered.   -Cardiology recommends outpatient EP follow up for ICD placement for primary prevention.  They do not recommending placing a life vest or ICD inpatient as patient's cardiac arrest during hospitalization was due to a PE and not a shockable rhythm.  -Trend renal function with diuresis; goal K >4 and Mg >2.         Other specified anxiety disorders  -Continue home Ativan PRN.       Essential hypertension  -See assessment for CHF exacerbation.         VTE Risk Mitigation (From admission, onward)         Ordered     apixaban tablet 5 mg  2 times daily      12/14/19 1334     apixaban tablet 10 mg  2 times daily      12/14/19 1334     Reason for No Pharmacological VTE Prophylaxis  Once     Question:  Reasons:  Answer:  Physician Provided (leave comment)  Comment:  TPA    12/10/19 0922     IP VTE HIGH RISK PATIENT  Once      12/10/19 0922                      Bradly Guzman MD  Department of Hospital Medicine   [facility]

## 2019-12-16 NOTE — SUBJECTIVE & OBJECTIVE
Past Medical History:   Diagnosis Date    Anxiety     Arthritis     CHF (congestive heart failure)     Coronary artery disease     Depression     Hypertension      History reviewed. No pertinent surgical history.  Review of patient's allergies indicates:  No Known Allergies    Scheduled Medications:    apixaban  10 mg Oral BID    Followed by    [START ON 2019] apixaban  5 mg Oral BID    ceFEPime (MAXIPIME) IVPB  2 g Intravenous Q12H    enalapril  2.5 mg Oral Daily    furosemide  40 mg Intravenous Daily    ipratropium  0.5 mg Nebulization Q6H    metoprolol tartrate  25 mg Oral BID    mirtazapine  15 mg Oral Nightly    mupirocin   Topical (Top) Daily    pantoprazole  40 mg Oral Daily    spironolactone  25 mg Oral Daily       PRN Medications: albuterol-ipratropium, ibuprofen, melatonin, ondansetron, sodium chloride 0.9%    Family History     Problem Relation (Age of Onset)    Arthritis Mother    Asthma Sister    Diabetes Sister    Heart disease Mother, Maternal Grandfather    Hyperlipidemia Mother    Hypertension Mother, Father    Kidney disease Mother        Tobacco Use    Smoking status: Former Smoker     Packs/day: 2.00     Years: 20.00     Pack years: 40.00     Types: Cigarettes     Start date: 1999     Last attempt to quit: 2017     Years since quittin.8    Smokeless tobacco: Never Used   Substance and Sexual Activity    Alcohol use: Not Currently    Drug use: Yes     Frequency: 7.0 times per week     Types: Amphetamines    Sexual activity: Not Currently     Partners: Female     Review of Systems   Constitutional: Positive for activity change and fatigue. Negative for chills.   HENT: Positive for dental problem. Negative for trouble swallowing and voice change.    Eyes: Negative for pain and visual disturbance.   Respiratory: Negative for cough and shortness of breath.    Cardiovascular: Positive for leg swelling. Negative for chest pain and palpitations.    Gastrointestinal: Positive for abdominal pain. Negative for nausea and vomiting.   Genitourinary: Negative for difficulty urinating and flank pain.   Musculoskeletal: Positive for gait problem. Negative for arthralgias and myalgias.   Skin: Positive for color change and wound. Negative for rash.   Neurological: Positive for weakness. Negative for numbness and headaches.   Psychiatric/Behavioral: Negative for agitation. The patient is not nervous/anxious.      Objective:     Vital Signs (Most Recent):  Temp: 97.6 °F (36.4 °C) (12/16/19 0737)  Pulse: 103 (12/16/19 1123)  Resp: 20 (12/16/19 0737)  BP: 120/75 (12/16/19 1013)  SpO2: 96 % (12/16/19 1013)    Vital Signs (24h Range):  Temp:  [97.6 °F (36.4 °C)-98.5 °F (36.9 °C)] 97.6 °F (36.4 °C)  Pulse:  [101-124] 103  Resp:  [18-20] 20  SpO2:  [92 %-96 %] 96 %  BP: (109-139)/() 120/75     Body mass index is 26.28 kg/m².    Physical Exam   Constitutional: He is oriented to person, place, and time. He appears ill. No distress.   Appears older than stated age   HENT:   Head: Normocephalic and atraumatic.   Right Ear: External ear normal.   Left Ear: External ear normal.   Nose: Nose normal.   Mouth/Throat: Abnormal dentition.   Eyes: Right eye exhibits no discharge. Left eye exhibits no discharge. Scleral icterus is present.   Neck: Normal range of motion.   Cardiovascular: Intact distal pulses. Tachycardia present.   Pulmonary/Chest: Effort normal. No respiratory distress.   Abdominal: He exhibits distension. There is no tenderness.   Musculoskeletal: He exhibits edema and tenderness.   Neurological: He is alert and oriented to person, place, and time. No sensory deficit. He exhibits normal muscle tone.   Skin: Skin is warm and dry. Bruising noted.   BLE discoloration and edema with dressings CDI   Psychiatric: His behavior is normal. He exhibits a depressed mood.   Vitals reviewed.    NEUROLOGICAL EXAMINATION:     MENTAL STATUS   Oriented to person, place, and  time.       Diagnostic Results:   Labs: Reviewed  X-Ray: Reviewed  CT: Reviewed

## 2019-12-16 NOTE — PLAN OF CARE
Problem: Physical Therapy Goal  Goal: Physical Therapy Goal  Description  Goals to be met by: 19     Patient will increase functional independence with mobility by performin. Sit to stand transfer with Modified [place]  2. Bed to chair transfer with Modified [place] using Rolling Walker or LRAD  3. Gait  x 250 feet with Stand-by Assistance using Rolling Walker or LRAD  4. Ascend/descend 9 stair with right Handrails Stand-by Assistance using no AD.   5. Lower extremity exercise program x15 reps per handout, with independence     Outcome: Ongoing, Progressing     Marnie Mcbride, PT, DPT  2019

## 2019-12-17 PROBLEM — E80.6 HYPERBILIRUBINEMIA: Status: RESOLVED | Noted: 2019-12-10 | Resolved: 2019-12-17

## 2019-12-17 PROBLEM — F41.9 ANXIETY: Status: ACTIVE | Noted: 2019-12-17

## 2019-12-17 LAB
ALBUMIN SERPL BCP-MCNC: 1.6 G/DL (ref 3.5–5.2)
ALP SERPL-CCNC: 84 U/L (ref 55–135)
ALT SERPL W/O P-5'-P-CCNC: 31 U/L (ref 10–44)
ANION GAP SERPL CALC-SCNC: 8 MMOL/L (ref 8–16)
ANISOCYTOSIS BLD QL SMEAR: SLIGHT
AST SERPL-CCNC: 43 U/L (ref 10–40)
BASO STIPL BLD QL SMEAR: ABNORMAL
BASOPHILS # BLD AUTO: 0.03 K/UL (ref 0–0.2)
BASOPHILS NFR BLD: 0.2 % (ref 0–1.9)
BILIRUB SERPL-MCNC: 2.6 MG/DL (ref 0.1–1)
BUN SERPL-MCNC: 19 MG/DL (ref 6–20)
BURR CELLS BLD QL SMEAR: ABNORMAL
CALCIUM SERPL-MCNC: 8.2 MG/DL (ref 8.7–10.5)
CHLORIDE SERPL-SCNC: 96 MMOL/L (ref 95–110)
CO2 SERPL-SCNC: 27 MMOL/L (ref 23–29)
CREAT SERPL-MCNC: 0.9 MG/DL (ref 0.5–1.4)
DIFFERENTIAL METHOD: ABNORMAL
EOSINOPHIL # BLD AUTO: 0.1 K/UL (ref 0–0.5)
EOSINOPHIL NFR BLD: 0.7 % (ref 0–8)
ERYTHROCYTE [DISTWIDTH] IN BLOOD BY AUTOMATED COUNT: 24.7 % (ref 11.5–14.5)
EST. GFR  (AFRICAN AMERICAN): >60 ML/MIN/1.73 M^2
EST. GFR  (NON AFRICAN AMERICAN): >60 ML/MIN/1.73 M^2
GIANT PLATELETS BLD QL SMEAR: PRESENT
GLUCOSE SERPL-MCNC: 85 MG/DL (ref 70–110)
HCT VFR BLD AUTO: 39.9 % (ref 40–54)
HGB BLD-MCNC: 12.2 G/DL (ref 14–18)
HYPOCHROMIA BLD QL SMEAR: ABNORMAL
IMM GRANULOCYTES # BLD AUTO: 0.18 K/UL (ref 0–0.04)
IMM GRANULOCYTES NFR BLD AUTO: 0.9 % (ref 0–0.5)
INR PPP: 1.7 (ref 0.8–1.2)
LYMPHOCYTES # BLD AUTO: 1.9 K/UL (ref 1–4.8)
LYMPHOCYTES NFR BLD: 9.8 % (ref 18–48)
MCH RBC QN AUTO: 25.4 PG (ref 27–31)
MCHC RBC AUTO-ENTMCNC: 30.6 G/DL (ref 32–36)
MCV RBC AUTO: 83 FL (ref 82–98)
MONOCYTES # BLD AUTO: 1.3 K/UL (ref 0.3–1)
MONOCYTES NFR BLD: 6.8 % (ref 4–15)
NEUTROPHILS # BLD AUTO: 15.8 K/UL (ref 1.8–7.7)
NEUTROPHILS NFR BLD: 81.6 % (ref 38–73)
NRBC BLD-RTO: 0 /100 WBC
OVALOCYTES BLD QL SMEAR: ABNORMAL
PLATELET # BLD AUTO: 354 K/UL (ref 150–350)
PLATELET BLD QL SMEAR: ABNORMAL
PMV BLD AUTO: 9.2 FL (ref 9.2–12.9)
POIKILOCYTOSIS BLD QL SMEAR: SLIGHT
POLYCHROMASIA BLD QL SMEAR: ABNORMAL
POTASSIUM SERPL-SCNC: 4.3 MMOL/L (ref 3.5–5.1)
PROT SERPL-MCNC: 6.4 G/DL (ref 6–8.4)
PROTHROMBIN TIME: 16.2 SEC (ref 9–12.5)
RBC # BLD AUTO: 4.8 M/UL (ref 4.6–6.2)
SCHISTOCYTES BLD QL SMEAR: ABNORMAL
SODIUM SERPL-SCNC: 131 MMOL/L (ref 136–145)
SPHEROCYTES BLD QL SMEAR: ABNORMAL
TARGETS BLD QL SMEAR: ABNORMAL
TOXIC GRANULES BLD QL SMEAR: PRESENT
WBC # BLD AUTO: 19.33 K/UL (ref 3.9–12.7)

## 2019-12-17 PROCEDURE — 97110 THERAPEUTIC EXERCISES: CPT

## 2019-12-17 PROCEDURE — 99232 SBSQ HOSP IP/OBS MODERATE 35: CPT | Mod: ,,, | Performed by: NURSE PRACTITIONER

## 2019-12-17 PROCEDURE — 36415 COLL VENOUS BLD VENIPUNCTURE: CPT

## 2019-12-17 PROCEDURE — 94761 N-INVAS EAR/PLS OXIMETRY MLT: CPT

## 2019-12-17 PROCEDURE — 25000003 PHARM REV CODE 250: Performed by: STUDENT IN AN ORGANIZED HEALTH CARE EDUCATION/TRAINING PROGRAM

## 2019-12-17 PROCEDURE — 99232 SBSQ HOSP IP/OBS MODERATE 35: CPT | Mod: ,,, | Performed by: HOSPITALIST

## 2019-12-17 PROCEDURE — 85610 PROTHROMBIN TIME: CPT

## 2019-12-17 PROCEDURE — 63600175 PHARM REV CODE 636 W HCPCS: Performed by: STUDENT IN AN ORGANIZED HEALTH CARE EDUCATION/TRAINING PROGRAM

## 2019-12-17 PROCEDURE — 80053 COMPREHEN METABOLIC PANEL: CPT

## 2019-12-17 PROCEDURE — 97535 SELF CARE MNGMENT TRAINING: CPT

## 2019-12-17 PROCEDURE — 20600001 HC STEP DOWN PRIVATE ROOM

## 2019-12-17 PROCEDURE — 85025 COMPLETE CBC W/AUTO DIFF WBC: CPT

## 2019-12-17 PROCEDURE — 99232 PR SUBSEQUENT HOSPITAL CARE,LEVL II: ICD-10-PCS | Mod: ,,, | Performed by: HOSPITALIST

## 2019-12-17 PROCEDURE — 94640 AIRWAY INHALATION TREATMENT: CPT

## 2019-12-17 PROCEDURE — 25000242 PHARM REV CODE 250 ALT 637 W/ HCPCS: Performed by: STUDENT IN AN ORGANIZED HEALTH CARE EDUCATION/TRAINING PROGRAM

## 2019-12-17 PROCEDURE — 99232 PR SUBSEQUENT HOSPITAL CARE,LEVL II: ICD-10-PCS | Mod: ,,, | Performed by: NURSE PRACTITIONER

## 2019-12-17 RX ORDER — CITALOPRAM 10 MG/1
10 TABLET ORAL DAILY
Status: DISCONTINUED | OUTPATIENT
Start: 2019-12-17 | End: 2019-12-20

## 2019-12-17 RX ORDER — HYDROCODONE BITARTRATE AND ACETAMINOPHEN 5; 325 MG/1; MG/1
1 TABLET ORAL ONCE
Status: COMPLETED | OUTPATIENT
Start: 2019-12-17 | End: 2019-12-17

## 2019-12-17 RX ORDER — FUROSEMIDE 10 MG/ML
40 INJECTION INTRAMUSCULAR; INTRAVENOUS ONCE
Status: COMPLETED | OUTPATIENT
Start: 2019-12-17 | End: 2019-12-17

## 2019-12-17 RX ORDER — POLYETHYLENE GLYCOL 3350 17 G/17G
17 POWDER, FOR SOLUTION ORAL 2 TIMES DAILY
Status: DISCONTINUED | OUTPATIENT
Start: 2019-12-17 | End: 2019-12-28 | Stop reason: HOSPADM

## 2019-12-17 RX ORDER — CEFEPIME HYDROCHLORIDE 2 G/50ML
2 INJECTION, SOLUTION INTRAVENOUS
Status: DISCONTINUED | OUTPATIENT
Start: 2019-12-18 | End: 2019-12-17

## 2019-12-17 RX ORDER — FUROSEMIDE 40 MG/1
40 TABLET ORAL DAILY
Qty: 30 TABLET | Refills: 0 | Status: SHIPPED | OUTPATIENT
Start: 2019-12-17 | End: 2019-12-18 | Stop reason: HOSPADM

## 2019-12-17 RX ORDER — HYDROXYZINE HYDROCHLORIDE 25 MG/1
25 TABLET, FILM COATED ORAL ONCE
Status: DISCONTINUED | OUTPATIENT
Start: 2019-12-17 | End: 2019-12-18

## 2019-12-17 RX ORDER — ONDANSETRON 2 MG/ML
4 INJECTION INTRAMUSCULAR; INTRAVENOUS ONCE
Status: COMPLETED | OUTPATIENT
Start: 2019-12-17 | End: 2019-12-17

## 2019-12-17 RX ORDER — HYDROXYZINE HYDROCHLORIDE 25 MG/1
25 TABLET, FILM COATED ORAL EVERY MORNING
Status: DISCONTINUED | OUTPATIENT
Start: 2019-12-17 | End: 2019-12-28 | Stop reason: HOSPADM

## 2019-12-17 RX ORDER — SPIRONOLACTONE 25 MG/1
25 TABLET ORAL DAILY
Qty: 30 TABLET | Refills: 0 | Status: SHIPPED | OUTPATIENT
Start: 2019-12-18 | End: 2020-01-27 | Stop reason: CLARIF

## 2019-12-17 RX ORDER — METOPROLOL SUCCINATE 50 MG/1
50 TABLET, EXTENDED RELEASE ORAL DAILY
Qty: 30 TABLET | Refills: 0 | Status: SHIPPED | OUTPATIENT
Start: 2019-12-17 | End: 2019-12-18 | Stop reason: HOSPADM

## 2019-12-17 RX ORDER — CITALOPRAM 10 MG/1
10 TABLET ORAL DAILY
Qty: 30 TABLET | Refills: 0 | Status: SHIPPED | OUTPATIENT
Start: 2019-12-18 | End: 2019-12-27

## 2019-12-17 RX ORDER — HYDROXYZINE HYDROCHLORIDE 25 MG/1
25 TABLET, FILM COATED ORAL DAILY
Qty: 30 TABLET | Refills: 0 | Status: ON HOLD | OUTPATIENT
Start: 2019-12-18 | End: 2020-02-14

## 2019-12-17 RX ORDER — PROMETHAZINE HYDROCHLORIDE 25 MG/1
25 TABLET ORAL EVERY 6 HOURS PRN
Status: DISCONTINUED | OUTPATIENT
Start: 2019-12-17 | End: 2019-12-28 | Stop reason: HOSPADM

## 2019-12-17 RX ORDER — LEVOFLOXACIN 750 MG/1
750 TABLET ORAL DAILY
Status: DISCONTINUED | OUTPATIENT
Start: 2019-12-18 | End: 2019-12-18

## 2019-12-17 RX ORDER — ONDANSETRON 8 MG/1
8 TABLET, ORALLY DISINTEGRATING ORAL EVERY 8 HOURS PRN
Status: DISCONTINUED | OUTPATIENT
Start: 2019-12-17 | End: 2019-12-21

## 2019-12-17 RX ADMIN — POLYETHYLENE GLYCOL 3350 17 G: 17 POWDER, FOR SOLUTION ORAL at 10:12

## 2019-12-17 RX ADMIN — CEFEPIME 2 G: 2 INJECTION, POWDER, FOR SOLUTION INTRAVENOUS at 01:12

## 2019-12-17 RX ADMIN — SPIRONOLACTONE 25 MG: 25 TABLET ORAL at 09:12

## 2019-12-17 RX ADMIN — FUROSEMIDE 40 MG: 10 INJECTION, SOLUTION INTRAMUSCULAR; INTRAVENOUS at 09:12

## 2019-12-17 RX ADMIN — ONDANSETRON 8 MG: 8 TABLET, ORALLY DISINTEGRATING ORAL at 01:12

## 2019-12-17 RX ADMIN — MUPIROCIN: 20 OINTMENT TOPICAL at 09:12

## 2019-12-17 RX ADMIN — IPRATROPIUM BROMIDE 0.5 MG: 0.5 SOLUTION RESPIRATORY (INHALATION) at 06:12

## 2019-12-17 RX ADMIN — ONDANSETRON 8 MG: 8 TABLET, ORALLY DISINTEGRATING ORAL at 12:12

## 2019-12-17 RX ADMIN — FUROSEMIDE 40 MG: 10 INJECTION, SOLUTION INTRAMUSCULAR; INTRAVENOUS at 12:12

## 2019-12-17 RX ADMIN — CEFEPIME 2 G: 2 INJECTION, POWDER, FOR SOLUTION INTRAVENOUS at 12:12

## 2019-12-17 RX ADMIN — APIXABAN 10 MG: 5 TABLET, FILM COATED ORAL at 09:12

## 2019-12-17 RX ADMIN — HYDROXYZINE HYDROCHLORIDE 25 MG: 25 TABLET, FILM COATED ORAL at 12:12

## 2019-12-17 RX ADMIN — APIXABAN 10 MG: 5 TABLET, FILM COATED ORAL at 10:12

## 2019-12-17 RX ADMIN — PANTOPRAZOLE SODIUM 40 MG: 40 TABLET, DELAYED RELEASE ORAL at 09:12

## 2019-12-17 RX ADMIN — IPRATROPIUM BROMIDE 0.5 MG: 0.5 SOLUTION RESPIRATORY (INHALATION) at 12:12

## 2019-12-17 RX ADMIN — METOPROLOL TARTRATE 50 MG: 25 TABLET ORAL at 10:12

## 2019-12-17 RX ADMIN — CITALOPRAM HYDROBROMIDE 10 MG: 10 TABLET ORAL at 12:12

## 2019-12-17 RX ADMIN — MIRTAZAPINE 15 MG: 15 TABLET, ORALLY DISINTEGRATING ORAL at 10:12

## 2019-12-17 RX ADMIN — METOPROLOL TARTRATE 50 MG: 25 TABLET ORAL at 09:12

## 2019-12-17 RX ADMIN — ENALAPRIL MALEATE 2.5 MG: 2.5 TABLET ORAL at 09:12

## 2019-12-17 RX ADMIN — ONDANSETRON 4 MG: 2 INJECTION INTRAMUSCULAR; INTRAVENOUS at 02:12

## 2019-12-17 NOTE — PLAN OF CARE
[facility]    HOME HEALTH ORDERS  FACE TO FACE ENCOUNTER    Patient Name: Francis Daigle  YOB: 1975    PCP: Primary Doctor No   PCP Address: None  PCP Phone Number: None  PCP Fax: None    Encounter Date: 12/17/2019    Admit to Home Health    Diagnoses:  Active Hospital Problems    Diagnosis  POA    *Sepsis due to undetermined organism [A41.9]  Yes    Anxiety [F41.9]  Unknown    Impaired mobility and ADLs [Z74.09]  No    Wheezing [R06.2]  Unknown    Acute massive pulmonary embolism [I26.99]  Unknown    Acute on chronic combined systolic and diastolic heart failure [I50.43]  Yes    Community acquired pneumonia of right lower lobe of lung [J18.1]  Yes    Intravenous drug abuse [F19.10]  Yes    Lactic acidosis [E87.2]  Yes    Transaminitis [R74.0]  Yes    Non-ischemic cardiomyopathy [I42.8]  Yes    Chronic hepatitis C without hepatic coma [B18.2]  Yes    Hyponatremia [E87.1]  Yes    Alteration in skin integrity [R23.9]  Yes    Cardiac arrest [I46.9]  No    Acute deep vein thrombosis (DVT) of proximal vein of lower extremity [I82.4Y9]  Yes    Acute hypoxemic respiratory failure [J96.01]  Yes    Essential hypertension [I10]  Yes    Other specified anxiety disorders [F41.8]  No      Resolved Hospital Problems    Diagnosis Date Resolved POA    Hemoptysis [R04.2] 12/17/2019 No    Hyperbilirubinemia [E80.6] 12/17/2019 Yes       No future appointments.        I have seen and examined this patient face to face today. My clinical findings that support the need for the home health skilled services and home bound status are the following:  Weakness/numbness causing balance and gait disturbance due to Coronary Heart Disease and Weakness/Debility making it taxing to leave home.    Allergies:Review of patient's allergies indicates:  No Known Allergies    Diet: cardiac diet    Activities: activity as tolerated    Nursing:   SN to complete comprehensive assessment including  routine vital signs. Instruct on disease process and s/s of complications to report to MD. Review/verify medication list sent home with the patient at time of discharge  and instruct patient/caregiver as needed. Frequency may be adjusted depending on start of care date.    Notify MD if SBP > 160 or < 90; DBP > 90 or < 50; HR > 120 or < 50; Temp > 101; Other      CONSULTS:    Physical Therapy to evaluate and treat. Evaluate for home safety and equipment needs; Establish/upgrade home exercise program. Perform / instruct on therapeutic exercises, gait training, transfer training, and Range of Motion.  Occupational Therapy to evaluate and treat. Evaluate home environment for safety and equipment needs. Perform/Instruct on transfers, ADL training, ROM, and therapeutic exercises.  Aide to provide assistance with personal care, ADLs, and vital signs.      Medications: Review discharge medications with patient and family and provide education.      Current Discharge Medication List      START taking these medications    Details   apixaban (ELIQUIS) 5 mg Tab Take 2 tablets (10 mg total) by mouth 2 (two) times daily.  Qty: 60 tablet, Refills: 0      citalopram (CELEXA) 10 MG tablet Take 1 tablet (10 mg total) by mouth once daily.  Qty: 30 tablet, Refills: 0      hydrOXYzine HCl (ATARAX) 25 MG tablet Take 1 tablet (25 mg total) by mouth once daily.  Qty: 30 tablet, Refills: 0      metoprolol succinate (TOPROL-XL) 50 MG 24 hr tablet Take 1 tablet (50 mg total) by mouth once daily.  Qty: 30 tablet, Refills: 0      spironolactone (ALDACTONE) 25 MG tablet Take 1 tablet (25 mg total) by mouth once daily.  Qty: 30 tablet, Refills: 0         CONTINUE these medications which have CHANGED    Details   furosemide (LASIX) 40 MG tablet Take 1 tablet (40 mg total) by mouth once daily.  Qty: 30 tablet, Refills: 0         CONTINUE these medications which have NOT CHANGED    Details   acetaminophen (TYLENOL) 325 MG tablet Take 325 mg by mouth  every 6 (six) hours as needed for Pain.      enalapril (VASOTEC) 2.5 MG tablet Take 1 tablet (2.5 mg total) by mouth once daily.  Qty: 30 tablet, Refills: 11         STOP taking these medications       carvedilol (COREG) 3.125 MG tablet Comments:   Reason for Stopping:               I certify that this patient is confined to his home and needs intermittent skilled nursing care, physical therapy and occupational therapy.

## 2019-12-17 NOTE — PLAN OF CARE
Problem: Occupational Therapy Goal  Goal: Occupational Therapy Goal  Description  Goals to be met by: 12/30     Patient will increase functional independence with ADLs by performing:    UE Dressing with [place].  LE Dressing with [place].  Grooming while seated with [place].  Toileting from toilet with Contact Guard Assistance for hygiene and clothing management.      Outcome: Ongoing, Progressing   Continue with POC.   Yessica Santana OT  12/17/2019

## 2019-12-17 NOTE — PROGRESS NOTES
Wound care follow for BLEs.    Wounds to the BLEs improving. Less edema noted. Skin peeling. Little weeping to the posterior left lower leg. Recommend switching to [place] Cream daily to the legs and feet. May use kerlex and non adherents if weeping.    Wound care to follow PRN.  Anupama Carrillo RN [facility]   [phone]

## 2019-12-17 NOTE — PLAN OF CARE
[facility]  FACILITY TRANSFER ORDERS      Patient Name: Francis Daigle  YOB: 1975    PCP: Primary Doctor No   PCP Address: None  PCP Phone Number: None  PCP Fax: None    Encounter Date: 12/18/2019    Admit to: [facility]     Vital Signs:  Routine    Diagnoses:   Active Hospital Problems    Diagnosis  POA    *Sepsis due to undetermined organism [A41.9]  Yes    Abdominal pain [R10.9]  Unknown    Anxiety [F41.9]  Unknown    Impaired mobility and ADLs [Z74.09]  No    Wheezing [R06.2]  Unknown    Acute massive pulmonary embolism [I26.99]  Unknown    Acute on chronic combined systolic and diastolic heart failure [I50.43]  Yes    Community acquired pneumonia of right lower lobe of lung [J18.1]  Yes    Intravenous drug abuse [F19.10]  Yes    Lactic acidosis [E87.2]  Yes    Transaminitis [R74.0]  Yes    Non-ischemic cardiomyopathy [I42.8]  Yes    Chronic hepatitis C without hepatic coma [B18.2]  Yes    Hyponatremia [E87.1]  Yes    Alteration in skin integrity [R23.9]  Yes    Cardiac arrest [I46.9]  No    Acute deep vein thrombosis (DVT) of proximal vein of lower extremity [I82.4Y9]  Yes    Acute hypoxemic respiratory failure [J96.01]  Yes    Essential hypertension [I10]  Yes    Other specified anxiety disorders [F41.8]  No      Resolved Hospital Problems    Diagnosis Date Resolved POA    Hemoptysis [R04.2] 12/17/2019 No    Hyperbilirubinemia [E80.6] 12/17/2019 Yes       Allergies:Review of patient's allergies indicates:  No Known Allergies    Diet: cardiac diet    Activities: Activity as tolerated    Nursing: routine care     CONSULTS:    Physical Therapy to evaluate and treat. , Occupational Therapy to evaluate and treat. and  to evaluate for community resources/long-range planning.    MISCELLANEOUS CARE:  none    WOUND CARE ORDERS  None    Medications: Review discharge medications with patient and family and provide education.       Current Discharge Medication List      START taking these medications    Details   !! apixaban (ELIQUIS) 5 mg Tab Take 2 tablets (10 mg total) by mouth 2 (two) times daily. for 4 days  Qty: 16 tablet, Refills: 0      !! apixaban (ELIQUIS) 5 mg Tab Take 1 tablet (5 mg total) by mouth 2 (two) times daily. Start taking on 12/21/19  Qty: 60 tablet, Refills: 3      citalopram (CELEXA) 10 MG tablet Take 1 tablet (10 mg total) by mouth once daily.  Qty: 30 tablet, Refills: 0      hydrOXYzine HCl (ATARAX) 25 MG tablet Take 1 tablet (25 mg total) by mouth once daily.  Qty: 30 tablet, Refills: 0      metoprolol succinate (TOPROL-XL) 100 MG 24 hr tablet Take 1 tablet (100 mg total) by mouth once daily.  Qty: 30 tablet, Refills: 11      spironolactone (ALDACTONE) 25 MG tablet Take 1 tablet (25 mg total) by mouth once daily.  Qty: 30 tablet, Refills: 0       !! - Potential duplicate medications found. Please discuss with provider.      CONTINUE these medications which have CHANGED    Details   furosemide (LASIX) 40 MG tablet Take 1 tablet (40 mg total) by mouth 2 (two) times daily.  Qty: 60 tablet, Refills: 11         CONTINUE these medications which have NOT CHANGED    Details   acetaminophen (TYLENOL) 325 MG tablet Take 325 mg by mouth every 6 (six) hours as needed for Pain.      enalapril (VASOTEC) 2.5 MG tablet Take 1 tablet (2.5 mg total) by mouth once daily.  Qty: 30 tablet, Refills: 11         STOP taking these medications       carvedilol (COREG) 3.125 MG tablet Comments:   Reason for Stopping:                    _________________________________  Bradly Guzman MD  12/18/2019

## 2019-12-17 NOTE — PROGRESS NOTES
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 He has a PMH significant for non-ischemic cardiomyopathy with combined CHF (EF 11% without ICD or CRT device) and on-going IVDU. He reports a two week duration of progressively worsening bilateral lower extremity swelling and redness of the anterior shins. This is associated with bilateral lower extremity pain with ambulation. He also reports noticing symptom association with worsening of baseline SOB with exertion as well as new abdominal distension and pain, within the same time frame. He describes abdominal pain as a generalized soreness that occurs approximately one hour after eating meals and spontaneously resolves without intervention or medication. He is unsure of how long the discomfort lasts before resolving. Additionally, he reports a two week duration of occasional episodes of pale semi-formed stools. He attempted symptom relief with increasing dose of daily Lasix, however without effect, prompting presentation to ED on 12/09.     He denies symptom association with fevers, chills, nausea, vomiting, history of prior abdominal surgeries, ETOH consumption, chest pain, pain with inspiration, lightheadedness, or blurry vision. He reports a on-going non-productive cough intermittent cough since CHF diagnosis that is unchanged. He does not weigh himself daily and is unsure of any weight loss or gain. He does follow with cardiology outpatient.     Of note, he also reports continued daily use of  IV methamphetamines with most recent usage on 12/08.     ED course: He initially presented to [place] ED on 12/09. Lab were significant for leukocytosis (25), hyponatremia (126), lactic acidosis (3.5), transaminitis (, ALT 73), hyperbilirubinemia (4.5), and toxicology positive for amphetamines. CXR was suggestive of right lower lobe pneumonia. Abdominal U/S was suggestive of acalculous cholecystitis. He was given IVF, Ceftriaxone, and Lasix. Case was discussed with transfer center and AES here; recommended transfer for possible MRCP.     Overview/Hospital Course:  Francis Daigle 44 y.o male presented from OSH with cholilithiasis and cholecystitis. Patient transfer to [facility] for AES evaluation. When patient got admitted to hospital medicine team the patient was transferred to ICU following cardiac arrest on 12/10/19. Emergently intubated during PEA arrest, ROSC achieved after 1 round of CPR. Central line and arterial line placed. Patient initially requiring pressor support, cyanotic, hypoxic on blood gas. Bedside ultrasound with signs of RV strain with DVT in lower extremity. Patient on pressors at the time, requiring 100% FiO2 to oxygenate. Given TPA emergently. Patient started on heparin ggt, bronchoscopy today revealing relatively normal airways and extubated. Most recent ultrasound did not show liver cirrhosis or cholilthiasis or cholecystitis. Patient with RLL PNA getting treated with Zosyn and later switch to Levo. Patient transitioned to Heparin gtt to 10mg BID eliquis. Cardiology will need to consulted for possible ICD or lifevest up on discharge on Monday.   Spoke with cardiology who states that he will likely need an ICD but he can follow up outpatient with cardiology for its placement.  Patient continues to state that he feels better each day.  He does endorse increased anxiety over the last day.  He states that he used to be on treatment in the past.  Started patient on citalopram and instructed  "him that he will need to request follow up with psychiatry once he is discharged.     Interval History: Patient noted some continued pain and anxiety overnight before he went to sleep. He was given 1x norco which provided him relief and allowed him to rest.  Patient endorses "panic attacks" that have been occurring.  He says that this has been an issue for him in the past.  During an episode, he feels short of breath and "really anxious" that oddly passes when he goes to the bathroom to micturate.     Review of Systems   Constitutional: Negative for chills and fever.   Respiratory: Positive for shortness of breath. Negative for cough.    Cardiovascular: Negative for chest pain and leg swelling.   Gastrointestinal: Negative for abdominal distention and abdominal pain.   Psychiatric/Behavioral: Negative for confusion. The patient is nervous/anxious.      Objective:     Vital Signs (Most Recent):  Temp: 97.7 °F (36.5 °C) (12/17/19 1152)  Pulse: 100 (12/17/19 1200)  Resp: 18 (12/17/19 1200)  BP: 105/72 (12/17/19 1152)  SpO2: 95 % (12/17/19 1200) Vital Signs (24h Range):  Temp:  [97.4 °F (36.3 °C)-98.1 °F (36.7 °C)] 97.7 °F (36.5 °C)  Pulse:  [] 100  Resp:  [16-18] 18  SpO2:  [94 %-99 %] 95 %  BP: ()/(65-80) 105/72     Weight: 77.5 kg (170 lb 13.7 oz)  Body mass index is 25.98 kg/m².    Intake/Output Summary (Last 24 hours) at 12/17/2019 1231  Last data filed at 12/16/2019 2351  Gross per 24 hour   Intake 480 ml   Output 950 ml   Net -470 ml      Physical Exam   Constitutional: He is oriented to person, place, and time.   Patient is a 43 yo M who appears alert and acutely anxious.    HENT:   Head: Normocephalic and atraumatic.   Eyes: Pupils are equal, round, and reactive to light. EOM are normal.   Neck: Normal range of motion.   Cardiovascular: Normal rate, regular rhythm and normal heart sounds.   Pulmonary/Chest: Effort normal and breath sounds normal. No respiratory distress.   Abdominal: Bowel sounds " are normal. He exhibits no distension. There is no tenderness.   Musculoskeletal: Normal range of motion.   Bandages covering venous stasis wounds in BL LE.   Swelling of right upper extremity    Neurological: He is alert and oriented to person, place, and time.   Skin: Skin is warm and dry.   Psychiatric: He has a normal mood and affect.       Significant Labs: All pertinent labs within the past 24 hours have been reviewed.     Recent Labs   Lab 12/15/19  0608 12/16/19  0615 12/17/19  0455   WBC 21.68* 18.03* 19.33*   HGB 12.8* 12.1* 12.2*   HCT 42.2 39.4* 39.9*   * 365* 354*   MCV 83 82 83   RDW 24.7* 24.6* 24.7*   * 132* 131*   K 3.5 4.2 4.3   CL 98 97 96   CO2 26 28 27   BUN 21* 20 19   CREATININE 0.9 0.9 0.9   GLU 71 86 85   PROT 7.0 6.4 6.4   ALBUMIN 1.6* 1.6* 1.6*   BILITOT 3.8* 3.1* 2.6*   AST 57* 45* 43*   ALKPHOS 100 87 84   ALT 42 34 31       Significant Imaging: I have reviewed all pertinent imaging results/findings within the past 24 hours.         Assessment/Plan:      * Sepsis due to undetermined organism  This is a 44 year old male with PMH significant for non-ischemic cardiomyopathy (EF 11%), IVDU, and Hepatitis C who is presenting on 12/10 with two week duration of worsening lower extremity swelling and erythema associated with same duration of post-prandial abdominal pain and distension with intermittent pale colored stools with work-up thus far concerning for tachycardia (120's), leukocytosis (25), lactic acidosis, transaminitis, hyperbilirubinemia, CXR suggestive of right lower lobe pneumonia, and abdominal U/S suggestive of possible acalculous cholecystitis. He has no symptoms of underlying respiratory infection and on chart review CXR abnormalities are similar to those in 08/2019 during admission for CHF exacerbation. UA is unremarkable. Negative for influenza. Suspect bilateral lower extremity edema and erythema more a manifestation of CHF instead of bilateral cellulitis.  Differential diagnoses include biliary etiology vs bacteremia in the setting of on-going IVDU vs possible CAP.     Plan:   -Initiated antibiotic coverage for suspected biliary etiology and CAP with Azithromycin, Ceftriaxone, and Flagyl then transition to Levo PO for PNA then to Cefepime per ID recommendation.   -Blood cultures NGTD  -Respiratory culture growing GNR and Candida with persistently elevated WBC. Candida in respiratory does not need to get treated.   -Trend WBC and liver function daily. Trending down and continue to monitor.   -Judiciously administer an IVF resuscitation necessary.     Anxiety  Patient with history of panic attacks in the past.  Patient describes increased worry and anxiety about his current condition and his recovery.     Plan:  -citalopram 10mg PO  -atarax PO daily  -recommend patient make an appointment with psychiatry upon discharge.     Wheezing  History of 40PK year. Mild wheeze throughout lung field. Sating well in room air  - continue Ipotroprium Neb Q8H for wheezing       Acute massive pulmonary embolism  DVT lower extremity probable origin of PE. Patient was started on heparin gtt    Plan  Transition to DVT dosing of 10mg BID eliquis   - right upper extremity arm US showing edema and no clots.     Acute hypoxemic respiratory failure        Acute deep vein thrombosis (DVT) of proximal vein of lower extremity  - See acute massive pulm embolism       Cardiac arrest  PEA arrest, 2 round of EPI and CPR achieved ROSC.     Plan:  -obtained CXR on 12/16 as patient c/o mid sternal pain that is tender to palpation.  Likely could be 2/2 to chest compression.  Will also obtain a lipase level to ensure he has developed pancreatitis.    -CXR negative for rib fractures. Lipase returned WNL.  His upper abdominal pain is decreased.     Hyponatremia  -See assessment for CHF exacerbation.       Chronic hepatitis C without hepatic coma  - F/U Pending Hep C Viral load   - Will need to set up with  hepatology for treatment as outpatient       Non-ischemic cardiomyopathy  -See assessment for sepsis.       Transaminitis  -See assessment for sepsis.   MELD-Na score: 21 at 12/17/2019  4:55 AM  MELD score: 16 at 12/17/2019  4:55 AM  Calculated from:  Serum Creatinine: 0.9 mg/dL (Rounded to 1 mg/dL) at 12/17/2019  4:55 AM  Serum Sodium: 131 mmol/L at 12/17/2019  4:55 AM  Total Bilirubin: 2.6 mg/dL at 12/17/2019  4:55 AM  INR(ratio): 1.7 at 12/17/2019  4:55 AM  Age: 44 years        Intravenous drug abuse  -On-going use of methamphetamines.   -Prior hepatitis screening positive for HepC in 10/2019.     Plan:   -Counseled on importance of cessation.   -Screening for HIV ordered.   -Monitor for signs of withdrawal for other substances.       Community acquired pneumonia of right lower lobe of lung  -See assessment for sepsis.       Acute on chronic combined systolic and diastolic heart failure  History of non-ischemic cardiomyopathy diagnosed in 11/2017 at [facility]  [facility] in 12/2017 with no evidence of angiographically significant coronary artery disease.  Most recent ECHO in 10/2019 concerning for EF of 35%, grade II diastolic dysfunction, and global hypokinesis with a restrictive physiology. HOME regimen: Carvedilol, Enalapril, Lasix 20 mg BID, and Spironolactone.   .-Suspect presentation from worsening right sided heart failure.     Plan:   - continue HOME regimen: Carvedilol, Enalapril, and Spironolactone  - IV diuresis 40mg daily urinating well.  Added additional 1x 40mg IV dose as his urinary output has decreased slightly.   - Increased metoprolol to 50mg BID per cardiology's recommendations for best optimization of his CHF medications.   -Strict I/O's and daily standing weights.   -Telemetry ordered.   -Cardiology recommends outpatient EP follow up for ICD placement for primary prevention.  They do not recommending placing a life vest or ICD inpatient as patient's cardiac arrest during hospitalization was due to a  PE and not a shockable rhythm.  -Trend renal function with diuresis; goal K >4 and Mg >2.         Other specified anxiety disorders  -Continue home Ativan PRN.       Essential hypertension  -See assessment for CHF exacerbation.         VTE Risk Mitigation (From admission, onward)         Ordered     apixaban tablet 5 mg  2 times daily      12/14/19 1334     apixaban tablet 10 mg  2 times daily      12/14/19 1334     IP VTE HIGH RISK PATIENT  Once      12/10/19 0922                      Bradly Guzman MD  Department of Hospital Medicine   [facility]

## 2019-12-17 NOTE — ASSESSMENT & PLAN NOTE
Patient with history of panic attacks in the past.  Patient describes increased worry and anxiety about his current condition and his recovery.     Plan:  -citalopram 10mg PO  -atarax PO daily  -recommend patient make an appointment with psychiatry upon discharge.

## 2019-12-17 NOTE — NURSING
DRESSING CHANGE DONE TO CINDY LOWER EXT. REMOVED OLD DRESSINGS AND CLEANED WITH NS. APPLIED BACTROBAN OINT AND TELFA PADS. WRAPPED WITH KERLEX.

## 2019-12-17 NOTE — SUBJECTIVE & OBJECTIVE
"Interval History: Patient noted some continued pain and anxiety overnight before he went to sleep. He was given 1x norco which provided him relief and allowed him to rest.  Patient endorses "panic attacks" that have been occurring.  He says that this has been an issue for him in the past.  During an episode, he feels short of breath and "really anxious" that oddly passes when he goes to the bathroom to micturate.     Review of Systems   Constitutional: Negative for chills and fever.   Respiratory: Positive for shortness of breath. Negative for cough.    Cardiovascular: Negative for chest pain and leg swelling.   Gastrointestinal: Negative for abdominal distention and abdominal pain.   Psychiatric/Behavioral: Negative for confusion. The patient is nervous/anxious.      Objective:     Vital Signs (Most Recent):  Temp: 97.7 °F (36.5 °C) (12/17/19 1152)  Pulse: 100 (12/17/19 1200)  Resp: 18 (12/17/19 1200)  BP: 105/72 (12/17/19 1152)  SpO2: 95 % (12/17/19 1200) Vital Signs (24h Range):  Temp:  [97.4 °F (36.3 °C)-98.1 °F (36.7 °C)] 97.7 °F (36.5 °C)  Pulse:  [] 100  Resp:  [16-18] 18  SpO2:  [94 %-99 %] 95 %  BP: ()/(65-80) 105/72     Weight: 77.5 kg (170 lb 13.7 oz)  Body mass index is 25.98 kg/m².    Intake/Output Summary (Last 24 hours) at 12/17/2019 1231  Last data filed at 12/16/2019 2351  Gross per 24 hour   Intake 480 ml   Output 950 ml   Net -470 ml      Physical Exam   Constitutional: He is oriented to person, place, and time.   Patient is a 43 yo M who appears alert and acutely anxious.    HENT:   Head: Normocephalic and atraumatic.   Eyes: Pupils are equal, round, and reactive to light. EOM are normal.   Neck: Normal range of motion.   Cardiovascular: Normal rate, regular rhythm and normal heart sounds.   Pulmonary/Chest: Effort normal and breath sounds normal. No respiratory distress.   Abdominal: Bowel sounds are normal. He exhibits no distension. There is no tenderness.   Musculoskeletal: Normal " range of motion.   Bandages covering venous stasis wounds in BL LE.   Swelling of right upper extremity    Neurological: He is alert and oriented to person, place, and time.   Skin: Skin is warm and dry.   Psychiatric: He has a normal mood and affect.       Significant Labs: All pertinent labs within the past 24 hours have been reviewed.     Recent Labs   Lab 12/15/19  0608 12/16/19  0615 12/17/19  0455   WBC 21.68* 18.03* 19.33*   HGB 12.8* 12.1* 12.2*   HCT 42.2 39.4* 39.9*   * 365* 354*   MCV 83 82 83   RDW 24.7* 24.6* 24.7*   * 132* 131*   K 3.5 4.2 4.3   CL 98 97 96   CO2 26 28 27   BUN 21* 20 19   CREATININE 0.9 0.9 0.9   GLU 71 86 85   PROT 7.0 6.4 6.4   ALBUMIN 1.6* 1.6* 1.6*   BILITOT 3.8* 3.1* 2.6*   AST 57* 45* 43*   ALKPHOS 100 87 84   ALT 42 34 31       Significant Imaging: I have reviewed all pertinent imaging results/findings within the past 24 hours.

## 2019-12-17 NOTE — PLAN OF CARE
Problem: SLP Goal  Goal: SLP Goal  Description  Short Term Goals:   1. Pt will tolerate a regular diet and thin liquids with no overt s/s of aspiration.      Outcome: Ongoing, Progressing     Pt with minimal appetite 2/2 persistent nausea.  Ongoing aspiration precautions advised.     FALLON Rodriguez., [facility]-SLP  Speech-Language Pathology  Pager: [phone]  12/17/2019

## 2019-12-17 NOTE — SUBJECTIVE & OBJECTIVE
Interval History 12/17/2019:  Patient is seen for follow-up rehab evaluation and recommendations: Feeling better today.  Participating with therapy and ambulating in room with supervision.      HPI, Past Medical, Family, and Social History remains the same as documented in the initial encounter.    Scheduled Medications:    apixaban  10 mg Oral BID    Followed by    [START ON 12/21/2019] apixaban  5 mg Oral BID    ceFEPime (MAXIPIME) IVPB  2 g Intravenous Q12H    enalapril  2.5 mg Oral Daily    furosemide  40 mg Intravenous Daily    ipratropium  0.5 mg Nebulization Q6H    metoprolol tartrate  50 mg Oral BID    mirtazapine  15 mg Oral Nightly    mupirocin   Topical (Top) Daily    pantoprazole  40 mg Oral Daily    spironolactone  25 mg Oral Daily     PRN Medications: albuterol-ipratropium, ibuprofen, melatonin, ondansetron, sodium chloride 0.9%    Review of Systems   Constitutional: Positive for activity change and fatigue. Negative for chills.   HENT: Positive for dental problem. Negative for trouble swallowing and voice change.    Eyes: Negative for pain and visual disturbance.   Respiratory: Negative for cough and shortness of breath.    Cardiovascular: Positive for leg swelling. Negative for chest pain and palpitations.   Gastrointestinal: Positive for abdominal pain. Negative for nausea and vomiting.   Genitourinary: Negative for difficulty urinating and flank pain.   Musculoskeletal: Positive for gait problem. Negative for arthralgias and myalgias.   Skin: Positive for color change and wound.   Neurological: Positive for weakness. Negative for numbness and headaches.   Psychiatric/Behavioral: Negative for agitation. The patient is not nervous/anxious.      Objective:     Vital Signs (Most Recent):  Temp: 97.9 °F (36.6 °C) (12/17/19 0803)  Pulse: (!) 114 (12/17/19 0803)  Resp: 18 (12/17/19 0803)  BP: 118/80 (12/17/19 0803)  SpO2: 99 % (12/17/19 0803)    Vital Signs (24h Range):  Temp:  [97.4 °F (36.3  °C)-98.1 °F (36.7 °C)] 97.9 °F (36.6 °C)  Pulse:  [] 114  Resp:  [16-18] 18  SpO2:  [94 %-99 %] 99 %  BP: ()/(65-80) 118/80     Physical Exam   Constitutional: He is oriented to person, place, and time. He appears ill. No distress.   Appears older than stated age   HENT:   Head: Normocephalic and atraumatic.   Right Ear: External ear normal.   Left Ear: External ear normal.   Nose: Nose normal.   Mouth/Throat: Abnormal dentition.   Eyes: Right eye exhibits no discharge. Left eye exhibits no discharge. Scleral icterus is present.   Neck: Normal range of motion.   Cardiovascular: Intact distal pulses. Tachycardia present.   Pulmonary/Chest: Effort normal. No respiratory distress.   Abdominal: He exhibits distension. There is no tenderness.   Musculoskeletal: He exhibits edema and tenderness.   Neurological: He is alert and oriented to person, place, and time. No sensory deficit. He exhibits normal muscle tone.   Skin: Skin is warm and dry. Bruising noted.   BLE discoloration, edema, wounds   Psychiatric: He has a normal mood and affect. His behavior is normal.   Vitals reviewed.    Diagnostic Results:   Labs: Reviewed  X-Ray: Reviewed  CT: Reviewed    NEUROLOGICAL EXAMINATION:     MENTAL STATUS   Oriented to person, place, and time.

## 2019-12-17 NOTE — PLAN OF CARE
ROBINA spoke with Hue with [facility] and was told that they have received the referral and will be submitting to [facility] for auth.  ROBINA will continue to F/U.          Magalys Camp LMSW

## 2019-12-17 NOTE — ASSESSMENT & PLAN NOTE
-See assessment for sepsis.   MELD-Na score: 21 at 12/17/2019  4:55 AM  MELD score: 16 at 12/17/2019  4:55 AM  Calculated from:  Serum Creatinine: 0.9 mg/dL (Rounded to 1 mg/dL) at 12/17/2019  4:55 AM  Serum Sodium: 131 mmol/L at 12/17/2019  4:55 AM  Total Bilirubin: 2.6 mg/dL at 12/17/2019  4:55 AM  INR(ratio): 1.7 at 12/17/2019  4:55 AM  Age: 44 years

## 2019-12-17 NOTE — PROGRESS NOTES
[facility]  Physical Medicine & Rehab  Progress Note    Patient Name: Francis Daigle  MRN: [id number]  Admission Date: 12/10/2019  Length of Stay: 7 days  Attending Physician: David Potts MD    Subjective:     Principal Problem:Sepsis due to undetermined organism    Hospital Course:   12/15/19:  Evaluated by OT.  PT evaluations pending.  Bed mobility CGA-Zachery.  Sit to stand and transfers Zachery.  Grooming CGA, LBD Zachery, and toileting modA.   12/16/19:  Evaluated by PT.  Bed mobility SBA.  Sit to stand CGA with RW.  Ambulated 100 ft CGA with RW.  Ascended and descended 5 stairs CGA.    Interval History 12/17/2019:  Patient is seen for follow-up rehab evaluation and recommendations: Feeling better today.  Participating with therapy and ambulating in room with supervision.      HPI, Past Medical, Family, and Social History remains the same as documented in the initial encounter.    Scheduled Medications:    apixaban  10 mg Oral BID    Followed by    [START ON 12/21/2019] apixaban  5 mg Oral BID    ceFEPime (MAXIPIME) IVPB  2 g Intravenous Q12H    enalapril  2.5 mg Oral Daily    furosemide  40 mg Intravenous Daily    ipratropium  0.5 mg Nebulization Q6H    metoprolol tartrate  50 mg Oral BID    mirtazapine  15 mg Oral Nightly    mupirocin   Topical (Top) Daily    pantoprazole  40 mg Oral Daily    spironolactone  25 mg Oral Daily     PRN Medications: albuterol-ipratropium, ibuprofen, melatonin, ondansetron, sodium chloride 0.9%    Review of Systems   Constitutional: Positive for activity change and fatigue. Negative for chills.   HENT: Positive for dental problem. Negative for trouble swallowing and voice change.    Eyes: Negative for pain and visual disturbance.   Respiratory: Negative for cough and shortness of breath.    Cardiovascular: Positive for leg swelling. Negative for chest pain and palpitations.   Gastrointestinal: Positive for abdominal pain. Negative for nausea and  vomiting.   Genitourinary: Negative for difficulty urinating and flank pain.   Musculoskeletal: Positive for gait problem. Negative for arthralgias and myalgias.   Skin: Positive for color change and wound.   Neurological: Positive for weakness. Negative for numbness and headaches.   Psychiatric/Behavioral: Negative for agitation. The patient is not nervous/anxious.      Objective:     Vital Signs (Most Recent):  Temp: 97.9 °F (36.6 °C) (12/17/19 0803)  Pulse: (!) 114 (12/17/19 0803)  Resp: 18 (12/17/19 0803)  BP: 118/80 (12/17/19 0803)  SpO2: 99 % (12/17/19 0803)    Vital Signs (24h Range):  Temp:  [97.4 °F (36.3 °C)-98.1 °F (36.7 °C)] 97.9 °F (36.6 °C)  Pulse:  [] 114  Resp:  [16-18] 18  SpO2:  [94 %-99 %] 99 %  BP: ()/(65-80) 118/80     Physical Exam   Constitutional: He is oriented to person, place, and time. He appears ill. No distress.   Appears older than stated age   HENT:   Head: Normocephalic and atraumatic.   Right Ear: External ear normal.   Left Ear: External ear normal.   Nose: Nose normal.   Mouth/Throat: Abnormal dentition.   Eyes: Right eye exhibits no discharge. Left eye exhibits no discharge. Scleral icterus is present.   Neck: Normal range of motion.   Cardiovascular: Intact distal pulses. Tachycardia present.   Pulmonary/Chest: Effort normal. No respiratory distress.   Abdominal: He exhibits distension. There is no tenderness.   Musculoskeletal: He exhibits edema and tenderness.   Neurological: He is alert and oriented to person, place, and time. No sensory deficit. He exhibits normal muscle tone.   Skin: Skin is warm and dry. Bruising noted.   BLE discoloration, edema, wounds   Psychiatric: He has a normal mood and affect. His behavior is normal.   Vitals reviewed.    Diagnostic Results:   Labs: Reviewed  X-Ray: Reviewed  CT: Reviewed    Assessment/Plan:      * Sepsis due to undetermined organism  -  Treated with antibiotics for suspected biliary etiology and PNA  -  Management per  primary team    Acute massive pulmonary embolism  -  On apixaban  -  Management per primary team    Acute deep vein thrombosis (DVT) of proximal vein of lower extremity  -  On apixaban  -  Management per primary team    Cardiac arrest  Acute hypoxemic respiratory failure  -  PEA arrest and acute hypoxemic respiratory failure on 12/10/19 requiring intubation and 1 round of CPR  -  Found to have PE and BLE DVT s/p tPA  -  Extubated 12/12/19  -  Cardiology consulted   -  Management per primary team     Impaired mobility and ADLs  -  (I) with mobility and ADLs at baseline   -  Related to prolonged and acute hospitalization, impaired endurance, weakness, impaired self care skills, impaired functional mobilty, impaired balance, impaired cognition  See hospital course for functional status.    Recommendations  -  Encourage mobility, OOB in chair, and early ambulation as appropriate  -  PT/OT evaluate and treat  -  Pain management  -  DVT prophylaxis if appropriate   -  Monitor for and prevent skin breakdown and pressure ulcers    Post-acute recommendation: Outpatient therapy (without SNF/HH benefits)- progressing well with therapy and with limited goals for inpatient rehab    IRIS Lazo  Department of Physical Medicine & Rehab   [facility]

## 2019-12-17 NOTE — PT/OT/SLP PROGRESS
"Speech Language Pathology Treatment    Patient Name:  Francis Daigle   MRN:  [id number]  Admitting Diagnosis: Sepsis due to undetermined organism    Recommendations:                 General Recommendations:  ST to follow up to monitor tolerance, educate pt  Diet recommendations:  Dental Soft, Liquid Diet Level: Thin   Aspiration Precautions: 1 bite/sip at a time, Eliminate distractions, Feed only when awake/alert, HOB to 90 degrees, Monitor for s/s of aspiration, Small bites/sips and Standard aspiration precautions   General Precautions: Standard, aspiration, fall  Communication strategies:  go to room if call light pushed    Subjective     SLP reviewed Pt with RN  Pt presents calm  He explains, "that is the most I've eaten in a while" re: partially complete lunch meal tray at bedside      Pain/Comfort:  · Pain Rating 1: 2/10  · Location - Orientation 1: generalized  · Location 1: abdomen  · Pain Addressed 1: Nurse notified, Cessation of Activity, Distraction  · Pain Rating Post-Intervention 1: 2/10    Objective:     Has the patient been evaluated by SLP for swallowing?   Yes  Keep patient NPO? No   Current Respiratory Status: room air    CXR 12/16/19: Mild cardiomegaly.  Opacification at the lung bases more on the right side probably a combination of pleural effusion and atelectatic changes.  The lung apices are clear.  Supporting devices have been removed since the previous study    Pt seen for dysphagia therapy. He was found with partially completed lunch meal tray at bedside.  Pt alert and attentive. He endorsed minimal appetite. He denied difficulty with meals and politely declined additional bites/sips from meal tray 2/2 full and stomach pain following meal. Pt demonstrated a mildly hoarse, clear vocal quality and explained he felt it was closer to baseline. He was educated on SLP Role, aspiration precautions, S/S aspiration for ongoing monitoring. He continued to decline additional bites/sips during ST " visit. Session discontinued.  RN in room to provide medications. RN remained at bedside upon SLP exit from room.     Assessment:     Francis Daigle is a 44 y.o. male with an SLP diagnosis of risk for asporation.  He presents with minimal appetite 2/2 persistent nausea this service day.  Ongoing aspiration precautions advised for safety.     Goals:   Multidisciplinary Problems     SLP Goals        Problem: SLP Goal    Goal Priority Disciplines Outcome   SLP Goal     SLP Ongoing, Progressing   Description:  Short Term Goals:   1. Pt will tolerate a regular diet and thin liquids with no overt s/s of aspiration.                       Plan:     · Patient to be seen:  4 x/week   · Plan of Care expires:     · Plan of Care reviewed with:  patient   · SLP Follow-Up:  Yes       Discharge recommendations:  (tbd)     Time Tracking:     SLP Treatment Date:   12/17/19  Speech Start Time:  1225  Speech Stop Time:  1233     Speech Total Time (min):  8 min    Billable Minutes: Seld Care/Home Management Training 8     APOLONIA Rodriguez, CCC-SLP  Speech-Language Pathology  Pager: [phone]    12/17/2019

## 2019-12-17 NOTE — NURSING
"1400: noted pt moaning and crying in room, stating "my stomach hurts so bad."  Pt requested and drank 120 ml of apple juice, then immediately vomited 100 ml of light brown emesis.  Pt was crying and inconsolable, with noted tenderness and guarding to abdomen during assessment.  Bowel sounds are decreased compared to  this mornings assessment.  Dr. Rhonda WU called, who came to room to assess pt.  Orders put in per Dr. Ellis for NPO status,  US of abd and mesenteric ischemia study.  Pt was also given Zofran IV x 1 dose in hopes of pt getting relief.  New orders reviewed with pt who gives verbal understanding. Will continue to monitor.   "

## 2019-12-17 NOTE — PT/OT/SLP PROGRESS
"Occupational Therapy   Treatment    Name: Francis Daigle  MRN: [id number]  Admitting Diagnosis:  Sepsis due to undetermined organism       Recommendations:     Discharge Recommendations: Rehab  Discharge Equipment Recommendations:  cane, straight, shower chair  Barriers to discharge:  None    Assessment:     Francis Daigle is a 44 y.o. male with a medical diagnosis of Sepsis due to undetermined organism.  He presents alert and willing to participate in therapy session. Pt demo cognitive deficits with attention (sustained/divided),  problem solving, safety awareness, impulsivity, and overall decline in PLOF with cognitive task. Pt displays decline in occupational performance with functional task with decrease in ability to carry out appropriate interactions, sequencing of ADLs/self care and performance of prior tasks.  Upon arrival, pt found supine with no family present. He tolerated treatment session well and progressing with goals. Performance deficits affecting function are weakness, impaired endurance, impaired functional mobilty, impaired self care skills, impaired balance, gait instability, decreased safety awareness.   ** Later during day --- pt found standing in hallway stating " I need to sit.. I need to sit" with increased confusion/frustration. W/c brought to patient to rest. " Im sorry.. I'm so stupid. I just want to go to the vending machine".   Pt has limited support at home and is a high fall risk.   Concerns regarding pt's ability for higher level functioning ( pay bills, initiate self care, cook/clean, manage medications, community mobility, contact safety officials as necessary via 911) and has limited support at home. He would benefit from IP rehab following d/c to continue to progress towards goals and improve quality of life.     Rehab Prognosis:  Good; patient would benefit from acute skilled OT services to address these deficits and reach maximum level of function.       Plan: "     Patient to be seen 3 x/week to address the above listed problems via self-care/home management, therapeutic activities, therapeutic exercises, neuromuscular re-education  · Plan of Care Expires: 01/15/20  · Plan of Care Reviewed with: patient    Subjective     Pain/Comfort:  · Pain Rating 1: 0/10  · Pain Rating Post-Intervention 1: 0/10    Objective:     Communicated with: RN prior to session.  Patient found supine with telemetry upon OT entry to room.    General Precautions: Standard, fall   Orthopedic Precautions:N/A   Braces: N/A     Occupational Performance:     Bed Mobility:    · Patient completed Rolling/Turning to Left with  supervision  · Patient completed Supine to Sit with supervision  · Patient completed Sit to Supine with supervision     Functional Mobility/Transfers:  · Patient completed Sit <> Stand Transfer with stand by assistance  with  no assistive device   · Functional Mobility: Pt completed functional mobility in hallway with SBA and RW. Pt with increase in WOB requiring standing rest breaks 2/2 fatigue.    Activities of Daily Living:  · Upper Body Dressing: minimum assistance to tory gown like jacket while standing  · Lower Body Dressing: moderate assistance to tory B socks while supine    [facility] 6 Click ADL: 20    Treatment & Education:  -Pt edu on OT role/POC  -Importance of OOB activity with staff assistance ( UIC during each meal)  -Safety during functional t/f and mobility  -White board updated  - Multiple self care tasks completed--as noted above  - All questions/concerns answered within OT scope of practice    Patient left supine with all lines intact, call button in reach and RN notifiedEducation:      GOALS:   Multidisciplinary Problems     Occupational Therapy Goals        Problem: Occupational Therapy Goal    Goal Priority Disciplines Outcome Interventions   Occupational Therapy Goal     OT, PT/OT Ongoing, Progressing    Description:  Goals to be met by: 12/30     Patient will  increase functional independence with ADLs by performing:    UE Dressing with [place].  LE Dressing with [place].  Grooming while seated with [place].  Toileting from toilet with Contact Guard Assistance for hygiene and clothing management.                       Time Tracking:     OT Date of Treatment: 12/17/19  OT Start Time: 0821  OT Stop Time: 0844  OT Total Time (min): 23 min    Billable Minutes:Self Care/Home Management 8  Therapeutic Exercise 15    Yessica Santana OT  12/17/2019

## 2019-12-17 NOTE — ASSESSMENT & PLAN NOTE
PEA arrest, 2 round of EPI and CPR achieved ROSC.     Plan:  -obtained CXR on 12/16 as patient c/o mid sternal pain that is tender to palpation.  Likely could be 2/2 to chest compression.  Will also obtain a lipase level to ensure he has developed pancreatitis.    -CXR negative for rib fractures. Lipase returned WNL.  His upper abdominal pain is decreased.

## 2019-12-17 NOTE — ASSESSMENT & PLAN NOTE
History of non-ischemic cardiomyopathy diagnosed in 11/2017 at [facility].  [facility] in 12/2017 with no evidence of angiographically significant coronary artery disease.  Most recent ECHO in 10/2019 concerning for EF of 35%, grade II diastolic dysfunction, and global hypokinesis with a restrictive physiology. HOME regimen: Carvedilol, Enalapril, Lasix 20 mg BID, and Spironolactone.   .-Suspect presentation from worsening right sided heart failure.     Plan:   - continue HOME regimen: Carvedilol, Enalapril, and Spironolactone  - IV diuresis 40mg daily urinating well.  Added additional 1x 40mg IV dose as his urinary output has decreased slightly.   - Increased metoprolol to 50mg BID per cardiology's recommendations for best optimization of his CHF medications.   -Strict I/O's and daily standing weights.   -Telemetry ordered.   -Cardiology recommends outpatient EP follow up for ICD placement for primary prevention.  They do not recommending placing a life vest or ICD inpatient as patient's cardiac arrest during hospitalization was due to a PE and not a shockable rhythm.  -Trend renal function with diuresis; goal K >4 and Mg >2.

## 2019-12-17 NOTE — PT/OT/SLP PROGRESS
"     Physical Therapy  Pt Not Seen    Patient Name:  Francis Daigle   MRN:  [id number]    2:38 pm    Patient not seen today secondary to  Other (Comment)(Pt's nurses (Sujata and Lalo) report "he's having a rough day today.  Right now he's nauseated"  "He's not appropriate for walking right now"). Will follow-up on next scheduled visit.    Lalitha Caballero, PTA  12/17/2019    "

## 2019-12-18 PROBLEM — R10.9 ABDOMINAL PAIN: Status: ACTIVE | Noted: 2019-12-18

## 2019-12-18 LAB
ALBUMIN SERPL BCP-MCNC: 1.7 G/DL (ref 3.5–5.2)
ALP SERPL-CCNC: 90 U/L (ref 55–135)
ALT SERPL W/O P-5'-P-CCNC: 30 U/L (ref 10–44)
ANION GAP SERPL CALC-SCNC: 7 MMOL/L (ref 8–16)
AST SERPL-CCNC: 47 U/L (ref 10–40)
BASOPHILS # BLD AUTO: 0.03 K/UL (ref 0–0.2)
BASOPHILS NFR BLD: 0.1 % (ref 0–1.9)
BILIRUB SERPL-MCNC: 2.8 MG/DL (ref 0.1–1)
BUN SERPL-MCNC: 18 MG/DL (ref 6–20)
CALCIUM SERPL-MCNC: 8 MG/DL (ref 8.7–10.5)
CHLORIDE SERPL-SCNC: 92 MMOL/L (ref 95–110)
CO2 SERPL-SCNC: 29 MMOL/L (ref 23–29)
CREAT SERPL-MCNC: 1 MG/DL (ref 0.5–1.4)
DIFFERENTIAL METHOD: ABNORMAL
EOSINOPHIL # BLD AUTO: 0.1 K/UL (ref 0–0.5)
EOSINOPHIL NFR BLD: 0.3 % (ref 0–8)
ERYTHROCYTE [DISTWIDTH] IN BLOOD BY AUTOMATED COUNT: 24.3 % (ref 11.5–14.5)
EST. GFR  (AFRICAN AMERICAN): >60 ML/MIN/1.73 M^2
EST. GFR  (NON AFRICAN AMERICAN): >60 ML/MIN/1.73 M^2
GLUCOSE SERPL-MCNC: 104 MG/DL (ref 70–110)
HCT VFR BLD AUTO: 39.1 % (ref 40–54)
HCV RNA SERPL NAA+PROBE-LOG IU: 6.14 LOG (10) IU/ML
HCV RNA SERPL QL NAA+PROBE: DETECTED IU/ML
HCV RNA SPEC NAA+PROBE-ACNC: ABNORMAL IU/ML
HGB BLD-MCNC: 12.3 G/DL (ref 14–18)
IMM GRANULOCYTES # BLD AUTO: 0.14 K/UL (ref 0–0.04)
IMM GRANULOCYTES NFR BLD AUTO: 0.6 % (ref 0–0.5)
INR PPP: 1.7 (ref 0.8–1.2)
LYMPHOCYTES # BLD AUTO: 1.8 K/UL (ref 1–4.8)
LYMPHOCYTES NFR BLD: 8.3 % (ref 18–48)
MCH RBC QN AUTO: 25.6 PG (ref 27–31)
MCHC RBC AUTO-ENTMCNC: 31.5 G/DL (ref 32–36)
MCV RBC AUTO: 82 FL (ref 82–98)
MONOCYTES # BLD AUTO: 1.4 K/UL (ref 0.3–1)
MONOCYTES NFR BLD: 6.2 % (ref 4–15)
NEUTROPHILS # BLD AUTO: 18.5 K/UL (ref 1.8–7.7)
NEUTROPHILS NFR BLD: 84.5 % (ref 38–73)
NRBC BLD-RTO: 0 /100 WBC
PLATELET # BLD AUTO: 383 K/UL (ref 150–350)
PMV BLD AUTO: 8.6 FL (ref 9.2–12.9)
POTASSIUM SERPL-SCNC: 4.3 MMOL/L (ref 3.5–5.1)
PROT SERPL-MCNC: 6.8 G/DL (ref 6–8.4)
PROTHROMBIN TIME: 16.6 SEC (ref 9–12.5)
RBC # BLD AUTO: 4.8 M/UL (ref 4.6–6.2)
SODIUM SERPL-SCNC: 128 MMOL/L (ref 136–145)
WBC # BLD AUTO: 21.96 K/UL (ref 3.9–12.7)

## 2019-12-18 PROCEDURE — 94640 AIRWAY INHALATION TREATMENT: CPT

## 2019-12-18 PROCEDURE — 20600001 HC STEP DOWN PRIVATE ROOM

## 2019-12-18 PROCEDURE — 63600175 PHARM REV CODE 636 W HCPCS: Performed by: STUDENT IN AN ORGANIZED HEALTH CARE EDUCATION/TRAINING PROGRAM

## 2019-12-18 PROCEDURE — 99232 PR SUBSEQUENT HOSPITAL CARE,LEVL II: ICD-10-PCS | Mod: ,,, | Performed by: HOSPITALIST

## 2019-12-18 PROCEDURE — 94761 N-INVAS EAR/PLS OXIMETRY MLT: CPT

## 2019-12-18 PROCEDURE — 80053 COMPREHEN METABOLIC PANEL: CPT

## 2019-12-18 PROCEDURE — 25000003 PHARM REV CODE 250: Performed by: STUDENT IN AN ORGANIZED HEALTH CARE EDUCATION/TRAINING PROGRAM

## 2019-12-18 PROCEDURE — 99900035 HC TECH TIME PER 15 MIN (STAT)

## 2019-12-18 PROCEDURE — 63600175 PHARM REV CODE 636 W HCPCS: Performed by: HOSPITALIST

## 2019-12-18 PROCEDURE — 99232 SBSQ HOSP IP/OBS MODERATE 35: CPT | Mod: ,,, | Performed by: HOSPITALIST

## 2019-12-18 PROCEDURE — 85610 PROTHROMBIN TIME: CPT

## 2019-12-18 PROCEDURE — 25500020 PHARM REV CODE 255: Performed by: HOSPITALIST

## 2019-12-18 PROCEDURE — 85025 COMPLETE CBC W/AUTO DIFF WBC: CPT

## 2019-12-18 PROCEDURE — 92526 ORAL FUNCTION THERAPY: CPT

## 2019-12-18 PROCEDURE — 97116 GAIT TRAINING THERAPY: CPT

## 2019-12-18 PROCEDURE — 36415 COLL VENOUS BLD VENIPUNCTURE: CPT

## 2019-12-18 RX ORDER — METOPROLOL TARTRATE 25 MG/1
50 TABLET, FILM COATED ORAL 2 TIMES DAILY
Status: COMPLETED | OUTPATIENT
Start: 2019-12-18 | End: 2019-12-18

## 2019-12-18 RX ORDER — LEVOFLOXACIN 750 MG/1
750 TABLET ORAL DAILY
Status: COMPLETED | OUTPATIENT
Start: 2019-12-18 | End: 2019-12-18

## 2019-12-18 RX ORDER — METOPROLOL SUCCINATE 50 MG/1
100 TABLET, EXTENDED RELEASE ORAL DAILY
Status: DISCONTINUED | OUTPATIENT
Start: 2019-12-19 | End: 2019-12-28 | Stop reason: HOSPADM

## 2019-12-18 RX ORDER — FUROSEMIDE 40 MG/1
40 TABLET ORAL 2 TIMES DAILY
Qty: 60 TABLET | Refills: 11 | Status: SHIPPED | OUTPATIENT
Start: 2019-12-18 | End: 2019-12-27

## 2019-12-18 RX ORDER — METOPROLOL SUCCINATE 100 MG/1
100 TABLET, EXTENDED RELEASE ORAL DAILY
Qty: 30 TABLET | Refills: 11 | Status: ON HOLD | OUTPATIENT
Start: 2019-12-19 | End: 2020-03-18 | Stop reason: HOSPADM

## 2019-12-18 RX ORDER — FUROSEMIDE 40 MG/1
40 TABLET ORAL 2 TIMES DAILY
Status: DISCONTINUED | OUTPATIENT
Start: 2019-12-18 | End: 2019-12-20

## 2019-12-18 RX ADMIN — IBUPROFEN 800 MG: 400 TABLET ORAL at 03:12

## 2019-12-18 RX ADMIN — APIXABAN 10 MG: 5 TABLET, FILM COATED ORAL at 10:12

## 2019-12-18 RX ADMIN — ENALAPRIL MALEATE 2.5 MG: 2.5 TABLET ORAL at 09:12

## 2019-12-18 RX ADMIN — ONDANSETRON 8 MG: 8 TABLET, ORALLY DISINTEGRATING ORAL at 02:12

## 2019-12-18 RX ADMIN — APIXABAN 10 MG: 5 TABLET, FILM COATED ORAL at 09:12

## 2019-12-18 RX ADMIN — FUROSEMIDE 40 MG: 10 INJECTION, SOLUTION INTRAMUSCULAR; INTRAVENOUS at 09:12

## 2019-12-18 RX ADMIN — IOHEXOL 100 ML: 350 INJECTION, SOLUTION INTRAVENOUS at 06:12

## 2019-12-18 RX ADMIN — LEVOFLOXACIN 750 MG: 750 TABLET, FILM COATED ORAL at 09:12

## 2019-12-18 RX ADMIN — METOPROLOL TARTRATE 50 MG: 25 TABLET ORAL at 10:12

## 2019-12-18 RX ADMIN — PANTOPRAZOLE SODIUM 40 MG: 40 TABLET, DELAYED RELEASE ORAL at 09:12

## 2019-12-18 RX ADMIN — CITALOPRAM HYDROBROMIDE 10 MG: 10 TABLET ORAL at 09:12

## 2019-12-18 RX ADMIN — POLYETHYLENE GLYCOL 3350 17 G: 17 POWDER, FOR SOLUTION ORAL at 09:12

## 2019-12-18 RX ADMIN — METOPROLOL TARTRATE 50 MG: 25 TABLET ORAL at 09:12

## 2019-12-18 NOTE — PLAN OF CARE
Plan is to discharge to Rehab.       12/18/19 7549   Discharge Reassessment   Assessment Type Discharge Planning Reassessment   Do you have any problems affording any of your prescribed medications? No   Discharge Plan A Rehab   Discharge Plan B Home with family   DME Needed Upon Discharge  none   Anticipated Discharge Disposition Rehab

## 2019-12-18 NOTE — ASSESSMENT & PLAN NOTE
DVT lower extremity probable origin of PE. Patient was started on heparin gtt  - right upper extremity arm US showing edema and no clots.     Plan  Transition to DVT dosing of 10mg BID eliquis

## 2019-12-18 NOTE — PT/OT/SLP PROGRESS
"Physical Therapy Treatment    Patient Name:  Francis Daigle   MRN:  [id number]    Recommendations:     Discharge Recommendations:  rehabilitation facility   Discharge Equipment Recommendations: walker, rolling, shower chair   Barriers to discharge: Inaccessible home and Decreased caregiver support    Assessment:     Francis Daigle is a 44 y.o. male admitted with a medical diagnosis of Sepsis due to undetermined organism.  He presents with the following impairments/functional limitations:  weakness, impaired endurance, gait instability, impaired functional mobilty, impaired balance, impaired cognition, pain, decreased safety awareness . Patient participated in two session due to anxiety and frustration. Patient continues w/ deficits in safety and endurance. He ambulated w/ RW and SBA; he ascended/descend 7 steps w/ RHR w/ CGA w/ increased time. Patient will benefit from continued physical therapy to address deficits and improve safety and functional mobility. Continue with physical therapy plan of care. .    Rehab Prognosis: Good; patient would benefit from acute skilled PT services to address these deficits and reach maximum level of function.    Recent Surgery: * No surgery found *      Plan:     During this hospitalization, patient to be seen 3 x/week to address the identified rehab impairments via gait training, therapeutic activities, therapeutic exercises, neuromuscular re-education and progress toward the following goals:    · Plan of Care Expires:  01/16/20    Subjective     Chief Complaint: "I want to go [facility]; I want to be home for [place]"  Patient/Family Comments/goals: agreeable to therapy w/ encouragment and divided into two sessions due to anxiety.   Pain/Comfort:  · Pain Rating 1: 5/10  · Location - Orientation 1: generalized  · Location 1: back  · Pain Addressed 1: Nurse notified, Distraction, Reposition  · Pain Rating Post-Intervention 1: 4/10      Objective:     Communicated with " nurse prior to session.  Patient found HOB elevated with telemetry upon PT entry to room. Nurse present w/ patient, providing education and meds.    General Precautions: Standard, aspiration, fall   Orthopedic Precautions:N/A   Braces: N/A     Functional Mobility:  · Bed Mobility:     · Supine to Sit: modified independence  · Sit to Supine: modified independence  · Transfers:     · Sit to Stand:  supervision with no AD  · Bed to Chair: supervision with  no AD  using  Step Transfer  · Gait: ambulates in room from bed to toilet w/ supervision w/o AD, 20 feet; ambulates w/ RW and close  feet, one standing rest break. Ascend/descend 7 steps w/ CGA and w/ use of handrail on the right, extra time, mildly unsteady.      AM-PAC 6 CLICK MOBILITY  Turning over in bed (including adjusting bedclothes, sheets and blankets)?: 4  Sitting down on and standing up from a chair with arms (e.g., wheelchair, bedside commode, etc.): 3  Moving from lying on back to sitting on the side of the bed?: 4  Moving to and from a bed to a chair (including a wheelchair)?: 3  Need to walk in hospital room?: 3  Climbing 3-5 steps with a railing?: 3  Basic Mobility Total Score: 20       Therapeutic Activities and Exercises:   patient educated on PT POC; educated on opportunity to continue therapy in IP Rehab to progress to safe discharge home.     Patient left HOB elevated with all lines intact and call button in reach..    GOALS:   Multidisciplinary Problems     Physical Therapy Goals        Problem: Physical Therapy Goal    Goal Priority Disciplines Outcome Goal Variances Interventions   Physical Therapy Goal     PT, PT/OT Ongoing, Progressing     Description:  Goals to be met by: 19     Patient will increase functional independence with mobility by performin. Sit to stand transfer with Modified [place]  2. Bed to chair transfer with Modified [place] using Rolling Walker or LRAD  3. Gait  x 250 feet with Stand-by  Assistance using Rolling Walker or LRAD  4. Ascend/descend 9 stair with right Handrails Stand-by Assistance using no AD.   5. Lower extremity exercise program x15 reps per handout, with independence                      Time Tracking:     PT Received On: 12/18/19  PT Start Time: 0930     PT Stop Time: 0949  PT Total Time (min): 19 min   + 10 minutes (8540-1823)  Billable Minutes: Gait Training 29    Treatment Type: Treatment  PT/PTA: PT     PTA Visit Number: 0     Chanel Young, PT  12/18/2019

## 2019-12-18 NOTE — PLAN OF CARE
Recommend ongoing dental soft solid diet and thin liquids. Given pt without dentures at bedside this admit, he appears to have met max potential with SLP services at this time. Please re-consult should changes occur.     SCOTT Kidd, CCC-SLP  [phone]  12/18/2019

## 2019-12-18 NOTE — NURSING
Pt returned from US via stretcher per [facility] transport staff.  Ambulated to bed from stretcher with one person standby assist.  Bed in low position, bed alarm set.

## 2019-12-18 NOTE — PLAN OF CARE
Problem: Adult Inpatient Plan of Care  Goal: Plan of Care Review  Outcome: Ongoing, Progressing  Flowsheets (Taken 12/18/2019 1751)  Plan of Care Reviewed With: patient  POC reviewed with pt. Questions invited and answered. Pt verbalized understanding however, he verbalized he is upset and is sad his family has not visited. I stayed with the pt as much as possible to day however, he is upset I cannot stay in there with him. VS stable, A&Ox4. Pt had a 6 beat run of V-tach around 730am and PVC's around 8. MD notified and aware. Monitored pt cardiac strips throughout the day periodically and have not seen any other abnormal rhythm. Will continue to monitor.    Goal: Patient-Specific Goal (Individualization)  Description      Problem: Adjustment to Illness (Sepsis/Septic Shock)  Goal: Optimal Coping  Outcome: Ongoing, Not Progressing   Pt does not appear to be coping well with being in the hospital. Pt has had periods of crying throughout the day and has needed to be calmed down by a nurse or other staff visiting the pt. Pt does not like to be left alone.   Flowsheets (Taken 12/18/2019 1751)  Individualized Care Needs: belly pain  POC reviewed with pt. Pt refuses to take ibuprofen for pain.

## 2019-12-18 NOTE — PLAN OF CARE
Problem: Physical Therapy Goal  Goal: Physical Therapy Goal  Description  Goals to be met by: 19     Patient will increase functional independence with mobility by performin. Sit to stand transfer with Modified [place]  2. Bed to chair transfer with Modified [place] using Rolling Walker or LRAD  3. Gait  x 250 feet with Stand-by Assistance using Rolling Walker or LRAD  4. Ascend/descend 9 stair with right Handrails Stand-by Assistance using no AD.   5. Lower extremity exercise program x15 reps per handout, with independence     Outcome: Ongoing, Progressing

## 2019-12-18 NOTE — PLAN OF CARE
"AAOx4. HR remained elevated throughout night, up to 120 BPM per monitor. Patient reported abdominal pain throughout night, medicine team aware. PRN Ibuprofen administered with no relief. Patient requesting stronger pain meds, medicine team aware. No reports of vomiting overnight. Patient reported nausea but refused PRN Zofran stating "it doesn't work and I feel worst." Patient requested something to help him sleep. One time dose of Hydroxyzine ordered. Patient refused, stating "I hate that stuff. I would rather die." Bed in lowest position, call light and personal items within reach. Will continue to monitor.  "

## 2019-12-18 NOTE — PT/OT/SLP PROGRESS
"Speech Language Pathology Treatment/  Discharge Summary    Patient Name:  Francis Daigle   MRN:  [id number]   [id number] A    Admitting Diagnosis: Sepsis due to undetermined organism    Recommendations:                 General Recommendations:  Follow-up not indicated  Diet recommendations:  Dental Soft, Liquid Diet Level: Thin   Aspiration Precautions: Standard aspiration precautions   General Precautions: Standard, fall, dental soft    Subjective     "I have to suck on it (PO trial regular consistency solid mayur cracker) because I don't have no teeth."     Pain/Comfort:  · Pain Rating 1: 0/10  · Pain Rating Post-Intervention 1: 0/10    Objective:     Has the patient been evaluated by SLP for swallowing?   Yes  Keep patient NPO? No   Current Respiratory Status: room air      Despite SLP recommendation for dental soft solids per results of Clinical Evaluation of Swallow completed 12/13/19, review of pt's medical chart remarkable for pt's diet order as regular consistency solids and thin liquids.     Pt awake and alert upon entry. HOB raised. Per pt, dentures not at bedside this admit. Pt observed with multiple straw sips thin water taken in isolation and as regular consistency solid liquid wash and PO trial bite x2 regular consistency solid mayur cracker. Despite pt observed briefly sucking on regular consistency solid bolus x2 to allow it to soften, reportedly 2/2 edentulous, then to gum bolus, oral phase of swallow remained timely and complete oral cavity clearance appreciated. Therefore ongoing SLP recommendation for dental soft solids. Overt clinical signs aspiration unappreciated across consistencies. Given pt without dentures this admit, he appears to have met max potential with SLP services at this time. Education provided re: ongoing standard aspiration precautions and updated SLP POC. Encouragement provided to request SLP re-consult should he experience swallowing changes. Pt verbalized understanding of " all education provided and agreement with SLP POC. White board updated. No further questions.     Assessment:     Francis Daigle is a 44 y.o. male who appears to have met max potential with SLP services at this time. Please re-consult should changes occur.     Goals:   Multidisciplinary Problems     SLP Goals        Problem: SLP Goal    Goal Priority Disciplines Outcome   SLP Goal     SLP Unable to Meet, Plan Revised   Description:  Short Term Goals:   1. Pt will tolerate a regular diet and thin liquids with no overt s/s of aspiration.                     Plan:     · Plan of Care reviewed with:  patient   · SLP Follow-Up:  No       Discharge recommendations:  rehabilitation facility     Time Tracking:     SLP Treatment Date:   12/18/19  Speech Start Time:  0951  Speech Stop Time:  1001     Speech Total Time (min):  10 min    Billable Minutes: Treatment Swallowing Dysfunction 10    SCOTT Kidd, CCC-SLP  [phone]  12/18/2019

## 2019-12-18 NOTE — PLAN OF CARE
PATIENT REC'D LYING IN BED, AWAKE AND ALERT. THE PATIENT IS VERY AGITATED AND HOLLERING OUT. C/O SUBSTERNAL AND ABDOMINAL PAIN. THE PATIENT IS INCONSOLABLE. MD NOTIFIED AND IS COMING TO ROUND ON THE PATIENT.

## 2019-12-18 NOTE — ASSESSMENT & PLAN NOTE
History of non-ischemic cardiomyopathy diagnosed in 11/2017 at [facility].  [facility] in 12/2017 with no evidence of angiographically significant coronary artery disease.  Most recent ECHO in 10/2019 concerning for EF of 35%, grade II diastolic dysfunction, and global hypokinesis with a restrictive physiology. HOME regimen: Carvedilol, Enalapril, Lasix 20 mg BID, and Spironolactone.   .-Suspect presentation from worsening right sided heart failure.     Plan:   - continue HOME regimen: Carvedilol, Enalapril, and Spironolactone  - changed lasix to 40mg PO BID  - last dose of Lopressor 50mg BID. Will transition to Toprol XL 100mg QD upon discharge    -Strict I/O's and daily standing weights.   -Telemetry ordered.   -Cardiology recommends outpatient EP follow up for ICD placement for primary prevention.  They do not recommending placing a life vest or ICD inpatient as patient's cardiac arrest during hospitalization was due to a PE and not a shockable rhythm.

## 2019-12-18 NOTE — ASSESSMENT & PLAN NOTE
This is a 44 year old male with PMH significant for non-ischemic cardiomyopathy (EF 11%), IVDU, and Hepatitis C who is presenting on 12/10 with two week duration of worsening lower extremity swelling and erythema associated with same duration of post-prandial abdominal pain and distension with intermittent pale colored stools with work-up thus far concerning for tachycardia (120's), leukocytosis (25), lactic acidosis, transaminitis, hyperbilirubinemia, CXR suggestive of right lower lobe pneumonia, and abdominal U/S suggestive of possible acalculous cholecystitis. He has no symptoms of underlying respiratory infection and on chart review CXR abnormalities are similar to those in 08/2019 during admission for CHF exacerbation. UA is unremarkable. Negative for influenza. Suspect bilateral lower extremity edema and erythema more a manifestation of CHF instead of bilateral cellulitis. Differential diagnoses include biliary etiology vs bacteremia in the setting of on-going IVDU vs possible CAP.     Plan:   -continue Levofloxacin PO   -Blood cultures NGTD  -Respiratory culture growing GNR and Candida with persistently elevated WBC. Candida in respiratory does not need to get treated.   -Trend WBC and liver function daily. Trending down and continue to monitor.   -Judiciously administer an IVF resuscitation necessary.

## 2019-12-18 NOTE — SUBJECTIVE & OBJECTIVE
"Interval History: Patient continues to have abdominal pain that is associated with meals.  The pain resolves with time.  He also complains of continued anxiety "all day." He states that he generally feels much better.     Review of Systems   Constitutional: Negative for chills and fever.   Respiratory: Negative for cough and shortness of breath.    Cardiovascular: Negative for chest pain and leg swelling.   Gastrointestinal: Positive for abdominal pain. Negative for abdominal distention.   Psychiatric/Behavioral: Negative for confusion. The patient is nervous/anxious.      Objective:     Vital Signs (Most Recent):  Temp: 97.5 °F (36.4 °C) (12/18/19 1509)  Pulse: 98 (12/18/19 1513)  Resp: 18 (12/18/19 1509)  BP: 97/71 (12/18/19 1509)  SpO2: 97 % (12/18/19 1509) Vital Signs (24h Range):  Temp:  [97.2 °F (36.2 °C)-97.7 °F (36.5 °C)] 97.5 °F (36.4 °C)  Pulse:  [] 98  Resp:  [18] 18  SpO2:  [92 %-97 %] 97 %  BP: ()/(60-93) 97/71     Weight: 77.5 kg (170 lb 13.7 oz)  Body mass index is 25.98 kg/m².  No intake or output data in the 24 hours ending 12/18/19 1702   Physical Exam   Constitutional: He is oriented to person, place, and time.   Patient is a 45 yo M who appears alert and anxious.    HENT:   Head: Normocephalic and atraumatic.   Eyes: Pupils are equal, round, and reactive to light. EOM are normal.   Neck: Normal range of motion.   Cardiovascular: Normal rate, regular rhythm and normal heart sounds.   Pulmonary/Chest: Effort normal and breath sounds normal. No respiratory distress.   Abdominal: Bowel sounds are normal. He exhibits no distension. There is tenderness (mild  diffuse tendernss to very light palpation).   Musculoskeletal: Normal range of motion.   Bandages covering venous stasis wounds in BL LE.   Swelling of right upper extremity    Neurological: He is alert and oriented to person, place, and time.   Skin: Skin is warm and dry.   Psychiatric: He has a normal mood and affect. "       Significant Labs: All pertinent labs within the past 24 hours have been reviewed.     Recent Labs   Lab 12/16/19  0615 12/17/19  0455 12/18/19  0717   WBC 18.03* 19.33* 21.96*   HGB 12.1* 12.2* 12.3*   HCT 39.4* 39.9* 39.1*   * 354* 383*   MCV 82 83 82   RDW 24.6* 24.7* 24.3*   * 131* 128*   K 4.2 4.3 4.3   CL 97 96 92*   CO2 28 27 29   BUN 20 19 18   CREATININE 0.9 0.9 1.0   GLU 86 85 104   PROT 6.4 6.4 6.8   ALBUMIN 1.6* 1.6* 1.7*   BILITOT 3.1* 2.6* 2.8*   AST 45* 43* 47*   ALKPHOS 87 84 90   ALT 34 31 30       Significant Imaging: I have reviewed all pertinent imaging results/findings within the past 24 hours.

## 2019-12-18 NOTE — ASSESSMENT & PLAN NOTE
-See assessment for sepsis.   MELD-Na score: 23 at 12/18/2019  7:17 AM  MELD score: 16 at 12/18/2019  7:17 AM  Calculated from:  Serum Creatinine: 1.0 mg/dL at 12/18/2019  7:17 AM  Serum Sodium: 128 mmol/L at 12/18/2019  7:17 AM  Total Bilirubin: 2.8 mg/dL at 12/18/2019  7:17 AM  INR(ratio): 1.7 at 12/18/2019  7:17 AM  Age: 44 years

## 2019-12-18 NOTE — PROGRESS NOTES
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 He has a PMH significant for non-ischemic cardiomyopathy with combined CHF (EF 11% without ICD or CRT device) and on-going IVDU. He reports a two week duration of progressively worsening bilateral lower extremity swelling and redness of the anterior shins. This is associated with bilateral lower extremity pain with ambulation. He also reports noticing symptom association with worsening of baseline SOB with exertion as well as new abdominal distension and pain, within the same time frame. He describes abdominal pain as a generalized soreness that occurs approximately one hour after eating meals and spontaneously resolves without intervention or medication. He is unsure of how long the discomfort lasts before resolving. Additionally, he reports a two week duration of occasional episodes of pale semi-formed stools. He attempted symptom relief with increasing dose of daily Lasix, however without effect, prompting presentation to ED on 12/09.     He denies symptom association with fevers, chills, nausea, vomiting, history of prior abdominal surgeries, ETOH consumption, chest pain, pain with inspiration, lightheadedness, or blurry vision. He reports a on-going non-productive cough intermittent cough since CHF diagnosis that is unchanged. He does not weigh himself daily and is unsure of any weight loss or gain. He does follow with cardiology outpatient.     Of note, he also reports continued daily use of  IV methamphetamines with most recent usage on 12/08.     ED course: He initially presented to [facility] ED on 12/09. Lab were significant for leukocytosis (25), hyponatremia (126), lactic acidosis (3.5), transaminitis (, ALT 73), hyperbilirubinemia (4.5), and toxicology positive for amphetamines. CXR was suggestive of right lower lobe pneumonia. Abdominal U/S was suggestive of acalculous cholecystitis. He was given IVF, Ceftriaxone, and Lasix. Case was discussed with transfer center and AES here; recommended transfer for possible MRCP.     Overview/Hospital Course:  Francis Daigle 44 y.o male presented from OSH with cholilithiasis and cholecystitis. Patient transfer to [facility] for AES evaluation. When patient got admitted to hospital medicine team the patient was transferred to ICU following cardiac arrest on 12/10/19. Emergently intubated during PEA arrest, ROSC achieved after 1 round of CPR. Central line and arterial line placed. Patient initially requiring pressor support, cyanotic, hypoxic on blood gas. Bedside ultrasound with signs of RV strain with DVT in lower extremity. Patient on pressors at the time, requiring 100% FiO2 to oxygenate. Given TPA emergently. Patient started on heparin ggt, bronchoscopy today revealing relatively normal airways and extubated. Most recent ultrasound did not show liver cirrhosis or cholilthiasis or cholecystitis. Patient with RLL PNA getting treated with Zosyn and later switch to Levo. Patient transitioned to Heparin gtt to 10mg BID eliquis. Cardiology will need to consulted for possible ICD or lifevest up on discharge on Monday.   Spoke with cardiology who states that he will likely need an ICD but he can follow up outpatient with cardiology for its placement.  Patient continues to state that he feels better each day.  He does endorse increased anxiety over the last day.  He states that he used to be on treatment in the past.  Started patient on citalopram and instructed  "him that he will need to request follow up with psychiatry once he is discharged. Patient has continued midepigastric pain that is associated with meals. Ultrasound for mesenteric ischemia showed no signs of occlusion or stenosis.  Ordered CT scan of the abdomen with contast to ensure no acute intraabdominal pathology.     Interval History: Patient continues to have abdominal pain that is associated with meals.  The pain resolves with time.  He also complains of continued anxiety "all day." He states that he generally feels much better.     Review of Systems   Constitutional: Negative for chills and fever.   Respiratory: Negative for cough and shortness of breath.    Cardiovascular: Negative for chest pain and leg swelling.   Gastrointestinal: Positive for abdominal pain. Negative for abdominal distention.   Psychiatric/Behavioral: Negative for confusion. The patient is nervous/anxious.      Objective:     Vital Signs (Most Recent):  Temp: 97.5 °F (36.4 °C) (12/18/19 1509)  Pulse: 98 (12/18/19 1513)  Resp: 18 (12/18/19 1509)  BP: 97/71 (12/18/19 1509)  SpO2: 97 % (12/18/19 1509) Vital Signs (24h Range):  Temp:  [97.2 °F (36.2 °C)-97.7 °F (36.5 °C)] 97.5 °F (36.4 °C)  Pulse:  [] 98  Resp:  [18] 18  SpO2:  [92 %-97 %] 97 %  BP: ()/(60-93) 97/71     Weight: 77.5 kg (170 lb 13.7 oz)  Body mass index is 25.98 kg/m².  No intake or output data in the 24 hours ending 12/18/19 1702   Physical Exam   Constitutional: He is oriented to person, place, and time.   Patient is a 43 yo M who appears alert and anxious.    HENT:   Head: Normocephalic and atraumatic.   Eyes: Pupils are equal, round, and reactive to light. EOM are normal.   Neck: Normal range of motion.   Cardiovascular: Normal rate, regular rhythm and normal heart sounds.   Pulmonary/Chest: Effort normal and breath sounds normal. No respiratory distress.   Abdominal: Bowel sounds are normal. He exhibits no distension. There is tenderness (mild  diffuse " tendernss to very light palpation).   Musculoskeletal: Normal range of motion.   Bandages covering venous stasis wounds in BL LE.   Swelling of right upper extremity    Neurological: He is alert and oriented to person, place, and time.   Skin: Skin is warm and dry.   Psychiatric: He has a normal mood and affect.       Significant Labs: All pertinent labs within the past 24 hours have been reviewed.     Recent Labs   Lab 12/16/19  0615 12/17/19  0455 12/18/19  0717   WBC 18.03* 19.33* 21.96*   HGB 12.1* 12.2* 12.3*   HCT 39.4* 39.9* 39.1*   * 354* 383*   MCV 82 83 82   RDW 24.6* 24.7* 24.3*   * 131* 128*   K 4.2 4.3 4.3   CL 97 96 92*   CO2 28 27 29   BUN 20 19 18   CREATININE 0.9 0.9 1.0   GLU 86 85 104   PROT 6.4 6.4 6.8   ALBUMIN 1.6* 1.6* 1.7*   BILITOT 3.1* 2.6* 2.8*   AST 45* 43* 47*   ALKPHOS 87 84 90   ALT 34 31 30       Significant Imaging: I have reviewed all pertinent imaging results/findings within the past 24 hours.         Assessment/Plan:      * Sepsis due to undetermined organism  This is a 44 year old male with PMH significant for non-ischemic cardiomyopathy (EF 11%), IVDU, and Hepatitis C who is presenting on 12/10 with two week duration of worsening lower extremity swelling and erythema associated with same duration of post-prandial abdominal pain and distension with intermittent pale colored stools with work-up thus far concerning for tachycardia (120's), leukocytosis (25), lactic acidosis, transaminitis, hyperbilirubinemia, CXR suggestive of right lower lobe pneumonia, and abdominal U/S suggestive of possible acalculous cholecystitis. He has no symptoms of underlying respiratory infection and on chart review CXR abnormalities are similar to those in 08/2019 during admission for CHF exacerbation. UA is unremarkable. Negative for influenza. Suspect bilateral lower extremity edema and erythema more a manifestation of CHF instead of bilateral cellulitis. Differential diagnoses include  biliary etiology vs bacteremia in the setting of on-going IVDU vs possible CAP.     Plan:   -continue Levofloxacin PO   -Blood cultures NGTD  -Respiratory culture growing GNR and Candida with persistently elevated WBC. Candida in respiratory does not need to get treated.   -Trend WBC and liver function daily. Trending down and continue to monitor.   -Judiciously administer an IVF resuscitation necessary.     Abdominal pain  Patient has endorsed episodic abdominal pains (8/10) in his his to upper epigastrium over the last few days.  He associates these pains with meals and has avoided food because of it.  Nothing seems to make better other than letting time pass.  Given patient's presentation with DVTs and possible PE.  Concern for possible mesenteric ischemia vs rib fracture vs GERD.   -CXR negative for any fracture  -Ultrasound with mesenteric view negative for any stenosis or occulusion   -Lactic acid WNL    Plan:  -CT scan of abdomen with contrast to ensure no underlying pathology   -Continue pantoprazole PO 40mg   -continue to monitor       Anxiety  Patient with history of panic attacks in the past.  Patient describes increased worry and anxiety about his current condition and his recovery.     Plan:  -citalopram 10mg PO  -atarax PO daily  -recommend patient make an appointment with psychiatry upon discharge.     Wheezing  History of 40PK year. Mild wheeze throughout lung field. Sating well in room air  - continue Ipotroprium Neb Q8H for wheezing       Acute massive pulmonary embolism  DVT lower extremity probable origin of PE. Patient was started on heparin gtt  - right upper extremity arm US showing edema and no clots.     Plan  Transition to DVT dosing of 10mg BID eliquis       Acute hypoxemic respiratory failure        Acute deep vein thrombosis (DVT) of proximal vein of lower extremity  - See acute massive pulm embolism       Cardiac arrest  PEA arrest, 2 round of EPI and CPR achieved ROSC.          Hyponatremia  -See assessment for CHF exacerbation.       Chronic hepatitis C without hepatic coma  - F/U Pending Hep C Viral load   - Will need to set up with hepatology for treatment as outpatient       Non-ischemic cardiomyopathy  -See assessment for sepsis.       Transaminitis  -See assessment for sepsis.   MELD-Na score: 23 at 12/18/2019  7:17 AM  MELD score: 16 at 12/18/2019  7:17 AM  Calculated from:  Serum Creatinine: 1.0 mg/dL at 12/18/2019  7:17 AM  Serum Sodium: 128 mmol/L at 12/18/2019  7:17 AM  Total Bilirubin: 2.8 mg/dL at 12/18/2019  7:17 AM  INR(ratio): 1.7 at 12/18/2019  7:17 AM  Age: 44 years        Intravenous drug abuse  -On-going use of methamphetamines.   -Prior hepatitis screening positive for HepC in 10/2019.     Plan:   -Counseled on importance of cessation.   -Screening for HIV ordered.   -Monitor for signs of withdrawal for other substances.       Community acquired pneumonia of right lower lobe of lung  -See assessment for sepsis.       Acute on chronic combined systolic and diastolic heart failure  History of non-ischemic cardiomyopathy diagnosed in 11/2017 at [facility].  [facility] in 12/2017 with no evidence of angiographically significant coronary artery disease.  Most recent ECHO in 10/2019 concerning for EF of 35%, grade II diastolic dysfunction, and global hypokinesis with a restrictive physiology. HOME regimen: Carvedilol, Enalapril, Lasix 20 mg BID, and Spironolactone.   .-Suspect presentation from worsening right sided heart failure.     Plan:   - continue HOME regimen: Carvedilol, Enalapril, and Spironolactone  - changed lasix to 40mg PO BID  - last dose of Lopressor 50mg BID. Will transition to Toprol XL 100mg QD upon discharge    -Strict I/O's and daily standing weights.   -Telemetry ordered.   -Cardiology recommends outpatient EP follow up for ICD placement for primary prevention.  They do not recommending placing a life vest or ICD inpatient as patient's cardiac arrest  during hospitalization was due to a PE and not a shockable rhythm.          Other specified anxiety disorders  -Continue home Ativan PRN.       Essential hypertension  -See assessment for CHF exacerbation.         VTE Risk Mitigation (From admission, onward)         Ordered     apixaban tablet 5 mg  2 times daily      12/14/19 1334     apixaban tablet 10 mg  2 times daily      12/14/19 1334     IP VTE HIGH RISK PATIENT  Once      12/10/19 0922                      Bradly Guzman MD  Department of Hospital Medicine   [facility]

## 2019-12-19 PROBLEM — J86.9 EMPYEMA: Status: ACTIVE | Noted: 2019-12-19

## 2019-12-19 LAB
ALBUMIN FLD-MCNC: 0.7 G/DL
ALBUMIN SERPL BCP-MCNC: 1.8 G/DL (ref 3.5–5.2)
ALP SERPL-CCNC: 101 U/L (ref 55–135)
ALT SERPL W/O P-5'-P-CCNC: 29 U/L (ref 10–44)
ANION GAP SERPL CALC-SCNC: 8 MMOL/L (ref 8–16)
APPEARANCE FLD: CLEAR
APTT BLDCRRT: 31.2 SEC (ref 21–32)
AST SERPL-CCNC: 48 U/L (ref 10–40)
BASOPHILS # BLD AUTO: 0.04 K/UL (ref 0–0.2)
BASOPHILS NFR BLD: 0.2 % (ref 0–1.9)
BILIRUB SERPL-MCNC: 3 MG/DL (ref 0.1–1)
BODY FLD TYPE: NORMAL
BODY FLUID SOURCE, LDH: NORMAL
BUN SERPL-MCNC: 18 MG/DL (ref 6–20)
CALCIUM SERPL-MCNC: 8.2 MG/DL (ref 8.7–10.5)
CHLORIDE SERPL-SCNC: 90 MMOL/L (ref 95–110)
CO2 SERPL-SCNC: 32 MMOL/L (ref 23–29)
COLOR FLD: YELLOW
CREAT SERPL-MCNC: 1 MG/DL (ref 0.5–1.4)
DIFFERENTIAL METHOD: ABNORMAL
EOSINOPHIL # BLD AUTO: 0.1 K/UL (ref 0–0.5)
EOSINOPHIL NFR BLD: 0.4 % (ref 0–8)
ERYTHROCYTE [DISTWIDTH] IN BLOOD BY AUTOMATED COUNT: 24.5 % (ref 11.5–14.5)
EST. GFR  (AFRICAN AMERICAN): >60 ML/MIN/1.73 M^2
EST. GFR  (NON AFRICAN AMERICAN): >60 ML/MIN/1.73 M^2
GLUCOSE SERPL-MCNC: 112 MG/DL (ref 70–110)
GRAM STN SPEC: NORMAL
HCT VFR BLD AUTO: 42.5 % (ref 40–54)
HGB BLD-MCNC: 12.9 G/DL (ref 14–18)
IMM GRANULOCYTES # BLD AUTO: 0.13 K/UL (ref 0–0.04)
IMM GRANULOCYTES NFR BLD AUTO: 0.6 % (ref 0–0.5)
INR PPP: 1.7 (ref 0.8–1.2)
LDH FLD L TO P-CCNC: 104 U/L
LYMPHOCYTES # BLD AUTO: 1.6 K/UL (ref 1–4.8)
LYMPHOCYTES NFR BLD: 7.9 % (ref 18–48)
LYMPHOCYTES NFR FLD MANUAL: 36 %
MCH RBC QN AUTO: 25.1 PG (ref 27–31)
MCHC RBC AUTO-ENTMCNC: 30.4 G/DL (ref 32–36)
MCV RBC AUTO: 83 FL (ref 82–98)
MESOTHL CELL NFR FLD MANUAL: 6 %
MONOCYTES # BLD AUTO: 1.5 K/UL (ref 0.3–1)
MONOCYTES NFR BLD: 7.1 % (ref 4–15)
MONOS+MACROS NFR FLD MANUAL: 46 %
NEUTROPHILS # BLD AUTO: 17.3 K/UL (ref 1.8–7.7)
NEUTROPHILS NFR BLD: 83.8 % (ref 38–73)
NEUTROPHILS NFR FLD MANUAL: 12 %
NRBC BLD-RTO: 0 /100 WBC
PLATELET # BLD AUTO: 400 K/UL (ref 150–350)
PMV BLD AUTO: 9 FL (ref 9.2–12.9)
POTASSIUM SERPL-SCNC: 3.9 MMOL/L (ref 3.5–5.1)
PROT FLD-MCNC: 2.2 G/DL
PROT SERPL-MCNC: 7.1 G/DL (ref 6–8.4)
PROTHROMBIN TIME: 16.7 SEC (ref 9–12.5)
RBC # BLD AUTO: 5.13 M/UL (ref 4.6–6.2)
SODIUM SERPL-SCNC: 130 MMOL/L (ref 136–145)
SPECIMEN SOURCE: NORMAL
SPECIMEN SOURCE: NORMAL
WBC # BLD AUTO: 20.66 K/UL (ref 3.9–12.7)
WBC # FLD: 72 /CU MM

## 2019-12-19 PROCEDURE — 25000003 PHARM REV CODE 250: Performed by: STUDENT IN AN ORGANIZED HEALTH CARE EDUCATION/TRAINING PROGRAM

## 2019-12-19 PROCEDURE — 82042 OTHER SOURCE ALBUMIN QUAN EA: CPT

## 2019-12-19 PROCEDURE — 63600175 PHARM REV CODE 636 W HCPCS: Performed by: STUDENT IN AN ORGANIZED HEALTH CARE EDUCATION/TRAINING PROGRAM

## 2019-12-19 PROCEDURE — 87205 SMEAR GRAM STAIN: CPT

## 2019-12-19 PROCEDURE — 84157 ASSAY OF PROTEIN OTHER: CPT

## 2019-12-19 PROCEDURE — 83615 LACTATE (LD) (LDH) ENZYME: CPT

## 2019-12-19 PROCEDURE — 99233 SBSQ HOSP IP/OBS HIGH 50: CPT | Mod: ,,, | Performed by: INTERNAL MEDICINE

## 2019-12-19 PROCEDURE — 20600001 HC STEP DOWN PRIVATE ROOM

## 2019-12-19 PROCEDURE — 85730 THROMBOPLASTIN TIME PARTIAL: CPT

## 2019-12-19 PROCEDURE — 63600175 PHARM REV CODE 636 W HCPCS: Performed by: HOSPITALIST

## 2019-12-19 PROCEDURE — 87070 CULTURE OTHR SPECIMN AEROBIC: CPT

## 2019-12-19 PROCEDURE — 89051 BODY FLUID CELL COUNT: CPT

## 2019-12-19 PROCEDURE — 25000242 PHARM REV CODE 250 ALT 637 W/ HCPCS: Performed by: STUDENT IN AN ORGANIZED HEALTH CARE EDUCATION/TRAINING PROGRAM

## 2019-12-19 PROCEDURE — 94640 AIRWAY INHALATION TREATMENT: CPT

## 2019-12-19 PROCEDURE — 99233 SBSQ HOSP IP/OBS HIGH 50: CPT | Mod: ,,, | Performed by: HOSPITALIST

## 2019-12-19 PROCEDURE — 36415 COLL VENOUS BLD VENIPUNCTURE: CPT

## 2019-12-19 PROCEDURE — 99233 PR SUBSEQUENT HOSPITAL CARE,LEVL III: ICD-10-PCS | Mod: ,,, | Performed by: HOSPITALIST

## 2019-12-19 PROCEDURE — 94761 N-INVAS EAR/PLS OXIMETRY MLT: CPT

## 2019-12-19 PROCEDURE — 80053 COMPREHEN METABOLIC PANEL: CPT

## 2019-12-19 PROCEDURE — 85025 COMPLETE CBC W/AUTO DIFF WBC: CPT

## 2019-12-19 PROCEDURE — 49082 ABD PARACENTESIS: CPT

## 2019-12-19 PROCEDURE — 99233 PR SUBSEQUENT HOSPITAL CARE,LEVL III: ICD-10-PCS | Mod: ,,, | Performed by: INTERNAL MEDICINE

## 2019-12-19 PROCEDURE — 87075 CULTR BACTERIA EXCEPT BLOOD: CPT

## 2019-12-19 PROCEDURE — 85610 PROTHROMBIN TIME: CPT

## 2019-12-19 RX ORDER — HEPARIN SODIUM,PORCINE/D5W 25000/250
18 INTRAVENOUS SOLUTION INTRAVENOUS CONTINUOUS
Status: ACTIVE | OUTPATIENT
Start: 2019-12-19 | End: 2019-12-20

## 2019-12-19 RX ORDER — MIRTAZAPINE 15 MG/1
30 TABLET, ORALLY DISINTEGRATING ORAL NIGHTLY
Status: DISCONTINUED | OUTPATIENT
Start: 2019-12-19 | End: 2019-12-20

## 2019-12-19 RX ORDER — LORAZEPAM 1 MG/1
2 TABLET ORAL ONCE
Status: COMPLETED | OUTPATIENT
Start: 2019-12-19 | End: 2019-12-19

## 2019-12-19 RX ORDER — LORAZEPAM 0.5 MG/1
0.5 TABLET ORAL EVERY 12 HOURS PRN
Status: DISCONTINUED | OUTPATIENT
Start: 2019-12-19 | End: 2019-12-21

## 2019-12-19 RX ORDER — CEFEPIME HYDROCHLORIDE 2 G/1
2 INJECTION, POWDER, FOR SOLUTION INTRAVENOUS
Status: DISCONTINUED | OUTPATIENT
Start: 2019-12-19 | End: 2019-12-22

## 2019-12-19 RX ADMIN — HEPARIN SODIUM AND DEXTROSE 18 UNITS/KG/HR: 10000; 5 INJECTION INTRAVENOUS at 05:12

## 2019-12-19 RX ADMIN — PROMETHAZINE HYDROCHLORIDE 25 MG: 25 TABLET ORAL at 11:12

## 2019-12-19 RX ADMIN — CEFEPIME 2 G: 2 INJECTION, POWDER, FOR SOLUTION INTRAVENOUS at 05:12

## 2019-12-19 RX ADMIN — POLYETHYLENE GLYCOL 3350 17 G: 17 POWDER, FOR SOLUTION ORAL at 08:12

## 2019-12-19 RX ADMIN — IPRATROPIUM BROMIDE 0.5 MG: 0.5 SOLUTION RESPIRATORY (INHALATION) at 07:12

## 2019-12-19 RX ADMIN — LORAZEPAM 0.5 MG: 0.5 TABLET ORAL at 08:12

## 2019-12-19 RX ADMIN — LORAZEPAM 2 MG: 1 TABLET ORAL at 09:12

## 2019-12-19 NOTE — NURSING
"I went in the pt room to change the PIV dressing and administer antibiotics. Pt states "leave me alone I just want to sleep". I told him what I was there for and he states "I don't care I just want to sleep". I left the room and notified the pt I would be back. I will attempt again to administer antibiotics and change the dressing when I am able. Pt also states he just wants to go home. MD Potts notified. MD aware.   "

## 2019-12-19 NOTE — SUBJECTIVE & OBJECTIVE
Past Medical History:   Diagnosis Date    Anxiety     Arthritis     CHF (congestive heart failure)     Coronary artery disease     Depression     Hypertension        History reviewed. No pertinent surgical history.    Review of patient's allergies indicates:  No Known Allergies    Family History     Problem Relation (Age of Onset)    Arthritis Mother    Asthma Sister    Diabetes Sister    Heart disease Mother, Maternal Grandfather    Hyperlipidemia Mother    Hypertension Mother, Father    Kidney disease Mother        Tobacco Use    Smoking status: Former Smoker     Packs/day: 2.00     Years: 20.00     Pack years: 40.00     Types: Cigarettes     Start date: 1999     Last attempt to quit: 2017     Years since quittin.9    Smokeless tobacco: Never Used   Substance and Sexual Activity    Alcohol use: Not Currently    Drug use: Yes     Frequency: 7.0 times per week     Types: Amphetamines    Sexual activity: Not Currently     Partners: Female         Review of Systems   Constitutional: Negative for chills and fever.   Respiratory: Negative for cough and shortness of breath.    Cardiovascular: Negative for chest pain and leg swelling.   Gastrointestinal: Positive for abdominal pain. Negative for abdominal distention.   Psychiatric/Behavioral: Negative for confusion. The patient is nervous/anxious.      Objective:     Vital Signs (Most Recent):  Temp: 97.7 °F (36.5 °C) (19 1150)  Pulse: 109 (19 1225)  Resp: 17 (19 1150)  BP: 116/81 (19 1150)  SpO2: 96 % (19 1225) Vital Signs (24h Range):  Temp:  [95.1 °F (35.1 °C)-97.7 °F (36.5 °C)] 97.7 °F (36.5 °C)  Pulse:  [] 109  Resp:  [17-20] 17  SpO2:  [90 %-97 %] 96 %  BP: ()/(55-82) 116/81     Weight: 76.7 kg (169 lb 1.5 oz)  Body mass index is 25.71 kg/m².      Intake/Output Summary (Last 24 hours) at 2019 1355  Last data filed at 2019 0600  Gross per 24 hour   Intake 472 ml   Output --   Net 472 ml        Physical Exam   Constitutional: He is oriented to person, place, and time.   Head: Normocephalic and atraumatic.   Eyes: Pupils are equal, round, and reactive to light. EOM are normal.   Neck: Normal range of motion.   Cardiovascular: Normal rate, regular rhythm and normal heart sounds.   Pulmonary/Chest: Effort normal and breath sounds normal. No respiratory distress.   Abdominal: Bowel sounds are normal. He exhibits no distension. There is tenderness (mild  diffuse tendernss to very light palpation).   Musculoskeletal: Normal range of motion.   Bandages covering venous stasis wounds in BL LE.   Swelling of right upper extremity    Neurological: He is alert and oriented to person, place, and time.   Skin: Skin is warm and dry.   Psychiatric: He has a normal mood and affect.     Vents:  Vent Mode: Spont (12/12/19 1131)  Ventilator Initiated: Yes (12/10/19 0656)  Set Rate: 18 bmp (12/12/19 0954)  Vt Set: 450 mL (12/12/19 0954)  Pressure Support: 10 cmH20 (12/12/19 1131)  PEEP/CPAP: 5 cmH20 (12/12/19 1131)  Oxygen Concentration (%): 24 (12/12/19 1910)  Peak Airway Pressure: 16 cmH2O (12/12/19 1131)  Plateau Pressure: 19 cmH20 (12/12/19 1131)  Total Ve: 12 mL (12/12/19 1131)  F/VT Ratio<105 (RSBI): (!) 68.24 (12/12/19 1131)    Lines/Drains/Airways     Peripheral Intravenous Line                 Peripheral IV - Single Lumen 12/16/19 0015 22 G Left;Posterior Hand 3 days                Significant Labs:    CBC/Anemia Profile:  Recent Labs   Lab 12/18/19  0717 12/19/19  0900   WBC 21.96* 20.66*   HGB 12.3* 12.9*   HCT 39.1* 42.5   * 400*   MCV 82 83   RDW 24.3* 24.5*        Chemistries:  Recent Labs   Lab 12/18/19  0717 12/19/19  0900   * 130*   K 4.3 3.9   CL 92* 90*   CO2 29 32*   BUN 18 18   CREATININE 1.0 1.0   CALCIUM 8.0* 8.2*   ALBUMIN 1.7* 1.8*   PROT 6.8 7.1   BILITOT 2.8* 3.0*   ALKPHOS 90 101   ALT 30 29   AST 47* 48*

## 2019-12-19 NOTE — ASSESSMENT & PLAN NOTE
44 year old male with PMH significant for non-ischemic cardiomyopathy (EF 11%), IVDU, and Hepatitis C who is presenting on 12/10 with two week duration of worsening lower extremity swelling and erythema. Patient with a complicated hospital course up until this point. Found to have a pulmonary embolism. Patient being anticoagulated. CT scan 12/18 revealed possible empyema or abscess around the right lung.     Plan  Will perform thoracentesis with possible chest tube 12/20  Will send samples for culture and lab tests  Hold apixaban  Start patient on Heparin drip and hold it at 2 AM 12/20.

## 2019-12-19 NOTE — PLAN OF CARE
Problem: Adult Inpatient Plan of Care  Goal: Patient-Specific Goal (Individualization)  Description  PmHx: chronic systolic CHF    12/9: Outside facility ED- leg swelling & dyspnea, transfer to [facility]  12/10: Code on Floor- Admit to SICU; Intubated, ECHO, BLE ultrasound, Head CT without contrast  12/11: Bronch, liver/gallbladder US  12/12: extubated to NC    Nursing:  MAP > 65          POC reviewed with pt. Questions invited and answered. Pt verbalized understanding however, voiced his displeasure. VS stable, A&Ox4. Pt had a bedside diagnostic Paracentesis today.   Outcome: Ongoing, Progressing  Flowsheets (Taken 12/19/2019 1524)  Individualized Care Needs: belly pain     Problem: Fall Injury Risk  Goal: Absence of Fall and Fall-Related Injury  Outcome: Ongoing, Progressing   Pt understands plan of care. Pt is independent however will call for help.   Problem: Adult Inpatient Plan of Care  Goal: Plan of Care Review  Outcome: Ongoing, Not Progressing  Flowsheets (Taken 12/19/2019 1524)  Plan of Care Reviewed With: patient

## 2019-12-19 NOTE — HPI
"Hospital course per primary team:    "44 y.o male presented from OSH with cholilithiasis and cholecystitis. Patient transfer to [facility] for [facility] evaluation. When patient got admitted to hospital medicine team the patient was transferred to ICU following cardiac arrest on 12/10/19. Emergently intubated during PEA arrest, ROSC achieved after 1 round of CPR. Central line and arterial line placed. Patient initially requiring pressor support, cyanotic, hypoxic on blood gas. Bedside ultrasound with signs of RV strain with DVT in lower extremity. Patient on pressors at the time, requiring 100% FiO2 to oxygenate. Given TPA emergently. Patient started on heparin ggt, bronchoscopy today revealing relatively normal airways and extubated. Most recent ultrasound did not show liver cirrhosis or cholilthiasis or cholecystitis. Patient with RLL PNA getting treated with Zosyn and later switch to Levo. Patient transitioned to Heparin gtt to 10mg BID eliquis. Patient continues to state that he feels better each day.  He does endorse increased anxiety over the last day.  He states that he used to be on treatment in the past.  Started patient on citalopram and instructed him that he will need to request follow up with psychiatry once he is discharged. Patient has continued midepigastric pain that is associated with meals. Ultrasound for mesenteric ischemia showed no signs of occlusion or stenosis."    CT scan 12/18 revealed possible empyema or abscess around the right lung. Pulmonology consulted for possible thoracentesis or chest tube placement.   "

## 2019-12-19 NOTE — PLAN OF CARE
Pt AAO*4, agitated, uncooperative. Pt refused meds, called MD at the bedside. MD explained treatment plan and importance of meds, pt yelled at the care team. Agreed to take lopressor and eliquis. Slept through night without any disturbance. Refused morning labs, notified MD. No falls or injuries reported. Will continue to monitor.

## 2019-12-19 NOTE — PT/OT/SLP PROGRESS
"Occupational Therapy      Patient Name:  Francis Daigle   MRN:  [id number]    Occupational therapy visit attempted 3x this date. Pt appears anxious and agitated each attempt. Pt declined stating, " no, leave me alone. I  Just want to sleep. My stomach hurts". Offered option to use weighted blanket however stated "maybe later". Unable to follow-up for 4th attempt. Will follow-up as scheduled.     Yessica Santana, OT  12/19/2019  "

## 2019-12-20 PROBLEM — E87.20 LACTIC ACIDOSIS: Status: RESOLVED | Noted: 2019-12-10 | Resolved: 2019-12-20

## 2019-12-20 LAB
ALBUMIN SERPL BCP-MCNC: 1.8 G/DL (ref 3.5–5.2)
ALP SERPL-CCNC: 95 U/L (ref 55–135)
ALT SERPL W/O P-5'-P-CCNC: 26 U/L (ref 10–44)
ANION GAP SERPL CALC-SCNC: 10 MMOL/L (ref 8–16)
APPEARANCE FLD: NORMAL
APTT BLDCRRT: 30.6 SEC (ref 21–32)
APTT BLDCRRT: 44.7 SEC (ref 21–32)
APTT BLDCRRT: 49.9 SEC (ref 21–32)
AST SERPL-CCNC: 40 U/L (ref 10–40)
BASOPHILS # BLD AUTO: 0.04 K/UL (ref 0–0.2)
BASOPHILS NFR BLD: 0.2 % (ref 0–1.9)
BILIRUB SERPL-MCNC: 3.3 MG/DL (ref 0.1–1)
BODY FLD TYPE: NORMAL
BUN SERPL-MCNC: 16 MG/DL (ref 6–20)
CALCIUM SERPL-MCNC: 8.5 MG/DL (ref 8.7–10.5)
CHLORIDE SERPL-SCNC: 93 MMOL/L (ref 95–110)
CO2 SERPL-SCNC: 30 MMOL/L (ref 23–29)
COLOR FLD: YELLOW
CREAT SERPL-MCNC: 0.9 MG/DL (ref 0.5–1.4)
DIFFERENTIAL METHOD: ABNORMAL
EOSINOPHIL # BLD AUTO: 0 K/UL (ref 0–0.5)
EOSINOPHIL NFR BLD: 0.2 % (ref 0–8)
ERYTHROCYTE [DISTWIDTH] IN BLOOD BY AUTOMATED COUNT: 24 % (ref 11.5–14.5)
EST. GFR  (AFRICAN AMERICAN): >60 ML/MIN/1.73 M^2
EST. GFR  (NON AFRICAN AMERICAN): >60 ML/MIN/1.73 M^2
GLUCOSE SERPL-MCNC: 89 MG/DL (ref 70–110)
GRAM STN SPEC: NORMAL
GRAM STN SPEC: NORMAL
HCT VFR BLD AUTO: 39.1 % (ref 40–54)
HGB BLD-MCNC: 11.9 G/DL (ref 14–18)
IMM GRANULOCYTES # BLD AUTO: 0.14 K/UL (ref 0–0.04)
IMM GRANULOCYTES NFR BLD AUTO: 0.7 % (ref 0–0.5)
INR PPP: 1.5 (ref 0.8–1.2)
LDH SERPL L TO P-CCNC: 343 U/L (ref 110–260)
LYMPHOCYTES # BLD AUTO: 1.7 K/UL (ref 1–4.8)
LYMPHOCYTES NFR BLD: 8 % (ref 18–48)
LYMPHOCYTES NFR FLD MANUAL: 30 %
MCH RBC QN AUTO: 25.2 PG (ref 27–31)
MCHC RBC AUTO-ENTMCNC: 30.4 G/DL (ref 32–36)
MCV RBC AUTO: 83 FL (ref 82–98)
MONOCYTES # BLD AUTO: 1.4 K/UL (ref 0.3–1)
MONOCYTES NFR BLD: 6.7 % (ref 4–15)
MONOS+MACROS NFR FLD MANUAL: 20 %
NEUTROPHILS # BLD AUTO: 17.9 K/UL (ref 1.8–7.7)
NEUTROPHILS NFR BLD: 84.2 % (ref 38–73)
NEUTROPHILS NFR FLD MANUAL: 50 %
NRBC BLD-RTO: 0 /100 WBC
PLATELET # BLD AUTO: 365 K/UL (ref 150–350)
PMV BLD AUTO: 9.4 FL (ref 9.2–12.9)
POTASSIUM SERPL-SCNC: 4.2 MMOL/L (ref 3.5–5.1)
PROT FLD-MCNC: 2.3 G/DL
PROT SERPL-MCNC: 6.9 G/DL (ref 6–8.4)
PROTHROMBIN TIME: 15.1 SEC (ref 9–12.5)
RBC # BLD AUTO: 4.73 M/UL (ref 4.6–6.2)
SODIUM SERPL-SCNC: 133 MMOL/L (ref 136–145)
SPECIMEN SOURCE: NORMAL
WBC # BLD AUTO: 21.23 K/UL (ref 3.9–12.7)
WBC # FLD: 2164 /CU MM

## 2019-12-20 PROCEDURE — 83615 LACTATE (LD) (LDH) ENZYME: CPT | Mod: 91

## 2019-12-20 PROCEDURE — 63600175 PHARM REV CODE 636 W HCPCS: Performed by: STUDENT IN AN ORGANIZED HEALTH CARE EDUCATION/TRAINING PROGRAM

## 2019-12-20 PROCEDURE — 88305 TISSUE EXAM BY PATHOLOGIST: CPT | Performed by: PATHOLOGY

## 2019-12-20 PROCEDURE — 80053 COMPREHEN METABOLIC PANEL: CPT

## 2019-12-20 PROCEDURE — 25000003 PHARM REV CODE 250: Performed by: STUDENT IN AN ORGANIZED HEALTH CARE EDUCATION/TRAINING PROGRAM

## 2019-12-20 PROCEDURE — 32551 PR TUBE THORACOSTOMY INCLUDES WATER SEAL: ICD-10-PCS | Mod: RT,,, | Performed by: INTERNAL MEDICINE

## 2019-12-20 PROCEDURE — 97535 SELF CARE MNGMENT TRAINING: CPT

## 2019-12-20 PROCEDURE — 85025 COMPLETE CBC W/AUTO DIFF WBC: CPT

## 2019-12-20 PROCEDURE — 99232 SBSQ HOSP IP/OBS MODERATE 35: CPT | Mod: ,,, | Performed by: HOSPITALIST

## 2019-12-20 PROCEDURE — 83615 LACTATE (LD) (LDH) ENZYME: CPT

## 2019-12-20 PROCEDURE — 99233 SBSQ HOSP IP/OBS HIGH 50: CPT | Mod: 25,,, | Performed by: INTERNAL MEDICINE

## 2019-12-20 PROCEDURE — 97530 THERAPEUTIC ACTIVITIES: CPT

## 2019-12-20 PROCEDURE — 36415 COLL VENOUS BLD VENIPUNCTURE: CPT

## 2019-12-20 PROCEDURE — 87116 MYCOBACTERIA CULTURE: CPT

## 2019-12-20 PROCEDURE — 85730 THROMBOPLASTIN TIME PARTIAL: CPT | Mod: 91

## 2019-12-20 PROCEDURE — 88112 CYTOPATH CELL ENHANCE TECH: CPT | Performed by: PATHOLOGY

## 2019-12-20 PROCEDURE — 87102 FUNGUS ISOLATION CULTURE: CPT

## 2019-12-20 PROCEDURE — 99233 PR SUBSEQUENT HOSPITAL CARE,LEVL III: ICD-10-PCS | Mod: 25,,, | Performed by: INTERNAL MEDICINE

## 2019-12-20 PROCEDURE — 25000242 PHARM REV CODE 250 ALT 637 W/ HCPCS: Performed by: STUDENT IN AN ORGANIZED HEALTH CARE EDUCATION/TRAINING PROGRAM

## 2019-12-20 PROCEDURE — 85730 THROMBOPLASTIN TIME PARTIAL: CPT

## 2019-12-20 PROCEDURE — 20600001 HC STEP DOWN PRIVATE ROOM

## 2019-12-20 PROCEDURE — 88112 PR  CYTOPATH, CELL ENHANCE TECH: ICD-10-PCS | Mod: 26,,, | Performed by: PATHOLOGY

## 2019-12-20 PROCEDURE — 63600175 PHARM REV CODE 636 W HCPCS

## 2019-12-20 PROCEDURE — 87206 SMEAR FLUORESCENT/ACID STAI: CPT

## 2019-12-20 PROCEDURE — 99232 PR SUBSEQUENT HOSPITAL CARE,LEVL II: ICD-10-PCS | Mod: ,,, | Performed by: HOSPITALIST

## 2019-12-20 PROCEDURE — 84157 ASSAY OF PROTEIN OTHER: CPT

## 2019-12-20 PROCEDURE — 93005 ELECTROCARDIOGRAM TRACING: CPT

## 2019-12-20 PROCEDURE — 87070 CULTURE OTHR SPECIMN AEROBIC: CPT

## 2019-12-20 PROCEDURE — 88112 CYTOPATH CELL ENHANCE TECH: CPT | Mod: 26,,, | Performed by: PATHOLOGY

## 2019-12-20 PROCEDURE — 93010 ELECTROCARDIOGRAM REPORT: CPT | Mod: ,,, | Performed by: INTERNAL MEDICINE

## 2019-12-20 PROCEDURE — 99900035 HC TECH TIME PER 15 MIN (STAT)

## 2019-12-20 PROCEDURE — 88305 TISSUE EXAM BY PATHOLOGIST: CPT | Mod: 26,,, | Performed by: PATHOLOGY

## 2019-12-20 PROCEDURE — 88305 TISSUE EXAM BY PATHOLOGIST: ICD-10-PCS | Mod: 26,,, | Performed by: PATHOLOGY

## 2019-12-20 PROCEDURE — 87205 SMEAR GRAM STAIN: CPT

## 2019-12-20 PROCEDURE — 93010 EKG 12-LEAD: ICD-10-PCS | Mod: ,,, | Performed by: INTERNAL MEDICINE

## 2019-12-20 PROCEDURE — 85610 PROTHROMBIN TIME: CPT

## 2019-12-20 PROCEDURE — 82945 GLUCOSE OTHER FLUID: CPT

## 2019-12-20 PROCEDURE — 25000003 PHARM REV CODE 250: Performed by: HOSPITALIST

## 2019-12-20 PROCEDURE — 97116 GAIT TRAINING THERAPY: CPT

## 2019-12-20 PROCEDURE — 32551 INSERTION OF CHEST TUBE: CPT | Mod: RT,,, | Performed by: INTERNAL MEDICINE

## 2019-12-20 PROCEDURE — 94761 N-INVAS EAR/PLS OXIMETRY MLT: CPT

## 2019-12-20 PROCEDURE — 94640 AIRWAY INHALATION TREATMENT: CPT

## 2019-12-20 PROCEDURE — 89051 BODY FLUID CELL COUNT: CPT

## 2019-12-20 RX ORDER — CITALOPRAM 10 MG/1
20 TABLET ORAL DAILY
Status: DISCONTINUED | OUTPATIENT
Start: 2019-12-20 | End: 2019-12-28 | Stop reason: HOSPADM

## 2019-12-20 RX ORDER — HYDROMORPHONE HYDROCHLORIDE 2 MG/ML
INJECTION, SOLUTION INTRAMUSCULAR; INTRAVENOUS; SUBCUTANEOUS
Status: COMPLETED
Start: 2019-12-20 | End: 2019-12-20

## 2019-12-20 RX ORDER — HYDROMORPHONE HYDROCHLORIDE 1 MG/ML
1 INJECTION, SOLUTION INTRAMUSCULAR; INTRAVENOUS; SUBCUTANEOUS ONCE
Status: COMPLETED | OUTPATIENT
Start: 2019-12-20 | End: 2019-12-20

## 2019-12-20 RX ORDER — HEPARIN SODIUM,PORCINE/D5W 25000/250
18 INTRAVENOUS SOLUTION INTRAVENOUS CONTINUOUS
Status: DISCONTINUED | OUTPATIENT
Start: 2019-12-20 | End: 2019-12-21

## 2019-12-20 RX ADMIN — CEFEPIME 2 G: 2 INJECTION, POWDER, FOR SOLUTION INTRAVENOUS at 10:12

## 2019-12-20 RX ADMIN — POLYETHYLENE GLYCOL 3350 17 G: 17 POWDER, FOR SOLUTION ORAL at 09:12

## 2019-12-20 RX ADMIN — HYDROMORPHONE HYDROCHLORIDE 1 MG: 1 INJECTION, SOLUTION INTRAMUSCULAR; INTRAVENOUS; SUBCUTANEOUS at 10:12

## 2019-12-20 RX ADMIN — SPIRONOLACTONE 25 MG: 25 TABLET ORAL at 10:12

## 2019-12-20 RX ADMIN — LORAZEPAM 0.5 MG: 0.5 TABLET ORAL at 11:12

## 2019-12-20 RX ADMIN — HYDROXYZINE HYDROCHLORIDE 25 MG: 25 TABLET, FILM COATED ORAL at 10:12

## 2019-12-20 RX ADMIN — FUROSEMIDE 40 MG: 40 TABLET ORAL at 10:12

## 2019-12-20 RX ADMIN — SODIUM CHLORIDE, POTASSIUM CHLORIDE, SODIUM LACTATE AND CALCIUM CHLORIDE 250 ML: 600; 310; 30; 20 INJECTION, SOLUTION INTRAVENOUS at 12:12

## 2019-12-20 RX ADMIN — POLYETHYLENE GLYCOL 3350 17 G: 17 POWDER, FOR SOLUTION ORAL at 10:12

## 2019-12-20 RX ADMIN — Medication 18 UNITS/KG/HR: at 02:12

## 2019-12-20 RX ADMIN — PANTOPRAZOLE SODIUM 40 MG: 40 TABLET, DELAYED RELEASE ORAL at 10:12

## 2019-12-20 RX ADMIN — IPRATROPIUM BROMIDE 0.5 MG: 0.5 SOLUTION RESPIRATORY (INHALATION) at 07:12

## 2019-12-20 RX ADMIN — HYDROMORPHONE HYDROCHLORIDE 2 MG: 2 INJECTION INTRAMUSCULAR; INTRAVENOUS; SUBCUTANEOUS at 10:12

## 2019-12-20 RX ADMIN — CEFEPIME 2 G: 2 INJECTION, POWDER, FOR SOLUTION INTRAVENOUS at 01:12

## 2019-12-20 RX ADMIN — CITALOPRAM HYDROBROMIDE 20 MG: 10 TABLET ORAL at 10:12

## 2019-12-20 RX ADMIN — IPRATROPIUM BROMIDE 0.5 MG: 0.5 SOLUTION RESPIRATORY (INHALATION) at 12:12

## 2019-12-20 RX ADMIN — METOPROLOL SUCCINATE 100 MG: 50 TABLET, EXTENDED RELEASE ORAL at 10:12

## 2019-12-20 RX ADMIN — ENALAPRIL MALEATE 2.5 MG: 2.5 TABLET ORAL at 10:12

## 2019-12-20 RX ADMIN — CEFEPIME 2 G: 2 INJECTION, POWDER, FOR SOLUTION INTRAVENOUS at 07:12

## 2019-12-20 NOTE — PROCEDURES
"Francis Daigle is a 44 y.o. male patient.    Temp: 97.2 °F (36.2 °C) (19 0758)  Pulse: 105 (19 1214)  Resp: 18 (19 1214)  BP: 116/73 (19 0758)  SpO2: 95 % (19 1214)  Weight: 78.5 kg (173 lb 1 oz) (19 0355)  Height: 5' 8" (172.7 cm) (12/10/19 1400)       Chest Tube Insertion  Date/Time: 2019 12:40 PM  Location procedure was performed: [facility] PULMONARY MEDICINE  Performed by: David Munoz MD  Authorized by: David Munoz MD   Assisting provider: Freda Wakefield MD  Post-operative diagnosis: right pleural effusion  Pre-operative diagnosis: right pleural effusion  Consent Done: Yes  Consent: Verbal consent obtained. Written consent obtained.  Risks and benefits: risks, benefits and alternatives were discussed  Consent given by: patient  Patient understanding: patient states understanding of the procedure being performed  Patient consent: the patient's understanding of the procedure matches consent given  Procedure consent: procedure consent matches procedure scheduled  Relevant documents: relevant documents present and verified  Test results: test results available and properly labeled  Site marked: the operative site was marked  Imaging studies: imaging studies available  Required items: required blood products, implants, devices, and special equipment available  Patient identity confirmed: , MRN and name  Time out: Immediately prior to procedure a "time out" was called to verify the correct patient, procedure, equipment, support staff and site/side marked as required.  Indications: pleural effusion  Patient sedated: no  Anesthesia: local infiltration    Anesthesia:  Local Anesthetic: lidocaine 1% without epinephrine  Anesthetic total: 8 mL  Preparation: skin prepped with ChloraPrep  Placement location: right lateral  Scalpel size: 11  Ultrasound guidance: yes  Tension pneumothorax heard: no  Tube connected to: suction  Drainage characteristics: serosanguinous " and cloudy  Drainage amount: 200 ml  Suture material: 2-0 silk  Dressing: 4x4 sterile gauze  Post-insertion x-ray findings: tube in good position  Patient tolerance: Patient tolerated the procedure well with no immediate complications  Technical procedures used: US  Significant surgical tasks conducted by the assistant(s): n/a  Complications: No  Estimated blood loss (mL): 1  Specimens: No  Implants: No          David Munoz  12/20/2019

## 2019-12-20 NOTE — ASSESSMENT & PLAN NOTE
Patient has endorsed episodic abdominal pains (8/10) in his his to upper epigastrium over the last few days.  He associates these pains with meals and has avoided food because of it.  Nothing seems to make better other than letting time pass.  Given patient's presentation with DVTs and possible PE.  Concern for possible mesenteric ischemia vs rib fracture vs GERD.   -CXR negative for any fracture  -Ultrasound with mesenteric view negative for any stenosis or occulusion   -Lactic acid WNL    Plan:  -f/u peritoneal fluid cx  -Recent CT scan showing moderate ascites  -s/p paracentesis on 12/19, which showed no SBP (WBC of 75.) SAAG of 1.1 likely 2/2 to portal HTN  -Continue pantoprazole PO 40mg   -continue to monitor

## 2019-12-20 NOTE — SUBJECTIVE & OBJECTIVE
Interval History: Per nursing, episode of anxiety overnight, will add ativan PRN for anxiety. CT abdomen showing bilateral PE, multiple clots in Jugular vein and subclavian vein, and possible empyema vs abscess of right lung. Pulmonology consulted with plan to do thora tomorrow. NPO for Midnight.     Review of Systems   Constitutional: Negative for chills and fever.   Respiratory: Negative for cough and shortness of breath.    Cardiovascular: Negative for chest pain and leg swelling.   Gastrointestinal: Positive for abdominal pain. Negative for abdominal distention.   Psychiatric/Behavioral: Negative for confusion. The patient is nervous/anxious.      Objective:     Vital Signs (Most Recent):  Temp: 97.4 °F (36.3 °C) (12/19/19 1526)  Pulse: (!) 125 (12/19/19 1526)  Resp: 17 (12/19/19 1526)  BP: 111/73 (12/19/19 1526)  SpO2: 95 % (12/19/19 1526) Vital Signs (24h Range):  Temp:  [95.1 °F (35.1 °C)-97.7 °F (36.5 °C)] 97.4 °F (36.3 °C)  Pulse:  [] 125  Resp:  [17-20] 17  SpO2:  [90 %-96 %] 95 %  BP: ()/(55-82) 111/73     Weight: 76.7 kg (169 lb 1.5 oz)  Body mass index is 25.71 kg/m².    Intake/Output Summary (Last 24 hours) at 12/19/2019 1812  Last data filed at 12/19/2019 0600  Gross per 24 hour   Intake 472 ml   Output --   Net 472 ml      Physical Exam   Constitutional: He is oriented to person, place, and time.   Patient is a 45 yo M who appears alert and anxious.    HENT:   Head: Normocephalic and atraumatic.   Eyes: Pupils are equal, round, and reactive to light. EOM are normal.   Neck: Normal range of motion.   Cardiovascular: Normal rate, regular rhythm and normal heart sounds.   Pulmonary/Chest: Effort normal and breath sounds normal. No respiratory distress.   Abdominal: Bowel sounds are normal. He exhibits no distension. There is tenderness (mild  diffuse tendernss to very light palpation).   Musculoskeletal: Normal range of motion.   Bandages covering venous stasis wounds in BL LE.   Swelling of  right upper extremity    Neurological: He is alert and oriented to person, place, and time.   Skin: Skin is warm and dry.   Psychiatric: He has a normal mood and affect.       Significant Labs:   BMP:   Recent Labs   Lab 12/19/19  0900   *   *   K 3.9   CL 90*   CO2 32*   BUN 18   CREATININE 1.0   CALCIUM 8.2*     CBC:   Recent Labs   Lab 12/18/19  0717 12/19/19  0900   WBC 21.96* 20.66*   HGB 12.3* 12.9*   HCT 39.1* 42.5   * 400*

## 2019-12-20 NOTE — ASSESSMENT & PLAN NOTE
44 year old male with PMH significant for non-ischemic cardiomyopathy (EF 11%), IVDU, and Hepatitis C who is presenting on 12/10 with two week duration of worsening lower extremity swelling and erythema. Patient with a complicated hospital course up until this point. Found to have a pulmonary embolism. Patient being anticoagulated. CT scan 12/18 revealed possible empyema or abscess around the right lung.     Plan  Chest tube placed 12/20. Connected to suction  Would restart heparin drip   If no bleeding can transition to oral anticoagulation 12/21 AM  Chest x-ray 12/21. Will monitor output. Will make a decision about TPA/Dnase 12/21

## 2019-12-20 NOTE — PLAN OF CARE
Plan is to discharge to [facility] if accepted.       12/20/19 1502   Discharge Reassessment   Assessment Type Discharge Planning Reassessment   Do you have any problems affording any of your prescribed medications? No   Discharge Plan A Long-term acute care facility (LTAC)   Discharge Plan B Rehab   DME Needed Upon Discharge  none   Anticipated Discharge Disposition LTAC

## 2019-12-20 NOTE — ASSESSMENT & PLAN NOTE
Patient with history of panic attacks in the past.  Patient describes increased worry and anxiety about his current condition and his recovery.     Plan:  -citalopram 10mg PO  -CA ativan for anxiety  -recommend patient make an appointment with psychiatry upon discharge.

## 2019-12-20 NOTE — SUBJECTIVE & OBJECTIVE
Interval History: NAEON. Patient had CT placed per pulmonology today without complications. His heparin ggt was restarted following the procedure.      Review of Systems   Constitutional: Negative for chills and fever.   Respiratory: Negative for cough and shortness of breath.    Cardiovascular: Negative for chest pain and leg swelling.   Gastrointestinal: Positive for abdominal pain. Negative for abdominal distention.   Psychiatric/Behavioral: Negative for confusion. The patient is nervous/anxious.      Objective:     Vital Signs (Most Recent):  Temp: 98.2 °F (36.8 °C) (12/20/19 1240)  Pulse: 103 (12/20/19 1240)  Resp: 20 (12/20/19 1240)  BP: 100/68 (12/20/19 1240)  SpO2: (!) 94 % (12/20/19 1240) Vital Signs (24h Range):  Temp:  [97.2 °F (36.2 °C)-98.7 °F (37.1 °C)] 98.2 °F (36.8 °C)  Pulse:  [103-132] 103  Resp:  [16-21] 20  SpO2:  [91 %-96 %] 94 %  BP: (100-119)/(68-85) 100/68     Weight: 78.5 kg (173 lb 1 oz)  Body mass index is 26.31 kg/m².    Intake/Output Summary (Last 24 hours) at 12/20/2019 1558  Last data filed at 12/19/2019 2300  Gross per 24 hour   Intake 716 ml   Output 75 ml   Net 641 ml      Physical Exam   Constitutional: He is oriented to person, place, and time.   Patient is a 45 yo M who appears alert and anxious.    HENT:   Head: Normocephalic and atraumatic.   Eyes: Pupils are equal, round, and reactive to light. EOM are normal.   Neck: Normal range of motion.   Cardiovascular: Normal rate, regular rhythm and normal heart sounds.   Pulmonary/Chest: Effort normal and breath sounds normal. No respiratory distress.   Abdominal: Bowel sounds are normal. He exhibits no distension. There is tenderness (mild tenderness to palpation).   Musculoskeletal: Normal range of motion.   Swelling of right upper extremity    Neurological: He is alert and oriented to person, place, and time.   Skin: Skin is warm and dry.   Psychiatric: He has a normal mood and affect.     Significant Labs: All pertinent labs  within the past 24 hours have been reviewed.     Recent Labs   Lab 12/18/19  0717 12/19/19  0900 12/20/19  0331   WBC 21.96* 20.66* 21.23*   HGB 12.3* 12.9* 11.9*   HCT 39.1* 42.5 39.1*   * 400* 365*   MCV 82 83 83   RDW 24.3* 24.5* 24.0*   * 130* 133*   K 4.3 3.9 4.2   CL 92* 90* 93*   CO2 29 32* 30*   BUN 18 18 16   CREATININE 1.0 1.0 0.9    112* 89   PROT 6.8 7.1 6.9   ALBUMIN 1.7* 1.8* 1.8*   BILITOT 2.8* 3.0* 3.3*   AST 47* 48* 40   ALKPHOS 90 101 95   ALT 30 29 26       Significant Imaging: I have reviewed all pertinent imaging results/findings within the past 24 hours.

## 2019-12-20 NOTE — PT/OT/SLP PROGRESS
"Physical Therapy Treatment  DISCHARGE NOTE    Patient Name:  Francis Daigle   MRN:  [id number]    Recommendations:     Discharge Recommendations:  outpatient PT   Discharge Equipment Recommendations: walker, rolling, shower chair   Barriers to discharge: None    Assessment:     Francis Daigle is a 44 y.o. male admitted with a medical diagnosis of Sepsis due to undetermined organism.  He presents with the following impairments/functional limitations:  weakness, gait instability, impaired balance.    Pt has improved cognitively.  Currently Mod I with amb, with use of RW for support.  Safe for stair negotiation.  Pt no longer requires PT services in the acute setting, however will benefit from skilled PT services in the outpatient setting to address balance deficits.      Rehab Prognosis: Good; patient would benefit from acute skilled PT services to address these deficits and reach maximum level of function.    Recent Surgery: * No surgery found *      Plan:     During this hospitalization, patient to be seen 3 x/week to address the identified rehab impairments via gait training, therapeutic activities, therapeutic exercises, neuromuscular re-education and progress toward the following goals:    · Plan of Care Expires:  01/16/20    Subjective     Chief Complaint: Thirst  Patient/Family Comments/goals: "I'll walk but I don't want to sit."  Pain/Comfort:  · Pain Rating 1: 0/10      Objective:     Communicated with nursing prior to session.  Patient found supine with telemetry, peripheral IV upon PT entry to room.     General Precautions: Standard, fall(dysphagia soft diet)   Orthopedic Precautions:N/A   Braces: N/A     Functional Mobility:  · Bed Mobility:     · Rolling Left:  independence  · Scooting: independence  · Supine to Sit: independence    · Transfers:     · Sit to Stand:  independence with no AD  · Bed to Chair: modified independence with  rolling walker  using  Stand Pivot  · Toilet Transfer: " supervision without AD using  Stand Pivot    · Gait: Pt amb >400', RW, Mod I, no episodes of LOB, mild difficulty with obstacle negotiation though able to self recover    · Balance: Mod I: dynamic standing balance with AD    · Stairs:  Pt ascended/descended 7 stair(s) with No Assistive Device with right handrail with Supervision, reciprocally.       AM-PAC 6 CLICK MOBILITY  Turning over in bed (including adjusting bedclothes, sheets and blankets)?: 4  Sitting down on and standing up from a chair with arms (e.g., wheelchair, bedside commode, etc.): 4  Moving from lying on back to sitting on the side of the bed?: 4  Moving to and from a bed to a chair (including a wheelchair)?: 4  Need to walk in hospital room?: 4  Climbing 3-5 steps with a railing?: 3  Basic Mobility Total Score: 23       Therapeutic Activities and Exercises:   Whiteboard updated  Bladder management: I with hygiene    Patient left sitting EOB with all lines intact, call button in reach and OT present.    GOALS:   Multidisciplinary Problems     Physical Therapy Goals        Problem: Physical Therapy Goal    Goal Priority Disciplines Outcome Goal Variances Interventions   Physical Therapy Goal     PT, PT/OT Ongoing, Progressing     Description:  Goals to be met by: 19     Patient will increase functional independence with mobility by performin. Sit to stand transfer with Modified [place]. - GOAL MET  2. Bed to chair transfer with Modified [place] using Rolling Walker or LRAD. - GOAL MET  3. Gait  x 250 feet with Stand-by Assistance using Rolling Walker or LRAD. - GOAL MET  4. Ascend/descend 9 stair with right Handrails Stand-by Assistance using no AD.   5. Lower extremity exercise program x 15 reps per handout, with independence.                       Time Tracking:     PT Received On: 19  PT Start Time: 1004     PT Stop Time: 1027  PT Total Time (min): 23 min     Billable Minutes: Gait Training 23    Treatment Type:  Treatment  PT/PTA: PT     PTA Visit Number: 0     Lauren Ryan, PT  12/20/2019

## 2019-12-20 NOTE — PROCEDURES
"Francis Daigle is a 44 y.o. male patient.    Temp: 97.4 °F (36.3 °C) (19 1526)  Pulse: (!) 125 (19 1526)  Resp: 17 (19 1526)  BP: 111/73 (19 1526)  SpO2: 95 % (19 1526)  Weight: 76.7 kg (169 lb 1.5 oz) (19 0607)  Height: 5' 8" (172.7 cm) (12/10/19 1400)       Paracentesis  Date/Time: 2019 6:37 PM  Location procedure was performed: [facility] MEDICINE  Performed by: Marlee Mcclendon MD  Authorized by: Marlee Mcclendon MD   Assisting provider: Manjeet Mishra MD  Pre-operative diagnosis: Abdominal Ascites  Post-operative diagnosis: Abdominal Ascites  Consent Done: Yes  Consent: Verbal consent obtained. Written consent obtained.  Consent given by: patient  Patient understanding: patient states understanding of the procedure being performed  Patient consent: the patient's understanding of the procedure matches consent given  Site marked: the operative site was marked  Patient identity confirmed:  and name  Time out: Immediately prior to procedure a "time out" was called to verify the correct patient, procedure, equipment, support staff and site/side marked as required.  Initial or subsequent exam: initial  Procedure purpose: diagnostic  Indications: secondary bacterial peritonitis  Anesthesia: local infiltration    Anesthesia:  Local anesthesia used: yes  Local Anesthetic: lidocaine 1% with epinephrine  Patient sedated: no  Preparation: Patient was prepped and draped in the usual sterile fashion.  Fluid removed: 50(ml)  Fluid appearance: clear  Technical procedures used: Sterile Technique   Complications: No  Estimated blood loss (mL): 5  Specimens: No  Implants: No          Marlee Mcclendon  2019  "

## 2019-12-20 NOTE — ASSESSMENT & PLAN NOTE
Patient with history of panic attacks in the past.  Patient describes increased worry and anxiety about his current condition and his recovery.     Plan:  -citalopram 20mg PO  -RI ativan for anxiety  -recommend patient make an appointment with psychiatry upon discharge.

## 2019-12-20 NOTE — ASSESSMENT & PLAN NOTE
Recent CT chest now showing possible empyema of right lung.    Plan:  -continue cefepime IV  -f/u pleural fluid protein, LDH, cultures, and cell count  -Chest tube placed on 12/20.    -PULMONOLOGY consulted, appreciate recommendations

## 2019-12-20 NOTE — PROGRESS NOTES
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 He has a PMH significant for non-ischemic cardiomyopathy with combined CHF (EF 11% without ICD or CRT device) and on-going IVDU. He reports a two week duration of progressively worsening bilateral lower extremity swelling and redness of the anterior shins. This is associated with bilateral lower extremity pain with ambulation. He also reports noticing symptom association with worsening of baseline SOB with exertion as well as new abdominal distension and pain, within the same time frame. He describes abdominal pain as a generalized soreness that occurs approximately one hour after eating meals and spontaneously resolves without intervention or medication. He is unsure of how long the discomfort lasts before resolving. Additionally, he reports a two week duration of occasional episodes of pale semi-formed stools. He attempted symptom relief with increasing dose of daily Lasix, however without effect, prompting presentation to ED on 12/09.     He denies symptom association with fevers, chills, nausea, vomiting, history of prior abdominal surgeries, ETOH consumption, chest pain, pain with inspiration, lightheadedness, or blurry vision. He reports a on-going non-productive cough intermittent cough since CHF diagnosis that is unchanged. He does not weigh himself daily and is unsure of any weight loss or gain. He does follow with cardiology outpatient.     Of note, he also reports continued daily use  of IV methamphetamines with most recent usage on 12/08.     ED course: He initially presented to [facility] ED on 12/09. Lab were significant for leukocytosis (25), hyponatremia (126), lactic acidosis (3.5), transaminitis (, ALT 73), hyperbilirubinemia (4.5), and toxicology positive for amphetamines. CXR was suggestive of right lower lobe pneumonia. Abdominal U/S was suggestive of acalculous cholecystitis. He was given IVF, Ceftriaxone, and Lasix. Case was discussed with transfer center and AES here; recommended transfer for possible MRCP.     Overview/Hospital Course:  Francis Daigle 44 y.o male presented from OSH with cholilithiasis and cholecystitis. Patient transfer to [facility] for AES evaluation. When patient got admitted to hospital medicine team the patient was transferred to ICU following cardiac arrest on 12/10/19. Emergently intubated during PEA arrest, ROSC achieved after 1 round of CPR. Central line and arterial line placed. Patient initially requiring pressor support, cyanotic, hypoxic on blood gas. Bedside ultrasound with signs of RV strain with DVT in lower extremity. Patient on pressors at the time, requiring 100% FiO2 to oxygenate. Given TPA emergently. Patient started on heparin ggt, bronchoscopy today revealing relatively normal airways and extubated. Most recent ultrasound did not show liver cirrhosis or cholilthiasis or cholecystitis. Patient with RLL PNA getting treated with Zosyn and later switch to Levo. Patient transitioned to Heparin gtt to 10mg BID eliquis. Cardiology will need to consulted for possible ICD or lifevest up on discharge on Monday.   Spoke with cardiology who states that he will likely need an ICD but he can follow up outpatient with cardiology for its placement.  Patient continues to state that he feels better each day.  He does endorse increased anxiety over the last day.  He states that he used to be on treatment in the past.  Started patient on citalopram and instructed  him that he will need to request follow up with psychiatry once he is discharged. Patient has continued midepigastric pain that is associated with meals. Ultrasound for mesenteric ischemia showed no signs of occlusion or stenosis.  Ordered CT scan of the abdomen with contast showing bilateral PE and empyema vs abcess. Pulmonary consulted and plan to thora tomorrow, NPO at Midnight.    Interval History: Per nursing, episode of anxiety overnight, will add ativan PRN for anxiety. CT abdomen showing bilateral PE, multiple clots in Jugular vein and subclavian vein, and possible empyema vs abscess of right lung. Pulmonology consulted with plan to do thora tomorrow. NPO for Midnight. S/p diagnostic paracentesis today to rule out SBP.    Review of Systems   Constitutional: Negative for chills and fever.   Respiratory: Negative for cough and shortness of breath.    Cardiovascular: Negative for chest pain and leg swelling.   Gastrointestinal: Positive for abdominal pain. Negative for abdominal distention.   Psychiatric/Behavioral: Negative for confusion. The patient is nervous/anxious.      Objective:     Vital Signs (Most Recent):  Temp: 97.4 °F (36.3 °C) (12/19/19 1526)  Pulse: (!) 125 (12/19/19 1526)  Resp: 17 (12/19/19 1526)  BP: 111/73 (12/19/19 1526)  SpO2: 95 % (12/19/19 1526) Vital Signs (24h Range):  Temp:  [95.1 °F (35.1 °C)-97.7 °F (36.5 °C)] 97.4 °F (36.3 °C)  Pulse:  [] 125  Resp:  [17-20] 17  SpO2:  [90 %-96 %] 95 %  BP: ()/(55-82) 111/73     Weight: 76.7 kg (169 lb 1.5 oz)  Body mass index is 25.71 kg/m².    Intake/Output Summary (Last 24 hours) at 12/19/2019 1812  Last data filed at 12/19/2019 0600  Gross per 24 hour   Intake 472 ml   Output --   Net 472 ml      Physical Exam   Constitutional: He is oriented to person, place, and time.   Patient is a 43 yo M who appears alert and anxious.    HENT:   Head: Normocephalic and atraumatic.   Eyes: Pupils are equal, round, and reactive to light. EOM are  normal.   Neck: Normal range of motion.   Cardiovascular: Normal rate, regular rhythm and normal heart sounds.   Pulmonary/Chest: Effort normal and breath sounds normal. No respiratory distress.   Abdominal: Bowel sounds are normal. He exhibits no distension. There is tenderness (mild  diffuse tendernss to very light palpation).   Musculoskeletal: Normal range of motion.   Bandages covering venous stasis wounds in BL LE.   Swelling of right upper extremity    Neurological: He is alert and oriented to person, place, and time.   Skin: Skin is warm and dry.   Psychiatric: He has a normal mood and affect.       Significant Labs:   BMP:   Recent Labs   Lab 12/19/19  0900   *   *   K 3.9   CL 90*   CO2 32*   BUN 18   CREATININE 1.0   CALCIUM 8.2*     CBC:   Recent Labs   Lab 12/18/19  0717 12/19/19  0900   WBC 21.96* 20.66*   HGB 12.3* 12.9*   HCT 39.1* 42.5   * 400*       Assessment/Plan:      * Sepsis due to undetermined organism  This is a 44 year old male with PMH significant for non-ischemic cardiomyopathy (EF 11%), IVDU, and Hepatitis C who is presenting on 12/10 with two week duration of worsening lower extremity swelling and erythema associated with same duration of post-prandial abdominal pain and distension with intermittent pale colored stools with work-up thus far concerning for tachycardia (120's), leukocytosis (25), lactic acidosis, transaminitis, hyperbilirubinemia, CXR suggestive of right lower lobe pneumonia, and abdominal U/S suggestive of possible acalculous cholecystitis. He has no symptoms of underlying respiratory infection and on chart review CXR abnormalities are similar to those in 08/2019 during admission for CHF exacerbation. UA is unremarkable. Negative for influenza. Suspect bilateral lower extremity edema and erythema more a manifestation of CHF instead of bilateral cellulitis. Differential diagnoses include biliary etiology vs bacteremia in the setting of on-going IVDU vs  possible CAP.     Plan:   -continue Levofloxacin PO   -Blood cultures NGTD  -Respiratory culture growing GNR and Candida with persistently elevated WBC. Candida in respiratory does not need to get treated.   -Trend WBC and liver function daily. Trending down and continue to monitor.   -Judiciously administer an IVF resuscitation necessary.     Empyema  Recent CT chest now showing possible empyema of right lung.    Plan:  -continue cefepime  -Pulm consulted, plan for thora tomorrow  -NPO, hold heparin at 2AM tomorrow      Abdominal pain  Patient has endorsed episodic abdominal pains (8/10) in his his to upper epigastrium over the last few days.  He associates these pains with meals and has avoided food because of it.  Nothing seems to make better other than letting time pass.  Given patient's presentation with DVTs and possible PE.  Concern for possible mesenteric ischemia vs rib fracture vs GERD.   -CXR negative for any fracture  -Ultrasound with mesenteric view negative for any stenosis or occulusion   -Lactic acid WNL    Plan:  -Recent CT scan showing moderate ascites  -s/p paracentesis on 12/19 to r/o possible SBP  -Continue pantoprazole PO 40mg   -continue to monitor       Anxiety  Patient with history of panic attacks in the past.  Patient describes increased worry and anxiety about his current condition and his recovery.     Plan:  -citalopram 10mg PO  -FL ativan for anxiety  -recommend patient make an appointment with psychiatry upon discharge.     Wheezing  History of 40PK year. Mild wheeze throughout lung field. Sating well in room air  - continue Ipotroprium Neb Q8H for wheezing       Acute massive pulmonary embolism  DVT lower extremity probable origin of PE. Patient was started on heparin gtt  - right upper extremity arm US showing edema and no clots.     Plan  -eliquis transitioned to heparin gtt for thora tomorrow       Acute hypoxemic respiratory failure        Acute deep vein thrombosis (DVT) of  proximal vein of lower extremity  - See acute massive pulm embolism       Cardiac arrest  PEA arrest, 2 round of EPI and CPR achieved ROSC.         Hyponatremia  -See assessment for CHF exacerbation.       Chronic hepatitis C without hepatic coma  - F/U Pending Hep C Viral load   - Will need to set up with hepatology for treatment as outpatient       Non-ischemic cardiomyopathy  -See assessment for sepsis.       Transaminitis  -See assessment for sepsis.   MELD-Na score: 23 at 12/18/2019  7:17 AM  MELD score: 16 at 12/18/2019  7:17 AM  Calculated from:  Serum Creatinine: 1.0 mg/dL at 12/18/2019  7:17 AM  Serum Sodium: 128 mmol/L at 12/18/2019  7:17 AM  Total Bilirubin: 2.8 mg/dL at 12/18/2019  7:17 AM  INR(ratio): 1.7 at 12/18/2019  7:17 AM  Age: 44 years        Intravenous drug abuse  -On-going use of methamphetamines.   -Prior hepatitis screening positive for HepC in 10/2019.     Plan:   -Counseled on importance of cessation.   -Screening for HIV ordered.   -Monitor for signs of withdrawal for other substances.       Community acquired pneumonia of right lower lobe of lung  -See assessment for sepsis.       Acute on chronic combined systolic and diastolic heart failure  History of non-ischemic cardiomyopathy diagnosed in 11/2017 at [facility].  [facility] in 12/2017 with no evidence of angiographically significant coronary artery disease.  Most recent ECHO in 10/2019 concerning for EF of 35%, grade II diastolic dysfunction, and global hypokinesis with a restrictive physiology. HOME regimen: Carvedilol, Enalapril, Lasix 20 mg BID, and Spironolactone.   .-Suspect presentation from worsening right sided heart failure.     Plan:   - continue HOME regimen: Carvedilol, Enalapril, and Spironolactone  - changed lasix to 40mg PO BID  - continue Toprol XL 100mg QD upon discharge    -Strict I/O's and daily standing weights.   -Telemetry ordered.   -Cardiology recommends outpatient EP follow up for ICD placement for primary prevention.   They do not recommending placing a life vest or ICD inpatient as patient's cardiac arrest during hospitalization was due to a PE and not a shockable rhythm.          Other specified anxiety disorders  -Continue home Ativan PRN.       Essential hypertension  -See assessment for CHF exacerbation.       VTE Risk Mitigation (From admission, onward)         Ordered     heparin 25,000 units in dextrose 5% 250 mL (100 units/mL) infusion HIGH INTENSITY nomogram - [facility]  Continuous     Question:  Heparin Infusion Adjustment (DO NOT MODIFY ANSWER)  Answer:  [email or web address]\Images\Pharmacy\HeparinInfusions\heparin HIGH INTENSITY nomogram for [facility] [id number]    12/19/19 0909     heparin 25,000 units in dextrose 5% (100 units/ml) IV bolus from bag - ADDITIONAL PRN BOLUS - 60 units/kg  As needed (PRN)     Question:  Heparin Infusion Adjustment (DO NOT MODIFY ANSWER)  Answer:  [email or web address]\Images\Pharmacy\HeparinInfusions\heparin HIGH INTENSITY nomogram for [facility] [id number]    12/19/19 0909     heparin 25,000 units in dextrose 5% (100 units/ml) IV bolus from bag - ADDITIONAL PRN BOLUS - 30 units/kg  As needed (PRN)     Question:  Heparin Infusion Adjustment (DO NOT MODIFY ANSWER)  Answer:  [email or web address]\Images\Pharmacy\HeparinInfusions\heparin HIGH INTENSITY nomogram for [facility] [id number]    12/19/19 0909     IP VTE HIGH RISK PATIENT  Once      12/10/19 0922                      Marlee Mcclendon MD  Department of Hospital Medicine   [facility]

## 2019-12-20 NOTE — ASSESSMENT & PLAN NOTE
DVT lower extremity probable origin of PE. Patient was started on heparin gtt  - right upper extremity arm US showing edema and no clots.     Plan  -eliquis transitioned to heparin gtt post CT placement

## 2019-12-20 NOTE — ASSESSMENT & PLAN NOTE
DVT lower extremity probable origin of PE. Patient was started on heparin gtt  - right upper extremity arm US showing edema and no clots.     Plan  -eliquis transitioned to heparin gtt for thora tomorrow

## 2019-12-20 NOTE — PROGRESS NOTES
[facility] Hospital Medicine  Progress Note    Patient Name: Francis Daigle  MRN: [id number]  Patient Class: IP- Inpatient   Admission Date: 12/10/2019  Length of Stay: 10 days  Attending Physician: David Potts MD  Primary Care Provider: Primary Doctor [facility] Medicine Team: 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 He has a PMH significant for non-ischemic cardiomyopathy with combined CHF (EF 11% without ICD or CRT device) and on-going IVDU. He reports a two week duration of progressively worsening bilateral lower extremity swelling and redness of the anterior shins. This is associated with bilateral lower extremity pain with ambulation. He also reports noticing symptom association with worsening of baseline SOB with exertion as well as new abdominal distension and pain, within the same time frame. He describes abdominal pain as a generalized soreness that occurs approximately one hour after eating meals and spontaneously resolves without intervention or medication. He is unsure of how long the discomfort lasts before resolving. Additionally, he reports a two week duration of occasional episodes of pale semi-formed stools. He attempted symptom relief with increasing dose of daily Lasix, however without effect, prompting presentation to ED on 12/09.     He denies symptom association with fevers, chills, nausea, vomiting, history of prior abdominal surgeries, ETOH consumption, chest pain, pain with inspiration, lightheadedness, or blurry vision. He reports a on-going non-productive cough intermittent cough since CHF diagnosis that is unchanged. He does not weigh himself daily and is unsure of any weight loss or gain. He does follow with cardiology outpatient.     Of note, he also reports continued daily use of  IV methamphetamines with most recent usage on 12/08.     ED course: He initially presented to [place] ED on 12/09. Lab were significant for leukocytosis (25), hyponatremia (126), lactic acidosis (3.5), transaminitis (, ALT 73), hyperbilirubinemia (4.5), and toxicology positive for amphetamines. CXR was suggestive of right lower lobe pneumonia. Abdominal U/S was suggestive of acalculous cholecystitis. He was given IVF, Ceftriaxone, and Lasix. Case was discussed with transfer center and AES here; recommended transfer for possible MRCP.     Overview/Hospital Course:  Francis Daigle 44 y.o male presented from OSH with cholilithiasis and cholecystitis. Patient transfer to [place] for AES evaluation. When patient got admitted to hospital medicine team the patient was transferred to ICU following cardiac arrest on 12/10/19. Emergently intubated during PEA arrest, ROSC achieved after 1 round of CPR. Central line and arterial line placed. Patient initially requiring pressor support, cyanotic, hypoxic on blood gas. Bedside ultrasound with signs of RV strain with DVT in lower extremity. Patient on pressors at the time, requiring 100% FiO2 to oxygenate. Given TPA emergently. Patient started on heparin ggt, bronchoscopy today revealing relatively normal airways and extubated. Most recent ultrasound did not show liver cirrhosis or cholilthiasis or cholecystitis. Patient with RLL PNA getting treated with Zosyn and later switch to Levo. Patient transitioned to Heparin gtt to 10mg BID eliquis. Cardiology will need to consulted for possible ICD or lifevest up on discharge on Monday.   Spoke with cardiology who states that he will likely need an ICD but he can follow up outpatient with cardiology for its placement.  Patient continues to state that he feels better each day.  He does endorse increased anxiety over the last day.  He states that he used to be on treatment in the past.  Started patient on citalopram and instructed  him that he will need to request follow up with psychiatry once he is discharged. Patient has continued midepigastric pain that is associated with meals. Ultrasound for mesenteric ischemia showed no signs of occlusion or stenosis.  Ordered CT scan of the abdomen with contast showing bilateral PE and empyema vs abcess. Pulmonary consulted and plan to CT tomorrow, NPO at Midnight.  Chest tube was preformed today and fluids sent for cultures and cytology.  Patient noted to be tachycardic overnight up to 130s; ECG in the AM noted sinus tachycardia.  His HR slowed to 98 by lunch time and a small 250 mL bolus of LR.  Re-started heparin drip following the chest tube placement per pulmonology recommendations.     Interval History: NAEON. Patient had CT placed per pulmonology today without complications. His heparin ggt was restarted following the procedure.      Review of Systems   Constitutional: Negative for chills and fever.   Respiratory: Negative for cough and shortness of breath.    Cardiovascular: Negative for chest pain and leg swelling.   Gastrointestinal: Positive for abdominal pain. Negative for abdominal distention.   Psychiatric/Behavioral: Negative for confusion. The patient is nervous/anxious.      Objective:     Vital Signs (Most Recent):  Temp: 98.2 °F (36.8 °C) (12/20/19 1240)  Pulse: 103 (12/20/19 1240)  Resp: 20 (12/20/19 1240)  BP: 100/68 (12/20/19 1240)  SpO2: (!) 94 % (12/20/19 1240) Vital Signs (24h Range):  Temp:  [97.2 °F (36.2 °C)-98.7 °F (37.1 °C)] 98.2 °F (36.8 °C)  Pulse:  [103-132] 103  Resp:  [16-21] 20  SpO2:  [91 %-96 %] 94 %  BP: (100-119)/(68-85) 100/68     Weight: 78.5 kg (173 lb 1 oz)  Body mass index is 26.31 kg/m².    Intake/Output Summary (Last 24 hours) at 12/20/2019 1558  Last data filed at 12/19/2019 2300  Gross per 24 hour   Intake 716 ml   Output 75 ml   Net 641 ml      Physical Exam   Constitutional: He is oriented to person, place, and time.   Patient is a 43 yo M who appears  alert and anxious.    HENT:   Head: Normocephalic and atraumatic.   Eyes: Pupils are equal, round, and reactive to light. EOM are normal.   Neck: Normal range of motion.   Cardiovascular: Normal rate, regular rhythm and normal heart sounds.   Pulmonary/Chest: Effort normal and breath sounds normal. No respiratory distress.   Abdominal: Bowel sounds are normal. He exhibits no distension. There is tenderness (mild tenderness to palpation).   Musculoskeletal: Normal range of motion.   Swelling of right upper extremity    Neurological: He is alert and oriented to person, place, and time.   Skin: Skin is warm and dry.   Psychiatric: He has a normal mood and affect.     Significant Labs: All pertinent labs within the past 24 hours have been reviewed.     Recent Labs   Lab 12/18/19  0717 12/19/19  0900 12/20/19  0331   WBC 21.96* 20.66* 21.23*   HGB 12.3* 12.9* 11.9*   HCT 39.1* 42.5 39.1*   * 400* 365*   MCV 82 83 83   RDW 24.3* 24.5* 24.0*   * 130* 133*   K 4.3 3.9 4.2   CL 92* 90* 93*   CO2 29 32* 30*   BUN 18 18 16   CREATININE 1.0 1.0 0.9    112* 89   PROT 6.8 7.1 6.9   ALBUMIN 1.7* 1.8* 1.8*   BILITOT 2.8* 3.0* 3.3*   AST 47* 48* 40   ALKPHOS 90 101 95   ALT 30 29 26       Significant Imaging: I have reviewed all pertinent imaging results/findings within the past 24 hours.         Assessment/Plan:      * Sepsis due to undetermined organism  This is a 44 year old male with PMH significant for non-ischemic cardiomyopathy (EF 11%), IVDU, and Hepatitis C who is presenting on 12/10 with two week duration of worsening lower extremity swelling and erythema associated with same duration of post-prandial abdominal pain and distension with intermittent pale colored stools with work-up thus far concerning for tachycardia (120's), leukocytosis (25), lactic acidosis, transaminitis, hyperbilirubinemia, CXR suggestive of right lower lobe pneumonia, and abdominal U/S suggestive of possible acalculous  cholecystitis. He has no symptoms of underlying respiratory infection and on chart review CXR abnormalities are similar to those in 08/2019 during admission for CHF exacerbation. UA is unremarkable. Negative for influenza. Suspect bilateral lower extremity edema and erythema more a manifestation of CHF instead of bilateral cellulitis. Differential diagnoses include biliary etiology vs bacteremia in the setting of on-going IVDU vs possible CAP.   -CT scan of chest 12/20: pulmonary abscess involving the right lower lobe, as well as additional areas bilaterally that could represent areas of developing pulmonary abscesses.  Loculated fluid collection.     Plan:   -continue cefepime IV    -Blood cultures NGTD  -Respiratory culture growing GNR and Candida with persistently elevated WBC. Candida in respiratory does not need to get treated.   -Trend WBC and liver function daily.   -Judiciously administer an IVF resuscitation necessary.     Empyema  Recent CT chest now showing possible empyema of right lung.    Plan:  -continue cefepime IV  -f/u pleural fluid protein, LDH, cultures, and cell count  -Chest tube placed on 12/20.    -PULMONOLOGY consulted, appreciate recommendations        Abdominal pain  Patient has endorsed episodic abdominal pains (8/10) in his his to upper epigastrium over the last few days.  He associates these pains with meals and has avoided food because of it.  Nothing seems to make better other than letting time pass.  Given patient's presentation with DVTs and possible PE.  Concern for possible mesenteric ischemia vs rib fracture vs GERD.   -CXR negative for any fracture  -Ultrasound with mesenteric view negative for any stenosis or occulusion   -Lactic acid WNL    Plan:  -f/u peritoneal fluid cx  -Recent CT scan showing moderate ascites  -s/p paracentesis on 12/19, which showed no SBP (WBC of 75.) SAAG of 1.1 likely 2/2 to portal HTN  -Continue pantoprazole PO 40mg   -continue to monitor        Anxiety  Patient with history of panic attacks in the past.  Patient describes increased worry and anxiety about his current condition and his recovery.     Plan:  -citalopram 20mg PO  -DC ativan for anxiety  -recommend patient make an appointment with psychiatry upon discharge.     Wheezing  History of 40PK year. Mild wheeze throughout lung field. Sating well in room air  - continue Ipotroprium Neb Q8H for wheezing       Acute massive pulmonary embolism  DVT lower extremity probable origin of PE. Patient was started on heparin gtt  - right upper extremity arm US showing edema and no clots.     Plan  -eliquis transitioned to heparin gtt post CT placement      Acute deep vein thrombosis (DVT) of proximal vein of lower extremity  - See acute massive pulm embolism       Cardiac arrest  PEA arrest, 2 round of EPI and CPR achieved ROSC.         Hyponatremia  -See assessment for CHF exacerbation.       Chronic hepatitis C without hepatic coma  - F/U Pending Hep C Viral load   - Will need to set up with hepatology for treatment as outpatient       Non-ischemic cardiomyopathy  -See assessment for sepsis.       Transaminitis  -See assessment for sepsis.   MELD-Na score: 18 at 12/20/2019  3:31 AM  MELD score: 15 at 12/20/2019  3:31 AM  Calculated from:  Serum Creatinine: 0.9 mg/dL (Rounded to 1 mg/dL) at 12/20/2019  3:31 AM  Serum Sodium: 133 mmol/L at 12/20/2019  3:31 AM  Total Bilirubin: 3.3 mg/dL at 12/20/2019  3:31 AM  INR(ratio): 1.5 at 12/20/2019  3:31 AM  Age: 44 years        Intravenous drug abuse  -On-going use of methamphetamines.   -Prior hepatitis screening positive for HepC in 10/2019.     Plan:   -Counseled on importance of cessation.   -Screening for HIV ordered.   -Monitor for signs of withdrawal for other substances.       Community acquired pneumonia of right lower lobe of lung  -See assessment for sepsis.       Acute on chronic combined systolic and diastolic heart failure  History of non-ischemic  cardiomyopathy diagnosed in 11/2017 at [facility].  [facility] in 12/2017 with no evidence of angiographically significant coronary artery disease.  Most recent ECHO in 10/2019 concerning for EF of 35%, grade II diastolic dysfunction, and global hypokinesis with a restrictive physiology. HOME regimen: Carvedilol, Enalapril, Lasix 20 mg BID, and Spironolactone.   .-Suspect presentation from worsening right sided heart failure.     Plan:   - continue HOME regimen: Carvedilol, Enalapril, and Spironolactone  - changed lasix to 40mg PO BID  - continue Toprol XL 100mg QD   -Strict I/O's and daily standing weights.   -Telemetry ordered.   -Cardiology recommends outpatient EP follow up for ICD placement for primary prevention.  They do not recommending placing a life vest or ICD inpatient as patient's cardiac arrest during hospitalization was due to a PE and not a shockable rhythm.          Other specified anxiety disorders  -Continue home Ativan PRN.       Essential hypertension  -See assessment for CHF exacerbation.         VTE Risk Mitigation (From admission, onward)         Ordered     heparin 25,000 units in dextrose 5% 250 mL (100 units/mL) infusion HIGH INTENSITY nomogram - [facility]  Continuous     Question:  Heparin Infusion Adjustment (DO NOT MODIFY ANSWER)  Answer:  [email or web address]\Images\Pharmacy\HeparinInfusions\heparin HIGH INTENSITY nomogram for [facility] [id number]    12/20/19 1407     heparin 25,000 units in dextrose 5% 250 mL (100 units/mL) infusion HIGH INTENSITY nomogram - [facility]  Continuous     Question:  Heparin Infusion Adjustment (DO NOT MODIFY ANSWER)  Answer:  \[email or web address]\[email or web address]\Images\Pharmacy\HeparinInfusions\heparin HIGH INTENSITY nomogram for [facility] [id number]    12/19/19 0909     heparin 25,000 units in dextrose 5% (100 units/ml) IV bolus from bag - ADDITIONAL PRN BOLUS - 60 units/kg  As needed (PRN)     Question:  Heparin Infusion Adjustment (DO NOT MODIFY ANSWER)  Answer:   [email or web address]\epic\Images\Pharmacy\HeparinInfusions\heparin HIGH INTENSITY nomogram for [facility] [id number]    12/19/19 0909     heparin 25,000 units in dextrose 5% (100 units/ml) IV bolus from bag - ADDITIONAL PRN BOLUS - 30 units/kg  As needed (PRN)     Question:  Heparin Infusion Adjustment (DO NOT MODIFY ANSWER)  Answer:  [email or web address]\[email or web address]\Images\Pharmacy\HeparinInfusions\heparin HIGH INTENSITY nomogram for [facility] [id number]    12/19/19 0909     IP VTE HIGH RISK PATIENT  Once      12/10/19 0922                      Bradly Guzman MD  Department of Hospital Medicine   [facility]

## 2019-12-20 NOTE — SUBJECTIVE & OBJECTIVE
Interval History: NAEON. Chest tube placed 12/20. Patient tolerated procedure well. Connected to suction. Will see how much it drains.     Objective:     Vital Signs (Most Recent):  Temp: 98.2 °F (36.8 °C) (12/20/19 1240)  Pulse: 103 (12/20/19 1240)  Resp: 20 (12/20/19 1240)  BP: 100/68 (12/20/19 1240)  SpO2: (!) 94 % (12/20/19 1240) Vital Signs (24h Range):  Temp:  [97.2 °F (36.2 °C)-98.7 °F (37.1 °C)] 98.2 °F (36.8 °C)  Pulse:  [103-132] 103  Resp:  [16-21] 20  SpO2:  [91 %-96 %] 94 %  BP: (100-119)/(68-85) 100/68     Weight: 78.5 kg (173 lb 1 oz)  Body mass index is 26.31 kg/m².      Intake/Output Summary (Last 24 hours) at 12/20/2019 1244  Last data filed at 12/19/2019 2300  Gross per 24 hour   Intake 716 ml   Output 75 ml   Net 641 ml       Physical Exam   Constitutional: He is oriented to person, place, and time. He appears well-developed and well-nourished.   HENT:   Head: Normocephalic and atraumatic.   Eyes: Pupils are equal, round, and reactive to light. EOM are normal.   Neck: Normal range of motion.   Cardiovascular: Normal rate, regular rhythm and normal heart sounds.   Pulmonary/Chest: Effort normal.   Chest tube in place. Decreased breath sounds throughout left lung field   Abdominal: Bowel sounds are normal. He exhibits no distension. There is tenderness (mild  diffuse tendernss to very light palpation).   Musculoskeletal: Normal range of motion.   Neurological: He is alert and oriented to person, place, and time.   Skin: Skin is warm and dry.   Psychiatric: He has a normal mood and affect.       Vents:  Vent Mode: Spont (12/12/19 1131)  Ventilator Initiated: Yes (12/10/19 0656)  Set Rate: 18 bmp (12/12/19 0954)  Vt Set: 450 mL (12/12/19 0954)  Pressure Support: 10 cmH20 (12/12/19 1131)  PEEP/CPAP: 5 cmH20 (12/12/19 1131)  Oxygen Concentration (%): 24 (12/12/19 1910)  Peak Airway Pressure: 16 cmH2O (12/12/19 1131)  Plateau Pressure: 19 cmH20 (12/12/19 1131)  Total Ve: 12 mL (12/12/19 1131)  F/VT  Ratio<105 (RSBI): (!) 68.24 (12/12/19 1131)    Lines/Drains/Airways     Peripheral Intravenous Line                 Peripheral IV - Single Lumen 12/16/19 0015 22 G Left;Posterior Hand 4 days                Significant Labs:    CBC/Anemia Profile:  Recent Labs   Lab 12/19/19  0900 12/20/19  0331   WBC 20.66* 21.23*   HGB 12.9* 11.9*   HCT 42.5 39.1*   * 365*   MCV 83 83   RDW 24.5* 24.0*        Chemistries:  Recent Labs   Lab 12/19/19  0900 12/20/19  0331   * 133*   K 3.9 4.2   CL 90* 93*   CO2 32* 30*   BUN 18 16   CREATININE 1.0 0.9   CALCIUM 8.2* 8.5*   ALBUMIN 1.8* 1.8*   PROT 7.1 6.9   BILITOT 3.0* 3.3*   ALKPHOS 101 95   ALT 29 26   AST 48* 40

## 2019-12-20 NOTE — ASSESSMENT & PLAN NOTE
History of non-ischemic cardiomyopathy diagnosed in 11/2017 at [facility].  [facility] in 12/2017 with no evidence of angiographically significant coronary artery disease.  Most recent ECHO in 10/2019 concerning for EF of 35%, grade II diastolic dysfunction, and global hypokinesis with a restrictive physiology. HOME regimen: Carvedilol, Enalapril, Lasix 20 mg BID, and Spironolactone.   .-Suspect presentation from worsening right sided heart failure.     Plan:   - continue HOME regimen: Carvedilol, Enalapril, and Spironolactone  - changed lasix to 40mg PO BID  - continue Toprol XL 100mg QD   -Strict I/O's and daily standing weights.   -Telemetry ordered.   -Cardiology recommends outpatient EP follow up for ICD placement for primary prevention.  They do not recommending placing a life vest or ICD inpatient as patient's cardiac arrest during hospitalization was due to a PE and not a shockable rhythm.

## 2019-12-20 NOTE — ASSESSMENT & PLAN NOTE
History of non-ischemic cardiomyopathy diagnosed in 11/2017 at [facility].  [facility] in 12/2017 with no evidence of angiographically significant coronary artery disease.  Most recent ECHO in 10/2019 concerning for EF of 35%, grade II diastolic dysfunction, and global hypokinesis with a restrictive physiology. HOME regimen: Carvedilol, Enalapril, Lasix 20 mg BID, and Spironolactone.   .-Suspect presentation from worsening right sided heart failure.     Plan:   - continue HOME regimen: Carvedilol, Enalapril, and Spironolactone  - changed lasix to 40mg PO BID  - continue Toprol XL 100mg QD upon discharge    -Strict I/O's and daily standing weights.   -Telemetry ordered.   -Cardiology recommends outpatient EP follow up for ICD placement for primary prevention.  They do not recommending placing a life vest or ICD inpatient as patient's cardiac arrest during hospitalization was due to a PE and not a shockable rhythm.

## 2019-12-20 NOTE — PLAN OF CARE
Problem: Occupational Therapy Goal  Goal: Occupational Therapy Goal  Description  Goals to be met by: 12/30     Patient will increase functional independence with ADLs by performing:    UE Dressing with [facility]. Met 12/20  LE Dressing with [facility].  Grooming while standing at sink with independence.  Toileting from toilet with Contact Guard Assistance for hygiene and clothing management. Met 12/20  Revised: Toileting from toilet with independence.      Outcome: Ongoing, Progressing   Pt near functional baseline. Pt demo's improved overall functional mobility/assistance level with ADL and higher level cognitive function. Pt scored 27/30 ( normal) on MoCA. Not appropriate for IP rehab however would benefit from OPOT upon discharge. Will need a driving evaluation after d/c.  Yessica Santana OT  12/20/2019

## 2019-12-20 NOTE — ASSESSMENT & PLAN NOTE
-See assessment for sepsis.   MELD-Na score: 18 at 12/20/2019  3:31 AM  MELD score: 15 at 12/20/2019  3:31 AM  Calculated from:  Serum Creatinine: 0.9 mg/dL (Rounded to 1 mg/dL) at 12/20/2019  3:31 AM  Serum Sodium: 133 mmol/L at 12/20/2019  3:31 AM  Total Bilirubin: 3.3 mg/dL at 12/20/2019  3:31 AM  INR(ratio): 1.5 at 12/20/2019  3:31 AM  Age: 44 years

## 2019-12-20 NOTE — PLAN OF CARE
12/20/19 1522   Post-Acute Status   Post-Acute Authorization Placement  ([facility])   Post-Acute Placement Status Referrals Sent   Discharge Delays None known at this time

## 2019-12-20 NOTE — PLAN OF CARE
"Pt AAO*4, restless, cooperative at times. Pt refused remeron saying " it gives me bad dreams," pt refused insertion of new IV. NPO from midnight for thoracentesis today. Heparin stopped at 0200 per MD orders. Administered ABX per MD orders. No falls or injuries reported. Heart rate constantly at 130's. Will continue to monitor.   "

## 2019-12-20 NOTE — ASSESSMENT & PLAN NOTE
Recent CT chest now showing possible empyema of right lung.    Plan:  -continue cefepime  -Pulm consulted, plan for thora tomorrow  -NPO, hold heparin at 2AM tomorrow

## 2019-12-20 NOTE — PT/OT/SLP PROGRESS
Occupational Therapy   Treatment / POC CHANGE    Name: Francis Daigle  MRN: [id number]  Admitting Diagnosis:  Sepsis due to undetermined organism       Recommendations:     Discharge Recommendations: outpatient OT  Discharge Equipment Recommendations:  shower chair, walker, rolling  Barriers to discharge:  None    Assessment:     Francis Daigle is a 44 y.o. male with a medical diagnosis of Sepsis due to undetermined organism.  He presents alert and motivated to participate in therapy session. Upon arrival, pt found seated EOB with no complaints of pain this date. Performance deficits affecting function are weakness, impaired endurance. Pt had made significant progress with goals and not appropriate for IP rehab 2/2 high level of functioning. He completed multiple higher level functional tasks and MoCA performed ( 27/30- normal) and displays no need for IP rehab at this time. Pt would benefit from OPOT and driving evaluation upon discharge from [facility] [place].     Rehab Prognosis:  Good; patient would benefit from acute skilled OT services to address these deficits and reach maximum level of function.       Plan:     Patient to be seen 2 x/week to address the above listed problems via self-care/home management, therapeutic activities, therapeutic exercises, neuromuscular re-education  · Plan of Care Expires: 01/15/20  · Plan of Care Reviewed with: patient    Subjective     Pain/Comfort:  · Pain Rating 1: 0/10  · Pain Rating Post-Intervention 1: 0/10    Objective:     Communicated with: RN prior to session.  Patient found seated EOB with chest tube, telemetry upon OT entry to room.    General Precautions: Standard, fall   Orthopedic Precautions:N/A   Braces: N/A     Occupational Performance:     Bed Mobility:    · NT, pt found seated EOB    Functional Mobility/Transfers:  · Patient completed Sit <> Stand Transfer with supervision  with  no assistive device   · Functional Mobility: Pt completed functional mobility in  [facility] ( [facility] distance) with RW and supervision. He demo'd mild increase in WOB however tolerated well. Pt did not appear anxious and did not require cues for overall safety.     Activities of Daily Living:  · Feeding:  independence to feed self while seated EOB  · Upper Body Dressing: stand by assistance to tory gown like jacket while standing at sink  · Toileting: supervision to complete toilting while standing in front of toilet    * Pt able to look up bank account in phone-- pt wanted to see if he had enough money to buy drink from vending machine ( pt completed activity without assistance provided)  * pt able to utilize vending machine without cues for sequencing     OT completed [place] Cognitive Assessment  Form A (MOCA) on this date. Pt was cooperative and completed form placed into paper chart in [facility]. General scores as follows:  - Visuospatial/Executive functionin/5  - Naming: 3/3  - Memory: Able to immediately recall 5/5 (no points)  - Attention: /  - Language: 3/3   - Abstraction: 2/2  - Delayed recall: /5  - Orientation: /  Total: 27/ (2630 = normal)    [facility] 6 Click ADL: 22    Treatment & Education:  -Pt edu on OT role/POC  -Importance of OOB activity with staff assistance  -Safety during functional t/f and mobility  -White board updated  - Multiple self care tasks completed--as noted above  - All questions/concerns answered within OT scope of practice      Patient left seated EOB with all lines intact, call button in reach and RN notifiedEducation:      GOALS:   Multidisciplinary Problems     Occupational Therapy Goals        Problem: Occupational Therapy Goal    Goal Priority Disciplines Outcome Interventions   Occupational Therapy Goal     OT, PT/OT Ongoing, Progressing    Description:  Goals to be met by:      Patient will increase functional independence with ADLs by performing:    UE Dressing with [place]. Met   LE Dressing with [place].  Grooming while  standing at sink with independence.  Toileting from toilet with Contact Guard Assistance for hygiene and clothing management. Met 12/20  Revised: Toileting from toilet with independence.                       Time Tracking:     OT Date of Treatment: 12/20/19  OT Start Time: 1120  OT Stop Time: 1153  OT Total Time (min): 33 min    Billable Minutes:Self Care/Home Management 20  Therapeutic Activity 13    Yessica Santana, OT  12/20/2019

## 2019-12-20 NOTE — PLAN OF CARE
Problem: Physical Therapy Goal  Goal: Physical Therapy Goal  Description  Goals to be met by: 19     Patient will increase functional independence with mobility by performin. Sit to stand transfer with Modified [place]. - GOAL MET  2. Bed to chair transfer with Modified [place] using Rolling Walker or LRAD. - GOAL MET  3. Gait  x 250 feet with Stand-by Assistance using Rolling Walker or LRAD. - GOAL MET  4. Ascend/descend 9 stair with right Handrails Stand-by Assistance using no AD.   5. Lower extremity exercise program x 15 reps per handout, with independence.      Outcome: Ongoing, Progressing     Pt met 3 goals at time of PT discharge.

## 2019-12-20 NOTE — ASSESSMENT & PLAN NOTE
-On-going use of methamphetamines.   -Prior hepatitis screening positive for HepC in 10/2019.     Plan:   -Counseled on importance of cessation.   -Screening for HIV ordered.   -Monitor for signs of withdrawal for other substances.

## 2019-12-20 NOTE — ASSESSMENT & PLAN NOTE
This is a 44 year old male with PMH significant for non-ischemic cardiomyopathy (EF 11%), IVDU, and Hepatitis C who is presenting on 12/10 with two week duration of worsening lower extremity swelling and erythema associated with same duration of post-prandial abdominal pain and distension with intermittent pale colored stools with work-up thus far concerning for tachycardia (120's), leukocytosis (25), lactic acidosis, transaminitis, hyperbilirubinemia, CXR suggestive of right lower lobe pneumonia, and abdominal U/S suggestive of possible acalculous cholecystitis. He has no symptoms of underlying respiratory infection and on chart review CXR abnormalities are similar to those in 08/2019 during admission for CHF exacerbation. UA is unremarkable. Negative for influenza. Suspect bilateral lower extremity edema and erythema more a manifestation of CHF instead of bilateral cellulitis. Differential diagnoses include biliary etiology vs bacteremia in the setting of on-going IVDU vs possible CAP.   -CT scan of chest 12/20: pulmonary abscess involving the right lower lobe, as well as additional areas bilaterally that could represent areas of developing pulmonary abscesses.  Loculated fluid collection.     Plan:   -continue cefepime IV    -Blood cultures NGTD  -Respiratory culture growing GNR and Candida with persistently elevated WBC. Candida in respiratory does not need to get treated.   -Trend WBC and liver function daily.   -Judiciously administer an IVF resuscitation necessary.

## 2019-12-20 NOTE — ASSESSMENT & PLAN NOTE
Patient has endorsed episodic abdominal pains (8/10) in his his to upper epigastrium over the last few days.  He associates these pains with meals and has avoided food because of it.  Nothing seems to make better other than letting time pass.  Given patient's presentation with DVTs and possible PE.  Concern for possible mesenteric ischemia vs rib fracture vs GERD.   -CXR negative for any fracture  -Ultrasound with mesenteric view negative for any stenosis or occulusion   -Lactic acid WNL    Plan:  -Recent CT scan showing moderate ascites  -s/p paracentesis on 12/19 to r/o possible SBP  -Continue pantoprazole PO 40mg   -continue to monitor

## 2019-12-20 NOTE — PROGRESS NOTES
[facility]  Pulmonology  Progress Note    Patient Name: Francis Daigle  MRN: [id number]  Admission Date: 12/10/2019  Hospital Length of Stay: 10 days  Code Status: Full Code  Attending Provider: David Potts MD  Primary Care Provider: Primary Doctor No   Principal Problem: Sepsis due to undetermined organism    Subjective:     Interval History: NAEON. Chest tube placed 12/20. Patient tolerated procedure well. Connected to suction. Will see how much it drains.     Objective:     Vital Signs (Most Recent):  Temp: 98.2 °F (36.8 °C) (12/20/19 1240)  Pulse: 103 (12/20/19 1240)  Resp: 20 (12/20/19 1240)  BP: 100/68 (12/20/19 1240)  SpO2: (!) 94 % (12/20/19 1240) Vital Signs (24h Range):  Temp:  [97.2 °F (36.2 °C)-98.7 °F (37.1 °C)] 98.2 °F (36.8 °C)  Pulse:  [103-132] 103  Resp:  [16-21] 20  SpO2:  [91 %-96 %] 94 %  BP: (100-119)/(68-85) 100/68     Weight: 78.5 kg (173 lb 1 oz)  Body mass index is 26.31 kg/m².      Intake/Output Summary (Last 24 hours) at 12/20/2019 1244  Last data filed at 12/19/2019 2300  Gross per 24 hour   Intake 716 ml   Output 75 ml   Net 641 ml       Physical Exam   Constitutional: He is oriented to person, place, and time. He appears well-developed and well-nourished.   HENT:   Head: Normocephalic and atraumatic.   Eyes: Pupils are equal, round, and reactive to light. EOM are normal.   Neck: Normal range of motion.   Cardiovascular: Normal rate, regular rhythm and normal heart sounds.   Pulmonary/Chest: Effort normal.   Chest tube in place. Decreased breath sounds throughout left lung field   Abdominal: Bowel sounds are normal. He exhibits no distension. There is tenderness (mild  diffuse tendernss to very light palpation).   Musculoskeletal: Normal range of motion.   Neurological: He is alert and oriented to person, place, and time.   Skin: Skin is warm and dry.   Psychiatric: He has a normal mood and affect.       Vents:  Vent Mode: Spont (12/12/19 1131)  Ventilator  Initiated: Yes (12/10/19 0656)  Set Rate: 18 bmp (12/12/19 0954)  Vt Set: 450 mL (12/12/19 0954)  Pressure Support: 10 cmH20 (12/12/19 1131)  PEEP/CPAP: 5 cmH20 (12/12/19 1131)  Oxygen Concentration (%): 24 (12/12/19 1910)  Peak Airway Pressure: 16 cmH2O (12/12/19 1131)  Plateau Pressure: 19 cmH20 (12/12/19 1131)  Total Ve: 12 mL (12/12/19 1131)  F/VT Ratio<105 (RSBI): (!) 68.24 (12/12/19 1131)    Lines/Drains/Airways     Peripheral Intravenous Line                 Peripheral IV - Single Lumen 12/16/19 0015 22 G Left;Posterior Hand 4 days                Significant Labs:    CBC/Anemia Profile:  Recent Labs   Lab 12/19/19  0900 12/20/19  0331   WBC 20.66* 21.23*   HGB 12.9* 11.9*   HCT 42.5 39.1*   * 365*   MCV 83 83   RDW 24.5* 24.0*        Chemistries:  Recent Labs   Lab 12/19/19  0900 12/20/19  0331   * 133*   K 3.9 4.2   CL 90* 93*   CO2 32* 30*   BUN 18 16   CREATININE 1.0 0.9   CALCIUM 8.2* 8.5*   ALBUMIN 1.8* 1.8*   PROT 7.1 6.9   BILITOT 3.0* 3.3*   ALKPHOS 101 95   ALT 29 26   AST 48* 40         Assessment/Plan:     Community acquired pneumonia of right lower lobe of lung  44 year old male with PMH significant for non-ischemic cardiomyopathy (EF 11%), IVDU, and Hepatitis C who is presenting on 12/10 with two week duration of worsening lower extremity swelling and erythema. Patient with a complicated hospital course up until this point. Found to have a pulmonary embolism. Patient being anticoagulated. CT scan 12/18 revealed possible empyema or abscess around the right lung.     Plan  Chest tube placed 12/20. Connected to suction  Would restart heparin drip   If no bleeding can transition to oral anticoagulation 12/21 AM  Chest x-ray 12/21. Will monitor output. Will make a decision about TPA/Dnase 12/21            Richie Vega MD  Pulmonology  [facility]

## 2019-12-20 NOTE — PROGRESS NOTES
"  [facility]  Adult Nutrition  Consult Note    SUMMARY     Recommendations    1. When medically feasible, resume Cardiac, Dysphagia soft diet (fluid restriction per MD).   2. Add Boost Plus ONS if PO intake consistently < 50%.   3. RD to monitor & follow-up.    Goals: Meet % EEN, EPN  Nutrition Goal Status: new  Communication of RD Recs: reviewed with RN    Reason for Assessment    Reason For Assessment: length of stay  Diagnosis: other (see comments)(Sepsis)  Relevant Medical History: CHF, IVDU  Interdisciplinary Rounds: did not attend    General Information Comments: Unable to see patient this AM 2/2 pt having a bedside procedure. NPO for thoracentesis today, paracentesis complete yesterday. Per RN documenation, pt tolerating diet prior to NPO status w/ adequate PO intake. Per chart review, pt w/ stable wt x 6 months (UBW: 73 kg). RD to assess for malnutrition at time of follow-up.  Nutrition Discharge Planning: Unable to determine    Nutrition/Diet History    Spiritual, Cultural Beliefs, Mu-ism Practices, Values that Affect Care: no  Factors Affecting Nutritional Intake: NPO    Anthropometrics    Temp: 98.2 °F (36.8 °C)  Height Method: Stated  Height: 5' 8" (172.7 cm)  Height (inches): 68 in  Weight Method: Bed Scale  Weight: 78.5 kg (173 lb 1 oz)  Weight (lb): 173.06 lb  Ideal Body Weight (IBW), Male: 154 lb  % Ideal Body Weight, Male (lb): 112.38 %  BMI (Calculated): 26.3  BMI Grade: 25 - 29.9 - overweight    Lab/Procedures/Meds    Pertinent Labs Reviewed: reviewed  Pertinent Labs Comments: Na 133  Pertinent Medications Reviewed: reviewed    Estimated/Assessed Needs    Weight Used For Calorie Calculations: 78.5 kg (173 lb 1 oz)     Energy Calorie Requirements (kcal): 2050 kcal/d  Energy Need Method: [place]-St Jeor(1.25 PAL)     Protein Requirements: 79-94 g/d (1-1.2 g/kg)  Weight Used For Protein Calculations: 78.5 kg (173 lb 1 oz)     Estimated Fluid Requirement Method: other (see " comments)(Per MD or 1 mL/kcal)    Nutrition Prescription Ordered    Current Diet Order: NPO  Nutrition Order Comments: Was on Cardiac, Dysphagia soft, Double portions    Evaluation of Received Nutrient/Fluid Intake    Comments: LBM: 12/16    Tolerance: tolerating    Nutrition Risk    Level of Risk/Frequency of Follow-up: (1x/week)     Assessment and Plan    No nutritional dx at this time.     Monitor and Evaluation    Food and Nutrient Intake: energy intake, food and beverage intake  Food and Nutrient Adminstration: diet order  Physical Activity and Function: nutrition-related ADLs and IADLs  Anthropometric Measurements: weight, weight change  Biochemical Data, Medical Tests and Procedures: inflammatory profile, lipid profile, glucose/endocrine profile, electrolyte and renal panel, gastrointestinal profile  Nutrition-Focused Physical Findings: overall appearance     Malnutrition Assessment    RD unable to assess for malnutrition; No nutritional dx at this time.    Nutrition Follow-Up    RD Follow-up?: Yes

## 2019-12-21 PROBLEM — J90 PLEURAL EFFUSION: Status: ACTIVE | Noted: 2019-12-21

## 2019-12-21 LAB
ALBUMIN SERPL BCP-MCNC: 1.6 G/DL (ref 3.5–5.2)
ALP SERPL-CCNC: 90 U/L (ref 55–135)
ALT SERPL W/O P-5'-P-CCNC: 24 U/L (ref 10–44)
ANION GAP SERPL CALC-SCNC: 8 MMOL/L (ref 8–16)
APTT BLDCRRT: 57.5 SEC (ref 21–32)
AST SERPL-CCNC: 40 U/L (ref 10–40)
BASOPHILS # BLD AUTO: 0.06 K/UL (ref 0–0.2)
BASOPHILS NFR BLD: 0.3 % (ref 0–1.9)
BILIRUB SERPL-MCNC: 2.6 MG/DL (ref 0.1–1)
BODY FLUID SOURCE, LDH: NORMAL
BUN SERPL-MCNC: 21 MG/DL (ref 6–20)
CALCIUM SERPL-MCNC: 8.2 MG/DL (ref 8.7–10.5)
CHLORIDE SERPL-SCNC: 96 MMOL/L (ref 95–110)
CO2 SERPL-SCNC: 29 MMOL/L (ref 23–29)
CREAT SERPL-MCNC: 1.1 MG/DL (ref 0.5–1.4)
DIFFERENTIAL METHOD: ABNORMAL
EOSINOPHIL # BLD AUTO: 0.2 K/UL (ref 0–0.5)
EOSINOPHIL NFR BLD: 1.1 % (ref 0–8)
ERYTHROCYTE [DISTWIDTH] IN BLOOD BY AUTOMATED COUNT: 24.1 % (ref 11.5–14.5)
EST. GFR  (AFRICAN AMERICAN): >60 ML/MIN/1.73 M^2
EST. GFR  (NON AFRICAN AMERICAN): >60 ML/MIN/1.73 M^2
GLUCOSE FLD-MCNC: 31 MG/DL
GLUCOSE SERPL-MCNC: 126 MG/DL (ref 70–110)
HCT VFR BLD AUTO: 36.5 % (ref 40–54)
HGB BLD-MCNC: 11.4 G/DL (ref 14–18)
IMM GRANULOCYTES # BLD AUTO: 0.13 K/UL (ref 0–0.04)
IMM GRANULOCYTES NFR BLD AUTO: 0.7 % (ref 0–0.5)
INR PPP: 1.7 (ref 0.8–1.2)
LDH FLD L TO P-CCNC: 445 U/L
LYMPHOCYTES # BLD AUTO: 2.1 K/UL (ref 1–4.8)
LYMPHOCYTES NFR BLD: 11.3 % (ref 18–48)
MCH RBC QN AUTO: 26.3 PG (ref 27–31)
MCHC RBC AUTO-ENTMCNC: 31.2 G/DL (ref 32–36)
MCV RBC AUTO: 84 FL (ref 82–98)
MONOCYTES # BLD AUTO: 1.1 K/UL (ref 0.3–1)
MONOCYTES NFR BLD: 5.9 % (ref 4–15)
NEUTROPHILS # BLD AUTO: 14.6 K/UL (ref 1.8–7.7)
NEUTROPHILS NFR BLD: 80.7 % (ref 38–73)
NRBC BLD-RTO: 0 /100 WBC
PLATELET # BLD AUTO: 331 K/UL (ref 150–350)
PMV BLD AUTO: 9.6 FL (ref 9.2–12.9)
POTASSIUM SERPL-SCNC: 4.4 MMOL/L (ref 3.5–5.1)
PROT SERPL-MCNC: 6.4 G/DL (ref 6–8.4)
PROTHROMBIN TIME: 16.4 SEC (ref 9–12.5)
RBC # BLD AUTO: 4.34 M/UL (ref 4.6–6.2)
SODIUM SERPL-SCNC: 133 MMOL/L (ref 136–145)
SPECIMEN SOURCE: NORMAL
WBC # BLD AUTO: 18.09 K/UL (ref 3.9–12.7)

## 2019-12-21 PROCEDURE — 63600175 PHARM REV CODE 636 W HCPCS: Mod: JG | Performed by: INTERNAL MEDICINE

## 2019-12-21 PROCEDURE — 99233 SBSQ HOSP IP/OBS HIGH 50: CPT | Mod: ,,, | Performed by: INTERNAL MEDICINE

## 2019-12-21 PROCEDURE — 99233 PR SUBSEQUENT HOSPITAL CARE,LEVL III: ICD-10-PCS | Mod: ,,, | Performed by: INTERNAL MEDICINE

## 2019-12-21 PROCEDURE — 25000003 PHARM REV CODE 250: Performed by: HOSPITALIST

## 2019-12-21 PROCEDURE — 25000242 PHARM REV CODE 250 ALT 637 W/ HCPCS: Performed by: INTERNAL MEDICINE

## 2019-12-21 PROCEDURE — 99232 SBSQ HOSP IP/OBS MODERATE 35: CPT | Mod: ,,, | Performed by: HOSPITALIST

## 2019-12-21 PROCEDURE — 25000003 PHARM REV CODE 250: Performed by: STUDENT IN AN ORGANIZED HEALTH CARE EDUCATION/TRAINING PROGRAM

## 2019-12-21 PROCEDURE — 85610 PROTHROMBIN TIME: CPT

## 2019-12-21 PROCEDURE — 63600175 PHARM REV CODE 636 W HCPCS: Performed by: STUDENT IN AN ORGANIZED HEALTH CARE EDUCATION/TRAINING PROGRAM

## 2019-12-21 PROCEDURE — 85730 THROMBOPLASTIN TIME PARTIAL: CPT

## 2019-12-21 PROCEDURE — 99232 PR SUBSEQUENT HOSPITAL CARE,LEVL II: ICD-10-PCS | Mod: ,,, | Performed by: HOSPITALIST

## 2019-12-21 PROCEDURE — 20600001 HC STEP DOWN PRIVATE ROOM

## 2019-12-21 PROCEDURE — 80053 COMPREHEN METABOLIC PANEL: CPT

## 2019-12-21 PROCEDURE — 85025 COMPLETE CBC W/AUTO DIFF WBC: CPT

## 2019-12-21 RX ORDER — ONDANSETRON 2 MG/ML
8 INJECTION INTRAMUSCULAR; INTRAVENOUS EVERY 8 HOURS PRN
Status: DISCONTINUED | OUTPATIENT
Start: 2019-12-21 | End: 2019-12-28 | Stop reason: HOSPADM

## 2019-12-21 RX ORDER — KETOROLAC TROMETHAMINE 15 MG/ML
30 INJECTION, SOLUTION INTRAMUSCULAR; INTRAVENOUS ONCE
Status: COMPLETED | OUTPATIENT
Start: 2019-12-21 | End: 2019-12-21

## 2019-12-21 RX ORDER — LORAZEPAM 1 MG/1
1 TABLET ORAL EVERY 8 HOURS PRN
Status: DISCONTINUED | OUTPATIENT
Start: 2019-12-21 | End: 2019-12-24

## 2019-12-21 RX ORDER — LORAZEPAM 1 MG/1
1 TABLET ORAL ONCE
Status: COMPLETED | OUTPATIENT
Start: 2019-12-21 | End: 2019-12-21

## 2019-12-21 RX ORDER — LACTULOSE 10 G/15ML
10 SOLUTION ORAL ONCE
Status: COMPLETED | OUTPATIENT
Start: 2019-12-21 | End: 2019-12-21

## 2019-12-21 RX ORDER — ONDANSETRON 2 MG/ML
8 INJECTION INTRAMUSCULAR; INTRAVENOUS EVERY 8 HOURS PRN
Status: DISCONTINUED | OUTPATIENT
Start: 2019-12-21 | End: 2019-12-21

## 2019-12-21 RX ADMIN — CEFEPIME 2 G: 2 INJECTION, POWDER, FOR SOLUTION INTRAVENOUS at 10:12

## 2019-12-21 RX ADMIN — PANTOPRAZOLE SODIUM 40 MG: 40 TABLET, DELAYED RELEASE ORAL at 09:12

## 2019-12-21 RX ADMIN — HYDROXYZINE HYDROCHLORIDE 25 MG: 25 TABLET, FILM COATED ORAL at 08:12

## 2019-12-21 RX ADMIN — ENALAPRIL MALEATE 2.5 MG: 2.5 TABLET ORAL at 09:12

## 2019-12-21 RX ADMIN — CEFEPIME 2 G: 2 INJECTION, POWDER, FOR SOLUTION INTRAVENOUS at 06:12

## 2019-12-21 RX ADMIN — ONDANSETRON 8 MG: 2 INJECTION INTRAMUSCULAR; INTRAVENOUS at 01:12

## 2019-12-21 RX ADMIN — LORAZEPAM 0.5 MG: 0.5 TABLET ORAL at 11:12

## 2019-12-21 RX ADMIN — KETOROLAC TROMETHAMINE 30 MG: 15 INJECTION, SOLUTION INTRAMUSCULAR; INTRAVENOUS at 02:12

## 2019-12-21 RX ADMIN — LORAZEPAM 1 MG: 1 TABLET ORAL at 03:12

## 2019-12-21 RX ADMIN — CITALOPRAM HYDROBROMIDE 20 MG: 10 TABLET ORAL at 09:12

## 2019-12-21 RX ADMIN — DORNASE ALFA 5 MG: 1 SOLUTION RESPIRATORY (INHALATION) at 03:12

## 2019-12-21 RX ADMIN — CEFEPIME 2 G: 2 INJECTION, POWDER, FOR SOLUTION INTRAVENOUS at 02:12

## 2019-12-21 RX ADMIN — APIXABAN 10 MG: 5 TABLET, FILM COATED ORAL at 09:12

## 2019-12-21 RX ADMIN — ALTEPLASE: 2.2 INJECTION, POWDER, LYOPHILIZED, FOR SOLUTION INTRAVENOUS at 03:12

## 2019-12-21 RX ADMIN — LACTULOSE 10 G: 20 SOLUTION ORAL at 11:12

## 2019-12-21 RX ADMIN — APIXABAN 10 MG: 5 TABLET, FILM COATED ORAL at 11:12

## 2019-12-21 RX ADMIN — LORAZEPAM 1 MG: 1 TABLET ORAL at 02:12

## 2019-12-21 RX ADMIN — METOPROLOL SUCCINATE 100 MG: 50 TABLET, EXTENDED RELEASE ORAL at 08:12

## 2019-12-21 NOTE — ASSESSMENT & PLAN NOTE
-s/p TPA  -evidence of significant clots in the bilateral LEs  -currently on Heparin gtt; PTT therapeutic  -ok to d/c heparin and resume DOAC  -will likely need lifelong AC

## 2019-12-21 NOTE — ASSESSMENT & PLAN NOTE
History of non-ischemic cardiomyopathy diagnosed in 11/2017 at [facility].  [facility] in 12/2017 with no evidence of angiographically significant coronary artery disease.  Most recent ECHO in 10/2019 concerning for EF of 35%, grade II diastolic dysfunction, and global hypokinesis with a restrictive physiology. HOME regimen: Carvedilol, Enalapril, Lasix 20 mg BID, and Spironolactone.   .-Suspect presentation from worsening right sided heart failure.     Plan:   - continue HOME regimen: Carvedilol, Enalapril, and Spironolactone  - changed lasix to 40mg PO BID  - continue Toprol XL 100mg QD   -Strict I/O's and daily standing weights.   -Telemetry ordered.   -Cardiology recommends outpatient EP follow up for ICD placement for primary prevention.  They do not recommending placing a life vest or ICD inpatient as patient's cardiac arrest during hospitalization was due to a PE and not a shockable rhythm.

## 2019-12-21 NOTE — ASSESSMENT & PLAN NOTE
"Patient has endorsed episodic abdominal pains (8/10) in his his to upper epigastrium over the last few days.  He associates these pains with meals and has avoided food because of it.  Nothing seems to make better other than letting time pass.  Given patient's presentation with DVTs and possible PE.  Concern for possible mesenteric ischemia vs rib fracture vs GERD.   -CXR negative for any fracture  -Ultrasound with mesenteric view negative for any stenosis or occulusion   -Lipase, Lactic acid WNL. Peritoneal cultures negative (likely portal HTN.)      Plan:  -Patient endorses sudden onset 10/10 abdominal pain again this morning following a BM.  KUB was ordered which was negative.  Patient given 1x dose of lactulose as he likely is constipated (no BM per patient for "many days.")  Patient responded well to the lactulose with a BM but began thrashing about his bed and pulling out his IV lines and threatening to leave AMA.  Ordered 1g PO ativan.  Will continue to monitor.   -s/p paracentesis on 12/19, which showed no SBP (WBC of 75.) SAAG of 1.1 likely 2/2 to portal HTN  -Continue pantoprazole PO 40mg   -continue to monitor     "

## 2019-12-21 NOTE — NURSING
"Pt erratic stating he is going to leave. I told him I'd let the doctor know and that we would have to remove the drains and devices before he left. He states "I don't care about that, I'm leaving with them in, I cannot do this anymore, I'm done". I explained to him he is attached to suction with a chest tube and that he cannot leave with medical devices in. He continued to threaten to leave. I contacted the [facility]. I took a verbal order for 1mg of ativan PO once.I asked the pt if he still wanted to leave or would he like to stay and get ativan. Pt states "i'll stay if I get the ativan, but i'd like to have it IV" I explained to the pt he does not have an order for IV ativan.  Once approved by pharmacy I will administer the PO ativan. MD states he will be around to talk with pt. Will continue to monitor pt while partner is at lunch and throughout the shift prn.   "

## 2019-12-21 NOTE — NURSING
Pt flailing around in bed, screaming and getting on the floor on his knees. This nurse concerned that chest tube will accidentally pull out due to pt's behavior. Pt c/o n/v and abdominal pain rated as 11 on 0-10 scale. Hospital medicine notified and will re-evaluate pt's meds. Awaiting new orders at this time. Will continue to monitor.

## 2019-12-21 NOTE — ASSESSMENT & PLAN NOTE
DVT lower extremity probable origin of PE. Patient was started on heparin gtt  - right upper extremity arm US showing edema and no clots.     Plan  -d/c heparin ggt  -continue eliquis

## 2019-12-21 NOTE — ASSESSMENT & PLAN NOTE
Recent CT chest now showing possible empyema of right lung.  -pleural fluid WBC elevated to over 2k, indicative of likely empyema.   -Chest tube placed on 12/20, drained 700mL of SS fluid    Plan:  -continue cefepime IV  -f/u pleural LDH   -PULMONOLOGY consulted, appreciate recommendations

## 2019-12-21 NOTE — ASSESSMENT & PLAN NOTE
- HEP C viral load (+) and elevated at 6  - Will need to set up with hepatology for treatment as outpatient

## 2019-12-21 NOTE — ASSESSMENT & PLAN NOTE
-s/p Chest tube insertion and drainage  -Gram stain negative, Cx pending  -Cell count/diff w/ non-neutrophilic predominance; protein not elevated;  ; glucose not sent  -does not meet definition for empyema, will hold off on TPA/DNAse at this time  -could be secondary to PE vs pneumonia  -will monitor drainage and resolution on CXR  -750cc in atirum canmalvin this AM

## 2019-12-21 NOTE — ASSESSMENT & PLAN NOTE
-See assessment for sepsis.   MELD-Na score: 20 at 12/21/2019  8:25 AM  MELD score: 17 at 12/21/2019  8:25 AM  Calculated from:  Serum Creatinine: 1.1 mg/dL at 12/21/2019  8:25 AM  Serum Sodium: 133 mmol/L at 12/21/2019  8:25 AM  Total Bilirubin: 2.6 mg/dL at 12/21/2019  8:25 AM  INR(ratio): 1.7 at 12/21/2019  8:25 AM  Age: 44 years

## 2019-12-21 NOTE — PROGRESS NOTES
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 He has a PMH significant for non-ischemic cardiomyopathy with combined CHF (EF 11% without ICD or CRT device) and on-going IVDU. He reports a two week duration of progressively worsening bilateral lower extremity swelling and redness of the anterior shins. This is associated with bilateral lower extremity pain with ambulation. He also reports noticing symptom association with worsening of baseline SOB with exertion as well as new abdominal distension and pain, within the same time frame. He describes abdominal pain as a generalized soreness that occurs approximately one hour after eating meals and spontaneously resolves without intervention or medication. He is unsure of how long the discomfort lasts before resolving. Additionally, he reports a two week duration of occasional episodes of pale semi-formed stools. He attempted symptom relief with increasing dose of daily Lasix, however without effect, prompting presentation to ED on 12/09.     He denies symptom association with fevers, chills, nausea, vomiting, history of prior abdominal surgeries, ETOH consumption, chest pain, pain with inspiration, lightheadedness, or blurry vision. He reports a on-going non-productive cough intermittent cough since CHF diagnosis that is unchanged. He does not weigh himself daily and is unsure of any weight loss or gain. He does follow with cardiology outpatient.     Of note, he also reports continued daily use of  IV methamphetamines with most recent usage on 12/08.     ED course: He initially presented to [place] ED on 12/09. Lab were significant for leukocytosis (25), hyponatremia (126), lactic acidosis (3.5), transaminitis (, ALT 73), hyperbilirubinemia (4.5), and toxicology positive for amphetamines. CXR was suggestive of right lower lobe pneumonia. Abdominal U/S was suggestive of acalculous cholecystitis. He was given IVF, Ceftriaxone, and Lasix. Case was discussed with transfer center and AES here; recommended transfer for possible MRCP.     Overview/Hospital Course:  Francis Daigle 44 y.o male presented from OSH with cholilithiasis and cholecystitis. Patient transfer [place] [facility] [place] [facility] [place]. When patient got admitted to hospital medicine team the patient was transferred to ICU following cardiac arrest on 12/10/19. Emergently intubated during PEA arrest, ROSC achieved after 1 round of CPR. Central line and arterial line placed. Patient initially requiring pressor support, cyanotic, hypoxic on blood gas. Bedside ultrasound with signs of RV strain with DVT in lower extremity. Patient on pressors at the time, requiring 100% FiO2 to oxygenate. Given TPA emergently. Patient started on heparin ggt, bronchoscopy today revealing relatively normal airways and extubated. Most recent ultrasound did not show liver cirrhosis or cholilthiasis or cholecystitis. Patient with RLL PNA getting treated with Zosyn and later switch to Levo. Patient transitioned to Heparin gtt to 10mg BID eliquis. Cardiology will need to consulted for possible ICD or lifevest up on discharge on Monday.   Spoke with cardiology who states that he will likely need an ICD but he can follow up outpatient with cardiology for its placement.  Patient continues to state that he feels better each day.  He does endorse increased anxiety over the last day.  He states that he used to be on treatment in the past.  Started patient on citalopram and instructed  him that he will need to request follow up with psychiatry once he is discharged. Patient has continued midepigastric pain that is associated with meals. Ultrasound for mesenteric ischemia showed no signs of occlusion or stenosis.  Ordered CT scan of the abdomen with contast showing bilateral PE and empyema vs abcess. Pulmonary consulted and plan to CT tomorrow, NPO at Midnight.  Chest tube was preformed today and fluids sent for cultures and cytology.  Patient noted to be tachycardic overnight up to 130s; ECG in the AM noted sinus tachycardia.  His HR slowed to 98 by lunch time and a small 250 mL bolus of LR.  Re-started heparin drip following the chest tube placement per pulmonology recommendations.   Patient has become more anxious and uncooperative today; pulling out IV lines and thrashing about room, demanding norco and IV ativan.  Addressed patient's concerns and provided 1g PO ativan q8 hours.      Interval History: Patient noted to become uncooperative and labile today around 10am.  He began thrashing about his bed and pulling out his IV lines as well as disrespecting the nurses.  He endorsed similar abdominal pain following a bowel movement today that has already been fully worked up.     Review of Systems   Constitutional: Negative for chills and fever.   Respiratory: Negative for cough and shortness of breath.    Cardiovascular: Negative for chest pain and leg swelling.   Gastrointestinal: Positive for abdominal pain. Negative for abdominal distention.   Psychiatric/Behavioral: Negative for confusion. The patient is nervous/anxious.      Objective:     Vital Signs (Most Recent):  Temp: 98.2 °F (36.8 °C) (12/21/19 1133)  Pulse: 97 (12/21/19 1133)  Resp: 18 (12/21/19 1133)  BP: 119/82 (12/21/19 1133)  SpO2: 96 % (12/21/19 1133) Vital Signs (24h Range):  Temp:  [97.2 °F (36.2 °C)-98.2 °F (36.8 °C)] 98.2 °F (36.8 °C)  Pulse:  [] 97  Resp:  [16-18] 18  SpO2:  [92 %-99 %] 96 %  BP: (101-119)/(71-82)  119/82     Weight: 78.5 kg (173 lb 1 oz)  Body mass index is 26.31 kg/m².  No intake or output data in the 24 hours ending 12/21/19 1539   Physical Exam   Constitutional: He is oriented to person, place, and time.   Patient is a 43 yo M who appears alert and anxious.    HENT:   Head: Normocephalic and atraumatic.   Eyes: Pupils are equal, round, and reactive to light. EOM are normal.   Neck: Normal range of motion.   Cardiovascular: Normal rate, regular rhythm and normal heart sounds.   Pulmonary/Chest: Effort normal and breath sounds normal. No respiratory distress.   Chest tube in place   Abdominal: Bowel sounds are normal. He exhibits no distension. There is tenderness (mild tenderness to palpation).   Musculoskeletal: Normal range of motion.   Swelling of right upper extremity    Neurological: He is alert and oriented to person, place, and time.   Skin: Skin is warm and dry.   Psychiatric: He has a normal mood and affect.       Significant Labs: All pertinent labs within the past 24 hours have been reviewed.     Recent Labs   Lab 12/19/19  0900 12/20/19  0331 12/21/19  0825   WBC 20.66* 21.23* 18.09*   HGB 12.9* 11.9* 11.4*   HCT 42.5 39.1* 36.5*   * 365* 331   MCV 83 83 84   RDW 24.5* 24.0* 24.1*   * 133* 133*   K 3.9 4.2 4.4   CL 90* 93* 96   CO2 32* 30* 29   BUN 18 16 21*   CREATININE 1.0 0.9 1.1   * 89 126*   PROT 7.1 6.9 6.4   ALBUMIN 1.8* 1.8* 1.6*   BILITOT 3.0* 3.3* 2.6*   AST 48* 40 40   ALKPHOS 101 95 90   ALT 29 26 24       Significant Imaging: I have reviewed all pertinent imaging results/findings within the past 24 hours.         Assessment/Plan:      * Sepsis due to undetermined organism  This is a 44 year old male with PMH significant for non-ischemic cardiomyopathy (EF 11%), IVDU, and Hepatitis C who is presenting on 12/10 with two week duration of worsening lower extremity swelling and erythema associated with same duration of post-prandial abdominal pain and distension with  intermittent pale colored stools with work-up thus far concerning for tachycardia (120's), leukocytosis (25), lactic acidosis, transaminitis, hyperbilirubinemia, CXR suggestive of right lower lobe pneumonia, and abdominal U/S suggestive of possible acalculous cholecystitis. He has no symptoms of underlying respiratory infection and on chart review CXR abnormalities are similar to those in 08/2019 during admission for CHF exacerbation. UA is unremarkable. Negative for influenza. Suspect bilateral lower extremity edema and erythema more a manifestation of CHF instead of bilateral cellulitis. Differential diagnoses include biliary etiology vs bacteremia in the setting of on-going IVDU vs possible CAP.   -CT scan of chest 12/20: pulmonary abscess involving the right lower lobe, as well as additional areas bilaterally that could represent areas of developing pulmonary abscesses.  Loculated fluid collection.     Plan:   -continue cefepime IV    -Blood cultures NGTD  -Respiratory culture growing GNR and Candida with persistently elevated WBC. Candida in respiratory does not need to get treated.   -Trend WBC and liver function daily.   -Judiciously administer an IVF resuscitation necessary.     Empyema  Recent CT chest now showing possible empyema of right lung.  -pleural fluid WBC elevated to over 2k, indicative of likely empyema.   -Chest tube placed on 12/20, drained 700mL of SS fluid    Plan:  -continue cefepime IV  -f/u pleural LDH   -PULMONOLOGY consulted, appreciate recommendations        Abdominal pain  Patient has endorsed episodic abdominal pains (8/10) in his his to upper epigastrium over the last few days.  He associates these pains with meals and has avoided food because of it.  Nothing seems to make better other than letting time pass.  Given patient's presentation with DVTs and possible PE.  Concern for possible mesenteric ischemia vs rib fracture vs GERD.   -CXR negative for any fracture  -Ultrasound with  "mesenteric view negative for any stenosis or occulusion   -Lipase, Lactic acid WNL. Peritoneal cultures negative (likely portal HTN.)      Plan:  -Patient endorses sudden onset 10/10 abdominal pain again this morning following a BM.  KUB was ordered which was negative.  Patient given 1x dose of lactulose as he likely is constipated (no BM per patient for "many days.")  Patient responded well to the lactulose with a BM but began thrashing about his bed and pulling out his IV lines and threatening to leave AMA.  Ordered 1g PO ativan.  Will continue to monitor.   -s/p paracentesis on 12/19, which showed no SBP (WBC of 75.) SAAG of 1.1 likely 2/2 to portal HTN  -Continue pantoprazole PO 40mg   -continue to monitor       Anxiety  Patient with history of panic attacks in the past.  Patient describes increased worry and anxiety about his current condition and his recovery.     Plan:  -citalopram 20mg PO  -HI ativan for anxiety  -recommend patient make an appointment with psychiatry upon discharge.     Wheezing  History of 40PK year. Mild wheeze throughout lung field. Sating well in room air  - continue Ipotroprium Neb Q8H for wheezing       Acute massive pulmonary embolism  DVT lower extremity probable origin of PE. Patient was started on heparin gtt  - right upper extremity arm US showing edema and no clots.     Plan  -d/c heparin ggt  -continue eliquis       Acute deep vein thrombosis (DVT) of proximal vein of lower extremity  - See acute massive pulm embolism       Cardiac arrest  PEA arrest, 2 round of EPI and CPR achieved ROSC.         Hyponatremia  -See assessment for CHF exacerbation.       Chronic hepatitis C without hepatic coma  - HEP C viral load (+) and elevated at 6  - Will need to set up with hepatology for treatment as outpatient       Non-ischemic cardiomyopathy  -See assessment for sepsis.       Transaminitis  -See assessment for sepsis.   MELD-Na score: 20 at 12/21/2019  8:25 AM  MELD score: 17 at " 12/21/2019  8:25 AM  Calculated from:  Serum Creatinine: 1.1 mg/dL at 12/21/2019  8:25 AM  Serum Sodium: 133 mmol/L at 12/21/2019  8:25 AM  Total Bilirubin: 2.6 mg/dL at 12/21/2019  8:25 AM  INR(ratio): 1.7 at 12/21/2019  8:25 AM  Age: 44 years        Intravenous drug abuse  -On-going use of methamphetamines.   -Prior hepatitis screening positive for HepC in 10/2019.     Plan:   -Counseled on importance of cessation.   -Monitor for signs of withdrawal for other substances.       Community acquired pneumonia of right lower lobe of lung  -See assessment for sepsis.       Acute on chronic combined systolic and diastolic heart failure  History of non-ischemic cardiomyopathy diagnosed in 11/2017 at [facility].  [facility] in 12/2017 with no evidence of angiographically significant coronary artery disease.  Most recent ECHO in 10/2019 concerning for EF of 35%, grade II diastolic dysfunction, and global hypokinesis with a restrictive physiology. HOME regimen: Carvedilol, Enalapril, Lasix 20 mg BID, and Spironolactone.   .-Suspect presentation from worsening right sided heart failure.     Plan:   - continue HOME regimen: Carvedilol, Enalapril, and Spironolactone  - changed lasix to 40mg PO BID  - continue Toprol XL 100mg QD   -Strict I/O's and daily standing weights.   -Telemetry ordered.   -Cardiology recommends outpatient EP follow up for ICD placement for primary prevention.  They do not recommending placing a life vest or ICD inpatient as patient's cardiac arrest during hospitalization was due to a PE and not a shockable rhythm.          Other specified anxiety disorders  -Continue home Ativan PRN.       Essential hypertension  -See assessment for CHF exacerbation.         VTE Risk Mitigation (From admission, onward)         Ordered     apixaban tablet 10 mg  2 times daily      12/21/19 1119     heparin 25,000 units in dextrose 5% 250 mL (100 units/mL) infusion HIGH INTENSITY nomogram - [facility]  Continuous     Question:  Heparin  Infusion Adjustment (DO NOT MODIFY ANSWER)  Answer:  \\[email or web address]\heparin HIGH INTENSITY nomogram for [facility] [id number]    12/19/19 0909     IP VTE HIGH RISK PATIENT  Once      12/10/19 0922                      Bradly Guzman MD  Department of Hospital Medicine   [facility]

## 2019-12-21 NOTE — ASSESSMENT & PLAN NOTE
Patient with history of panic attacks in the past.  Patient describes increased worry and anxiety about his current condition and his recovery.     Plan:  -citalopram 20mg PO  -NM ativan for anxiety  -recommend patient make an appointment with psychiatry upon discharge.

## 2019-12-21 NOTE — PLAN OF CARE
Problem: Fall Injury Risk  Goal: Absence of Fall and Fall-Related Injury  Outcome: Ongoing, Progressing     Problem: Skin Injury Risk Increased  Goal: Skin Health and Integrity  Outcome: Ongoing, Progressing     Pt AAOx4 is able to state needs and concerns VVS afebrile tolerated medication without difficulty c/o pain 1x dose of IV Toradol given. Chest tube in place no noted kinks in tubing dressing dry clean and intact. Safety measures maintained call light within reach pt remains free from falls.

## 2019-12-21 NOTE — PLAN OF CARE
Problem: Adult Inpatient Plan of Care  Goal: Plan of Care Review  Outcome: Ongoing, Progressing  Goal: Patient-Specific Goal (Individualization)  Description  PmHx: chronic systolic CHF    12/9: Outside facility ED- leg swelling & dyspnea, transfer to [facility]  12/10: Code on Floor- Admit to SICU; Intubated, ECHO, BLE ultrasound, Head CT without contrast  12/11: Bronch, liver/gallbladder US  12/12: extubated to NC    Nursing:  MAP > 65        Outcome: Ongoing, Progressing  Goal: Optimal Comfort and Wellbeing  Outcome: Ongoing, Progressing   POC reviewed with patient.  Chest Tube placed per MD at bedside with RN assist, pleural fluid sent for testing per MD order.  Pt remains free from falls/injury.  Heparin drip started per MD order.

## 2019-12-21 NOTE — PROGRESS NOTES
[facility]  Pulmonology  Progress Note    Patient Name: Francis Daigle  MRN: [id number]  Admission Date: 12/10/2019  Hospital Length of Stay: 11 days  Code Status: Full Code  Attending Provider: David Potts MD  Primary Care Provider: Primary Doctor No   Principal Problem: Sepsis due to undetermined organism    Subjective:     Interval History: No issues overnight. Chest tube placed yesterday. Continuous air leak noted in canister this AM. Audible leak from the tubing appreciated. Dressing taken down and tubing replaced. CXR with chest tube in place. Leak resolved with changing of tubing.     Objective:     Vital Signs (Most Recent):  Temp: 97.9 °F (36.6 °C) (12/21/19 0446)  Pulse: 94 (12/21/19 0800)  Resp: 16 (12/21/19 0800)  BP: 101/76 (12/21/19 0800)  SpO2: 99 % (12/21/19 0800) Vital Signs (24h Range):  Temp:  [97.2 °F (36.2 °C)-98.2 °F (36.8 °C)] 97.9 °F (36.6 °C)  Pulse:  [] 94  Resp:  [16-20] 16  SpO2:  [92 %-99 %] 99 %  BP: (100-110)/(68-76) 101/76     Weight: 78.5 kg (173 lb 1 oz)  Body mass index is 26.31 kg/m².    No intake or output data in the 24 hours ending 12/21/19 0843    Physical Exam   Constitutional: He is oriented to person, place, and time. He appears well-developed and well-nourished.   HENT:   Head: Normocephalic and atraumatic.   Eyes: Pupils are equal, round, and reactive to light. EOM are normal.   Neck: Normal range of motion.   Cardiovascular: Normal rate, regular rhythm and normal heart sounds.   Pulmonary/Chest: Effort normal.   Chest tube in place. Decreased breath sounds throughout left lung field   Abdominal: Bowel sounds are normal. He exhibits no distension. There is tenderness (mild  diffuse tendernss to very light palpation).   Musculoskeletal: Normal range of motion.   Neurological: He is alert and oriented to person, place, and time.   Skin: Skin is warm and dry.   Psychiatric: He has a normal mood and affect.         Lines/Drains/Airways      Drain                 Chest Tube 12/20/19 Right 1 day          Peripheral Intravenous Line                 Peripheral IV - Single Lumen 12/20/19 1305 20 G Anterior;Left Forearm less than 1 day                Significant Labs:    CBC/Anemia Profile:  Recent Labs   Lab 12/19/19  0900 12/20/19  0331   WBC 20.66* 21.23*   HGB 12.9* 11.9*   HCT 42.5 39.1*   * 365*   MCV 83 83   RDW 24.5* 24.0*        Chemistries:  Recent Labs   Lab 12/19/19  0900 12/20/19  0331   * 133*   K 3.9 4.2   CL 90* 93*   CO2 32* 30*   BUN 18 16   CREATININE 1.0 0.9   CALCIUM 8.2* 8.5*   ALBUMIN 1.8* 1.8*   PROT 7.1 6.9   BILITOT 3.0* 3.3*   ALKPHOS 101 95   ALT 29 26   AST 48* 40       All pertinent labs within the past 24 hours have been reviewed.    Significant Imaging:  I have reviewed and interpreted all pertinent imaging results/findings within the past 24 hours.    Assessment/Plan:     Pleural effusion  -s/p Chest tube insertion and drainage  -Gram stain negative, Cx pending  -Cell count/diff w/  50% neutrophils and 30% lymphs; protein not elevated;  ; glucose not sent  -will hold off on TPA/DNAse at this time  -could be secondary to PE vs pneumonia  -will monitor drainage and resolution on CXR  -750cc in atirum canister this AM  -on bedside u/s still septations and fluid noted; therefore, will instill 10mg tPA/5mg Dornase today    Acute massive pulmonary embolism  -s/p TPA  -evidence of significant clots in the bilateral LEs  -currently on Heparin gtt; PTT therapeutic  -ok to d/c heparin and resume DOAC  -will likely need lifelong AC           Aristides York MD  Pulmonology  [facility]

## 2019-12-21 NOTE — SUBJECTIVE & OBJECTIVE
Interval History: Patient noted to become uncooperative and labile today around 10am.  He began thrashing about his bed and pulling out his IV lines as well as disrespecting the nurses.  He endorsed similar abdominal pain following a bowel movement today that has already been fully worked up.     Review of Systems   Constitutional: Negative for chills and fever.   Respiratory: Negative for cough and shortness of breath.    Cardiovascular: Negative for chest pain and leg swelling.   Gastrointestinal: Positive for abdominal pain. Negative for abdominal distention.   Psychiatric/Behavioral: Negative for confusion. The patient is nervous/anxious.      Objective:     Vital Signs (Most Recent):  Temp: 98.2 °F (36.8 °C) (12/21/19 1133)  Pulse: 97 (12/21/19 1133)  Resp: 18 (12/21/19 1133)  BP: 119/82 (12/21/19 1133)  SpO2: 96 % (12/21/19 1133) Vital Signs (24h Range):  Temp:  [97.2 °F (36.2 °C)-98.2 °F (36.8 °C)] 98.2 °F (36.8 °C)  Pulse:  [] 97  Resp:  [16-18] 18  SpO2:  [92 %-99 %] 96 %  BP: (101-119)/(71-82) 119/82     Weight: 78.5 kg (173 lb 1 oz)  Body mass index is 26.31 kg/m².  No intake or output data in the 24 hours ending 12/21/19 1539   Physical Exam   Constitutional: He is oriented to person, place, and time.   Patient is a 43 yo M who appears alert and anxious.    HENT:   Head: Normocephalic and atraumatic.   Eyes: Pupils are equal, round, and reactive to light. EOM are normal.   Neck: Normal range of motion.   Cardiovascular: Normal rate, regular rhythm and normal heart sounds.   Pulmonary/Chest: Effort normal and breath sounds normal. No respiratory distress.   Chest tube in place   Abdominal: Bowel sounds are normal. He exhibits no distension. There is tenderness (mild tenderness to palpation).   Musculoskeletal: Normal range of motion.   Swelling of right upper extremity    Neurological: He is alert and oriented to person, place, and time.   Skin: Skin is warm and dry.   Psychiatric: He has a normal  mood and affect.       Significant Labs: All pertinent labs within the past 24 hours have been reviewed.     Recent Labs   Lab 12/19/19  0900 12/20/19  0331 12/21/19  0825   WBC 20.66* 21.23* 18.09*   HGB 12.9* 11.9* 11.4*   HCT 42.5 39.1* 36.5*   * 365* 331   MCV 83 83 84   RDW 24.5* 24.0* 24.1*   * 133* 133*   K 3.9 4.2 4.4   CL 90* 93* 96   CO2 32* 30* 29   BUN 18 16 21*   CREATININE 1.0 0.9 1.1   * 89 126*   PROT 7.1 6.9 6.4   ALBUMIN 1.8* 1.8* 1.6*   BILITOT 3.0* 3.3* 2.6*   AST 48* 40 40   ALKPHOS 101 95 90   ALT 29 26 24       Significant Imaging: I have reviewed all pertinent imaging results/findings within the past 24 hours.

## 2019-12-21 NOTE — ASSESSMENT & PLAN NOTE
-On-going use of methamphetamines.   -Prior hepatitis screening positive for HepC in 10/2019.     Plan:   -Counseled on importance of cessation.   -Monitor for signs of withdrawal for other substances.

## 2019-12-21 NOTE — SUBJECTIVE & OBJECTIVE
Interval History: No issues overnight. Chest tube placed yesterday. Continuous air leak noted in canister this AM. Audible leak from the tubing appreciated. Dressing taken down and tubing replaced. CXR with chest tube in place. Leak resolved with changing of tubing.     Objective:     Vital Signs (Most Recent):  Temp: 97.9 °F (36.6 °C) (12/21/19 0446)  Pulse: 94 (12/21/19 0800)  Resp: 16 (12/21/19 0800)  BP: 101/76 (12/21/19 0800)  SpO2: 99 % (12/21/19 0800) Vital Signs (24h Range):  Temp:  [97.2 °F (36.2 °C)-98.2 °F (36.8 °C)] 97.9 °F (36.6 °C)  Pulse:  [] 94  Resp:  [16-20] 16  SpO2:  [92 %-99 %] 99 %  BP: (100-110)/(68-76) 101/76     Weight: 78.5 kg (173 lb 1 oz)  Body mass index is 26.31 kg/m².    No intake or output data in the 24 hours ending 12/21/19 0843    Physical Exam   Constitutional: He is oriented to person, place, and time. He appears well-developed and well-nourished.   HENT:   Head: Normocephalic and atraumatic.   Eyes: Pupils are equal, round, and reactive to light. EOM are normal.   Neck: Normal range of motion.   Cardiovascular: Normal rate, regular rhythm and normal heart sounds.   Pulmonary/Chest: Effort normal.   Chest tube in place. Decreased breath sounds throughout left lung field   Abdominal: Bowel sounds are normal. He exhibits no distension. There is tenderness (mild  diffuse tendernss to very light palpation).   Musculoskeletal: Normal range of motion.   Neurological: He is alert and oriented to person, place, and time.   Skin: Skin is warm and dry.   Psychiatric: He has a normal mood and affect.         Lines/Drains/Airways     Drain                 Chest Tube 12/20/19 Right 1 day          Peripheral Intravenous Line                 Peripheral IV - Single Lumen 12/20/19 1305 20 G Anterior;Left Forearm less than 1 day                Significant Labs:    CBC/Anemia Profile:  Recent Labs   Lab 12/19/19  0900 12/20/19  0331   WBC 20.66* 21.23*   HGB 12.9* 11.9*   HCT 42.5 39.1*   PLT  400* 365*   MCV 83 83   RDW 24.5* 24.0*        Chemistries:  Recent Labs   Lab 12/19/19  0900 12/20/19  0331   * 133*   K 3.9 4.2   CL 90* 93*   CO2 32* 30*   BUN 18 16   CREATININE 1.0 0.9   CALCIUM 8.2* 8.5*   ALBUMIN 1.8* 1.8*   PROT 7.1 6.9   BILITOT 3.0* 3.3*   ALKPHOS 101 95   ALT 29 26   AST 48* 40       All pertinent labs within the past 24 hours have been reviewed.    Significant Imaging:  I have reviewed and interpreted all pertinent imaging results/findings within the past 24 hours.

## 2019-12-22 PROBLEM — F41.8 OTHER SPECIFIED ANXIETY DISORDERS: Status: RESOLVED | Noted: 2019-08-23 | Resolved: 2019-12-22

## 2019-12-22 PROBLEM — I42.8 NON-ISCHEMIC CARDIOMYOPATHY: Status: RESOLVED | Noted: 2019-12-10 | Resolved: 2019-12-22

## 2019-12-22 LAB
ALBUMIN SERPL BCP-MCNC: 1.7 G/DL (ref 3.5–5.2)
ALP SERPL-CCNC: 109 U/L (ref 55–135)
ALT SERPL W/O P-5'-P-CCNC: 24 U/L (ref 10–44)
AMPHET+METHAMPHET UR QL: NEGATIVE
ANION GAP SERPL CALC-SCNC: 9 MMOL/L (ref 8–16)
ANISOCYTOSIS BLD QL SMEAR: SLIGHT
AST SERPL-CCNC: 41 U/L (ref 10–40)
BARBITURATES UR QL SCN>200 NG/ML: NEGATIVE
BASOPHILS # BLD AUTO: 0.03 K/UL (ref 0–0.2)
BASOPHILS # BLD AUTO: 0.1 K/UL (ref 0–0.2)
BASOPHILS NFR BLD: 0.1 % (ref 0–1.9)
BASOPHILS NFR BLD: 0.2 % (ref 0–1.9)
BENZODIAZ UR QL SCN>200 NG/ML: NEGATIVE
BILIRUB SERPL-MCNC: 3.6 MG/DL (ref 0.1–1)
BUN SERPL-MCNC: 19 MG/DL (ref 6–20)
BZE UR QL SCN: NEGATIVE
CALCIUM SERPL-MCNC: 7.8 MG/DL (ref 8.7–10.5)
CANNABINOIDS UR QL SCN: NEGATIVE
CHLORIDE SERPL-SCNC: 94 MMOL/L (ref 95–110)
CO2 SERPL-SCNC: 28 MMOL/L (ref 23–29)
CREAT SERPL-MCNC: 1 MG/DL (ref 0.5–1.4)
CREAT UR-MCNC: 117 MG/DL (ref 23–375)
DIFFERENTIAL METHOD: ABNORMAL
DIFFERENTIAL METHOD: ABNORMAL
EOSINOPHIL # BLD AUTO: 0 K/UL (ref 0–0.5)
EOSINOPHIL # BLD AUTO: 0 K/UL (ref 0–0.5)
EOSINOPHIL NFR BLD: 0 % (ref 0–8)
EOSINOPHIL NFR BLD: 0.1 % (ref 0–8)
ERYTHROCYTE [DISTWIDTH] IN BLOOD BY AUTOMATED COUNT: 23.2 % (ref 11.5–14.5)
ERYTHROCYTE [DISTWIDTH] IN BLOOD BY AUTOMATED COUNT: 23.9 % (ref 11.5–14.5)
EST. GFR  (AFRICAN AMERICAN): >60 ML/MIN/1.73 M^2
EST. GFR  (NON AFRICAN AMERICAN): >60 ML/MIN/1.73 M^2
ETHANOL UR-MCNC: <10 MG/DL
GLUCOSE SERPL-MCNC: 101 MG/DL (ref 70–110)
HCT VFR BLD AUTO: 35.7 % (ref 40–54)
HCT VFR BLD AUTO: 36.9 % (ref 40–54)
HGB BLD-MCNC: 11.5 G/DL (ref 14–18)
HGB BLD-MCNC: 11.8 G/DL (ref 14–18)
HYPOCHROMIA BLD QL SMEAR: ABNORMAL
IMM GRANULOCYTES # BLD AUTO: 0.61 K/UL (ref 0–0.04)
IMM GRANULOCYTES # BLD AUTO: 0.65 K/UL (ref 0–0.04)
IMM GRANULOCYTES NFR BLD AUTO: 1.4 % (ref 0–0.5)
IMM GRANULOCYTES NFR BLD AUTO: 1.4 % (ref 0–0.5)
INR PPP: 2 (ref 0.8–1.2)
LACTATE SERPL-SCNC: 2 MMOL/L (ref 0.5–2.2)
LACTATE SERPL-SCNC: 2.8 MMOL/L (ref 0.5–2.2)
LACTATE SERPL-SCNC: 3.1 MMOL/L (ref 0.5–2.2)
LYMPHOCYTES # BLD AUTO: 1.6 K/UL (ref 1–4.8)
LYMPHOCYTES # BLD AUTO: 1.7 K/UL (ref 1–4.8)
LYMPHOCYTES NFR BLD: 3.4 % (ref 18–48)
LYMPHOCYTES NFR BLD: 3.4 % (ref 18–48)
MAGNESIUM SERPL-MCNC: 1.5 MG/DL (ref 1.6–2.6)
MCH RBC QN AUTO: 26 PG (ref 27–31)
MCH RBC QN AUTO: 26.2 PG (ref 27–31)
MCHC RBC AUTO-ENTMCNC: 32 G/DL (ref 32–36)
MCHC RBC AUTO-ENTMCNC: 32.2 G/DL (ref 32–36)
MCV RBC AUTO: 81 FL (ref 82–98)
MCV RBC AUTO: 82 FL (ref 82–98)
METHADONE UR QL SCN>300 NG/ML: NEGATIVE
MONOCYTES # BLD AUTO: 1.3 K/UL (ref 0.3–1)
MONOCYTES # BLD AUTO: 1.5 K/UL (ref 0.3–1)
MONOCYTES NFR BLD: 2.9 % (ref 4–15)
MONOCYTES NFR BLD: 3.1 % (ref 4–15)
NEUTROPHILS # BLD AUTO: 41.5 K/UL (ref 1.8–7.7)
NEUTROPHILS # BLD AUTO: 44.3 K/UL (ref 1.8–7.7)
NEUTROPHILS NFR BLD: 91.9 % (ref 38–73)
NEUTROPHILS NFR BLD: 92.1 % (ref 38–73)
NRBC BLD-RTO: 0 /100 WBC
NRBC BLD-RTO: 0 /100 WBC
OPIATES UR QL SCN: NEGATIVE
OVALOCYTES BLD QL SMEAR: ABNORMAL
PCP UR QL SCN>25 NG/ML: NEGATIVE
PLATELET # BLD AUTO: 299 K/UL (ref 150–350)
PLATELET # BLD AUTO: 327 K/UL (ref 150–350)
PLATELET BLD QL SMEAR: ABNORMAL
PMV BLD AUTO: 9.1 FL (ref 9.2–12.9)
PMV BLD AUTO: 9.3 FL (ref 9.2–12.9)
POIKILOCYTOSIS BLD QL SMEAR: SLIGHT
POTASSIUM SERPL-SCNC: 4.5 MMOL/L (ref 3.5–5.1)
PROT SERPL-MCNC: 6.6 G/DL (ref 6–8.4)
PROTHROMBIN TIME: 19.3 SEC (ref 9–12.5)
RBC # BLD AUTO: 4.42 M/UL (ref 4.6–6.2)
RBC # BLD AUTO: 4.51 M/UL (ref 4.6–6.2)
SODIUM SERPL-SCNC: 131 MMOL/L (ref 136–145)
TARGETS BLD QL SMEAR: ABNORMAL
TOXICOLOGY INFORMATION: NORMAL
WBC # BLD AUTO: 45.06 K/UL (ref 3.9–12.7)
WBC # BLD AUTO: 48.14 K/UL (ref 3.9–12.7)

## 2019-12-22 PROCEDURE — 63600175 PHARM REV CODE 636 W HCPCS: Mod: JG | Performed by: INTERNAL MEDICINE

## 2019-12-22 PROCEDURE — 85610 PROTHROMBIN TIME: CPT

## 2019-12-22 PROCEDURE — 25000003 PHARM REV CODE 250: Performed by: HOSPITALIST

## 2019-12-22 PROCEDURE — 63600175 PHARM REV CODE 636 W HCPCS: Performed by: HOSPITALIST

## 2019-12-22 PROCEDURE — 93010 ELECTROCARDIOGRAM REPORT: CPT | Mod: ,,, | Performed by: INTERNAL MEDICINE

## 2019-12-22 PROCEDURE — 93005 ELECTROCARDIOGRAM TRACING: CPT

## 2019-12-22 PROCEDURE — 83735 ASSAY OF MAGNESIUM: CPT

## 2019-12-22 PROCEDURE — 99233 SBSQ HOSP IP/OBS HIGH 50: CPT | Mod: ,,, | Performed by: INTERNAL MEDICINE

## 2019-12-22 PROCEDURE — 87040 BLOOD CULTURE FOR BACTERIA: CPT | Mod: 59

## 2019-12-22 PROCEDURE — 83605 ASSAY OF LACTIC ACID: CPT | Mod: 91

## 2019-12-22 PROCEDURE — 25000003 PHARM REV CODE 250: Performed by: STUDENT IN AN ORGANIZED HEALTH CARE EDUCATION/TRAINING PROGRAM

## 2019-12-22 PROCEDURE — 80053 COMPREHEN METABOLIC PANEL: CPT

## 2019-12-22 PROCEDURE — 99233 PR SUBSEQUENT HOSPITAL CARE,LEVL III: ICD-10-PCS | Mod: ,,, | Performed by: INTERNAL MEDICINE

## 2019-12-22 PROCEDURE — 99233 SBSQ HOSP IP/OBS HIGH 50: CPT | Mod: ,,, | Performed by: HOSPITALIST

## 2019-12-22 PROCEDURE — 80307 DRUG TEST PRSMV CHEM ANLYZR: CPT

## 2019-12-22 PROCEDURE — 36415 COLL VENOUS BLD VENIPUNCTURE: CPT

## 2019-12-22 PROCEDURE — 99233 PR SUBSEQUENT HOSPITAL CARE,LEVL III: ICD-10-PCS | Mod: ,,, | Performed by: HOSPITALIST

## 2019-12-22 PROCEDURE — 25000242 PHARM REV CODE 250 ALT 637 W/ HCPCS: Performed by: INTERNAL MEDICINE

## 2019-12-22 PROCEDURE — 20600001 HC STEP DOWN PRIVATE ROOM

## 2019-12-22 PROCEDURE — 87040 BLOOD CULTURE FOR BACTERIA: CPT

## 2019-12-22 PROCEDURE — 63600175 PHARM REV CODE 636 W HCPCS: Performed by: STUDENT IN AN ORGANIZED HEALTH CARE EDUCATION/TRAINING PROGRAM

## 2019-12-22 PROCEDURE — 85025 COMPLETE CBC W/AUTO DIFF WBC: CPT | Mod: 91

## 2019-12-22 PROCEDURE — 93010 EKG 12-LEAD: ICD-10-PCS | Mod: ,,, | Performed by: INTERNAL MEDICINE

## 2019-12-22 RX ORDER — VANCOMYCIN HCL IN 5 % DEXTROSE 1G/250ML
1000 PLASTIC BAG, INJECTION (ML) INTRAVENOUS
Status: DISCONTINUED | OUTPATIENT
Start: 2019-12-22 | End: 2019-12-25

## 2019-12-22 RX ORDER — FUROSEMIDE 40 MG/1
40 TABLET ORAL DAILY
Status: DISCONTINUED | OUTPATIENT
Start: 2019-12-22 | End: 2019-12-22

## 2019-12-22 RX ADMIN — PANTOPRAZOLE SODIUM 40 MG: 40 TABLET, DELAYED RELEASE ORAL at 08:12

## 2019-12-22 RX ADMIN — CITALOPRAM HYDROBROMIDE 20 MG: 10 TABLET ORAL at 08:12

## 2019-12-22 RX ADMIN — VANCOMYCIN HYDROCHLORIDE 1000 MG: 1 INJECTION, POWDER, LYOPHILIZED, FOR SOLUTION INTRAVENOUS at 09:12

## 2019-12-22 RX ADMIN — PIPERACILLIN AND TAZOBACTAM 4.5 G: 4; .5 INJECTION, POWDER, FOR SOLUTION INTRAVENOUS at 02:12

## 2019-12-22 RX ADMIN — LORAZEPAM 1 MG: 1 TABLET ORAL at 11:12

## 2019-12-22 RX ADMIN — PIPERACILLIN AND TAZOBACTAM 4.5 G: 4; .5 INJECTION, POWDER, FOR SOLUTION INTRAVENOUS at 11:12

## 2019-12-22 RX ADMIN — MAGNESIUM SULFATE HEPTAHYDRATE 1 G: 500 INJECTION, SOLUTION INTRAMUSCULAR; INTRAVENOUS at 11:12

## 2019-12-22 RX ADMIN — POLYETHYLENE GLYCOL 3350 17 G: 17 POWDER, FOR SOLUTION ORAL at 08:12

## 2019-12-22 RX ADMIN — ENALAPRIL MALEATE 2.5 MG: 2.5 TABLET ORAL at 08:12

## 2019-12-22 RX ADMIN — APIXABAN 10 MG: 5 TABLET, FILM COATED ORAL at 09:12

## 2019-12-22 RX ADMIN — ALTEPLASE: 2.2 INJECTION, POWDER, LYOPHILIZED, FOR SOLUTION INTRAVENOUS at 01:12

## 2019-12-22 RX ADMIN — CEFEPIME 2 G: 2 INJECTION, POWDER, FOR SOLUTION INTRAVENOUS at 02:12

## 2019-12-22 RX ADMIN — METOPROLOL SUCCINATE 100 MG: 50 TABLET, EXTENDED RELEASE ORAL at 08:12

## 2019-12-22 RX ADMIN — SPIRONOLACTONE 25 MG: 25 TABLET ORAL at 08:12

## 2019-12-22 RX ADMIN — SODIUM CHLORIDE, SODIUM LACTATE, POTASSIUM CHLORIDE, AND CALCIUM CHLORIDE 500 ML: .6; .31; .03; .02 INJECTION, SOLUTION INTRAVENOUS at 12:12

## 2019-12-22 RX ADMIN — HYDROXYZINE HYDROCHLORIDE 25 MG: 25 TABLET, FILM COATED ORAL at 08:12

## 2019-12-22 RX ADMIN — DORNASE ALFA 5 MG: 1 SOLUTION RESPIRATORY (INHALATION) at 01:12

## 2019-12-22 RX ADMIN — FUROSEMIDE 40 MG: 40 TABLET ORAL at 10:12

## 2019-12-22 RX ADMIN — LORAZEPAM 1 MG: 1 TABLET ORAL at 02:12

## 2019-12-22 RX ADMIN — APIXABAN 10 MG: 5 TABLET, FILM COATED ORAL at 08:12

## 2019-12-22 RX ADMIN — VANCOMYCIN HYDROCHLORIDE 1500 MG: 1.5 INJECTION, POWDER, LYOPHILIZED, FOR SOLUTION INTRAVENOUS at 09:12

## 2019-12-22 NOTE — NURSING
Chest tube drainage system changed per pulmonology. Dressing clean, dry, intact. Serosanguinous drainage noted to chest tube.

## 2019-12-22 NOTE — ASSESSMENT & PLAN NOTE
-s/p Chest tube insertion and drainage  -Gram stain negative, Cx NGTD  -Cell count/diff w/ 50% segs; protein not elevated;  ; glucose 30  -could be secondary to PE (pulmonary infarct) vs pneumonia  -s/p 1 dose of tpa/dnase with significant output (~1600cc); drainage is serosanguineous  -bedside ultrasound still with some septations; therefore, will give additional dose today

## 2019-12-22 NOTE — SUBJECTIVE & OBJECTIVE
Interval History: Issues yesterday with abdominal pain. Seems to have relieved with BM. 1 dose of tpa/dornase given with significant output from chest tube.     Objective:     Vital Signs (Most Recent):  Temp: 97.6 °F (36.4 °C) (12/22/19 1205)  Pulse: 100 (12/22/19 1205)  Resp: 18 (12/22/19 1205)  BP: 101/70 (12/22/19 1205)  SpO2: 97 % (12/22/19 1205) Vital Signs (24h Range):  Temp:  [97.5 °F (36.4 °C)-98.6 °F (37 °C)] 97.6 °F (36.4 °C)  Pulse:  [] 100  Resp:  [18] 18  SpO2:  [96 %-100 %] 97 %  BP: (101-116)/(59-79) 101/70     Weight: 78.5 kg (173 lb 1 oz)  Body mass index is 26.31 kg/m².      Intake/Output Summary (Last 24 hours) at 12/22/2019 1235  Last data filed at 12/22/2019 1046  Gross per 24 hour   Intake 480 ml   Output 700 ml   Net -220 ml       Physical Exam   Constitutional: He is oriented to person, place, and time. He appears well-developed and well-nourished.   HENT:   Head: Normocephalic and atraumatic.   Eyes: Pupils are equal, round, and reactive to light. EOM are normal.   Neck: Normal range of motion.   Cardiovascular: Normal rate, regular rhythm and normal heart sounds.   Pulmonary/Chest: Effort normal.   Chest tube in place. Decreased breath sounds throughout left lung field   Abdominal: Bowel sounds are normal. He exhibits no distension. There is tenderness (less tender than yesterday).   Musculoskeletal: Normal range of motion.   Neurological: He is alert and oriented to person, place, and time.   Skin: Skin is warm and dry.   Psychiatric: He has a normal mood and affect.       Vents:  Vent Mode: Spont (12/12/19 1131)  Ventilator Initiated: Yes (12/10/19 0656)  Set Rate: 18 bmp (12/12/19 0954)  Vt Set: 450 mL (12/12/19 0954)  Pressure Support: 10 cmH20 (12/12/19 1131)  PEEP/CPAP: 5 cmH20 (12/12/19 1131)  Oxygen Concentration (%): 24 (12/12/19 1910)  Peak Airway Pressure: 16 cmH2O (12/12/19 1131)  Plateau Pressure: 19 cmH20 (12/12/19 1131)  Total Ve: 12 mL (12/12/19 1131)  F/VT Ratio<105  (RSBI): (!) 68.24 (12/12/19 1131)    Lines/Drains/Airways     Drain                 Chest Tube 12/20/19 Right 2 days          Peripheral Intravenous Line                 Peripheral IV - Single Lumen 12/20/19 1305 20 G Anterior;Left Forearm 1 day                Significant Labs:    CBC/Anemia Profile:  Recent Labs   Lab 12/21/19  0825 12/22/19  0639 12/22/19  0919   WBC 18.09* 45.06* 48.14*   HGB 11.4* 11.8* 11.5*   HCT 36.5* 36.9* 35.7*    327 299   MCV 84 82 81*   RDW 24.1* 23.9* 23.2*        Chemistries:  Recent Labs   Lab 12/21/19  0825 12/22/19  0638   * 131*   K 4.4 4.5   CL 96 94*   CO2 29 28   BUN 21* 19   CREATININE 1.1 1.0   CALCIUM 8.2* 7.8*   ALBUMIN 1.6* 1.7*   PROT 6.4 6.6   BILITOT 2.6* 3.6*   ALKPHOS 90 109   ALT 24 24   AST 40 41*   MG  --  1.5*       All pertinent labs within the past 24 hours have been reviewed.    Significant Imaging:  I have reviewed and interpreted all pertinent imaging results/findings within the past 24 hours.

## 2019-12-22 NOTE — PROGRESS NOTES
[facility]  Pulmonology  Progress Note    Patient Name: Francis Daigle  MRN: [id number]  Admission Date: 12/10/2019  Hospital Length of Stay: 12 days  Code Status: Full Code  Attending Provider: David Potts MD  Primary Care Provider: Primary Doctor No   Principal Problem: Sepsis due to undetermined organism    Subjective:     Interval History: Issues yesterday with abdominal pain. Seems to have relieved with BM. 1 dose of tpa/dornase given with significant output from chest tube.     Objective:     Vital Signs (Most Recent):  Temp: 97.6 °F (36.4 °C) (12/22/19 1205)  Pulse: 100 (12/22/19 1205)  Resp: 18 (12/22/19 1205)  BP: 101/70 (12/22/19 1205)  SpO2: 97 % (12/22/19 1205) Vital Signs (24h Range):  Temp:  [97.5 °F (36.4 °C)-98.6 °F (37 °C)] 97.6 °F (36.4 °C)  Pulse:  [] 100  Resp:  [18] 18  SpO2:  [96 %-100 %] 97 %  BP: (101-116)/(59-79) 101/70     Weight: 78.5 kg (173 lb 1 oz)  Body mass index is 26.31 kg/m².      Intake/Output Summary (Last 24 hours) at 12/22/2019 1235  Last data filed at 12/22/2019 1046  Gross per 24 hour   Intake 480 ml   Output 700 ml   Net -220 ml       Physical Exam   Constitutional: He is oriented to person, place, and time. He appears well-developed and well-nourished.   HENT:   Head: Normocephalic and atraumatic.   Eyes: Pupils are equal, round, and reactive to light. EOM are normal.   Neck: Normal range of motion.   Cardiovascular: Normal rate, regular rhythm and normal heart sounds.   Pulmonary/Chest: Effort normal.   Chest tube in place. Decreased breath sounds throughout left lung field   Abdominal: Bowel sounds are normal. He exhibits no distension. There is tenderness (less tender than yesterday).   Musculoskeletal: Normal range of motion.   Neurological: He is alert and oriented to person, place, and time.   Skin: Skin is warm and dry.   Psychiatric: He has a normal mood and affect.       Vents:  Vent Mode: Spont (12/12/19 1131)  Ventilator Initiated:  Yes (12/10/19 0656)  Set Rate: 18 bmp (12/12/19 0954)  Vt Set: 450 mL (12/12/19 0954)  Pressure Support: 10 cmH20 (12/12/19 1131)  PEEP/CPAP: 5 cmH20 (12/12/19 1131)  Oxygen Concentration (%): 24 (12/12/19 1910)  Peak Airway Pressure: 16 cmH2O (12/12/19 1131)  Plateau Pressure: 19 cmH20 (12/12/19 1131)  Total Ve: 12 mL (12/12/19 1131)  F/VT Ratio<105 (RSBI): (!) 68.24 (12/12/19 1131)    Lines/Drains/Airways     Drain                 Chest Tube 12/20/19 Right 2 days          Peripheral Intravenous Line                 Peripheral IV - Single Lumen 12/20/19 1305 20 G Anterior;Left Forearm 1 day                Significant Labs:    CBC/Anemia Profile:  Recent Labs   Lab 12/21/19  0825 12/22/19  0639 12/22/19 0919   WBC 18.09* 45.06* 48.14*   HGB 11.4* 11.8* 11.5*   HCT 36.5* 36.9* 35.7*    327 299   MCV 84 82 81*   RDW 24.1* 23.9* 23.2*        Chemistries:  Recent Labs   Lab 12/21/19  0825 12/22/19  0638   * 131*   K 4.4 4.5   CL 96 94*   CO2 29 28   BUN 21* 19   CREATININE 1.1 1.0   CALCIUM 8.2* 7.8*   ALBUMIN 1.6* 1.7*   PROT 6.4 6.6   BILITOT 2.6* 3.6*   ALKPHOS 90 109   ALT 24 24   AST 40 41*   MG  --  1.5*       All pertinent labs within the past 24 hours have been reviewed.    Significant Imaging:  I have reviewed and interpreted all pertinent imaging results/findings within the past 24 hours.    Assessment/Plan:     Pleural effusion  -s/p Chest tube insertion and drainage  -Gram stain negative, Cx NGTD  -Cell count/diff w/ 50% segs; protein not elevated;  ; glucose 30  -could be secondary to PE (pulmonary infarct) vs pneumonia  -s/p 1 dose of tpa/dnase with significant output (~1600cc); drainage is serosanguineous  -bedside ultrasound still with some septations; therefore, will give additional dose today           Aristides York MD  Pulmonology  [facility]

## 2019-12-22 NOTE — PLAN OF CARE
Pt AAOx4, VSS, reports pain of 11/10 to center of abdomen. MD notified. Pt very anxious throughout the day. Given 1.5mg of Ativan total and pt still somewhat anxious. Pt jumping out of bed and ambulating to bathroom alone. Bed alarm set and sounding. Pt instructed on need for assistance due to chest tube. Internal medicine  notified and at bedside earlier today. Abdominal x-rays WNL. Will continue to monitor.

## 2019-12-22 NOTE — PROGRESS NOTES
Pharmacokinetic Initial Assessment: IV Vancomycin    Assessment/Plan:    Initiate intravenous vancomycin with loading dose of 1500 mg once followed by a maintenance dose of vancomycin 1000 mg IV every 12 hours  Desired empiric serum trough concentration is 10 to 20 mcg/mL  Draw vancomycin trough level 30 min prior to fourth dose on 12/23/19 at approximately 2100  Pharmacy will continue to follow and monitor vancomycin.      Please contact pharmacy at extension [phone] with any questions regarding this assessment.     Thank you for the consult,   Taylor Winn PharmD       Patient brief summary:  Francis Daigle is a 44 y.o. male initiated on antimicrobial therapy with IV Vancomycin for treatment of suspected sepsis    Drug Allergies:   Review of patient's allergies indicates:  No Known Allergies    Actual Body Weight:   78.5 kg    Renal Function:   Estimated Creatinine Clearance: 89.3 mL/min (based on SCr of 1 mg/dL).,     Dialysis Method (if applicable):  N/A    CBC (last 72 hours):  Recent Labs   Lab Result Units 12/19/19  0900 12/20/19  0331 12/21/19  0825 12/22/19  0639   WBC K/uL 20.66* 21.23* 18.09* 45.06*   Hemoglobin g/dL 12.9* 11.9* 11.4* 11.8*   Hematocrit % 42.5 39.1* 36.5* 36.9*   Platelets K/uL 400* 365* 331 327   Gran% % 83.8* 84.2* 80.7* 92.1*   Lymph% % 7.9* 8.0* 11.3* 3.4*   Mono% % 7.1 6.7 5.9 2.9*   Eosinophil% % 0.4 0.2 1.1 0.0   Basophil% % 0.2 0.2 0.3 0.2   Differential Method  Automated Automated Automated Automated       Metabolic Panel (last 72 hours):  Recent Labs   Lab Result Units 12/19/19  0900 12/20/19  0331 12/20/19  1000 12/21/19  0825 12/22/19  0638   Sodium mmol/L 130* 133*  --  133* 131*   Potassium mmol/L 3.9 4.2  --  4.4 4.5   Chloride mmol/L 90* 93*  --  96 94*   CO2 mmol/L 32* 30*  --  29 28   Glucose mg/dL 112* 89  --  126* 101   Glucose, Fluid mg/dL  --   --  31  --   --    BUN, Bld mg/dL 18 16  --  21* 19   Creatinine mg/dL 1.0 0.9  --  1.1 1.0   Albumin g/dL 1.8* 1.8*   --  1.6* 1.7*   Total Bilirubin mg/dL 3.0* 3.3*  --  2.6* 3.6*   Alkaline Phosphatase U/L 101 95  --  90 109   AST U/L 48* 40  --  40 41*   ALT U/L 29 26  --  24 24   Magnesium mg/dL  --   --   --   --  1.5*       Drug levels (last 3 results):  No results for input(s): VANCOMYCINRA, VANCOMYCINPE, VANCOMYCINTR in the last 72 hours.    Microbiologic Results:  Microbiology Results (last 7 days)     Procedure Component Value Units Date/Time    Blood Culture #1 **CANNOT BE ORDERED STAT** [[id number]]     Order Status:  Sent Specimen:  Blood     Blood Culture #1 **CANNOT BE ORDERED STAT** [[id number]]     Order Status:  Sent Specimen:  Blood     AFB culture (includes stain) [[id number]] Collected:  12/20/19 1000    Order Status:  Completed Specimen:  Body Fluid from Pleural Fluid Updated:  12/21/19 2127     AFB Culture & Smear Culture in progress    Culture, Body Fluid - Bactec [[id number]] Collected:  12/20/19 1000    Order Status:  Completed Specimen:  Body Fluid from Pleural Fluid Updated:  12/21/19 1612     Body Fluid Culture, Sterile No Growth to date      No Growth to date    Gram stain [[id number]] Collected:  12/20/19 1000    Order Status:  Completed Specimen:  Body Fluid from Pleural Fluid Updated:  12/20/19 1628     Gram Stain Result Rare WBC's      No organisms seen    Fungus culture [[id number]] Collected:  12/20/19 1000    Order Status:  Sent Specimen:  Body Fluid from Pleural Fluid Updated:  12/20/19 1152    Culture, Anaerobic [[id number]] Collected:  12/19/19 1506    Order Status:  Completed Specimen:  Body Fluid from Ascites Updated:  12/20/19 0745     Anaerobic Culture Culture in progress    Aerobic culture [[id number]] Collected:  12/19/19 1506    Order Status:  Completed Specimen:  Body Fluid from Ascites Updated:  12/20/19 0744     Aerobic Bacterial Culture No growth    Gram stain [[id number]] Collected:  12/19/19 1506    Order Status:  Completed Specimen:  Body Fluid from Ascites Updated:  12/19/19 2015      Gram Stain Result Cytospin indicates:      Rare WBC's      No organisms seen    Blood culture [[id number]] Collected:  12/10/19 0740    Order Status:  Completed Specimen:  Blood from Line, Jugular, Internal Left Updated:  12/15/19 1012     Blood Culture, Routine No growth after 5 days.

## 2019-12-23 LAB
ALBUMIN SERPL BCP-MCNC: 1.4 G/DL (ref 3.5–5.2)
ALP SERPL-CCNC: 92 U/L (ref 55–135)
ALT SERPL W/O P-5'-P-CCNC: 21 U/L (ref 10–44)
ANION GAP SERPL CALC-SCNC: 7 MMOL/L (ref 8–16)
ANISOCYTOSIS BLD QL SMEAR: SLIGHT
AST SERPL-CCNC: 34 U/L (ref 10–40)
BACTERIA SPEC AEROBE CULT: NO GROWTH
BASOPHILS # BLD AUTO: 0.09 K/UL (ref 0–0.2)
BASOPHILS NFR BLD: 0.3 % (ref 0–1.9)
BILIRUB SERPL-MCNC: 2.9 MG/DL (ref 0.1–1)
BUN SERPL-MCNC: 17 MG/DL (ref 6–20)
BURR CELLS BLD QL SMEAR: ABNORMAL
CALCIUM SERPL-MCNC: 7.6 MG/DL (ref 8.7–10.5)
CHLORIDE SERPL-SCNC: 94 MMOL/L (ref 95–110)
CO2 SERPL-SCNC: 30 MMOL/L (ref 23–29)
CREAT SERPL-MCNC: 0.9 MG/DL (ref 0.5–1.4)
DACRYOCYTES BLD QL SMEAR: ABNORMAL
DIFFERENTIAL METHOD: ABNORMAL
DOHLE BOD BLD QL SMEAR: PRESENT
EOSINOPHIL # BLD AUTO: 0 K/UL (ref 0–0.5)
EOSINOPHIL NFR BLD: 0.1 % (ref 0–8)
ERYTHROCYTE [DISTWIDTH] IN BLOOD BY AUTOMATED COUNT: 23.9 % (ref 11.5–14.5)
EST. GFR  (AFRICAN AMERICAN): >60 ML/MIN/1.73 M^2
EST. GFR  (NON AFRICAN AMERICAN): >60 ML/MIN/1.73 M^2
GLUCOSE SERPL-MCNC: 108 MG/DL (ref 70–110)
HCT VFR BLD AUTO: 37.4 % (ref 40–54)
HGB BLD-MCNC: 11.6 G/DL (ref 14–18)
HYPOCHROMIA BLD QL SMEAR: ABNORMAL
IMM GRANULOCYTES # BLD AUTO: 0.3 K/UL (ref 0–0.04)
IMM GRANULOCYTES NFR BLD AUTO: 1 % (ref 0–0.5)
INR PPP: 1.8 (ref 0.8–1.2)
LIPASE SERPL-CCNC: 22 U/L (ref 4–60)
LYMPHOCYTES # BLD AUTO: 1.8 K/UL (ref 1–4.8)
LYMPHOCYTES NFR BLD: 5.8 % (ref 18–48)
MCH RBC QN AUTO: 25.7 PG (ref 27–31)
MCHC RBC AUTO-ENTMCNC: 31 G/DL (ref 32–36)
MCV RBC AUTO: 83 FL (ref 82–98)
MONOCYTES # BLD AUTO: 1.2 K/UL (ref 0.3–1)
MONOCYTES NFR BLD: 4 % (ref 4–15)
NEUTROPHILS # BLD AUTO: 27.7 K/UL (ref 1.8–7.7)
NEUTROPHILS NFR BLD: 88.8 % (ref 38–73)
NRBC BLD-RTO: 0 /100 WBC
OVALOCYTES BLD QL SMEAR: ABNORMAL
PLATELET # BLD AUTO: 337 K/UL (ref 150–350)
PLATELET BLD QL SMEAR: ABNORMAL
PMV BLD AUTO: 9.1 FL (ref 9.2–12.9)
POIKILOCYTOSIS BLD QL SMEAR: ABNORMAL
POLYCHROMASIA BLD QL SMEAR: ABNORMAL
POTASSIUM SERPL-SCNC: 4.2 MMOL/L (ref 3.5–5.1)
PROT SERPL-MCNC: 5.7 G/DL (ref 6–8.4)
PROTHROMBIN TIME: 17.6 SEC (ref 9–12.5)
RBC # BLD AUTO: 4.52 M/UL (ref 4.6–6.2)
SODIUM SERPL-SCNC: 131 MMOL/L (ref 136–145)
TARGETS BLD QL SMEAR: ABNORMAL
TOXIC GRANULES BLD QL SMEAR: PRESENT
VANCOMYCIN TROUGH SERPL-MCNC: 14.9 UG/ML (ref 10–22)
WBC # BLD AUTO: 31.09 K/UL (ref 3.9–12.7)

## 2019-12-23 PROCEDURE — 99232 SBSQ HOSP IP/OBS MODERATE 35: CPT | Mod: ,,, | Performed by: HOSPITALIST

## 2019-12-23 PROCEDURE — 85610 PROTHROMBIN TIME: CPT

## 2019-12-23 PROCEDURE — 99233 SBSQ HOSP IP/OBS HIGH 50: CPT | Mod: ,,, | Performed by: INTERNAL MEDICINE

## 2019-12-23 PROCEDURE — 99233 PR SUBSEQUENT HOSPITAL CARE,LEVL III: ICD-10-PCS | Mod: ,,, | Performed by: INTERNAL MEDICINE

## 2019-12-23 PROCEDURE — 36415 COLL VENOUS BLD VENIPUNCTURE: CPT

## 2019-12-23 PROCEDURE — 25500020 PHARM REV CODE 255: Performed by: HOSPITALIST

## 2019-12-23 PROCEDURE — 25000003 PHARM REV CODE 250: Performed by: STUDENT IN AN ORGANIZED HEALTH CARE EDUCATION/TRAINING PROGRAM

## 2019-12-23 PROCEDURE — 63600175 PHARM REV CODE 636 W HCPCS: Performed by: STUDENT IN AN ORGANIZED HEALTH CARE EDUCATION/TRAINING PROGRAM

## 2019-12-23 PROCEDURE — 25000242 PHARM REV CODE 250 ALT 637 W/ HCPCS: Performed by: INTERNAL MEDICINE

## 2019-12-23 PROCEDURE — 63600175 PHARM REV CODE 636 W HCPCS: Performed by: HOSPITALIST

## 2019-12-23 PROCEDURE — 63600175 PHARM REV CODE 636 W HCPCS: Performed by: INTERNAL MEDICINE

## 2019-12-23 PROCEDURE — 25000003 PHARM REV CODE 250: Performed by: HOSPITALIST

## 2019-12-23 PROCEDURE — 85025 COMPLETE CBC W/AUTO DIFF WBC: CPT

## 2019-12-23 PROCEDURE — 99232 SBSQ HOSP IP/OBS MODERATE 35: CPT | Mod: AF,HB,, | Performed by: PSYCHIATRY & NEUROLOGY

## 2019-12-23 PROCEDURE — 99232 PR SUBSEQUENT HOSPITAL CARE,LEVL II: ICD-10-PCS | Mod: ,,, | Performed by: HOSPITALIST

## 2019-12-23 PROCEDURE — 80202 ASSAY OF VANCOMYCIN: CPT

## 2019-12-23 PROCEDURE — 20600001 HC STEP DOWN PRIVATE ROOM

## 2019-12-23 PROCEDURE — 83690 ASSAY OF LIPASE: CPT

## 2019-12-23 PROCEDURE — 80053 COMPREHEN METABOLIC PANEL: CPT

## 2019-12-23 PROCEDURE — 25500020 PHARM REV CODE 255: Performed by: STUDENT IN AN ORGANIZED HEALTH CARE EDUCATION/TRAINING PROGRAM

## 2019-12-23 PROCEDURE — 99232 PR SUBSEQUENT HOSPITAL CARE,LEVL II: ICD-10-PCS | Mod: AF,HB,, | Performed by: PSYCHIATRY & NEUROLOGY

## 2019-12-23 RX ORDER — LORAZEPAM 0.5 MG/1
0.5 TABLET ORAL EVERY 6 HOURS PRN
Status: DISCONTINUED | OUTPATIENT
Start: 2019-12-23 | End: 2019-12-23

## 2019-12-23 RX ADMIN — LORAZEPAM 0.5 MG: 0.5 TABLET ORAL at 05:12

## 2019-12-23 RX ADMIN — LORAZEPAM 1 MG: 1 TABLET ORAL at 08:12

## 2019-12-23 RX ADMIN — APIXABAN 10 MG: 5 TABLET, FILM COATED ORAL at 10:12

## 2019-12-23 RX ADMIN — PIPERACILLIN AND TAZOBACTAM 4.5 G: 4; .5 INJECTION, POWDER, FOR SOLUTION INTRAVENOUS at 06:12

## 2019-12-23 RX ADMIN — IOHEXOL 75 ML: 350 INJECTION, SOLUTION INTRAVENOUS at 09:12

## 2019-12-23 RX ADMIN — PANTOPRAZOLE SODIUM 40 MG: 40 TABLET, DELAYED RELEASE ORAL at 10:12

## 2019-12-23 RX ADMIN — IOHEXOL 15 ML: 350 INJECTION, SOLUTION INTRAVENOUS at 08:12

## 2019-12-23 RX ADMIN — Medication 6 MG: at 08:12

## 2019-12-23 RX ADMIN — POLYETHYLENE GLYCOL 3350 17 G: 17 POWDER, FOR SOLUTION ORAL at 10:12

## 2019-12-23 RX ADMIN — ONDANSETRON 8 MG: 2 INJECTION INTRAMUSCULAR; INTRAVENOUS at 12:12

## 2019-12-23 RX ADMIN — ENALAPRIL MALEATE 2.5 MG: 2.5 TABLET ORAL at 10:12

## 2019-12-23 RX ADMIN — PIPERACILLIN AND TAZOBACTAM 4.5 G: 4; .5 INJECTION, POWDER, FOR SOLUTION INTRAVENOUS at 01:12

## 2019-12-23 RX ADMIN — APIXABAN 5 MG: 5 TABLET, FILM COATED ORAL at 08:12

## 2019-12-23 RX ADMIN — VANCOMYCIN HYDROCHLORIDE 1000 MG: 1 INJECTION, POWDER, LYOPHILIZED, FOR SOLUTION INTRAVENOUS at 10:12

## 2019-12-23 RX ADMIN — ALTEPLASE: 2.2 INJECTION, POWDER, LYOPHILIZED, FOR SOLUTION INTRAVENOUS at 04:12

## 2019-12-23 RX ADMIN — HYDROXYZINE HYDROCHLORIDE 25 MG: 25 TABLET, FILM COATED ORAL at 10:12

## 2019-12-23 RX ADMIN — DORNASE ALFA 5 MG: 1 SOLUTION RESPIRATORY (INHALATION) at 04:12

## 2019-12-23 RX ADMIN — VANCOMYCIN HYDROCHLORIDE 1000 MG: 1 INJECTION, POWDER, LYOPHILIZED, FOR SOLUTION INTRAVENOUS at 11:12

## 2019-12-23 RX ADMIN — SPIRONOLACTONE 25 MG: 25 TABLET ORAL at 10:12

## 2019-12-23 RX ADMIN — CITALOPRAM HYDROBROMIDE 20 MG: 10 TABLET ORAL at 10:12

## 2019-12-23 RX ADMIN — METOPROLOL SUCCINATE 100 MG: 50 TABLET, EXTENDED RELEASE ORAL at 10:12

## 2019-12-23 NOTE — PROGRESS NOTES
[facility]  Pulmonology  Progress Note    Patient Name: Francis Daigle  MRN: [id number]  Admission Date: 12/10/2019  Hospital Length of Stay: 13 days  Code Status: Full Code  Attending Provider: David Potts MD  Primary Care Provider: Primary Doctor No   Principal Problem: Sepsis due to undetermined organism    Subjective:     Interval History: No issues overnight. CT remains in place with good output. Received 1 dose of tpa/dnase yesterday. About 1L drainage post instillation.     Objective:     Vital Signs (Most Recent):  Temp: 97.3 °F (36.3 °C) (12/23/19 0735)  Pulse: 107 (12/23/19 0735)  Resp: 18 (12/23/19 0735)  BP: 114/81 (12/23/19 0735)  SpO2: 96 % (12/23/19 0735) Vital Signs (24h Range):  Temp:  [96.9 °F (36.1 °C)-98.9 °F (37.2 °C)] 97.3 °F (36.3 °C)  Pulse:  [] 107  Resp:  [18-20] 18  SpO2:  [94 %-98 %] 96 %  BP: ()/(64-81) 114/81     Weight: 78.5 kg (173 lb 1 oz)  Body mass index is 26.31 kg/m².      Intake/Output Summary (Last 24 hours) at 12/23/2019 0822  Last data filed at 12/23/2019 0601  Gross per 24 hour   Intake 2050 ml   Output 2502 ml   Net -452 ml       Physical Exam   Constitutional: He is oriented to person, place, and time. He appears well-developed and well-nourished.   HENT:   Head: Normocephalic and atraumatic.   Eyes: Pupils are equal, round, and reactive to light. EOM are normal.   Neck: Normal range of motion.   Cardiovascular: Normal rate, regular rhythm and normal heart sounds.   Pulmonary/Chest: Effort normal.   Chest tube in place. Improved aeration bilaterally   Abdominal: Bowel sounds are normal. He exhibits no distension. There is tenderness.   Musculoskeletal: Normal range of motion.   Neurological: He is alert and oriented to person, place, and time.   Skin: Skin is warm and dry.   Psychiatric: He has a normal mood and affect.       Significant Labs:    CBC/Anemia Profile:  Recent Labs   Lab 12/21/19  0825 12/22/19  0639 12/22/19  0919   WBC  18.09* 45.06* 48.14*   HGB 11.4* 11.8* 11.5*   HCT 36.5* 36.9* 35.7*    327 299   MCV 84 82 81*   RDW 24.1* 23.9* 23.2*        Chemistries:  Recent Labs   Lab 12/21/19  0825 12/22/19  0638   * 131*   K 4.4 4.5   CL 96 94*   CO2 29 28   BUN 21* 19   CREATININE 1.1 1.0   CALCIUM 8.2* 7.8*   ALBUMIN 1.6* 1.7*   PROT 6.4 6.6   BILITOT 2.6* 3.6*   ALKPHOS 90 109   ALT 24 24   AST 40 41*   MG  --  1.5*       All pertinent labs within the past 24 hours have been reviewed.    Significant Imaging:  I have reviewed and interpreted all pertinent imaging results/findings within the past 24 hours.    Assessment/Plan:     Pleural effusion  -s/p Chest tube insertion and drainage  -Gram stain negative, Cx NGTD  -Cell count/diff w/ 50% segs; protein not elevated;  ; glucose 30  -could be secondary to PE (pulmonary infarct) vs pneumonia  -s/p 2 dose of tpa/dnase with significant output (~3600cc); drainage is serosanguineous  -will follow up bedside u/s and CXR to see if additional tpa/dnase to be given    Acute massive pulmonary embolism  -s/p TPA  -evidence of significant clots in the bilateral LEs  -will likely need lifelong AC           Aristides York MD  Pulmonology  [facility]

## 2019-12-23 NOTE — ASSESSMENT & PLAN NOTE
DVT lower extremity probable origin of PE. Patient was started on heparin gtt  - right upper extremity arm US showing edema and no clots.     Plan  -continue eliquis

## 2019-12-23 NOTE — PLAN OF CARE
Pt AAOx4, VSS, denies pain today. Pt WBC went from 18 to 48 this am. Septic workup completed. Pt afebrile. IV antibiotics started. Urine drug screen completed today. No other complaints at this time.

## 2019-12-23 NOTE — ASSESSMENT & PLAN NOTE
-See assessment for sepsis.   MELD-Na score: 23 at 12/22/2019  6:39 AM  MELD score: 19 at 12/22/2019  6:39 AM  Calculated from:  Serum Creatinine: 1.0 mg/dL at 12/22/2019  6:38 AM  Serum Sodium: 131 mmol/L at 12/22/2019  6:38 AM  Total Bilirubin: 3.6 mg/dL at 12/22/2019  6:38 AM  INR(ratio): 2.0 at 12/22/2019  6:39 AM  Age: 44 years

## 2019-12-23 NOTE — ASSESSMENT & PLAN NOTE
Patient with history of panic attacks in the past.  Patient describes increased worry and anxiety about his current condition and his recovery.     Plan:  -citalopram 20mg PO  -TN ativan for anxiety  -recommend patient make an appointment with psychiatry upon discharge.

## 2019-12-23 NOTE — ASSESSMENT & PLAN NOTE
-See assessment for sepsis.   MELD-Na score: 22 at 12/23/2019 10:40 AM  MELD score: 17 at 12/23/2019 10:40 AM  Calculated from:  Serum Creatinine: 0.9 mg/dL (Rounded to 1 mg/dL) at 12/23/2019 10:40 AM  Serum Sodium: 131 mmol/L at 12/23/2019 10:40 AM  Total Bilirubin: 2.9 mg/dL at 12/23/2019 10:40 AM  INR(ratio): 1.8 at 12/23/2019 10:40 AM  Age: 44 years

## 2019-12-23 NOTE — PROGRESS NOTES
[facility] Hospital Medicine  Progress Note    Patient Name: Francis Daigle  MRN: [id number]  Patient Class: IP- Inpatient   Admission Date: 12/10/2019  Length of Stay: 12 days  Attending Physician: David Potts MD  Primary Care Provider: Primary Doctor 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 He has a PMH significant for non-ischemic cardiomyopathy with combined CHF (EF 11% without ICD or CRT device) and on-going IVDU. He reports a two week duration of progressively worsening bilateral lower extremity swelling and redness of the anterior shins. This is associated with bilateral lower extremity pain with ambulation. He also reports noticing symptom association with worsening of baseline SOB with exertion as well as new abdominal distension and pain, within the same time frame. He describes abdominal pain as a generalized soreness that occurs approximately one hour after eating meals and spontaneously resolves without intervention or medication. He is unsure of how long the discomfort lasts before resolving. Additionally, he reports a two week duration of occasional episodes of pale semi-formed stools. He attempted symptom relief with increasing dose of daily Lasix, however without effect, prompting presentation to ED on 12/09.     He denies symptom association with fevers, chills, nausea, vomiting, history of prior abdominal surgeries, ETOH consumption, chest pain, pain with inspiration, lightheadedness, or blurry vision. He reports a on-going non-productive cough intermittent cough since CHF diagnosis that is unchanged. He does not weigh himself daily and is unsure of any weight loss or gain. He does follow with cardiology outpatient.     Of note, he also reports continued daily use of  IV methamphetamines with most recent usage on 12/08.     ED course: He initially presented to [facility] ED on 12/09. Lab were significant for leukocytosis (25), hyponatremia (126), lactic acidosis (3.5), transaminitis (, ALT 73), hyperbilirubinemia (4.5), and toxicology positive for amphetamines. CXR was suggestive of right lower lobe pneumonia. Abdominal U/S was suggestive of acalculous cholecystitis. He was given IVF, Ceftriaxone, and Lasix. Case was discussed with transfer center and AES here; recommended transfer for possible MRCP.     Overview/Hospital Course:  Francis Daigle 44 y.o male presented from OSH with cholilithiasis and cholecystitis. [place] [facility] [place]. When patient got admitted to hospital medicine team the patient was transferred to ICU following cardiac arrest on 12/10/19. Emergently intubated during PEA arrest, ROSC achieved after 1 round of CPR. Central line and arterial line placed. Patient initially requiring pressor support, cyanotic, hypoxic on blood gas. Bedside ultrasound with signs of RV strain with DVT in lower extremity. Patient on pressors at the time, requiring 100% FiO2 to oxygenate. Given TPA emergently. Patient started on heparin ggt, bronchoscopy today revealing relatively normal airways and extubated. Most recent ultrasound did not show liver cirrhosis or cholilthiasis or cholecystitis. Patient with RLL PNA getting treated with Zosyn and later switch to Levo. Patient transitioned to Heparin gtt to 10mg BID eliquis. Cardiology will need to consulted for possible ICD or lifevest up on discharge on Monday.   Spoke with cardiology who states that he will likely need an ICD but he can follow up outpatient with cardiology for its placement.  Patient continues to state that he feels better each day.  He does endorse increased anxiety over the last day.  He states that he used to be on treatment in the past.  Started patient on citalopram and instructed  him that he will need to request follow up with psychiatry once he is discharged. Patient has continued midepigastric pain that is associated with meals. Ultrasound for mesenteric ischemia showed no signs of occlusion or stenosis.  Ordered CT scan of the abdomen with contast showing bilateral PE and empyema vs abcess. Pulmonary consulted and plan to CT tomorrow, NPO at Midnight.  Chest tube was preformed today and fluids sent for cultures and cytology.  Patient noted to be tachycardic overnight up to 130s; ECG in the AM noted sinus tachycardia.  His HR slowed to 98 by lunch time and a small 250 mL bolus of LR.  Re-started heparin drip following the chest tube placement per pulmonology recommendations.   Patient has become more anxious and uncooperative today; pulling out IV lines and thrashing about room, demanding norco and IV ativan.  Addressed patient's concerns and provided 1g PO ativan q8 hours.   On 12/22, patient noted to have a spike in his WBC (42k.) Blood cultures were drawn as well as a lactate.  His antibitoics were broadened to Vancomycin and Zosyn IV.  His CT tube was noted to put out an additional 1L. Curiously, his abdominal pain has improved after lactulose gave him a bowel movement and he currently has no complaints.     Interval History: Patient noted to have a spike in his WBC (42k.) Blood cultures were drawn as well as a lactate.  His antibitoics were broadened to Vancomycin and Zosyn IV.  His CT tube was noted to put out an additional 1L.  Curiously, his abdominal pain has improved after lactulose gave him a bowel movement and he currently has no complaints.     Review of Systems   Constitutional: Negative for chills and fever.   Eyes: Negative for visual disturbance.   Respiratory: Negative for cough and shortness of breath.    Cardiovascular: Negative for chest pain and leg swelling.   Gastrointestinal: Negative for abdominal distention, abdominal pain, constipation, diarrhea and vomiting.      Objective:     Vital Signs (Most Recent):  Temp: 98.1 °F (36.7 °C) (12/22/19 1601)  Pulse: 94 (12/22/19 1601)  Resp: 18 (12/22/19 1601)  BP: 96/66 (12/22/19 1601)  SpO2: 95 % (12/22/19 1601) Vital Signs (24h Range):  Temp:  [97.6 °F (36.4 °C)-98.6 °F (37 °C)] 98.1 °F (36.7 °C)  Pulse:  [] 94  Resp:  [18] 18  SpO2:  [95 %-98 %] 95 %  BP: ()/(59-75) 96/66     Weight: 78.5 kg (173 lb 1 oz)  Body mass index is 26.31 kg/m².    Intake/Output Summary (Last 24 hours) at 12/22/2019 1831  Last data filed at 12/22/2019 1424  Gross per 24 hour   Intake 480 ml   Output 1000 ml   Net -520 ml      Physical Exam   Constitutional: He is oriented to person, place, and time.   Patient is a 45 yo M who appears alert and anxious.    HENT:   Head: Normocephalic and atraumatic.   Eyes: Pupils are equal, round, and reactive to light. EOM are normal.   Neck: Normal range of motion.   Cardiovascular: Normal rate, regular rhythm and normal heart sounds.   Pulmonary/Chest: Effort normal and breath sounds normal. No respiratory distress.   Chest tube in place   Abdominal: Bowel sounds are normal. He exhibits no distension. There is no tenderness.   Musculoskeletal: Normal range of motion.   Swelling of right upper extremity    Neurological: He is alert and oriented to person, place, and time.   Skin: Skin is warm and dry.   Psychiatric: He has a normal mood and affect.       Significant Labs: All pertinent labs within the past 24 hours have been reviewed.     Recent Labs   Lab 12/20/19  0331 12/21/19  0825 12/22/19  0638 12/22/19  0639 12/22/19  0919   WBC 21.23* 18.09*  --  45.06* 48.14*   HGB 11.9* 11.4*  --  11.8* 11.5*   HCT 39.1* 36.5*  --  36.9* 35.7*   * 331  --  327 299   MCV 83 84  --  82 81*   RDW 24.0* 24.1*  --  23.9* 23.2*   * 133* 131*  --   --    K 4.2 4.4 4.5  --   --    CL 93* 96 94*  --   --    CO2 30* 29 28  --   --    BUN 16 21* 19  --   --    CREATININE 0.9 1.1 1.0  --   --    GLU 89 126* 101   --   --    PROT 6.9 6.4 6.6  --   --    ALBUMIN 1.8* 1.6* 1.7*  --   --    BILITOT 3.3* 2.6* 3.6*  --   --    AST 40 40 41*  --   --    ALKPHOS 95 90 109  --   --    ALT 26 24 24  --   --        Significant Imaging: I have reviewed all pertinent imaging results/findings within the past 24 hours.         Assessment/Plan:      * Sepsis due to undetermined organism  This is a 44 year old male with PMH significant for non-ischemic cardiomyopathy (EF 11%), IVDU, and Hepatitis C who is presenting on 12/10 with two week duration of worsening lower extremity swelling and erythema associated with same duration of post-prandial abdominal pain and distension with intermittent pale colored stools with work-up thus far concerning for tachycardia (120's), leukocytosis (25), lactic acidosis, transaminitis, hyperbilirubinemia, CXR suggestive of right lower lobe pneumonia, and abdominal U/S suggestive of possible acalculous cholecystitis. He has no symptoms of underlying respiratory infection and on chart review CXR abnormalities are similar to those in 08/2019 during admission for CHF exacerbation. UA is unremarkable. Negative for influenza. Suspect bilateral lower extremity edema and erythema more a manifestation of CHF instead of bilateral cellulitis. Differential diagnoses include biliary etiology vs bacteremia in the setting of on-going IVDU vs possible CAP.   -CT scan of chest 12/20: pulmonary abscess involving the right lower lobe, as well as additional areas bilaterally that could represent areas of developing pulmonary abscesses.  Loculated fluid collection.   -12/22: WBC elevated to 42k.  Afebrile and HDs. Patient clinically feels well.  Broadened abx to IV vanc and zosyn.     Plan:   -Vancomycin and Zosyn IV   -f/u repeat blood cultures and lactate    -Respiratory culture growing GNR and Candida with persistently elevated WBC. Candida in respiratory does not need to get treated.   -Judiciously administer an IVF  resuscitation necessary.     Empyema  Recent CT chest now showing possible empyema of right lung.  -pleural fluid WBC elevated to over 2k and LDH elevated well above normal, indicative of likely empyema.   -Chest tube placed on 12/20, drained 700mL of SS fluid.  12/22 showed output of additional 1L    Plan:  -his WBC elevated to 42k on 12/22 that remained elevated on repeat CBC.  Clinically he feels well and has no complaints.  His antibiotics were broadened to Vanc and Zosyn IV.    -f/u repeat blood culture and lactate   -PULMONOLOGY consulted, appreciate recommendations        Abdominal pain  Patient has endorsed episodic abdominal pains (8/10) in his his to upper epigastrium over the last few days.  He associates these pains with meals and has avoided food because of it.  Nothing seems to make better other than letting time pass.  Given patient's presentation with DVTs and possible PE.  Concern for possible mesenteric ischemia vs rib fracture vs GERD.   -CXR negative for any fracture  -Ultrasound with mesenteric view negative for any stenosis or occulusion   -Lipase, Lactic acid WNL. Peritoneal cultures negative (likely portal HTN.)\    Plan:  -Patient's abdominal pain improved after 1x dose of lactulose.  He was noted to have 1x large BM later that day. Likely 2/2 to constipation   -s/p paracentesis on 12/19, which showed no SBP (WBC of 75.) SAAG of 1.1 likely 2/2 to portal HTN  -Continue pantoprazole PO 40mg   -continue to monitor       Anxiety  Patient with history of panic attacks in the past.  Patient describes increased worry and anxiety about his current condition and his recovery.     Plan:  -citalopram 20mg PO  -AR ativan for anxiety  -recommend patient make an appointment with psychiatry upon discharge.     Wheezing  History of 40PK year. Mild wheeze throughout lung field. Sating well in room air  - continue Ipotroprium Neb Q8H for wheezing       Acute massive pulmonary embolism  DVT lower extremity  probable origin of PE. Patient was started on heparin gtt  - right upper extremity arm US showing edema and no clots.     Plan  -continue eliquis       Acute deep vein thrombosis (DVT) of proximal vein of lower extremity  - See acute massive pulm embolism       Cardiac arrest  PEA arrest, 2 round of EPI and CPR achieved ROSC.         Hyponatremia  -See assessment for CHF exacerbation.       Chronic hepatitis C without hepatic coma  - HEP C viral load (+) and elevated at 6  - Will need to set up with hepatology for treatment as outpatient       Transaminitis  -See assessment for sepsis.   MELD-Na score: 23 at 12/22/2019  6:39 AM  MELD score: 19 at 12/22/2019  6:39 AM  Calculated from:  Serum Creatinine: 1.0 mg/dL at 12/22/2019  6:38 AM  Serum Sodium: 131 mmol/L at 12/22/2019  6:38 AM  Total Bilirubin: 3.6 mg/dL at 12/22/2019  6:38 AM  INR(ratio): 2.0 at 12/22/2019  6:39 AM  Age: 44 years        Intravenous drug abuse  -On-going use of methamphetamines.   -Prior hepatitis screening positive for HepC in 10/2019.     Plan:   -Counseled on importance of cessation.   -Monitor for signs of withdrawal for other substances.       Community acquired pneumonia of right lower lobe of lung  -See assessment for sepsis.       Acute on chronic combined systolic and diastolic heart failure  History of non-ischemic cardiomyopathy diagnosed in 11/2017 at [facility]  [facility] in 12/2017 with no evidence of angiographically significant coronary artery disease.  Most recent ECHO in 10/2019 concerning for EF of 35%, grade II diastolic dysfunction, and global hypokinesis with a restrictive physiology. HOME regimen: Carvedilol, Enalapril, Lasix 20 mg BID, and Spironolactone.   .-Suspect presentation from worsening right sided heart failure.     Plan:   - continue HOME regimen: Carvedilol, Enalapril, and Spironolactone  - HOLD lasix to 40mg PO BID  - continue Toprol XL 100mg QD   -Strict I/O's and daily standing weights.   -Telemetry ordered.    -Cardiology recommends outpatient EP follow up for ICD placement for primary prevention.  They do not recommending placing a life vest or ICD inpatient as patient's cardiac arrest during hospitalization was due to a PE and not a shockable rhythm.          Essential hypertension  -See assessment for CHF exacerbation.         VTE Risk Mitigation (From admission, onward)         Ordered     apixaban tablet 10 mg  2 times daily      12/21/19 1119     heparin 25,000 units in dextrose 5% 250 mL (100 units/mL) infusion HIGH INTENSITY nomogram - [facility]  Continuous     Question:  Heparin Infusion Adjustment (DO NOT MODIFY ANSWER)  Answer:  \\[email or web address]\epic\Images\Pharmacy\HeparinInfusions\heparin HIGH INTENSITY nomogram for [facility] [email or web address]    12/19/19 0909     IP VTE HIGH RISK PATIENT  Once      12/10/19 0922                      Bradly Guzman MD  Department of Hospital Medicine   [facility]

## 2019-12-23 NOTE — SUBJECTIVE & OBJECTIVE
Interval History: No issues overnight. CT remains in place with good output. Received 1 dose of tpa/dnase yesterday. About 1L drainage post instillation.     Objective:     Vital Signs (Most Recent):  Temp: 97.3 °F (36.3 °C) (12/23/19 0735)  Pulse: 107 (12/23/19 0735)  Resp: 18 (12/23/19 0735)  BP: 114/81 (12/23/19 0735)  SpO2: 96 % (12/23/19 0735) Vital Signs (24h Range):  Temp:  [96.9 °F (36.1 °C)-98.9 °F (37.2 °C)] 97.3 °F (36.3 °C)  Pulse:  [] 107  Resp:  [18-20] 18  SpO2:  [94 %-98 %] 96 %  BP: ()/(64-81) 114/81     Weight: 78.5 kg (173 lb 1 oz)  Body mass index is 26.31 kg/m².      Intake/Output Summary (Last 24 hours) at 12/23/2019 0822  Last data filed at 12/23/2019 0601  Gross per 24 hour   Intake 2050 ml   Output 2502 ml   Net -452 ml       Physical Exam   Constitutional: He is oriented to person, place, and time. He appears well-developed and well-nourished.   HENT:   Head: Normocephalic and atraumatic.   Eyes: Pupils are equal, round, and reactive to light. EOM are normal.   Neck: Normal range of motion.   Cardiovascular: Normal rate, regular rhythm and normal heart sounds.   Pulmonary/Chest: Effort normal.   Chest tube in place. Improved aeration bilaterally   Abdominal: Bowel sounds are normal. He exhibits no distension. There is tenderness.   Musculoskeletal: Normal range of motion.   Neurological: He is alert and oriented to person, place, and time.   Skin: Skin is warm and dry.   Psychiatric: He has a normal mood and affect.       Significant Labs:    CBC/Anemia Profile:  Recent Labs   Lab 12/21/19  0825 12/22/19  0639 12/22/19  0919   WBC 18.09* 45.06* 48.14*   HGB 11.4* 11.8* 11.5*   HCT 36.5* 36.9* 35.7*    327 299   MCV 84 82 81*   RDW 24.1* 23.9* 23.2*        Chemistries:  Recent Labs   Lab 12/21/19  0825 12/22/19  0638   * 131*   K 4.4 4.5   CL 96 94*   CO2 29 28   BUN 21* 19   CREATININE 1.1 1.0   CALCIUM 8.2* 7.8*   ALBUMIN 1.6* 1.7*   PROT 6.4 6.6   BILITOT 2.6*  3.6*   ALKPHOS 90 109   ALT 24 24   AST 40 41*   MG  --  1.5*       All pertinent labs within the past 24 hours have been reviewed.    Significant Imaging:  I have reviewed and interpreted all pertinent imaging results/findings within the past 24 hours.

## 2019-12-23 NOTE — CONSULTS
[facility]  Psychiatry  Progress Note    Patient Name: Francis Daigle  MRN: [id number]   Code Status: Full Code  Admission Date: 12/10/2019  Hospital Length of Stay: 13 days  Expected Discharge Date: 12/30/2019  Attending Physician: David Potts MD  Primary Care Provider: Primary Doctor No    Current Legal Status: N/A    Patient information was obtained from patient, past medical records and ER records.     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 He has a PMH significant for non-ischemic cardiomyopathy with combined CHF (EF 11% without ICD or CRT device), Hepatitis C, and on-going IVDU (daily IV methamphetamines most recently on 12/08) who presented on 12/10 with two week duration of worsening lower extremity swelling and erythema associated with same duration of post-prandial abdominal pain and distension with intermittent pale colored stools with work-up thus far concerning for tachycardia (120's), leukocytosis (25), lactic acidosis, transaminitis, hyperbilirubinemia, CXR suggestive of right lower lobe pneumonia, and abdominal U/S suggestive of possible acalculous cholecystitis. He has no symptoms of underlying respiratory infection and on chart review CXR abnormalities are similar to those in 08/2019 during admission for CHF exacerbation. UA is unremarkable. Negative for influenza. Medicine suspects bilateral lower extremity edema and erythema more a manifestation of CHF instead of bilateral cellulitis. Differential diagnoses include biliary etiology vs bacteremia in the setting of on-going IVDU vs possible CAP.        Overview/Hospital Course:  -CT scan of chest 12/20: pulmonary abscess involving the right lower lobe, as well as additional areas bilaterally that could represent areas of  "developing pulmonary abscesses.  Loculated fluid collection.   -12/22: WBC elevated to 42k.  Afebrile and HDs. Patient clinically feels well.  Broadened abx to IV vanc and zosyn.     Per medicine notes:   "Patient has become more anxious and uncooperative today; pulling out IV lines and thrashing about room, demanding norco and IV ativan.  Addressed patient's concerns and provided 1g PO ativan q8 hours.   On 12/22, patient noted to have a spike in his WBC (42k.) Blood cultures were drawn as well as a lactate.  His antibitoics were broadened to Vancomycin and Zosyn IV.  His CT tube was noted to put out an additional 1L. Curiously, his abdominal pain has improved after lactulose gave him a bowel movement and he currently has no complaints."     Subjective:   On evaluation the patient reports that he was saying he would pull out his chest tube and IV because he was in abdominal pain.  States that he knows he is in the hospital for an infection and sepsis and that if he were to leave he could possibly die without treatment.  Reports that he has no intention to leave or refuse care currently. Denies SI/HI/AVH.  He does report anxiety with panic.  Reports that his panic attacks last for up to 1-2 hours.  Discussed concern for medical etiology of panic symptoms (shortness of breath and palpitations) given poor pulmonary and cardiac status.  Patient adamant that klonopin has been the only medication that has been beneficial for his anxiety in the past.  States that his main concern is anxiety associated with insomnia. Reports that he has tried seroquel (increased sensation of RLS), remeron (increased sensation of RLS), and trazodone (dizyy and "out of it").  Denies any issues with appetite, but states what he can eat is limited due to pain.      Patient reports that he started using Adderal after the death of his mother in 10/14.  That escalated to IV meth use. Recently his girlfriend overdosed on fentanyl (unclear if " "intentional or not). He responded to her mother's phone call and started chest compressions on her.  Of note, his late girlfriend had 2 DUIs for driving while on Xanax and was to "do time" for DUI's prior to overdosing. He reports that he is done using meth now but is not interested in substance abuse treatment.  Feels that going to groups that are freely available would be more beneficial for him.       Collateral:  Denies         Psychiatric Review Of Systems - Is patient experiencing or having changes in:  Negative except as noted above      Psychiatric History:  Diagnose(s):  Stimulant Use D/O   Previous Medication Trials:  celexa (trialed for 4-6 weeks), xanax, klonopin, ativan, seroquel, trazodone, remeron  Previous Psychiatric Hospitalizations:  One prior in July of this year for SI   Previous Suicide Attempts:  Denies   History of Violence:  Denies   Outpatient Psychiatrist:Denies      Social History:  Lives with his cousin.  Applying for disability.  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  Has 6 kids, youngest age 20.  Is .  Recent loss of his girlfriend of 1-2 years.       Substance Abuse History:  Smoked 2ppd for 20 years, stopped 11/2015   Daily IV meth use, last on 12/8  Denies EtOH and all other illicit substances currently      Legal History:  Reports minor arrests for traffic infractions.  Denies any care home time/probation/parole/felonies.       Family Psychiatric History:   Denies      Hospital Course: As noted above     Interval History: as noted above     Family History     Problem Relation (Age of Onset)    Arthritis Mother    Asthma Sister    Diabetes Sister    Heart disease Mother, Maternal Grandfather    Hyperlipidemia Mother    Hypertension Mother, Father    Kidney disease Mother        Tobacco Use    Smoking status: Former Smoker     Packs/day: 2.00     Years: 20.00     Pack years: 40.00     Types: Cigarettes     Start date: 1/23/1999     Last " "attempt to quit: 2017     Years since quittin.9    Smokeless tobacco: Never Used   Substance and Sexual Activity    Alcohol use: Not Currently    Drug use: Yes     Frequency: 7.0 times per week     Types: Amphetamines    Sexual activity: Not Currently     Partners: Female     Psychotherapeutics (From admission, onward)    Start     Stop Route Frequency Ordered    19 1400  LORazepam tablet 1 mg      -- Oral Every 8 hours PRN 19 1253    19 0900  citalopram tablet 20 mg      -- Oral Daily 19 0811           Review of Systems  Objective:     Vital Signs (Most Recent):  Temp: 97.4 °F (36.3 °C) (19 1243)  Pulse: 85 (19 1543)  Resp: 17 (19 1243)  BP: (!) 90/50 (19 1252)  SpO2: 96 % (19 1243) Vital Signs (24h Range):  Temp:  [96.9 °F (36.1 °C)-98.9 °F (37.2 °C)] 97.4 °F (36.3 °C)  Pulse:  [] 85  Resp:  [17-20] 17  SpO2:  [94 %-98 %] 96 %  BP: ()/(50-81) 90/50     Height: 5' 8" (172.7 cm)  Weight: 78.5 kg (173 lb 1 oz)  Body mass index is 26.31 kg/m².      Intake/Output Summary (Last 24 hours) at 2019 1616  Last data filed at 2019 1100  Gross per 24 hour   Intake 2700 ml   Output 1902 ml   Net 798 ml       Physical Exam   Psychiatric:   Appearance: Appears stated age, dressed in hospital gown, adequately groomed   Behavior: calm and cooperative with interview, good eye contact  Motor: - PMA/PMR, no tics or tremors  Speech: normal rate, normal volume, normal quantity  Mood: "sucks because I'm tired of hurting"  Affect: mood congruent, no lability noted, not tearful, mild blunting but overall moderately euthymic   Thought Content: -SI, -HI, no delusional content elicited   Thought Process: linear, logical, goal directed   Perceptual Disturbances: -AH, -VH, not actively responding to internal stimuli, does not appear internally preoccupied   Orientation: A&Ox4  Memory: intact recent, intact remote  Attention: vega [facility]  Insight: " limited to fair   Judgement: poor, but apparently improved per chart review         Significant Labs: All pertinent labs within the past 24 hours have been reviewed.    Significant Imaging: I have reviewed all pertinent imaging results/findings within the past 24 hours.    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 He has a PMH significant for non-ischemic cardiomyopathy with combined CHF (EF 11% without ICD or CRT device), Hepatitis C, and on-going IVDU (daily IV methamphetamines most recently on 12/08) who presented on 12/10 with two week duration of worsening lower extremity swelling and erythema.   Patient now with chest tube due to concern for empyema.  Is septic with ongoing work up for infectious etiology with broad spectrum IV abx. On evaluation the patient reports anxiety associated with shortness of breath and palpitations.  Patient with long history of stimulant use that is likely contributing to anxiety in addition to psychosocial stressors of loss of his girlfriend and medical illness.  Would advise against benzos for treatment of anxiety in general, but aung in the patient with potential respiratory compromise and history of substance use disorder.  Discussed with patient potential off label use of gabapentin for insomnia/anxiety and on label use for neuropathic pain (reports that he has pin prick sensations in his feet) with which he was in agreement.  No need for PEC at this time.  Reports understanding of medical condition and risk of potential death if he were to discontinue treatment and leave the hospital.  States that he would like to continue care as recommended currently.     Rec:   - May continue citalopram 20mg PO qD for mood/anxiety   - Start gabapentin 100mg PO TID PRN anxiety/agitation/neuropathic pain    - Start Gabapentin 300mg PO qHS for anxiety/insomnia/neuropathic pain, may increase up to 600mg PO  qHS if only obtaining partial response  - Would avoid use of benzodiazepines given potential for respiratory compromise, tolerance, and delirium     Psychiatry will sign off at this time.  Please contact us with any questions or concerns.        Charline Kohli MD   Psychiatry  [facility]

## 2019-12-23 NOTE — ASSESSMENT & PLAN NOTE
Recent CT chest now showing possible empyema of right lung.  -pleural fluid WBC elevated to over 2k and LDH elevated well above normal, indicative of likely empyema.   -Chest tube placed on 12/20, drained 700mL of SS fluid.  12/22 showed output of additional 1L    Plan:  -his WBC elevated to 42k on 12/22 that remained elevated on repeat CBC.  Clinically he feels well and has no complaints.  His antibiotics were broadened to Vanc and Zosyn IV.    -f/u repeat blood culture and lactate   -PULMONOLOGY on board.

## 2019-12-23 NOTE — SUBJECTIVE & OBJECTIVE
"Interval History: as noted above     Family History     Problem Relation (Age of Onset)    Arthritis Mother    Asthma Sister    Diabetes Sister    Heart disease Mother, Maternal Grandfather    Hyperlipidemia Mother    Hypertension Mother, Father    Kidney disease Mother        Tobacco Use    Smoking status: Former Smoker     Packs/day: 2.00     Years: 20.00     Pack years: 40.00     Types: Cigarettes     Start date: 1999     Last attempt to quit: 2017     Years since quittin.9    Smokeless tobacco: Never Used   Substance and Sexual Activity    Alcohol use: Not Currently    Drug use: Yes     Frequency: 7.0 times per week     Types: Amphetamines    Sexual activity: Not Currently     Partners: Female     Psychotherapeutics (From admission, onward)    Start     Stop Route Frequency Ordered    19 1400  LORazepam tablet 1 mg      -- Oral Every 8 hours PRN 19 1253    19 0900  citalopram tablet 20 mg      -- Oral Daily 19 0811           Review of Systems  Objective:     Vital Signs (Most Recent):  Temp: 97.4 °F (36.3 °C) (19 1243)  Pulse: 85 (19 1543)  Resp: 17 (19 1243)  BP: (!) 90/50 (19 1252)  SpO2: 96 % (19 1243) Vital Signs (24h Range):  Temp:  [96.9 °F (36.1 °C)-98.9 °F (37.2 °C)] 97.4 °F (36.3 °C)  Pulse:  [] 85  Resp:  [17-20] 17  SpO2:  [94 %-98 %] 96 %  BP: ()/(50-81) 90/50     Height: 5' 8" (172.7 cm)  Weight: 78.5 kg (173 lb 1 oz)  Body mass index is 26.31 kg/m².      Intake/Output Summary (Last 24 hours) at 2019 1616  Last data filed at 2019 1100  Gross per 24 hour   Intake 2700 ml   Output 1902 ml   Net 798 ml       Physical Exam   Psychiatric:   Appearance: Appears stated age, dressed in hospital gown, adequately groomed   Behavior: calm and cooperative with interview, good eye contact  Motor: - PMA/PMR, no tics or tremors  Speech: normal rate, normal volume, normal quantity  Mood: "sucks because I'm tired of " "hurting"  Affect: mood congruent, no lability noted, not tearful, mild blunting but overall moderately euthymic   Thought Content: -SI, -HI, no delusional content elicited   Thought Process: linear, logical, goal directed   Perceptual Disturbances: -AH, -VH, not actively responding to internal stimuli, does not appear internally preoccupied   Orientation: A&Ox4  Memory: intact recent, intact remote  Attention: passed [facility]  Insight: limited to fair   Judgement: poor, but apparently improved per chart review         Significant Labs: All pertinent labs within the past 24 hours have been reviewed.    Significant Imaging: I have reviewed all pertinent imaging results/findings within the past 24 hours.  "

## 2019-12-23 NOTE — ASSESSMENT & PLAN NOTE
Patient with history of panic attacks in the past.  Patient describes increased worry and anxiety about his current condition and his recovery.     Plan:  -citalopram 20mg PO  -MN ativan for anxiety  -psychiatry consulted, appreciate recs

## 2019-12-23 NOTE — ASSESSMENT & PLAN NOTE
Recent CT chest now showing possible empyema of right lung.  -pleural fluid WBC elevated to over 2k and LDH elevated well above normal, indicative of likely empyema.   -Chest tube placed on 12/20, drained 700mL of SS fluid.  12/22 showed output of additional 1L    Plan:  -his WBC elevated to 42k on 12/22 that remained elevated on repeat CBC.  Clinically he feels well and has no complaints.  His antibiotics were broadened to Vanc and Zosyn IV.    -f/u repeat blood culture and lactate   -PULMONOLOGY consulted, appreciate recommendations

## 2019-12-23 NOTE — ASSESSMENT & PLAN NOTE
-s/p Chest tube insertion and drainage  -Gram stain negative, Cx NGTD  -Cell count/diff w/ 50% segs; protein not elevated;  ; glucose 30  -could be secondary to PE (pulmonary infarct) vs pneumonia  -s/p 2 dose of tpa/dnase with significant output (~3600cc); drainage is serosanguineous  -will follow up bedside u/s and CXR to see if additional tpa/dnase to be given

## 2019-12-23 NOTE — PLAN OF CARE
Plan is LTAC, denied by [facility] due to not in network with payor.  Must get 20 in network denials before [employer] to do a single case agreement.       12/23/19 7077   Discharge Reassessment   Assessment Type Discharge Planning Reassessment   Do you have any problems affording any of your prescribed medications? No   Discharge Plan A Long-term acute care facility (LTAC)   Discharge Plan B Rehab   DME Needed Upon Discharge  none   Anticipated Discharge Disposition LTAC

## 2019-12-23 NOTE — ASSESSMENT & PLAN NOTE
This is a 44 year old male with PMH significant for non-ischemic cardiomyopathy (EF 11%), IVDU, and Hepatitis C who is presenting on 12/10 with two week duration of worsening lower extremity swelling and erythema associated with same duration of post-prandial abdominal pain and distension with intermittent pale colored stools with work-up thus far concerning for tachycardia (120's), leukocytosis (25), lactic acidosis, transaminitis, hyperbilirubinemia, CXR suggestive of right lower lobe pneumonia, and abdominal U/S suggestive of possible acalculous cholecystitis. He has no symptoms of underlying respiratory infection and on chart review CXR abnormalities are similar to those in 08/2019 during admission for CHF exacerbation. UA is unremarkable. Negative for influenza. Suspect bilateral lower extremity edema and erythema more a manifestation of CHF instead of bilateral cellulitis. Differential diagnoses include biliary etiology vs bacteremia in the setting of on-going IVDU vs possible CAP.   -CT scan of chest 12/20: pulmonary abscess involving the right lower lobe, as well as additional areas bilaterally that could represent areas of developing pulmonary abscesses.  Loculated fluid collection.   -12/22: WBC elevated to 42k.  Afebrile and HDs. Patient clinically feels well.  Broadened abx to IV vanc and zosyn.     Plan:   -Vancomycin and Zosyn IV   -f/u repeat blood cultures and lactate    -Respiratory culture growing GNR and Candida with persistently elevated WBC. Candida in respiratory does not need to get treated.   -Judiciously administer an IVF resuscitation necessary.

## 2019-12-23 NOTE — ASSESSMENT & PLAN NOTE
Patient has endorsed episodic abdominal pains (8/10) in his his to upper epigastrium over the last few days.  He associates these pains with meals and has avoided food because of it.  Nothing seems to make better other than letting time pass.  Given patient's presentation with DVTs and possible PE.  Concern for possible mesenteric ischemia vs rib fracture vs GERD.   -CXR negative for any fracture  -Ultrasound with mesenteric view negative for any stenosis or occulusion   -Lipase, Lactic acid WNL. Peritoneal cultures negative (likely portal HTN.)    Plan:  -Patient's abdominal pain improved after 1x dose of lactulose.  He was noted to have 1x large BM later that day. Likely 2/2 to constipation   -s/p paracentesis on 12/19, which showed no SBP (WBC of 75.) SAAG of 1.1 likely 2/2 to portal HTN  -Continue pantoprazole PO 40mg   -CT abdomen showed no ileus or obstruction. Empyema improved per Pulm reading   -continue to monitor

## 2019-12-23 NOTE — HPI
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 He has a PMH significant for non-ischemic cardiomyopathy with combined CHF (EF 11% without ICD or CRT device), Hepatitis C, and on-going IVDU (daily IV methamphetamines most recently on 12/08) who presented on 12/10 with two week duration of worsening lower extremity swelling and erythema associated with same duration of post-prandial abdominal pain and distension with intermittent pale colored stools with work-up thus far concerning for tachycardia (120's), leukocytosis (25), lactic acidosis, transaminitis, hyperbilirubinemia, CXR suggestive of right lower lobe pneumonia, and abdominal U/S suggestive of possible acalculous cholecystitis. He has no symptoms of underlying respiratory infection and on chart review CXR abnormalities are similar to those in 08/2019 during admission for CHF exacerbation. UA is unremarkable. Negative for influenza. Medicine suspects bilateral lower extremity edema and erythema more a manifestation of CHF instead of bilateral cellulitis. Differential diagnoses include biliary etiology vs bacteremia in the setting of on-going IVDU vs possible CAP.        Overview/Hospital Course:  -CT scan of chest 12/20: pulmonary abscess involving the right lower lobe, as well as additional areas bilaterally that could represent areas of developing pulmonary abscesses.  Loculated fluid collection.   -12/22: WBC elevated to 42k.  Afebrile and HDs. Patient clinically feels well.  Broadened abx to IV vanc and zosyn.     Per medicine notes:   "Patient has become more anxious and uncooperative today; pulling out IV lines and thrashing about room, demanding norco and IV ativan.  Addressed patient's concerns and provided 1g PO ativan q8 hours.   On 12/22, patient noted to have a spike in his WBC (42k.) Blood cultures were drawn as well as a lactate.  His antibitoics were broadened to " "Vancomycin and Zosyn IV.  His CT tube was noted to put out an additional 1L. Curiously, his abdominal pain has improved after lactulose gave him a bowel movement and he currently has no complaints."     Subjective:   On evaluation the patient reports that he was saying he would pull out his chest tube and IV because he was in abdominal pain.  States that he knows he is in the hospital for an infection and sepsis and that if he were to leave he could possibly die without treatment.  Reports that he has no intention to leave or refuse care currently. Denies SI/HI/AVH.  He does report anxiety with panic.  Reports that his panic attacks last for up to 1-2 hours.  Discussed concern for medical etiology of panic symptoms (shortness of breath and palpitations) given poor pulmonary and cardiac status.  Patient adamant that klonopin has been the only medication that has been beneficial for his anxiety in the past.  States that his main concern is anxiety associated with insomnia. Reports that he has tried seroquel (increased sensation of RLS), remeron (increased sensation of RLS), and trazodone (dizyy and "out of it").  Denies any issues with appetite, but states what he can eat is limited due to pain.      Patient reports that he started using Adderal after the death of his mother in 10/14.  That escalated to IV meth use. Recently his girlfriend overdosed on fentanyl (unclear if intentional or not). He responded to her mother's phone call and started chest compressions on her.  Of note, his late girlfriend had 2 DUIs for driving while on Xanax and was to "do time" for DUI's prior to overdosing. He reports that he is done using meth now but is not interested in substance abuse treatment.  Feels that going to groups that are freely available would be more beneficial for him.       Collateral:  Denies         Psychiatric Review Of Systems - Is patient experiencing or having changes in:  Negative except as noted above    "   Psychiatric History:  Diagnose(s): Stimulant Use D/O   Previous Medication Trials: celexa (trialed for 4-6 weeks), xanax, klonopin, ativan, seroquel, trazodone, remeron  Previous Psychiatric Hospitalizations: One prior in July of this year for SI   Previous Suicide Attempts: Denies   History of Violence: Denies   Outpatient Psychiatrist:Denies      Social History:  Lives with his cousin.  Applying for disability.  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  Has 6 kids, youngest age 20.  Is .  Recent loss of his girlfriend of 1-2 years.       Substance Abuse History:  Smoked 2ppd for 20 years, stopped 11/2015   Daily IV meth use, last on 12/8  Denies EtOH and all other illicit substances currently      Legal History:  Reports minor arrests for traffic infractions.  Denies any alf time/probation/parole/felonies.       Family Psychiatric History:   Denies

## 2019-12-23 NOTE — ASSESSMENT & PLAN NOTE
History of non-ischemic cardiomyopathy diagnosed in 11/2017 at [facility].  [facility] in 12/2017 with no evidence of angiographically significant coronary artery disease.  Most recent ECHO in 10/2019 concerning for EF of 35%, grade II diastolic dysfunction, and global hypokinesis with a restrictive physiology. HOME regimen: Carvedilol, Enalapril, Lasix 20 mg BID, and Spironolactone.   .-Suspect presentation from worsening right sided heart failure.     Plan:   - continue HOME regimen: Carvedilol, Enalapril, and Spironolactone  - HOLD lasix to 40mg PO BID  - continue Toprol XL 100mg QD   -Strict I/O's and daily standing weights.   -Telemetry ordered.   -Cardiology recommends outpatient EP follow up for ICD placement for primary prevention.  They do not recommending placing a life vest or ICD inpatient as patient's cardiac arrest during hospitalization was due to a PE and not a shockable rhythm.

## 2019-12-23 NOTE — PLAN OF CARE
AAOx4. VSS. Patient continues to cry out in pain. Medicine team aware. No other significant events overnight. Chest tube remains in place with large output. Bed in lowest position, call light and personal items within reach. TM.

## 2019-12-23 NOTE — ASSESSMENT & PLAN NOTE
-On-going use of methamphetamines.   -Prior hepatitis screening positive for HepC in 10/2019.     Plan:   -Counseled on importance of cessation.   -Monitor for signs of withdrawal for other substances.   -Psychiatry on board, appreciate recs

## 2019-12-23 NOTE — ASSESSMENT & PLAN NOTE
Patient has endorsed episodic abdominal pains (8/10) in his his to upper epigastrium over the last few days.  He associates these pains with meals and has avoided food because of it.  Nothing seems to make better other than letting time pass.  Given patient's presentation with DVTs and possible PE.  Concern for possible mesenteric ischemia vs rib fracture vs GERD.   -CXR negative for any fracture  -Ultrasound with mesenteric view negative for any stenosis or occulusion   -Lipase, Lactic acid WNL. Peritoneal cultures negative (likely portal HTN.)\    Plan:  -Patient's abdominal pain improved after 1x dose of lactulose.  He was noted to have 1x large BM later that day. Likely 2/2 to constipation   -s/p paracentesis on 12/19, which showed no SBP (WBC of 75.) SAAG of 1.1 likely 2/2 to portal HTN  -Continue pantoprazole PO 40mg   -continue to monitor

## 2019-12-23 NOTE — PLAN OF CARE
Pt Aax4, breathing even and unlabored, call light in reach, bed in lowest position, chest tube hooked to suction. Chest tube chamber changed. Strict I and O maintained, neuro checks completed per orders. Administered medication per orders, pt tolerated well. Pt went for abdominal CT. BP low throughout shift, pt asymptomatic, reported findings to med team. Vss, frequent hourly rounding completed. No other significant events throughout shift. Will continue to monitor.

## 2019-12-23 NOTE — ASSESSMENT & PLAN NOTE
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 He has a PMH significant for non-ischemic cardiomyopathy with combined CHF (EF 11% without ICD or CRT device), Hepatitis C, and on-going IVDU (daily IV methamphetamines most recently on 12/08) who presented on 12/10 with two week duration of worsening lower extremity swelling and erythema.   Patient now with chest tube due to concern for empyema.  Is septic with ongoing work up for infectious etiology with broad spectrum IV abx. On evaluation the patient reports anxiety associated with shortness of breath and palpitations.  Patient with long history of stimulant use that is likely contributing to anxiety in addition to psychosocial stressors of loss of his girlfriend and medical illness.  Would advise against benzos for treatment of anxiety in general, but aung in the patient with potential respiratory compromise and history of substance use disorder.  Discussed with patient potential off label use of gabapentin for insomnia/anxiety and on label use for neuropathic pain (reports that he has pin prick sensations in his feet) with which he was in agreement.  No need for PEC at this time.  Reports understanding of medical condition and risk of potential death if he were to discontinue treatment and leave the hospital.  States that he would like to continue care as recommended currently.     Rec:   - May continue citalopram 20mg PO qD for mood/anxiety   - Start gabapentin 100mg PO TID PRN anxiety/agitation/neuropathic pain    - Start Gabapentin 300mg PO qHS for anxiety/insomnia/neuropathic pain, may increase up to 600mg PO qHS if only obtaining partial response  - Would avoid use of benzodiazepines given potential for respiratory compromise, tolerance, and delirium     Psychiatry will sign off at this time.  Please contact us with any questions or concerns.

## 2019-12-23 NOTE — PROGRESS NOTES
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 He has a PMH significant for non-ischemic cardiomyopathy with combined CHF (EF 11% without ICD or CRT device) and on-going IVDU. He reports a two week duration of progressively worsening bilateral lower extremity swelling and redness of the anterior shins. This is associated with bilateral lower extremity pain with ambulation. He also reports noticing symptom association with worsening of baseline SOB with exertion as well as new abdominal distension and pain, within the same time frame. He describes abdominal pain as a generalized soreness that occurs approximately one hour after eating meals and spontaneously resolves without intervention or medication. He is unsure of how long the discomfort lasts before resolving. Additionally, he reports a two week duration of occasional episodes of pale semi-formed stools. He attempted symptom relief with increasing dose of daily Lasix, however without effect, prompting presentation to ED on 12/09.     He denies symptom association with fevers, chills, nausea, vomiting, history of prior abdominal surgeries, ETOH consumption, chest pain, pain with inspiration, lightheadedness, or blurry vision. He reports a on-going non-productive cough intermittent cough since CHF diagnosis that is unchanged. He does not weigh himself daily and is unsure of any weight loss or gain. He does follow with cardiology outpatient.     Of note, he also reports continued daily use of  IV methamphetamines with most recent usage on 12/08.     ED course: He initially presented to [place] ED on 12/09. Lab were significant for leukocytosis (25), hyponatremia (126), lactic acidosis (3.5), transaminitis (, ALT 73), hyperbilirubinemia (4.5), and toxicology positive for amphetamines. CXR was suggestive of right lower lobe pneumonia. Abdominal U/S was suggestive of acalculous cholecystitis. He was given IVF, Ceftriaxone, and Lasix. Case was discussed with transfer center and AES here; recommended transfer for possible MRCP.     Overview/Hospital Course:  Francis Daigle 44 y.o male presented from OSH with cholilithiasis and cholecystitis. [facility] evaluation. When patient got admitted to hospital medicine team the patient was transferred to ICU following cardiac arrest on 12/10/19. Emergently intubated during PEA arrest, ROSC achieved after 1 round of CPR. Central line and arterial line placed. Patient initially requiring pressor support, cyanotic, hypoxic on blood gas. Bedside ultrasound with signs of RV strain with DVT in lower extremity. Patient on pressors at the time, requiring 100% FiO2 to oxygenate. Given TPA emergently. Patient started on heparin ggt, bronchoscopy today revealing relatively normal airways and extubated. Most recent ultrasound did not show liver cirrhosis or cholilthiasis or cholecystitis. Patient with RLL PNA getting treated with Zosyn and later switch to Levo. Patient transitioned to Heparin gtt to 10mg BID eliquis. Cardiology will need to consulted for possible ICD or lifevest up on discharge on Monday.   Spoke with cardiology who states that he will likely need an ICD but he can follow up outpatient with cardiology for its placement.  Patient continues to state that he feels better each day.  He does endorse increased anxiety over the last day.  He states that he used to be on treatment in the past.  Started patient on citalopram and instructed  him that he will need to request follow up with psychiatry once he is discharged. Patient has continued midepigastric pain that is associated with meals. Ultrasound for mesenteric ischemia showed no signs of occlusion or stenosis.  Ordered CT scan of the abdomen with contast showing bilateral PE and empyema vs abcess. Pulmonary consulted and plan to CT tomorrow, NPO at Midnight.  Chest tube was preformed today and fluids sent for cultures and cytology.  Patient noted to be tachycardic overnight up to 130s; ECG in the AM noted sinus tachycardia.  His HR slowed to 98 by lunch time and a small 250 mL bolus of LR.  Re-started heparin drip following the chest tube placement per pulmonology recommendations.   Patient has become more anxious and uncooperative today; pulling out IV lines and thrashing about room, demanding norco and IV ativan.  Addressed patient's concerns and provided 1g PO ativan q8 hours.   On 12/22, patient noted to have a spike in his WBC (42k.), trending down. Blood cultures were drawn as well as a lactate.  His antibitoics were broadened to Vancomycin and Zosyn IV.  His CT tube was noted to put out an additional 1L. Curiously, his abdominal pain has improved after lactulose gave him a bowel movement, but complains of it all day, CT abdomen with contrast showed no ileus or obstruction, empyema improved per pulm. Lipase also was within normal. Psychiatry consulted for further assessment and evaluation.     Interval History: Patient noted to have a spike in his WBC (42k.), trending down in 31. Still complains of abdominal pain. CT abdomen with contrast ordered.      Review of Systems   Constitutional: Positive for activity change and fatigue. Negative for chills and fever.   HENT: Negative for congestion.    Respiratory: Negative for cough and shortness of breath.    Cardiovascular: Negative for chest pain.   Gastrointestinal: Positive for abdominal pain. Negative for abdominal distention, constipation,  diarrhea, nausea and vomiting.   Genitourinary: Negative for dysuria.   Musculoskeletal: Negative for back pain.   Neurological: Negative for headaches.   Psychiatric/Behavioral: The patient is nervous/anxious.      Objective:     Vital Signs (Most Recent):  Temp: 97.4 °F (36.3 °C) (12/23/19 1243)  Pulse: 85 (12/23/19 1243)  Resp: 17 (12/23/19 1243)  BP: (!) 90/50 (12/23/19 1252)  SpO2: 96 % (12/23/19 1243) Vital Signs (24h Range):  Temp:  [96.9 °F (36.1 °C)-98.9 °F (37.2 °C)] 97.4 °F (36.3 °C)  Pulse:  [] 85  Resp:  [17-20] 17  SpO2:  [94 %-98 %] 96 %  BP: ()/(50-81) 90/50     Weight: 78.5 kg (173 lb 1 oz)  Body mass index is 26.31 kg/m².    Intake/Output Summary (Last 24 hours) at 12/23/2019 1517  Last data filed at 12/23/2019 1100  Gross per 24 hour   Intake 2700 ml   Output 1902 ml   Net 798 ml      Physical Exam  Constitutional: He is oriented to person, place, and time.   Patient is a 43 yo M who appears alert and anxious.    HENT:   Head: Normocephalic and atraumatic.   Eyes: Pupils are equal, round, and reactive to light. EOM are normal.   Neck: Normal range of motion.   Cardiovascular: Normal rate, regular rhythm and normal heart sounds.   Pulmonary/Chest: Effort normal and breath sounds normal. No respiratory distress.   Chest tube in place   Abdominal: Bowel sounds are normal. He exhibits no distension. There is no tenderness.   Musculoskeletal: Normal range of motion.   Swelling of right upper extremity    Neurological: He is alert and oriented to person, place, and time.   Skin: Skin is warm and dry.   Psychiatric: He has a normal mood and affect.      Significant Labs:   BMP:   Recent Labs   Lab 12/22/19  0638 12/23/19  1040    108   * 131*   K 4.5 4.2   CL 94* 94*   CO2 28 30*   BUN 19 17   CREATININE 1.0 0.9   CALCIUM 7.8* 7.6*   MG 1.5*  --      CBC:   Recent Labs   Lab 12/22/19  0639 12/22/19  0919 12/23/19  1040   WBC 45.06* 48.14* 31.09*   HGB 11.8* 11.5* 11.6*   HCT  36.9* 35.7* 37.4*    299 337     CMP:   Recent Labs   Lab 12/22/19  0638 12/23/19  1040   * 131*   K 4.5 4.2   CL 94* 94*   CO2 28 30*    108   BUN 19 17   CREATININE 1.0 0.9   CALCIUM 7.8* 7.6*   PROT 6.6 5.7*   ALBUMIN 1.7* 1.4*   BILITOT 3.6* 2.9*   ALKPHOS 109 92   AST 41* 34   ALT 24 21   ANIONGAP 9 7*   EGFRNONAA >60.0 >60.0     Lipase:   Recent Labs   Lab 12/23/19  1040   LIPASE 22       Significant Imaging: I have reviewed and interpreted all pertinent imaging results/findings within the past 24 hours.      Assessment/Plan:      * Sepsis due to undetermined organism  This is a 44 year old male with PMH significant for non-ischemic cardiomyopathy (EF 11%), IVDU, and Hepatitis C who is presenting on 12/10 with two week duration of worsening lower extremity swelling and erythema associated with same duration of post-prandial abdominal pain and distension with intermittent pale colored stools with work-up thus far concerning for tachycardia (120's), leukocytosis (25), lactic acidosis, transaminitis, hyperbilirubinemia, CXR suggestive of right lower lobe pneumonia, and abdominal U/S suggestive of possible acalculous cholecystitis. He has no symptoms of underlying respiratory infection and on chart review CXR abnormalities are similar to those in 08/2019 during admission for CHF exacerbation. UA is unremarkable. Negative for influenza. Suspect bilateral lower extremity edema and erythema more a manifestation of CHF instead of bilateral cellulitis. Differential diagnoses include biliary etiology vs bacteremia in the setting of on-going IVDU vs possible CAP.   -CT scan of chest 12/20: pulmonary abscess involving the right lower lobe, as well as additional areas bilaterally that could represent areas of developing pulmonary abscesses.  Loculated fluid collection.   -12/22: WBC elevated to 42k.  Afebrile and HDs. Patient clinically feels well.  Broadened abx to IV vanc and zosyn.     Plan:    -Vancomycin and Zosyn IV   -f/u repeat blood cultures and lactate    -Respiratory culture growing GNR and Candida with persistently elevated WBC. Candida in respiratory does not need to get treated.   -Judiciously administer an IVF resuscitation necessary.     Pleural effusion        Empyema  Recent CT chest now showing possible empyema of right lung.  -pleural fluid WBC elevated to over 2k and LDH elevated well above normal, indicative of likely empyema.   -Chest tube placed on 12/20, drained 700mL of SS fluid.  12/22 showed output of additional 1L    Plan:  -his WBC elevated to 42k on 12/22 that remained elevated on repeat CBC.  Clinically he feels well and has no complaints.  His antibiotics were broadened to Vanc and Zosyn IV.    -f/u repeat blood culture and lactate   -PULMONOLOGY on board.       Abdominal pain  Patient has endorsed episodic abdominal pains (8/10) in his his to upper epigastrium over the last few days.  He associates these pains with meals and has avoided food because of it.  Nothing seems to make better other than letting time pass.  Given patient's presentation with DVTs and possible PE.  Concern for possible mesenteric ischemia vs rib fracture vs GERD.   -CXR negative for any fracture  -Ultrasound with mesenteric view negative for any stenosis or occulusion   -Lipase, Lactic acid WNL. Peritoneal cultures negative (likely portal HTN.)    Plan:  -Patient's abdominal pain improved after 1x dose of lactulose.  He was noted to have 1x large BM later that day. Likely 2/2 to constipation   -s/p paracentesis on 12/19, which showed no SBP (WBC of 75.) SAAG of 1.1 likely 2/2 to portal HTN  -Continue pantoprazole PO 40mg   -CT abdomen showed no ileus or obstruction. Empyema improved per Pulm reading   -continue to monitor       Anxiety  Patient with history of panic attacks in the past.  Patient describes increased worry and anxiety about his current condition and his recovery.     Plan:  -citalopram  20mg PO  -[place] ativan for anxiety  -psychiatry consulted, appreciate recs       Impaired mobility and ADLs        Wheezing  History of 40PK year. Mild wheeze throughout lung field. Sating well in room air  - continue Ipotroprium Neb Q8H for wheezing       Acute massive pulmonary embolism  DVT lower extremity probable origin of PE. Patient was started on heparin gtt  - right upper extremity arm US showing edema and no clots.     Plan  -continue eliquis       Alteration in skin integrity        Acute deep vein thrombosis (DVT) of proximal vein of lower extremity  - See acute massive pulm embolism       Cardiac arrest  PEA arrest, 2 round of EPI and CPR achieved ROSC.         Hyponatremia  -See assessment for CHF exacerbation.       Chronic hepatitis C without hepatic coma  - HEP C viral load (+) and elevated at 6  - Will need to set up with hepatology for treatment as outpatient       Transaminitis  -See assessment for sepsis.   MELD-Na score: 22 at 12/23/2019 10:40 AM  MELD score: 17 at 12/23/2019 10:40 AM  Calculated from:  Serum Creatinine: 0.9 mg/dL (Rounded to 1 mg/dL) at 12/23/2019 10:40 AM  Serum Sodium: 131 mmol/L at 12/23/2019 10:40 AM  Total Bilirubin: 2.9 mg/dL at 12/23/2019 10:40 AM  INR(ratio): 1.8 at 12/23/2019 10:40 AM  Age: 44 years        Intravenous drug abuse  -On-going use of methamphetamines.   -Prior hepatitis screening positive for HepC in 10/2019.     Plan:   -Counseled on importance of cessation.   -Monitor for signs of withdrawal for other substances.   -Psychiatry on board, appreciate recs    Community acquired pneumonia of right lower lobe of lung  -See assessment for sepsis.       Acute on chronic combined systolic and diastolic heart failure  History of non-ischemic cardiomyopathy diagnosed in 11/2017 at [facility].  [facility] in 12/2017 with no evidence of angiographically significant coronary artery disease.  Most recent ECHO in 10/2019 concerning for EF of 35%, grade II diastolic dysfunction, and  global hypokinesis with a restrictive physiology. HOME regimen: Carvedilol, Enalapril, Lasix 20 mg BID, and Spironolactone.   .-Suspect presentation from worsening right sided heart failure.     Plan:   - continue HOME regimen: Carvedilol, Enalapril, and Spironolactone  - HOLD lasix to 40mg PO BID  - continue Toprol XL 100mg QD   -Strict I/O's and daily standing weights.   -Telemetry ordered.   -Cardiology recommends outpatient EP follow up for ICD placement for primary prevention.  They do not recommending placing a life vest or ICD inpatient as patient's cardiac arrest during hospitalization was due to a PE and not a shockable rhythm.          Essential hypertension  -See assessment for CHF exacerbation.         VTE Risk Mitigation (From admission, onward)         Ordered     apixaban tablet 5 mg  2 times daily      12/23/19 0946     heparin 25,000 units in dextrose 5% 250 mL (100 units/mL) infusion HIGH INTENSITY nomogram - [facility]  Continuous     Question:  Heparin Infusion Adjustment (DO NOT MODIFY ANSWER)  Answer:  [email or web address] HIGH INTENSITY nomogram for [facility] [email or web address]    12/19/19 0909     IP VTE HIGH RISK PATIENT  Once      12/10/19 0922                      Freddy Licona MD  Department of Hospital Medicine   [facility]

## 2019-12-23 NOTE — PROGRESS NOTES
"Patient continuously screaming out in pain, stating "it hurts so bad." When asked what hurts, patient states his stomach. Administered PRN Ativan to calm patient down with no relief. Also administered PRN Zofran with no relief. Patient refused PRN Melatonin, stating "it gives me weird dreams." As of this time, patient has had 2 large bowel movements and has vomited approximately 20cc of undigested food. Medicine team 1 has been notified and made aware of situation. On call doctor states she will "come and see patient in about 10 minutes."  "

## 2019-12-23 NOTE — SUBJECTIVE & OBJECTIVE
Interval History: Patient noted to have a spike in his WBC (42k.) Blood cultures were drawn as well as a lactate.  His antibitoics were broadened to Vancomycin and Zosyn IV.  His CT tube was noted to put out an additional 1L.  Curiously, his abdominal pain has improved after lactulose gave him a bowel movement and he currently has no complaints.     Review of Systems   Constitutional: Negative for chills and fever.   Eyes: Negative for visual disturbance.   Respiratory: Negative for cough and shortness of breath.    Cardiovascular: Negative for chest pain and leg swelling.   Gastrointestinal: Negative for abdominal distention, abdominal pain, constipation, diarrhea and vomiting.     Objective:     Vital Signs (Most Recent):  Temp: 98.1 °F (36.7 °C) (12/22/19 1601)  Pulse: 94 (12/22/19 1601)  Resp: 18 (12/22/19 1601)  BP: 96/66 (12/22/19 1601)  SpO2: 95 % (12/22/19 1601) Vital Signs (24h Range):  Temp:  [97.6 °F (36.4 °C)-98.6 °F (37 °C)] 98.1 °F (36.7 °C)  Pulse:  [] 94  Resp:  [18] 18  SpO2:  [95 %-98 %] 95 %  BP: ()/(59-75) 96/66     Weight: 78.5 kg (173 lb 1 oz)  Body mass index is 26.31 kg/m².    Intake/Output Summary (Last 24 hours) at 12/22/2019 1831  Last data filed at 12/22/2019 1424  Gross per 24 hour   Intake 480 ml   Output 1000 ml   Net -520 ml      Physical Exam   Constitutional: He is oriented to person, place, and time.   Patient is a 43 yo M who appears alert and anxious.    HENT:   Head: Normocephalic and atraumatic.   Eyes: Pupils are equal, round, and reactive to light. EOM are normal.   Neck: Normal range of motion.   Cardiovascular: Normal rate, regular rhythm and normal heart sounds.   Pulmonary/Chest: Effort normal and breath sounds normal. No respiratory distress.   Chest tube in place   Abdominal: Bowel sounds are normal. He exhibits no distension. There is no tenderness.   Musculoskeletal: Normal range of motion.   Swelling of right upper extremity    Neurological: He is alert  and oriented to person, place, and time.   Skin: Skin is warm and dry.   Psychiatric: He has a normal mood and affect.       Significant Labs: All pertinent labs within the past 24 hours have been reviewed.     Recent Labs   Lab 12/20/19  0331 12/21/19  0825 12/22/19  0638 12/22/19  0639 12/22/19  0919   WBC 21.23* 18.09*  --  45.06* 48.14*   HGB 11.9* 11.4*  --  11.8* 11.5*   HCT 39.1* 36.5*  --  36.9* 35.7*   * 331  --  327 299   MCV 83 84  --  82 81*   RDW 24.0* 24.1*  --  23.9* 23.2*   * 133* 131*  --   --    K 4.2 4.4 4.5  --   --    CL 93* 96 94*  --   --    CO2 30* 29 28  --   --    BUN 16 21* 19  --   --    CREATININE 0.9 1.1 1.0  --   --    GLU 89 126* 101  --   --    PROT 6.9 6.4 6.6  --   --    ALBUMIN 1.8* 1.6* 1.7*  --   --    BILITOT 3.3* 2.6* 3.6*  --   --    AST 40 40 41*  --   --    ALKPHOS 95 90 109  --   --    ALT 26 24 24  --   --        Significant Imaging: I have reviewed all pertinent imaging results/findings within the past 24 hours.

## 2019-12-23 NOTE — SUBJECTIVE & OBJECTIVE
Interval History: Patient noted to have a spike in his WBC (42k.), trending down in 31. Still complains of abdominal pain. CT abdomen with contrast ordered.      Review of Systems   Constitutional: Positive for activity change and fatigue. Negative for chills and fever.   HENT: Negative for congestion.    Respiratory: Negative for cough and shortness of breath.    Cardiovascular: Negative for chest pain.   Gastrointestinal: Positive for abdominal pain. Negative for abdominal distention, constipation, diarrhea, nausea and vomiting.   Genitourinary: Negative for dysuria.   Musculoskeletal: Negative for back pain.   Neurological: Negative for headaches.   Psychiatric/Behavioral: The patient is nervous/anxious.      Objective:     Vital Signs (Most Recent):  Temp: 97.4 °F (36.3 °C) (12/23/19 1243)  Pulse: 85 (12/23/19 1243)  Resp: 17 (12/23/19 1243)  BP: (!) 90/50 (12/23/19 1252)  SpO2: 96 % (12/23/19 1243) Vital Signs (24h Range):  Temp:  [96.9 °F (36.1 °C)-98.9 °F (37.2 °C)] 97.4 °F (36.3 °C)  Pulse:  [] 85  Resp:  [17-20] 17  SpO2:  [94 %-98 %] 96 %  BP: ()/(50-81) 90/50     Weight: 78.5 kg (173 lb 1 oz)  Body mass index is 26.31 kg/m².    Intake/Output Summary (Last 24 hours) at 12/23/2019 1517  Last data filed at 12/23/2019 1100  Gross per 24 hour   Intake 2700 ml   Output 1902 ml   Net 798 ml      Physical Exam  Constitutional: He is oriented to person, place, and time.   Patient is a 43 yo M who appears alert and anxious.    HENT:   Head: Normocephalic and atraumatic.   Eyes: Pupils are equal, round, and reactive to light. EOM are normal.   Neck: Normal range of motion.   Cardiovascular: Normal rate, regular rhythm and normal heart sounds.   Pulmonary/Chest: Effort normal and breath sounds normal. No respiratory distress.   Chest tube in place   Abdominal: Bowel sounds are normal. He exhibits no distension. There is no tenderness.   Musculoskeletal: Normal range of motion.   Swelling of right upper  extremity    Neurological: He is alert and oriented to person, place, and time.   Skin: Skin is warm and dry.   Psychiatric: He has a normal mood and affect.      Significant Labs:   BMP:   Recent Labs   Lab 12/22/19  0638 12/23/19  1040    108   * 131*   K 4.5 4.2   CL 94* 94*   CO2 28 30*   BUN 19 17   CREATININE 1.0 0.9   CALCIUM 7.8* 7.6*   MG 1.5*  --      CBC:   Recent Labs   Lab 12/22/19  0639 12/22/19  0919 12/23/19  1040   WBC 45.06* 48.14* 31.09*   HGB 11.8* 11.5* 11.6*   HCT 36.9* 35.7* 37.4*    299 337     CMP:   Recent Labs   Lab 12/22/19  0638 12/23/19  1040   * 131*   K 4.5 4.2   CL 94* 94*   CO2 28 30*    108   BUN 19 17   CREATININE 1.0 0.9   CALCIUM 7.8* 7.6*   PROT 6.6 5.7*   ALBUMIN 1.7* 1.4*   BILITOT 3.6* 2.9*   ALKPHOS 109 92   AST 41* 34   ALT 24 21   ANIONGAP 9 7*   EGFRNONAA >60.0 >60.0     Lipase:   Recent Labs   Lab 12/23/19  1040   LIPASE 22       Significant Imaging: I have reviewed and interpreted all pertinent imaging results/findings within the past 24 hours.

## 2019-12-23 NOTE — ASSESSMENT & PLAN NOTE
History of non-ischemic cardiomyopathy diagnosed in 11/2017 at [facility].  [facility] in 12/2017 with no evidence of angiographically significant coronary artery disease.  Most recent ECHO in 10/2019 concerning for EF of 35%, grade II diastolic dysfunction, and global hypokinesis with a restrictive physiology. HOME regimen: Carvedilol, Enalapril, Lasix 20 mg BID, and Spironolactone.   .-Suspect presentation from worsening right sided heart failure.     Plan:   - continue HOME regimen: Carvedilol, Enalapril, and Spironolactone  - HOLD lasix to 40mg PO BID  - continue Toprol XL 100mg QD   -Strict I/O's and daily standing weights.   -Telemetry ordered.   -Cardiology recommends outpatient EP follow up for ICD placement for primary prevention.  They do not recommending placing a life vest or ICD inpatient as patient's cardiac arrest during hospitalization was due to a PE and not a shockable rhythm.

## 2019-12-24 LAB
ALBUMIN SERPL BCP-MCNC: 1.5 G/DL (ref 3.5–5.2)
ALP SERPL-CCNC: 79 U/L (ref 55–135)
ALT SERPL W/O P-5'-P-CCNC: 17 U/L (ref 10–44)
ANION GAP SERPL CALC-SCNC: 5 MMOL/L (ref 8–16)
ANISOCYTOSIS BLD QL SMEAR: SLIGHT
AST SERPL-CCNC: 28 U/L (ref 10–40)
BASOPHILS # BLD AUTO: 0.05 K/UL (ref 0–0.2)
BASOPHILS NFR BLD: 0.2 % (ref 0–1.9)
BILIRUB SERPL-MCNC: 2.6 MG/DL (ref 0.1–1)
BUN SERPL-MCNC: 15 MG/DL (ref 6–20)
BURR CELLS BLD QL SMEAR: ABNORMAL
CALCIUM SERPL-MCNC: 7.8 MG/DL (ref 8.7–10.5)
CHLORIDE SERPL-SCNC: 96 MMOL/L (ref 95–110)
CO2 SERPL-SCNC: 29 MMOL/L (ref 23–29)
CREAT SERPL-MCNC: 0.8 MG/DL (ref 0.5–1.4)
DIFFERENTIAL METHOD: ABNORMAL
EOSINOPHIL # BLD AUTO: 0 K/UL (ref 0–0.5)
EOSINOPHIL NFR BLD: 0.2 % (ref 0–8)
ERYTHROCYTE [DISTWIDTH] IN BLOOD BY AUTOMATED COUNT: 24 % (ref 11.5–14.5)
EST. GFR  (AFRICAN AMERICAN): >60 ML/MIN/1.73 M^2
EST. GFR  (NON AFRICAN AMERICAN): >60 ML/MIN/1.73 M^2
FINAL PATHOLOGIC DIAGNOSIS: NORMAL
GLUCOSE SERPL-MCNC: 123 MG/DL (ref 70–110)
HCT VFR BLD AUTO: 36.1 % (ref 40–54)
HGB BLD-MCNC: 11.4 G/DL (ref 14–18)
HYPOCHROMIA BLD QL SMEAR: ABNORMAL
IMM GRANULOCYTES # BLD AUTO: 0.2 K/UL (ref 0–0.04)
IMM GRANULOCYTES NFR BLD AUTO: 0.8 % (ref 0–0.5)
INR PPP: 1.7 (ref 0.8–1.2)
LYMPHOCYTES # BLD AUTO: 1.5 K/UL (ref 1–4.8)
LYMPHOCYTES NFR BLD: 6.4 % (ref 18–48)
MCH RBC QN AUTO: 26.1 PG (ref 27–31)
MCHC RBC AUTO-ENTMCNC: 31.6 G/DL (ref 32–36)
MCV RBC AUTO: 83 FL (ref 82–98)
MONOCYTES # BLD AUTO: 1.1 K/UL (ref 0.3–1)
MONOCYTES NFR BLD: 4.6 % (ref 4–15)
NEUTROPHILS # BLD AUTO: 20.8 K/UL (ref 1.8–7.7)
NEUTROPHILS NFR BLD: 87.8 % (ref 38–73)
NRBC BLD-RTO: 0 /100 WBC
OVALOCYTES BLD QL SMEAR: ABNORMAL
PLATELET # BLD AUTO: 362 K/UL (ref 150–350)
PLATELET BLD QL SMEAR: ABNORMAL
PMV BLD AUTO: 9.2 FL (ref 9.2–12.9)
POIKILOCYTOSIS BLD QL SMEAR: SLIGHT
POLYCHROMASIA BLD QL SMEAR: ABNORMAL
POTASSIUM SERPL-SCNC: 4.3 MMOL/L (ref 3.5–5.1)
PROT SERPL-MCNC: 5.8 G/DL (ref 6–8.4)
PROTHROMBIN TIME: 16.3 SEC (ref 9–12.5)
RBC # BLD AUTO: 4.37 M/UL (ref 4.6–6.2)
SODIUM SERPL-SCNC: 130 MMOL/L (ref 136–145)
TARGETS BLD QL SMEAR: ABNORMAL
WBC # BLD AUTO: 23.71 K/UL (ref 3.9–12.7)

## 2019-12-24 PROCEDURE — 25000003 PHARM REV CODE 250: Performed by: STUDENT IN AN ORGANIZED HEALTH CARE EDUCATION/TRAINING PROGRAM

## 2019-12-24 PROCEDURE — 85610 PROTHROMBIN TIME: CPT

## 2019-12-24 PROCEDURE — 63600175 PHARM REV CODE 636 W HCPCS: Performed by: HOSPITALIST

## 2019-12-24 PROCEDURE — 20600001 HC STEP DOWN PRIVATE ROOM

## 2019-12-24 PROCEDURE — 85025 COMPLETE CBC W/AUTO DIFF WBC: CPT

## 2019-12-24 PROCEDURE — 36415 COLL VENOUS BLD VENIPUNCTURE: CPT

## 2019-12-24 PROCEDURE — 63600175 PHARM REV CODE 636 W HCPCS: Performed by: STUDENT IN AN ORGANIZED HEALTH CARE EDUCATION/TRAINING PROGRAM

## 2019-12-24 PROCEDURE — 25000003 PHARM REV CODE 250: Performed by: HOSPITALIST

## 2019-12-24 PROCEDURE — 99233 PR SUBSEQUENT HOSPITAL CARE,LEVL III: ICD-10-PCS | Mod: ,,, | Performed by: HOSPITALIST

## 2019-12-24 PROCEDURE — 99233 SBSQ HOSP IP/OBS HIGH 50: CPT | Mod: ,,, | Performed by: HOSPITALIST

## 2019-12-24 PROCEDURE — 80053 COMPREHEN METABOLIC PANEL: CPT

## 2019-12-24 RX ORDER — HYDROXYZINE HYDROCHLORIDE 25 MG/1
25 TABLET, FILM COATED ORAL ONCE AS NEEDED
Status: COMPLETED | OUTPATIENT
Start: 2019-12-24 | End: 2019-12-24

## 2019-12-24 RX ORDER — GABAPENTIN 100 MG/1
100 CAPSULE ORAL 3 TIMES DAILY PRN
Status: DISCONTINUED | OUTPATIENT
Start: 2019-12-24 | End: 2019-12-28 | Stop reason: HOSPADM

## 2019-12-24 RX ORDER — LORAZEPAM 2 MG/ML
0.5 INJECTION INTRAMUSCULAR ONCE
Status: COMPLETED | OUTPATIENT
Start: 2019-12-24 | End: 2019-12-24

## 2019-12-24 RX ORDER — LACTULOSE 10 G/15ML
10 SOLUTION ORAL 3 TIMES DAILY PRN
Status: DISCONTINUED | OUTPATIENT
Start: 2019-12-24 | End: 2019-12-28 | Stop reason: HOSPADM

## 2019-12-24 RX ORDER — GABAPENTIN 300 MG/1
300 CAPSULE ORAL NIGHTLY
Status: DISCONTINUED | OUTPATIENT
Start: 2019-12-24 | End: 2019-12-28 | Stop reason: HOSPADM

## 2019-12-24 RX ORDER — HYDROXYZINE HYDROCHLORIDE 25 MG/1
25 TABLET, FILM COATED ORAL ONCE
Status: DISCONTINUED | OUTPATIENT
Start: 2019-12-24 | End: 2019-12-24

## 2019-12-24 RX ORDER — LORAZEPAM 1 MG/1
1 TABLET ORAL ONCE
Status: DISCONTINUED | OUTPATIENT
Start: 2019-12-24 | End: 2019-12-24

## 2019-12-24 RX ORDER — FUROSEMIDE 40 MG/1
40 TABLET ORAL 2 TIMES DAILY
Status: DISCONTINUED | OUTPATIENT
Start: 2019-12-24 | End: 2019-12-26

## 2019-12-24 RX ORDER — LORAZEPAM 2 MG/ML
1 INJECTION INTRAMUSCULAR ONCE
Status: COMPLETED | OUTPATIENT
Start: 2019-12-24 | End: 2019-12-24

## 2019-12-24 RX ADMIN — APIXABAN 5 MG: 5 TABLET, FILM COATED ORAL at 09:12

## 2019-12-24 RX ADMIN — GABAPENTIN 300 MG: 300 CAPSULE ORAL at 10:12

## 2019-12-24 RX ADMIN — VANCOMYCIN HYDROCHLORIDE 1000 MG: 1 INJECTION, POWDER, LYOPHILIZED, FOR SOLUTION INTRAVENOUS at 10:12

## 2019-12-24 RX ADMIN — LORAZEPAM 0.5 MG: 2 INJECTION INTRAMUSCULAR; INTRAVENOUS at 07:12

## 2019-12-24 RX ADMIN — POLYETHYLENE GLYCOL 3350 17 G: 17 POWDER, FOR SOLUTION ORAL at 09:12

## 2019-12-24 RX ADMIN — LORAZEPAM 1 MG: 2 INJECTION INTRAMUSCULAR; INTRAVENOUS at 01:12

## 2019-12-24 RX ADMIN — PIPERACILLIN AND TAZOBACTAM 4.5 G: 4; .5 INJECTION, POWDER, FOR SOLUTION INTRAVENOUS at 06:12

## 2019-12-24 RX ADMIN — HYDROXYZINE HYDROCHLORIDE 25 MG: 25 TABLET, FILM COATED ORAL at 09:12

## 2019-12-24 RX ADMIN — PANTOPRAZOLE SODIUM 40 MG: 40 TABLET, DELAYED RELEASE ORAL at 09:12

## 2019-12-24 RX ADMIN — ENALAPRIL MALEATE 2.5 MG: 2.5 TABLET ORAL at 09:12

## 2019-12-24 RX ADMIN — SPIRONOLACTONE 25 MG: 25 TABLET ORAL at 09:12

## 2019-12-24 RX ADMIN — GABAPENTIN 100 MG: 100 CAPSULE ORAL at 05:12

## 2019-12-24 RX ADMIN — CITALOPRAM HYDROBROMIDE 20 MG: 10 TABLET ORAL at 09:12

## 2019-12-24 RX ADMIN — METOPROLOL SUCCINATE 100 MG: 50 TABLET, EXTENDED RELEASE ORAL at 09:12

## 2019-12-24 RX ADMIN — FUROSEMIDE 40 MG: 40 TABLET ORAL at 05:12

## 2019-12-24 RX ADMIN — PIPERACILLIN AND TAZOBACTAM 4.5 G: 4; .5 INJECTION, POWDER, FOR SOLUTION INTRAVENOUS at 02:12

## 2019-12-24 NOTE — ASSESSMENT & PLAN NOTE
-s/p Chest tube insertion and drainage  -Gram stain negative, Cx NGTD  -Cell count/diff w/ 50% segs; protein not elevated;  ; glucose 30  -could be secondary to PE (pulmonary infarct) vs pneumonia  -s/p 3 dose of tpa/dnase with significant output (~3600cc); drainage is serosanguineous  -will follow up bedside u/s and CXR to see if additional tpa/dnase to be given

## 2019-12-24 NOTE — PLAN OF CARE
Pt remained free from falls/injury. No acute events during shift. VSS, NAD. Bed in lowest position, wheels locked, side rails up times 2, call light w/in reach. Room clear of obstacles. Pt. Had several aggressive outbursts throughout the shift. Redirection at times was successful. JENELLE.

## 2019-12-24 NOTE — SUBJECTIVE & OBJECTIVE
Interval History: Seen by yesterday psych, recommending to gabapentin 100mg TID PRN for anxiety and Gabapentin 300mg qhs. WBC decreasing to 23 today. Pulmonology following, chest tube with 550cc output yesterday. Reporting no abdominal pain today on exam.    Review of Systems   Constitutional: Positive for activity change and fatigue. Negative for chills and fever.   HENT: Negative for congestion.    Respiratory: Negative for cough and shortness of breath.    Cardiovascular: Negative for chest pain.   Gastrointestinal: Negative for abdominal distention, abdominal pain, constipation, diarrhea, nausea and vomiting.   Genitourinary: Negative for dysuria.   Musculoskeletal: Negative for back pain.   Neurological: Negative for headaches.   Psychiatric/Behavioral: The patient is nervous/anxious.      Objective:     Vital Signs (Most Recent):  Temp: 98 °F (36.7 °C) (12/24/19 1224)  Pulse: 75 (12/24/19 1224)  Resp: 16 (12/24/19 1224)  BP: 99/73 (12/24/19 1224)  SpO2: 96 % (12/24/19 1224) Vital Signs (24h Range):  Temp:  [97.4 °F (36.3 °C)-98.4 °F (36.9 °C)] 98 °F (36.7 °C)  Pulse:  [75-99] 75  Resp:  [16-18] 16  SpO2:  [95 %-96 %] 96 %  BP: ()/(54-79) 99/73     Weight: 78.5 kg (173 lb 1 oz)  Body mass index is 26.31 kg/m².    Intake/Output Summary (Last 24 hours) at 12/24/2019 1547  Last data filed at 12/24/2019 1100  Gross per 24 hour   Intake 650 ml   Output 1375 ml   Net -725 ml      Physical Exam   Constitutional: He is oriented to person, place, and time.   Patient is a 43 yo M who appears alert and anxious.    HENT:   Head: Normocephalic and atraumatic.   Eyes: Pupils are equal, round, and reactive to light. EOM are normal.   Neck: Normal range of motion.   Cardiovascular: Normal rate, regular rhythm and normal heart sounds.   Pulmonary/Chest: Effort normal and breath sounds normal. No respiratory distress.   Chest tube in place   Abdominal: Bowel sounds are normal. He exhibits no distension. There is no  tenderness.   Musculoskeletal: Normal range of motion.   Neurological: He is alert and oriented to person, place, and time.   Skin: Skin is warm and dry.   Psychiatric: He has a normal mood and affect.       Significant Labs:   BMP:   Recent Labs   Lab 12/24/19  0421   *   *   K 4.3   CL 96   CO2 29   BUN 15   CREATININE 0.8   CALCIUM 7.8*     CBC:   Recent Labs   Lab 12/23/19  1040 12/24/19 0421   WBC 31.09* 23.71*   HGB 11.6* 11.4*   HCT 37.4* 36.1*    362*

## 2019-12-24 NOTE — SUBJECTIVE & OBJECTIVE
Interval History: No issues overnight. CT remains in place with good output. Received 1 dose of tpa/dnase yesterday.     Objective:     Vital Signs (Most Recent):  Temp: 98 °F (36.7 °C) (12/24/19 0801)  Pulse: 93 (12/24/19 0801)  Resp: 18 (12/24/19 0801)  BP: 117/79 (12/24/19 0801)  SpO2: 96 % (12/24/19 0801) Vital Signs (24h Range):  Temp:  [97.4 °F (36.3 °C)-98.4 °F (36.9 °C)] 98 °F (36.7 °C)  Pulse:  [82-99] 93  Resp:  [16-18] 18  SpO2:  [95 %-96 %] 96 %  BP: ()/(50-79) 117/79     Weight: 78.5 kg (173 lb 1 oz)  Body mass index is 26.31 kg/m².      Intake/Output Summary (Last 24 hours) at 12/24/2019 1203  Last data filed at 12/24/2019 1100  Gross per 24 hour   Intake 650 ml   Output 1375 ml   Net -725 ml       Physical Exam   Constitutional: He is oriented to person, place, and time. He appears well-developed and well-nourished.   HENT:   Head: Normocephalic and atraumatic.   Eyes: Pupils are equal, round, and reactive to light. EOM are normal.   Neck: Normal range of motion.   Cardiovascular: Normal rate, regular rhythm and normal heart sounds.   Pulmonary/Chest: Effort normal.   Chest tube in place. Improved aeration bilaterally   Abdominal: Bowel sounds are normal. He exhibits no distension. There is tenderness.   Musculoskeletal: Normal range of motion.   Neurological: He is alert and oriented to person, place, and time.   Skin: Skin is warm and dry.   Psychiatric: He has a normal mood and affect.       Significant Labs:    CBC/Anemia Profile:  Recent Labs   Lab 12/23/19  1040 12/24/19  0421   WBC 31.09* 23.71*   HGB 11.6* 11.4*   HCT 37.4* 36.1*    362*   MCV 83 83   RDW 23.9* 24.0*        Chemistries:  Recent Labs   Lab 12/23/19  1040 12/24/19  0421   * 130*   K 4.2 4.3   CL 94* 96   CO2 30* 29   BUN 17 15   CREATININE 0.9 0.8   CALCIUM 7.6* 7.8*   ALBUMIN 1.4* 1.5*   PROT 5.7* 5.8*   BILITOT 2.9* 2.6*   ALKPHOS 92 79   ALT 21 17   AST 34 28       All pertinent labs within the past 24  hours have been reviewed.    Significant Imaging:  I have reviewed and interpreted all pertinent imaging results/findings within the past 24 hours.

## 2019-12-24 NOTE — PROGRESS NOTES
[facility]  Pulmonology  Progress Note    Patient Name: Francis Daigle  MRN: [id number]  Admission Date: 12/10/2019  Hospital Length of Stay: 14 days  Code Status: Full Code  Attending Provider: David Potts MD  Primary Care Provider: Primary Doctor No   Principal Problem: Sepsis due to undetermined organism    Subjective:     Interval History: No issues overnight. CT remains in place with good output. Received 1 dose of tpa/dnase yesterday.     Objective:     Vital Signs (Most Recent):  Temp: 98 °F (36.7 °C) (12/24/19 0801)  Pulse: 93 (12/24/19 0801)  Resp: 18 (12/24/19 0801)  BP: 117/79 (12/24/19 0801)  SpO2: 96 % (12/24/19 0801) Vital Signs (24h Range):  Temp:  [97.4 °F (36.3 °C)-98.4 °F (36.9 °C)] 98 °F (36.7 °C)  Pulse:  [82-99] 93  Resp:  [16-18] 18  SpO2:  [95 %-96 %] 96 %  BP: ()/(50-79) 117/79     Weight: 78.5 kg (173 lb 1 oz)  Body mass index is 26.31 kg/m².      Intake/Output Summary (Last 24 hours) at 12/24/2019 1203  Last data filed at 12/24/2019 1100  Gross per 24 hour   Intake 650 ml   Output 1375 ml   Net -725 ml       Physical Exam   Constitutional: He is oriented to person, place, and time. He appears well-developed and well-nourished.   HENT:   Head: Normocephalic and atraumatic.   Eyes: Pupils are equal, round, and reactive to light. EOM are normal.   Neck: Normal range of motion.   Cardiovascular: Normal rate, regular rhythm and normal heart sounds.   Pulmonary/Chest: Effort normal.   Chest tube in place. Improved aeration bilaterally   Abdominal: Bowel sounds are normal. He exhibits no distension. There is tenderness.   Musculoskeletal: Normal range of motion.   Neurological: He is alert and oriented to person, place, and time.   Skin: Skin is warm and dry.   Psychiatric: He has a normal mood and affect.       Significant Labs:    CBC/Anemia Profile:  Recent Labs   Lab 12/23/19  1040 12/24/19  0421   WBC 31.09* 23.71*   HGB 11.6* 11.4*   HCT 37.4* 36.1*     362*   MCV 83 83   RDW 23.9* 24.0*        Chemistries:  Recent Labs   Lab 12/23/19  1040 12/24/19  0421   * 130*   K 4.2 4.3   CL 94* 96   CO2 30* 29   BUN 17 15   CREATININE 0.9 0.8   CALCIUM 7.6* 7.8*   ALBUMIN 1.4* 1.5*   PROT 5.7* 5.8*   BILITOT 2.9* 2.6*   ALKPHOS 92 79   ALT 21 17   AST 34 28       All pertinent labs within the past 24 hours have been reviewed.    Significant Imaging:  I have reviewed and interpreted all pertinent imaging results/findings within the past 24 hours.    Assessment/Plan:     Pleural effusion  -s/p Chest tube insertion and drainage  -Gram stain negative, Cx NGTD  -Cell count/diff w/ 50% segs; protein not elevated;  ; glucose 30  -could be secondary to PE (pulmonary infarct) vs pneumonia  -s/p 3 dose of tpa/dnase with significant output (~3600cc); drainage is serosanguineous  -will follow up bedside u/s and CXR to see if additional tpa/dnase to be given           Aristides York MD  Pulmonology  [facility]

## 2019-12-24 NOTE — PROGRESS NOTES
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 He has a PMH significant for non-ischemic cardiomyopathy with combined CHF (EF 11% without ICD or CRT device) and on-going IVDU. He reports a two week duration of progressively worsening bilateral lower extremity swelling and redness of the anterior shins. This is associated with bilateral lower extremity pain with ambulation. He also reports noticing symptom association with worsening of baseline SOB with exertion as well as new abdominal distension and pain, within the same time frame. He describes abdominal pain as a generalized soreness that occurs approximately one hour after eating meals and spontaneously resolves without intervention or medication. He is unsure of how long the discomfort lasts before resolving. Additionally, he reports a two week duration of occasional episodes of pale semi-formed stools. He attempted symptom relief with increasing dose of daily Lasix, however without effect, prompting presentation to ED on 12/09.     He denies symptom association with fevers, chills, nausea, vomiting, history of prior abdominal surgeries, ETOH consumption, chest pain, pain with inspiration, lightheadedness, or blurry vision. He reports a on-going non-productive cough intermittent cough since CHF diagnosis that is unchanged. He does not weigh himself daily and is unsure of any weight loss or gain. He does follow with cardiology outpatient.     Of note, he also reports continued daily  use of IV methamphetamines with most recent usage on 12/08.     ED course: He initially presented to [facility] on 12/09. Lab were significant for leukocytosis (25), hyponatremia (126), lactic acidosis (3.5), transaminitis (, ALT 73), hyperbilirubinemia (4.5), and toxicology positive for amphetamines. CXR was suggestive of right lower lobe pneumonia. Abdominal U/S was suggestive of acalculous cholecystitis. He was given IVF, Ceftriaxone, and Lasix. Case was discussed with transfer center and AES here; recommended transfer for possible MRCP.     Overview/Hospital Course:  Francis Daigle 44 y.o male presented from OSH with cholilithiasis and cholecystitis. [facility] evaluation. When patient got admitted to hospital medicine team the patient was transferred to ICU following cardiac arrest on 12/10/19. Emergently intubated during PEA arrest, ROSC achieved after 1 round of CPR. Central line and arterial line placed. Patient initially requiring pressor support, cyanotic, hypoxic on blood gas. Bedside ultrasound with signs of RV strain with DVT in lower extremity. Patient on pressors at the time, requiring 100% FiO2 to oxygenate. Given TPA emergently. Patient started on heparin ggt, bronchoscopy today revealing relatively normal airways and extubated. Most recent ultrasound did not show liver cirrhosis or cholilthiasis or cholecystitis. Patient with RLL PNA getting treated with Zosyn and later switch to Levo. Patient transitioned to Heparin gtt to 10mg BID eliquis. Cardiology will need to consulted for possible ICD or lifevest up on discharge on Monday.   Spoke with cardiology who states that he will likely need an ICD but he can follow up outpatient with cardiology for its placement.  Patient continues to state that he feels better each day.  He does endorse increased anxiety over the last day.  He states that he used to be on treatment in the past.  Started patient on citalopram and  instructed him that he will need to request follow up with psychiatry once he is discharged. Patient has continued midepigastric pain that is associated with meals. Ultrasound for mesenteric ischemia showed no signs of occlusion or stenosis.  Ordered CT scan of the abdomen with contast showing bilateral PE and empyema vs abcess. Pulmonary consulted and plan to CT tomorrow, NPO at Midnight.  Chest tube was preformed today and fluids sent for cultures and cytology.  Patient noted to be tachycardic overnight up to 130s; ECG in the AM noted sinus tachycardia.  His HR slowed to 98 by lunch time and a small 250 mL bolus of LR.  Re-started heparin drip following the chest tube placement per pulmonology recommendations.   Patient has become more anxious and uncooperative today; pulling out IV lines and thrashing about room, demanding norco and IV ativan.  Addressed patient's concerns and provided 1g PO ativan q8 hours.  On 12/22, patient noted to have a spike in his WBC (42k.), trending down. Blood cultures were drawn as well as a lactate.  His antibitoics were broadened to Vancomycin and Zosyn IV.  His CT tube was noted to put out an additional 1L. Curiously, his abdominal pain has improved after lactulose gave him a bowel movement, but complains of it all day, CT abdomen with contrast showed no ileus or obstruction, empyema improved per pulm. Lipase also was within normal. Psych consulted on 12/23, recommending to gabapentin 100mg TID PRN for anxiety and Gabapentin 300mg qhs. WBC continuing to improve.    Interval History: Seen by yesterday psych, recommending to gabapentin 100mg TID PRN for anxiety and Gabapentin 300mg qhs. WBC decreasing to 23 today. Pulmonology following, chest tube with 550cc output yesterday. Reporting no abdominal pain today on exam.    Review of Systems   Constitutional: Positive for activity change and fatigue. Negative for chills and fever.   HENT: Negative for congestion.    Respiratory:  Negative for cough and shortness of breath.    Cardiovascular: Negative for chest pain.   Gastrointestinal: Negative for abdominal distention, abdominal pain, constipation, diarrhea, nausea and vomiting.   Genitourinary: Negative for dysuria.   Musculoskeletal: Negative for back pain.   Neurological: Negative for headaches.   Psychiatric/Behavioral: The patient is nervous/anxious.      Objective:     Vital Signs (Most Recent):  Temp: 98 °F (36.7 °C) (12/24/19 1224)  Pulse: 75 (12/24/19 1224)  Resp: 16 (12/24/19 1224)  BP: 99/73 (12/24/19 1224)  SpO2: 96 % (12/24/19 1224) Vital Signs (24h Range):  Temp:  [97.4 °F (36.3 °C)-98.4 °F (36.9 °C)] 98 °F (36.7 °C)  Pulse:  [75-99] 75  Resp:  [16-18] 16  SpO2:  [95 %-96 %] 96 %  BP: ()/(54-79) 99/73     Weight: 78.5 kg (173 lb 1 oz)  Body mass index is 26.31 kg/m².    Intake/Output Summary (Last 24 hours) at 12/24/2019 1547  Last data filed at 12/24/2019 1100  Gross per 24 hour   Intake 650 ml   Output 1375 ml   Net -725 ml      Physical Exam   Constitutional: He is oriented to person, place, and time.   Patient is a 43 yo M who appears alert and anxious.    HENT:   Head: Normocephalic and atraumatic.   Eyes: Pupils are equal, round, and reactive to light. EOM are normal.   Neck: Normal range of motion.   Cardiovascular: Normal rate, regular rhythm and normal heart sounds.   Pulmonary/Chest: Effort normal and breath sounds normal. No respiratory distress.   Chest tube in place   Abdominal: Bowel sounds are normal. He exhibits no distension. There is no tenderness.   Musculoskeletal: Normal range of motion.   Neurological: He is alert and oriented to person, place, and time.   Skin: Skin is warm and dry.   Psychiatric: He has a normal mood and affect.       Significant Labs:   BMP:   Recent Labs   Lab 12/24/19  0421   *   *   K 4.3   CL 96   CO2 29   BUN 15   CREATININE 0.8   CALCIUM 7.8*     CBC:   Recent Labs   Lab 12/23/19  1040 12/24/19  0421   WBC  31.09* 23.71*   HGB 11.6* 11.4*   HCT 37.4* 36.1*    362*           Assessment/Plan:      * Sepsis due to undetermined organism  This is a 44 year old male with PMH significant for non-ischemic cardiomyopathy (EF 11%), IVDU, and Hepatitis C who is presenting on 12/10 with two week duration of worsening lower extremity swelling and erythema associated with same duration of post-prandial abdominal pain and distension with intermittent pale colored stools with work-up thus far concerning for tachycardia (120's), leukocytosis (25), lactic acidosis, transaminitis, hyperbilirubinemia, CXR suggestive of right lower lobe pneumonia, and abdominal U/S suggestive of possible acalculous cholecystitis. He has no symptoms of underlying respiratory infection and on chart review CXR abnormalities are similar to those in 08/2019 during admission for CHF exacerbation. UA is unremarkable. Negative for influenza. Suspect bilateral lower extremity edema and erythema more a manifestation of CHF instead of bilateral cellulitis. Differential diagnoses include biliary etiology vs bacteremia in the setting of on-going IVDU vs possible CAP.   -CT scan of chest 12/20: pulmonary abscess involving the right lower lobe, as well as additional areas bilaterally that could represent areas of developing pulmonary abscesses.  Loculated fluid collection.   -12/22: WBC elevated to 42k.  Afebrile and HDs. Patient clinically feels well.  Broadened abx to IV vanc and zosyn.     Plan:   -Vancomycin and Zosyn IV  -f/u repeat blood cultures and lactate    -Respiratory culture growing GNR and Candida with persistently elevated WBC. Candida in respiratory does not need to get treated.   -Judiciously administer an IVF resuscitation necessary.     Empyema  Recent CT chest now showing possible empyema of right lung.  -pleural fluid WBC elevated to over 2k and LDH elevated well above normal, indicative of likely empyema.   -Chest tube placed on 12/20,  drained 700mL of SS fluid.  12/22 showed output of additional 1L    Plan:  -his WBC elevated to 42k on 12/22 that remained elevated on repeat CBC.  Clinically he feels well and has no complaints.  His antibiotics were broadened to Vanc and Zosyn IV.    -f/u repeat blood culture and lactate   -PULMONOLOGY on board.         Abdominal pain  Patient has endorsed episodic abdominal pains (8/10) in his his to upper epigastrium over the last few days.  He associates these pains with meals and has avoided food because of it.  Nothing seems to make better other than letting time pass.  Given patient's presentation with DVTs and possible PE.  Concern for possible mesenteric ischemia vs rib fracture vs GERD.   -CXR negative for any fracture  -Ultrasound with mesenteric view negative for any stenosis or occulusion   -Lipase, Lactic acid WNL. Peritoneal cultures negative (likely portal HTN.)    Plan:  -Patient's abdominal pain improved after 1x dose of lactulose.  He was noted to have 1x large BM later that day. Likely 2/2 to constipation   -s/p paracentesis on 12/19, which showed no SBP (WBC of 75.) SAAG of 1.1 likely 2/2 to portal HTN  -Continue pantoprazole PO 40mg   -CT abdomen showed no ileus or obstruction. Empyema improved per Pulm reading   -continue to monitor       Anxiety  Patient with history of panic attacks in the past.  Patient describes increased worry and anxiety about his current condition and his recovery.     Plan:  -Psych consulted, recommending continued citalopram 20mg PO, gabapentin 100mg TID PRN, and gabapentin 300mg qhs         Wheezing  History of 40PK year. Mild wheeze throughout lung field. Sating well in room air  - continue Ipotroprium Neb Q8H for wheezing       Acute massive pulmonary embolism  DVT lower extremity probable origin of PE. Patient was started on heparin gtt  - right upper extremity arm US showing edema and no clots.     Plan  -continue eliquis         Acute deep vein thrombosis (DVT)  of proximal vein of lower extremity  - See acute massive pulm embolism       Cardiac arrest  PEA arrest, 2 round of EPI and CPR achieved ROSC.         Hyponatremia  -See assessment for CHF exacerbation.       Chronic hepatitis C without hepatic coma  - HEP C viral load (+) and elevated at 6  - Will need to set up with hepatology for treatment as outpatient       Transaminitis  -See assessment for sepsis.   MELD-Na score: 22 at 12/24/2019  4:21 AM  MELD score: 16 at 12/24/2019  4:21 AM  Calculated from:  Serum Creatinine: 0.8 mg/dL (Rounded to 1 mg/dL) at 12/24/2019  4:21 AM  Serum Sodium: 130 mmol/L at 12/24/2019  4:21 AM  Total Bilirubin: 2.6 mg/dL at 12/24/2019  4:21 AM  INR(ratio): 1.7 at 12/24/2019  4:21 AM  Age: 44 years        Intravenous drug abuse  -On-going use of methamphetamines.   -Prior hepatitis screening positive for HepC in 10/2019.     Plan:   -Counseled on importance of cessation.   -Monitor for signs of withdrawal for other substances.   -Psychiatry on board, appreciate recs    Community acquired pneumonia of right lower lobe of lung  -See assessment for sepsis.       Acute on chronic combined systolic and diastolic heart failure  History of non-ischemic cardiomyopathy diagnosed in 11/2017 at [facility].  [facility] in 12/2017 with no evidence of angiographically significant coronary artery disease.  Most recent ECHO in 10/2019 concerning for EF of 35%, grade II diastolic dysfunction, and global hypokinesis with a restrictive physiology. HOME regimen: Carvedilol, Enalapril, Lasix 20 mg BID, and Spironolactone.   .-Suspect presentation from worsening right sided heart failure.     Plan:   - continue HOME regimen: Carvedilol, Enalapril, and Spironolactone  - HOLD lasix to 40mg PO BID  - continue Toprol XL 100mg QD   -Strict I/O's and daily standing weights.   -Telemetry ordered.   -Cardiology recommends outpatient EP follow up for ICD placement for primary prevention.  They do not recommending placing a life  vest or ICD inpatient as patient's cardiac arrest during hospitalization was due to a PE and not a shockable rhythm.      Essential hypertension  -See assessment for CHF exacerbation.         VTE Risk Mitigation (From admission, onward)         Ordered     apixaban tablet 5 mg  2 times daily      12/23/19 0946     heparin 25,000 units in dextrose 5% 250 mL (100 units/mL) infusion HIGH INTENSITY nomogram - [facility]  Continuous     Question:  Heparin Infusion Adjustment (DO NOT MODIFY ANSWER)  Answer:  [email or web address] HIGH INTENSITY nomogram for [facility] [email or web address]    12/19/19 0909     IP VTE HIGH RISK PATIENT  Once      12/10/19 0922                      Marlee Mcclendon MD  Department of Hospital Medicine   [facility]

## 2019-12-24 NOTE — ASSESSMENT & PLAN NOTE
-See assessment for sepsis.   MELD-Na score: 22 at 12/24/2019  4:21 AM  MELD score: 16 at 12/24/2019  4:21 AM  Calculated from:  Serum Creatinine: 0.8 mg/dL (Rounded to 1 mg/dL) at 12/24/2019  4:21 AM  Serum Sodium: 130 mmol/L at 12/24/2019  4:21 AM  Total Bilirubin: 2.6 mg/dL at 12/24/2019  4:21 AM  INR(ratio): 1.7 at 12/24/2019  4:21 AM  Age: 44 years

## 2019-12-24 NOTE — PT/OT/SLP PROGRESS
"Occupational Therapy      Patient Name:  Francis Daigle   MRN:  [id number]    Occupational therapy visit attempted 2x this date. Pt refused participation each attempt despite encouragement provided. Educated pt on the importance of OOB activity. Pt states, " I just want to sleep". Will follow-up as scheduled.     Yessica Santana OT  12/24/2019  "

## 2019-12-24 NOTE — ASSESSMENT & PLAN NOTE
Patient with history of panic attacks in the past.  Patient describes increased worry and anxiety about his current condition and his recovery.     Plan:  -Psych consulted, recommending continued citalopram 20mg PO, gabapentin 100mg TID PRN, and gabapentin 300mg qhs

## 2019-12-24 NOTE — PROGRESS NOTES
Pharmacokinetic Assessment Follow Up: IV Vancomycin    Vancomycin serum concentration assessment(s):    The trough level was drawn correctly and can be used to guide therapy at this time. The measurement is within the desired definitive target range of 10 to 20 mcg/mL.    Vancomycin Regimen Plan:    Continue regimen to Vancomycin 1000 mg IV every 12 hours with next serum trough concentration measured at 1030 prior to fourth dose on 12/25    Drug levels (last 3 results):  Recent Labs   Lab Result Units 12/23/19  2143   Vancomycin-Trough ug/mL 14.9       Pharmacy will continue to follow and monitor vancomycin.    Please contact pharmacy at extension [phone] for questions regarding this assessment.    Thank you for the consult,   Sveta Cuevas       Patient brief summary:  Francis Daigle is a 44 y.o. male initiated on antimicrobial therapy with IV Vancomycin for treatment of sepsis    The patient's current regimen is 1000 q12h    Drug Allergies:   Review of patient's allergies indicates:  No Known Allergies    Actual Body Weight:   78.5kg    Renal Function:   Estimated Creatinine Clearance: 99.2 mL/min (based on SCr of 0.9 mg/dL).,     Dialysis Method (if applicable):  N/A    CBC (last 72 hours):  Recent Labs   Lab Result Units 12/21/19  0825 12/22/19  0639 12/22/19  0919 12/23/19  1040   WBC K/uL 18.09* 45.06* 48.14* 31.09*   Hemoglobin g/dL 11.4* 11.8* 11.5* 11.6*   Hematocrit % 36.5* 36.9* 35.7* 37.4*   Platelets K/uL 331 327 299 337   Gran% % 80.7* 92.1* 91.9* 88.8*   Lymph% % 11.3* 3.4* 3.4* 5.8*   Mono% % 5.9 2.9* 3.1* 4.0   Eosinophil% % 1.1 0.0 0.1 0.1   Basophil% % 0.3 0.2 0.1 0.3   Differential Method  Automated Automated Automated Automated       Metabolic Panel (last 72 hours):  Recent Labs   Lab Result Units 12/21/19  0825 12/22/19  0638 12/22/19  1027 12/23/19  1040   Sodium mmol/L 133* 131*  --  131*   Potassium mmol/L 4.4 4.5  --  4.2   Chloride mmol/L 96 94*  --  94*   CO2 mmol/L 29 28  --  30*    Glucose mg/dL 126* 101  --  108   BUN, Bld mg/dL 21* 19  --  17   Creatinine mg/dL 1.1 1.0  --  0.9   Creatinine, Random Ur mg/dL  --   --  117.0  --    Albumin g/dL 1.6* 1.7*  --  1.4*   Total Bilirubin mg/dL 2.6* 3.6*  --  2.9*   Alkaline Phosphatase U/L 90 109  --  92   AST U/L 40 41*  --  34   ALT U/L 24 24  --  21   Magnesium mg/dL  --  1.5*  --   --        Vancomycin Administrations:  vancomycin given in the last 96 hours                   vancomycin in dextrose 5 % 1 gram/250 mL IVPB 1,000 mg (mg) 1,000 mg New Bag 12/23/19 2343     1,000 mg New Bag  1006     1,000 mg New Bag 12/22/19 2127    vancomycin 1.5 g in dextrose 5 % 250 mL IVPB (ready to mix) (mg) 1,500 mg New Bag 12/22/19 0949                Microbiologic Results:  Microbiology Results (last 7 days)     Procedure Component Value Units Date/Time    Culture, Body Fluid - Bactec [[id number]] Collected:  12/20/19 1000    Order Status:  Completed Specimen:  Body Fluid from Pleural Fluid Updated:  12/23/19 1612     Body Fluid Culture, Sterile No Growth to date      No Growth to date      No Growth to date      No Growth to date    AFB culture (includes stain) [[id number]] Collected:  12/20/19 1000    Order Status:  Completed Specimen:  Body Fluid from Pleural Fluid Updated:  12/23/19 1407     AFB Culture & Smear Culture in progress     AFB CULTURE STAIN No acid fast bacilli seen.    Culture, Anaerobic [[id number]] Collected:  12/19/19 1506    Order Status:  Completed Specimen:  Body Fluid from Ascites Updated:  12/23/19 1259     Anaerobic Culture Culture in progress    Blood Culture #1 **CANNOT BE ORDERED STAT** [[id number]] Collected:  12/22/19 0856    Order Status:  Completed Specimen:  Blood Updated:  12/23/19 1012     Blood Culture, Routine No Growth to date      No Growth to date    Blood culture [[id number]] Collected:  12/22/19 0946    Order Status:  Completed Specimen:  Blood Updated:  12/23/19 1012     Blood Culture, Routine No Growth to date      No  Growth to date    Narrative:       Collection has been rescheduled by LW1 at 12/22/2019 09:21 Reason:   Unable to collect nurse Imelda is trying to get 2nd set of cultures  Collection has been rescheduled by LW1 at 12/22/2019 09:21 Reason:   Unable to collect nurse Imelda is trying to get 2nd set of cultures    Aerobic culture [[id number]] Collected:  12/19/19 1506    Order Status:  Completed Specimen:  Body Fluid from Ascites Updated:  12/23/19 0955     Aerobic Bacterial Culture No growth    Blood Culture #1 **CANNOT BE ORDERED STAT** [[id number]] Collected:  12/22/19 0857    Order Status:  Canceled Specimen:  Blood     Gram stain [[id number]] Collected:  12/20/19 1000    Order Status:  Completed Specimen:  Body Fluid from Pleural Fluid Updated:  12/20/19 1628     Gram Stain Result Rare WBC's      No organisms seen    Fungus culture [[id number]] Collected:  12/20/19 1000    Order Status:  Sent Specimen:  Body Fluid from Pleural Fluid Updated:  12/20/19 1152    Gram stain [[id number]] Collected:  12/19/19 1506    Order Status:  Completed Specimen:  Body Fluid from Ascites Updated:  12/19/19 2015     Gram Stain Result Cytospin indicates:      Rare WBC's      No organisms seen

## 2019-12-25 PROBLEM — R74.01 TRANSAMINITIS: Status: RESOLVED | Noted: 2019-12-10 | Resolved: 2019-12-25

## 2019-12-25 PROBLEM — D72.829 LEUKOCYTOSIS: Status: ACTIVE | Noted: 2019-12-25

## 2019-12-25 LAB
ALBUMIN SERPL BCP-MCNC: 1.5 G/DL (ref 3.5–5.2)
ALP SERPL-CCNC: 83 U/L (ref 55–135)
ALT SERPL W/O P-5'-P-CCNC: 17 U/L (ref 10–44)
ANION GAP SERPL CALC-SCNC: 7 MMOL/L (ref 8–16)
AST SERPL-CCNC: 33 U/L (ref 10–40)
BACTERIA FLD CULT: NORMAL
BASOPHILS # BLD AUTO: 0.06 K/UL (ref 0–0.2)
BASOPHILS NFR BLD: 0.3 % (ref 0–1.9)
BILIRUB SERPL-MCNC: 2.1 MG/DL (ref 0.1–1)
BUN SERPL-MCNC: 15 MG/DL (ref 6–20)
CALCIUM SERPL-MCNC: 7.7 MG/DL (ref 8.7–10.5)
CHLORIDE SERPL-SCNC: 94 MMOL/L (ref 95–110)
CO2 SERPL-SCNC: 28 MMOL/L (ref 23–29)
CREAT SERPL-MCNC: 1.1 MG/DL (ref 0.5–1.4)
DIFFERENTIAL METHOD: ABNORMAL
EOSINOPHIL # BLD AUTO: 0.1 K/UL (ref 0–0.5)
EOSINOPHIL NFR BLD: 0.6 % (ref 0–8)
ERYTHROCYTE [DISTWIDTH] IN BLOOD BY AUTOMATED COUNT: 23.7 % (ref 11.5–14.5)
EST. GFR  (AFRICAN AMERICAN): >60 ML/MIN/1.73 M^2
EST. GFR  (NON AFRICAN AMERICAN): >60 ML/MIN/1.73 M^2
GLUCOSE SERPL-MCNC: 121 MG/DL (ref 70–110)
HCT VFR BLD AUTO: 35.1 % (ref 40–54)
HGB BLD-MCNC: 10.6 G/DL (ref 14–18)
IMM GRANULOCYTES # BLD AUTO: 0.14 K/UL (ref 0–0.04)
IMM GRANULOCYTES NFR BLD AUTO: 0.6 % (ref 0–0.5)
INR PPP: 1.5 (ref 0.8–1.2)
LYMPHOCYTES # BLD AUTO: 2.3 K/UL (ref 1–4.8)
LYMPHOCYTES NFR BLD: 9.9 % (ref 18–48)
MCH RBC QN AUTO: 25.5 PG (ref 27–31)
MCHC RBC AUTO-ENTMCNC: 30.2 G/DL (ref 32–36)
MCV RBC AUTO: 85 FL (ref 82–98)
MONOCYTES # BLD AUTO: 1.2 K/UL (ref 0.3–1)
MONOCYTES NFR BLD: 5.3 % (ref 4–15)
NEUTROPHILS # BLD AUTO: 18.9 K/UL (ref 1.8–7.7)
NEUTROPHILS NFR BLD: 83.3 % (ref 38–73)
NRBC BLD-RTO: 0 /100 WBC
PLATELET # BLD AUTO: 416 K/UL (ref 150–350)
PMV BLD AUTO: 9.5 FL (ref 9.2–12.9)
POTASSIUM SERPL-SCNC: 4.2 MMOL/L (ref 3.5–5.1)
PROT SERPL-MCNC: 5.7 G/DL (ref 6–8.4)
PROTHROMBIN TIME: 14.6 SEC (ref 9–12.5)
RBC # BLD AUTO: 4.15 M/UL (ref 4.6–6.2)
SODIUM SERPL-SCNC: 129 MMOL/L (ref 136–145)
VANCOMYCIN TROUGH SERPL-MCNC: 17.7 UG/ML (ref 10–22)
WBC # BLD AUTO: 22.63 K/UL (ref 3.9–12.7)

## 2019-12-25 PROCEDURE — 99232 SBSQ HOSP IP/OBS MODERATE 35: CPT | Mod: ,,, | Performed by: HOSPITALIST

## 2019-12-25 PROCEDURE — 25000003 PHARM REV CODE 250: Performed by: STUDENT IN AN ORGANIZED HEALTH CARE EDUCATION/TRAINING PROGRAM

## 2019-12-25 PROCEDURE — 63600175 PHARM REV CODE 636 W HCPCS: Performed by: HOSPITALIST

## 2019-12-25 PROCEDURE — 25000003 PHARM REV CODE 250: Performed by: HOSPITALIST

## 2019-12-25 PROCEDURE — 36415 COLL VENOUS BLD VENIPUNCTURE: CPT

## 2019-12-25 PROCEDURE — 80053 COMPREHEN METABOLIC PANEL: CPT

## 2019-12-25 PROCEDURE — 20600001 HC STEP DOWN PRIVATE ROOM

## 2019-12-25 PROCEDURE — 99232 PR SUBSEQUENT HOSPITAL CARE,LEVL II: ICD-10-PCS | Mod: ,,, | Performed by: HOSPITALIST

## 2019-12-25 PROCEDURE — 85025 COMPLETE CBC W/AUTO DIFF WBC: CPT

## 2019-12-25 PROCEDURE — 85610 PROTHROMBIN TIME: CPT

## 2019-12-25 PROCEDURE — 80202 ASSAY OF VANCOMYCIN: CPT

## 2019-12-25 RX ADMIN — GABAPENTIN 300 MG: 300 CAPSULE ORAL at 09:12

## 2019-12-25 RX ADMIN — VANCOMYCIN HYDROCHLORIDE 1000 MG: 1 INJECTION, POWDER, LYOPHILIZED, FOR SOLUTION INTRAVENOUS at 12:12

## 2019-12-25 RX ADMIN — PIPERACILLIN AND TAZOBACTAM 4.5 G: 4; .5 INJECTION, POWDER, FOR SOLUTION INTRAVENOUS at 03:12

## 2019-12-25 RX ADMIN — FUROSEMIDE 40 MG: 40 TABLET ORAL at 05:12

## 2019-12-25 RX ADMIN — PANTOPRAZOLE SODIUM 40 MG: 40 TABLET, DELAYED RELEASE ORAL at 08:12

## 2019-12-25 RX ADMIN — APIXABAN 5 MG: 5 TABLET, FILM COATED ORAL at 09:12

## 2019-12-25 RX ADMIN — CITALOPRAM HYDROBROMIDE 20 MG: 10 TABLET ORAL at 08:12

## 2019-12-25 RX ADMIN — APIXABAN 5 MG: 5 TABLET, FILM COATED ORAL at 08:12

## 2019-12-25 RX ADMIN — Medication 6 MG: at 09:12

## 2019-12-25 RX ADMIN — HYDROXYZINE HYDROCHLORIDE 25 MG: 25 TABLET, FILM COATED ORAL at 08:12

## 2019-12-25 RX ADMIN — FUROSEMIDE 40 MG: 40 TABLET ORAL at 08:12

## 2019-12-25 NOTE — PLAN OF CARE
Pt Aax4, breathing even and unlabored, call light in reach,bed in lowest position, chest tube draining with suction. Pt drowsy and somnolent throughout shift, med team aware. Administered medications per orders, pt tolerated well. POC reviewed with pt. VSS, frequent hourly rounding completed. Prn gabapentin administered for pt anxiety. No other significant events throughout shift. Will continue to monitor.

## 2019-12-25 NOTE — ASSESSMENT & PLAN NOTE
-s/p Chest tube insertion and drainage  -Gram stain negative, Cx NGTD  -Cell count/diff w/ 50% segs; protein not elevated;  ; glucose 30  -could be secondary to PE (pulmonary infarct) vs pneumonia  -s/p 3 dose of tpa/dnase with significant output  -still continues to drain about 500cc per shift;   -will consider pulling once drainage slows to about 100-150/d

## 2019-12-25 NOTE — PROGRESS NOTES
Pharmacokinetic Assessment Follow Up: IV Vancomycin    Vancomycin serum concentration assessment(s):    Vancomycin serum concentration Assessment/Plan:    The random level was drawn correctly and  resulted at 17.7 mcg/mL about 15 hours after previous dose. The measurement is above the desired definitive target range of 15 to 20 mcg/mL. True trough closer to 20.8. CrCl slightly down     Vancomycin Regimen Plan:    Change regimen to Vancomycin 750 mg IV every 12 hours with next serum trough concentration measured at 1230 prior to 4th dose on 12/27    Drug levels (last 3 results):  Recent Labs   Lab Result Units 12/23/19  2143 12/25/19  1248   Vancomycin-Trough ug/mL 14.9 17.7       Pharmacy will continue to follow and monitor vancomycin.    Please contact pharmacy at extension [phone] for questions regarding this assessment.    Thank you for the consult,   Dena Tran       Patient brief summary:  Francis Daigle is a 44 y.o. male initiated on antimicrobial therapy with IV Vancomycin for treatment of lower respiratory infection    The patient's current regimen is 1000 mg IV q 12 h    Drug Allergies:   Review of patient's allergies indicates:  No Known Allergies    Actual Body Weight:   78.5 kg    Renal Function:   Estimated Creatinine Clearance: 81.2 mL/min (based on SCr of 1.1 mg/dL).,     Dialysis Method (if applicable):  N/A    CBC (last 72 hours):  Recent Labs   Lab Result Units 12/23/19  1040 12/24/19  0421 12/25/19  0350   WBC K/uL 31.09* 23.71* 22.63*   Hemoglobin g/dL 11.6* 11.4* 10.6*   Hematocrit % 37.4* 36.1* 35.1*   Platelets K/uL 337 362* 416*   Gran% % 88.8* 87.8* 83.3*   Lymph% % 5.8* 6.4* 9.9*   Mono% % 4.0 4.6 5.3   Eosinophil% % 0.1 0.2 0.6   Basophil% % 0.3 0.2 0.3   Differential Method  Automated Automated Automated       Metabolic Panel (last 72 hours):  Recent Labs   Lab Result Units 12/23/19  1040 12/24/19  0421 12/25/19  0350   Sodium mmol/L 131* 130* 129*   Potassium mmol/L 4.2 4.3 4.2    Chloride mmol/L 94* 96 94*   CO2 mmol/L 30* 29 28   Glucose mg/dL 108 123* 121*   BUN, Bld mg/dL 17 15 15   Creatinine mg/dL 0.9 0.8 1.1   Albumin g/dL 1.4* 1.5* 1.5*   Total Bilirubin mg/dL 2.9* 2.6* 2.1*   Alkaline Phosphatase U/L 92 79 83   AST U/L 34 28 33   ALT U/L 21 17 17       Vancomycin Administrations:  vancomycin given in the last 96 hours                   vancomycin in dextrose 5 % 1 gram/250 mL IVPB 1,000 mg (mg) 1,000 mg New Bag 12/25/19 1258     1,000 mg New Bag 12/24/19 2201     1,000 mg New Bag  1043     1,000 mg New Bag 12/23/19 2343     1,000 mg New Bag  1006     1,000 mg New Bag 12/22/19 2127                Microbiologic Results:  Microbiology Results (last 7 days)     Procedure Component Value Units Date/Time    Blood culture [[id number]] Collected:  12/22/19 0946    Order Status:  Completed Specimen:  Blood Updated:  12/25/19 1012     Blood Culture, Routine No Growth to date      No Growth to date      No Growth to date      No Growth to date    Narrative:       Collection has been rescheduled by LW1 at 12/22/2019 09:21 Reason:   Unable to collect nurse Imelda is trying to get 2nd set of cultures  Collection has been rescheduled by LW1 at 12/22/2019 09:21 Reason:   Unable to collect nurse Imelda is trying to get 2nd set of cultures    Blood Culture #1 **CANNOT BE ORDERED STAT** [[id number]] Collected:  12/22/19 0856    Order Status:  Completed Specimen:  Blood Updated:  12/25/19 1012     Blood Culture, Routine No Growth to date      No Growth to date      No Growth to date      No Growth to date    Culture, Body Fluid - Bactec [[id number]] Collected:  12/20/19 1000    Order Status:  Completed Specimen:  Body Fluid from Pleural Fluid Updated:  12/24/19 1612     Body Fluid Culture, Sterile No Growth to date      No Growth to date      No Growth to date      No Growth to date      No Growth to date    AFB culture (includes stain) [[id number]] Collected:  12/20/19 1000    Order Status:  Completed  Specimen:  Body Fluid from Pleural Fluid Updated:  12/23/19 1407     AFB Culture & Smear Culture in progress     AFB CULTURE STAIN No acid fast bacilli seen.    Culture, Anaerobic [[id number]] Collected:  12/19/19 1506    Order Status:  Completed Specimen:  Body Fluid from Ascites Updated:  12/23/19 1259     Anaerobic Culture Culture in progress    Aerobic culture [[id number]] Collected:  12/19/19 1506    Order Status:  Completed Specimen:  Body Fluid from Ascites Updated:  12/23/19 0955     Aerobic Bacterial Culture No growth    Blood Culture #1 **CANNOT BE ORDERED STAT** [[id number]] Collected:  12/22/19 0857    Order Status:  Canceled Specimen:  Blood     Gram stain [[id number]] Collected:  12/20/19 1000    Order Status:  Completed Specimen:  Body Fluid from Pleural Fluid Updated:  12/20/19 1628     Gram Stain Result Rare WBC's      No organisms seen    Fungus culture [[id number]] Collected:  12/20/19 1000    Order Status:  Sent Specimen:  Body Fluid from Pleural Fluid Updated:  12/20/19 1152    Gram stain [[id number]] Collected:  12/19/19 1506    Order Status:  Completed Specimen:  Body Fluid from Ascites Updated:  12/19/19 2015     Gram Stain Result Cytospin indicates:      Rare WBC's      No organisms seen

## 2019-12-25 NOTE — PROGRESS NOTES
[facility]  Pulmonology  Progress Note    Patient Name: Francis Daigle  MRN: [id number]  Admission Date: 12/10/2019  Hospital Length of Stay: 15 days  Code Status: Full Code  Attending Provider: David Potts MD  Primary Care Provider: Primary Doctor No   Principal Problem: Sepsis due to undetermined organism    Subjective:     Interval History: No issues overnight. CT remains in place with good output.     Objective:     Vital Signs (Most Recent):  Temp: 97.4 °F (36.3 °C) (12/25/19 0809)  Pulse: 78 (12/25/19 0908)  Resp: 18 (12/25/19 0809)  BP: 101/75 (12/25/19 0908)  SpO2: 96 % (12/25/19 0809) Vital Signs (24h Range):  Temp:  [96.2 °F (35.7 °C)-98 °F (36.7 °C)] 97.4 °F (36.3 °C)  Pulse:  [75-85] 78  Resp:  [16-18] 18  SpO2:  [94 %-99 %] 96 %  BP: ()/(56-75) 101/75     Weight: 78.5 kg (173 lb 1 oz)  Body mass index is 26.31 kg/m².      Intake/Output Summary (Last 24 hours) at 12/25/2019 0957  Last data filed at 12/25/2019 0600  Gross per 24 hour   Intake 650 ml   Output 600 ml   Net 50 ml       Physical Exam   Constitutional: He is oriented to person, place, and time. He appears well-developed and well-nourished.   HENT:   Head: Normocephalic and atraumatic.   Eyes: Pupils are equal, round, and reactive to light. EOM are normal.   Neck: Normal range of motion.   Cardiovascular: Normal rate, regular rhythm and normal heart sounds.   Pulmonary/Chest: Effort normal.   Chest tube in place. Improved aeration bilaterally   Abdominal: Bowel sounds are normal. He exhibits no distension. There is tenderness.   Musculoskeletal: Normal range of motion.   Neurological: He is alert and oriented to person, place, and time.   Skin: Skin is warm and dry.   Psychiatric: He has a normal mood and affect.       Significant Labs:    CBC/Anemia Profile:  Recent Labs   Lab 12/23/19  1040 12/24/19  0421 12/25/19  0350   WBC 31.09* 23.71* 22.63*   HGB 11.6* 11.4* 10.6*   HCT 37.4* 36.1* 35.1*    362*  416*   MCV 83 83 85   RDW 23.9* 24.0* 23.7*        Chemistries:  Recent Labs   Lab 12/23/19  1040 12/24/19  0421 12/25/19  0350   * 130* 129*   K 4.2 4.3 4.2   CL 94* 96 94*   CO2 30* 29 28   BUN 17 15 15   CREATININE 0.9 0.8 1.1   CALCIUM 7.6* 7.8* 7.7*   ALBUMIN 1.4* 1.5* 1.5*   PROT 5.7* 5.8* 5.7*   BILITOT 2.9* 2.6* 2.1*   ALKPHOS 92 79 83   ALT 21 17 17   AST 34 28 33       All pertinent labs within the past 24 hours have been reviewed.    Significant Imaging:  I have reviewed and interpreted all pertinent imaging results/findings within the past 24 hours.    Assessment/Plan:     Pleural effusion  -s/p Chest tube insertion and drainage  -Gram stain negative, Cx NGTD  -Cell count/diff w/ 50% segs; protein not elevated;  ; glucose 30  -could be secondary to PE (pulmonary infarct) vs pneumonia  -s/p 3 dose of tpa/dnase with significant output  -still continues to drain about 500cc per shift;   -will consider pulling once drainage slows to about 100-150/d           Aristides York MD  Pulmonology  [facility]

## 2019-12-25 NOTE — PLAN OF CARE
Pt remained free from falls/injury. No acute events during shift. VSS, NAD. Bed in lowest position, wheels locked, side rails up times 2, call light w/in reach. Room clear of obstacles.

## 2019-12-25 NOTE — SUBJECTIVE & OBJECTIVE
Interval History: No acute events overnight. Chest tube in place, draining 50cc per chart yesterday. Pulmonology following. WBC improving from 48 to 21 today with initiation of Vancomycin with zosyn, however no clear source though possibly due in empyema. No culture growth to date. Reporting abdominal intermittently for past week. CT abdomen from 12/23 reporting consolidation at base of lung increased in size but improved per pulmonology. Will consult ID today for further recommendation on antiboitic management. No abdominal pain on exam today.     Review of Systems   Constitutional: Positive for activity change and fatigue. Negative for chills and fever.   HENT: Negative for congestion.    Eyes: Negative for photophobia and visual disturbance.   Respiratory: Negative for cough and shortness of breath.    Cardiovascular: Negative for chest pain.   Gastrointestinal: Negative for abdominal distention, abdominal pain, constipation, diarrhea, nausea and vomiting.   Genitourinary: Negative for dysuria.   Musculoskeletal: Negative for back pain.   Neurological: Negative for headaches.   Psychiatric/Behavioral: The patient is nervous/anxious.      Objective:     Vital Signs (Most Recent):  Temp: 97.3 °F (36.3 °C) (12/25/19 1123)  Pulse: 85 (12/25/19 1123)  Resp: 16 (12/25/19 1123)  BP: 99/69 (12/25/19 1123)  SpO2: 97 % (12/25/19 1123) Vital Signs (24h Range):  Temp:  [96.2 °F (35.7 °C)-97.6 °F (36.4 °C)] 97.3 °F (36.3 °C)  Pulse:  [75-85] 85  Resp:  [16-18] 16  SpO2:  [94 %-99 %] 97 %  BP: ()/(56-75) 99/69     Weight: 78.5 kg (173 lb 1 oz)  Body mass index is 26.31 kg/m².    Intake/Output Summary (Last 24 hours) at 12/25/2019 1453  Last data filed at 12/25/2019 0600  Gross per 24 hour   Intake --   Output 250 ml   Net -250 ml      Physical Exam   Constitutional: He is oriented to person, place, and time.   Patient is a 45 yo M who appears alert and anxious.    HENT:   Head: Normocephalic and atraumatic.   Eyes:  Pupils are equal, round, and reactive to light. EOM are normal.   Neck: Normal range of motion.   Cardiovascular: Normal rate, regular rhythm and normal heart sounds.   Pulmonary/Chest: Effort normal and breath sounds normal. No respiratory distress.   Chest tube in place   Abdominal: Bowel sounds are normal. He exhibits no distension. There is no tenderness.   Musculoskeletal: Normal range of motion.   Neurological: He is alert and oriented to person, place, and time.   Skin: Skin is warm and dry.   Psychiatric: He has a normal mood and affect.       Significant Labs:   BMP:   Recent Labs   Lab 12/25/19  0350   *   *   K 4.2   CL 94*   CO2 28   BUN 15   CREATININE 1.1   CALCIUM 7.7*     CBC:   Recent Labs   Lab 12/24/19  0421 12/25/19  0350   WBC 23.71* 22.63*   HGB 11.4* 10.6*   HCT 36.1* 35.1*   * 416*

## 2019-12-25 NOTE — PROGRESS NOTES
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 He has a PMH significant for non-ischemic cardiomyopathy with combined CHF (EF 11% without ICD or CRT device) and on-going IVDU. He reports a two week duration of progressively worsening bilateral lower extremity swelling and redness of the anterior shins. This is associated with bilateral lower extremity pain with ambulation. He also reports noticing symptom association with worsening of baseline SOB with exertion as well as new abdominal distension and pain, within the same time frame. He describes abdominal pain as a generalized soreness that occurs approximately one hour after eating meals and spontaneously resolves without intervention or medication. He is unsure of how long the discomfort lasts before resolving. Additionally, he reports a two week duration of occasional episodes of pale semi-formed stools. He attempted symptom relief with increasing dose of daily Lasix, however without effect, prompting presentation to ED on 12/09.     He denies symptom association with fevers, chills, nausea, vomiting, history of prior abdominal surgeries, ETOH consumption, chest pain, pain with inspiration, lightheadedness, or blurry vision. He reports a on-going non-productive cough intermittent cough since CHF diagnosis that is unchanged. He does not weigh himself daily and is unsure of any weight loss or gain. He does follow with cardiology outpatient.     Of note, he also reports continued daily use of  IV methamphetamines with most recent usage on 12/08.     ED course: He initially presented to [facility] ED on 12/09. Lab were significant for leukocytosis (25), hyponatremia (126), lactic acidosis (3.5), transaminitis (, ALT 73), hyperbilirubinemia (4.5), and toxicology positive for amphetamines. CXR was suggestive of right lower lobe pneumonia. Abdominal U/S was suggestive of acalculous cholecystitis. He was given IVF, Ceftriaxone, and Lasix. Case was discussed with transfer center and AES here; recommended transfer for possible MRCP.     Overview/Hospital Course:  Francis Daigle 44 y.o male presented from OSH with cholilithiasis and cholecystitis. Patient transfer to [facility] for AES evaluation. When patient got admitted to hospital medicine team the patient was transferred to ICU following cardiac arrest on 12/10/19. Emergently intubated during PEA arrest, ROSC achieved after 1 round of CPR. Central line and arterial line placed. Patient initially requiring pressor support, cyanotic, hypoxic on blood gas. Bedside ultrasound with signs of RV strain with DVT in lower extremity. Patient on pressors at the time, requiring 100% FiO2 to oxygenate. Given TPA emergently. Patient started on heparin ggt, bronchoscopy today revealing relatively normal airways and extubated. Most recent ultrasound did not show liver cirrhosis or cholilthiasis or cholecystitis. Patient with RLL PNA getting treated with Zosyn and later switch to Levo. Patient transitioned to Heparin gtt to 10mg BID eliquis. Cardiology will need to consulted for possible ICD or lifevest up on discharge on Monday.   Spoke with cardiology who states that he will likely need an ICD but he can follow up outpatient with cardiology for its placement.  Patient continues to state that he feels better each day.  He does endorse increased anxiety over the last day.  He states that he used to be on treatment in the past.  Started patient on citalopram and instructed  him that he will need to request follow up with psychiatry once he is discharged. Patient has continued midepigastric pain that is associated with meals. Ultrasound for mesenteric ischemia showed no signs of occlusion or stenosis.  Ordered CT scan of the abdomen with contast showing bilateral PE and empyema vs abcess. Pulmonary consulted and plan to CT tomorrow, NPO at Midnight.  Chest tube was preformed today and fluids sent for cultures and cytology.  Patient noted to be tachycardic overnight up to 130s; ECG in the AM noted sinus tachycardia.  His HR slowed to 98 by lunch time and a small 250 mL bolus of LR.  Re-started heparin drip following the chest tube placement per pulmonology recommendations.   Patient has become more anxious and uncooperative today; pulling out IV lines and thrashing about room, demanding norco and IV ativan.  Addressed patient's concerns and provided 1g PO ativan q8 hours.  On 12/22, patient noted to have a spike in his WBC (42k.), trending down. Blood cultures were drawn as well as a lactate.  His antibitoics were broadened to Vancomycin and Zosyn IV.  His CT tube was noted to put out an additional 1L. Curiously, his abdominal pain has improved after lactulose gave him a bowel movement, but complains of it all day, CT abdomen with contrast showed no ileus or obstruction, empyema improved per pulm. Lipase also was within normal. Psych consulted on 12/23, recommending to gabapentin 100mg TID PRN for anxiety and Gabapentin 300mg qhs. WBC continuing to improve. Will consult ID for recommendations on antibiotic management.     Interval History: No acute events overnight. Chest tube in place, draining 50cc per chart yesterday. Pulmonology following. WBC improving from 48 to 21 today with initiation of Vancomycin with zosyn, however no clear source though possibly due in empyema. No culture growth to date. Reporting abdominal intermittently for past week. CT abdomen from 12/23 reporting  consolidation at base of lung increased in size but improved per pulmonology. Will consult ID today for further recommendation on antiboitic management. No abdominal pain on exam today.     Review of Systems   Constitutional: Positive for activity change and fatigue. Negative for chills and fever.   HENT: Negative for congestion.    Eyes: Negative for photophobia and visual disturbance.   Respiratory: Negative for cough and shortness of breath.    Cardiovascular: Negative for chest pain.   Gastrointestinal: Negative for abdominal distention, abdominal pain, constipation, diarrhea, nausea and vomiting.   Genitourinary: Negative for dysuria.   Musculoskeletal: Negative for back pain.   Neurological: Negative for headaches.   Psychiatric/Behavioral: The patient is nervous/anxious.      Objective:     Vital Signs (Most Recent):  Temp: 97.3 °F (36.3 °C) (12/25/19 1123)  Pulse: 85 (12/25/19 1123)  Resp: 16 (12/25/19 1123)  BP: 99/69 (12/25/19 1123)  SpO2: 97 % (12/25/19 1123) Vital Signs (24h Range):  Temp:  [96.2 °F (35.7 °C)-97.6 °F (36.4 °C)] 97.3 °F (36.3 °C)  Pulse:  [75-85] 85  Resp:  [16-18] 16  SpO2:  [94 %-99 %] 97 %  BP: ()/(56-75) 99/69     Weight: 78.5 kg (173 lb 1 oz)  Body mass index is 26.31 kg/m².    Intake/Output Summary (Last 24 hours) at 12/25/2019 1453  Last data filed at 12/25/2019 0600  Gross per 24 hour   Intake --   Output 250 ml   Net -250 ml      Physical Exam   Constitutional: He is oriented to person, place, and time.   Patient is a 45 yo M who appears alert and anxious.    HENT:   Head: Normocephalic and atraumatic.   Eyes: Pupils are equal, round, and reactive to light. EOM are normal.   Neck: Normal range of motion.   Cardiovascular: Normal rate, regular rhythm and normal heart sounds.   Pulmonary/Chest: Effort normal and breath sounds normal. No respiratory distress.   Chest tube in place   Abdominal: Bowel sounds are normal. He exhibits no distension. There is no tenderness.    Musculoskeletal: Normal range of motion.   Neurological: He is alert and oriented to person, place, and time.   Skin: Skin is warm and dry.   Psychiatric: He has a normal mood and affect.       Significant Labs:   BMP:   Recent Labs   Lab 12/25/19  0350   *   *   K 4.2   CL 94*   CO2 28   BUN 15   CREATININE 1.1   CALCIUM 7.7*     CBC:   Recent Labs   Lab 12/24/19  0421 12/25/19  0350   WBC 23.71* 22.63*   HGB 11.4* 10.6*   HCT 36.1* 35.1*   * 416*       Assessment/Plan:      * Sepsis due to undetermined organism  This is a 44 year old male with PMH significant for non-ischemic cardiomyopathy (EF 11%), IVDU, and Hepatitis C who is presenting on 12/10 with two week duration of worsening lower extremity swelling and erythema associated with same duration of post-prandial abdominal pain and distension with intermittent pale colored stools with work-up thus far concerning for tachycardia (120's), leukocytosis (25), lactic acidosis, transaminitis, hyperbilirubinemia, CXR suggestive of right lower lobe pneumonia, and abdominal U/S suggestive of possible acalculous cholecystitis. He has no symptoms of underlying respiratory infection and on chart review CXR abnormalities are similar to those in 08/2019 during admission for CHF exacerbation. UA is unremarkable. Negative for influenza. Suspect bilateral lower extremity edema and erythema more a manifestation of CHF instead of bilateral cellulitis. Differential diagnoses include biliary etiology vs bacteremia in the setting of on-going IVDU vs possible CAP.   -CT scan of chest 12/20: pulmonary abscess involving the right lower lobe, as well as additional areas bilaterally that could represent areas of developing pulmonary abscesses.  Loculated fluid collection.   -12/22: WBC elevated to 42k.  Afebrile and HDs. Patient clinically feels well.  Broadened abx to IV vanc and zosyn.     Plan:   -Vancomycin and Zosyn IV  -f/u repeat blood cultures and lactate     -Respiratory culture growing GNR and Candida with persistently elevated WBC. Candida in respiratory does not need to get treated.   -Judiciously administer an IVF resuscitation necessary.     Empyema  Recent CT chest now showing possible empyema of right lung.  -pleural fluid WBC elevated to over 2k and LDH elevated well above normal, indicative of likely empyema.   -Chest tube placed on 12/20, drained 700mL of SS fluid.  12/22 showed output of additional 1L    Plan:  -his WBC elevated to 42k on 12/22 that remained elevated on repeat CBC.  Clinically he feels well and has no complaints.  His antibiotics were broadened to Vanc and Zosyn IV.    -f/u repeat blood culture and lactate   -PULMONOLOGY on board.       Leukocytosis  Increase in WBC from 18 to 45 on 12/22  No clear etiology as chest tube for suspected empyema (seen on CT on 12/18) placed on 12/20 with adequate drainage  Started on vancomycin and zosyn at time  WBC improving since: 48->31->23->22    Plan:  -due to unclear etiology of rapid increase in WBC on 12/22 that appears to be responding to vancomycin/zosyn without clear source (cultures NGTD from pleural fluid), will consult ID for further antibiotic recommendations      Abdominal pain  Patient has endorsed episodic abdominal pains (8/10) in his his to upper epigastrium over the last few days.  He associates these pains with meals and has avoided food because of it.  Nothing seems to make better other than letting time pass.  Given patient's presentation with DVTs and possible PE.  Concern for possible mesenteric ischemia vs rib fracture vs GERD.   -CXR negative for any fracture  -Ultrasound with mesenteric view negative for any stenosis or occulusion   -Lipase, Lactic acid WNL. Peritoneal cultures negative (likely portal HTN.)    Plan:  -Patient's abdominal pain improved after 1x dose of lactulose.  He was noted to have 1x large BM later that day. Likely 2/2 to constipation   -s/p paracentesis on  12/19, which showed no SBP (WBC of 75.) SAAG of 1.1 likely 2/2 to portal HTN  -Continue pantoprazole PO 40mg   -CT abdomen showed no ileus or obstruction. Empyema improved per Pulm reading   -continue to monitor       Anxiety  Patient with history of panic attacks in the past.  Patient describes increased worry and anxiety about his current condition and his recovery.     Plan:  -Psych consulted, recommending continued citalopram 20mg PO, gabapentin 100mg TID PRN, and gabapentin 300mg qhs  -Avoid benzos as patient has polysubstance abuse history         Wheezing  History of 40PK year. Mild wheeze throughout lung field. Sating well in room air  - continue Ipotroprium Neb Q8H for wheezing       Acute massive pulmonary embolism  DVT lower extremity probable origin of PE. Patient was started on heparin gtt  - right upper extremity arm US showing edema and no clots.     Plan  -continue eliquis       Acute deep vein thrombosis (DVT) of proximal vein of lower extremity  - See acute massive pulm embolism       Cardiac arrest  PEA arrest, 2 round of EPI and CPR achieved ROSC.         Hyponatremia  -See assessment for CHF exacerbation.       Chronic hepatitis C without hepatic coma  - HEP C viral load (+) and elevated at 6  - Will need to set up with hepatology for treatment as outpatient       Intravenous drug abuse  -On-going use of methamphetamines.   -Prior hepatitis screening positive for HepC in 10/2019.     Plan:   -Counseled on importance of cessation.   -Monitor for signs of withdrawal for other substances.   -Psychiatry on board, appreciate recs    Community acquired pneumonia of right lower lobe of lung  -See assessment for sepsis.       Acute on chronic combined systolic and diastolic heart failure  History of non-ischemic cardiomyopathy diagnosed in 11/2017 at [facility].  [facility] in 12/2017 with no evidence of angiographically significant coronary artery disease.  Most recent ECHO in 10/2019 concerning for EF of 35%,  grade II diastolic dysfunction, and global hypokinesis with a restrictive physiology. HOME regimen: Carvedilol, Enalapril, Lasix 20 mg BID, and Spironolactone.   .-Suspect presentation from worsening right sided heart failure.     Plan:   - continue HOME regimen: Carvedilol, Enalapril, and Spironolactone  - continue lasix to 40mg PO BID  - continue Toprol XL 100mg QD   -Strict I/O's and daily standing weights.   -Telemetry ordered.   -Cardiology recommends outpatient EP follow up for ICD placement for primary prevention.  They do not recommending placing a life vest or ICD inpatient as patient's cardiac arrest during hospitalization was due to a PE and not a shockable rhythm.          Essential hypertension  -See assessment for CHF exacerbation.         VTE Risk Mitigation (From admission, onward)         Ordered     apixaban tablet 5 mg  2 times daily      12/23/19 0946     heparin 25,000 units in dextrose 5% 250 mL (100 units/mL) infusion HIGH INTENSITY nomogram - [facility]  Continuous     Question:  Heparin Infusion Adjustment (DO NOT MODIFY ANSWER)  Answer:  [email or web address] HIGH INTENSITY nomogram for [facility] [email or web address]    12/19/19 0909     IP VTE HIGH RISK PATIENT  Once      12/10/19 0922                      Malree Mcclendon MD  Department of Hospital Medicine   [facility]

## 2019-12-25 NOTE — ASSESSMENT & PLAN NOTE
Increase in WBC from 18 to 45 on 12/22  No clear etiology as chest tube for suspected empyema (seen on CT on 12/18) placed on 12/20 with adequate drainage  Started on vancomycin and zosyn at time  WBC improving since: 48->31->23->22    Plan:  -due to unclear etiology of rapid increase in WBC on 12/22 that appears to be responding to vancomycin/zosyn without clear source (cultures NGTD), will consult ID for further antibiotic recommendations

## 2019-12-25 NOTE — SUBJECTIVE & OBJECTIVE
Interval History: No issues overnight. CT remains in place with good output. Received 1 dose of tpa/dnase yesterday.     Objective:     Vital Signs (Most Recent):  Temp: 97.4 °F (36.3 °C) (12/25/19 0809)  Pulse: 78 (12/25/19 0908)  Resp: 18 (12/25/19 0809)  BP: 101/75 (12/25/19 0908)  SpO2: 96 % (12/25/19 0809) Vital Signs (24h Range):  Temp:  [96.2 °F (35.7 °C)-98 °F (36.7 °C)] 97.4 °F (36.3 °C)  Pulse:  [75-85] 78  Resp:  [16-18] 18  SpO2:  [94 %-99 %] 96 %  BP: ()/(56-75) 101/75     Weight: 78.5 kg (173 lb 1 oz)  Body mass index is 26.31 kg/m².      Intake/Output Summary (Last 24 hours) at 12/25/2019 0957  Last data filed at 12/25/2019 0600  Gross per 24 hour   Intake 650 ml   Output 600 ml   Net 50 ml       Physical Exam   Constitutional: He is oriented to person, place, and time. He appears well-developed and well-nourished.   HENT:   Head: Normocephalic and atraumatic.   Eyes: Pupils are equal, round, and reactive to light. EOM are normal.   Neck: Normal range of motion.   Cardiovascular: Normal rate, regular rhythm and normal heart sounds.   Pulmonary/Chest: Effort normal.   Chest tube in place. Improved aeration bilaterally   Abdominal: Bowel sounds are normal. He exhibits no distension. There is tenderness.   Musculoskeletal: Normal range of motion.   Neurological: He is alert and oriented to person, place, and time.   Skin: Skin is warm and dry.   Psychiatric: He has a normal mood and affect.       Significant Labs:    CBC/Anemia Profile:  Recent Labs   Lab 12/23/19  1040 12/24/19  0421 12/25/19  0350   WBC 31.09* 23.71* 22.63*   HGB 11.6* 11.4* 10.6*   HCT 37.4* 36.1* 35.1*    362* 416*   MCV 83 83 85   RDW 23.9* 24.0* 23.7*        Chemistries:  Recent Labs   Lab 12/23/19  1040 12/24/19  0421 12/25/19  0350   * 130* 129*   K 4.2 4.3 4.2   CL 94* 96 94*   CO2 30* 29 28   BUN 17 15 15   CREATININE 0.9 0.8 1.1   CALCIUM 7.6* 7.8* 7.7*   ALBUMIN 1.4* 1.5* 1.5*   PROT 5.7* 5.8* 5.7*    BILITOT 2.9* 2.6* 2.1*   ALKPHOS 92 79 83   ALT 21 17 17   AST 34 28 33       All pertinent labs within the past 24 hours have been reviewed.    Significant Imaging:  I have reviewed and interpreted all pertinent imaging results/findings within the past 24 hours.

## 2019-12-25 NOTE — ASSESSMENT & PLAN NOTE
History of non-ischemic cardiomyopathy diagnosed in 11/2017 at [facility].  [facility] in 12/2017 with no evidence of angiographically significant coronary artery disease.  Most recent ECHO in 10/2019 concerning for EF of 35%, grade II diastolic dysfunction, and global hypokinesis with a restrictive physiology. HOME regimen: Carvedilol, Enalapril, Lasix 20 mg BID, and Spironolactone.   .-Suspect presentation from worsening right sided heart failure.     Plan:   - continue HOME regimen: Carvedilol, Enalapril, and Spironolactone  - continue lasix to 40mg PO BID  - continue Toprol XL 100mg QD   -Strict I/O's and daily standing weights.   -Telemetry ordered.   -Cardiology recommends outpatient EP follow up for ICD placement for primary prevention.  They do not recommending placing a life vest or ICD inpatient as patient's cardiac arrest during hospitalization was due to a PE and not a shockable rhythm.

## 2019-12-25 NOTE — ASSESSMENT & PLAN NOTE
Patient with history of panic attacks in the past.  Patient describes increased worry and anxiety about his current condition and his recovery.     Plan:  -Psych consulted, recommending continued citalopram 20mg PO, gabapentin 100mg TID PRN, and gabapentin 300mg qhs  -Avoid benzos as patient has polysubstance abuse history

## 2019-12-26 LAB
ALBUMIN SERPL BCP-MCNC: 1.4 G/DL (ref 3.5–5.2)
ALP SERPL-CCNC: 73 U/L (ref 55–135)
ALT SERPL W/O P-5'-P-CCNC: 16 U/L (ref 10–44)
ANION GAP SERPL CALC-SCNC: 8 MMOL/L (ref 8–16)
AST SERPL-CCNC: 31 U/L (ref 10–40)
BACTERIA SPEC ANAEROBE CULT: NORMAL
BASOPHILS # BLD AUTO: 0.05 K/UL (ref 0–0.2)
BASOPHILS NFR BLD: 0.3 % (ref 0–1.9)
BILIRUB SERPL-MCNC: 1.7 MG/DL (ref 0.1–1)
BNP SERPL-MCNC: 985 PG/ML (ref 0–99)
BUN SERPL-MCNC: 14 MG/DL (ref 6–20)
CALCIUM SERPL-MCNC: 7.4 MG/DL (ref 8.7–10.5)
CHLORIDE SERPL-SCNC: 98 MMOL/L (ref 95–110)
CO2 SERPL-SCNC: 28 MMOL/L (ref 23–29)
CREAT SERPL-MCNC: 1 MG/DL (ref 0.5–1.4)
DIFFERENTIAL METHOD: ABNORMAL
EOSINOPHIL # BLD AUTO: 0.3 K/UL (ref 0–0.5)
EOSINOPHIL NFR BLD: 1.9 % (ref 0–8)
ERYTHROCYTE [DISTWIDTH] IN BLOOD BY AUTOMATED COUNT: 23.9 % (ref 11.5–14.5)
EST. GFR  (AFRICAN AMERICAN): >60 ML/MIN/1.73 M^2
EST. GFR  (NON AFRICAN AMERICAN): >60 ML/MIN/1.73 M^2
GLUCOSE SERPL-MCNC: 97 MG/DL (ref 70–110)
HCT VFR BLD AUTO: 34.1 % (ref 40–54)
HGB BLD-MCNC: 10.3 G/DL (ref 14–18)
IMM GRANULOCYTES # BLD AUTO: 0.1 K/UL (ref 0–0.04)
IMM GRANULOCYTES NFR BLD AUTO: 0.7 % (ref 0–0.5)
INR PPP: 1.5 (ref 0.8–1.2)
LYMPHOCYTES # BLD AUTO: 2.3 K/UL (ref 1–4.8)
LYMPHOCYTES NFR BLD: 15.8 % (ref 18–48)
MCH RBC QN AUTO: 25.2 PG (ref 27–31)
MCHC RBC AUTO-ENTMCNC: 30.2 G/DL (ref 32–36)
MCV RBC AUTO: 83 FL (ref 82–98)
MONOCYTES # BLD AUTO: 1.2 K/UL (ref 0.3–1)
MONOCYTES NFR BLD: 8 % (ref 4–15)
NEUTROPHILS # BLD AUTO: 10.9 K/UL (ref 1.8–7.7)
NEUTROPHILS NFR BLD: 73.3 % (ref 38–73)
NRBC BLD-RTO: 0 /100 WBC
PHOSPHATIDYLETHANOL (PETH): NEGATIVE NG/ML
PLATELET # BLD AUTO: 384 K/UL (ref 150–350)
PMV BLD AUTO: 9 FL (ref 9.2–12.9)
POTASSIUM SERPL-SCNC: 4 MMOL/L (ref 3.5–5.1)
PROT SERPL-MCNC: 5.6 G/DL (ref 6–8.4)
PROTHROMBIN TIME: 14.9 SEC (ref 9–12.5)
RBC # BLD AUTO: 4.09 M/UL (ref 4.6–6.2)
SODIUM SERPL-SCNC: 134 MMOL/L (ref 136–145)
WBC # BLD AUTO: 14.83 K/UL (ref 3.9–12.7)

## 2019-12-26 PROCEDURE — 63600175 PHARM REV CODE 636 W HCPCS: Performed by: STUDENT IN AN ORGANIZED HEALTH CARE EDUCATION/TRAINING PROGRAM

## 2019-12-26 PROCEDURE — 99233 SBSQ HOSP IP/OBS HIGH 50: CPT | Mod: ,,, | Performed by: INTERNAL MEDICINE

## 2019-12-26 PROCEDURE — 25000003 PHARM REV CODE 250: Performed by: STUDENT IN AN ORGANIZED HEALTH CARE EDUCATION/TRAINING PROGRAM

## 2019-12-26 PROCEDURE — 83880 ASSAY OF NATRIURETIC PEPTIDE: CPT

## 2019-12-26 PROCEDURE — 25000003 PHARM REV CODE 250: Performed by: HOSPITALIST

## 2019-12-26 PROCEDURE — 99233 PR SUBSEQUENT HOSPITAL CARE,LEVL III: ICD-10-PCS | Mod: ,,, | Performed by: INTERNAL MEDICINE

## 2019-12-26 PROCEDURE — 20600001 HC STEP DOWN PRIVATE ROOM

## 2019-12-26 PROCEDURE — 99232 PR SUBSEQUENT HOSPITAL CARE,LEVL II: ICD-10-PCS | Mod: ,,, | Performed by: HOSPITALIST

## 2019-12-26 PROCEDURE — 97530 THERAPEUTIC ACTIVITIES: CPT

## 2019-12-26 PROCEDURE — 99232 SBSQ HOSP IP/OBS MODERATE 35: CPT | Mod: ,,, | Performed by: HOSPITALIST

## 2019-12-26 PROCEDURE — 85610 PROTHROMBIN TIME: CPT

## 2019-12-26 PROCEDURE — 80053 COMPREHEN METABOLIC PANEL: CPT

## 2019-12-26 PROCEDURE — 63600175 PHARM REV CODE 636 W HCPCS: Performed by: HOSPITALIST

## 2019-12-26 PROCEDURE — 85025 COMPLETE CBC W/AUTO DIFF WBC: CPT

## 2019-12-26 PROCEDURE — 36415 COLL VENOUS BLD VENIPUNCTURE: CPT

## 2019-12-26 RX ORDER — FUROSEMIDE 40 MG/1
40 TABLET ORAL DAILY
Status: DISCONTINUED | OUTPATIENT
Start: 2019-12-27 | End: 2019-12-28 | Stop reason: HOSPADM

## 2019-12-26 RX ADMIN — HYDROXYZINE HYDROCHLORIDE 25 MG: 25 TABLET, FILM COATED ORAL at 08:12

## 2019-12-26 RX ADMIN — VANCOMYCIN HYDROCHLORIDE 750 MG: 750 INJECTION, POWDER, LYOPHILIZED, FOR SOLUTION INTRAVENOUS at 12:12

## 2019-12-26 RX ADMIN — CITALOPRAM HYDROBROMIDE 20 MG: 10 TABLET ORAL at 08:12

## 2019-12-26 RX ADMIN — GABAPENTIN 300 MG: 300 CAPSULE ORAL at 09:12

## 2019-12-26 RX ADMIN — PIPERACILLIN AND TAZOBACTAM 4.5 G: 4; .5 INJECTION, POWDER, FOR SOLUTION INTRAVENOUS at 05:12

## 2019-12-26 RX ADMIN — SODIUM CHLORIDE, SODIUM LACTATE, POTASSIUM CHLORIDE, AND CALCIUM CHLORIDE 250 ML: .6; .31; .03; .02 INJECTION, SOLUTION INTRAVENOUS at 10:12

## 2019-12-26 RX ADMIN — PIPERACILLIN AND TAZOBACTAM 4.5 G: 4; .5 INJECTION, POWDER, FOR SOLUTION INTRAVENOUS at 08:12

## 2019-12-26 RX ADMIN — FUROSEMIDE 40 MG: 40 TABLET ORAL at 08:12

## 2019-12-26 RX ADMIN — APIXABAN 5 MG: 5 TABLET, FILM COATED ORAL at 09:12

## 2019-12-26 RX ADMIN — PANTOPRAZOLE SODIUM 40 MG: 40 TABLET, DELAYED RELEASE ORAL at 08:12

## 2019-12-26 RX ADMIN — PIPERACILLIN AND TAZOBACTAM 4.5 G: 4; .5 INJECTION, POWDER, FOR SOLUTION INTRAVENOUS at 01:12

## 2019-12-26 RX ADMIN — APIXABAN 5 MG: 5 TABLET, FILM COATED ORAL at 08:12

## 2019-12-26 RX ADMIN — SPIRONOLACTONE 25 MG: 25 TABLET ORAL at 08:12

## 2019-12-26 NOTE — PLAN OF CARE
MACARIO called [facility] insurance, Lyudmila QUARLES [phone], to get a list of in network LTAC's.  No answer, MACARIO left message.    4:00 PM  Lyudmila called back and will email LTAC list to MACARIO.

## 2019-12-26 NOTE — ASSESSMENT & PLAN NOTE
-s/p Chest tube insertion and drainage  -Gram stain negative, Cx NGTD  -Cell count/diff w/ 50% segs; protein not elevated;  ; glucose 30  -could be secondary to PE (pulmonary infarct) vs pneumonia  -s/p 3 dose of tpa/dnase with significant output  -approx 300cc since yesterday  -will consider pulling once drainage slows to about 100-150/d

## 2019-12-26 NOTE — ASSESSMENT & PLAN NOTE
This is a 44 year old male with PMH significant for non-ischemic cardiomyopathy (EF 11%), IVDU, and Hepatitis C who is presenting on 12/10 with two week duration of worsening lower extremity swelling and erythema associated with same duration of post-prandial abdominal pain and distension with intermittent pale colored stools with work-up thus far concerning for tachycardia (120's), leukocytosis (25), lactic acidosis, transaminitis, hyperbilirubinemia, CXR suggestive of right lower lobe pneumonia, and abdominal U/S suggestive of possible acalculous cholecystitis. He has no symptoms of underlying respiratory infection and on chart review CXR abnormalities are similar to those in 08/2019 during admission for CHF exacerbation. UA is unremarkable. Negative for influenza. Suspect bilateral lower extremity edema and erythema more a manifestation of CHF instead of bilateral cellulitis. Differential diagnoses include biliary etiology vs bacteremia in the setting of on-going IVDU vs possible CAP.   -CT scan of chest 12/20: pulmonary abscess involving the right lower lobe, as well as additional areas bilaterally that could represent areas of developing pulmonary abscesses.  Loculated fluid collection.   -12/22: WBC elevated to 42k.  Afebrile and HDs. Patient clinically feels well.  Broadened abx to IV vanc and zosyn.     Plan:   -Vancomycin and Zosyn IV  -Blood Cx NGTD   -Respiratory culture growing GNR and Candida with persistently elevated WBC. Candida in respiratory does not need to get treated.   -Judiciously administer an IVF resuscitation necessary.

## 2019-12-26 NOTE — PROGRESS NOTES
[facility]  Pulmonology  Progress Note    Patient Name: Francis Daigle  MRN: [id number]  Admission Date: 12/10/2019  Hospital Length of Stay: 16 days  Code Status: Full Code  Attending Provider: David Potts MD  Primary Care Provider: Primary Doctor No   Principal Problem: Sepsis due to undetermined organism    Subjective:     Interval History: No issues overnight. CT remains in place with about 300cc output over the last 24h    Objective:     Vital Signs (Most Recent):  Temp: 97.9 °F (36.6 °C) (12/26/19 0820)  Pulse: 76 (12/26/19 0820)  Resp: 18 (12/26/19 0820)  BP: 90/67 (12/26/19 0820)  SpO2: 95 % (12/26/19 0820) Vital Signs (24h Range):  Temp:  [97.3 °F (36.3 °C)-98.1 °F (36.7 °C)] 97.9 °F (36.6 °C)  Pulse:  [76-85] 76  Resp:  [16-18] 18  SpO2:  [95 %-97 %] 95 %  BP: ()/(56-75) 90/67     Weight: 78.5 kg (173 lb 1 oz)  Body mass index is 26.31 kg/m².      Intake/Output Summary (Last 24 hours) at 12/26/2019 0900  Last data filed at 12/26/2019 0841  Gross per 24 hour   Intake 180 ml   Output 1525 ml   Net -1345 ml       Physical Exam   Constitutional: He is oriented to person, place, and time. He appears well-developed and well-nourished.   HENT:   Head: Normocephalic and atraumatic.   Eyes: Pupils are equal, round, and reactive to light. EOM are normal.   Neck: Normal range of motion.   Cardiovascular: Normal rate, regular rhythm and normal heart sounds.   Pulmonary/Chest: Effort normal.   Chest tube in place. Improved aeration bilaterally   Abdominal: Bowel sounds are normal. He exhibits no distension. There is no tenderness.   Musculoskeletal: Normal range of motion.   Neurological: He is alert and oriented to person, place, and time.   Skin: Skin is warm and dry.   Psychiatric: He has a normal mood and affect.       Significant Labs:    CBC/Anemia Profile:  Recent Labs   Lab 12/25/19  0350 12/26/19  0440   WBC 22.63* 14.83*   HGB 10.6* 10.3*   HCT 35.1* 34.1*   * 384*   MCV  85 83   RDW 23.7* 23.9*        Chemistries:  Recent Labs   Lab 12/25/19  0350 12/26/19  0440   * 134*   K 4.2 4.0   CL 94* 98   CO2 28 28   BUN 15 14   CREATININE 1.1 1.0   CALCIUM 7.7* 7.4*   ALBUMIN 1.5* 1.4*   PROT 5.7* 5.6*   BILITOT 2.1* 1.7*   ALKPHOS 83 73   ALT 17 16   AST 33 31       All pertinent labs within the past 24 hours have been reviewed.    Significant Imaging:  I have reviewed and interpreted all pertinent imaging results/findings within the past 24 hours.    Assessment/Plan:     Pleural effusion  -s/p Chest tube insertion and drainage  -Gram stain negative, Cx NGTD  -Cell count/diff w/ 50% segs; protein not elevated;  ; glucose 30  -could be secondary to PE (pulmonary infarct) vs pneumonia  -s/p 3 dose of tpa/dnase with significant output  -approx 300cc since yesterday  -will consider pulling once drainage slows to about 100-150/d  -re-ultrasound in the AM once output slows down  -placed to waterseal today           Aristides York MD  Pulmonology  [facility]

## 2019-12-26 NOTE — SUBJECTIVE & OBJECTIVE
Past Medical History:   Diagnosis Date    Anxiety     Arthritis     CHF (congestive heart failure)     Coronary artery disease     Depression     Hypertension        History reviewed. No pertinent surgical history.    Review of patient's allergies indicates:  No Known Allergies    Medications:  Medications Prior to Admission   Medication Sig    acetaminophen (TYLENOL) 325 MG tablet Take 325 mg by mouth every 6 (six) hours as needed for Pain.    enalapril (VASOTEC) 2.5 MG tablet Take 1 tablet (2.5 mg total) by mouth once daily.    [DISCONTINUED] carvedilol (COREG) 3.125 MG tablet Take 1 tablet (3.125 mg total) by mouth 2 (two) times daily with meals.    [DISCONTINUED] furosemide (LASIX) 20 MG tablet Take 1 tablet (20 mg total) by mouth 2 (two) times daily.     Antibiotics (From admission, onward)    Start     Stop Route Frequency Ordered    12/26/19 0100  vancomycin 750 mg in dextrose 5 % 250 mL IVPB (ready to mix system)      -- IV Every 12 hours (non-standard times) 12/25/19 1431    12/22/19 1400  piperacillin-tazobactam 4.5 g in sodium chloride 0.9% 100 mL IVPB (ready to mix system)      -- IV Every 8 hours 12/22/19 1013        Antifungals (From admission, onward)    None        Antivirals (From admission, onward)    None             There is no immunization history on file for this patient.    Family History     Problem Relation (Age of Onset)    Arthritis Mother    Asthma Sister    Diabetes Sister    Heart disease Mother, Maternal Grandfather    Hyperlipidemia Mother    Hypertension Mother, Father    Kidney disease Mother        Social History     Socioeconomic History    Marital status:      Spouse name: Not on file    Number of children: Not on file    Years of education: Not on file    Highest education level: Not on file   Occupational History    Not on file   Social Needs    Financial resource strain: Not on file    Food insecurity:     Worry: Not on file     Inability: Not on file     Transportation needs:     Medical: Not on file     Non-medical: Not on file   Tobacco Use    Smoking status: Former Smoker     Packs/day: 2.00     Years: 20.00     Pack years: 40.00     Types: Cigarettes     Start date: 1999     Last attempt to quit: 2017     Years since quittin.9    Smokeless tobacco: Never Used   Substance and Sexual Activity    Alcohol use: Not Currently    Drug use: Yes     Frequency: 7.0 times per week     Types: Amphetamines    Sexual activity: Not Currently     Partners: Female   Lifestyle    Physical activity:     Days per week: Not on file     Minutes per session: Not on file    Stress: Not on file   Relationships    Social connections:     Talks on phone: Not on file     Gets together: Not on file     Attends Latter day service: Not on file     Active member of club or organization: Not on file     Attends meetings of clubs or organizations: Not on file     Relationship status: Not on file   Other Topics Concern    Not on file   Social History Narrative    Not on file     Review of Systems   Constitutional: Positive for activity change and fatigue. Negative for appetite change, chills and fever.   HENT: Positive for dental problem. Negative for congestion, ear discharge, ear pain and mouth sores.    Eyes: Negative for discharge and itching.   Respiratory: Negative for apnea, choking, chest tightness and shortness of breath.    Cardiovascular: Positive for chest pain.        Chest pain from the tube   Gastrointestinal: Negative for abdominal distention, abdominal pain, constipation, diarrhea, nausea and vomiting.   Genitourinary: Negative for difficulty urinating and dysuria.   Musculoskeletal: Negative for arthralgias, back pain and joint swelling.   Skin: Negative for rash and wound.        Skin changes on the legs no erythema   Neurological: Negative for dizziness and facial asymmetry.   Psychiatric/Behavioral: Negative for agitation and behavioral problems.    All other systems reviewed and are negative.    Objective:     Vital Signs (Most Recent):  Temp: 98.7 °F (37.1 °C) (12/26/19 1145)  Pulse: 86 (12/26/19 1145)  Resp: 18 (12/26/19 1145)  BP: 100/67 (12/26/19 1145)  SpO2: 99 % (12/26/19 1145) Vital Signs (24h Range):  Temp:  [97.6 °F (36.4 °C)-98.7 °F (37.1 °C)] 98.7 °F (37.1 °C)  Pulse:  [76-86] 86  Resp:  [17-18] 18  SpO2:  [95 %-99 %] 99 %  BP: ()/(56-67) 100/67     Weight: 78.5 kg (173 lb 1 oz)  Body mass index is 26.31 kg/m².    Estimated Creatinine Clearance: 89.3 mL/min (based on SCr of 1 mg/dL).    Physical Exam   Constitutional: He is oriented to person, place, and time. He appears well-developed. No distress.   HENT:   Head: Normocephalic and atraumatic.   Mouth/Throat: No oropharyngeal exudate.   Missing teeth above, lower teeth small in size- as per pt as childhood   Eyes: EOM are normal. Right eye exhibits no discharge. Left eye exhibits no discharge. No scleral icterus.   Neck: Normal range of motion. Neck supple. No JVD present.   Cardiovascular: Normal rate, regular rhythm, normal heart sounds and intact distal pulses.   No murmur heard.  Pulmonary/Chest: Effort normal and breath sounds normal. No respiratory distress. He has no wheezes. He has no rales.   Good air entry  Lower posterior chest tube seen inserted on right side   Abdominal: Soft. Bowel sounds are normal. He exhibits no distension. There is no tenderness. There is no guarding.   Musculoskeletal: Normal range of motion. He exhibits edema. He exhibits no tenderness.   Lymphadenopathy:     He has no cervical adenopathy.   Neurological: He is alert and oriented to person, place, and time.   No focal motor deficits   Skin: Skin is warm and dry. No erythema.   Skin changes on legs- dark coloration from edema/redness before - no erythema, no tenderness   Nursing note and vitals reviewed.      Significant Labs:   CBC:   Recent Labs   Lab 12/25/19  0350 12/26/19  0440   WBC 22.63* 14.83*    HGB 10.6* 10.3*   HCT 35.1* 34.1*   * 384*     CMP:   Recent Labs   Lab 12/25/19  0350 12/26/19  0440   * 134*   K 4.2 4.0   CL 94* 98   CO2 28 28   * 97   BUN 15 14   CREATININE 1.1 1.0   CALCIUM 7.7* 7.4*   PROT 5.7* 5.6*   ALBUMIN 1.5* 1.4*   BILITOT 2.1* 1.7*   ALKPHOS 83 73   AST 33 31   ALT 17 16   ANIONGAP 7* 8   EGFRNONAA >60.0 >60.0     Microbiology Results (last 7 days)     Procedure Component Value Units Date/Time    Blood Culture #1 **CANNOT BE ORDERED STAT** [[id number]] Collected:  12/22/19 0856    Order Status:  Completed Specimen:  Blood Updated:  12/26/19 1012     Blood Culture, Routine No Growth to date      No Growth to date      No Growth to date      No Growth to date      No Growth to date    Blood culture [[id number]] Collected:  12/22/19 0946    Order Status:  Completed Specimen:  Blood Updated:  12/26/19 1012     Blood Culture, Routine No Growth to date      No Growth to date      No Growth to date      No Growth to date      No Growth to date    Narrative:       Collection has been rescheduled by LW1 at 12/22/2019 09:21 Reason:   Unable to collect nurse Imelda is trying to get 2nd set of cultures  Collection has been rescheduled by LW1 at 12/22/2019 09:21 Reason:   Unable to collect nurse Imelda is trying to get 2nd set of cultures    Culture, Anaerobic [[id number]] Collected:  12/19/19 1506    Order Status:  Completed Specimen:  Body Fluid from Ascites Updated:  12/26/19 0901     Anaerobic Culture No anaerobes isolated    Culture, Body Fluid - Bactec [[id number]] Collected:  12/20/19 1000    Order Status:  Completed Specimen:  Body Fluid from Pleural Fluid Updated:  12/25/19 1612     Body Fluid Culture, Sterile No growth after 5 days.    AFB culture (includes stain) [[id number]] Collected:  12/20/19 1000    Order Status:  Completed Specimen:  Body Fluid from Pleural Fluid Updated:  12/23/19 1407     AFB Culture & Smear Culture in progress     AFB CULTURE STAIN No acid fast  bacilli seen.    Aerobic culture [[id number]] Collected:  12/19/19 1506    Order Status:  Completed Specimen:  Body Fluid from Ascites Updated:  12/23/19 0955     Aerobic Bacterial Culture No growth    Blood Culture #1 **CANNOT BE ORDERED STAT** [[id number]] Collected:  12/22/19 0857    Order Status:  Canceled Specimen:  Blood     Gram stain [[id number]] Collected:  12/20/19 1000    Order Status:  Completed Specimen:  Body Fluid from Pleural Fluid Updated:  12/20/19 1628     Gram Stain Result Rare WBC's      No organisms seen    Fungus culture [[id number]] Collected:  12/20/19 1000    Order Status:  Sent Specimen:  Body Fluid from Pleural Fluid Updated:  12/20/19 1152    Gram stain [[id number]] Collected:  12/19/19 1506    Order Status:  Completed Specimen:  Body Fluid from Ascites Updated:  12/19/19 2015     Gram Stain Result Cytospin indicates:      Rare WBC's      No organisms seen        All pertinent labs within the past 24 hours have been reviewed.    Significant Imaging: I have reviewed all pertinent imaging results/findings within the past 24 hours.

## 2019-12-26 NOTE — ASSESSMENT & PLAN NOTE
44M PMH non ischemic CM w/ severely reduced EF 15%, IVDU-methamphetamine, chronic HepC,  use who presented to [place] 12/10/2019 with b/l  LE edema and SOB. He was diagnosed w/ RLL pneumonia, and noted to have elevated LFT. RUQ sono then was concerning for acalculous cholecystitis. Pt was on Azithro/rocephin and Metro. [place] [facility] [place]. On arrival here, pt had PEA cardiacarrest, believed to be respiratory in etiology, bedside echo w/ R heart strain 2/2 possible massive PE, LE doppler + b/L LE DVT. 12/10 ETT and 12/11 BAL grew candida and enterobacter. Came with WBC 25. Was switched to Pip-TAzo and Vanc 12/11- 12/14 and then 12/14-12/18 on levaquin.  ID then recommended on 12/15 to stop Levo and do 7 days of Cefepime. Cefepime 12/15-12/21.   12/18 CT showed no cirrhosis but b/l PE, b/l effussion/consodliation, empyema on RLL, no cholecystitis, some ascites  On 12/19 bedside paracentesis diagnostic with 50ml ascites (which showed no SBP (WBC of 75.) SAAG of 1.1 likely 2/2 to portal HTN, Cx negative), On 12/20-pulmonary performed thoracentesis 200-700ml SS fluid out, Cx negative and placed Chest tube  12/22 WBC increased to 48 with no fevers and Abx changed from cefepime to Vanc/pip-tazo    ID reconsulted 12/26 for pt- wbc trending down, no fevers and abx duration/recommendations  Recommendations:  -- Pt currently on empiric Vanc/Pip-tazo- Enterobacter already treated with 7 days of Cefepime. Pt clinically better. Would continue Vanc/Pip-tazo till chest tube is in. Can stop once chest tube is removed. Can switch to Augmentin 875mg PO BID for 3-5 days after chest tube is removed    ID will sign off

## 2019-12-26 NOTE — ASSESSMENT & PLAN NOTE
Increase in WBC from 18 to 45 on 12/22  No clear etiology as chest tube for suspected empyema (seen on CT on 12/18) placed on 12/20 with adequate drainage  Started on vancomycin and zosyn at time  WBC improving since: 48->31->23->22->14    Plan:  -due to unclear etiology of rapid increase in WBC on 12/22 that appears to be responding to vancomycin/zosyn without clear source (cultures NGTD), will consult ID for further antibiotic recommendations

## 2019-12-26 NOTE — PLAN OF CARE
POC reviewed with patient. AAO x 4. Chest tube draining with suction. Up to chair today, tolerated well. Neuro checks completed.  All questions answered. No acute events. Call light in reach. Safety maintained.

## 2019-12-26 NOTE — CONSULTS
[facility]  Infectious Disease  Consult Note    Patient Name: Francis Daigle  MRN: [id number]  Admission Date: 12/10/2019  Hospital Length of Stay: 16 days  Attending Physician: Daivd Potts MD  Primary Care Provider: Primary Doctor No     Isolation Status: No active isolations    Patient information was obtained from patient, past medical records and ER records.      Inpatient consult to Infectious Diseases  Consult performed by: Vera Corrales MD  Consult ordered by: Freddy Licona MD  Reason for consult: duration and recs for ABx for RLL empyema        Assessment/Plan:     Empyema  44M PMH non ischemic CM w/ severely reduced EF 15%, IVDU-methamphetamine, chronic HepC,  use who presented to [facility] 12/10/2019 with b/l  LE edema and SOB. He was diagnosed w/ RLL pneumonia, and noted to have elevated LFT. RUQ sono then was concerning for acalculous cholecystitis. Pt was on Azithro/rocephin and Metro. He was transferred to [facility] [place] for MRCP 12/10. On arrival here, pt had PEA cardiacarrest, believed to be respiratory in etiology, bedside echo w/ R heart strain 2/2 possible massive PE, LE doppler + b/L LE DVT. 12/10 ETT and 12/11 BAL grew candida and enterobacter. Came with WBC 25. Was switched to Pip-TAzo and Vanc 12/11- 12/14 and then 12/14-12/18 on levaquin.  ID then recommended on 12/15 to stop Levo and do 7 days of Cefepime. Cefepime 12/15-12/21.   12/18 CT showed no cirrhosis but b/l PE, b/l effussion/consodliation, empyema on RLL, no cholecystitis, some ascites  On 12/19 bedside paracentesis diagnostic with 50ml ascites (which showed no SBP (WBC of 75.) SAAG of 1.1 likely 2/2 to portal HTN, Cx negative), On 12/20-pulmonary performed thoracentesis 200-700ml SS fluid out, Cx negative and placed Chest tube  12/22 WBC increased to 48 with no fevers and Abx changed from cefepime to Vanc/pip-tazo    ID reconsulted 12/26 for pt- wbc trending down, no fevers and abx  duration/recommendations  Recommendations:  -- Pt currently on empiric Vanc/Pip-tazo- Enterobacter already treated with 7 days of Cefepime. Pt clinically better. Would continue Vanc/Pip-tazo till chest tube is in. Can stop once chest tube is removed. Can switch to Augmentin 875mg PO BID for 3-5 days after chest tube is removed    ID will sign off          Thank you for your consult. I will sign off. Please contact us if you have any additional questions.     Case discussed with Dr Whitney Corrales MD   ID Fellow  Infectious Disease  [facility]    Subjective:     Principal Problem: Sepsis due to undetermined organism    HPI: 44M PMH non ischemic CM w/ severely reduced EF 15%, IVDU-methamphetamine, chronic HepC,  use who presented to [facility] 12/10/2019 with b/l  LE edema and SOB. He was diagnosed w/ RLL pneumonia, and noted to have elevated LFT. RUQ sono then was concerning for acalculous cholecystitis. Pt was on Azithro/rocephin and Metro. He was transferred to [facility] [place] 12/10. On arrival here, pt had PEA cardiacarrest, believed to be respiratory in etiology, bedside echo w/ R heart strain 2/2 possible massive PE, LE doppler + b/L LE DVT. 12/10 ETT and 12/11 BAL grew candida and enterobacter. Came with WBC 25. Was switched to Pip-TAzo and Vanc 12/11- 12/14 and then 12/14-12/18 on levaquin.  ID then recommended on 12/15 to stop Levo and do 7 days of Cefepime. Cefepime 12/15-12/21.   12/18 CT showed no cirrhosis but b/l PE, b/l effussion/consodliation, empyema on RLL, no cholecystitis, some ascites  On 12/19 bedside paracentesis diagnostic with 50ml ascites (which showed no SBP (WBC of 75.) SAAG of 1.1 likely 2/2 to portal HTN, Cx negative), On 12/20-pulmonary performed thoracentesis 200-700ml SS fluid out, Cx negative and placed Chest tube  12/22 WBC increased to 48 with no fevers and Abx changed from cefepime to Vanc/pip-tazo  ID reconsulted 12/26 for pt- wbc trending down, no fevers and  abx duration/recommendations    Past Medical History:   Diagnosis Date    Anxiety     Arthritis     CHF (congestive heart failure)     Coronary artery disease     Depression     Hypertension        History reviewed. No pertinent surgical history.    Review of patient's allergies indicates:  No Known Allergies    Medications:  Medications Prior to Admission   Medication Sig    acetaminophen (TYLENOL) 325 MG tablet Take 325 mg by mouth every 6 (six) hours as needed for Pain.    enalapril (VASOTEC) 2.5 MG tablet Take 1 tablet (2.5 mg total) by mouth once daily.    [DISCONTINUED] carvedilol (COREG) 3.125 MG tablet Take 1 tablet (3.125 mg total) by mouth 2 (two) times daily with meals.    [DISCONTINUED] furosemide (LASIX) 20 MG tablet Take 1 tablet (20 mg total) by mouth 2 (two) times daily.     Antibiotics (From admission, onward)    Start     Stop Route Frequency Ordered    12/26/19 0100  vancomycin 750 mg in dextrose 5 % 250 mL IVPB (ready to mix system)      -- IV Every 12 hours (non-standard times) 12/25/19 1431    12/22/19 1400  piperacillin-tazobactam 4.5 g in sodium chloride 0.9% 100 mL IVPB (ready to mix system)      -- IV Every 8 hours 12/22/19 1013        Antifungals (From admission, onward)    None        Antivirals (From admission, onward)    None             There is no immunization history on file for this patient.    Family History     Problem Relation (Age of Onset)    Arthritis Mother    Asthma Sister    Diabetes Sister    Heart disease Mother, Maternal Grandfather    Hyperlipidemia Mother    Hypertension Mother, Father    Kidney disease Mother        Social History     Socioeconomic History    Marital status:      Spouse name: Not on file    Number of children: Not on file    Years of education: Not on file    Highest education level: Not on file   Occupational History    Not on file   Social Needs    Financial resource strain: Not on file    Food insecurity:     Worry: Not on  file     Inability: Not on file    Transportation needs:     Medical: Not on file     Non-medical: Not on file   Tobacco Use    Smoking status: Former Smoker     Packs/day: 2.00     Years: 20.00     Pack years: 40.00     Types: Cigarettes     Start date: 1999     Last attempt to quit: 2017     Years since quittin.9    Smokeless tobacco: Never Used   Substance and Sexual Activity    Alcohol use: Not Currently    Drug use: Yes     Frequency: 7.0 times per week     Types: Amphetamines    Sexual activity: Not Currently     Partners: Female   Lifestyle    Physical activity:     Days per week: Not on file     Minutes per session: Not on file    Stress: Not on file   Relationships    Social connections:     Talks on phone: Not on file     Gets together: Not on file     Attends Quaker service: Not on file     Active member of club or organization: Not on file     Attends meetings of clubs or organizations: Not on file     Relationship status: Not on file   Other Topics Concern    Not on file   Social History Narrative    Not on file     Review of Systems   Constitutional: Positive for activity change and fatigue. Negative for appetite change, chills and fever.   HENT: Positive for dental problem. Negative for congestion, ear discharge, ear pain and mouth sores.    Eyes: Negative for discharge and itching.   Respiratory: Negative for apnea, choking, chest tightness and shortness of breath.    Cardiovascular: Positive for chest pain.        Chest pain from the tube   Gastrointestinal: Negative for abdominal distention, abdominal pain, constipation, diarrhea, nausea and vomiting.   Genitourinary: Negative for difficulty urinating and dysuria.   Musculoskeletal: Negative for arthralgias, back pain and joint swelling.   Skin: Negative for rash and wound.        Skin changes on the legs no erythema   Neurological: Negative for dizziness and facial asymmetry.   Psychiatric/Behavioral: Negative for  agitation and behavioral problems.   All other systems reviewed and are negative.    Objective:     Vital Signs (Most Recent):  Temp: 98.7 °F (37.1 °C) (12/26/19 1145)  Pulse: 86 (12/26/19 1145)  Resp: 18 (12/26/19 1145)  BP: 100/67 (12/26/19 1145)  SpO2: 99 % (12/26/19 1145) Vital Signs (24h Range):  Temp:  [97.6 °F (36.4 °C)-98.7 °F (37.1 °C)] 98.7 °F (37.1 °C)  Pulse:  [76-86] 86  Resp:  [17-18] 18  SpO2:  [95 %-99 %] 99 %  BP: ()/(56-67) 100/67     Weight: 78.5 kg (173 lb 1 oz)  Body mass index is 26.31 kg/m².    Estimated Creatinine Clearance: 89.3 mL/min (based on SCr of 1 mg/dL).    Physical Exam   Constitutional: He is oriented to person, place, and time. He appears well-developed. No distress.   HENT:   Head: Normocephalic and atraumatic.   Mouth/Throat: No oropharyngeal exudate.   Missing teeth above, lower teeth small in size- as per pt as childhood   Eyes: EOM are normal. Right eye exhibits no discharge. Left eye exhibits no discharge. No scleral icterus.   Neck: Normal range of motion. Neck supple. No JVD present.   Cardiovascular: Normal rate, regular rhythm, normal heart sounds and intact distal pulses.   No murmur heard.  Pulmonary/Chest: Effort normal and breath sounds normal. No respiratory distress. He has no wheezes. He has no rales.   Good air entry  Lower posterior chest tube seen inserted on right side   Abdominal: Soft. Bowel sounds are normal. He exhibits no distension. There is no tenderness. There is no guarding.   Musculoskeletal: Normal range of motion. He exhibits edema. He exhibits no tenderness.   Lymphadenopathy:     He has no cervical adenopathy.   Neurological: He is alert and oriented to person, place, and time.   No focal motor deficits   Skin: Skin is warm and dry. No erythema.   Skin changes on legs- dark coloration from edema/redness before - no erythema, no tenderness   Nursing note and vitals reviewed.      Significant Labs:   CBC:   Recent Labs   Lab 12/25/19  6201  12/26/19  0440   WBC 22.63* 14.83*   HGB 10.6* 10.3*   HCT 35.1* 34.1*   * 384*     CMP:   Recent Labs   Lab 12/25/19  0350 12/26/19  0440   * 134*   K 4.2 4.0   CL 94* 98   CO2 28 28   * 97   BUN 15 14   CREATININE 1.1 1.0   CALCIUM 7.7* 7.4*   PROT 5.7* 5.6*   ALBUMIN 1.5* 1.4*   BILITOT 2.1* 1.7*   ALKPHOS 83 73   AST 33 31   ALT 17 16   ANIONGAP 7* 8   EGFRNONAA >60.0 >60.0     Microbiology Results (last 7 days)     Procedure Component Value Units Date/Time    Blood Culture #1 **CANNOT BE ORDERED STAT** [[id number]] Collected:  12/22/19 0856    Order Status:  Completed Specimen:  Blood Updated:  12/26/19 1012     Blood Culture, Routine No Growth to date      No Growth to date      No Growth to date      No Growth to date      No Growth to date    Blood culture [[id number]] Collected:  12/22/19 0946    Order Status:  Completed Specimen:  Blood Updated:  12/26/19 1012     Blood Culture, Routine No Growth to date      No Growth to date      No Growth to date      No Growth to date      No Growth to date    Narrative:       Collection has been rescheduled by LW1 at 12/22/2019 09:21 Reason:   Unable to collect nurse Imelda is trying to get 2nd set of cultures  Collection has been rescheduled by LW1 at 12/22/2019 09:21 Reason:   Unable to collect nurse Imelda is trying to get 2nd set of cultures    Culture, Anaerobic [[id number]] Collected:  12/19/19 1506    Order Status:  Completed Specimen:  Body Fluid from Ascites Updated:  12/26/19 0901     Anaerobic Culture No anaerobes isolated    Culture, Body Fluid - Bactec [[id number]] Collected:  12/20/19 1000    Order Status:  Completed Specimen:  Body Fluid from Pleural Fluid Updated:  12/25/19 1612     Body Fluid Culture, Sterile No growth after 5 days.    AFB culture (includes stain) [[id number]] Collected:  12/20/19 1000    Order Status:  Completed Specimen:  Body Fluid from Pleural Fluid Updated:  12/23/19 1407     AFB Culture & Smear Culture in progress      AFB CULTURE STAIN No acid fast bacilli seen.    Aerobic culture [[id number]] Collected:  12/19/19 1506    Order Status:  Completed Specimen:  Body Fluid from Ascites Updated:  12/23/19 0955     Aerobic Bacterial Culture No growth    Blood Culture #1 **CANNOT BE ORDERED STAT** [[id number]] Collected:  12/22/19 0857    Order Status:  Canceled Specimen:  Blood     Gram stain [[id number]] Collected:  12/20/19 1000    Order Status:  Completed Specimen:  Body Fluid from Pleural Fluid Updated:  12/20/19 1628     Gram Stain Result Rare WBC's      No organisms seen    Fungus culture [[id number]] Collected:  12/20/19 1000    Order Status:  Sent Specimen:  Body Fluid from Pleural Fluid Updated:  12/20/19 1152    Gram stain [[id number]] Collected:  12/19/19 1506    Order Status:  Completed Specimen:  Body Fluid from Ascites Updated:  12/19/19 2015     Gram Stain Result Cytospin indicates:      Rare WBC's      No organisms seen        All pertinent labs within the past 24 hours have been reviewed.    Significant Imaging: I have reviewed all pertinent imaging results/findings within the past 24 hours.

## 2019-12-26 NOTE — PLAN OF CARE
Pt remained free from falls/injury. No acute events during shift. VSS, NAD. Bed in lowest position, wheels locked, side rails up times 2, call light w/in reach. Room clear of obstacles. WCTM.

## 2019-12-26 NOTE — ASSESSMENT & PLAN NOTE
Patient has endorsed episodic abdominal pains (8/10) in his his to upper epigastrium over the last few days.  He associates these pains with meals and has avoided food because of it.  Nothing seems to make better other than letting time pass.  Given patient's presentation with DVTs and possible PE.  Concern for possible mesenteric ischemia vs rib fracture vs GERD.   -CXR negative for any fracture  -Ultrasound with mesenteric view negative for any stenosis or occulusion   -Lipase, Lactic acid WNL. Peritoneal cultures negative (likely portal HTN.)    Plan:  -Patient's abdominal pain improved after 1x dose of lactulose.  He was noted to have 1x large BM later that day. Likely 2/2 to constipation   -s/p paracentesis on 12/19, which showed no SBP (WBC of 75.) SAAG of 1.1 likely 2/2 to portal HTN  -Continue pantoprazole PO 40mg   -CT abdomen showed no ileus or obstruction. Empyema improved per Pulm reading   -continue to monitor   -Resolved

## 2019-12-26 NOTE — SUBJECTIVE & OBJECTIVE
Interval History: No issues overnight. CT remains in place with about 300cc output over the last 24h    Objective:     Vital Signs (Most Recent):  Temp: 97.9 °F (36.6 °C) (12/26/19 0820)  Pulse: 76 (12/26/19 0820)  Resp: 18 (12/26/19 0820)  BP: 90/67 (12/26/19 0820)  SpO2: 95 % (12/26/19 0820) Vital Signs (24h Range):  Temp:  [97.3 °F (36.3 °C)-98.1 °F (36.7 °C)] 97.9 °F (36.6 °C)  Pulse:  [76-85] 76  Resp:  [16-18] 18  SpO2:  [95 %-97 %] 95 %  BP: ()/(56-75) 90/67     Weight: 78.5 kg (173 lb 1 oz)  Body mass index is 26.31 kg/m².      Intake/Output Summary (Last 24 hours) at 12/26/2019 0900  Last data filed at 12/26/2019 0841  Gross per 24 hour   Intake 180 ml   Output 1525 ml   Net -1345 ml       Physical Exam   Constitutional: He is oriented to person, place, and time. He appears well-developed and well-nourished.   HENT:   Head: Normocephalic and atraumatic.   Eyes: Pupils are equal, round, and reactive to light. EOM are normal.   Neck: Normal range of motion.   Cardiovascular: Normal rate, regular rhythm and normal heart sounds.   Pulmonary/Chest: Effort normal.   Chest tube in place. Improved aeration bilaterally   Abdominal: Bowel sounds are normal. He exhibits no distension. There is no tenderness.   Musculoskeletal: Normal range of motion.   Neurological: He is alert and oriented to person, place, and time.   Skin: Skin is warm and dry.   Psychiatric: He has a normal mood and affect.       Significant Labs:    CBC/Anemia Profile:  Recent Labs   Lab 12/25/19  0350 12/26/19  0440   WBC 22.63* 14.83*   HGB 10.6* 10.3*   HCT 35.1* 34.1*   * 384*   MCV 85 83   RDW 23.7* 23.9*        Chemistries:  Recent Labs   Lab 12/25/19  0350 12/26/19  0440   * 134*   K 4.2 4.0   CL 94* 98   CO2 28 28   BUN 15 14   CREATININE 1.1 1.0   CALCIUM 7.7* 7.4*   ALBUMIN 1.5* 1.4*   PROT 5.7* 5.6*   BILITOT 2.1* 1.7*   ALKPHOS 83 73   ALT 17 16   AST 33 31       All pertinent labs within the past 24 hours have been  reviewed.    Significant Imaging:  I have reviewed and interpreted all pertinent imaging results/findings within the past 24 hours.

## 2019-12-26 NOTE — PROGRESS NOTES
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 He has a PMH significant for non-ischemic cardiomyopathy with combined CHF (EF 11% without ICD or CRT device) and on-going IVDU. He reports a two week duration of progressively worsening bilateral lower extremity swelling and redness of the anterior shins. This is associated with bilateral lower extremity pain with ambulation. He also reports noticing symptom association with worsening of baseline SOB with exertion as well as new abdominal distension and pain, within the same time frame. He describes abdominal pain as a generalized soreness that occurs approximately one hour after eating meals and spontaneously resolves without intervention or medication. He is unsure of how long the discomfort lasts before resolving. Additionally, he reports a two week duration of occasional episodes of pale semi-formed stools. He attempted symptom relief with increasing dose of daily Lasix, however without effect, prompting presentation to ED on 12/09.     He denies symptom association with fevers, chills, nausea, vomiting, history of prior abdominal surgeries, ETOH consumption, chest pain, pain with inspiration, lightheadedness, or blurry vision. He reports a on-going non-productive cough intermittent cough since CHF diagnosis that is unchanged. He does not weigh himself daily and is unsure of any weight loss or gain. He does follow with cardiology outpatient.     Of note, he also reports continued daily use of  IV methamphetamines with most recent usage on 12/08.     ED course: He initially presented to [place] ED on 12/09. Lab were significant for leukocytosis (25), hyponatremia (126), lactic acidosis (3.5), transaminitis (, ALT 73), hyperbilirubinemia (4.5), and toxicology positive for amphetamines. CXR was suggestive of right lower lobe pneumonia. Abdominal U/S was suggestive of acalculous cholecystitis. He was given IVF, Ceftriaxone, and Lasix. Case was discussed with transfer center and AES here; recommended transfer for possible MRCP.     Overview/Hospital Course:  Francis Daigle 44 y.o male presented from OSH with cholilithiasis and cholecystitis. Patient transfer to [facility] for [facility] evaluation. When patient got admitted to hospital medicine team the patient was transferred to ICU following cardiac arrest on 12/10/19. Emergently intubated during PEA arrest, ROSC achieved after 1 round of CPR. Central line and arterial line placed. Patient initially requiring pressor support, cyanotic, hypoxic on blood gas. Bedside ultrasound with signs of RV strain with DVT in lower extremity. Patient on pressors at the time, requiring 100% FiO2 to oxygenate. Given TPA emergently. Patient started on heparin ggt, bronchoscopy today revealing relatively normal airways and extubated. Most recent ultrasound did not show liver cirrhosis or cholilthiasis or cholecystitis. Patient with RLL PNA getting treated with Zosyn and later switch to Levo. Patient transitioned to Heparin gtt to 10mg BID eliquis. Cardiology will need to consulted for possible ICD or lifevest up on discharge on Monday.   Spoke with cardiology who states that he will likely need an ICD but he can follow up outpatient with cardiology for its placement.  Patient continues to state that he feels better each day.  He does endorse increased anxiety over the last day.  He states that he used to be on treatment in the past.  Started patient on citalopram and instructed  him that he will need to request follow up with psychiatry once he is discharged. Patient has continued midepigastric pain that is associated with meals. Ultrasound for mesenteric ischemia showed no signs of occlusion or stenosis.  Ordered CT scan of the abdomen with contast showing bilateral PE and empyema vs abcess. Pulmonary consulted and plan to CT tomorrow, NPO at Midnight.  Chest tube was preformed today and fluids sent for cultures and cytology.  Patient noted to be tachycardic overnight up to 130s; ECG in the AM noted sinus tachycardia.  His HR slowed to 98 by lunch time and a small 250 mL bolus of LR.  Re-started heparin drip following the chest tube placement per pulmonology recommendations.   Patient has become more anxious and uncooperative today; pulling out IV lines and thrashing about room, demanding norco and IV ativan.  Addressed patient's concerns and provided 1g PO ativan q8 hours.  On 12/22, patient noted to have a spike in his WBC (42k.), trending down. Blood cultures were drawn as well as a lactate.  His antibitoics were broadened to Vancomycin and Zosyn IV.  His CT tube was noted to put out an additional 1L. Curiously, his abdominal pain has improved after lactulose gave him a bowel movement, but complains of it all day, CT abdomen with contrast showed no ileus or obstruction, empyema improved per pulm will remove the chest tube when the output less than 150 cc/24hrs. Psych consulted on 12/23, recommending to gabapentin 100mg TID PRN for anxiety and Gabapentin 300mg qhs. WBC continuing to improve. Will consult ID for recommendations on antibiotic management.     Interval History: No acute events overnight. Chest tube in place, draining 300cc per chart yesterday. Pulmonology following. WBC improving from 48 to 14 today with initiation of Vancomycin with zosyn, however no clear source though possibly due in empyema. No culture growth to date. Reporting abdominal intermittently for past week. CT  abdomen from 12/23 reporting consolidation at base of lung increased in size but improved per pulmonology. Will consult ID today for further recommendation on antiboitic management. No abdominal pain on exam today. He looked better today asking about PT and when he can go home.     Review of Systems   Constitutional: Positive for activity change and fatigue. Negative for chills and fever.   HENT: Negative for congestion.    Eyes: Negative for photophobia and visual disturbance.   Respiratory: Negative for cough and shortness of breath.    Cardiovascular: Negative for chest pain.   Gastrointestinal: Negative for abdominal distention, abdominal pain, constipation, diarrhea, nausea and vomiting.   Genitourinary: Negative for dysuria.   Musculoskeletal: Negative for back pain.   Neurological: Negative for headaches.   Psychiatric/Behavioral: The patient is not nervous/anxious.      Objective:     Vital Signs (Most Recent):  Temp: 98.7 °F (37.1 °C) (12/26/19 1145)  Pulse: 86 (12/26/19 1145)  Resp: 18 (12/26/19 1145)  BP: 100/67 (12/26/19 1145)  SpO2: 99 % (12/26/19 1145) Vital Signs (24h Range):  Temp:  [97.6 °F (36.4 °C)-98.7 °F (37.1 °C)] 98.7 °F (37.1 °C)  Pulse:  [76-86] 86  Resp:  [17-18] 18  SpO2:  [95 %-99 %] 99 %  BP: ()/(56-67) 100/67     Weight: 78.5 kg (173 lb 1 oz)  Body mass index is 26.31 kg/m².    Intake/Output Summary (Last 24 hours) at 12/26/2019 1437  Last data filed at 12/26/2019 1256  Gross per 24 hour   Intake 420 ml   Output 2025 ml   Net -1605 ml      Physical Exam   Constitutional: He is oriented to person, place, and time.   Patient is a 45 yo M who appears alert and anxious.    HENT:   Head: Normocephalic and atraumatic.   Eyes: Pupils are equal, round, and reactive to light. EOM are normal.   Neck: Normal range of motion.   Cardiovascular: Normal rate, regular rhythm and normal heart sounds.   Pulmonary/Chest: Effort normal and breath sounds normal. No respiratory distress.   Chest tube  in place   Abdominal: Bowel sounds are normal. He exhibits no distension. There is no tenderness.   Musculoskeletal: Normal range of motion.   Neurological: He is alert and oriented to person, place, and time.   Skin: Skin is warm and dry.   Psychiatric: He has a normal mood and affect.       Significant Labs:   BMP:   Recent Labs   Lab 12/26/19  0440   GLU 97   *   K 4.0   CL 98   CO2 28   BUN 14   CREATININE 1.0   CALCIUM 7.4*     CBC:   Recent Labs   Lab 12/25/19  0350 12/26/19  0440   WBC 22.63* 14.83*   HGB 10.6* 10.3*   HCT 35.1* 34.1*   * 384*       Assessment/Plan:      * Sepsis due to undetermined organism  This is a 44 year old male with PMH significant for non-ischemic cardiomyopathy (EF 11%), IVDU, and Hepatitis C who is presenting on 12/10 with two week duration of worsening lower extremity swelling and erythema associated with same duration of post-prandial abdominal pain and distension with intermittent pale colored stools with work-up thus far concerning for tachycardia (120's), leukocytosis (25), lactic acidosis, transaminitis, hyperbilirubinemia, CXR suggestive of right lower lobe pneumonia, and abdominal U/S suggestive of possible acalculous cholecystitis. He has no symptoms of underlying respiratory infection and on chart review CXR abnormalities are similar to those in 08/2019 during admission for CHF exacerbation. UA is unremarkable. Negative for influenza. Suspect bilateral lower extremity edema and erythema more a manifestation of CHF instead of bilateral cellulitis. Differential diagnoses include biliary etiology vs bacteremia in the setting of on-going IVDU vs possible CAP.   -CT scan of chest 12/20: pulmonary abscess involving the right lower lobe, as well as additional areas bilaterally that could represent areas of developing pulmonary abscesses.  Loculated fluid collection.   -12/22: WBC elevated to 42k.  Afebrile and HDs. Patient clinically feels well.  Broadened abx to IV  vanc and zosyn.     Plan:   -Vancomycin and Zosyn IV  -Blood Cx NGTD   -Respiratory culture growing GNR and Candida with persistently elevated WBC. Candida in respiratory does not need to get treated.   -Judiciously administer an IVF resuscitation necessary.     Leukocytosis  Increase in WBC from 18 to 45 on 12/22  No clear etiology as chest tube for suspected empyema (seen on CT on 12/18) placed on 12/20 with adequate drainage  Started on vancomycin and zosyn at time  WBC improving since: 48->31->23->22->14    Plan:  -due to unclear etiology of rapid increase in WBC on 12/22 that appears to be responding to vancomycin/zosyn without clear source (cultures NGTD), will consult ID for further antibiotic recommendations      Empyema  Recent CT chest now showing possible empyema of right lung.  -pleural fluid WBC elevated to over 2k and LDH elevated well above normal, indicative of likely empyema.   -Chest tube placed on 12/20, drained 700mL of SS fluid.  12/22 showed output of additional 1L    Plan:  -his WBC elevated to 42k on 12/22 that remained elevated on repeat CBC.  Clinically he feels well and has no complaints.  His antibiotics were broadened to Vanc and Zosyn IV.    -Blood Cx NGTD  -PULMONOLOGY on board. Plan for hopefully remove the Chest tube tomorrow if less than 150cc/24hrs       Abdominal pain  Patient has endorsed episodic abdominal pains (8/10) in his his to upper epigastrium over the last few days.  He associates these pains with meals and has avoided food because of it.  Nothing seems to make better other than letting time pass.  Given patient's presentation with DVTs and possible PE.  Concern for possible mesenteric ischemia vs rib fracture vs GERD.   -CXR negative for any fracture  -Ultrasound with mesenteric view negative for any stenosis or occulusion   -Lipase, Lactic acid WNL. Peritoneal cultures negative (likely portal HTN.)    Plan:  -Patient's abdominal pain improved after 1x dose of  lactulose.  He was noted to have 1x large BM later that day. Likely 2/2 to constipation   -s/p paracentesis on 12/19, which showed no SBP (WBC of 75.) SAAG of 1.1 likely 2/2 to portal HTN  -Continue pantoprazole PO 40mg   -CT abdomen showed no ileus or obstruction. Empyema improved per Pulm reading   -continue to monitor   -Resolved       Anxiety  Patient with history of panic attacks in the past.  Patient describes increased worry and anxiety about his current condition and his recovery.     Plan:  -Psych consulted, recommending continued citalopram 20mg PO, gabapentin 100mg TID PRN, and gabapentin 300mg qhs  -Avoid benzos as patient has polysubstance abuse history    Wheezing  History of 40PK year. Mild wheeze throughout lung field. Sating well in room air  - continue Ipotroprium Neb Q8H for wheezing       Acute massive pulmonary embolism  DVT lower extremity probable origin of PE. Patient was started on heparin gtt  - right upper extremity arm US showing edema and no clots.     Plan  -continue eliquis       Acute deep vein thrombosis (DVT) of proximal vein of lower extremity  - See acute massive pulm embolism       Cardiac arrest  PEA arrest, 2 round of EPI and CPR achieved ROSC.         Hyponatremia  -See assessment for CHF exacerbation.       Chronic hepatitis C without hepatic coma  - HEP C viral load (+) and elevated at 6  - Will need to set up with hepatology for treatment as outpatient       Intravenous drug abuse  -On-going use of methamphetamines.   -Prior hepatitis screening positive for HepC in 10/2019.     Plan:   -Counseled on importance of cessation.   -Monitor for signs of withdrawal for other substances.   -Psychiatry on board, appreciate recs    Community acquired pneumonia of right lower lobe of lung  -See assessment for sepsis.       Acute on chronic combined systolic and diastolic heart failure  History of non-ischemic cardiomyopathy diagnosed in 11/2017 at [facility].  [facility] in 12/2017 with no  evidence of angiographically significant coronary artery disease.  Most recent ECHO in 10/2019 concerning for EF of 35%, grade II diastolic dysfunction, and global hypokinesis with a restrictive physiology. HOME regimen: Carvedilol, Enalapril, Lasix 20 mg BID, and Spironolactone.   .-Suspect presentation from worsening right sided heart failure.     Plan:   - continue HOME regimen: Carvedilol, Enalapril, and Spironolactone  - continue lasix to 40mg PO BID  - continue Toprol XL 100mg QD   -Strict I/O's and daily standing weights.   -Telemetry ordered.   -Cardiology recommends outpatient EP follow up for ICD placement for primary prevention.  They do not recommending placing a life vest or ICD inpatient as patient's cardiac arrest during hospitalization was due to a PE and not a shockable rhythm.          Essential hypertension  -See assessment for CHF exacerbation.         VTE Risk Mitigation (From admission, onward)         Ordered     apixaban tablet 5 mg  2 times daily      12/23/19 0946     heparin 25,000 units in dextrose 5% 250 mL (100 units/mL) infusion HIGH INTENSITY nomogram - [facility]  Continuous     Question:  Heparin Infusion Adjustment (DO NOT MODIFY ANSWER)  Answer:  \\[email or web address]\Images\[email or web address]\HeparinInfusions\heparin HIGH INTENSITY nomogram for [facility] [id number]    12/19/19 0909     IP VTE HIGH RISK PATIENT  Once      12/10/19 0922                      Freddy Licona MD  Department of Hospital Medicine   [facility]

## 2019-12-26 NOTE — ASSESSMENT & PLAN NOTE
History of non-ischemic cardiomyopathy diagnosed in 11/2017 at [facility].  [facility] in 12/2017 with no evidence of angiographically significant coronary artery disease.  Most recent ECHO in 10/2019 concerning for EF of 35%, grade II diastolic dysfunction, and global hypokinesis with a restrictive physiology. HOME regimen: Carvedilol, Enalapril, Lasix 20 mg BID, and Spironolactone.   .-Suspect presentation from worsening right sided heart failure.     Plan:   - continue HOME regimen: Carvedilol, Enalapril, and Spironolactone  - continue lasix to 40mg PO BID  - continue Toprol XL 100mg QD   -Strict I/O's and daily standing weights.   -Telemetry ordered.   -Cardiology recommends outpatient EP follow up for ICD placement for primary prevention.  They do not recommending placing a life vest or ICD inpatient as patient's cardiac arrest during hospitalization was due to a PE and not a shockable rhythm.

## 2019-12-26 NOTE — PT/OT/SLP PROGRESS
"Occupational Therapy   Treatment    Name: Francis Daigle  MRN: [id number]  Admitting Diagnosis:  Sepsis due to undetermined organism       Recommendations:     Discharge Recommendations: outpatient OT  Discharge Equipment Recommendations:  shower chair, walker, rolling  Barriers to discharge:  None    Assessment:     Francis Daigle is a 44 y.o. male with a medical diagnosis of Sepsis due to undetermined organism.  He presents alert and willing to participate in therapy session. Upon arrival, pt found reclined in chair with no concerns. Performance deficits affecting function are weakness, impaired endurance, impaired balance, impaired self care skills. He tolerated treatment session well and progressing with goals. He would benefit from OPOT following d/c to continue to progress towards goals and improve quality of life.     Rehab Prognosis:  Good; patient would benefit from acute skilled OT services to address these deficits and reach maximum level of function.       Plan:     Patient to be seen 2 x/week to address the above listed problems via self-care/home management, therapeutic activities, therapeutic exercises, neuromuscular re-education  · Plan of Care Expires: 01/15/20  · Plan of Care Reviewed with: patient    Subjective     " I'm having a very good day. I'm feeling much better"   Pt also commented about feeling less anxious today    Pain/Comfort:  · Pain Rating 1: 0/10  · Pain Rating Post-Intervention 1: 0/10    Objective:     Communicated with: RN prior to session.  Patient found up in chair with chest tube, telemetry upon OT entry to room.    General Precautions: Standard, fall   Orthopedic Precautions:N/A   Braces: N/A     Occupational Performance:     Bed Mobility:    · NT, pt found seated [facility]    Functional Mobility/Transfers:  · Patient completed Sit <> Stand Transfer with supervision  with  no assistive device   · Functional Mobility: Pt completed functional mobility in hallway ( community " distance) with RW and SBA. He tolerated well with no LOB or SOB.     Activities of Daily Living:  · Upper Body Dressing: minimum assistance to tory gown like jacket while standing  · Lower Body Dressing: stand by assistance to tory B socks while seated UIC  · Toileting: Supervision to complete toileting using urinal while seated UIC    AMPAC 6 Click ADL: 22    Treatment & Education:  -Pt edu on OT role/POC  -Importance of OOB activity with staff assistance  -Safety during functional t/f and mobility  -White board updated  - Multiple self care tasks completed--as noted above  - Pt appropriately used card, select number, and grab items from vending machine with SBA    Patient left up in chair with all lines intact, call button in reach and RN notifiedEducation:      GOALS:   Multidisciplinary Problems     Occupational Therapy Goals        Problem: Occupational Therapy Goal    Goal Priority Disciplines Outcome Interventions   Occupational Therapy Goal     OT, PT/OT Ongoing, Progressing    Description:  Goals to be met by: 12/30     Patient will increase functional independence with ADLs by performing:    UE Dressing with [place].  LE Dressing with [place].  Grooming while standing at sink with independence.  Toileting from toilet with Contact Guard Assistance for hygiene and clothing management. Met 12/20  Revised: Toileting from toilet with independence.                        Time Tracking:     OT Date of Treatment: 12/26/19  OT Start Time: 1043  OT Stop Time: 1100  OT Total Time (min): 17 min    Billable Minutes:Therapeutic Activity 17    Yessica Santana OT  12/26/2019

## 2019-12-26 NOTE — SUBJECTIVE & OBJECTIVE
Interval History: No acute events overnight. Chest tube in place, draining 300cc per chart yesterday. Pulmonology following. WBC improving from 48 to 14 today with initiation of Vancomycin with zosyn, however no clear source though possibly due in empyema. No culture growth to date. Reporting abdominal intermittently for past week. CT abdomen from 12/23 reporting consolidation at base of lung increased in size but improved per pulmonology. Will consult ID today for further recommendation on antiboitic management. No abdominal pain on exam today. He looked better today asking about PT and when he can go home.     Review of Systems   Constitutional: Positive for activity change and fatigue. Negative for chills and fever.   HENT: Negative for congestion.    Eyes: Negative for photophobia and visual disturbance.   Respiratory: Negative for cough and shortness of breath.    Cardiovascular: Negative for chest pain.   Gastrointestinal: Negative for abdominal distention, abdominal pain, constipation, diarrhea, nausea and vomiting.   Genitourinary: Negative for dysuria.   Musculoskeletal: Negative for back pain.   Neurological: Negative for headaches.   Psychiatric/Behavioral: The patient is not nervous/anxious.      Objective:     Vital Signs (Most Recent):  Temp: 98.7 °F (37.1 °C) (12/26/19 1145)  Pulse: 86 (12/26/19 1145)  Resp: 18 (12/26/19 1145)  BP: 100/67 (12/26/19 1145)  SpO2: 99 % (12/26/19 1145) Vital Signs (24h Range):  Temp:  [97.6 °F (36.4 °C)-98.7 °F (37.1 °C)] 98.7 °F (37.1 °C)  Pulse:  [76-86] 86  Resp:  [17-18] 18  SpO2:  [95 %-99 %] 99 %  BP: ()/(56-67) 100/67     Weight: 78.5 kg (173 lb 1 oz)  Body mass index is 26.31 kg/m².    Intake/Output Summary (Last 24 hours) at 12/26/2019 1437  Last data filed at 12/26/2019 1256  Gross per 24 hour   Intake 420 ml   Output 2025 ml   Net -1605 ml      Physical Exam   Constitutional: He is oriented to person, place, and time.   Patient is a 45 yo M who appears  alert and anxious.    HENT:   Head: Normocephalic and atraumatic.   Eyes: Pupils are equal, round, and reactive to light. EOM are normal.   Neck: Normal range of motion.   Cardiovascular: Normal rate, regular rhythm and normal heart sounds.   Pulmonary/Chest: Effort normal and breath sounds normal. No respiratory distress.   Chest tube in place   Abdominal: Bowel sounds are normal. He exhibits no distension. There is no tenderness.   Musculoskeletal: Normal range of motion.   Neurological: He is alert and oriented to person, place, and time.   Skin: Skin is warm and dry.   Psychiatric: He has a normal mood and affect.       Significant Labs:   BMP:   Recent Labs   Lab 12/26/19  0440   GLU 97   *   K 4.0   CL 98   CO2 28   BUN 14   CREATININE 1.0   CALCIUM 7.4*     CBC:   Recent Labs   Lab 12/25/19  0350 12/26/19  0440   WBC 22.63* 14.83*   HGB 10.6* 10.3*   HCT 35.1* 34.1*   * 384*

## 2019-12-26 NOTE — PLAN OF CARE
Problem: Occupational Therapy Goal  Goal: Occupational Therapy Goal  Description  Goals to be met by: 12/30     Patient will increase functional independence with ADLs by performing:    UE Dressing with [place].  LE Dressing with [place].  Grooming while standing at sink with independence.  Toileting from toilet with Contact Guard Assistance for hygiene and clothing management. Met 12/20  Revised: Toileting from toilet with independence.       Outcome: Ongoing, Progressing   Pt progressing well with goals. Continue with POC.   Yessica Santana OT  12/26/2019

## 2019-12-26 NOTE — ASSESSMENT & PLAN NOTE
Recent CT chest now showing possible empyema of right lung.  -pleural fluid WBC elevated to over 2k and LDH elevated well above normal, indicative of likely empyema.   -Chest tube placed on 12/20, drained 700mL of SS fluid.  12/22 showed output of additional 1L    Plan:  -his WBC elevated to 42k on 12/22 that remained elevated on repeat CBC.  Clinically he feels well and has no complaints.  His antibiotics were broadened to Vanc and Zosyn IV.    -Blood Cx NGTD  -PULMONOLOGY on board. Plan for hopefully remove the Chest tube tomorrow if less than 150cc/24hrs

## 2019-12-27 LAB
ALBUMIN SERPL BCP-MCNC: 1.7 G/DL (ref 3.5–5.2)
ALP SERPL-CCNC: 89 U/L (ref 55–135)
ALT SERPL W/O P-5'-P-CCNC: 18 U/L (ref 10–44)
ANION GAP SERPL CALC-SCNC: 8 MMOL/L (ref 8–16)
AST SERPL-CCNC: 28 U/L (ref 10–40)
BACTERIA BLD CULT: NORMAL
BACTERIA BLD CULT: NORMAL
BASOPHILS # BLD AUTO: 0.06 K/UL (ref 0–0.2)
BASOPHILS NFR BLD: 0.4 % (ref 0–1.9)
BILIRUB SERPL-MCNC: 1.8 MG/DL (ref 0.1–1)
BUN SERPL-MCNC: 11 MG/DL (ref 6–20)
CALCIUM SERPL-MCNC: 7.8 MG/DL (ref 8.7–10.5)
CHLORIDE SERPL-SCNC: 97 MMOL/L (ref 95–110)
CO2 SERPL-SCNC: 29 MMOL/L (ref 23–29)
CREAT SERPL-MCNC: 1.1 MG/DL (ref 0.5–1.4)
DIFFERENTIAL METHOD: ABNORMAL
EOSINOPHIL # BLD AUTO: 0.3 K/UL (ref 0–0.5)
EOSINOPHIL NFR BLD: 2.2 % (ref 0–8)
ERYTHROCYTE [DISTWIDTH] IN BLOOD BY AUTOMATED COUNT: 23.6 % (ref 11.5–14.5)
EST. GFR  (AFRICAN AMERICAN): >60 ML/MIN/1.73 M^2
EST. GFR  (NON AFRICAN AMERICAN): >60 ML/MIN/1.73 M^2
GLUCOSE SERPL-MCNC: 90 MG/DL (ref 70–110)
HCT VFR BLD AUTO: 34.8 % (ref 40–54)
HGB BLD-MCNC: 10.6 G/DL (ref 14–18)
IMM GRANULOCYTES # BLD AUTO: 0.1 K/UL (ref 0–0.04)
IMM GRANULOCYTES NFR BLD AUTO: 0.7 % (ref 0–0.5)
INR PPP: 1.3 (ref 0.8–1.2)
LYMPHOCYTES # BLD AUTO: 2.2 K/UL (ref 1–4.8)
LYMPHOCYTES NFR BLD: 14.5 % (ref 18–48)
MCH RBC QN AUTO: 25.9 PG (ref 27–31)
MCHC RBC AUTO-ENTMCNC: 30.5 G/DL (ref 32–36)
MCV RBC AUTO: 85 FL (ref 82–98)
MONOCYTES # BLD AUTO: 1.2 K/UL (ref 0.3–1)
MONOCYTES NFR BLD: 7.8 % (ref 4–15)
NEUTROPHILS # BLD AUTO: 11 K/UL (ref 1.8–7.7)
NEUTROPHILS NFR BLD: 74.4 % (ref 38–73)
NRBC BLD-RTO: 0 /100 WBC
PLATELET # BLD AUTO: 399 K/UL (ref 150–350)
PMV BLD AUTO: 8.9 FL (ref 9.2–12.9)
POTASSIUM SERPL-SCNC: 3.9 MMOL/L (ref 3.5–5.1)
PROT SERPL-MCNC: 6.4 G/DL (ref 6–8.4)
PROTHROMBIN TIME: 13.2 SEC (ref 9–12.5)
RBC # BLD AUTO: 4.1 M/UL (ref 4.6–6.2)
SODIUM SERPL-SCNC: 134 MMOL/L (ref 136–145)
VANCOMYCIN TROUGH SERPL-MCNC: 15.4 UG/ML (ref 10–22)
WBC # BLD AUTO: 14.83 K/UL (ref 3.9–12.7)

## 2019-12-27 PROCEDURE — 97164 PT RE-EVAL EST PLAN CARE: CPT

## 2019-12-27 PROCEDURE — 80053 COMPREHEN METABOLIC PANEL: CPT

## 2019-12-27 PROCEDURE — 97116 GAIT TRAINING THERAPY: CPT

## 2019-12-27 PROCEDURE — 25000003 PHARM REV CODE 250: Performed by: STUDENT IN AN ORGANIZED HEALTH CARE EDUCATION/TRAINING PROGRAM

## 2019-12-27 PROCEDURE — 85025 COMPLETE CBC W/AUTO DIFF WBC: CPT

## 2019-12-27 PROCEDURE — 85610 PROTHROMBIN TIME: CPT

## 2019-12-27 PROCEDURE — 25000003 PHARM REV CODE 250: Performed by: HOSPITALIST

## 2019-12-27 PROCEDURE — 99232 SBSQ HOSP IP/OBS MODERATE 35: CPT | Mod: ,,, | Performed by: HOSPITALIST

## 2019-12-27 PROCEDURE — 20600001 HC STEP DOWN PRIVATE ROOM

## 2019-12-27 PROCEDURE — 99232 PR SUBSEQUENT HOSPITAL CARE,LEVL II: ICD-10-PCS | Mod: ,,, | Performed by: HOSPITALIST

## 2019-12-27 PROCEDURE — 63600175 PHARM REV CODE 636 W HCPCS: Performed by: HOSPITALIST

## 2019-12-27 PROCEDURE — 80202 ASSAY OF VANCOMYCIN: CPT

## 2019-12-27 PROCEDURE — 36415 COLL VENOUS BLD VENIPUNCTURE: CPT

## 2019-12-27 RX ORDER — PANTOPRAZOLE SODIUM 40 MG/1
40 TABLET, DELAYED RELEASE ORAL DAILY
Qty: 30 TABLET | Refills: 0 | Status: SHIPPED | OUTPATIENT
Start: 2019-12-28

## 2019-12-27 RX ORDER — GABAPENTIN 300 MG/1
300 CAPSULE ORAL NIGHTLY
Qty: 30 CAPSULE | Refills: 3 | Status: SHIPPED | OUTPATIENT
Start: 2019-12-27

## 2019-12-27 RX ORDER — FUROSEMIDE 40 MG/1
40 TABLET ORAL DAILY
Qty: 30 TABLET | Refills: 3 | Status: ON HOLD | OUTPATIENT
Start: 2019-12-27 | End: 2020-03-18 | Stop reason: HOSPADM

## 2019-12-27 RX ORDER — ENALAPRIL MALEATE 2.5 MG/1
2.5 TABLET ORAL DAILY
Qty: 30 TABLET | Refills: 3 | Status: SHIPPED | OUTPATIENT
Start: 2019-12-27 | End: 2020-12-21

## 2019-12-27 RX ORDER — CITALOPRAM 20 MG/1
20 TABLET, FILM COATED ORAL DAILY
Qty: 30 TABLET | Refills: 3 | Status: SHIPPED | OUTPATIENT
Start: 2019-12-27 | End: 2020-01-27 | Stop reason: CLARIF

## 2019-12-27 RX ORDER — GABAPENTIN 100 MG/1
100 CAPSULE ORAL 3 TIMES DAILY PRN
Qty: 90 CAPSULE | Refills: 0 | Status: SHIPPED | OUTPATIENT
Start: 2019-12-27 | End: 2020-12-26

## 2019-12-27 RX ORDER — LACTULOSE 10 G/15ML
10 SOLUTION ORAL; RECTAL 3 TIMES DAILY PRN
Qty: 1350 ML | Refills: 3 | Status: SHIPPED | OUTPATIENT
Start: 2019-12-27

## 2019-12-27 RX ORDER — AMOXICILLIN AND CLAVULANATE POTASSIUM 875; 125 MG/1; MG/1
1 TABLET, FILM COATED ORAL EVERY 12 HOURS
Qty: 10 TABLET | Refills: 0 | Status: SHIPPED | OUTPATIENT
Start: 2019-12-27 | End: 2020-01-01

## 2019-12-27 RX ADMIN — APIXABAN 5 MG: 5 TABLET, FILM COATED ORAL at 09:12

## 2019-12-27 RX ADMIN — PIPERACILLIN AND TAZOBACTAM 4.5 G: 4; .5 INJECTION, POWDER, FOR SOLUTION INTRAVENOUS at 09:12

## 2019-12-27 RX ADMIN — VANCOMYCIN HYDROCHLORIDE 750 MG: 750 INJECTION, POWDER, LYOPHILIZED, FOR SOLUTION INTRAVENOUS at 03:12

## 2019-12-27 RX ADMIN — HYDROXYZINE HYDROCHLORIDE 25 MG: 25 TABLET, FILM COATED ORAL at 09:12

## 2019-12-27 RX ADMIN — PIPERACILLIN AND TAZOBACTAM 4.5 G: 4; .5 INJECTION, POWDER, FOR SOLUTION INTRAVENOUS at 06:12

## 2019-12-27 RX ADMIN — GABAPENTIN 300 MG: 300 CAPSULE ORAL at 09:12

## 2019-12-27 RX ADMIN — CITALOPRAM HYDROBROMIDE 20 MG: 10 TABLET ORAL at 09:12

## 2019-12-27 RX ADMIN — FUROSEMIDE 40 MG: 40 TABLET ORAL at 09:12

## 2019-12-27 RX ADMIN — VANCOMYCIN HYDROCHLORIDE 750 MG: 750 INJECTION, POWDER, LYOPHILIZED, FOR SOLUTION INTRAVENOUS at 01:12

## 2019-12-27 RX ADMIN — PANTOPRAZOLE SODIUM 40 MG: 40 TABLET, DELAYED RELEASE ORAL at 09:12

## 2019-12-27 RX ADMIN — PIPERACILLIN AND TAZOBACTAM 4.5 G: 4; .5 INJECTION, POWDER, FOR SOLUTION INTRAVENOUS at 03:12

## 2019-12-27 NOTE — PROGRESS NOTES
"  [facility]  Adult Nutrition  Consult Note    SUMMARY     Recommendations    1. Continue current Cardiac, Dysphagia soft diet.   2. RD to monitor & follow-up.    Goals: Meet % EEN, EPN  Nutrition Goal Status: goal met  Communication of RD Recs: reviewed with RN    Reason for Assessment    Reason For Assessment: RD follow-up  Diagnosis: other (see comments)(Sepsis)  Relevant Medical History: CHF, IVDU  Interdisciplinary Rounds: did not attend    General Information Comments: Pt reports good appetite, consuming 100% of meals. PTA, pt w/ good appetite & stable wt (UBW: 175#). Chart review confirms wt history. NFPE not indicated, pt appears nourished w/ no physical signs of malnutrition.  Nutrition Discharge Planning: Adequate PO intake    Nutrition/Diet History    Spiritual, Cultural Beliefs, Amish Practices, Values that Affect Care: no  Factors Affecting Nutritional Intake: None identified at this time    Anthropometrics    Temp: 98.1 °F (36.7 °C)  Height Method: Stated  Height: 5' 8" (172.7 cm)  Height (inches): 68 in  Weight Method: Bed Scale  Weight: 78.5 kg (173 lb 1 oz)  Weight (lb): 173.06 lb  Ideal Body Weight (IBW), Male: 154 lb  % Ideal Body Weight, Male (lb): 112.38 %  BMI (Calculated): 26.3  BMI Grade: 25 - 29.9 - overweight    Lab/Procedures/Meds    Pertinent Labs Reviewed: reviewed  Pertinent Labs Comments: Na 134  Pertinent Medications Reviewed: reviewed    Estimated/Assessed Needs    Weight Used For Calorie Calculations: 78.5 kg (173 lb 1 oz)     Energy Calorie Requirements (kcal): 2050 kcal/d  Energy Need Method: [place]-St Jeor(1.25 PAL)     Protein Requirements: 79-94 g/d (1-1.2 g/kg)  Weight Used For Protein Calculations: 78.5 kg (173 lb 1 oz)     Estimated Fluid Requirement Method: other (see comments)(Per MD or 1 mL/kcal)     Nutrition Prescription Ordered    Current Diet Order: Cardiac, Dysphagia soft  Nutrition Order Comments: 1500 mL FR    Evaluation of Received " Nutrient/Fluid Intake    Comments: LBM: 12/25    Tolerance: tolerating    Nutrition Risk    Level of Risk/Frequency of Follow-up: (1x/week)     Assessment and Plan    No nutritional dx at this time.     Monitor and Evaluation    Food and Nutrient Intake: energy intake, food and beverage intake  Food and Nutrient Adminstration: diet order  Physical Activity and Function: nutrition-related ADLs and IADLs  Anthropometric Measurements: weight, weight change  Biochemical Data, Medical Tests and Procedures: inflammatory profile, lipid profile, glucose/endocrine profile, electrolyte and renal panel, gastrointestinal profile  Nutrition-Focused Physical Findings: overall appearance     Nutrition Follow-Up    RD Follow-up?: Yes

## 2019-12-27 NOTE — ASSESSMENT & PLAN NOTE
This is a 44 year old male with PMH significant for non-ischemic cardiomyopathy (EF 11%), IVDU, and Hepatitis C who is presenting on 12/10 with two week duration of worsening lower extremity swelling and erythema associated with same duration of post-prandial abdominal pain and distension with intermittent pale colored stools with work-up thus far concerning for tachycardia (120's), leukocytosis (25), lactic acidosis, transaminitis, hyperbilirubinemia, CXR suggestive of right lower lobe pneumonia, and abdominal U/S suggestive of possible acalculous cholecystitis. He has no symptoms of underlying respiratory infection and on chart review CXR abnormalities are similar to those in 08/2019 during admission for CHF exacerbation. UA is unremarkable. Negative for influenza. Suspect bilateral lower extremity edema and erythema more a manifestation of CHF instead of bilateral cellulitis. Differential diagnoses include biliary etiology vs bacteremia in the setting of on-going IVDU vs possible CAP.   -CT scan of chest 12/20: pulmonary abscess involving the right lower lobe, as well as additional areas bilaterally that could represent areas of developing pulmonary abscesses.  Loculated fluid collection.   -12/22: WBC elevated to 42k.  Afebrile and HDs. Patient clinically feels well.  Broadened abx to IV vanc and zosyn.     Plan:   -Vancomycin and Zosyn IV  -Blood Cx NGTD   -Respiratory culture growing GNR and Candida with persistently elevated WBC. Candida in respiratory does not need to get treated.   -Judiciously administer an IVF resuscitation necessary.   -per ID recs: will change Abx to oral Augmentin once chest tube removed

## 2019-12-27 NOTE — ASSESSMENT & PLAN NOTE
Increase in WBC from 18 to 45 on 12/22  No clear etiology as chest tube for suspected empyema (seen on CT on 12/18) placed on 12/20 with adequate drainage  Started on vancomycin and zosyn at time  WBC improving since: 48->31->23->22->14    Plan:  -due to unclear etiology of rapid increase in WBC on 12/22 that appears to be responding to vancomycin/zosyn without clear source (cultures NGTD), ID recs continue the same regimen until chest tube remove then change to PO Augmentin

## 2019-12-27 NOTE — PROGRESS NOTES
Pharmacokinetic Assessment Follow Up: IV Vancomycin    Vancomycin serum concentration assessment(s):    The trough level was drawn correctly and can be used to guide therapy at this time. The measurement is within the desired definitive target range of 15 to 20 mcg/mL.    Vancomycin Regimen Plan:    Continue Vancomycin 750 mg IV every 12 hours with next serum trough concentration measured on 12/29 at 1430 to confirm stability     Drug levels (last 3 results):  Recent Labs   Lab Result Units 12/25/19  1248 12/27/19  1400   Vancomycin-Trough ug/mL 17.7 15.4     Pharmacy will continue to follow and monitor vancomycin.    Please contact pharmacy at extension [phone] for questions regarding this assessment.    Thank you for the consult,   Corina Bearden     Patient brief summary:  Francis Daigle is a 44 y.o. male initiated on antimicrobial therapy with IV Vancomycin for treatment of lower respiratory infection    Drug Allergies:   Review of patient's allergies indicates:  No Known Allergies    Actual Body Weight:   78.5 kg    Renal Function:   Estimated Creatinine Clearance: 81.2 mL/min (based on SCr of 1.1 mg/dL).,     CBC (last 72 hours):  Recent Labs   Lab Result Units 12/25/19  0350 12/26/19  0440 12/27/19  0343   WBC K/uL 22.63* 14.83* 14.83*   Hemoglobin g/dL 10.6* 10.3* 10.6*   Hematocrit % 35.1* 34.1* 34.8*   Platelets K/uL 416* 384* 399*   Gran% % 83.3* 73.3* 74.4*   Lymph% % 9.9* 15.8* 14.5*   Mono% % 5.3 8.0 7.8   Eosinophil% % 0.6 1.9 2.2   Basophil% % 0.3 0.3 0.4   Differential Method  Automated Automated Automated     Metabolic Panel (last 72 hours):  Recent Labs   Lab Result Units 12/25/19  0350 12/26/19  0440 12/27/19  0343   Sodium mmol/L 129* 134* 134*   Potassium mmol/L 4.2 4.0 3.9   Chloride mmol/L 94* 98 97   CO2 mmol/L 28 28 29   Glucose mg/dL 121* 97 90   BUN, Bld mg/dL 15 14 11   Creatinine mg/dL 1.1 1.0 1.1   Albumin g/dL 1.5* 1.4* 1.7*   Total Bilirubin mg/dL 2.1* 1.7* 1.8*   Alkaline  Phosphatase U/L 83 73 89   AST U/L 33 31 28   ALT U/L 17 16 18     Vancomycin Administrations:  vancomycin given in the last 96 hours                   vancomycin 750 mg in dextrose 5 % 250 mL IVPB (ready to mix system) (mg) 750 mg New Bag 12/27/19 1517     750 mg New Bag  0158     750 mg New Bag 12/26/19 1253     750 mg New Bag  0012              Microbiologic Results:  Microbiology Results (last 7 days)     Procedure Component Value Units Date/Time    Fungus culture [[id number]] Collected:  12/20/19 1000    Order Status:  Completed Specimen:  Body Fluid from Pleural Fluid Updated:  12/27/19 1100     Fungus (Mycology) Culture Culture in progress    Blood Culture #1 **CANNOT BE ORDERED STAT** [[id number]] Collected:  12/22/19 0856    Order Status:  Completed Specimen:  Blood Updated:  12/27/19 1012     Blood Culture, Routine No growth after 5 days.    Blood culture [[id number]] Collected:  12/22/19 0946    Order Status:  Completed Specimen:  Blood Updated:  12/27/19 1012     Blood Culture, Routine No growth after 5 days.    Narrative:       Collection has been rescheduled by LW1 at 12/22/2019 09:21 Reason:   Unable to collect nurse Imelda is trying to get 2nd set of cultures  Collection has been rescheduled by LW1 at 12/22/2019 09:21 Reason:   Unable to collect nurse Imelda is trying to get 2nd set of cultures    Culture, Anaerobic [[id number]] Collected:  12/19/19 1506    Order Status:  Completed Specimen:  Body Fluid from Ascites Updated:  12/26/19 0901     Anaerobic Culture No anaerobes isolated    Culture, Body Fluid - Bactec [[id number]] Collected:  12/20/19 1000    Order Status:  Completed Specimen:  Body Fluid from Pleural Fluid Updated:  12/25/19 1612     Body Fluid Culture, Sterile No growth after 5 days.    AFB culture (includes stain) [[id number]] Collected:  12/20/19 1000    Order Status:  Completed Specimen:  Body Fluid from Pleural Fluid Updated:  12/23/19 1407     AFB Culture & Smear Culture in progress      AFB CULTURE STAIN No acid fast bacilli seen.    Aerobic culture [[id number]] Collected:  12/19/19 1506    Order Status:  Completed Specimen:  Body Fluid from Ascites Updated:  12/23/19 0955     Aerobic Bacterial Culture No growth    Blood Culture #1 **CANNOT BE ORDERED STAT** [[id number]] Collected:  12/22/19 0857    Order Status:  Canceled Specimen:  Blood     Gram stain [[id number]] Collected:  12/20/19 1000    Order Status:  Completed Specimen:  Body Fluid from Pleural Fluid Updated:  12/20/19 1628     Gram Stain Result Rare WBC's      No organisms seen

## 2019-12-27 NOTE — PLAN OF CARE
Problem: Physical Therapy Goal  Goal: Physical Therapy Goal  Description  Goals to be met by: 19     Patient will increase functional independence with mobility by performin. Sit to stand transfer with Modified [place]. - GOAL MET  2. Bed to chair transfer with Modified [place] using Rolling Walker or LRAD. - GOAL MET  3. Gait  x 250 feet with Stand-by Assistance using Rolling Walker or LRAD. - GOAL MET  4. Ascend/descend 9 stair with right Handrails Stand-by Assistance using no AD.   5. Lower extremity exercise program x 15 reps per handout, with independence.      Outcome: Met   PT D/Pardeep due to pt able to perform functional mobility including ambulating in the halls with RW and going up/down steps with single rail. Verito Giordano PT 19

## 2019-12-27 NOTE — ASSESSMENT & PLAN NOTE
Recent CT chest now showing possible empyema of right lung.  -pleural fluid WBC elevated to over 2k and LDH elevated well above normal, indicative of likely empyema.   -Chest tube placed on 12/20, drained 700mL of SS fluid.  12/22 showed output of additional 1L    Plan:  -his WBC elevated to 42k on 12/22 that remained elevated on repeat CBC.  Clinically he feels well and has no complaints.  His antibiotics were broadened to Vanc and Zosyn IV.    -Blood Cx NGTD  -PULMONOLOGY on board. Plan for hopefully remove the Chest tube tomorrow if less than 150cc/24hrs   - Ambulatory referral to Pulm upon discharge

## 2019-12-27 NOTE — PLAN OF CARE
"Brief Progress Note  [facility]      Patient seen and examined today. Doing well sating >95% on RA with minimal output from pleural catheter.     Vitals:    12/27/19 0755 12/27/19 1140 12/27/19 1400 12/27/19 1612   BP: 107/75 124/75  126/68   BP Location: Right arm Left arm  Right arm   Patient Position: Sitting Lying  Sitting   Pulse: 109 95  105   Resp: 17 18  17   Temp: 96.9 °F (36.1 °C) 98.1 °F (36.7 °C)  97.5 °F (36.4 °C)   TempSrc: Axillary Oral  Oral   SpO2: 96% 99%  97%   Weight:   78.5 kg (173 lb 1 oz)    Height:   5' 8" (1.727 m)        CXR: No significant interval change.  Right-sided pleural catheter remains in place.  Collection of air surrounding the pleural drain again suggests pneumothorax following evacuation of pleural fluid.  Continued consolidation at the right lower lung zone likely representative of atelectasis.    Pleural catheter removed. Patient tolerated well with pressure dressing in place.        Thank you for allowing us to participate in the care of this patient. We will sign off. Please call with questions.      Radha Navarro M.D., PGY - IV  [facility]    "

## 2019-12-27 NOTE — PROGRESS NOTES
[facility] Hospital Medicine  Progress Note    Patient Name: Francis Daigle  MRN: [id number]  Patient Class: IP- Inpatient   Admission Date: 12/10/2019  Length of Stay: 17 days  Attending Physician: David Potts MD  Primary Care Provider: Primary Doctor 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 He has a PMH significant for non-ischemic cardiomyopathy with combined CHF (EF 11% without ICD or CRT device) and on-going IVDU. He reports a two week duration of progressively worsening bilateral lower extremity swelling and redness of the anterior shins. This is associated with bilateral lower extremity pain with ambulation. He also reports noticing symptom association with worsening of baseline SOB with exertion as well as new abdominal distension and pain, within the same time frame. He describes abdominal pain as a generalized soreness that occurs approximately one hour after eating meals and spontaneously resolves without intervention or medication. He is unsure of how long the discomfort lasts before resolving. Additionally, he reports a two week duration of occasional episodes of pale semi-formed stools. He attempted symptom relief with increasing dose of daily Lasix, however without effect, prompting presentation to ED on 12/09.     He denies symptom association with fevers, chills, nausea, vomiting, history of prior abdominal surgeries, ETOH consumption, chest pain, pain with inspiration, lightheadedness, or blurry vision. He reports a on-going non-productive cough intermittent cough since CHF diagnosis that is unchanged. He does not weigh himself daily and is unsure of any weight loss or gain. He does follow with cardiology outpatient.     Of note, he also reports continued daily use of  IV methamphetamines with most recent usage on 12/08.     ED course: He initially presented to [place] ED on 12/09. Lab were significant for leukocytosis (25), hyponatremia (126), lactic acidosis (3.5), transaminitis (, ALT 73), hyperbilirubinemia (4.5), and toxicology positive for amphetamines. CXR was suggestive of right lower lobe pneumonia. Abdominal U/S was suggestive of acalculous cholecystitis. He was given IVF, Ceftriaxone, and Lasix. Case was discussed with transfer center and AES here; recommended transfer for possible MRCP.     Overview/Hospital Course:  Francis Daigle 44 y.o male presented from OSH with cholilithiasis and cholecystitis. Patient transfer to [facility] for AES evaluation. When patient got admitted to hospital medicine team the patient was transferred to ICU following cardiac arrest on 12/10/19. Emergently intubated during PEA arrest, ROSC achieved after 1 round of CPR. Central line and arterial line placed. Patient initially requiring pressor support, cyanotic, hypoxic on blood gas. Bedside ultrasound with signs of RV strain with DVT in lower extremity. Patient on pressors at the time, requiring 100% FiO2 to oxygenate. Given TPA emergently. Patient started on heparin ggt, bronchoscopy today revealing relatively normal airways and extubated. Most recent ultrasound did not show liver cirrhosis or cholilthiasis or cholecystitis. Patient with RLL PNA getting treated with Zosyn and later switch to Levo. Patient transitioned to Heparin gtt to 10mg BID eliquis. Cardiology will need to consulted for possible ICD or lifevest up on discharge on Monday.   Spoke with cardiology who states that he will likely need an ICD but he can follow up outpatient with cardiology for its placement.  Patient continues to state that he feels better each day.  He does endorse increased anxiety over the last day.  He states that he used to be on treatment in the past.  Started patient on citalopram and instructed  him that he will need to request follow up with psychiatry once he is discharged. Patient has continued midepigastric pain that is associated with meals. Ultrasound for mesenteric ischemia showed no signs of occlusion or stenosis.  Ordered CT scan of the abdomen with contast showing bilateral PE and empyema vs abcess. Pulmonary consulted and plan to CT tomorrow, NPO at Midnight.  Chest tube was preformed today and fluids sent for cultures and cytology.  Patient noted to be tachycardic overnight up to 130s; ECG in the AM noted sinus tachycardia.  His HR slowed to 98 by lunch time and a small 250 mL bolus of LR.  Re-started heparin drip following the chest tube placement per pulmonology recommendations.   Patient has become more anxious and uncooperative today; pulling out IV lines and thrashing about room, demanding norco and IV ativan.  Addressed patient's concerns and provided 1g PO ativan q8 hours.  On 12/22, patient noted to have a spike in his WBC (42k.), trending down. Blood cultures were drawn as well as a lactate.  His antibitoics were broadened to Vancomycin and Zosyn IV.  His CT tube was noted to put out an additional 1L. Curiously, his abdominal pain has improved after lactulose gave him a bowel movement, but complains of it all day, CT abdomen with contrast showed no ileus or obstruction, empyema improved per pulm will remove the chest tube when the output less than 150 cc/24hrs. Psych consulted on 12/23, recommending to gabapentin 100mg TID PRN for anxiety and Gabapentin 300mg qhs. WBC continuing to improve. ID recommended continus same Abx until chest tube remove then switch to Augmentin.      Interval History: No acute events overnight. Chest tube in place, draining 300cc per chart yesterday. Pulmonology following. WBC improving from 48 to 14 today with initiation of Vancomycin with zosyn, however no clear source though possibly due in empyema. No culture growth to date. No abdominal pain on exam  today. He looked better today asking about PT and when he can go home. ID recs continue same Abx until remove chest tube.     Review of Systems   Constitutional: Positive for activity change and fatigue. Negative for chills and fever.   HENT: Negative for congestion.    Eyes: Negative for photophobia and visual disturbance.   Respiratory: Negative for cough and shortness of breath.    Cardiovascular: Negative for chest pain.   Gastrointestinal: Negative for abdominal distention, abdominal pain, constipation, diarrhea, nausea and vomiting.   Genitourinary: Negative for dysuria.   Musculoskeletal: Negative for back pain.   Neurological: Negative for headaches.   Psychiatric/Behavioral: The patient is not nervous/anxious.      Objective:     Vital Signs (Most Recent):  Temp: 98.8 °F (37.1 °C) (12/27/19 0332)  Pulse: 109 (12/27/19 0332)  Resp: 18 (12/27/19 0332)  BP: 113/70 (12/27/19 0332)  SpO2: (!) 93 % (12/27/19 0332) Vital Signs (24h Range):  Temp:  [97.4 °F (36.3 °C)-98.8 °F (37.1 °C)] 98.8 °F (37.1 °C)  Pulse:  [] 109  Resp:  [18] 18  SpO2:  [93 %-99 %] 93 %  BP: ()/(67-73) 113/70     Weight: 78.5 kg (173 lb 1 oz)  Body mass index is 26.31 kg/m².    Intake/Output Summary (Last 24 hours) at 12/27/2019 0647  Last data filed at 12/27/2019 0400  Gross per 24 hour   Intake 1552 ml   Output 2580 ml   Net -1028 ml      Physical Exam   Constitutional: He is oriented to person, place, and time.   Patient is a 45 yo M who appears alert and anxious.    HENT:   Head: Normocephalic and atraumatic.   Eyes: Pupils are equal, round, and reactive to light. EOM are normal.   Neck: Normal range of motion.   Cardiovascular: Normal rate, regular rhythm and normal heart sounds.   Pulmonary/Chest: Effort normal and breath sounds normal. No respiratory distress.   Chest tube in place   Abdominal: Bowel sounds are normal. He exhibits no distension. There is no tenderness.   Musculoskeletal: Normal range of motion.    Neurological: He is alert and oriented to person, place, and time.   Skin: Skin is warm and dry.   Psychiatric: He has a normal mood and affect.       Significant Labs:   BMP:   Recent Labs   Lab 12/27/19  0343   GLU 90   *   K 3.9   CL 97   CO2 29   BUN 11   CREATININE 1.1   CALCIUM 7.8*     CBC:   Recent Labs   Lab 12/26/19  0440 12/27/19  0343   WBC 14.83* 14.83*   HGB 10.3* 10.6*   HCT 34.1* 34.8*   * 399*       Assessment/Plan:      * Sepsis due to undetermined organism  This is a 44 year old male with PMH significant for non-ischemic cardiomyopathy (EF 11%), IVDU, and Hepatitis C who is presenting on 12/10 with two week duration of worsening lower extremity swelling and erythema associated with same duration of post-prandial abdominal pain and distension with intermittent pale colored stools with work-up thus far concerning for tachycardia (120's), leukocytosis (25), lactic acidosis, transaminitis, hyperbilirubinemia, CXR suggestive of right lower lobe pneumonia, and abdominal U/S suggestive of possible acalculous cholecystitis. He has no symptoms of underlying respiratory infection and on chart review CXR abnormalities are similar to those in 08/2019 during admission for CHF exacerbation. UA is unremarkable. Negative for influenza. Suspect bilateral lower extremity edema and erythema more a manifestation of CHF instead of bilateral cellulitis. Differential diagnoses include biliary etiology vs bacteremia in the setting of on-going IVDU vs possible CAP.   -CT scan of chest 12/20: pulmonary abscess involving the right lower lobe, as well as additional areas bilaterally that could represent areas of developing pulmonary abscesses.  Loculated fluid collection.   -12/22: WBC elevated to 42k.  Afebrile and HDs. Patient clinically feels well.  Broadened abx to IV vanc and zosyn.     Plan:   -Vancomycin and Zosyn IV  -Blood Cx NGTD   -Respiratory culture growing GNR and Candida with persistently  elevated WBC. Candida in respiratory does not need to get treated.   -Judiciously administer an IVF resuscitation necessary.   -per ID recs: will change Abx to oral Augmentin once chest tube removed     Leukocytosis  Increase in WBC from 18 to 45 on 12/22  No clear etiology as chest tube for suspected empyema (seen on CT on 12/18) placed on 12/20 with adequate drainage  Started on vancomycin and zosyn at time  WBC improving since: 48->31->23->22->14    Plan:  -due to unclear etiology of rapid increase in WBC on 12/22 that appears to be responding to vancomycin/zosyn without clear source (cultures NGTD), ID recs continue the same regimen until chest tube remove then change to PO Augmentin       Pleural effusion        Empyema  Recent CT chest now showing possible empyema of right lung.  -pleural fluid WBC elevated to over 2k and LDH elevated well above normal, indicative of likely empyema.   -Chest tube placed on 12/20, drained 700mL of SS fluid.  12/22 showed output of additional 1L    Plan:  -his WBC elevated to 42k on 12/22 that remained elevated on repeat CBC.  Clinically he feels well and has no complaints.  His antibiotics were broadened to Vanc and Zosyn IV.    -Blood Cx NGTD  -PULMONOLOGY on board. Plan for hopefully remove the Chest tube tomorrow if less than 150cc/24hrs   - Ambulatory referral to Pulm upon discharge       Abdominal pain  Patient has endorsed episodic abdominal pains (8/10) in his his to upper epigastrium over the last few days.  He associates these pains with meals and has avoided food because of it.  Nothing seems to make better other than letting time pass.  Given patient's presentation with DVTs and possible PE.  Concern for possible mesenteric ischemia vs rib fracture vs GERD.   -CXR negative for any fracture  -Ultrasound with mesenteric view negative for any stenosis or occulusion   -Lipase, Lactic acid WNL. Peritoneal cultures negative (likely portal HTN.)    Plan:  -Patient's  abdominal pain improved after 1x dose of lactulose.  He was noted to have 1x large BM later that day. Likely 2/2 to constipation   -s/p paracentesis on 12/19, which showed no SBP (WBC of 75.) SAAG of 1.1 likely 2/2 to portal HTN  -Continue pantoprazole PO 40mg   -CT abdomen showed no ileus or obstruction. Empyema improved per Pulm reading   -continue to monitor   -Resolved       Anxiety  Patient with history of panic attacks in the past.  Patient describes increased worry and anxiety about his current condition and his recovery.     Plan:  -Psych consulted, recommending continued citalopram 20mg PO, gabapentin 100mg TID PRN, and gabapentin 300mg qhs  -Avoid benzos as patient has polysubstance abuse history       Impaired mobility and ADLs  PT/OT       Wheezing  History of 40PK year. Mild wheeze throughout lung field. Sating well in room air  - continue Ipotroprium Neb Q8H for wheezing       Acute massive pulmonary embolism  DVT lower extremity probable origin of PE. Patient was started on heparin gtt  - right upper extremity arm US showing edema and no clots.     Plan  -continue eliquis       Alteration in skin integrity        Acute deep vein thrombosis (DVT) of proximal vein of lower extremity  - See acute massive pulm embolism       Cardiac arrest  PEA arrest, 2 round of EPI and CPR achieved ROSC.         Hyponatremia  -See assessment for CHF exacerbation.       Chronic hepatitis C without hepatic coma  - HEP C viral load (+) and elevated at 6  - Will need to set up with hepatology for treatment as outpatient       Intravenous drug abuse  -On-going use of methamphetamines.   -Prior hepatitis screening positive for HepC in 10/2019.     Plan:   -Counseled on importance of cessation.   -Monitor for signs of withdrawal for other substances.   -Psychiatry on board, appreciate recs    Community acquired pneumonia of right lower lobe of lung  -See assessment for sepsis.       Acute on chronic combined systolic and  diastolic heart failure  History of non-ischemic cardiomyopathy diagnosed in 11/2017 at [facility].  C in 12/2017 with no evidence of angiographically significant coronary artery disease.  Most recent ECHO in 10/2019 concerning for EF of 35%, grade II diastolic dysfunction, and global hypokinesis with a restrictive physiology. HOME regimen: Carvedilol, Enalapril, Lasix 20 mg BID, and Spironolactone.   .-Suspect presentation from worsening right sided heart failure.     Plan:   - continue HOME regimen: Carvedilol, Enalapril, and Spironolactone  - continue lasix to 40mg PO BID  - continue Toprol XL 100mg QD   -Strict I/O's and daily standing weights.   -Telemetry ordered.   -Cardiology recommends outpatient EP follow up for ICD placement for primary prevention.  They do not recommending placing a life vest or ICD inpatient as patient's cardiac arrest during hospitalization was due to a PE and not a shockable rhythm.  - Ambulatory referral to Cardiology           Essential hypertension  -See assessment for CHF exacerbation.         VTE Risk Mitigation (From admission, onward)         Ordered     apixaban tablet 5 mg  2 times daily      12/23/19 0946     heparin 25,000 units in dextrose 5% 250 mL (100 units/mL) infusion HIGH INTENSITY nomogram - [facility]  Continuous     Question:  Heparin Infusion Adjustment (DO NOT MODIFY ANSWER)  Answer:  \\[email or web address]\Images\[email or web address]\HeparinInfusions\heparin HIGH INTENSITY nomogram for [facility] [id number]    12/19/19 0909     IP VTE HIGH RISK PATIENT  Once      12/10/19 0922                      Freddy Licona MD  Department of Hospital Medicine   [facility]

## 2019-12-27 NOTE — PT/OT/SLP RE-EVAL
"Physical Therapy Re-evaluation/D/C Summary    Patient Name:  Francis Daigle   MRN:  [id number]    Recommendations:     Discharge Recommendations:  outpatient PT   Discharge Equipment Recommendations: shower chair, walker, rolling   Barriers to discharge: Decreased caregiver support lives alone    Assessment:     Francis Daigle is a 44 y.o. male admitted with a medical diagnosis of Sepsis due to undetermined organism.  PT D/Pardeep due to pt able to perform functional mobility with RW and go up/down steps as needed. Pt limited with number of steps today due to attatched to IV line but did steps without difficulty. Pt educated to continue to perform gait and functional mobility, he expressed understanding.    Recent Surgery: * No surgery found *      Plan:     ·  (D/C PT due to pt able to perform functional mobility including up/down steps with single rail support)Plan of Care Expires:  01/26/20   Plan of Care Reviewed with: patient    Subjective     Communicated with nurse prior to session.  Patient found supine with chest tube, peripheral IV upon PT entry to room, agreeable to evaluation.    "I may be going home soon" (PT asked if pt had any concerns about his function upon D/C and pt stated "no")    Pain/Comfort:  · Pain Rating 1: 0/10  · Pain Rating Post-Intervention 1: 0/10    Patients cultural, spiritual, Alevism conflicts given the current situation: no      Objective:     Patient found with: chest tube, peripheral IV     General Precautions: Standard, fall   Orthopedic Precautions:N/A   Braces: N/A     Exams:  · Cognitive Exam:  Patient is oriented to Person and Place  · Sensation:    · -       Intact  light/touch B LE  · RLE ROM: WFL  · RLE Strength: WFL  · LLE ROM: WFL  · LLE Strength: WFL    Functional Mobility:  · Bed Mobility:     · Supine to Sit: independence  · Transfers:     · Sit to Stand:  modified independence with rolling walker  · Gait: 300ft with RW with mod independent, no instability " noted. Pt then ambulated 8ft with no AD in his room with SBA for PT to carry his chest tube and manage IV pole.     AM-PAC 6 CLICK MOBILITY  Total Score:24     Patient left up in chair with all lines intact, call button in reach and nurse notified.    GOALS:   Multidisciplinary Problems     Physical Therapy Goals     Not on file          Multidisciplinary Problems (Resolved)        Problem: Physical Therapy Goal    Goal Priority Disciplines Outcome Goal Variances Interventions   Physical Therapy Goal   (Resolved)     PT, PT/OT Met     Description:  Goals to be met by: 19     Patient will increase functional independence with mobility by performin. Sit to stand transfer with Modified [place]. - GOAL MET  2. Bed to chair transfer with Modified [place] using Rolling Walker or LRAD. - GOAL MET  3. Gait  x 250 feet with Stand-by Assistance using Rolling Walker or LRAD. - GOAL MET  4. Ascend/descend 9 stair with right Handrails Stand-by Assistance using no AD.   5. Lower extremity exercise program x 15 reps per handout, with independence.                       History:     Past Medical History:   Diagnosis Date    Anxiety     Arthritis     CHF (congestive heart failure)     Coronary artery disease     Depression     Hypertension        History reviewed. No pertinent surgical history.    Time Tracking:     PT Received On: 19  PT Start Time: 1150     PT Stop Time: 1208  PT Total Time (min): 18 min     Billable Minutes: Re-eval 8 and Gait Training 10      Verito Giordano, PT  2019

## 2019-12-27 NOTE — PLAN OF CARE
Patient ambulated in his room independently today and in the [facility] with his walker.Patient refuses the bed alarm. Chest tube removed this afternoon. Medications delivered bedside. VS stable. Wound care performed by nurse. Plan to D/C home tomorrow.

## 2019-12-27 NOTE — ASSESSMENT & PLAN NOTE
History of non-ischemic cardiomyopathy diagnosed in 11/2017 at [facility].  [facility] in 12/2017 with no evidence of angiographically significant coronary artery disease.  Most recent ECHO in 10/2019 concerning for EF of 35%, grade II diastolic dysfunction, and global hypokinesis with a restrictive physiology. HOME regimen: Carvedilol, Enalapril, Lasix 20 mg BID, and Spironolactone.   .-Suspect presentation from worsening right sided heart failure.     Plan:   - continue HOME regimen: Carvedilol, Enalapril, and Spironolactone  - continue lasix to 40mg PO BID  - continue Toprol XL 100mg QD   -Strict I/O's and daily standing weights.   -Telemetry ordered.   -Cardiology recommends outpatient EP follow up for ICD placement for primary prevention.  They do not recommending placing a life vest or ICD inpatient as patient's cardiac arrest during hospitalization was due to a PE and not a shockable rhythm.  - Ambulatory referral to Cardiology

## 2019-12-27 NOTE — SUBJECTIVE & OBJECTIVE
Interval History: No acute events overnight. Chest tube in place, draining 300cc per chart yesterday. Pulmonology following. WBC improving from 48 to 14 today with initiation of Vancomycin with zosyn, however no clear source though possibly due in empyema. No culture growth to date. No abdominal pain on exam today. He looked better today asking about PT and when he can go home. ID recs continue same Abx until remove chest tube.     Review of Systems   Constitutional: Positive for activity change and fatigue. Negative for chills and fever.   HENT: Negative for congestion.    Eyes: Negative for photophobia and visual disturbance.   Respiratory: Negative for cough and shortness of breath.    Cardiovascular: Negative for chest pain.   Gastrointestinal: Negative for abdominal distention, abdominal pain, constipation, diarrhea, nausea and vomiting.   Genitourinary: Negative for dysuria.   Musculoskeletal: Negative for back pain.   Neurological: Negative for headaches.   Psychiatric/Behavioral: The patient is not nervous/anxious.      Objective:     Vital Signs (Most Recent):  Temp: 98.8 °F (37.1 °C) (12/27/19 0332)  Pulse: 109 (12/27/19 0332)  Resp: 18 (12/27/19 0332)  BP: 113/70 (12/27/19 0332)  SpO2: (!) 93 % (12/27/19 0332) Vital Signs (24h Range):  Temp:  [97.4 °F (36.3 °C)-98.8 °F (37.1 °C)] 98.8 °F (37.1 °C)  Pulse:  [] 109  Resp:  [18] 18  SpO2:  [93 %-99 %] 93 %  BP: ()/(67-73) 113/70     Weight: 78.5 kg (173 lb 1 oz)  Body mass index is 26.31 kg/m².    Intake/Output Summary (Last 24 hours) at 12/27/2019 0647  Last data filed at 12/27/2019 0400  Gross per 24 hour   Intake 1552 ml   Output 2580 ml   Net -1028 ml      Physical Exam   Constitutional: He is oriented to person, place, and time.   Patient is a 43 yo M who appears alert and anxious.    HENT:   Head: Normocephalic and atraumatic.   Eyes: Pupils are equal, round, and reactive to light. EOM are normal.   Neck: Normal range of motion.    Cardiovascular: Normal rate, regular rhythm and normal heart sounds.   Pulmonary/Chest: Effort normal and breath sounds normal. No respiratory distress.   Chest tube in place   Abdominal: Bowel sounds are normal. He exhibits no distension. There is no tenderness.   Musculoskeletal: Normal range of motion.   Neurological: He is alert and oriented to person, place, and time.   Skin: Skin is warm and dry.   Psychiatric: He has a normal mood and affect.       Significant Labs:   BMP:   Recent Labs   Lab 12/27/19  0343   GLU 90   *   K 3.9   CL 97   CO2 29   BUN 11   CREATININE 1.1   CALCIUM 7.8*     CBC:   Recent Labs   Lab 12/26/19  0440 12/27/19  0343   WBC 14.83* 14.83*   HGB 10.3* 10.6*   HCT 34.1* 34.8*   * 399*

## 2019-12-27 NOTE — PLAN OF CARE
Problem: Adjustment to Illness (Sepsis/Septic Shock)  Goal: Optimal Coping  Outcome: Ongoing, Progressing  Intervention: Optimize Psychosocial Adjustment to Illness  Flowsheets (Taken 12/27/2019 0356)  Supportive Measures: active listening utilized; self-care encouraged; verbalization of feelings encouraged; positive reinforcement provided  Family/Support System Care: presence promoted     Problem: Infection (Sepsis/Septic Shock)  Goal: Absence of Infection Signs/Symptoms  Outcome: Ongoing, Progressing  Intervention: Prevent or Manage Infection Progression  Flowsheets (Taken 12/27/2019 0356)  Infection Prevention: environmental surveillance performed; rest/sleep promoted; single patient room provided  Fever Reduction/Comfort Measures: lightweight clothing; medication administered; lightweight bedding  Infection Management: aseptic technique maintained  Isolation Precautions: protective environment maintained   POC discussed with patient. Patient Aaox4. Patient was upset because he did not have Ativan ordered. Pt stated he has anxiety and needs Ativan to go to sleep.

## 2019-12-27 NOTE — PLAN OF CARE
Plan was for LTAC with chest tube and antibiotics.  If chest tube removed, patient will transition to PO antibiotics per ID note and discharge to home with family.       12/27/19 7303   Discharge Reassessment   Assessment Type Discharge Planning Reassessment   Do you have any problems affording any of your prescribed medications? No   Discharge Plan A Home with family   Discharge Plan B Rehab   DME Needed Upon Discharge  none   Anticipated Discharge Disposition Home

## 2019-12-28 VITALS
DIASTOLIC BLOOD PRESSURE: 58 MMHG | RESPIRATION RATE: 17 BRPM | OXYGEN SATURATION: 95 % | TEMPERATURE: 98 F | BODY MASS INDEX: 25.13 KG/M2 | HEIGHT: 68 IN | HEART RATE: 90 BPM | WEIGHT: 165.81 LBS | SYSTOLIC BLOOD PRESSURE: 93 MMHG

## 2019-12-28 LAB
ALBUMIN SERPL BCP-MCNC: 1.7 G/DL (ref 3.5–5.2)
ALP SERPL-CCNC: 85 U/L (ref 55–135)
ALT SERPL W/O P-5'-P-CCNC: 19 U/L (ref 10–44)
ANION GAP SERPL CALC-SCNC: 6 MMOL/L (ref 8–16)
AST SERPL-CCNC: 32 U/L (ref 10–40)
BASOPHILS # BLD AUTO: 0.08 K/UL (ref 0–0.2)
BASOPHILS NFR BLD: 0.6 % (ref 0–1.9)
BILIRUB SERPL-MCNC: 1.9 MG/DL (ref 0.1–1)
BUN SERPL-MCNC: 9 MG/DL (ref 6–20)
CALCIUM SERPL-MCNC: 7.7 MG/DL (ref 8.7–10.5)
CHLORIDE SERPL-SCNC: 96 MMOL/L (ref 95–110)
CO2 SERPL-SCNC: 29 MMOL/L (ref 23–29)
CREAT SERPL-MCNC: 0.8 MG/DL (ref 0.5–1.4)
DIFFERENTIAL METHOD: ABNORMAL
EOSINOPHIL # BLD AUTO: 0.3 K/UL (ref 0–0.5)
EOSINOPHIL NFR BLD: 2.2 % (ref 0–8)
ERYTHROCYTE [DISTWIDTH] IN BLOOD BY AUTOMATED COUNT: 23.9 % (ref 11.5–14.5)
EST. GFR  (AFRICAN AMERICAN): >60 ML/MIN/1.73 M^2
EST. GFR  (NON AFRICAN AMERICAN): >60 ML/MIN/1.73 M^2
GLUCOSE SERPL-MCNC: 88 MG/DL (ref 70–110)
HCT VFR BLD AUTO: 35.9 % (ref 40–54)
HGB BLD-MCNC: 11.1 G/DL (ref 14–18)
IMM GRANULOCYTES # BLD AUTO: 0.08 K/UL (ref 0–0.04)
IMM GRANULOCYTES NFR BLD AUTO: 0.6 % (ref 0–0.5)
INR PPP: 1.4 (ref 0.8–1.2)
LYMPHOCYTES # BLD AUTO: 1.5 K/UL (ref 1–4.8)
LYMPHOCYTES NFR BLD: 10.5 % (ref 18–48)
MCH RBC QN AUTO: 26.6 PG (ref 27–31)
MCHC RBC AUTO-ENTMCNC: 30.9 G/DL (ref 32–36)
MCV RBC AUTO: 86 FL (ref 82–98)
MONOCYTES # BLD AUTO: 1.1 K/UL (ref 0.3–1)
MONOCYTES NFR BLD: 7.6 % (ref 4–15)
NEUTROPHILS # BLD AUTO: 11.1 K/UL (ref 1.8–7.7)
NEUTROPHILS NFR BLD: 78.5 % (ref 38–73)
NRBC BLD-RTO: 0 /100 WBC
PLATELET # BLD AUTO: 383 K/UL (ref 150–350)
PMV BLD AUTO: 9.2 FL (ref 9.2–12.9)
POTASSIUM SERPL-SCNC: 4.2 MMOL/L (ref 3.5–5.1)
PROT SERPL-MCNC: 6.4 G/DL (ref 6–8.4)
PROTHROMBIN TIME: 13.5 SEC (ref 9–12.5)
RBC # BLD AUTO: 4.18 M/UL (ref 4.6–6.2)
SODIUM SERPL-SCNC: 131 MMOL/L (ref 136–145)
WBC # BLD AUTO: 14.17 K/UL (ref 3.9–12.7)

## 2019-12-28 PROCEDURE — 25000003 PHARM REV CODE 250: Performed by: STUDENT IN AN ORGANIZED HEALTH CARE EDUCATION/TRAINING PROGRAM

## 2019-12-28 PROCEDURE — 85025 COMPLETE CBC W/AUTO DIFF WBC: CPT

## 2019-12-28 PROCEDURE — 85610 PROTHROMBIN TIME: CPT

## 2019-12-28 PROCEDURE — 99233 SBSQ HOSP IP/OBS HIGH 50: CPT | Mod: ,,, | Performed by: INTERNAL MEDICINE

## 2019-12-28 PROCEDURE — 87902 NFCT AGT GNTYP ALYS HEP C: CPT

## 2019-12-28 PROCEDURE — 99233 PR SUBSEQUENT HOSPITAL CARE,LEVL III: ICD-10-PCS | Mod: ,,, | Performed by: INTERNAL MEDICINE

## 2019-12-28 PROCEDURE — 99239 HOSP IP/OBS DSCHRG MGMT >30: CPT | Mod: ,,, | Performed by: HOSPITALIST

## 2019-12-28 PROCEDURE — 63600175 PHARM REV CODE 636 W HCPCS: Performed by: HOSPITALIST

## 2019-12-28 PROCEDURE — 80053 COMPREHEN METABOLIC PANEL: CPT

## 2019-12-28 PROCEDURE — 25000003 PHARM REV CODE 250: Performed by: HOSPITALIST

## 2019-12-28 PROCEDURE — 87522 HEPATITIS C REVRS TRNSCRPJ: CPT

## 2019-12-28 PROCEDURE — 99239 PR HOSPITAL DISCHARGE DAY,>30 MIN: ICD-10-PCS | Mod: ,,, | Performed by: HOSPITALIST

## 2019-12-28 RX ADMIN — ENALAPRIL MALEATE 2.5 MG: 2.5 TABLET ORAL at 09:12

## 2019-12-28 RX ADMIN — HYDROXYZINE HYDROCHLORIDE 25 MG: 25 TABLET, FILM COATED ORAL at 08:12

## 2019-12-28 RX ADMIN — PIPERACILLIN AND TAZOBACTAM 4.5 G: 4; .5 INJECTION, POWDER, FOR SOLUTION INTRAVENOUS at 12:12

## 2019-12-28 RX ADMIN — PIPERACILLIN AND TAZOBACTAM 4.5 G: 4; .5 INJECTION, POWDER, FOR SOLUTION INTRAVENOUS at 09:12

## 2019-12-28 RX ADMIN — APIXABAN 5 MG: 5 TABLET, FILM COATED ORAL at 09:12

## 2019-12-28 RX ADMIN — FUROSEMIDE 40 MG: 40 TABLET ORAL at 09:12

## 2019-12-28 RX ADMIN — PANTOPRAZOLE SODIUM 40 MG: 40 TABLET, DELAYED RELEASE ORAL at 09:12

## 2019-12-28 RX ADMIN — VANCOMYCIN HYDROCHLORIDE 750 MG: 750 INJECTION, POWDER, LYOPHILIZED, FOR SOLUTION INTRAVENOUS at 04:12

## 2019-12-28 RX ADMIN — METOPROLOL SUCCINATE 100 MG: 50 TABLET, EXTENDED RELEASE ORAL at 09:12

## 2019-12-28 RX ADMIN — CITALOPRAM HYDROBROMIDE 20 MG: 10 TABLET ORAL at 09:12

## 2019-12-28 NOTE — DISCHARGE INSTRUCTIONS
Sepsis     To treat sepsis, antibiotics and fluids may by given through an intravenous (IV) line.     Sepsis happens when your body responds with widespread inflammation to a bad infection or bacteremia--the presence of bacteria in your bloodstream. Sepsis can be deadly. Blood pressure may drop and the lungs and kidneys may start to fail. Emergency care for sepsis is crucial.  Risk factors  Those most at risk for sepsis are:  · Infants or older adults  · People who have an illness that weakens their immune system, such as cancer, AIDS, or diabetes  · People being treated with chemotherapy medicines or radiation, which weakens the immune system  · People who have had a transplant  · People with a very severe infection such as pneumonia, meningitis, or a urinary tract infection  When to go to the emergency department (ED)  Sepsis is an emergency. Go to the nearest ED if you have a fever with any of these symptoms:  · Chills and shaking  · Rapid heartbeat and breathing  · Trouble breathing  · Severe nausea or uncontrolled vomiting  · Confusion, disorientation, drowsiness, or dizziness  · Decreased urination  · Severe pain, including in the back or joints   What to expect in the ED  · Blood and urine tests are done to look for bacteria. They also check for organ failure.  · Blood, urine, or sputum cultures may be taken. The samples are sent to a lab. They are placed in a special container. Any bacteria should grow in 24 hours.  · X-rays or other imaging tests may be done.  A person with sepsis will be admitted to the hospital and treated with antibiotics. Treatment may also include oxygen and intravenous fluids.  Date Last Reviewed: 10/1/2016  © 0205-1537 [employer]. [address] [place] [address], [place] [address]. All rights reserved. This information is not intended as a substitute for professional medical care. Always follow your healthcare professional's instructions.

## 2019-12-28 NOTE — DISCHARGE SUMMARY
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 He has a PMH significant for non-ischemic cardiomyopathy with combined CHF (EF 11% without ICD or CRT device) and on-going IVDU. He reports a two week duration of progressively worsening bilateral lower extremity swelling and redness of the anterior shins. This is associated with bilateral lower extremity pain with ambulation. He also reports noticing symptom association with worsening of baseline SOB with exertion as well as new abdominal distension and pain, within the same time frame. He describes abdominal pain as a generalized soreness that occurs approximately one hour after eating meals and spontaneously resolves without intervention or medication. He is unsure of how long the discomfort lasts before resolving. Additionally, he reports a two week duration of occasional episodes of pale semi-formed stools. He attempted symptom relief with increasing dose of daily Lasix, however without effect, prompting presentation to ED on 12/09.     He denies symptom association with fevers, chills, nausea, vomiting, history of prior abdominal surgeries, ETOH consumption, chest pain, pain with inspiration, lightheadedness, or blurry vision. He reports a on-going non-productive cough intermittent cough since CHF diagnosis that is unchanged. He does not weigh himself daily and is unsure of any weight loss or gain. He does follow with cardiology outpatient.     Of note, he also reports continued daily use of IV  methamphetamines with most recent usage on 12/08.     ED course: He initially presented to [facility] ED on 12/09. Lab were significant for leukocytosis (25), hyponatremia (126), lactic acidosis (3.5), transaminitis (, ALT 73), hyperbilirubinemia (4.5), and toxicology positive for amphetamines. CXR was suggestive of right lower lobe pneumonia. Abdominal U/S was suggestive of acalculous cholecystitis. He was given IVF, Ceftriaxone, and Lasix. Case was discussed with transfer center and AES here; recommended transfer for possible MRCP.     * No surgery found *      Hospital Course:   Francis Daigle 44 y.o male presented from OSH with cholilithiasis and cholecystitis. Patient transfer to [facility] for AES evaluation. When patient got admitted to hospital medicine team the patient was transferred to ICU following cardiac arrest on 12/10/19. Emergently intubated during PEA arrest, ROSC achieved after 1 round of CPR. Central line and arterial line placed. Patient initially requiring pressor support, cyanotic, hypoxic on blood gas. Bedside ultrasound with signs of RV strain with DVT in lower extremity. Patient on pressors at the time, requiring 100% FiO2 to oxygenate. Given TPA emergently. Patient started on heparin ggt, bronchoscopy today revealing relatively normal airways and extubated. Most recent ultrasound did not show liver cirrhosis or cholilthiasis or cholecystitis. Patient with RLL PNA getting treated with Zosyn and later switch to Levo. Patient transitioned to Heparin gtt to 10mg BID eliquis. Cardiology will need to consulted for possible ICD or lifevest up on discharge on Monday.   Spoke with cardiology who states that he will likely need an ICD but he can follow up outpatient with cardiology for its placement.  Patient continues to state that he feels better each day.  He does endorse increased anxiety over the last day.  He states that he used to be on treatment in the past.  Started patient on citalopram  and instructed him that he will need to request follow up with psychiatry once he is discharged. Patient has continued midepigastric pain that is associated with meals. Ultrasound for mesenteric ischemia showed no signs of occlusion or stenosis.  Ordered CT scan of the abdomen with contast showing bilateral PE and empyema vs abcess. Pulmonary consulted and plan to CT tomorrow, NPO at Midnight.  Chest tube was preformed today and fluids sent for cultures and cytology.  Patient noted to be tachycardic overnight up to 130s; ECG in the AM noted sinus tachycardia.  His HR slowed to 98 by lunch time and a small 250 mL bolus of LR.  Re-started heparin drip following the chest tube placement per pulmonology recommendations.   Patient has become more anxious and uncooperative today; pulling out IV lines and thrashing about room, demanding norco and IV ativan.  Addressed patient's concerns and provided 1g PO ativan q8 hours.  On 12/22, patient noted to have a spike in his WBC (42k.), trending down. Blood cultures were drawn as well as a lactate.  His antibitoics were broadened to Vancomycin and Zosyn IV.  His CT tube was noted to put out an additional 1L. Curiously, his abdominal pain has improved after lactulose gave him a bowel movement, but complains of it all day, CT abdomen with contrast showed no ileus or obstruction, empyema improved per pulm will remove the chest tube when the output less than 150 cc/24hrs. Psych consulted on 12/23, recommending to gabapentin 100mg TID PRN for anxiety and Gabapentin 300mg qhs. WBC continuing to improve. ID recommended continus same Abx until chest tube remove then switch to Augmentin. Chest tube removed 12/27. Patient for discharged today, medications to be delivered at bedside. Referrals placed for Cardiology, pulmonology, hepatology (to assess patient for treatment of confirmed chronic hepatitis-C), and PCP.     Consults:   Consults (From admission, onward)        Status Ordering  Provider     Inpatient consult to Cardiology  Once     Provider:  (Not yet assigned)    Completed SHERWIN CRAWFORD     Inpatient consult to Infectious Diseases  Once     Provider:  (Not yet assigned)    Completed MAURO COTTO     Inpatient consult to Infectious Diseases  Once     Provider:  (Not yet assigned)    Completed SHARON MENDEZ     Inpatient consult to Physical Medicine Rehab  Once     Provider:  (Not yet assigned)    Completed SHARMIN CHAUDHRY     Inpatient consult to Psychiatry  Once     Provider:  (Not yet assigned)    Completed SHARON MENDEZ     Inpatient consult to Pulmonology  Once     Provider:  (Not yet assigned)    Completed SHARMIN CHAUDHRY     Pharmacy to dose Vancomycin consult  Once     Provider:  (Not yet assigned)    Acknowledged YAZ SCHAFFER          No new Assessment & Plan notes have been filed under this hospital service since the last note was generated.  Service: Hospital Medicine    Final Active Diagnoses:    Diagnosis Date Noted POA    PRINCIPAL PROBLEM:  Sepsis due to undetermined organism [A41.9] 12/10/2019 Yes    Leukocytosis [D72.829] 12/25/2019 Unknown    Pleural effusion [J90] 12/21/2019 Unknown    Empyema [J86.9] 12/19/2019 Unknown    Abdominal pain [R10.9] 12/18/2019 Unknown    Anxiety [F41.9] 12/17/2019 Unknown    Impaired mobility and ADLs [Z74.09] 12/16/2019 No    Wheezing [R06.2] 12/14/2019 Unknown    Acute massive pulmonary embolism [I26.99] 12/11/2019 Unknown    Acute on chronic combined systolic and diastolic heart failure [I50.43] 12/10/2019 Yes    Community acquired pneumonia of right lower lobe of lung [J18.1] 12/10/2019 Yes    Intravenous drug abuse [F19.10] 12/10/2019 Yes    Chronic hepatitis C without hepatic coma [B18.2] 12/10/2019 Yes    Hyponatremia [E87.1] 12/10/2019 Yes    Alteration in skin integrity [R23.9] 12/10/2019 Yes    Cardiac arrest [I46.9]  No    Acute deep vein thrombosis (DVT) of proximal vein of lower extremity  [I82.4Y9]  Yes    Essential hypertension [I10] 08/23/2019 Yes      Problems Resolved During this Admission:    Diagnosis Date Noted Date Resolved POA    Hemoptysis [R04.2]  12/17/2019 No    Lactic acidosis [E87.2] 12/10/2019 12/20/2019 Yes    Transaminitis [R74.0] 12/10/2019 12/25/2019 Yes    Hyperbilirubinemia [E80.6] 12/10/2019 12/17/2019 Yes    Non-ischemic cardiomyopathy [I42.8] 12/10/2019 12/22/2019 Yes    Acute hypoxemic respiratory failure [J96.01]  12/20/2019 Yes    Other specified anxiety disorders [F41.8] 08/23/2019 12/22/2019 No       Discharged Condition: good    Disposition:     Follow Up:    Patient Instructions:      Ambulatory Referral to Hepatology   Referral Priority: Routine Referral Type: Consultation   Referral Reason: Specialty Services Required   Requested Specialty: Hepatology   Number of Visits Requested: 1     Ambulatory Referral to Cardiology   Referral Priority: Routine Referral Type: Consultation   Referral Reason: Specialty Services Required   Requested Specialty: Cardiology   Number of Visits Requested: 1     Ambulatory Referral to Internal Medicine   Referral Priority: Routine Referral Type: Consultation   Referral Reason: Specialty Services Required   Requested Specialty: Internal Medicine   Number of Visits Requested: 1     Ambulatory Referral to Pulmonology   Referral Priority: Routine Referral Type: Consultation   Referral Reason: Specialty Services Required   Requested Specialty: Pulmonary Disease   Number of Visits Requested: 1       Significant Diagnostic Studies: Labs:   CMP   Recent Labs   Lab 12/27/19 0343 12/28/19  0521   * 131*   K 3.9 4.2   CL 97 96   CO2 29 29   GLU 90 88   BUN 11 9   CREATININE 1.1 0.8   CALCIUM 7.8* 7.7*   PROT 6.4 6.4   ALBUMIN 1.7* 1.7*   BILITOT 1.8* 1.9*   ALKPHOS 89 85   AST 28 32   ALT 18 19   ANIONGAP 8 6*   ESTGFRAFRICA >60.0 >60.0   EGFRNONAA >60.0 >60.0    and CBC   Recent Labs   Lab 12/27/19 0343 12/28/19  0521   WBC 14.83*  14.17*   HGB 10.6* 11.1*   HCT 34.8* 35.9*   * 383*       Pending Diagnostic Studies:     Procedure Component Value Units Date/Time    Hepatitis C RNA, quantitative, PCR [[id number]] Collected:  12/10/19 1100    Order Status:  Sent Lab Status:  No result     Specimen:  Blood     Hepatitis C genotype [[id number]] Collected:  12/28/19 0521    Order Status:  Sent Lab Status:  In process Updated:  12/28/19 0701    Specimen:  Blood     Lactic acid, plasma [[id number]] Collected:  12/10/19 0705    Order Status:  Sent Lab Status:  In process Updated:  12/10/19 0708    Specimen:  Blood     Narrative:       Collection has been rescheduled by CDP at 12/10/2019 07:00 Reason:   Nurse Draw    Legionella culture Sputum, Expectorated [[id number]] Collected:  12/11/19 1125    Order Status:  Sent Lab Status:  In process Updated:  12/11/19 1429    Specimen:  Sputum Expectorated     Mycoplasma pneumoniae Ab IgG & IgM [[id number]] Collected:  12/10/19 1100    Order Status:  Sent Lab Status:  No result     Specimen:  Blood          Medications:  Reconciled Home Medications:      Medication List      START taking these medications    amoxicillin-clavulanate 875-125mg 875-125 mg per tablet  Commonly known as:  AUGMENTIN  Take 1 tablet by mouth every 12 (twelve) hours. for 5 days     citalopram 20 MG tablet  Commonly known as:  CELEXA  Take 1 tablet (20 mg total) by mouth once daily.     Eliquis 5 mg Tab  Generic drug:  apixaban  Take 1 tablet (5 mg total) by mouth 2 (two) times daily.     * gabapentin 300 MG capsule  Commonly known as:  NEURONTIN  Take 1 capsule (300 mg total) by mouth every evening.     * gabapentin 100 MG capsule  Commonly known as:  NEURONTIN  Take 1 capsule (100 mg total) by mouth 3 (three) times daily as needed (anxiety/agitation/neuropathic pain).     hydrOXYzine HCl 25 MG tablet  Commonly known as:  ATARAX  Take 1 tablet (25 mg total) by mouth once daily.     lactulose 10 gram/15 mL solution  Commonly known as:   CHRONULAC  Take 15 mLs (10 g total) by mouth 3 (three) times daily as needed (constipation).     metoprolol succinate 100 MG 24 hr tablet  Commonly known as:  TOPROL-XL  Take 1 tablet (100 mg total) by mouth once daily.     pantoprazole 40 MG tablet  Commonly known as:  PROTONIX  Take 1 tablet (40 mg total) by mouth once daily.     spironolactone 25 MG tablet  Commonly known as:  ALDACTONE  Take 1 tablet (25 mg total) by mouth once daily.         * This list has 2 medication(s) that are the same as other medications prescribed for you. Read the directions carefully, and ask your doctor or other care provider to review them with you.            CHANGE how you take these medications    furosemide 40 MG tablet  Commonly known as:  LASIX  Take 1 tablet (40 mg total) by mouth once daily.  What changed:    · medication strength  · how much to take  · when to take this        CONTINUE taking these medications    acetaminophen 325 MG tablet  Commonly known as:  TYLENOL  Take 325 mg by mouth every 6 (six) hours as needed for Pain.     enalapril 2.5 MG tablet  Commonly known as:  VASOTEC  Take 1 tablet (2.5 mg total) by mouth once daily.        STOP taking these medications    carvedilol 3.125 MG tablet  Commonly known as:  COREG            Indwelling Lines/Drains at time of discharge:   Lines/Drains/Airways     None                          Freddy Licona MD  Department of Hospital Medicine  [facility]

## 2019-12-28 NOTE — PROGRESS NOTES
Therapy with vancomycin complete and/or consult discontinued by provider.  Pharmacy will sign off, please re-consult as needed.    Dena Tran, PharmD  PGY-2 Pharmacy Resident- Internal Medicine  [facility] [id number]

## 2019-12-28 NOTE — NURSING
Patient discharged via transportation service to home. Discharge instructions given to Patient. IV removed per md order.

## 2019-12-28 NOTE — PLAN OF CARE
Problem: Infection (Sepsis/Septic Shock)  Goal: Absence of Infection Signs/Symptoms  Outcome: Ongoing, Progressing  Intervention: Prevent or Manage Infection Progression  Flowsheets (Taken 12/28/2019 0409)  Infection Prevention: rest/sleep promoted; single patient room provided  Fever Reduction/Comfort Measures: lightweight bedding; medication administered; lightweight clothing  Infection Management: aseptic technique maintained  Isolation Precautions: protective environment maintained     Problem: Sleep Impairment (Anxiety Signs/Symptoms)  Goal: Improved Sleep Hygiene (Anxiety Signs/Symptoms)  Outcome: Ongoing, Progressing  Intervention: Promote Healthy Sleep Hygiene  Flowsheets (Taken 12/28/2019 0409)  Sleep Hygiene Promotion: awakenings minimized; noise level reduced; regular sleep pattern promoted; room lighting adjusted   POC discussed with patient. Patient Aaox4. Patient bed is in the lowest position. Bed wheels are locked. Call light is within reach.

## 2019-12-28 NOTE — PLAN OF CARE
CM placed a request with patient flow center for ambulatory transport to take patient home to address listed in chart.  Patient's nurse verified patient has a way to get into home when he gets there.  Any questions about transport to be [place] with [place] flow [place] at [phone] option 5.  Transport requested for 1:30 PM.

## 2019-12-30 NOTE — PLAN OF CARE
Patient discharged via ambulatory transport after sitting on hold with medicaid transport for 30 minutes.       12/30/19 1546   Final Note   Assessment Type Final Discharge Note   Anticipated Discharge Disposition Home   Right Care Referral Info   Post Acute Recommendation No Care

## 2020-01-03 LAB
HCV GENTYP SERPL NAA+PROBE: 3
HCV RNA SERPL NAA+PROBE-LOG IU: 6.12 LOG (10) IU/ML
HCV RNA SERPL QL NAA+PROBE: DETECTED
HCV RNA SPEC NAA+PROBE-ACNC: ABNORMAL IU/ML

## 2020-01-10 ENCOUNTER — DOCUMENTATION ONLY (OUTPATIENT)
Dept: TRANSPLANT | Facility: CLINIC | Age: 45
End: 2020-01-10

## 2020-01-10 NOTE — LETTER
January 10, 2020    Francis Daigle  [address] [place] [address]      Dear Francis Daigle:    Your doctor has referred you to the [facility]. We are sending this letter to remind you to make an appointment with us to complete the referral process.     Please call us at [phone] to schedule an appointment. We look forward to seeing you soon.     If you received a call and have been scheduled, please disregard this letter.       Sincerely,        [facility]   [address] [place] [id number]  [phone]

## 2020-01-10 NOTE — NURSING
Pt records reviewed.   Pt will be referred to Hepatitis C clinic  Transaminitis  Chronic hepatitis C without hepatic coma  Initial referral received  from the [facility]que.   Referring Provider/diagnosis  Freddy Licona MD    Referral letter sent to  patient.

## 2020-01-15 LAB — FUNGUS SPEC CULT: ABNORMAL

## 2020-01-21 LAB — FUNGUS SPEC CULT: NORMAL

## 2020-01-27 ENCOUNTER — HOSPITAL ENCOUNTER (INPATIENT)
Facility: HOSPITAL | Age: 45
LOS: 18 days | Discharge: HOME | DRG: 853 | End: 2020-02-14
Attending: EMERGENCY MEDICINE | Admitting: INTERNAL MEDICINE
Payer: MEDICAID

## 2020-01-27 DIAGNOSIS — I50.22 CHF (CONGESTIVE HEART FAILURE), NYHA CLASS III, CHRONIC, SYSTOLIC: ICD-10-CM

## 2020-01-27 DIAGNOSIS — J18.9 PNEUMONIA DUE TO INFECTIOUS ORGANISM, UNSPECIFIED LATERALITY, UNSPECIFIED PART OF LUNG: ICD-10-CM

## 2020-01-27 DIAGNOSIS — E87.70 VOLUME OVERLOAD: Primary | ICD-10-CM

## 2020-01-27 DIAGNOSIS — J18.9 PNEUMONIA: ICD-10-CM

## 2020-01-27 DIAGNOSIS — J91.8 PARAPNEUMONIC EFFUSION: ICD-10-CM

## 2020-01-27 DIAGNOSIS — R65.20 SEVERE SEPSIS: ICD-10-CM

## 2020-01-27 DIAGNOSIS — E87.70 HYPERVOLEMIA, UNSPECIFIED HYPERVOLEMIA TYPE: ICD-10-CM

## 2020-01-27 DIAGNOSIS — J18.9 PARAPNEUMONIC EFFUSION: ICD-10-CM

## 2020-01-27 DIAGNOSIS — A41.9 SEVERE SEPSIS: ICD-10-CM

## 2020-01-27 DIAGNOSIS — N50.89 SCROTAL EDEMA: ICD-10-CM

## 2020-01-27 PROBLEM — R74.8 LIVER ENZYME ELEVATION: Status: ACTIVE | Noted: 2020-01-27

## 2020-01-27 PROBLEM — R60.1 ANASARCA: Status: ACTIVE | Noted: 2020-01-27

## 2020-01-27 LAB
ALBUMIN SERPL BCP-MCNC: 2.1 G/DL (ref 3.5–5.2)
ALP SERPL-CCNC: 114 U/L (ref 55–135)
ALT SERPL W/O P-5'-P-CCNC: 123 U/L (ref 10–44)
AMMONIA PLAS-SCNC: 18 UMOL/L (ref 10–50)
ANION GAP SERPL CALC-SCNC: 11 MMOL/L (ref 8–16)
AST SERPL-CCNC: 217 U/L (ref 10–40)
BASOPHILS # BLD AUTO: 0.08 K/UL (ref 0–0.2)
BASOPHILS NFR BLD: 0.2 % (ref 0–1.9)
BILIRUB SERPL-MCNC: 5.9 MG/DL (ref 0.1–1)
BILIRUB UR QL STRIP: NEGATIVE
BNP SERPL-MCNC: 3101 PG/ML (ref 0–99)
BUN SERPL-MCNC: 34 MG/DL (ref 6–20)
CALCIUM SERPL-MCNC: 8.6 MG/DL (ref 8.7–10.5)
CHLORIDE SERPL-SCNC: 99 MMOL/L (ref 95–110)
CLARITY UR: CLEAR
CO2 SERPL-SCNC: 22 MMOL/L (ref 23–29)
COLOR UR: YELLOW
CREAT SERPL-MCNC: 1 MG/DL (ref 0.5–1.4)
DIFFERENTIAL METHOD: ABNORMAL
EOSINOPHIL # BLD AUTO: 0 K/UL (ref 0–0.5)
EOSINOPHIL NFR BLD: 0 % (ref 0–8)
ERYTHROCYTE [DISTWIDTH] IN BLOOD BY AUTOMATED COUNT: 21.1 % (ref 11.5–14.5)
EST. GFR  (AFRICAN AMERICAN): >60 ML/MIN/1.73 M^2
EST. GFR  (NON AFRICAN AMERICAN): >60 ML/MIN/1.73 M^2
GLUCOSE SERPL-MCNC: 101 MG/DL (ref 70–110)
GLUCOSE UR QL STRIP: NEGATIVE
HCT VFR BLD AUTO: 36.3 % (ref 40–54)
HGB BLD-MCNC: 11.9 G/DL (ref 14–18)
HGB UR QL STRIP: ABNORMAL
IMM GRANULOCYTES # BLD AUTO: 0.27 K/UL (ref 0–0.04)
IMM GRANULOCYTES NFR BLD AUTO: 0.7 % (ref 0–0.5)
INR PPP: 3.3
KETONES UR QL STRIP: NEGATIVE
LACTATE SERPL-SCNC: 2.9 MMOL/L (ref 0.5–1.9)
LACTATE SERPL-SCNC: 3.2 MMOL/L (ref 0.5–1.9)
LEUKOCYTE ESTERASE UR QL STRIP: NEGATIVE
LYMPHOCYTES # BLD AUTO: 1.5 K/UL (ref 1–4.8)
LYMPHOCYTES NFR BLD: 3.9 % (ref 18–48)
MCH RBC QN AUTO: 25.4 PG (ref 27–31)
MCHC RBC AUTO-ENTMCNC: 32.8 G/DL (ref 32–36)
MCV RBC AUTO: 77 FL (ref 82–98)
MONOCYTES # BLD AUTO: 1.3 K/UL (ref 0.3–1)
MONOCYTES NFR BLD: 3.6 % (ref 4–15)
NEUTROPHILS # BLD AUTO: 33.8 K/UL (ref 1.8–7.7)
NEUTROPHILS NFR BLD: 91.6 % (ref 38–73)
NITRITE UR QL STRIP: NEGATIVE
NRBC BLD-RTO: 0 /100 WBC
PH UR STRIP: 6 [PH] (ref 5–8)
PLATELET # BLD AUTO: 238 K/UL (ref 150–350)
PMV BLD AUTO: 10.3 FL (ref 9.2–12.9)
POTASSIUM SERPL-SCNC: 4.5 MMOL/L (ref 3.5–5.1)
PROT SERPL-MCNC: 7 G/DL (ref 6–8.4)
PROT UR QL STRIP: NEGATIVE
PROTHROMBIN TIME: 32.3 SEC (ref 10.6–14.8)
RBC # BLD AUTO: 4.69 M/UL (ref 4.6–6.2)
SODIUM SERPL-SCNC: 132 MMOL/L (ref 136–145)
SP GR UR STRIP: 1.01 (ref 1–1.03)
TROPONIN I SERPL DL<=0.01 NG/ML-MCNC: 0.14 NG/ML
URN SPEC COLLECT METH UR: ABNORMAL
UROBILINOGEN UR STRIP-ACNC: NEGATIVE EU/DL
WBC # BLD AUTO: 36.92 K/UL (ref 3.9–12.7)

## 2020-01-27 PROCEDURE — 81003 URINALYSIS AUTO W/O SCOPE: CPT

## 2020-01-27 PROCEDURE — 99285 EMERGENCY DEPT VISIT HI MDM: CPT | Mod: 25

## 2020-01-27 PROCEDURE — 83880 ASSAY OF NATRIURETIC PEPTIDE: CPT

## 2020-01-27 PROCEDURE — 63600175 PHARM REV CODE 636 W HCPCS: Performed by: INTERNAL MEDICINE

## 2020-01-27 PROCEDURE — 25500020 PHARM REV CODE 255: Performed by: EMERGENCY MEDICINE

## 2020-01-27 PROCEDURE — 25000003 PHARM REV CODE 250: Performed by: INTERNAL MEDICINE

## 2020-01-27 PROCEDURE — 36415 COLL VENOUS BLD VENIPUNCTURE: CPT

## 2020-01-27 PROCEDURE — 94761 N-INVAS EAR/PLS OXIMETRY MLT: CPT

## 2020-01-27 PROCEDURE — 82140 ASSAY OF AMMONIA: CPT

## 2020-01-27 PROCEDURE — 96375 TX/PRO/DX INJ NEW DRUG ADDON: CPT

## 2020-01-27 PROCEDURE — 63600175 PHARM REV CODE 636 W HCPCS: Performed by: EMERGENCY MEDICINE

## 2020-01-27 PROCEDURE — 20000000 HC ICU ROOM

## 2020-01-27 PROCEDURE — 85610 PROTHROMBIN TIME: CPT

## 2020-01-27 PROCEDURE — 25000003 PHARM REV CODE 250

## 2020-01-27 PROCEDURE — 87040 BLOOD CULTURE FOR BACTERIA: CPT

## 2020-01-27 PROCEDURE — 84484 ASSAY OF TROPONIN QUANT: CPT

## 2020-01-27 PROCEDURE — 80053 COMPREHEN METABOLIC PANEL: CPT

## 2020-01-27 PROCEDURE — 83605 ASSAY OF LACTIC ACID: CPT | Mod: 91

## 2020-01-27 PROCEDURE — 21400001 HC TELEMETRY ROOM

## 2020-01-27 PROCEDURE — 96365 THER/PROPH/DIAG IV INF INIT: CPT

## 2020-01-27 PROCEDURE — 85025 COMPLETE CBC W/AUTO DIFF WBC: CPT

## 2020-01-27 PROCEDURE — 93005 ELECTROCARDIOGRAM TRACING: CPT

## 2020-01-27 RX ORDER — HYDROCODONE BITARTRATE AND ACETAMINOPHEN 10; 325 MG/1; MG/1
1 TABLET ORAL EVERY 6 HOURS PRN
Status: DISCONTINUED | OUTPATIENT
Start: 2020-01-27 | End: 2020-02-14 | Stop reason: HOSPADM

## 2020-01-27 RX ORDER — LEVOFLOXACIN 5 MG/ML
500 INJECTION, SOLUTION INTRAVENOUS
Status: ACTIVE | OUTPATIENT
Start: 2020-01-27 | End: 2020-01-27

## 2020-01-27 RX ORDER — ONDANSETRON 2 MG/ML
4 INJECTION INTRAMUSCULAR; INTRAVENOUS
Status: COMPLETED | OUTPATIENT
Start: 2020-01-27 | End: 2020-01-27

## 2020-01-27 RX ORDER — HYDROCODONE BITARTRATE AND ACETAMINOPHEN 5; 325 MG/1; MG/1
1 TABLET ORAL EVERY 6 HOURS PRN
Status: DISCONTINUED | OUTPATIENT
Start: 2020-01-27 | End: 2020-02-14 | Stop reason: HOSPADM

## 2020-01-27 RX ORDER — ACETAMINOPHEN 325 MG/1
650 TABLET ORAL EVERY 8 HOURS PRN
Status: DISCONTINUED | OUTPATIENT
Start: 2020-01-27 | End: 2020-02-14 | Stop reason: HOSPADM

## 2020-01-27 RX ORDER — ENALAPRIL MALEATE 2.5 MG/1
2.5 TABLET ORAL DAILY
Status: DISCONTINUED | OUTPATIENT
Start: 2020-01-27 | End: 2020-02-05

## 2020-01-27 RX ORDER — GABAPENTIN 100 MG/1
100 CAPSULE ORAL 3 TIMES DAILY PRN
Status: DISCONTINUED | OUTPATIENT
Start: 2020-01-27 | End: 2020-02-14 | Stop reason: HOSPADM

## 2020-01-27 RX ORDER — SODIUM CHLORIDE 9 MG/ML
1000 INJECTION, SOLUTION INTRAVENOUS ONCE
Status: COMPLETED | OUTPATIENT
Start: 2020-01-27 | End: 2020-01-27

## 2020-01-27 RX ORDER — FUROSEMIDE 10 MG/ML
40 INJECTION INTRAMUSCULAR; INTRAVENOUS
Status: COMPLETED | OUTPATIENT
Start: 2020-01-27 | End: 2020-01-27

## 2020-01-27 RX ORDER — ACETAMINOPHEN 325 MG/1
325 TABLET ORAL EVERY 6 HOURS PRN
Status: DISCONTINUED | OUTPATIENT
Start: 2020-01-27 | End: 2020-02-14 | Stop reason: HOSPADM

## 2020-01-27 RX ORDER — PANTOPRAZOLE SODIUM 40 MG/1
40 TABLET, DELAYED RELEASE ORAL DAILY
Status: DISCONTINUED | OUTPATIENT
Start: 2020-01-28 | End: 2020-02-08

## 2020-01-27 RX ORDER — IBUPROFEN 200 MG
16 TABLET ORAL
Status: DISCONTINUED | OUTPATIENT
Start: 2020-01-27 | End: 2020-02-14 | Stop reason: HOSPADM

## 2020-01-27 RX ORDER — CHLORHEXIDINE GLUCONATE ORAL RINSE 1.2 MG/ML
15 SOLUTION DENTAL 2 TIMES DAILY
Status: COMPLETED | OUTPATIENT
Start: 2020-01-27 | End: 2020-02-01

## 2020-01-27 RX ORDER — AMOXICILLIN 250 MG
1 CAPSULE ORAL 2 TIMES DAILY
Status: DISCONTINUED | OUTPATIENT
Start: 2020-01-27 | End: 2020-02-14 | Stop reason: HOSPADM

## 2020-01-27 RX ORDER — GLUCAGON 1 MG
1 KIT INJECTION
Status: DISCONTINUED | OUTPATIENT
Start: 2020-01-27 | End: 2020-02-14 | Stop reason: HOSPADM

## 2020-01-27 RX ORDER — FUROSEMIDE 10 MG/ML
40 INJECTION INTRAMUSCULAR; INTRAVENOUS 2 TIMES DAILY
Status: DISCONTINUED | OUTPATIENT
Start: 2020-01-27 | End: 2020-01-28

## 2020-01-27 RX ORDER — HYDROMORPHONE HYDROCHLORIDE 1 MG/ML
0.5 INJECTION, SOLUTION INTRAMUSCULAR; INTRAVENOUS; SUBCUTANEOUS
Status: COMPLETED | OUTPATIENT
Start: 2020-01-27 | End: 2020-01-27

## 2020-01-27 RX ORDER — METOPROLOL SUCCINATE 50 MG/1
100 TABLET, EXTENDED RELEASE ORAL DAILY
Status: DISCONTINUED | OUTPATIENT
Start: 2020-01-27 | End: 2020-02-14 | Stop reason: HOSPADM

## 2020-01-27 RX ORDER — ONDANSETRON 2 MG/ML
4 INJECTION INTRAMUSCULAR; INTRAVENOUS EVERY 8 HOURS PRN
Status: DISCONTINUED | OUTPATIENT
Start: 2020-01-27 | End: 2020-02-14 | Stop reason: HOSPADM

## 2020-01-27 RX ORDER — IBUPROFEN 200 MG
24 TABLET ORAL
Status: DISCONTINUED | OUTPATIENT
Start: 2020-01-27 | End: 2020-02-14 | Stop reason: HOSPADM

## 2020-01-27 RX ORDER — VANCOMYCIN HCL IN 5 % DEXTROSE 1G/250ML
1000 PLASTIC BAG, INJECTION (ML) INTRAVENOUS
Status: DISCONTINUED | OUTPATIENT
Start: 2020-01-27 | End: 2020-01-27

## 2020-01-27 RX ORDER — HYDROXYZINE HYDROCHLORIDE 25 MG/1
25 TABLET, FILM COATED ORAL DAILY
Status: DISCONTINUED | OUTPATIENT
Start: 2020-01-27 | End: 2020-02-14 | Stop reason: HOSPADM

## 2020-01-27 RX ORDER — SODIUM CHLORIDE 0.9 % (FLUSH) 0.9 %
2 SYRINGE (ML) INJECTION
Status: DISCONTINUED | OUTPATIENT
Start: 2020-01-27 | End: 2020-02-14 | Stop reason: HOSPADM

## 2020-01-27 RX ORDER — ACETAMINOPHEN 325 MG/1
650 TABLET ORAL EVERY 4 HOURS PRN
Status: DISCONTINUED | OUTPATIENT
Start: 2020-01-27 | End: 2020-02-14 | Stop reason: HOSPADM

## 2020-01-27 RX ORDER — MUPIROCIN 20 MG/G
OINTMENT TOPICAL 2 TIMES DAILY
Status: COMPLETED | OUTPATIENT
Start: 2020-01-27 | End: 2020-02-01

## 2020-01-27 RX ADMIN — SENNOSIDES AND DOCUSATE SODIUM 1 TABLET: 8.6; 5 TABLET ORAL at 10:01

## 2020-01-27 RX ADMIN — SODIUM CHLORIDE 2178 ML: 0.9 INJECTION, SOLUTION INTRAVENOUS at 04:01

## 2020-01-27 RX ADMIN — HYDROMORPHONE HYDROCHLORIDE 0.5 MG: 1 INJECTION, SOLUTION INTRAMUSCULAR; INTRAVENOUS; SUBCUTANEOUS at 12:01

## 2020-01-27 RX ADMIN — IOHEXOL 100 ML: 350 INJECTION, SOLUTION INTRAVENOUS at 12:01

## 2020-01-27 RX ADMIN — CHLORHEXIDINE GLUCONATE 15 ML: 1.2 RINSE ORAL at 10:01

## 2020-01-27 RX ADMIN — FUROSEMIDE 40 MG: 10 INJECTION, SOLUTION INTRAMUSCULAR; INTRAVENOUS at 12:01

## 2020-01-27 RX ADMIN — APIXABAN 5 MG: 5 TABLET, FILM COATED ORAL at 10:01

## 2020-01-27 RX ADMIN — FUROSEMIDE 40 MG: 10 INJECTION, SOLUTION INTRAMUSCULAR; INTRAVENOUS at 06:01

## 2020-01-27 RX ADMIN — GABAPENTIN 100 MG: 100 CAPSULE ORAL at 10:01

## 2020-01-27 RX ADMIN — METOPROLOL SUCCINATE 100 MG: 50 TABLET, EXTENDED RELEASE ORAL at 04:01

## 2020-01-27 RX ADMIN — ONDANSETRON HYDROCHLORIDE 4 MG: 2 INJECTION, SOLUTION INTRAMUSCULAR; INTRAVENOUS at 12:01

## 2020-01-27 RX ADMIN — ENALAPRIL MALEATE 2.5 MG: 2.5 TABLET ORAL at 04:01

## 2020-01-27 RX ADMIN — MUPIROCIN: 20 OINTMENT TOPICAL at 11:01

## 2020-01-27 RX ADMIN — SODIUM CHLORIDE 1000 ML: 900 INJECTION INTRAVENOUS at 01:01

## 2020-01-27 RX ADMIN — PIPERACILLIN AND TAZOBACTAM 4.5 G: 4; .5 INJECTION, POWDER, LYOPHILIZED, FOR SOLUTION INTRAVENOUS; PARENTERAL at 10:01

## 2020-01-27 RX ADMIN — HYDROXYZINE HYDROCHLORIDE 25 MG: 25 TABLET, FILM COATED ORAL at 04:01

## 2020-01-27 RX ADMIN — PIPERACILLIN AND TAZOBACTAM 4.5 G: 4; .5 INJECTION, POWDER, LYOPHILIZED, FOR SOLUTION INTRAVENOUS; PARENTERAL at 12:01

## 2020-01-27 RX ADMIN — HYDROCODONE BITARTRATE AND ACETAMINOPHEN 1 TABLET: 10; 325 TABLET ORAL at 10:01

## 2020-01-27 NOTE — HPI
44-year-old male with past medical history of combined diastolic and systolic CHF (EF 15% as at 12/2019), HCV, bilateral LE DVTs and bilateral PE, prior IVDU, HTn who presented with groin swelling of 5 days duration.    Prior hospital records reviewed and it reveals patient was [place] [facility] [place] ago after a complicated hospital course for acute cholecystits complicated by b/l DVT, bilateral PE s/p tPA, PEA cardiac arrest. HE was in the hospital for 20 days and was discharged home. He has not had any follow up visits since then.    He reports increasing lower extremity swelling progressing to scrotal swelling for the past 5 days. He endorses compliance with Lasix 40mg daily and states that he has been taking an extra 20mg daily for the past 5 days. He also endorses a dry cough, congestion and cold intolerance. He denies fever, chills, chest pain.    In the ED he has received vancomycin, zosyn and levaquin, fluid bolus and 40mg IV lasix. Chest xray shows a right middle lobe pneumonia.

## 2020-01-27 NOTE — SUBJECTIVE & OBJECTIVE
Past Medical History:   Diagnosis Date    Anxiety     Arthritis     CHF (congestive heart failure)     Cirrhosis     Coronary artery disease     Depression     DVT (deep venous thrombosis)     Hepatitis C     Hypertension        History reviewed. No pertinent surgical history.    Review of patient's allergies indicates:  No Known Allergies    No current facility-administered medications on file prior to encounter.      Current Outpatient Medications on File Prior to Encounter   Medication Sig    apixaban (ELIQUIS) 5 mg Tab Take 1 tablet (5 mg total) by mouth 2 (two) times daily.    enalapril (VASOTEC) 2.5 MG tablet Take 1 tablet (2.5 mg total) by mouth once daily.    furosemide (LASIX) 40 MG tablet Take 1 tablet (40 mg total) by mouth once daily.    gabapentin (NEURONTIN) 100 MG capsule Take 1 capsule (100 mg total) by mouth 3 (three) times daily as needed (anxiety/agitation/neuropathic pain).    gabapentin (NEURONTIN) 300 MG capsule Take 1 capsule (300 mg total) by mouth every evening.    hydrOXYzine HCl (ATARAX) 25 MG tablet Take 1 tablet (25 mg total) by mouth once daily.    lactulose (CHRONULAC) 10 gram/15 mL solution Take 15 mLs (10 g total) by mouth 3 (three) times daily as needed (constipation).    metoprolol succinate (TOPROL-XL) 100 MG 24 hr tablet Take 1 tablet (100 mg total) by mouth once daily.    pantoprazole (PROTONIX) 40 MG tablet Take 1 tablet (40 mg total) by mouth once daily.    acetaminophen (TYLENOL) 325 MG tablet Take 325 mg by mouth every 6 (six) hours as needed for Pain.    [DISCONTINUED] citalopram (CELEXA) 20 MG tablet Take 1 tablet (20 mg total) by mouth once daily.    [DISCONTINUED] spironolactone (ALDACTONE) 25 MG tablet Take 1 tablet (25 mg total) by mouth once daily.     Family History     Problem Relation (Age of Onset)    Arthritis Mother    Asthma Sister    Diabetes Sister    Heart disease Mother, Maternal Grandfather    Hyperlipidemia Mother    Hypertension  Mother, Father    Kidney disease Mother        Tobacco Use    Smoking status: Former Smoker     Packs/day: 2.00     Years: 20.00     Pack years: 40.00     Types: Cigarettes     Start date: 1/23/1999     Last attempt to quit: 1/23/2017     Years since quitting: 3.0    Smokeless tobacco: Never Used   Substance and Sexual Activity    Alcohol use: Not Currently    Drug use: Not Currently     Frequency: 7.0 times per week     Types: Amphetamines     Comment: quit in december 2019    Sexual activity: Not Currently     Partners: Female     Review of Systems   Constitutional: Positive for activity change and fatigue. Negative for chills, diaphoresis and fever.   HENT: Positive for congestion. Negative for ear pain, hearing loss and tinnitus.    Eyes: Negative for photophobia, discharge and visual disturbance.   Respiratory: Positive for shortness of breath. Negative for cough, chest tightness and wheezing.    Cardiovascular: Positive for leg swelling. Negative for chest pain and palpitations.   Gastrointestinal: Positive for abdominal distention. Negative for abdominal pain, blood in stool, constipation, diarrhea, nausea and vomiting.   Endocrine: Negative for polydipsia, polyphagia and polyuria.   Genitourinary: Positive for scrotal swelling. Negative for decreased urine volume, difficulty urinating, flank pain, hematuria and urgency.   Musculoskeletal: Negative for back pain, gait problem and myalgias.   Skin: Negative for rash and wound.   Neurological: Positive for weakness. Negative for dizziness, tremors, syncope, light-headedness and headaches.   Psychiatric/Behavioral: Negative for agitation and sleep disturbance.     Objective:     Vital Signs (Most Recent):  Temp: 97.7 °F (36.5 °C) (01/27/20 1501)  Pulse: (!) 129 (01/27/20 1501)  Resp: (!) 24 (01/27/20 1501)  BP: (!) 120/94 (01/27/20 1501)  SpO2: 97 % (01/27/20 1501) Vital Signs (24h Range):  Temp:  [97.6 °F (36.4 °C)-98 °F (36.7 °C)] 97.7 °F (36.5  °C)  Pulse:  [110-130] 129  Resp:  [20-37] 24  SpO2:  [89 %-98 %] 97 %  BP: (120-140)/(86-96) 120/94     Weight: 82.6 kg (182 lb 1.6 oz)  Body mass index is 27.69 kg/m².    Physical Exam   Constitutional: He is oriented to person, place, and time. He appears well-developed and well-nourished. No distress.   HENT:   Head: Normocephalic and atraumatic.   Mouth/Throat: Oropharynx is clear and moist. No oropharyngeal exudate.   Eyes: Pupils are equal, round, and reactive to light. EOM are normal. Scleral icterus is present.   Neck: No thyromegaly present.   Cardiovascular: Normal rate, regular rhythm and normal heart sounds.   No murmur heard.  Pulmonary/Chest: Effort normal. No respiratory distress. He has no wheezes. He has rales in the right lower field and the left lower field.   Abdominal: Soft. Bowel sounds are normal. He exhibits distension. There is no tenderness. No hernia.   Genitourinary:   Genitourinary Comments: Scrotal edema     Musculoskeletal: Normal range of motion. He exhibits no edema.   Anasarca. 3+ pitting edema   Lymphadenopathy:     He has no cervical adenopathy.   Neurological: He is alert and oriented to person, place, and time. He displays normal reflexes. No cranial nerve deficit.   Skin: Skin is warm. Capillary refill takes less than 2 seconds. No rash noted.   Psychiatric: He has a normal mood and affect.   Nursing note and vitals reviewed.        CRANIAL NERVES     CN III, IV, VI   Pupils are equal, round, and reactive to light.  Extraocular motions are normal.        Significant Labs:   BMP:   Recent Labs   Lab 01/27/20  1105      *   K 4.5   CL 99   CO2 22*   BUN 34*   CREATININE 1.0   CALCIUM 8.6*     CBC:   Recent Labs   Lab 01/27/20  1105   WBC 36.92*   HGB 11.9*   HCT 36.3*        CMP:   Recent Labs   Lab 01/27/20  1105   *   K 4.5   CL 99   CO2 22*      BUN 34*   CREATININE 1.0   CALCIUM 8.6*   PROT 7.0   ALBUMIN 2.1*   BILITOT 5.9*   ALKPHOS 114    *   *   ANIONGAP 11   EGFRNONAA >60.0     Cardiac Markers:   Recent Labs   Lab 01/27/20  1105   BNP 3,101*     Lactic Acid:   Recent Labs   Lab 01/27/20  1238   LACTATE 3.2*     Magnesium: No results for input(s): MG in the last 48 hours.  Troponin:   Recent Labs   Lab 01/27/20  1105   TROPONINI 0.140*     TSH:   Recent Labs   Lab 10/28/19  0524   TSH 3.671     Urine Culture: No results for input(s): LABURIN in the last 48 hours.  Urine Studies: No results for input(s): COLORU, APPEARANCEUA, PHUR, SPECGRAV, PROTEINUA, GLUCUA, KETONESU, BILIRUBINUA, OCCULTUA, NITRITE, UROBILINOGEN, LEUKOCYTESUR, RBCUA, WBCUA, BACTERIA, SQUAMEPITHEL, HYALINECASTS in the last 48 hours.    Invalid input(s): WRIGHTSUR    Significant Imaging: I have reviewed and interpreted all pertinent imaging results/findings within the past 24 hours.     Imaging Results          CT Abdomen Pelvis With Contrast (Final result)  Result time 01/27/20 12:19:29    Final result by Bette Herman MD (01/27/20 12:19:29)                 Impression:      Moderate ascites with diffuse subcutaneous edema throughout the abdomen, pelvis and scrotum    Fatty infiltration of the liver with stable 10 mm hypodense lesion in the dome of the liver    Removal of the right pleural catheter with persistent right hydropneumothorax and small left pleural effusion    Patchy alveolar opacities within the right middle lobe and lung bases suggestive of pneumonia      Electronically signed by: Bette Herman MD  Date:    01/27/2020  Time:    12:19             Narrative:      CMS MANDATED QUALITY DATA - CT RADIATION - 436    All CT scans at this facility utilize dose modulation, iterative reconstruction, and/or weight based dosing when appropriate to reduce radiation dose to as low as reasonably achievable.    EXAMINATION:  CT ABDOMEN PELVIS WITH CONTRAST    CLINICAL HISTORY:  scrotal edema;    TECHNIQUE:  CT abdomen and pelvis with 100 mL  Omnipaque    COMPARISON:  12/23/2019    FINDINGS:  CT ABDOMEN:    The previously noted right pleural catheter has been removed.  There is persistent hydropneumothorax in the right lung base with small left pleural effusion.  There is nodular alveolar opacities within the right lower lobe and lung bases.    The liver is hypodense compatible with fatty infiltration.  There is a stable 1 cm hypodense lesion in the dome of the right lobe of the liver suggestive of hepatic cyst.  The spleen, pancreas, gallbladder and adrenal glands are normal.    The kidneys enhance symmetrically without hydronephrosis or calculi.  There is a 7 mm nonobstructing left renal stone.    There is moderate ascites.  There are no thick-walled or dilated bowel loops.  There is diffuse anasarca.    There is no adenopathy.    CT PELVIS:    The bladder and prostate gland are normal.  There is no pelvic adenopathy.  There is moderate edema within the subcutaneous tissues in scrotum.  There are no acute osseous abnormalities.                               X-Ray Chest AP Portable (Final result)  Result time 01/27/20 10:49:55    Final result by Bette Herman MD (01/27/20 10:49:55)                 Impression:      Progression of the airspace disease within the right lower lobe with new airspace disease in left mid lung and small bilateral pleural effusions    Mild cardiomegaly      Electronically signed by: Bette Herman MD  Date:    01/27/2020  Time:    10:49             Narrative:    EXAMINATION:  XR CHEST AP PORTABLE    CLINICAL HISTORY:  sob;    FINDINGS:  Portable chest at 10:35 is compared to 12/27/2019 shows mild cardiomegaly    There is progression of the airspace disease within the right lung base with small right pleural effusion.  There is development of alveolar opacity in the left mid lung with tiny left pleural effusion.  The upper lobes are clear.  Pulmonary vasculature is normal. No acute osseous abnormality.

## 2020-01-28 LAB
ALBUMIN SERPL BCP-MCNC: 1.9 G/DL (ref 3.5–5.2)
ALP SERPL-CCNC: 91 U/L (ref 55–135)
ALT SERPL W/O P-5'-P-CCNC: 108 U/L (ref 10–44)
AMMONIA PLAS-SCNC: 27 UMOL/L (ref 10–50)
ANION GAP SERPL CALC-SCNC: 10 MMOL/L (ref 8–16)
ANION GAP SERPL CALC-SCNC: 8 MMOL/L (ref 8–16)
AST SERPL-CCNC: 167 U/L (ref 10–40)
BASOPHILS # BLD AUTO: 0.04 K/UL (ref 0–0.2)
BASOPHILS NFR BLD: 0.2 % (ref 0–1.9)
BILIRUB SERPL-MCNC: 4.3 MG/DL (ref 0.1–1)
BUN SERPL-MCNC: 25 MG/DL (ref 6–20)
BUN SERPL-MCNC: 32 MG/DL (ref 6–20)
CALCIUM SERPL-MCNC: 7.5 MG/DL (ref 8.7–10.5)
CALCIUM SERPL-MCNC: 7.9 MG/DL (ref 8.7–10.5)
CHLORIDE SERPL-SCNC: 96 MMOL/L (ref 95–110)
CHLORIDE SERPL-SCNC: 98 MMOL/L (ref 95–110)
CO2 SERPL-SCNC: 26 MMOL/L (ref 23–29)
CO2 SERPL-SCNC: 30 MMOL/L (ref 23–29)
CREAT SERPL-MCNC: 0.9 MG/DL (ref 0.5–1.4)
CREAT SERPL-MCNC: 1.1 MG/DL (ref 0.5–1.4)
DIFFERENTIAL METHOD: ABNORMAL
EOSINOPHIL # BLD AUTO: 0.1 K/UL (ref 0–0.5)
EOSINOPHIL NFR BLD: 0.5 % (ref 0–8)
ERYTHROCYTE [DISTWIDTH] IN BLOOD BY AUTOMATED COUNT: 20.7 % (ref 11.5–14.5)
EST. GFR  (AFRICAN AMERICAN): >60 ML/MIN/1.73 M^2
EST. GFR  (AFRICAN AMERICAN): >60 ML/MIN/1.73 M^2
EST. GFR  (NON AFRICAN AMERICAN): >60 ML/MIN/1.73 M^2
EST. GFR  (NON AFRICAN AMERICAN): >60 ML/MIN/1.73 M^2
GLUCOSE SERPL-MCNC: 137 MG/DL (ref 70–110)
GLUCOSE SERPL-MCNC: 98 MG/DL (ref 70–110)
HCT VFR BLD AUTO: 31.4 % (ref 40–54)
HGB BLD-MCNC: 10.3 G/DL (ref 14–18)
IMM GRANULOCYTES # BLD AUTO: 0.13 K/UL (ref 0–0.04)
IMM GRANULOCYTES NFR BLD AUTO: 0.5 % (ref 0–0.5)
LACTATE SERPL-SCNC: 2 MMOL/L (ref 0.5–1.9)
LACTATE SERPL-SCNC: 2.2 MMOL/L (ref 0.5–1.9)
LYMPHOCYTES # BLD AUTO: 1.9 K/UL (ref 1–4.8)
LYMPHOCYTES NFR BLD: 7.6 % (ref 18–48)
MAGNESIUM SERPL-MCNC: 1.4 MG/DL (ref 1.6–2.6)
MAGNESIUM SERPL-MCNC: 1.5 MG/DL (ref 1.6–2.6)
MCH RBC QN AUTO: 25.4 PG (ref 27–31)
MCHC RBC AUTO-ENTMCNC: 32.8 G/DL (ref 32–36)
MCV RBC AUTO: 77 FL (ref 82–98)
MONOCYTES # BLD AUTO: 1.1 K/UL (ref 0.3–1)
MONOCYTES NFR BLD: 4.6 % (ref 4–15)
NEUTROPHILS # BLD AUTO: 21.4 K/UL (ref 1.8–7.7)
NEUTROPHILS NFR BLD: 86.6 % (ref 38–73)
NRBC BLD-RTO: 0 /100 WBC
PHOSPHATE SERPL-MCNC: 2.8 MG/DL (ref 2.7–4.5)
PLATELET # BLD AUTO: 187 K/UL (ref 150–350)
PMV BLD AUTO: 9.6 FL (ref 9.2–12.9)
POTASSIUM SERPL-SCNC: 3.2 MMOL/L (ref 3.5–5.1)
POTASSIUM SERPL-SCNC: 3.5 MMOL/L (ref 3.5–5.1)
PROT SERPL-MCNC: 6.4 G/DL (ref 6–8.4)
RBC # BLD AUTO: 4.06 M/UL (ref 4.6–6.2)
SODIUM SERPL-SCNC: 134 MMOL/L (ref 136–145)
SODIUM SERPL-SCNC: 134 MMOL/L (ref 136–145)
WBC # BLD AUTO: 24.72 K/UL (ref 3.9–12.7)

## 2020-01-28 PROCEDURE — 83735 ASSAY OF MAGNESIUM: CPT | Mod: 91

## 2020-01-28 PROCEDURE — 84100 ASSAY OF PHOSPHORUS: CPT

## 2020-01-28 PROCEDURE — 20000000 HC ICU ROOM

## 2020-01-28 PROCEDURE — 63600175 PHARM REV CODE 636 W HCPCS: Performed by: INTERNAL MEDICINE

## 2020-01-28 PROCEDURE — 82140 ASSAY OF AMMONIA: CPT

## 2020-01-28 PROCEDURE — 25000003 PHARM REV CODE 250

## 2020-01-28 PROCEDURE — 80048 BASIC METABOLIC PNL TOTAL CA: CPT

## 2020-01-28 PROCEDURE — 83605 ASSAY OF LACTIC ACID: CPT | Mod: 91

## 2020-01-28 PROCEDURE — 25000003 PHARM REV CODE 250: Performed by: EMERGENCY MEDICINE

## 2020-01-28 PROCEDURE — 85025 COMPLETE CBC W/AUTO DIFF WBC: CPT

## 2020-01-28 PROCEDURE — 63600175 PHARM REV CODE 636 W HCPCS: Performed by: EMERGENCY MEDICINE

## 2020-01-28 PROCEDURE — 36415 COLL VENOUS BLD VENIPUNCTURE: CPT

## 2020-01-28 PROCEDURE — 83605 ASSAY OF LACTIC ACID: CPT

## 2020-01-28 PROCEDURE — 83735 ASSAY OF MAGNESIUM: CPT

## 2020-01-28 PROCEDURE — 21400001 HC TELEMETRY ROOM

## 2020-01-28 PROCEDURE — 25000003 PHARM REV CODE 250: Performed by: INTERNAL MEDICINE

## 2020-01-28 PROCEDURE — 80053 COMPREHEN METABOLIC PANEL: CPT

## 2020-01-28 PROCEDURE — 94761 N-INVAS EAR/PLS OXIMETRY MLT: CPT

## 2020-01-28 RX ORDER — POTASSIUM CHLORIDE 20 MEQ/1
40 TABLET, EXTENDED RELEASE ORAL
Status: DISCONTINUED | OUTPATIENT
Start: 2020-01-28 | End: 2020-02-14 | Stop reason: HOSPADM

## 2020-01-28 RX ORDER — MAGNESIUM SULFATE 1 G/100ML
1 INJECTION INTRAVENOUS
Status: DISCONTINUED | OUTPATIENT
Start: 2020-01-28 | End: 2020-02-14 | Stop reason: HOSPADM

## 2020-01-28 RX ORDER — POTASSIUM CHLORIDE 7.45 MG/ML
20 INJECTION INTRAVENOUS
Status: DISCONTINUED | OUTPATIENT
Start: 2020-01-28 | End: 2020-02-14 | Stop reason: HOSPADM

## 2020-01-28 RX ORDER — POTASSIUM CHLORIDE 20 MEQ/1
20 TABLET, EXTENDED RELEASE ORAL
Status: DISCONTINUED | OUTPATIENT
Start: 2020-01-28 | End: 2020-02-14 | Stop reason: HOSPADM

## 2020-01-28 RX ORDER — FUROSEMIDE 10 MG/ML
20 INJECTION INTRAMUSCULAR; INTRAVENOUS 2 TIMES DAILY PRN
Status: DISCONTINUED | OUTPATIENT
Start: 2020-01-28 | End: 2020-02-14 | Stop reason: HOSPADM

## 2020-01-28 RX ORDER — CALCIUM CHLORIDE IN 0.9 % NACL 1 G/100 ML
1 INTRAVENOUS SOLUTION, PIGGYBACK (ML) INTRAVENOUS
Status: DISCONTINUED | OUTPATIENT
Start: 2020-01-28 | End: 2020-02-14 | Stop reason: HOSPADM

## 2020-01-28 RX ORDER — POTASSIUM CHLORIDE 7.45 MG/ML
40 INJECTION INTRAVENOUS
Status: DISCONTINUED | OUTPATIENT
Start: 2020-01-28 | End: 2020-02-14 | Stop reason: HOSPADM

## 2020-01-28 RX ORDER — MAGNESIUM SULFATE HEPTAHYDRATE 40 MG/ML
2 INJECTION, SOLUTION INTRAVENOUS
Status: DISCONTINUED | OUTPATIENT
Start: 2020-01-28 | End: 2020-02-14 | Stop reason: HOSPADM

## 2020-01-28 RX ORDER — MAGNESIUM SULFATE HEPTAHYDRATE 40 MG/ML
4 INJECTION, SOLUTION INTRAVENOUS
Status: DISCONTINUED | OUTPATIENT
Start: 2020-01-28 | End: 2020-02-14 | Stop reason: HOSPADM

## 2020-01-28 RX ORDER — LANOLIN ALCOHOL/MO/W.PET/CERES
800 CREAM (GRAM) TOPICAL
Status: DISCONTINUED | OUTPATIENT
Start: 2020-01-28 | End: 2020-02-14 | Stop reason: HOSPADM

## 2020-01-28 RX ADMIN — VANCOMYCIN HYDROCHLORIDE 1500 MG: 1 INJECTION, POWDER, LYOPHILIZED, FOR SOLUTION INTRAVENOUS at 04:01

## 2020-01-28 RX ADMIN — MAGNESIUM SULFATE 4 G: 2 INJECTION INTRAVENOUS at 11:01

## 2020-01-28 RX ADMIN — PIPERACILLIN AND TAZOBACTAM 4.5 G: 4; .5 INJECTION, POWDER, LYOPHILIZED, FOR SOLUTION INTRAVENOUS; PARENTERAL at 07:01

## 2020-01-28 RX ADMIN — APIXABAN 5 MG: 5 TABLET, FILM COATED ORAL at 10:01

## 2020-01-28 RX ADMIN — PIPERACILLIN AND TAZOBACTAM 4.5 G: 4; .5 INJECTION, POWDER, LYOPHILIZED, FOR SOLUTION INTRAVENOUS; PARENTERAL at 10:01

## 2020-01-28 RX ADMIN — HYDROCODONE BITARTRATE AND ACETAMINOPHEN 1 TABLET: 10; 325 TABLET ORAL at 06:01

## 2020-01-28 RX ADMIN — POTASSIUM CHLORIDE 40 MEQ: 20 TABLET, EXTENDED RELEASE ORAL at 06:01

## 2020-01-28 RX ADMIN — SENNOSIDES AND DOCUSATE SODIUM 1 TABLET: 8.6; 5 TABLET ORAL at 10:01

## 2020-01-28 RX ADMIN — VANCOMYCIN HYDROCHLORIDE 1500 MG: 1 INJECTION, POWDER, LYOPHILIZED, FOR SOLUTION INTRAVENOUS at 03:01

## 2020-01-28 RX ADMIN — MUPIROCIN: 20 OINTMENT TOPICAL at 10:01

## 2020-01-28 RX ADMIN — PIPERACILLIN AND TAZOBACTAM 4.5 G: 4; .5 INJECTION, POWDER, LYOPHILIZED, FOR SOLUTION INTRAVENOUS; PARENTERAL at 02:01

## 2020-01-28 RX ADMIN — CHLORHEXIDINE GLUCONATE 15 ML: 1.2 RINSE ORAL at 10:01

## 2020-01-28 RX ADMIN — MAGNESIUM OXIDE 800 MG: 400 TABLET ORAL at 06:01

## 2020-01-28 RX ADMIN — METOPROLOL SUCCINATE 100 MG: 50 TABLET, EXTENDED RELEASE ORAL at 09:01

## 2020-01-28 RX ADMIN — CHLORHEXIDINE GLUCONATE 15 ML: 1.2 RINSE ORAL at 09:01

## 2020-01-28 RX ADMIN — PANTOPRAZOLE SODIUM 40 MG: 40 TABLET, DELAYED RELEASE ORAL at 05:01

## 2020-01-28 RX ADMIN — MUPIROCIN: 20 OINTMENT TOPICAL at 09:01

## 2020-01-28 RX ADMIN — MAGNESIUM OXIDE 800 MG: 400 TABLET ORAL at 09:01

## 2020-01-28 RX ADMIN — HYDROXYZINE HYDROCHLORIDE 25 MG: 25 TABLET, FILM COATED ORAL at 09:01

## 2020-01-28 RX ADMIN — POTASSIUM CHLORIDE 40 MEQ: 20 TABLET, EXTENDED RELEASE ORAL at 11:01

## 2020-01-28 RX ADMIN — SENNOSIDES AND DOCUSATE SODIUM 1 TABLET: 8.6; 5 TABLET ORAL at 09:01

## 2020-01-28 RX ADMIN — FUROSEMIDE 40 MG: 10 INJECTION, SOLUTION INTRAMUSCULAR; INTRAVENOUS at 09:01

## 2020-01-28 RX ADMIN — APIXABAN 5 MG: 5 TABLET, FILM COATED ORAL at 09:01

## 2020-01-28 NOTE — ASSESSMENT & PLAN NOTE
As evidenced by tachycardia, lactic acidosis, liver injury. Pulmonary source most likely. Patient has a history of recent hospitalization with antibiotic use  -start sepsis protocol - IV fluids 30cc/kg, he already received broad spectrum antibiotics with vancomycin, zosyn and levaquin in the ED  -continue vancomycin and zosyn  -trend lactic acid levels  -blood cultures x 2

## 2020-01-28 NOTE — PROGRESS NOTES
VANCOMYCIN PHARMACOKINETIC NOTE:  Vancomycin Day # 1    Objective/Assessment:    Diagnosis/Indication for Vancomycin: Pneumonia     44 y.o., male; Actual Body Weight = 82.6 kg (182 lb 1.6 oz).    The patient has the following labs:     1/27/2020 Estimated Creatinine Clearance: 96.7 mL/min (based on SCr of 1 mg/dL). Lab Results   Component Value Date    BUN 34 (H) 01/27/2020       Lab Results   Component Value Date    WBC 36.92 (H) 01/27/2020            Plan:  Adjust vancomycin dose and/or frequency based on the patient's actual weight and renal function:  Initiate Vancomycin 1500 mg IV every 12 hours.  Orders have been entered into patient's chart.    Vancomycin dose = 18 mg/kg actual body weight    Vancomycin trough level has been ordered for prior to 4th dose.    Pharmacy will manage vancomycin therapy, monitor serum vancomycin levels, monitor renal function and adjust regimen as necessary.      Thank you for allowing us to participate in this patient's care.     Roosevelt Tse 1/27/2020 10:12 PM  Department of Pharmacy  Ext [phone]

## 2020-01-28 NOTE — ED PROVIDER NOTES
Encounter Date: 1/27/2020       History     Chief Complaint   Patient presents with    Groin Swelling     for several days     HPI     Seen and evaluated.  Presented with a chief complaint of volume overload.  He reported groin swelling. He states his scrotum had significantly swollen more so than before.  He denies significant lower extremity edema and says this is actually improved.  He has a history of heart failure, cirrhosis, and hepatitis-C.  He was recently diagnosed with pneumonia treated for that.  He states the symptoms have been slowly worsening over time.  He denies any alleviating factors.  This is an acute exacerbation.  He is significantly ill.  No associated fevers        Review of patient's allergies indicates:  No Known Allergies  Past Medical History:   Diagnosis Date    Anxiety     Arthritis     CHF (congestive heart failure)     Cirrhosis     Coronary artery disease     Depression     DVT (deep venous thrombosis)     Hepatitis C     Hypertension      History reviewed. No pertinent surgical history.  Family History   Problem Relation Age of Onset    Arthritis Mother     Heart disease Mother     Hyperlipidemia Mother     Hypertension Mother     Kidney disease Mother     Hypertension Father     Asthma Sister     Diabetes Sister     Heart disease Maternal Grandfather      Social History     Tobacco Use    Smoking status: Former Smoker     Packs/day: 2.00     Years: 20.00     Pack years: 40.00     Types: Cigarettes     Start date: 1/23/1999     Last attempt to quit: 1/23/2017     Years since quitting: 3.0    Smokeless tobacco: Never Used   Substance Use Topics    Alcohol use: Not Currently    Drug use: Not Currently     Frequency: 7.0 times per week     Types: Amphetamines     Comment: quit in december 2019     Review of Systems   Constitutional: Positive for activity change. Negative for fever.   HENT: Negative for sore throat.    Respiratory: Positive for shortness of breath.     Cardiovascular: Positive for leg swelling. Negative for chest pain.   Gastrointestinal: Positive for abdominal distention. Negative for nausea.   Genitourinary: Positive for penile swelling and scrotal swelling. Negative for dysuria.   Musculoskeletal: Negative for back pain.   Skin: Negative for rash.   Neurological: Negative for weakness.   Hematological: Does not bruise/bleed easily.   Psychiatric/Behavioral: Negative for agitation.   All other systems reviewed and are negative.      Physical Exam     Initial Vitals [01/27/20 0956]   BP Pulse Resp Temp SpO2   (!) 137/96 110 20 98 °F (36.7 °C) (!) 89 %      MAP       --         Physical Exam    Nursing note and vitals reviewed.  Constitutional: Vital signs are normal.   Ill appearing, frail   HENT:   Head: Normocephalic and atraumatic.   Neck: Neck supple.   Cardiovascular: Normal rate and regular rhythm.   Pulmonary/Chest: Breath sounds normal. No respiratory distress.   Abdominal: Soft. Normal appearance. He exhibits distension. There is no tenderness.   Genitourinary:   Genitourinary Comments: Significant scrotal edema   Musculoskeletal: Normal range of motion.   Neurological: He is alert and oriented to person, place, and time.   Skin: Skin is warm and dry.   Psychiatric: He has a normal mood and affect.         ED Course   Procedures  Labs Reviewed   CBC W/ AUTO DIFFERENTIAL - Abnormal; Notable for the following components:       Result Value    WBC 36.92 (*)     Hemoglobin 11.9 (*)     Hematocrit 36.3 (*)     Mean Corpuscular Volume 77 (*)     Mean Corpuscular Hemoglobin 25.4 (*)     RDW 21.1 (*)     Immature Granulocytes 0.7 (*)     Gran # (ANC) 33.8 (*)     Immature Grans (Abs) 0.27 (*)     Mono # 1.3 (*)     Gran% 91.6 (*)     Lymph% 3.9 (*)     Mono% 3.6 (*)     All other components within normal limits   COMPREHENSIVE METABOLIC PANEL - Abnormal; Notable for the following components:    Sodium 132 (*)     CO2 22 (*)     BUN, Bld 34 (*)     Calcium 8.6  (*)     Albumin 2.1 (*)     Total Bilirubin 5.9 (*)      (*)      (*)     All other components within normal limits   TROPONIN I - Abnormal; Notable for the following components:    Troponin I 0.140 (*)     All other components within normal limits    Narrative:     trop   result(s) called and verbal readback obtained from Marylou Smith RN/ER by JGK 01/27/2020 11:50   B-TYPE NATRIURETIC PEPTIDE - Abnormal; Notable for the following components:    BNP 3,101 (*)     All other components within normal limits   PROTIME-INR - Abnormal; Notable for the following components:    PT 32.3 (*)     All other components within normal limits   LACTIC ACID, PLASMA - Abnormal; Notable for the following components:    Lactate (Lactic Acid) 3.2 (*)     All other components within normal limits    Narrative:     La   result(s) called and verbal readback obtained from Bharath Garcia   RN/ER by JAkdemia 01/27/2020 13:18   AMMONIA        ECG Results          EKG 12-lead (In process)  Result time 01/27/20 11:11:51    In process by Interface, Lab In [facility] (01/27/20 11:11:51)                 Narrative:    Test Reason : E87.70,    Vent. Rate : 129 BPM     Atrial Rate : 129 BPM     P-R Int : 158 ms          QRS Dur : 100 ms      QT Int : 304 ms       P-R-T Axes : 067 027 005 degrees     QTc Int : 445 ms    Sinus tachycardia  Possible Left atrial enlargement  Incomplete right bundle branch block  Anteroseptal infarct (cited on or before 22-DEC-2019)  Abnormal ECG  When compared with ECG of 22-DEC-2019 07:17,  The axis Shifted left  Inverted T waves have replaced nonspecific T wave abnormality in Inferior  leads    Referred By: [email or web address]   SELF           Confirmed By:                             Imaging Results          CT Abdomen Pelvis With Contrast (Final result)  Result time 01/27/20 12:19:29    Final result by Bette Herman MD (01/27/20 12:19:29)                 Impression:      Moderate ascites with diffuse subcutaneous  edema throughout the abdomen, pelvis and scrotum    Fatty infiltration of the liver with stable 10 mm hypodense lesion in the dome of the liver    Removal of the right pleural catheter with persistent right hydropneumothorax and small left pleural effusion    Patchy alveolar opacities within the right middle lobe and lung bases suggestive of pneumonia      Electronically signed by: Bette Herman MD  Date:    01/27/2020  Time:    12:19             Narrative:      CMS MANDATED QUALITY DATA - CT RADIATION - 436    All CT scans at this facility utilize dose modulation, iterative reconstruction, and/or weight based dosing when appropriate to reduce radiation dose to as low as reasonably achievable.    EXAMINATION:  CT ABDOMEN PELVIS WITH CONTRAST    CLINICAL HISTORY:  scrotal edema;    TECHNIQUE:  CT abdomen and pelvis with 100 mL Omnipaque    COMPARISON:  12/23/2019    FINDINGS:  CT ABDOMEN:    The previously noted right pleural catheter has been removed.  There is persistent hydropneumothorax in the right lung base with small left pleural effusion.  There is nodular alveolar opacities within the right lower lobe and lung bases.    The liver is hypodense compatible with fatty infiltration.  There is a stable 1 cm hypodense lesion in the dome of the right lobe of the liver suggestive of hepatic cyst.  The spleen, pancreas, gallbladder and adrenal glands are normal.    The kidneys enhance symmetrically without hydronephrosis or calculi.  There is a 7 mm nonobstructing left renal stone.    There is moderate ascites.  There are no thick-walled or dilated bowel loops.  There is diffuse anasarca.    There is no adenopathy.    CT PELVIS:    The bladder and prostate gland are normal.  There is no pelvic adenopathy.  There is moderate edema within the subcutaneous tissues in scrotum.  There are no acute osseous abnormalities.                               X-Ray Chest AP Portable (Final result)  Result time 01/27/20 10:49:55     Final result by Bette Herman MD (01/27/20 10:49:55)                 Impression:      Progression of the airspace disease within the right lower lobe with new airspace disease in left mid lung and small bilateral pleural effusions    Mild cardiomegaly      Electronically signed by: Bette Herman MD  Date:    01/27/2020  Time:    10:49             Narrative:    EXAMINATION:  XR CHEST AP PORTABLE    CLINICAL HISTORY:  sob;    FINDINGS:  Portable chest at 10:35 is compared to 12/27/2019 shows mild cardiomegaly    There is progression of the airspace disease within the right lung base with small right pleural effusion.  There is development of alveolar opacity in the left mid lung with tiny left pleural effusion.  The upper lobes are clear.  Pulmonary vasculature is normal. No acute osseous abnormality.                                 Medical Decision Making:   Initial Assessment:   Seen and evaluated.  Presented with a complaint of groin pain.  Found to have significant scrotal edema.  This is consistent with history of heart failure known CHF as well as liver failure with worsening ascites and probable hypoalbuminemia.  Patient has tachycardia, significant white count, and concern for source being pulmonary infection with pneumonia on chest radiograph.  Lactic acid was ordered x2.  Fluids were started at 130 cc an hour.  I was not comfortable given patient a large bolus given his heart failure history and clinical hypovolemia.  I did diurese him with Lasix here.  He was admitted to the ICU in guarded condition                                 Clinical Impression:       ICD-10-CM ICD-9-CM   1. Volume overload E87.70 276.69   2. Scrotal edema N50.89 608.86   3. Pneumonia due to infectious organism, unspecified laterality, unspecified part of lung J18.9 136.9     484.8   4. Pneumonia J18.9 486                             Sergio Lucas Jr., MD  01/27/20 1952

## 2020-01-28 NOTE — PLAN OF CARE
Assessment done. Pt could need to have Medicaid transportation take him home if cousin Estevan cannot pick him up. Pt states he might need a cane on discharge.      01/28/20 1355   Discharge Assessment   Assessment Type Discharge Planning Assessment   Confirmed/corrected address and phone number on facesheet? Yes   Assessment information obtained from? Patient   Communicated expected length of stay with patient/caregiver no   Prior to hospitilization cognitive status: Alert/Oriented   Prior to hospitalization functional status: Assistive Equipment  (Uses cane )   Current cognitive status: Alert/Oriented   Current Functional Status: Needs Assistance   Lives With other relative(s)  (Lives with Estevan oviedo)   Able to Return to Prior Arrangements yes   Is patient able to care for self after discharge? Yes   Who are your caregiver(s) and their phone number(s)? Estevan Linares cousin lives with him [phone]  Itz Daigle son [phone]   Patient's perception of discharge disposition home or selfcare   Readmission Within the Last 30 Days current reason for admission unrelated to previous admission  (Could need Medicaid Transportation if Estevan cannot come pick pt up at diischarge.)   If yes, most recent facility name: [facility]   Patient currently being followed by outpatient case management? No   Patient currently receives any other outside agency services? No   Equipment Currently Used at Home cane, straight  (Pt bought cane and cane is in [employer].)   Part D Coverage Medicaid    Do you have any problems affording any of your prescribed medications? No   Is the patient taking medications as prescribed? yes   Does the patient have transportation home? Yes   Transportation Anticipated family or friend will provide   Does the patient receive services at the [facility]? No   Discharge Plan A Home with family   DME Needed Upon Discharge  cane, quad   Patient/Family in Agreement with Plan yes    Readmission Questionnaire   At the time of your discharge, did someone talk to you about what your health problems were? Yes   At the time of discharge, did someone talk to you about what to watch out for regarding worsening of your health problem? Yes   At the time of discharge, did someone talk to you about what to do if you experienced worsening of your health problem? Yes   At the time of discharge, did someone talk to you about which medication to take when you left the hospital and which ones to stop taking? Yes   At the time of discharge, did someone talk to you about when and where to follow up with a doctor after you left the hospital? Yes   What do you believe caused you to be sick enough to be re-admitted? Peripheral Edema   How often do you need to have someone help you when you read instructions, pamphlets, or other written material from your doctor or pharmacy? Never   Do you have problems taking your medications as prescribed? No   Do you have any problems affording any of  your prescribed medications? No   Do you have problems obtaining/receiving your medications? No   Does the patient have transportation to healthcare appointments? Yes  (Pt's cousin Estevan might can pick him up or he might need Medicaid Transportion.)   Living Arrangements house   Does the patient have family/friends to help with healtcare needs after discharge? yes   Does your caregiver provide all the help you need? Yes   Are you currently feeling confused? No   Are you currently having problems thinking? No   Are you currently having memory problems? No   Have you felt down, depressed, or hopeless? 1   Have you felt little interest or pleasure in doing things? 1   In the last 7 days, my sleep quality was: fair

## 2020-01-28 NOTE — PLAN OF CARE
01/28/20 1408   Discharge Reassessment   Assessment Type Discharge Planning Reassessment   Anticipated Discharge Disposition Home   Cm was given 5 wishes booklet and instructed on how to fill out. Pt v/u.

## 2020-01-28 NOTE — NURSING
Pt Output inaccurate due to urinating on floor frequently. Educated pt on benefit of Asif Catheter, pt denied intervention.

## 2020-01-28 NOTE — PLAN OF CARE
Raw areas on scotal penis area will not allow us to inspect area, refused munoz attempted to cleanse area pt screaming, dont touch it. Propped on pillow cases to attempt to reduce swelling

## 2020-01-28 NOTE — H&P
[facility] Medicine  History & Physical    Patient Name: Francis Daigle  MRN: [id number]  Admission Date: 1/27/2020  Attending Physician: Promise Magana MD  Primary Care Provider: Primary Doctor No         Patient information was obtained from patient, past medical records and ER records.     Subjective:     Principal Problem:Severe sepsis    Chief Complaint:   Chief Complaint   Patient presents with    Groin Swelling     for several days        HPI: 44-year-old male with past medical history of combined diastolic and systolic CHF (EF 15% as at 12/2019), HCV, bilateral LE DVTs and bilateral PE, prior IVDU, HTn who presented with groin swelling of 5 days duration.    Prior hospital records reviewed and it reveals patient was discharged from [facility] one month ago after a complicated hospital course for acute cholecystits complicated by b/l DVT, bilateral PE s/p tPA, PEA cardiac arrest. HE was in the hospital for 20 days and was discharged home. He has not had any follow up visits since then.    He reports increasing lower extremity swelling progressing to scrotal swelling for the past 5 days. He endorses compliance with Lasix 40mg daily and states that he has been taking an extra 20mg daily for the past 5 days. He also endorses a dry cough, congestion and cold intolerance. He denies fever, chills, chest pain.    In the ED he has received vancomycin, zosyn and levaquin, fluid bolus and 40mg IV lasix. Chest xray shows a right middle lobe pneumonia.     Past Medical History:   Diagnosis Date    Anxiety     Arthritis     CHF (congestive heart failure)     Cirrhosis     Coronary artery disease     Depression     DVT (deep venous thrombosis)     Hepatitis C     Hypertension        History reviewed. No pertinent surgical history.    Review of patient's allergies indicates:  No Known Allergies    No current facility-administered medications on file prior to encounter.      Current  Outpatient Medications on File Prior to Encounter   Medication Sig    apixaban (ELIQUIS) 5 mg Tab Take 1 tablet (5 mg total) by mouth 2 (two) times daily.    enalapril (VASOTEC) 2.5 MG tablet Take 1 tablet (2.5 mg total) by mouth once daily.    furosemide (LASIX) 40 MG tablet Take 1 tablet (40 mg total) by mouth once daily.    gabapentin (NEURONTIN) 100 MG capsule Take 1 capsule (100 mg total) by mouth 3 (three) times daily as needed (anxiety/agitation/neuropathic pain).    gabapentin (NEURONTIN) 300 MG capsule Take 1 capsule (300 mg total) by mouth every evening.    hydrOXYzine HCl (ATARAX) 25 MG tablet Take 1 tablet (25 mg total) by mouth once daily.    lactulose (CHRONULAC) 10 gram/15 mL solution Take 15 mLs (10 g total) by mouth 3 (three) times daily as needed (constipation).    metoprolol succinate (TOPROL-XL) 100 MG 24 hr tablet Take 1 tablet (100 mg total) by mouth once daily.    pantoprazole (PROTONIX) 40 MG tablet Take 1 tablet (40 mg total) by mouth once daily.    acetaminophen (TYLENOL) 325 MG tablet Take 325 mg by mouth every 6 (six) hours as needed for Pain.    [DISCONTINUED] citalopram (CELEXA) 20 MG tablet Take 1 tablet (20 mg total) by mouth once daily.    [DISCONTINUED] spironolactone (ALDACTONE) 25 MG tablet Take 1 tablet (25 mg total) by mouth once daily.     Family History     Problem Relation (Age of Onset)    Arthritis Mother    Asthma Sister    Diabetes Sister    Heart disease Mother, Maternal Grandfather    Hyperlipidemia Mother    Hypertension Mother, Father    Kidney disease Mother        Tobacco Use    Smoking status: Former Smoker     Packs/day: 2.00     Years: 20.00     Pack years: 40.00     Types: Cigarettes     Start date: 1/23/1999     Last attempt to quit: 1/23/2017     Years since quitting: 3.0    Smokeless tobacco: Never Used   Substance and Sexual Activity    Alcohol use: Not Currently    Drug use: Not Currently     Frequency: 7.0 times per week     Types:  Amphetamines     Comment: quit in december 2019    Sexual activity: Not Currently     Partners: Female     Review of Systems   Constitutional: Positive for activity change and fatigue. Negative for chills, diaphoresis and fever.   HENT: Positive for congestion. Negative for ear pain, hearing loss and tinnitus.    Eyes: Negative for photophobia, discharge and visual disturbance.   Respiratory: Positive for shortness of breath. Negative for cough, chest tightness and wheezing.    Cardiovascular: Positive for leg swelling. Negative for chest pain and palpitations.   Gastrointestinal: Positive for abdominal distention. Negative for abdominal pain, blood in stool, constipation, diarrhea, nausea and vomiting.   Endocrine: Negative for polydipsia, polyphagia and polyuria.   Genitourinary: Positive for scrotal swelling. Negative for decreased urine volume, difficulty urinating, flank pain, hematuria and urgency.   Musculoskeletal: Negative for back pain, gait problem and myalgias.   Skin: Negative for rash and wound.   Neurological: Positive for weakness. Negative for dizziness, tremors, syncope, light-headedness and headaches.   Psychiatric/Behavioral: Negative for agitation and sleep disturbance.     Objective:     Vital Signs (Most Recent):  Temp: 97.7 °F (36.5 °C) (01/27/20 1501)  Pulse: (!) 129 (01/27/20 1501)  Resp: (!) 24 (01/27/20 1501)  BP: (!) 120/94 (01/27/20 1501)  SpO2: 97 % (01/27/20 1501) Vital Signs (24h Range):  Temp:  [97.6 °F (36.4 °C)-98 °F (36.7 °C)] 97.7 °F (36.5 °C)  Pulse:  [110-130] 129  Resp:  [20-37] 24  SpO2:  [89 %-98 %] 97 %  BP: (120-140)/(86-96) 120/94     Weight: 82.6 kg (182 lb 1.6 oz)  Body mass index is 27.69 kg/m².    Physical Exam   Constitutional: He is oriented to person, place, and time. He appears well-developed and well-nourished. No distress.   HENT:   Head: Normocephalic and atraumatic.   Mouth/Throat: Oropharynx is clear and moist. No oropharyngeal exudate.   Eyes: Pupils are  equal, round, and reactive to light. EOM are normal. Scleral icterus is present.   Neck: No thyromegaly present.   Cardiovascular: Normal rate, regular rhythm and normal heart sounds.   No murmur heard.  Pulmonary/Chest: Effort normal. No respiratory distress. He has no wheezes. He has rales in the right lower field and the left lower field.   Abdominal: Soft. Bowel sounds are normal. He exhibits distension. There is no tenderness. No hernia.   Genitourinary:   Genitourinary Comments: Scrotal edema     Musculoskeletal: Normal range of motion. He exhibits no edema.   Anasarca. 3+ pitting edema   Lymphadenopathy:     He has no cervical adenopathy.   Neurological: He is alert and oriented to person, place, and time. He displays normal reflexes. No cranial nerve deficit.   Skin: Skin is warm. Capillary refill takes less than 2 seconds. No rash noted.   Psychiatric: He has a normal mood and affect.   Nursing note and vitals reviewed.        CRANIAL NERVES     CN III, IV, VI   Pupils are equal, round, and reactive to light.  Extraocular motions are normal.        Significant Labs:   BMP:   Recent Labs   Lab 01/27/20  1105      *   K 4.5   CL 99   CO2 22*   BUN 34*   CREATININE 1.0   CALCIUM 8.6*     CBC:   Recent Labs   Lab 01/27/20  1105   WBC 36.92*   HGB 11.9*   HCT 36.3*        CMP:   Recent Labs   Lab 01/27/20  1105   *   K 4.5   CL 99   CO2 22*      BUN 34*   CREATININE 1.0   CALCIUM 8.6*   PROT 7.0   ALBUMIN 2.1*   BILITOT 5.9*   ALKPHOS 114   *   *   ANIONGAP 11   EGFRNONAA >60.0     Cardiac Markers:   Recent Labs   Lab 01/27/20  1105   BNP 3,101*     Lactic Acid:   Recent Labs   Lab 01/27/20  1238   LACTATE 3.2*     Magnesium: No results for input(s): MG in the last 48 hours.  Troponin:   Recent Labs   Lab 01/27/20  1105   TROPONINI 0.140*     TSH:   Recent Labs   Lab 10/28/19  0524   TSH 3.671     Urine Culture: No results for input(s): LABURIN in the last 48  hours.  Urine Studies: No results for input(s): COLORU, APPEARANCEUA, PHUR, SPECGRAV, PROTEINUA, GLUCUA, KETONESU, BILIRUBINUA, OCCULTUA, NITRITE, UROBILINOGEN, LEUKOCYTESUR, RBCUA, WBCUA, BACTERIA, SQUAMEPITHEL, HYALINECASTS in the last 48 hours.    Invalid input(s): WRIGHTSUR    Significant Imaging: I have reviewed and interpreted all pertinent imaging results/findings within the past 24 hours.     Imaging Results          CT Abdomen Pelvis With Contrast (Final result)  Result time 01/27/20 12:19:29    Final result by Bette Herman MD (01/27/20 12:19:29)                 Impression:      Moderate ascites with diffuse subcutaneous edema throughout the abdomen, pelvis and scrotum    Fatty infiltration of the liver with stable 10 mm hypodense lesion in the dome of the liver    Removal of the right pleural catheter with persistent right hydropneumothorax and small left pleural effusion    Patchy alveolar opacities within the right middle lobe and lung bases suggestive of pneumonia      Electronically signed by: Bette Herman MD  Date:    01/27/2020  Time:    12:19             Narrative:      CMS MANDATED QUALITY DATA - CT RADIATION - 436    All CT scans at this facility utilize dose modulation, iterative reconstruction, and/or weight based dosing when appropriate to reduce radiation dose to as low as reasonably achievable.    EXAMINATION:  CT ABDOMEN PELVIS WITH CONTRAST    CLINICAL HISTORY:  scrotal edema;    TECHNIQUE:  CT abdomen and pelvis with 100 mL Omnipaque    COMPARISON:  12/23/2019    FINDINGS:  CT ABDOMEN:    The previously noted right pleural catheter has been removed.  There is persistent hydropneumothorax in the right lung base with small left pleural effusion.  There is nodular alveolar opacities within the right lower lobe and lung bases.    The liver is hypodense compatible with fatty infiltration.  There is a stable 1 cm hypodense lesion in the dome of the right lobe of the liver suggestive of  hepatic cyst.  The spleen, pancreas, gallbladder and adrenal glands are normal.    The kidneys enhance symmetrically without hydronephrosis or calculi.  There is a 7 mm nonobstructing left renal stone.    There is moderate ascites.  There are no thick-walled or dilated bowel loops.  There is diffuse anasarca.    There is no adenopathy.    CT PELVIS:    The bladder and prostate gland are normal.  There is no pelvic adenopathy.  There is moderate edema within the subcutaneous tissues in scrotum.  There are no acute osseous abnormalities.                               X-Ray Chest AP Portable (Final result)  Result time 01/27/20 10:49:55    Final result by Bette Herman MD (01/27/20 10:49:55)                 Impression:      Progression of the airspace disease within the right lower lobe with new airspace disease in left mid lung and small bilateral pleural effusions    Mild cardiomegaly      Electronically signed by: Bette Herman MD  Date:    01/27/2020  Time:    10:49             Narrative:    EXAMINATION:  XR CHEST AP PORTABLE    CLINICAL HISTORY:  sob;    FINDINGS:  Portable chest at 10:35 is compared to 12/27/2019 shows mild cardiomegaly    There is progression of the airspace disease within the right lung base with small right pleural effusion.  There is development of alveolar opacity in the left mid lung with tiny left pleural effusion.  The upper lobes are clear.  Pulmonary vasculature is normal. No acute osseous abnormality.                                  Assessment/Plan:     * Severe sepsis  As evidenced by tachycardia, lactic acidosis, liver injury. Pulmonary source most likely. Patient has a history of recent hospitalization with antibiotic use  -start sepsis protocol - IV fluids 30cc/kg, he already received broad spectrum antibiotics with vancomycin, zosyn and levaquin in the ED  -continue vancomycin and zosyn  -trend lactic acid levels  -blood cultures x 2      Pneumonia  Cover for HCAP with  vancomycin and zosyn  Repeat CBC in AM      Acute on chronic combined systolic and diastolic congestive heart failure  Patient has evidence of fluid overload with elevated BNP, EF is 15%  IV lasix 40mg BID  Strict Is & O's monitoring  Daily weights  Consult cardiology    Anasarca  Driven by liver injury?  Aggressive diuresis with IV lasix BID  Consult GI      Chronic hepatitis C without hepatic coma  Patient has not seen a hepatologist for treatment of hepatitis C  GI consulted      Liver enzyme elevation  ? Hepatic congestion vs early acute liver failure  Trend LFTS      Essential hypertension  Resume home antihypertensives        VTE Risk Mitigation (From admission, onward)         Ordered     apixaban tablet 5 mg  2 times daily      01/27/20 1516     IP VTE HIGH RISK PATIENT  Once      01/27/20 1516     Reason for No Pharmacological VTE Prophylaxis  Once     Question:  Reasons:  Answer:  Already adequately anticoagulated on oral Anticoagulants    01/27/20 1516                   Promise Magana MD  Department of Hospital Medicine   [facility]

## 2020-01-28 NOTE — ASSESSMENT & PLAN NOTE
Patient has evidence of fluid overload with elevated BNP, EF is 15%  IV lasix 40mg BID  Strict Is & O's monitoring  Daily weights  Consult cardiology

## 2020-01-29 ENCOUNTER — CLINICAL SUPPORT (OUTPATIENT)
Dept: CARDIOLOGY | Facility: HOSPITAL | Age: 45
DRG: 853 | End: 2020-01-29
Attending: INTERNAL MEDICINE
Payer: MEDICAID

## 2020-01-29 PROBLEM — J91.8 PARAPNEUMONIC EFFUSION: Status: ACTIVE | Noted: 2020-01-29

## 2020-01-29 PROBLEM — J18.9 PARAPNEUMONIC EFFUSION: Status: ACTIVE | Noted: 2020-01-29

## 2020-01-29 PROBLEM — J94.8 HYDROPNEUMOTHORAX: Status: ACTIVE | Noted: 2019-12-21

## 2020-01-29 LAB
ALBUMIN SERPL BCP-MCNC: 1.9 G/DL (ref 3.5–5.2)
ALP SERPL-CCNC: 97 U/L (ref 55–135)
ALT SERPL W/O P-5'-P-CCNC: 98 U/L (ref 10–44)
ANION GAP SERPL CALC-SCNC: 11 MMOL/L (ref 8–16)
AORTIC ROOT ANNULUS: 2.8 CM
AORTIC VALVE CUSP SEPERATION: 2.32 CM
APTT PPP: 41.6 SEC (ref 23.6–33.3)
APTT PPP: 89 SEC (ref 23.6–33.3)
AST SERPL-CCNC: 131 U/L (ref 10–40)
AV INDEX (PROSTH): 0.95
AV MEAN GRADIENT: 2 MMHG
AV PEAK GRADIENT: 2 MMHG
AV VALVE AREA: 2.81 CM2
AV VELOCITY RATIO: 82.75
BASOPHILS # BLD AUTO: 0.03 K/UL (ref 0–0.2)
BASOPHILS # BLD AUTO: 0.04 K/UL (ref 0–0.2)
BASOPHILS NFR BLD: 0.1 % (ref 0–1.9)
BASOPHILS NFR BLD: 0.2 % (ref 0–1.9)
BILIRUB SERPL-MCNC: 2.8 MG/DL (ref 0.1–1)
BSA FOR ECHO PROCEDURE: 1.99 M2
BUN SERPL-MCNC: 22 MG/DL (ref 6–20)
CALCIUM SERPL-MCNC: 7.5 MG/DL (ref 8.7–10.5)
CHLORIDE SERPL-SCNC: 93 MMOL/L (ref 95–110)
CO2 SERPL-SCNC: 30 MMOL/L (ref 23–29)
CREAT SERPL-MCNC: 0.9 MG/DL (ref 0.5–1.4)
CV ECHO LV RWT: 0.46 CM
DIFFERENTIAL METHOD: ABNORMAL
DIFFERENTIAL METHOD: ABNORMAL
DOP CALC AO PEAK VEL: 0.77 M/S
DOP CALC AO VTI: 10.57 CM
DOP CALC LVOT AREA: 3 CM2
DOP CALC LVOT DIAMETER: 1.94 CM
DOP CALC LVOT PEAK VEL: 63.72 M/S
DOP CALC LVOT STROKE VOLUME: 29.69 CM3
DOP CALCLVOT PEAK VEL VTI: 10.05 CM
E WAVE DECELERATION TIME: 174.04 MSEC
E/A RATIO: 1.35
E/E' RATIO: 10.8 M/S
ECHO LV POSTERIOR WALL: 1.24 CM (ref 0.6–1.1)
EOSINOPHIL # BLD AUTO: 0.3 K/UL (ref 0–0.5)
EOSINOPHIL # BLD AUTO: 0.4 K/UL (ref 0–0.5)
EOSINOPHIL NFR BLD: 1.1 % (ref 0–8)
EOSINOPHIL NFR BLD: 1.7 % (ref 0–8)
ERYTHROCYTE [DISTWIDTH] IN BLOOD BY AUTOMATED COUNT: 21.9 % (ref 11.5–14.5)
ERYTHROCYTE [DISTWIDTH] IN BLOOD BY AUTOMATED COUNT: 22.3 % (ref 11.5–14.5)
EST. GFR  (AFRICAN AMERICAN): >60 ML/MIN/1.73 M^2
EST. GFR  (NON AFRICAN AMERICAN): >60 ML/MIN/1.73 M^2
FRACTIONAL SHORTENING: 17 % (ref 28–44)
GLUCOSE SERPL-MCNC: 154 MG/DL (ref 70–110)
HCT VFR BLD AUTO: 33.6 % (ref 40–54)
HCT VFR BLD AUTO: 33.7 % (ref 40–54)
HGB BLD-MCNC: 10.8 G/DL (ref 14–18)
HGB BLD-MCNC: 10.9 G/DL (ref 14–18)
IMM GRANULOCYTES # BLD AUTO: 0.17 K/UL (ref 0–0.04)
IMM GRANULOCYTES # BLD AUTO: 0.19 K/UL (ref 0–0.04)
IMM GRANULOCYTES NFR BLD AUTO: 0.8 % (ref 0–0.5)
IMM GRANULOCYTES NFR BLD AUTO: 0.8 % (ref 0–0.5)
INR PPP: 2.2
INTERVENTRICULAR SEPTUM: 1.63 CM (ref 0.6–1.1)
IVRT: 124.32 MSEC
LEFT ATRIUM SIZE: 4.12 CM
LEFT INTERNAL DIMENSION IN SYSTOLE: 4.49 CM (ref 2.1–4)
LEFT VENTRICLE DIASTOLIC VOLUME INDEX: 54.47 ML/M2
LEFT VENTRICLE DIASTOLIC VOLUME: 107.1 ML
LEFT VENTRICLE MASS INDEX: 173 G/M2
LEFT VENTRICLE SYSTOLIC VOLUME INDEX: 46.3 ML/M2
LEFT VENTRICLE SYSTOLIC VOLUME: 91 ML
LEFT VENTRICULAR INTERNAL DIMENSION IN DIASTOLE: 5.39 CM (ref 3.5–6)
LEFT VENTRICULAR MASS: 339.2 G
LV LATERAL E/E' RATIO: 10.13 M/S
LV SEPTAL E/E' RATIO: 11.57 M/S
LYMPHOCYTES # BLD AUTO: 1.4 K/UL (ref 1–4.8)
LYMPHOCYTES # BLD AUTO: 1.6 K/UL (ref 1–4.8)
LYMPHOCYTES NFR BLD: 5.7 % (ref 18–48)
LYMPHOCYTES NFR BLD: 7.5 % (ref 18–48)
MAGNESIUM SERPL-MCNC: 2 MG/DL (ref 1.6–2.6)
MCH RBC QN AUTO: 25.1 PG (ref 27–31)
MCH RBC QN AUTO: 25.6 PG (ref 27–31)
MCHC RBC AUTO-ENTMCNC: 32 G/DL (ref 32–36)
MCHC RBC AUTO-ENTMCNC: 32.4 G/DL (ref 32–36)
MCV RBC AUTO: 78 FL (ref 82–98)
MCV RBC AUTO: 79 FL (ref 82–98)
MONOCYTES # BLD AUTO: 0.8 K/UL (ref 0.3–1)
MONOCYTES # BLD AUTO: 0.9 K/UL (ref 0.3–1)
MONOCYTES NFR BLD: 3.5 % (ref 4–15)
MONOCYTES NFR BLD: 3.9 % (ref 4–15)
MV PEAK A VEL: 0.6 M/S
MV PEAK E VEL: 0.81 M/S
NEUTROPHILS # BLD AUTO: 18.7 K/UL (ref 1.8–7.7)
NEUTROPHILS # BLD AUTO: 22.3 K/UL (ref 1.8–7.7)
NEUTROPHILS NFR BLD: 86 % (ref 38–73)
NEUTROPHILS NFR BLD: 88.7 % (ref 38–73)
NRBC BLD-RTO: 0 /100 WBC
NRBC BLD-RTO: 0 /100 WBC
PHOSPHATE SERPL-MCNC: 1.9 MG/DL (ref 2.7–4.5)
PISA TR MAX VEL: 2.13 M/S
PLATELET # BLD AUTO: 165 K/UL (ref 150–350)
PLATELET # BLD AUTO: 184 K/UL (ref 150–350)
PMV BLD AUTO: 10.3 FL (ref 9.2–12.9)
PMV BLD AUTO: 10.7 FL (ref 9.2–12.9)
POTASSIUM SERPL-SCNC: 3.2 MMOL/L (ref 3.5–5.1)
PROT SERPL-MCNC: 6.2 G/DL (ref 6–8.4)
PROTHROMBIN TIME: 23.4 SEC (ref 10.6–14.8)
PULM VEIN S/D RATIO: 1.12
PV PEAK D VEL: 42.04 M/S
PV PEAK S VEL: 47.13 M/S
PV PEAK VELOCITY: 57.05 CM/S
RBC # BLD AUTO: 4.26 M/UL (ref 4.6–6.2)
RBC # BLD AUTO: 4.3 M/UL (ref 4.6–6.2)
RIGHT VENTRICULAR END-DIASTOLIC DIMENSION: 395 CM
SODIUM SERPL-SCNC: 134 MMOL/L (ref 136–145)
TDI LATERAL: 0.08 M/S
TDI SEPTAL: 0.07 M/S
TDI: 0.08 M/S
TR MAX PG: 18 MMHG
VANCOMYCIN TROUGH SERPL-MCNC: 25.4 UG/ML (ref 10–22)
WBC # BLD AUTO: 21.71 K/UL (ref 3.9–12.7)
WBC # BLD AUTO: 25.06 K/UL (ref 3.9–12.7)

## 2020-01-29 PROCEDURE — 83735 ASSAY OF MAGNESIUM: CPT

## 2020-01-29 PROCEDURE — 25000003 PHARM REV CODE 250: Performed by: EMERGENCY MEDICINE

## 2020-01-29 PROCEDURE — 20000000 HC ICU ROOM

## 2020-01-29 PROCEDURE — 21400001 HC TELEMETRY ROOM

## 2020-01-29 PROCEDURE — 94761 N-INVAS EAR/PLS OXIMETRY MLT: CPT

## 2020-01-29 PROCEDURE — 63600175 PHARM REV CODE 636 W HCPCS: Performed by: INTERNAL MEDICINE

## 2020-01-29 PROCEDURE — 93306 TTE W/DOPPLER COMPLETE: CPT

## 2020-01-29 PROCEDURE — 63600175 PHARM REV CODE 636 W HCPCS: Performed by: EMERGENCY MEDICINE

## 2020-01-29 PROCEDURE — 99233 SBSQ HOSP IP/OBS HIGH 50: CPT | Mod: ,,, | Performed by: INTERNAL MEDICINE

## 2020-01-29 PROCEDURE — 85730 THROMBOPLASTIN TIME PARTIAL: CPT

## 2020-01-29 PROCEDURE — 25000003 PHARM REV CODE 250: Performed by: INTERNAL MEDICINE

## 2020-01-29 PROCEDURE — 25000003 PHARM REV CODE 250

## 2020-01-29 PROCEDURE — 85610 PROTHROMBIN TIME: CPT

## 2020-01-29 PROCEDURE — 84100 ASSAY OF PHOSPHORUS: CPT

## 2020-01-29 PROCEDURE — 25500020 PHARM REV CODE 255: Performed by: INTERNAL MEDICINE

## 2020-01-29 PROCEDURE — P9047 ALBUMIN (HUMAN), 25%, 50ML: HCPCS | Mod: JG | Performed by: INTERNAL MEDICINE

## 2020-01-29 PROCEDURE — 85025 COMPLETE CBC W/AUTO DIFF WBC: CPT | Mod: 91

## 2020-01-29 PROCEDURE — 99233 PR SUBSEQUENT HOSPITAL CARE,LEVL III: ICD-10-PCS | Mod: ,,, | Performed by: INTERNAL MEDICINE

## 2020-01-29 PROCEDURE — 63600175 PHARM REV CODE 636 W HCPCS: Mod: JG | Performed by: INTERNAL MEDICINE

## 2020-01-29 PROCEDURE — 99900035 HC TECH TIME PER 15 MIN (STAT)

## 2020-01-29 PROCEDURE — 80053 COMPREHEN METABOLIC PANEL: CPT

## 2020-01-29 PROCEDURE — 80202 ASSAY OF VANCOMYCIN: CPT

## 2020-01-29 RX ORDER — HEPARIN SODIUM,PORCINE/D5W 25000/250
18 INTRAVENOUS SOLUTION INTRAVENOUS CONTINUOUS
Status: DISCONTINUED | OUTPATIENT
Start: 2020-01-29 | End: 2020-02-08

## 2020-01-29 RX ORDER — ALBUMIN HUMAN 250 G/1000ML
12.5 SOLUTION INTRAVENOUS ONCE
Status: COMPLETED | OUTPATIENT
Start: 2020-01-29 | End: 2020-01-29

## 2020-01-29 RX ORDER — FUROSEMIDE 10 MG/ML
20 INJECTION INTRAMUSCULAR; INTRAVENOUS
Status: DISCONTINUED | OUTPATIENT
Start: 2020-01-29 | End: 2020-02-02

## 2020-01-29 RX ORDER — SPIRONOLACTONE 25 MG/1
25 TABLET ORAL DAILY
Status: DISCONTINUED | OUTPATIENT
Start: 2020-01-29 | End: 2020-02-01

## 2020-01-29 RX ADMIN — SODIUM PHOSPHATE, MONOBASIC, MONOHYDRATE AND SODIUM PHOSPHATE, DIBASIC, ANHYDROUS 20.01 MMOL: 276; 142 INJECTION, SOLUTION INTRAVENOUS at 05:01

## 2020-01-29 RX ADMIN — FUROSEMIDE 20 MG: 20 INJECTION, SOLUTION INTRAMUSCULAR; INTRAVENOUS at 04:01

## 2020-01-29 RX ADMIN — CHLORHEXIDINE GLUCONATE 15 ML: 1.2 RINSE ORAL at 09:01

## 2020-01-29 RX ADMIN — MUPIROCIN: 20 OINTMENT TOPICAL at 09:01

## 2020-01-29 RX ADMIN — HYDROCODONE BITARTRATE AND ACETAMINOPHEN 1 TABLET: 10; 325 TABLET ORAL at 06:01

## 2020-01-29 RX ADMIN — HYDROCODONE BITARTRATE AND ACETAMINOPHEN 1 TABLET: 5; 325 TABLET ORAL at 09:01

## 2020-01-29 RX ADMIN — POTASSIUM CHLORIDE 40 MEQ: 20 TABLET, EXTENDED RELEASE ORAL at 06:01

## 2020-01-29 RX ADMIN — SENNOSIDES AND DOCUSATE SODIUM 1 TABLET: 8.6; 5 TABLET ORAL at 09:01

## 2020-01-29 RX ADMIN — VANCOMYCIN HYDROCHLORIDE 1500 MG: 1 INJECTION, POWDER, LYOPHILIZED, FOR SOLUTION INTRAVENOUS at 03:01

## 2020-01-29 RX ADMIN — PIPERACILLIN AND TAZOBACTAM 4.5 G: 4; .5 INJECTION, POWDER, LYOPHILIZED, FOR SOLUTION INTRAVENOUS; PARENTERAL at 06:01

## 2020-01-29 RX ADMIN — METOPROLOL SUCCINATE 100 MG: 50 TABLET, EXTENDED RELEASE ORAL at 09:01

## 2020-01-29 RX ADMIN — ENALAPRIL MALEATE 2.5 MG: 2.5 TABLET ORAL at 09:01

## 2020-01-29 RX ADMIN — PIPERACILLIN AND TAZOBACTAM 4.5 G: 4; .5 INJECTION, POWDER, LYOPHILIZED, FOR SOLUTION INTRAVENOUS; PARENTERAL at 01:01

## 2020-01-29 RX ADMIN — ALBUMIN (HUMAN) 12.5 G: 12.5 SOLUTION INTRAVENOUS at 01:01

## 2020-01-29 RX ADMIN — IOHEXOL 100 ML: 350 INJECTION, SOLUTION INTRAVENOUS at 09:01

## 2020-01-29 RX ADMIN — PANTOPRAZOLE SODIUM 40 MG: 40 TABLET, DELAYED RELEASE ORAL at 05:01

## 2020-01-29 RX ADMIN — SPIRONOLACTONE 25 MG: 25 TABLET ORAL at 04:01

## 2020-01-29 RX ADMIN — HEPARIN SODIUM 18 UNITS/KG/HR: 10000 INJECTION, SOLUTION INTRAVENOUS at 01:01

## 2020-01-29 RX ADMIN — PIPERACILLIN AND TAZOBACTAM 4.5 G: 4; .5 INJECTION, POWDER, LYOPHILIZED, FOR SOLUTION INTRAVENOUS; PARENTERAL at 09:01

## 2020-01-29 NOTE — PROGRESS NOTES
"[facility]  Adult Nutrition   Progress Note (Initial Assessment)     SUMMARY     Recommendations  Recommendation/Intervention:   1.) Continue diet as tolerated by pt   2.)  to assist with meal selections daily   3.) Recommend replacing phos and Mg    Goals: 1.) Pt to meet at least 75% estimated needs via oral diet   Nutrition Goal Status: new    Dietitian Rounds Brief  Pt assessed 2' ICU status. Tolerating diet. No N/V. States appetite/intake good. LBM 1/27 per pt. Noted hypophosphatemia & hypomagnesemia. No needs voiced at this time. Reports compliance with low Na diet--RD card provided for future questions.    Reason for Assessment  Reason For Assessment: identified at risk by screening criteria(ICU status)  Diagnosis: infection/sepsis  Relevant Medical History: CHF, HCV, DVT, HTN    Nutrition Risk Screen  Nutrition Risk Screen: no indicators present     MST Score: 0  Have you recently lost weight without trying?: No  Weight loss score: 0  Have you been eating poorly because of a decreased appetite?: No  Appetite score: 0       Nutrition/Diet History  Patient Reported Diet/Restrictions/Preferences: low salt  Spiritual, Cultural Beliefs, Mandaeism Practices, Values that Affect Care: no  Food Allergies: NKFA  Factors Affecting Nutritional Intake: None identified at this time    Anthropometrics  Temp: 96 °F (35.6 °C)  Height Method: Stated  Height: 5' 8" (172.7 cm)  Height (inches): 68 in  Weight Method: Bed Scale  Weight: 82.8 kg (182 lb 8.7 oz)  Weight (lb): 182.54 lb  Ideal Body Weight (IBW), Male: 154 lb  % Ideal Body Weight, Male (lb): 118.53 %  BMI (Calculated): 27.8  BMI Grade: 25 - 29.9 - overweight       Weight History:  Wt Readings from Last 10 Encounters:   01/29/20 82.8 kg (182 lb 8.7 oz)   12/28/19 75.2 kg (165 lb 12.6 oz)   12/09/19 74.8 kg (165 lb)   10/30/19 69.8 kg (153 lb 14.1 oz)   08/25/19 70.5 kg (155 lb 6.8 oz)   07/06/19 74.8 kg (165 lb)   07/06/19 74.8 kg (165 lb) "   07/04/19 73.1 kg (161 lb 1.6 oz)       Lab/Procedures/Meds: Pertinent Labs Reviewed  Clinical Chemistry:  Recent Labs   Lab 01/28/20  0442  01/29/20  0311   *   < > 134*   K 3.5   < > 3.2*   CL 98   < > 93*   CO2 26   < > 30*   *   < > 154*   BUN 32*   < > 22*   CREATININE 1.1   < > 0.9   CALCIUM 7.9*   < > 7.5*   PROT 6.4  --  6.2   ALBUMIN 1.9*  --  1.9*   BILITOT 4.3*  --  2.8*   ALKPHOS 91  --  97   *  --  131*   *  --  98*   ANIONGAP 10   < > 11   ESTGFRAFRICA >60.0   < > >60.0   EGFRNONAA >60.0   < > >60.0   MG 1.5*   < > 2.0   PHOS 2.8  --  1.9*    < > = values in this interval not displayed.     CBC:   Recent Labs   Lab 01/29/20 0311   WBC 21.71*   RBC 4.30*   HGB 10.8*   HCT 33.7*      MCV 78*   MCH 25.1*   MCHC 32.0     Cardiac Profile:  Recent Labs   Lab 01/27/20  1105   BNP 3,101*   TROPONINI 0.140*       Medications: Pertinent Medications reviewed  Scheduled Meds:   chlorhexidine  15 mL Mouth/Throat BID    enalapril  2.5 mg Oral Daily    heparin (PORCINE)  80 Units/kg (Adjusted) Intravenous Once    hydrOXYzine HCl  25 mg Oral Daily    metoprolol succinate  100 mg Oral Daily    mupirocin   Nasal BID    pantoprazole  40 mg Oral Daily    piperacillin-tazobactam (ZOSYN) IVPB  4.5 g Intravenous Q8H    senna-docusate 8.6-50 mg  1 tablet Oral BID    vancomycin (VANCOCIN) IVPB  1,500 mg Intravenous Q12H     Continuous Infusions:   heparin (porcine) in D5W       PRN Meds:.acetaminophen, acetaminophen, acetaminophen, calcium chloride IVPB, calcium chloride IVPB, calcium chloride IVPB, dextrose 50%, dextrose 50%, furosemide, gabapentin, glucagon (human recombinant), glucose, glucose, heparin (PORCINE), heparin (PORCINE), HYDROcodone-acetaminophen, HYDROcodone-acetaminophen, magnesium oxide, magnesium sulfate IVPB, magnesium sulfate IVPB, magnesium sulfate IVPB, magnesium sulfate IVPB, ondansetron, potassium chloride in water, potassium chloride in water, potassium  chloride in water, potassium chloride in water, potassium chloride, potassium chloride, potassium chloride, potassium chloride, promethazine (PHENERGAN) IVPB, sodium chloride 0.9%, sodium phosphate IVPB, sodium phosphate IVPB, sodium phosphate IVPB, sodium phosphate IVPB, sodium phosphate IVPB, Pharmacy to dose Vancomycin consult **AND** vancomycin - pharmacy to dose    Estimated/Assessed Needs    Weight Used For Calorie Calculations: 82.8 kg (182 lb 8.7 oz)  Energy Calorie Requirements (kcal): 2070-2484 (25-30 kcal/kg)  Energy Need Method: Kcal/kg  Protein Requirements: 82-99 g (1-1.2 g/kg)  Weight Used For Protein Calculations: 82.8 kg (182 lb 8.7 oz)     Estimated Fluid Requirement Method: RDA Method  RDA Method (mL): 2070       Nutrition Prescription Ordered  Current Diet Order: cardiac     Evaluation of Received Nutrient/Fluid Intake  Energy Calories Required: meeting needs  Protein Required: meeting needs  Fluid Required: meeting needs  Tolerance: tolerating     Intake/Output Summary (Last 24 hours) at 1/29/2020 1209  Last data filed at 1/29/2020 0500  Gross per 24 hour   Intake 2370 ml   Output 3760 ml   Net -1390 ml        % Meal Intake: 75 - 100 %      Nutrition Risk  Level of Risk/Frequency of Follow-up: moderate     Monitor and Evaluation  Food and Nutrient Intake: food and beverage intake, energy intake  Food and Nutrient Adminstration: diet order  Knowledge/Beliefs/Attitudes: food and nutrition knowledge/skill, beliefs and attitudes  Physical Activity and Function: nutrition-related ADLs and IADLs  Anthropometric Measurements: weight change  Biochemical Data, Medical Tests and Procedures: gastrointestinal profile, electrolyte and renal panel, glucose/endocrine profile  Nutrition-Focused Physical Findings: overall appearance     Nutrition Follow-Up  RD Follow-up?: Yes    Tiffanie Pena RD 01/29/2020 12:09 PM

## 2020-01-29 NOTE — ASSESSMENT & PLAN NOTE
· Continue antibiotics as ordered.  · Defer to the thoracic surgery regarding management of his pleural space infection.  · Not requiring supplemental oxygen.  · Aggressive diuresis.  · Stable to transfer out of the intensive care unit.

## 2020-01-29 NOTE — HPI
Mr. Francis Daigle is a 44-year-old gentleman with past medical history hepatitis C, chronic liver disease, and systolic heart failure who was admitted to [facility] last month after sustaining massive pulmonary embolus with subsequent PA arrest.  He survived this but developed a right-sided pleural effusion afterwards.  Pleural fluid analysis demonstrated AA exudate with low glucose which was suspected to be from his pulmonary embolism, but was also treated with a course of intrapleural thrombolytics out of an abundance of caution.  He was discharged from [facility] a couple weeks ago, and has been gradually developing increasing scrotal and lower extremity edema.  He came to the emergency department [facility] couple days ago because of this.  He denies any associated shortness of breath, fevers, chills, pleurisy, or other constitutional symptoms.  Pulmonology was consulted after a CT of his abdomen incidentally demonstrated a right-sided hydropneumothorax.  When I examined him this morning he was resting comfortably in bed without any respiratory complaints.  Again he denied any chest pain pleurisy, fevers, or other constitutional.

## 2020-01-29 NOTE — CONSULTS
[facility]  Pulmonology   Consult Note    Consults   Subjective     Chief Complaint   Patient presents with    Groin Swelling     for several days      History of Present Illness:  Mr. Francis Daigle is a 44-year-old gentleman with past medical history hepatitis C, chronic liver disease, and systolic heart failure who was admitted to [facility] last month after sustaining massive pulmonary embolus with subsequent PA arrest.  He survived this but developed a right-sided pleural effusion afterwards.  Pleural fluid analysis demonstrated AA exudate with low glucose which was suspected to be from his pulmonary embolism, but was also treated with a course of intrapleural thrombolytics out of an abundance of caution.  He was discharged from [facility] a couple weeks ago, and has been gradually developing increasing scrotal and lower extremity edema.  He came to the emergency department [facility] couple days ago because of this.  He denies any associated shortness of breath, fevers, chills, pleurisy, or other constitutional symptoms.  Pulmonology was consulted after a CT of his abdomen incidentally demonstrated a right-sided hydropneumothorax.  When I examined him this morning he was resting comfortably in bed without any respiratory complaints.  Again he denied any chest pain pleurisy, fevers, or other constitutional.     Past Medical History:   Diagnosis Date    Anxiety     Arthritis     CHF (congestive heart failure)     Cirrhosis     Coronary artery disease     Depression     DVT (deep venous thrombosis)     Hepatitis C     Hypertension      History reviewed. No pertinent surgical history.  Family History   Problem Relation Age of Onset    Arthritis Mother     Heart disease Mother     Hyperlipidemia Mother     Hypertension Mother     Kidney disease Mother     Hypertension Father     Asthma Sister     Diabetes Sister     Heart disease Maternal Grandfather       Social  History     Tobacco Use    Smoking status: Former Smoker     Packs/day: 2.00     Years: 20.00     Pack years: 40.00     Types: Cigarettes     Start date: 1/23/1999     Last attempt to quit: 1/23/2017     Years since quitting: 3.0    Smokeless tobacco: Never Used   Substance and Sexual Activity    Alcohol use: Not Currently    Drug use: Not Currently     Frequency: 7.0 times per week     Types: Amphetamines     Comment: quit in december 2019    Sexual activity: Not Currently     Partners: Female      Allergies:  Patient has no known allergies.     Facility-Administered Medications as of 1/29/2020   Medication Route Frequency    [COMPLETED] 0.9%  NaCl infusion Intravenous Once    acetaminophen tablet 325 mg Oral Q6H PRN    acetaminophen tablet 650 mg Oral Q4H PRN    acetaminophen tablet 650 mg Oral Q8H PRN    [COMPLETED] albumin human 25% bottle 12.5 g Intravenous Once    calcium chloride 1g in sodium chloride 0.9% 100mL (ready to mix system) Intravenous PRN    calcium chloride 1g in sodium chloride 0.9% 100mL (ready to mix system) Intravenous PRN    calcium chloride 1g in sodium chloride 0.9% 100mL (ready to mix system) Intravenous PRN    chlorhexidine 0.12 % solution 15 mL Mouth/Throat BID    dextrose 50% injection 12.5 g Intravenous PRN    dextrose 50% injection 25 g Intravenous PRN    enalapril tablet 2.5 mg Oral Daily    furosemide injection 20 mg Intravenous BID PRN    furosemide injection 20 mg Intravenous Q12H    [COMPLETED] furosemide injection 40 mg Intravenous ED 1 Time    gabapentin capsule 100 mg Oral TID PRN    glucagon (human recombinant) injection 1 mg Intramuscular PRN    glucose chewable tablet 16 g Oral PRN    glucose chewable tablet 24 g Oral PRN    heparin 25,000 units in dextrose 5% (100 units/ml) IV bolus from bag - ADDITIONAL PRN BOLUS - 30 units/kg Intravenous PRN    heparin 25,000 units in dextrose 5% (100 units/ml) IV bolus from bag - ADDITIONAL PRN BOLUS - 60  units/kg Intravenous PRN    [COMPLETED] heparin 25,000 units in dextrose 5% (100 units/ml) IV bolus from bag INITIAL BOLUS Intravenous Once    heparin 25,000 units in dextrose 5% 250 mL (100 units/mL) infusion HIGH INTENSITY nomogram - [facility] Intravenous Continuous    HYDROcodone-acetaminophen  mg per tablet 1 tablet Oral Q6H PRN    HYDROcodone-acetaminophen 5-325 mg per tablet 1 tablet Oral Q6H PRN    [COMPLETED] HYDROmorphone injection 0.5 mg Intravenous ED 1 Time    hydrOXYzine HCl tablet 25 mg Oral Daily    [COMPLETED] iohexol (OMNIPAQUE 350) injection 100 mL Intravenous ONCE PRN    [COMPLETED] iohexol (OMNIPAQUE 350) injection 100 mL Intravenous ONCE PRN    [] levoFLOXacin 500 mg/100 mL IVPB 500 mg Intravenous ED 1 Time    magnesium oxide tablet 800 mg Oral PRN    magnesium sulfate 2g in water 50mL IVPB (premix) Intravenous PRN    magnesium sulfate 2g in water 50mL IVPB (premix) Intravenous PRN    magnesium sulfate 2g in water 50mL IVPB (premix) Intravenous PRN    magnesium sulfate in dextrose IVPB (premix) 1 g Intravenous PRN    metoprolol succinate (TOPROL-XL) 24 hr tablet 100 mg Oral Daily    mupirocin 2 % ointment Nasal BID    [COMPLETED] ondansetron injection 4 mg Intravenous ED 1 Time    ondansetron injection 4 mg Intravenous Q8H PRN    pantoprazole EC tablet 40 mg Oral Daily    [COMPLETED] piperacillin-tazobactam 4.5 g in dextrose 5 % 100 mL IVPB (ready to mix system) Intravenous ED 1 Time    piperacillin-tazobactam 4.5 g in dextrose 5 % 100 mL IVPB (ready to mix system) Intravenous Q8H    potassium chloride 10 mEq in 100 mL IVPB Intravenous PRN    potassium chloride 10 mEq in 100 mL IVPB Intravenous PRN    potassium chloride 10 mEq in 100 mL IVPB Intravenous PRN    potassium chloride 10 mEq in 100 mL IVPB Intravenous PRN    potassium chloride SA CR tablet 20 mEq Oral PRN    potassium chloride SA CR tablet 20 mEq Oral PRN    potassium chloride SA CR tablet 40 mEq  Oral PRN    potassium chloride SA CR tablet 40 mEq Oral PRN    promethazine (PHENERGAN) 6.25 mg in dextrose 5 % 50 mL IVPB Intravenous Q6H PRN    senna-docusate 8.6-50 mg per tablet 1 tablet Oral BID    [COMPLETED] sodium chloride 0.9% bolus 2,178 mL Intravenous Once    sodium chloride 0.9% flush 2 mL Intravenous PRN    sodium phosphate 15 mmol in dextrose 5 % 250 mL IVPB Intravenous PRN    sodium phosphate 15 mmol in dextrose 5 % 250 mL IVPB Intravenous PRN    sodium phosphate 20.01 mmol in dextrose 5 % 250 mL IVPB Intravenous PRN    sodium phosphate 20.01 mmol in dextrose 5 % 250 mL IVPB Intravenous PRN    sodium phosphate 30 mmol in dextrose 5 % 250 mL IVPB Intravenous PRN    spironolactone tablet 25 mg Oral Daily    [START ON 2020] vancomycin (VANCOCIN) 1,500 mg in dextrose 5 % 500 mL IVPB Intravenous Q16H    vancomycin - pharmacy to dose Intravenous pharmacy to manage frequency     Outpatient Medications as of 2020   Medication    apixaban (ELIQUIS) 5 mg Tab    enalapril (VASOTEC) 2.5 MG tablet    furosemide (LASIX) 40 MG tablet    gabapentin (NEURONTIN) 100 MG capsule    gabapentin (NEURONTIN) 300 MG capsule    [] hydrOXYzine HCl (ATARAX) 25 MG tablet    lactulose (CHRONULAC) 10 gram/15 mL solution    metoprolol succinate (TOPROL-XL) 100 MG 24 hr tablet    pantoprazole (PROTONIX) 40 MG tablet    acetaminophen (TYLENOL) 325 MG tablet      Review of Systems   Constitutional: Positive for weight gain and activity change. Negative for fever, chills, weight loss and night sweats.   HENT: Negative for postnasal drip, rhinorrhea, sinus pressure and congestion.    Eyes: Negative for redness and itching.   Respiratory: Negative for cough, shortness of breath, wheezing and orthopnea.    Cardiovascular: Positive for leg swelling. Negative for chest pain and palpitations.   Genitourinary: Negative for difficulty urinating and hematuria.   Endocrine: Negative for polydipsia,  "polyphagia and polyuria.    Musculoskeletal: Negative for gait problem, joint swelling and myalgias.   Skin: Negative for rash.   Gastrointestinal: Negative for nausea, vomiting, abdominal pain and acid reflux.   Neurological: Negative for syncope, weakness and headaches.   Hematological: Negative for adenopathy. Does not bruise/bleed easily and no excessive bruising.   Psychiatric/Behavioral: Negative for confusion and sleep disturbance. The patient is not nervous/anxious.    All other systems reviewed and are negative.     I have personally reviewed the following: active problem list, medication list, allergies, family history, social history, health maintenance, notes from last encounter, lab results, endoscopy procedure notes, imaging and nursing/provider documentation .    Objective     VS: Temp:  [96 °F (35.6 °C)-97.6 °F (36.4 °C)]   Pulse:  [80-90]   Resp:  [13-31]   BP: ()/(59-84)   SpO2:  [91 %-99 %]    Estimated body mass index is 27.76 kg/m² as calculated from the following:    Height as of this encounter: 5' 8" (1.727 m).    Weight as of this encounter: 82.8 kg (182 lb 8.7 oz).   Ideal body weight: 68.4 kg (150 lb 12.7 oz)  Adjusted ideal body weight: 74.2 kg (163 lb 7.9 oz)   I/O:   Intake/Output Summary (Last 24 hours) at 1/29/2020 1712  Last data filed at 1/29/2020 1600  Gross per 24 hour   Intake 1690 ml   Output 2660 ml   Net -970 ml        Vent: SpO2 96% on room air   PE Physical Exam   Constitutional: He is oriented to person, place, and time. He appears well-developed and well-nourished. He appears not cachectic.  Non-toxic appearance. No distress.   Temporal wasting present He is not obese.   HENT:   Head: Normocephalic and atraumatic.   Right Ear: External ear normal.   Left Ear: External ear normal.   Nose: Nose normal.   Mouth/Throat: Uvula is midline, oropharynx is clear and moist and mucous membranes are normal. Mallampati Score: II.   Neck: Trachea normal and normal range of motion. " Neck supple. No JVD present. No tracheal deviation present. No thyromegaly present.   Cardiovascular: Normal rate, regular rhythm, normal heart sounds and intact distal pulses. Exam reveals no gallop and no friction rub.   No murmur heard.  Pulmonary/Chest: Normal expansion, symmetric chest wall expansion and effort normal. No stridor. He has decreased breath sounds (Over the right base). He has no wheezes. He has no rhonchi. He has no rales. He exhibits tenderness (Over the right base).   Some mild soft tissue edema about the right lower hemithorax.  No fluctuance no tenderness no crepitus.   Abdominal: Soft. Bowel sounds are normal. He exhibits no distension. There is no tenderness. There is no guarding.   Musculoskeletal: Normal range of motion. He exhibits edema. He exhibits no tenderness or deformity.   Lymphadenopathy: No supraclavicular adenopathy is present.     He has no cervical adenopathy.     He has no axillary adenopathy.   Neurological: He is alert and oriented to person, place, and time. He has normal strength. No cranial nerve deficit or sensory deficit. He exhibits normal muscle tone. GCS eye subscore is 4. GCS verbal subscore is 5. GCS motor subscore is 6.   Skin: Skin is warm, dry and intact. No rash noted. He is not diaphoretic. No cyanosis. Nails show no clubbing.   Psychiatric: He has a normal mood and affect. His speech is normal and behavior is normal.   Nursing note and vitals reviewed.       Recent Diagnostic Studies:  Labs I have personally reviewed and interpreted all recent labs / diagnostic studies, and noted the following relevant results:  BNP:  3101  Lactate:  2.4, 2.0  Urinalysis:  Unremarkable.  Ammonia:  27  Recent Labs   Lab 01/29/20  0311 01/29/20  1243   WBC 21.71* 25.06*   RBC 4.30* 4.26*   HGB 10.8* 10.9*   HCT 33.7* 33.6*    184   MCV 78* 79*   MCH 25.1* 25.6*   MCHC 32.0 32.4   *  --    K 3.2*  --    CL 93*  --    CO2 30*  --    BUN 22*  --    CREATININE 0.9   --    MG 2.0  --    ALT 98*  --    *  --    ALKPHOS 97  --    BILITOT 2.8*  --    PROT 6.2  --    ALBUMIN 1.9*  --    INR  --  2.2   APTT  --  41.6*     Pleural Fluid Studies  Date:  12/20/2019   Protein:  2.3   LDH:  445   Glucose:  31   Cell Count:  WBC 2164 (PMN 50% / Lymph 30% / Monocytes 20% / Eos 0%)   Appearance:  Cloudy and yellow   Cytology:  Negative for malignant cells.  Reactive mesothelial cells.   No organisms on Gram stain, and negative culture.   My interpretation:  Exudate with a low glucose concentration consistent with a complicated parapneumonic effusion     Imaging I have personally reviewed and interpreted all available images and reviewed the associated Radiology reports.  My personal impression of the relevant studies is as follows:  CT chest:  · Known acute bilateral pulmonary thromboembolism.  · Loculated air/fluid collection within the pleural space of the right lower thorax.  Correlate with evidence of empyema.  There is associated compressive atelectatic change within the adjacent right lower lobe  · Small amounts of non loculated bilateral pleural fluid.  · Left-sided airspace opacities suggesting acute infectious or inflammatory infiltrate with additional scattered bilateral parenchymal opacities which may reflect areas of atelectasis but require short-term CT follow-up to document resolution.  · Cardiomegaly and small pericardial effusion.  · Body wall edema and edematous changes within the mediastinal fat.  There are borderline prominent mediastinal lymph nodes.  · Ascites.  · My impression:  Gross anasarca and evidence of cardiogenic pulmonary edema, loculated right sided hydropneumothorax, with some intrapleural air that was present on a CT of the chest obtained a month prior.    CT abdomen/pelvis:  · Moderate ascites with diffuse subcutaneous edema throughout the abdomen, pelvis and scrotum  · Fatty infiltration of the liver with stable 10 mm hypodense lesion in the  dome of the liver  · Removal of the right pleural catheter with persistent right hydropneumothorax and small left pleural effusion  · Patchy alveolar opacities within the right middle lobe and lung bases suggestive of pneumonia    Chest x-ray:  Progression of the airspace disease within the right lower lobe with new airspace disease in left mid lung and small bilateral pleural effusions  Mild cardiomegaly    TTE:  · Mild concentric left ventricular hypertrophy.  · Severely decreased left ventricular systolic function. The estimated ejection fraction is 30%.  · Grade II (moderate) left ventricular diastolic dysfunction consistent with pseudonormalization.  · Septal wall has abnormal motion. Diastolic flattening of the interventricular septum consistent with right ventricle volume overload.  · Mild right ventricular enlargement.  · Mildly reduced right ventricular systolic function.  · Mild left atrial enlargement.  · Mild right atrial enlargement.  · Mild-to-moderate mitral regurgitation.  · Moderate tricuspid regurgitation.  · Mild pulmonary hypertension present.  · Trivial circumferential pericardial effusion.     Micro I have personally reviewed and interpreted the available culture data.  Relevant results are as follows.  Blood Culture   Lab Results   Component Value Date    LABBLOO No Growth to date 01/27/2020    LABBLOO No Growth to date 01/27/2020    LABBLOO No Growth to date 01/27/2020   , Sputum Culture   Lab Results   Component Value Date    GSRESP Few WBC's 12/11/2019    GSRESP Few yeast 12/11/2019    RESPIRATORYC No S aureus or Pseudomonas isolated. 12/11/2019    RESPIRATORYC KEYSHAWN ALBICANS  Moderate   (A) 12/11/2019    RESPIRATORYC (A) 12/11/2019     ENTEROBACTER CLOACAE  Few  susceptibility pending      and Urine Culture  No results found for: LABURIN     Previous Diagnostic Studies:  I have personally reviewed and interpreted the following labs/studies/images from previous encounters:  BAL from  December 2019:  · No significant abnormalities.    Assessment       Active Hospital Problems    Diagnosis    *Severe sepsis    Parapneumonic effusion    Anasarca    Liver enzyme elevation    Hydropneumothorax    Chronic hepatitis C without hepatic coma    Pneumonia    Essential hypertension    Acute on chronic combined systolic and diastolic congestive heart failure        My Impression:  Persistent right-sided hepatic hydrothorax the setting of recent complicated parapneumonic effusion that was not completely resolved with chest tube drainage in intrapleural thrombolytics.  Although my initial inclination is to consider surgical intervention, his overall non-toxic and absence of constitutional respiratory symptoms warrants pause.    Plan     Pulmonary  · Continue antibiotics as ordered.  · Consultation with thoracic surgery.  · Titrate supplemental oxygen.  · Aggressive diuresis.  · Observation intensive care unit for now.       Thank you for your consult. I will follow-up with patient. Please contact us if you have any additional questions.    Trae Steeel MD  Pulmonary / Critical Care Medicine  [facility]

## 2020-01-29 NOTE — CONSULTS
Consult Note  Cardiothoracic Surgery    Consults  SUBJECTIVE:     History of Present Illness:  Patient is a 44 y.o. male presents with increasing edema despite taking his diuretic. Was hospitalized last month with acute cholecystitis complicated by DVT/PE, cardiac arrest     Scheduled Meds:   chlorhexidine  15 mL Mouth/Throat BID    enalapril  2.5 mg Oral Daily    furosemide (LASIX) IV  20 mg Intravenous Q12H    hydrOXYzine HCl  25 mg Oral Daily    metoprolol succinate  100 mg Oral Daily    mupirocin   Nasal BID    pantoprazole  40 mg Oral Daily    piperacillin-tazobactam (ZOSYN) IVPB  4.5 g Intravenous Q8H    senna-docusate 8.6-50 mg  1 tablet Oral BID    spironolactone  25 mg Oral Daily    [START ON 1/30/2020] vancomycin (VANCOCIN) IVPB  1,500 mg Intravenous Q16H     Infusions/Drips:   heparin (porcine) in D5W 18 Units/kg/hr (01/29/20 1345)     PRN Meds:acetaminophen, acetaminophen, acetaminophen, calcium chloride IVPB, calcium chloride IVPB, calcium chloride IVPB, dextrose 50%, dextrose 50%, furosemide, gabapentin, glucagon (human recombinant), glucose, glucose, heparin (PORCINE), heparin (PORCINE), HYDROcodone-acetaminophen, HYDROcodone-acetaminophen, magnesium oxide, magnesium sulfate IVPB, magnesium sulfate IVPB, magnesium sulfate IVPB, magnesium sulfate IVPB, ondansetron, potassium chloride in water, potassium chloride in water, potassium chloride in water, potassium chloride in water, potassium chloride, potassium chloride, potassium chloride, potassium chloride, promethazine (PHENERGAN) IVPB, sodium chloride 0.9%, sodium phosphate IVPB, sodium phosphate IVPB, sodium phosphate IVPB, sodium phosphate IVPB, sodium phosphate IVPB, Pharmacy to dose Vancomycin consult **AND** vancomycin - pharmacy to dose    Review of patient's allergies indicates:  No Known Allergies    Past Medical History:   Diagnosis Date    Anxiety     Arthritis     CHF (congestive heart failure)     Cirrhosis     Coronary  artery disease     Depression     DVT (deep venous thrombosis)     Hepatitis C     Hypertension      History reviewed. No pertinent surgical history.  Family History   Problem Relation Age of Onset    Arthritis Mother     Heart disease Mother     Hyperlipidemia Mother     Hypertension Mother     Kidney disease Mother     Hypertension Father     Asthma Sister     Diabetes Sister     Heart disease Maternal Grandfather      Social History     Tobacco Use    Smoking status: Former Smoker     Packs/day: 2.00     Years: 20.00     Pack years: 40.00     Types: Cigarettes     Start date: 1/23/1999     Last attempt to quit: 1/23/2017     Years since quitting: 3.0    Smokeless tobacco: Never Used   Substance Use Topics    Alcohol use: Not Currently    Drug use: Not Currently     Frequency: 7.0 times per week     Types: Amphetamines     Comment: quit in december 2019        Review of Systems:  Weakness  Fever  Swelling    OBJECTIVE:     Vital Signs (Most Recent)  Temp: 97.6 °F (36.4 °C) (01/29/20 1400)  Pulse: 86 (01/29/20 1600)  Resp: 19 (01/29/20 1600)  BP: 108/79 (01/29/20 1600)  SpO2: 96 % (01/29/20 1529)    Admission Weight: 72.6 kg (160 lb) (01/27/20 0956)   Most Recent Weight: 82.8 kg (182 lb 8.7 oz) (01/29/20 1206)    Vital Signs Range (Last 24H):  Temp:  [96 °F (35.6 °C)-97.6 °F (36.4 °C)]   Pulse:  [80-90]   Resp:  [13-31]   BP: ()/(59-84)   SpO2:  [91 %-99 %]     Physical Exam:  NAD  BS coarse  CV RRR  Abd soft  Ext ++ edema  Neuro intact    Laboratory:  CBC:   Recent Labs   Lab 01/29/20  1243   WBC 25.06*   RBC 4.26*   HGB 10.9*   HCT 33.6*      MCV 79*   MCH 25.6*   MCHC 32.4     BMP:   Recent Labs   Lab 01/29/20  0311   *   *   K 3.2*   CL 93*   CO2 30*   BUN 22*   CREATININE 0.9   CALCIUM 7.5*   MG 2.0     Coagulation:   Recent Labs   Lab 01/29/20  1243   INR 2.2   APTT 41.6*     Microbiology Results (last 7 days)     Procedure Component Value Units Date/Time    Blood  Culture #1 **CANNOT BE ORDERED STAT** [[id number]] Collected:  01/27/20 1235    Order Status:  Completed Specimen:  Blood from Peripheral, Antecubital, Right Updated:  01/29/20 1432     Blood Culture, Routine No Growth to date      No Growth to date      No Growth to date    Blood Culture #2 **CANNOT BE ORDERED STAT** [[id number]] Collected:  01/27/20 1242    Order Status:  Completed Specimen:  Blood from Peripheral, Antecubital, Left Updated:  01/29/20 1432     Blood Culture, Routine No Growth to date      No Growth to date      No Growth to date    Culture, Respiratory with Gram Stain [[id number]]     Order Status:  No result Specimen:  Respiratory     Blood culture [[id number]]     Order Status:  Canceled Specimen:  Blood     Blood culture [[id number]]     Order Status:  Canceled Specimen:  Blood           Diagnostic Results:  X-Ray: Reviewed  CT: Reviewed    ASSESSMENT/PLAN:     Impression : Sepsis, parapneumonic loculated effusion/empyema    Recommendation : Will need VATS/limited thoracotomy and decortication.                                                Will discuss timing with other involved physicians    Thank you for the consult

## 2020-01-29 NOTE — CONSULTS
Chief Complaint:   Liver failure    HPI:    44 year old male with history of cardiomyopathy, post CCY for cholecystitis developed  PEA arrest 12/10/19 due to PE (had bilateral LE DVT).  Echo showed RV strain.  Received TPA and started on heparin.  Most recent ultrasound did not show liver cirrhosis.  Plts normal.  Noted to have rise in biliruin  Currently in ICU with sepsis on empyema of the right lung.  Pt denies abd pain     TTE  · Mild concentric left ventricular hypertrophy.  · Severely decreased left ventricular systolic function. The estimated ejection fraction is 30%.  · Grade II (moderate) left ventricular diastolic dysfunction consistent with pseudonormalization.  · Septal wall has abnormal motion. Diastolic flattening of the interventricular septum consistent with right ventricle volume overload.  · Mild right ventricular enlargement.  · Mildly reduced right ventricular systolic function.  · Mild left atrial enlargement.  · Mild right atrial enlargement.  · Mild-to-moderate mitral regurgitation.  · Moderate tricuspid regurgitation.  · Mild pulmonary hypertension present.  · Trivial circumferential pericardial effusion.    Bilirubin up/down since at least 7/2019  Creatinine normal  Ammonia 14    Results for JANET ANDRADE (MRN [id number]) as of 1/29/2020 17:06   Ref. Range 12/19/2019 15:15   Fluid Color Unknown Yellow   Fluid Appearance Unknown Clear   Body Fluid Type Unknown Ascites   WBC, Body Fluid Latest Units: /cu mm 72   Segs, Fluid Latest Units: % 12   Lymphs, Fluid Latest Units: % 36   Monocytes/Macrophages, Fluid Latest Units: % 46   LD, Fluid Latest Ref Range: Not established U/L 104   Body Fluid Albumin Latest Ref Range: See text g/dL 0.7   Body Fluid Source, Albumin Unknown Ascites   Body Fluid Source, LDH Unknown Ascites   Body Fluid Source, Total Protein Unknown Ascites   Body Fluid, Protein Latest Ref Range: Not established g/dL 2.2   Mesothelial cells, Fluid Latest Units: % 6          Lactate 2.0  Results for JANET ANDRADE (MRN [id number]) as of 1/29/2020 16:41   Ref. Range 12/28/2019 05:21   HCV Qualitative Result Latest Ref Range: Not detected  DETECTED (A)   HCV Quantitative Log Latest Ref Range: <1.08 Log (10) IU/mL 6.12 (H)   HCV Quantitative Result Latest Ref Range: <12 IU/mL 1,314,825 (H)   Hepatitis C Virus Genotype Unknown 3 (A)       Review of Systems:  Constitutional: No fever, weight loss  Eyes: No vision problems  ENT: No hearing problems, dysphagia  Cardiovascular: No chest pain or palpitations  Respiratory: No breathing problems or cough  GI: No diarrhea or constipation  GALDINO: No urinary symptoms  Neurologic: No tremor or headaches  Musculoskeletal: No weakness or pain  Integumentary: no rashes or lesions  Psychiatric: no depression or anxiety  Complete ROS performed and negative unless stated above in HPI    Past Medical History:   Diagnosis Date    Anxiety     Arthritis     CHF (congestive heart failure)     Cirrhosis     Coronary artery disease     Depression     DVT (deep venous thrombosis)     Hepatitis C     Hypertension        History reviewed. No pertinent surgical history.    Family History   Problem Relation Age of Onset    Arthritis Mother     Heart disease Mother     Hyperlipidemia Mother     Hypertension Mother     Kidney disease Mother     Hypertension Father     Asthma Sister     Diabetes Sister     Heart disease Maternal Grandfather        Social History     Socioeconomic History    Marital status:      Spouse name: Not on file    Number of children: Not on file    Years of education: Not on file    Highest education level: Not on file   Occupational History    Not on file   Social Needs    Financial resource strain: Not on file    Food insecurity:     Worry: Not on file     Inability: Not on file    Transportation needs:     Medical: Not on file     Non-medical: Not on file   Tobacco Use    Smoking status: Former Smoker      Packs/day: 2.00     Years: 20.00     Pack years: 40.00     Types: Cigarettes     Start date: 1/23/1999     Last attempt to quit: 1/23/2017     Years since quitting: 3.0    Smokeless tobacco: Never Used   Substance and Sexual Activity    Alcohol use: Not Currently    Drug use: Not Currently     Frequency: 7.0 times per week     Types: Amphetamines     Comment: quit in december 2019    Sexual activity: Not Currently     Partners: Female   Lifestyle    Physical activity:     Days per week: Not on file     Minutes per session: Not on file    Stress: Not on file   Relationships    Social connections:     Talks on phone: Not on file     Gets together: Not on file     Attends Muslim service: Not on file     Active member of club or organization: Not on file     Attends meetings of clubs or organizations: Not on file     Relationship status: Not on file   Other Topics Concern    Not on file   Social History Narrative    Not on file       Review of patient's allergies indicates:  No Known Allergies    No current facility-administered medications on file prior to encounter.      Current Outpatient Medications on File Prior to Encounter   Medication Sig Dispense Refill    apixaban (ELIQUIS) 5 mg Tab Take 1 tablet (5 mg total) by mouth 2 (two) times daily. 60 tablet 3    enalapril (VASOTEC) 2.5 MG tablet Take 1 tablet (2.5 mg total) by mouth once daily. 30 tablet 3    furosemide (LASIX) 40 MG tablet Take 1 tablet (40 mg total) by mouth once daily. 30 tablet 3    gabapentin (NEURONTIN) 100 MG capsule Take 1 capsule (100 mg total) by mouth 3 (three) times daily as needed (anxiety/agitation/neuropathic pain). 90 capsule 0    gabapentin (NEURONTIN) 300 MG capsule Take 1 capsule (300 mg total) by mouth every evening. 30 capsule 3    lactulose (CHRONULAC) 10 gram/15 mL solution Take 15 mLs (10 g total) by mouth 3 (three) times daily as needed (constipation). 1350 mL 3    metoprolol succinate (TOPROL-XL) 100 MG  "24 hr tablet Take 1 tablet (100 mg total) by mouth once daily. 30 tablet 11    pantoprazole (PROTONIX) 40 MG tablet Take 1 tablet (40 mg total) by mouth once daily. 30 tablet 0    acetaminophen (TYLENOL) 325 MG tablet Take 325 mg by mouth every 6 (six) hours as needed for Pain.         Objective:  /82   Pulse 84   Temp 96 °F (35.6 °C)   Resp 15   Ht 5' 8" (1.727 m)   Wt 82.8 kg (182 lb 8.7 oz)   SpO2 98%   BMI 27.76 kg/m²   General: wd, wn, nad  HE: ncat, perrl, eomi  ENT: op pink and moist without lesions or exudates  CV: +s1s2, no mrg, rrr  Resp: ctapb, no wrr  GI: bs active, abd soft, nt, nd  Skin: no lesions, no rash  Neuro: cn 2-12 in tact, no focal deficits, no asterixis  Psych: regular rate speech, normal affect    Labs:  Recent Results (from the past 2688 hour(s))   Blood culture    Collection Time: 10/26/19  1:30 PM   Result Value Ref Range    Blood Culture, Routine No growth after 5 days.    Basic metabolic panel    Collection Time: 10/26/19  1:40 PM   Result Value Ref Range    Sodium 132 (L) 136 - 145 mmol/L    Potassium 4.5 3.5 - 5.1 mmol/L    Chloride 106 95 - 110 mmol/L    CO2 18 (L) 23 - 29 mmol/L    Glucose 82 70 - 110 mg/dL    BUN, Bld 24 (H) 6 - 20 mg/dL    Creatinine 0.8 0.5 - 1.4 mg/dL    Calcium 8.4 (L) 8.7 - 10.5 mg/dL    Anion Gap 8 8 - 16 mmol/L    eGFR if African American >60 >60 mL/min/1.73 m^2    eGFR if non African American >60 >60 mL/min/1.73 m^2   Troponin I    Collection Time: 10/26/19  1:40 PM   Result Value Ref Range    Troponin I 0.041 (H) 0.000 - 0.026 ng/mL   CBC auto differential    Collection Time: 10/26/19  1:58 PM   Result Value Ref Range    WBC 16.29 (H) 3.90 - 12.70 K/uL    RBC 5.18 4.60 - 6.20 M/uL    Hemoglobin 14.2 14.0 - 18.0 g/dL    Hematocrit 42.6 40.0 - 54.0 %    Mean Corpuscular Volume 82 82 - 98 fL    Mean Corpuscular Hemoglobin 27.4 27.0 - 31.0 pg    Mean Corpuscular Hemoglobin Conc 33.3 32.0 - 36.0 g/dL    RDW 17.7 (H) 11.5 - 14.5 %    Platelets 240 " 150 - 350 K/uL    MPV 9.2 9.2 - 12.9 fL    Gran # (ANC) 14.4 (H) 1.8 - 7.7 K/uL    Immature Grans (Abs) 0.05 (H) 0.00 - 0.04 K/uL    Lymph # 1.0 1.0 - 4.8 K/uL    Mono # 0.7 0.3 - 1.0 K/uL    Eos # 0.1 0.0 - 0.5 K/uL    Baso # 0.03 0.00 - 0.20 K/uL    nRBC 0 0 /100 WBC    Gran% 88.3 (H) 38.0 - 73.0 %    Lymph% 6.3 (L) 18.0 - 48.0 %    Mono% 4.4 4.0 - 15.0 %    Eosinophil% 0.5 0.0 - 8.0 %    Basophil% 0.2 0.0 - 1.9 %    Differential Method Automated    Lactic acid, plasma    Collection Time: 10/26/19  1:58 PM   Result Value Ref Range    Lactate (Lactic Acid) 2.7 (H) 0.5 - 2.2 mmol/L   Blood culture    Collection Time: 10/26/19  1:58 PM   Result Value Ref Range    Blood Culture, Routine No growth after 5 days.    Brain natriuretic peptide    Collection Time: 10/26/19  1:58 PM   Result Value Ref Range    BNP 1,211 (H) 0 - 99 pg/mL   CBC auto differential    Collection Time: 10/26/19  5:42 PM   Result Value Ref Range    WBC 19.74 (H) 3.90 - 12.70 K/uL    RBC 5.28 4.60 - 6.20 M/uL    Hemoglobin 14.6 14.0 - 18.0 g/dL    Hematocrit 43.2 40.0 - 54.0 %    Mean Corpuscular Volume 82 82 - 98 fL    Mean Corpuscular Hemoglobin 27.7 27.0 - 31.0 pg    Mean Corpuscular Hemoglobin Conc 33.8 32.0 - 36.0 g/dL    RDW 17.7 (H) 11.5 - 14.5 %    Platelets 275 150 - 350 K/uL    MPV 9.7 9.2 - 12.9 fL    Gran # (ANC) 16.8 (H) 1.8 - 7.7 K/uL    Immature Grans (Abs) 0.09 (H) 0.00 - 0.04 K/uL    Lymph # 1.8 1.0 - 4.8 K/uL    Mono # 0.9 0.3 - 1.0 K/uL    Eos # 0.2 0.0 - 0.5 K/uL    Baso # 0.04 0.00 - 0.20 K/uL    nRBC 0 0 /100 WBC    Gran% 84.9 (H) 38.0 - 73.0 %    Lymph% 8.9 (L) 18.0 - 48.0 %    Mono% 4.7 4.0 - 15.0 %    Eosinophil% 0.8 0.0 - 8.0 %    Basophil% 0.2 0.0 - 1.9 %    Differential Method Automated    Comprehensive metabolic panel - if not done in ED    Collection Time: 10/26/19  5:42 PM   Result Value Ref Range    Sodium 131 (L) 136 - 145 mmol/L    Potassium 3.8 3.5 - 5.1 mmol/L    Chloride 102 95 - 110 mmol/L    CO2 16 (L) 23 - 29  mmol/L    Glucose 205 (H) 70 - 110 mg/dL    BUN, Bld 26 (H) 6 - 20 mg/dL    Creatinine 0.9 0.5 - 1.4 mg/dL    Calcium 8.1 (L) 8.7 - 10.5 mg/dL    Total Protein 7.8 6.0 - 8.4 g/dL    Albumin 2.9 (L) 3.5 - 5.2 g/dL    Total Bilirubin 1.7 (H) 0.1 - 1.0 mg/dL    Alkaline Phosphatase 130 55 - 135 U/L     (H) 10 - 40 U/L     (H) 10 - 44 U/L    Anion Gap 13 8 - 16 mmol/L    eGFR if African American >60 >60 mL/min/1.73 m^2    eGFR if non African American >60 >60 mL/min/1.73 m^2   Magnesium - if not done in ED    Collection Time: 10/26/19  5:42 PM   Result Value Ref Range    Magnesium 1.5 (L) 1.6 - 2.6 mg/dL   Phosphorus - if not done in ED    Collection Time: 10/26/19  5:42 PM   Result Value Ref Range    Phosphorus 4.2 2.7 - 4.5 mg/dL   Troponin I    Collection Time: 10/26/19  5:42 PM   Result Value Ref Range    Troponin I 0.072 (H) 0.000 - 0.026 ng/mL   ISTAT PROCEDURE    Collection Time: 10/26/19  6:13 PM   Result Value Ref Range    POC PH 7.369 7.35 - 7.45    POC PCO2 31.7 (L) 35 - 45 mmHg    POC PO2 75 (L) 80 - 100 mmHg    POC HCO3 18.3 (L) 24 - 28 mmol/L    POC BE -7 -2 to 2 mmol/L    POC SATURATED O2 95 95 - 100 %    POC TCO2 19 (L) 23 - 27 mmol/L    Sample ARTERIAL     Site RR     Allens Test Pass     DelSys Room Air    Troponin I    Collection Time: 10/26/19  7:39 PM   Result Value Ref Range    Troponin I 0.047 (H) 0.000 - 0.026 ng/mL   Lactic acid, plasma    Collection Time: 10/26/19  7:39 PM   Result Value Ref Range    Lactate (Lactic Acid) 0.9 0.5 - 2.2 mmol/L   Drug screen panel, emergency    Collection Time: 10/26/19  9:17 PM   Result Value Ref Range    Benzodiazepines Negative     Methadone metabolites Negative     Cocaine (Metab.) Negative     Opiate Scrn, Ur Presumptive Positive     Barbiturate Screen, Ur Negative     Amphetamine Screen, Ur Presumptive Positive     THC Negative     Phencyclidine Negative     Creatinine, Random Ur 33.7 23.0 - 375.0 mg/dL    Toxicology Information SEE COMMENT     Troponin I    Collection Time: 10/27/19  1:35 AM   Result Value Ref Range    Troponin I 0.035 (H) 0.000 - 0.026 ng/mL   CBC auto differential    Collection Time: 10/27/19  5:14 AM   Result Value Ref Range    WBC 13.98 (H) 3.90 - 12.70 K/uL    RBC 4.89 4.60 - 6.20 M/uL    Hemoglobin 13.2 (L) 14.0 - 18.0 g/dL    Hematocrit 40.3 40.0 - 54.0 %    Mean Corpuscular Volume 82 82 - 98 fL    Mean Corpuscular Hemoglobin 27.0 27.0 - 31.0 pg    Mean Corpuscular Hemoglobin Conc 32.8 32.0 - 36.0 g/dL    RDW 17.6 (H) 11.5 - 14.5 %    Platelets 254 150 - 350 K/uL    MPV 9.6 9.2 - 12.9 fL    Gran # (ANC) 11.0 (H) 1.8 - 7.7 K/uL    Immature Grans (Abs) 0.04 0.00 - 0.04 K/uL    Lymph # 1.4 1.0 - 4.8 K/uL    Mono # 1.2 (H) 0.3 - 1.0 K/uL    Eos # 0.3 0.0 - 0.5 K/uL    Baso # 0.03 0.00 - 0.20 K/uL    nRBC 0 0 /100 WBC    Gran% 78.7 (H) 38.0 - 73.0 %    Lymph% 10.2 (L) 18.0 - 48.0 %    Mono% 8.2 4.0 - 15.0 %    Eosinophil% 2.4 0.0 - 8.0 %    Basophil% 0.2 0.0 - 1.9 %    Differential Method Automated    Basic metabolic panel     Collection Time: 10/27/19  5:15 AM   Result Value Ref Range    Sodium 136 136 - 145 mmol/L    Potassium 3.5 3.5 - 5.1 mmol/L    Chloride 100 95 - 110 mmol/L    CO2 27 23 - 29 mmol/L    Glucose 88 70 - 110 mg/dL    BUN, Bld 21 (H) 6 - 20 mg/dL    Creatinine 0.9 0.5 - 1.4 mg/dL    Calcium 8.1 (L) 8.7 - 10.5 mg/dL    Anion Gap 9 8 - 16 mmol/L    eGFR if African American >60 >60 mL/min/1.73 m^2    eGFR if non African American >60 >60 mL/min/1.73 m^2   Comprehensive metabolic panel    Collection Time: 10/27/19  5:15 AM   Result Value Ref Range    Sodium 136 136 - 145 mmol/L    Potassium 3.5 3.5 - 5.1 mmol/L    Chloride 100 95 - 110 mmol/L    CO2 27 23 - 29 mmol/L    Glucose 88 70 - 110 mg/dL    BUN, Bld 21 (H) 6 - 20 mg/dL    Creatinine 0.9 0.5 - 1.4 mg/dL    Calcium 8.1 (L) 8.7 - 10.5 mg/dL    Total Protein 7.0 6.0 - 8.4 g/dL    Albumin 2.6 (L) 3.5 - 5.2 g/dL    Total Bilirubin 1.8 (H) 0.1 - 1.0 mg/dL    Alkaline  Phosphatase 114 55 - 135 U/L     (H) 10 - 40 U/L     (H) 10 - 44 U/L    Anion Gap 9 8 - 16 mmol/L    eGFR if African American >60 >60 mL/min/1.73 m^2    eGFR if non African American >60 >60 mL/min/1.73 m^2   CBC auto differential    Collection Time: 10/28/19  5:24 AM   Result Value Ref Range    WBC 14.06 (H) 3.90 - 12.70 K/uL    RBC 5.04 4.60 - 6.20 M/uL    Hemoglobin 13.8 (L) 14.0 - 18.0 g/dL    Hematocrit 41.1 40.0 - 54.0 %    Mean Corpuscular Volume 82 82 - 98 fL    Mean Corpuscular Hemoglobin 27.4 27.0 - 31.0 pg    Mean Corpuscular Hemoglobin Conc 33.6 32.0 - 36.0 g/dL    RDW 18.0 (H) 11.5 - 14.5 %    Platelets 280 150 - 350 K/uL    MPV 9.6 9.2 - 12.9 fL    Gran # (ANC) 10.6 (H) 1.8 - 7.7 K/uL    Immature Grans (Abs) 0.05 (H) 0.00 - 0.04 K/uL    Lymph # 1.6 1.0 - 4.8 K/uL    Mono # 1.3 (H) 0.3 - 1.0 K/uL    Eos # 0.5 0.0 - 0.5 K/uL    Baso # 0.06 0.00 - 0.20 K/uL    nRBC 0 0 /100 WBC    Gran% 75.4 (H) 38.0 - 73.0 %    Lymph% 11.0 (L) 18.0 - 48.0 %    Mono% 9.5 4.0 - 15.0 %    Eosinophil% 3.3 0.0 - 8.0 %    Basophil% 0.4 0.0 - 1.9 %    Differential Method Automated    Comprehensive metabolic panel    Collection Time: 10/28/19  5:24 AM   Result Value Ref Range    Sodium 135 (L) 136 - 145 mmol/L    Potassium 4.2 3.5 - 5.1 mmol/L    Chloride 100 95 - 110 mmol/L    CO2 26 23 - 29 mmol/L    Glucose 99 70 - 110 mg/dL    BUN, Bld 25 (H) 6 - 20 mg/dL    Creatinine 1.0 0.5 - 1.4 mg/dL    Calcium 8.4 (L) 8.7 - 10.5 mg/dL    Total Protein 7.2 6.0 - 8.4 g/dL    Albumin 2.5 (L) 3.5 - 5.2 g/dL    Total Bilirubin 1.4 (H) 0.1 - 1.0 mg/dL    Alkaline Phosphatase 108 55 - 135 U/L     (H) 10 - 40 U/L     (H) 10 - 44 U/L    Anion Gap 9 8 - 16 mmol/L    eGFR if African American >60 >60 mL/min/1.73 m^2    eGFR if non African American >60 >60 mL/min/1.73 m^2   TSH    Collection Time: 10/28/19  5:24 AM   Result Value Ref Range    TSH 3.671 0.400 - 4.000 uIU/mL   Lipid panel    Collection Time: 10/28/19  5:24  "AM   Result Value Ref Range    Cholesterol 105 (L) 120 - 199 mg/dL    Triglycerides 77 30 - 150 mg/dL    HDL 33 (L) 40 - 75 mg/dL    LDL Cholesterol 56.6 (L) 63.0 - 159.0 mg/dL    Hdl/Cholesterol Ratio 31.4 20.0 - 50.0 %    Total Cholesterol/HDL Ratio 3.2 2.0 - 5.0    Non-HDL Cholesterol 72 mg/dL   Hepatitis panel, acute    Collection Time: 10/28/19 11:48 AM   Result Value Ref Range    Hepatitis B Surface Ag Negative Negative    Hep B C IgM Negative Negative    Hep A IgM Negative Negative    Hepatitis C Ab Positive (A) Negative   Echo Color Flow Doppler? Yes    Collection Time: 10/28/19  1:17 PM   Result Value Ref Range    Ascending aorta 3.34 cm    AV mean gradient 3 mmHg    Ao peak juan manuel 1.27 m/s    Ao VTI 17.62 cm    IVRT 0.11 msec    IVS 1.19 (A) 0.6 - 1.1 cm    LA size 3.99 cm    LVIDD 5.35 3.5 - 6.0 cm    LVIDS 5.05 (A) 2.1 - 4.0 cm    LVOT diameter 2.01 cm    LVOT peak VTI 18.09 cm    PW 1.10 0.6 - 1.1 cm    MV Peak A Juan Manuel 0.48 m/s    E wave decelartion time 102.80 msec    MV Peak E Juan Manuel 0.71 m/s    RVDD 4.33 cm    TAPSE 1.60 cm    TR Max Juan Manuel 2.83 m/s    Ao root annulus 3.23 cm    AORTIC VALVE CUSP SEPERATION 2.46 cm    PV PEAK VELOCITY 0.78 cm/s    LVOT peak juan manuel 1.03 m/s    FS 6 %    LV mass 244.22 g    Left Ventricle Relative Wall Thickness 0.41 cm    AV valve area 3.26 cm2    AV Velocity Ratio 0.81     AV index (prosthetic) 1.03     E/A ratio 1.48     LVOT area 3.2 cm2    LVOT stroke volume 57.37 cm3    AV peak gradient 6 mmHg    LV Mass Index 130 g/m2    Triscuspid Valve Regurgitation Peak Gradient 32 mmHg    BSA 1.88 m2    TDI SEPTAL 0.06 m/s    LV LATERAL E/E' RATIO 10.14 m/s    LV SEPTAL E/E' RATIO 11.83 m/s    TDI LATERAL 0.07 m/s    Mean e' 0.07 m/s    MV "A" wave duration 87.00 msec    Pulm vein "A" wave 97.00 msec    E/E' ratio 10.92 m/s    MV valve area p 1/2 method 7.33 cm2    MV stenosis pressure 1/2 time 30 ms    Right Atrial Pressure (from IVC) 3 mmHg    TV rest pulmonary artery pressure 35 mmHg "   CBC auto differential    Collection Time: 10/29/19  5:24 AM   Result Value Ref Range    WBC 12.59 3.90 - 12.70 K/uL    RBC 5.18 4.60 - 6.20 M/uL    Hemoglobin 13.8 (L) 14.0 - 18.0 g/dL    Hematocrit 42.8 40.0 - 54.0 %    Mean Corpuscular Volume 83 82 - 98 fL    Mean Corpuscular Hemoglobin 26.6 (L) 27.0 - 31.0 pg    Mean Corpuscular Hemoglobin Conc 32.2 32.0 - 36.0 g/dL    RDW 17.9 (H) 11.5 - 14.5 %    Platelets 275 150 - 350 K/uL    MPV 9.2 9.2 - 12.9 fL    Gran # (ANC) 9.1 (H) 1.8 - 7.7 K/uL    Immature Grans (Abs) 0.05 (H) 0.00 - 0.04 K/uL    Lymph # 1.7 1.0 - 4.8 K/uL    Mono # 1.2 (H) 0.3 - 1.0 K/uL    Eos # 0.4 0.0 - 0.5 K/uL    Baso # 0.07 0.00 - 0.20 K/uL    nRBC 0 0 /100 WBC    Gran% 72.6 38.0 - 73.0 %    Lymph% 13.8 (L) 18.0 - 48.0 %    Mono% 9.1 4.0 - 15.0 %    Eosinophil% 3.5 0.0 - 8.0 %    Basophil% 0.6 0.0 - 1.9 %    Differential Method Automated    Comprehensive metabolic panel    Collection Time: 10/29/19  5:24 AM   Result Value Ref Range    Sodium 130 (L) 136 - 145 mmol/L    Potassium 4.4 3.5 - 5.1 mmol/L    Chloride 96 95 - 110 mmol/L    CO2 25 23 - 29 mmol/L    Glucose 93 70 - 110 mg/dL    BUN, Bld 21 (H) 6 - 20 mg/dL    Creatinine 0.9 0.5 - 1.4 mg/dL    Calcium 9.1 8.7 - 10.5 mg/dL    Total Protein 7.7 6.0 - 8.4 g/dL    Albumin 2.6 (L) 3.5 - 5.2 g/dL    Total Bilirubin 1.2 (H) 0.1 - 1.0 mg/dL    Alkaline Phosphatase 115 55 - 135 U/L     (H) 10 - 40 U/L     (H) 10 - 44 U/L    Anion Gap 9 8 - 16 mmol/L    eGFR if African American >60 >60 mL/min/1.73 m^2    eGFR if non African American >60 >60 mL/min/1.73 m^2   CBC auto differential    Collection Time: 10/30/19  5:14 AM   Result Value Ref Range    WBC 12.71 (H) 3.90 - 12.70 K/uL    RBC 5.05 4.60 - 6.20 M/uL    Hemoglobin 13.4 (L) 14.0 - 18.0 g/dL    Hematocrit 40.9 40.0 - 54.0 %    Mean Corpuscular Volume 81 (L) 82 - 98 fL    Mean Corpuscular Hemoglobin 26.5 (L) 27.0 - 31.0 pg    Mean Corpuscular Hemoglobin Conc 32.8 32.0 - 36.0 g/dL     RDW 17.3 (H) 11.5 - 14.5 %    Platelets 331 150 - 350 K/uL    MPV 9.9 9.2 - 12.9 fL    Gran # (ANC) 9.3 (H) 1.8 - 7.7 K/uL    Immature Grans (Abs) 0.07 (H) 0.00 - 0.04 K/uL    Lymph # 1.8 1.0 - 4.8 K/uL    Mono # 1.2 (H) 0.3 - 1.0 K/uL    Eos # 0.3 0.0 - 0.5 K/uL    Baso # 0.06 0.00 - 0.20 K/uL    nRBC 0 0 /100 WBC    Gran% 73.0 38.0 - 73.0 %    Lymph% 13.9 (L) 18.0 - 48.0 %    Mono% 9.8 4.0 - 15.0 %    Eosinophil% 2.2 0.0 - 8.0 %    Basophil% 0.5 0.0 - 1.9 %    Differential Method Automated    Comprehensive metabolic panel    Collection Time: 10/30/19  5:14 AM   Result Value Ref Range    Sodium 128 (L) 136 - 145 mmol/L    Potassium 4.6 3.5 - 5.1 mmol/L    Chloride 95 95 - 110 mmol/L    CO2 24 23 - 29 mmol/L    Glucose 100 70 - 110 mg/dL    BUN, Bld 27 (H) 6 - 20 mg/dL    Creatinine 0.9 0.5 - 1.4 mg/dL    Calcium 9.3 8.7 - 10.5 mg/dL    Total Protein 7.9 6.0 - 8.4 g/dL    Albumin 2.6 (L) 3.5 - 5.2 g/dL    Total Bilirubin 1.2 (H) 0.1 - 1.0 mg/dL    Alkaline Phosphatase 116 55 - 135 U/L     (H) 10 - 40 U/L     (H) 10 - 44 U/L    Anion Gap 9 8 - 16 mmol/L    eGFR if African American >60 >60 mL/min/1.73 m^2    eGFR if non African American >60 >60 mL/min/1.73 m^2   CBC auto differential    Collection Time: 12/09/19  2:40 PM   Result Value Ref Range    WBC 25.39 (H) 3.90 - 12.70 K/uL    RBC 5.29 4.60 - 6.20 M/uL    Hemoglobin 13.8 (L) 14.0 - 18.0 g/dL    Hematocrit 44.6 40.0 - 54.0 %    Mean Corpuscular Volume 84 82 - 98 fL    Mean Corpuscular Hemoglobin 26.1 (L) 27.0 - 31.0 pg    Mean Corpuscular Hemoglobin Conc 30.9 (L) 32.0 - 36.0 g/dL    RDW 25.3 (H) 11.5 - 14.5 %    Platelets 183 150 - 350 K/uL    MPV 10.1 9.2 - 12.9 fL    Immature Granulocytes 0.7 (H) 0.0 - 0.5 %    Gran # (ANC) 22.5 (H) 1.8 - 7.7 K/uL    Immature Grans (Abs) 0.19 (H) 0.00 - 0.04 K/uL    Lymph # 1.5 1.0 - 4.8 K/uL    Mono # 1.0 0.3 - 1.0 K/uL    Eos # 0.1 0.0 - 0.5 K/uL    Baso # 0.07 0.00 - 0.20 K/uL    nRBC 0 0 /100 WBC    Gran%  88.6 (H) 38.0 - 73.0 %    Lymph% 6.0 (L) 18.0 - 48.0 %    Mono% 4.1 4.0 - 15.0 %    Eosinophil% 0.3 0.0 - 8.0 %    Basophil% 0.3 0.0 - 1.9 %    Differential Method Automated    Comprehensive metabolic panel    Collection Time: 12/09/19  2:40 PM   Result Value Ref Range    Sodium 126 (L) 136 - 145 mmol/L    Potassium 4.7 3.5 - 5.1 mmol/L    Chloride 96 95 - 110 mmol/L    CO2 20 (L) 23 - 29 mmol/L    Glucose 107 70 - 110 mg/dL    BUN, Bld 31 (H) 6 - 20 mg/dL    Creatinine 0.8 0.5 - 1.4 mg/dL    Calcium 7.6 (L) 8.7 - 10.5 mg/dL    Total Protein 7.2 6.0 - 8.4 g/dL    Albumin 2.0 (L) 3.5 - 5.2 g/dL    Total Bilirubin 4.0 (H) 0.1 - 1.0 mg/dL    Alkaline Phosphatase 120 55 - 135 U/L     (H) 10 - 40 U/L    ALT 73 (H) 10 - 44 U/L    Anion Gap 10 8 - 16 mmol/L    eGFR if African American >60.0 >60 mL/min/1.73 m^2    eGFR if non African American >60.0 >60 mL/min/1.73 m^2   Magnesium    Collection Time: 12/09/19  2:40 PM   Result Value Ref Range    Magnesium 1.7 1.6 - 2.6 mg/dL   Brain natriuretic peptide    Collection Time: 12/09/19  2:40 PM   Result Value Ref Range     (H) 0 - 99 pg/mL   Troponin I    Collection Time: 12/09/19  2:40 PM   Result Value Ref Range    Troponin I 0.04 0.02 - 0.50 ng/mL   Influenza A & B by Molecular    Collection Time: 12/09/19  2:57 PM   Result Value Ref Range    Influenza A, Molecular Negative Negative    Influenza B, Molecular Negative Negative    Flu A & B Source Nasal Swab    Blood culture x two cultures. Draw prior to antibiotics.    Collection Time: 12/09/19  3:00 PM   Result Value Ref Range    Blood Culture, Routine No growth after 5 days.    Lactic acid, plasma #1    Collection Time: 12/09/19  3:00 PM   Result Value Ref Range    Lactate (Lactic Acid) 3.5 (HH) 0.5 - 2.2 mmol/L   Urinalysis, Reflex to Urine Culture Urine, Clean Catch    Collection Time: 12/09/19  3:19 PM   Result Value Ref Range    Specimen UA Urine, Clean Catch     Color, UA Yellow Yellow, Straw, Tonya     Appearance, UA Clear Clear    pH, UA 6.0 5.0 - 8.0    Specific Gravity, UA 1.010 1.005 - 1.030    Protein, UA Negative Negative    Glucose, UA Negative Negative    Ketones, UA Negative Negative    Bilirubin (UA) 1+ (A) Negative    Occult Blood UA Negative Negative    Nitrite, UA Negative Negative    Urobilinogen, UA 1.0 Negative EU/dL    Leukocytes, UA Negative Negative   Drug screen panel, emergency    Collection Time: 12/09/19  3:19 PM   Result Value Ref Range    Benzodiazepines Negative     Cocaine (Metab.) Negative     Opiate Scrn, Ur Negative     Barbiturate Screen, Ur Negative     Amphetamine Screen, Ur Presumptive Positive     THC Negative     Phencyclidine Negative     Creatinine, Random Ur 48.2 23.0 - 375.0 mg/dL    Toxicology Information SEE COMMENT    Blood culture x two cultures. Draw prior to antibiotics.    Collection Time: 12/09/19  3:45 PM   Result Value Ref Range    Blood Culture, Routine No growth after 5 days.    Procalcitonin    Collection Time: 12/09/19  5:30 PM   Result Value Ref Range    Procalcitonin 1.04 (H) <0.25 ng/mL   Lactic acid, plasma #2    Collection Time: 12/09/19  5:32 PM   Result Value Ref Range    Lactate (Lactic Acid) 3.2 (H) 0.5 - 2.2 mmol/L   Comprehensive Metabolic Panel (CMP)    Collection Time: 12/10/19  4:35 AM   Result Value Ref Range    Sodium 129 (L) 136 - 145 mmol/L    Potassium 4.6 3.5 - 5.1 mmol/L    Chloride 99 95 - 110 mmol/L    CO2 20 (L) 23 - 29 mmol/L    Glucose 103 70 - 110 mg/dL    BUN, Bld 30 (H) 6 - 20 mg/dL    Creatinine 1.0 0.5 - 1.4 mg/dL    Calcium 8.0 (L) 8.7 - 10.5 mg/dL    Total Protein 7.7 6.0 - 8.4 g/dL    Albumin 1.9 (L) 3.5 - 5.2 g/dL    Total Bilirubin 4.7 (H) 0.1 - 1.0 mg/dL    Alkaline Phosphatase 166 (H) 55 - 135 U/L     (H) 10 - 40 U/L    ALT 74 (H) 10 - 44 U/L    Anion Gap 10 8 - 16 mmol/L    eGFR if African American >60.0 >60 mL/min/1.73 m^2    eGFR if non African American >60.0 >60 mL/min/1.73 m^2   Magnesium    Collection Time:  12/10/19  4:35 AM   Result Value Ref Range    Magnesium 1.8 1.6 - 2.6 mg/dL   Phosphorus    Collection Time: 12/10/19  4:35 AM   Result Value Ref Range    Phosphorus 3.1 2.7 - 4.5 mg/dL   Lactic acid, plasma    Collection Time: 12/10/19  4:35 AM   Result Value Ref Range    Lactate (Lactic Acid) 3.0 (H) 0.5 - 2.2 mmol/L   CBC with Automated Differential    Collection Time: 12/10/19  4:35 AM   Result Value Ref Range    WBC 27.30 (H) 3.90 - 12.70 K/uL    RBC 5.17 4.60 - 6.20 M/uL    Hemoglobin 13.2 (L) 14.0 - 18.0 g/dL    Hematocrit 43.4 40.0 - 54.0 %    Mean Corpuscular Volume 84 82 - 98 fL    Mean Corpuscular Hemoglobin 25.5 (L) 27.0 - 31.0 pg    Mean Corpuscular Hemoglobin Conc 30.4 (L) 32.0 - 36.0 g/dL    RDW 25.2 (H) 11.5 - 14.5 %    Platelets 167 150 - 350 K/uL    MPV 10.2 9.2 - 12.9 fL    Immature Granulocytes 0.9 (H) 0.0 - 0.5 %    Gran # (ANC) 23.9 (H) 1.8 - 7.7 K/uL    Immature Grans (Abs) 0.24 (H) 0.00 - 0.04 K/uL    Lymph # 1.9 1.0 - 4.8 K/uL    Mono # 1.1 (H) 0.3 - 1.0 K/uL    Eos # 0.1 0.0 - 0.5 K/uL    Baso # 0.07 0.00 - 0.20 K/uL    nRBC 0 0 /100 WBC    Gran% 87.6 (H) 38.0 - 73.0 %    Lymph% 6.9 (L) 18.0 - 48.0 %    Mono% 3.9 (L) 4.0 - 15.0 %    Eosinophil% 0.4 0.0 - 8.0 %    Basophil% 0.3 0.0 - 1.9 %    Platelet Estimate Appears normal     Aniso Slight     Poik Slight     Poly Occasional     Hypo Occasional     Target Cells Occasional     Jocelin Cells Occasional     Differential Method Automated    PT/INR    Collection Time: 12/10/19  4:35 AM   Result Value Ref Range    Prothrombin Time 18.9 (H) 9.0 - 12.5 sec    INR 1.9 (H) 0.8 - 1.2   Blood culture (site 1)    Collection Time: 12/10/19  4:35 AM   Result Value Ref Range    Blood Culture, Routine No growth after 5 days.    Blood culture (site 2)    Collection Time: 12/10/19  4:35 AM   Result Value Ref Range    Blood Culture, Routine No growth after 5 days.    ISTAT PROCEDURE    Collection Time: 12/10/19  7:04 AM   Result Value Ref Range    POC PH 6.973  (LL) 7.35 - 7.45    POC PCO2 46.7 (H) 35 - 45 mmHg    POC PO2 184 (H) 80 - 100 mmHg    POC HCO3 10.8 (L) 24 - 28 mmol/L    POC BE -21 -2 to 2 mmol/L    POC SATURATED O2 99 95 - 100 %    POC Lactate 12.52 (HH) 0.36 - 1.25 mmol/L    POC TCO2 12 (L) 23 - 27 mmol/L    Sample ARTERIAL     Site Mary/C     Allens Test N/A    POCT glucose    Collection Time: 12/10/19  7:04 AM   Result Value Ref Range    POCT Glucose 112 (H) 70 - 110 mg/dL   CBC auto differential    Collection Time: 12/10/19  7:05 AM   Result Value Ref Range    WBC 26.60 (H) 3.90 - 12.70 K/uL    RBC 5.14 4.60 - 6.20 M/uL    Hemoglobin 13.4 (L) 14.0 - 18.0 g/dL    Hematocrit 44.3 40.0 - 54.0 %    Mean Corpuscular Volume 86 82 - 98 fL    Mean Corpuscular Hemoglobin 26.1 (L) 27.0 - 31.0 pg    Mean Corpuscular Hemoglobin Conc 30.2 (L) 32.0 - 36.0 g/dL    RDW 25.2 (H) 11.5 - 14.5 %    Platelets 206 150 - 350 K/uL    MPV 10.6 9.2 - 12.9 fL    Immature Granulocytes 1.6 (H) 0.0 - 0.5 %    Gran # (ANC) 21.0 (H) 1.8 - 7.7 K/uL    Immature Grans (Abs) 0.43 (H) 0.00 - 0.04 K/uL    Lymph # 3.7 1.0 - 4.8 K/uL    Mono # 1.2 (H) 0.3 - 1.0 K/uL    Eos # 0.2 0.0 - 0.5 K/uL    Baso # 0.05 0.00 - 0.20 K/uL    nRBC 0 0 /100 WBC    Gran% 79.0 (H) 38.0 - 73.0 %    Lymph% 14.0 (L) 18.0 - 48.0 %    Mono% 4.4 4.0 - 15.0 %    Eosinophil% 0.8 0.0 - 8.0 %    Basophil% 0.2 0.0 - 1.9 %    Differential Method Automated    Comprehensive metabolic panel    Collection Time: 12/10/19  7:05 AM   Result Value Ref Range    Sodium 129 (L) 136 - 145 mmol/L    Potassium 5.2 (H) 3.5 - 5.1 mmol/L    Chloride 100 95 - 110 mmol/L    CO2 9 (LL) 23 - 29 mmol/L    Glucose 122 (H) 70 - 110 mg/dL    BUN, Bld 34 (H) 6 - 20 mg/dL    Creatinine 1.4 0.5 - 1.4 mg/dL    Calcium 8.0 (L) 8.7 - 10.5 mg/dL    Total Protein 7.8 6.0 - 8.4 g/dL    Albumin 1.9 (L) 3.5 - 5.2 g/dL    Total Bilirubin 4.4 (H) 0.1 - 1.0 mg/dL    Alkaline Phosphatase 164 (H) 55 - 135 U/L     (H) 10 - 40 U/L    ALT 73 (H) 10 - 44 U/L     Anion Gap 20 (H) 8 - 16 mmol/L    eGFR if African American >60.0 >60 mL/min/1.73 m^2    eGFR if non  59.8 (A) >60 mL/min/1.73 m^2   Magnesium    Collection Time: 12/10/19  7:05 AM   Result Value Ref Range    Magnesium 2.2 1.6 - 2.6 mg/dL   Phosphorus    Collection Time: 12/10/19  7:05 AM   Result Value Ref Range    Phosphorus 6.7 (H) 2.7 - 4.5 mg/dL   APTT    Collection Time: 12/10/19  7:05 AM   Result Value Ref Range    aPTT 41.2 (H) 21.0 - 32.0 sec   Protime-INR    Collection Time: 12/10/19  7:05 AM   Result Value Ref Range    Prothrombin Time 21.5 (H) 9.0 - 12.5 sec    INR 2.2 (H) 0.8 - 1.2   Type & Screen    Collection Time: 12/10/19  7:05 AM   Result Value Ref Range    Group & Rh O NEG     Indirect Ricardo NEG    Brain natriuretic peptide    Collection Time: 12/10/19  7:05 AM   Result Value Ref Range    BNP 3,300 (H) 0 - 99 pg/mL   Troponin I    Collection Time: 12/10/19  7:05 AM   Result Value Ref Range    Troponin I 0.070 (H) 0.000 - 0.026 ng/mL   ISTAT PROCEDURE    Collection Time: 12/10/19  7:08 AM   Result Value Ref Range    POC Sodium 131 (L) 136 - 145 mmol/L    POC Potassium 5.0 3.5 - 5.1 mmol/L    POC Ionized Calcium 1.05 (L) 1.06 - 1.42 mmol/L    POC Hematocrit 50 36 - 54 %PCV    Sample ARTERIAL     Site [facility]     Allens Test N/A    HIV 1/2 Ag/Ab (4th Gen)    Collection Time: 12/10/19  7:13 AM   Result Value Ref Range    HIV 1/2 Ag/Ab Negative Negative   Blood culture    Collection Time: 12/10/19  7:20 AM   Result Value Ref Range    Blood Culture, Routine No growth after 5 days.    ISTAT PROCEDURE    Collection Time: 12/10/19  7:20 AM   Result Value Ref Range    POC PH 7.051 (L) 7.35 - 7.45    POC PCO2 63.5 (H) 35 - 45 mmHg    POC PO2 64 (HH) 40 - 60 mmHg    POC HCO3 17.6 (L) 24 - 28 mmol/L    POC BE -13 -2 to 2 mmol/L    POC SATURATED O2 80 (L) 95 - 100 %    POC TCO2 20 (L) 24 - 29 mmol/L    Rate 22     Sample VENOUS     Site Other     Allens Test N/A     DelSys Adult Vent      Mode AC/PRVC     Vt 420     PEEP 5     FiO2 100    Blood culture    Collection Time: 12/10/19  7:40 AM   Result Value Ref Range    Blood Culture, Routine No growth after 5 days.    ISTAT PROCEDURE    Collection Time: 12/10/19  8:44 AM   Result Value Ref Range    POC PH 7.271 (LL) 7.35 - 7.45    POC PCO2 40.7 35 - 45 mmHg    POC PO2 368 (H) 80 - 100 mmHg    POC HCO3 18.7 (L) 24 - 28 mmol/L    POC BE -8 -2 to 2 mmol/L    POC SATURATED O2 100 95 - 100 %    POC TCO2 20 (L) 23 - 27 mmol/L    Rate 22     Sample ARTERIAL     Site Mary/UAC     Allens Test N/A     DelSys Adult Vent     Mode AC/PRVC     Vt 420     PEEP 5     FiO2 100    Comprehensive metabolic panel    Collection Time: 12/10/19 11:00 AM   Result Value Ref Range    Sodium 131 (L) 136 - 145 mmol/L    Potassium 3.7 3.5 - 5.1 mmol/L    Chloride 100 95 - 110 mmol/L    CO2 22 (L) 23 - 29 mmol/L    Glucose 100 70 - 110 mg/dL    BUN, Bld 35 (H) 6 - 20 mg/dL    Creatinine 1.2 0.5 - 1.4 mg/dL    Calcium 7.5 (L) 8.7 - 10.5 mg/dL    Total Protein 6.9 6.0 - 8.4 g/dL    Albumin 1.7 (L) 3.5 - 5.2 g/dL    Total Bilirubin 4.4 (H) 0.1 - 1.0 mg/dL    Alkaline Phosphatase 149 (H) 55 - 135 U/L     (H) 10 - 40 U/L    ALT 73 (H) 10 - 44 U/L    Anion Gap 9 8 - 16 mmol/L    eGFR if African American >60.0 >60 mL/min/1.73 m^2    eGFR if non African American >60.0 >60 mL/min/1.73 m^2   Legionella antigen, urine    Collection Time: 12/10/19 11:00 AM   Result Value Ref Range    L. pneumo. Ur Antigen Not detected Not detected   Hepatitis panel, acute    Collection Time: 12/10/19 11:00 AM   Result Value Ref Range    Hepatitis B Surface Ag Negative Negative    Hep B C IgM Negative Negative    Hep A IgM Negative Negative    Hepatitis C Ab Positive (A) Negative   Troponin I    Collection Time: 12/10/19 11:00 AM   Result Value Ref Range    Troponin I 0.131 (H) 0.000 - 0.026 ng/mL   Lactic acid, plasma    Collection Time: 12/10/19 11:00 AM   Result Value Ref Range    Lactate (Lactic  Acid) 2.1 0.5 - 2.2 mmol/L   Comprehensive Metabolic Panel (CMP)    Collection Time: 12/10/19 11:00 AM   Result Value Ref Range    Sodium 131 (L) 136 - 145 mmol/L    Potassium 3.7 3.5 - 5.1 mmol/L    Chloride 100 95 - 110 mmol/L    CO2 22 (L) 23 - 29 mmol/L    Glucose 100 70 - 110 mg/dL    BUN, Bld 35 (H) 6 - 20 mg/dL    Creatinine 1.2 0.5 - 1.4 mg/dL    Calcium 7.5 (L) 8.7 - 10.5 mg/dL    Total Protein 6.9 6.0 - 8.4 g/dL    Albumin 1.7 (L) 3.5 - 5.2 g/dL    Total Bilirubin 4.4 (H) 0.1 - 1.0 mg/dL    Alkaline Phosphatase 149 (H) 55 - 135 U/L     (H) 10 - 40 U/L    ALT 73 (H) 10 - 44 U/L    Anion Gap 9 8 - 16 mmol/L    eGFR if African American >60.0 >60 mL/min/1.73 m^2    eGFR if non African American >60.0 >60 mL/min/1.73 m^2   CBC auto differential    Collection Time: 12/10/19 11:00 AM   Result Value Ref Range    WBC 27.69 (H) 3.90 - 12.70 K/uL    RBC 4.77 4.60 - 6.20 M/uL    Hemoglobin 12.6 (L) 14.0 - 18.0 g/dL    Hematocrit 38.4 (L) 40.0 - 54.0 %    Mean Corpuscular Volume 81 (L) 82 - 98 fL    Mean Corpuscular Hemoglobin 26.4 (L) 27.0 - 31.0 pg    Mean Corpuscular Hemoglobin Conc 32.8 32.0 - 36.0 g/dL    RDW 23.9 (H) 11.5 - 14.5 %    Platelets 187 150 - 350 K/uL    MPV 9.4 9.2 - 12.9 fL    Immature Granulocytes 1.8 (H) 0.0 - 0.5 %    Gran # (ANC) 25.0 (H) 1.8 - 7.7 K/uL    Immature Grans (Abs) 0.51 (H) 0.00 - 0.04 K/uL    Lymph # 1.0 1.0 - 4.8 K/uL    Mono # 1.2 (H) 0.3 - 1.0 K/uL    Eos # 0.0 0.0 - 0.5 K/uL    Baso # 0.07 0.00 - 0.20 K/uL    nRBC 0 0 /100 WBC    Gran% 90.2 (H) 38.0 - 73.0 %    Lymph% 3.4 (L) 18.0 - 48.0 %    Mono% 4.3 4.0 - 15.0 %    Eosinophil% 0.0 0.0 - 8.0 %    Basophil% 0.3 0.0 - 1.9 %    Platelet Estimate Appears normal     Aniso Slight     Poik Slight     Poly Occasional     Hypo Occasional     Ovalocytes Occasional     Target Cells Occasional     Jocelin Cells Occasional     Basophilic Stippling Occasional     Godwin-Jolly Bodies Occasional     Large/Giant Platelets Present      Fragmented Cells Occasional     Pappenheimer Bodies Present     Differential Method Automated    Toxicology screen, urine    Collection Time: 12/10/19 11:00 AM   Result Value Ref Range    Alcohol, Urine <10 <10 mg/dL    Benzodiazepines Negative     Methadone metabolites Negative     Cocaine (Metab.) Negative     Opiate Scrn, Ur Negative     Barbiturate Screen, Ur Negative     Amphetamine Screen, Ur Presumptive Positive     THC Negative     Phencyclidine Negative     Creatinine, Random Ur 17.0 (L) 23.0 - 375.0 mg/dL    Toxicology Information SEE COMMENT    ISTAT PROCEDURE    Collection Time: 12/10/19 11:08 AM   Result Value Ref Range    POC PH 7.377 7.35 - 7.45    POC PCO2 39.0 35 - 45 mmHg    POC PO2 119 (H) 80 - 100 mmHg    POC HCO3 22.9 (L) 24 - 28 mmol/L    POC BE -2 -2 to 2 mmol/L    POC SATURATED O2 99 95 - 100 %    POC TCO2 24 23 - 27 mmol/L    Rate 22     Sample ARTERIAL     Site Mary/UAC     Allens Test N/A     DelSys Adult Vent     Mode AC/PRVC     Vt 420     PEEP 5     FiO2 30    Culture, Respiratory with Gram Stain    Collection Time: 12/10/19 11:30 AM   Result Value Ref Range    Respiratory Culture (A)      ENTEROBACTER CLOACAE  Few  Normal respiratory shaye also present      Gram Stain (Respiratory) <10 epithelial cells per low power field.     Gram Stain (Respiratory) Rare WBC's     Gram Stain (Respiratory) No organisms seen        Susceptibility    Enterobacter cloacae - CULTURE, RESPIRATORY     Ceftriaxone <=1 Sensitive mcg/mL     Ciprofloxacin <=1 Sensitive mcg/mL     Cefepime <=2 Sensitive mcg/mL     Ertapenem <=0.5 Sensitive mcg/mL     Gentamicin <=4 Sensitive mcg/mL     Levofloxacin <=2 Sensitive mcg/mL     Meropenem <=1 Sensitive mcg/mL     Piperacillin/Tazo <=16 Sensitive mcg/mL     Trimeth/Sulfa <=2/38 Sensitive mcg/mL     Tetracycline <=4 Sensitive mcg/mL     Tobramycin <=4 Sensitive mcg/mL   Echo Color Flow Doppler? Yes    Collection Time: 12/10/19  2:35 PM   Result Value Ref Range     Ascending aorta 3.02 cm    STJ 2.70 cm    AV mean gradient 2 mmHg    Ao peak juan manuel 0.84 m/s    Ao VTI 11.71 cm    IVS 0.96 0.6 - 1.1 cm    LA size 3.52 cm    Left Atrium Major Axis 6.78 cm    Left Atrium Minor Axis 6.64 cm    LVIDD 6.20 3.5 - 6.0 cm    LVIDS 5.50 (A) 2.1 - 4.0 cm    LVOT diameter 2.24 cm    LVOT peak VTI 8.71 cm    PW 1.11 (A) 0.6 - 1.1 cm    MV Peak E Juan Manuel 1.09 m/s    RA Major Axis 6.54 cm    RA Width 5.39 cm    RVDD 4.99 cm    Sinus 3.39 cm    TAPSE 1.47 cm    TR Max Juan Manuel 2.77 m/s    TDI LATERAL 0.08 m/s    TDI SEPTAL 0.07 m/s    LA WIDTH 5.16 cm    LV Diastolic Volume 141.74 mL    LV Systolic Volume 113.47 mL    RV S' 8.65 cm/s    LVOT peak juan manuel 0.74 m/s    LV LATERAL E/E' RATIO 13.63 m/s    LV SEPTAL E/E' RATIO 15.57 m/s    FS 11 %    LA volume 103.58 cm3    LV mass 272.90 g    Left Ventricle Relative Wall Thickness 0.36 cm    AV valve area 2.93 cm2    AV Velocity Ratio 0.88     AV index (prosthetic) 0.74     Mean e' 0.08 m/s    LVOT area 3.9 cm2    LVOT stroke volume 34.31 cm3    AV peak gradient 3 mmHg    E/E' ratio 14.53 m/s    LV Systolic Volume Index 57.7 mL/m2    LV Diastolic Volume Index 72.02 mL/m2    LA Volume Index 52.6 mL/m2    LV Mass Index 139 g/m2    Triscuspid Valve Regurgitation Peak Gradient 31 mmHg    BSA 2 m2   APTT    Collection Time: 12/10/19  3:45 PM   Result Value Ref Range    aPTT 37.1 (H) 21.0 - 32.0 sec   Protime-INR    Collection Time: 12/10/19  3:45 PM   Result Value Ref Range    Prothrombin Time 25.0 (H) 9.0 - 12.5 sec    INR 2.6 (H) 0.8 - 1.2   Comprehensive Metabolic Panel (CMP)    Collection Time: 12/10/19  4:48 PM   Result Value Ref Range    Sodium 129 (L) 136 - 145 mmol/L    Potassium 3.8 3.5 - 5.1 mmol/L    Chloride 99 95 - 110 mmol/L    CO2 21 (L) 23 - 29 mmol/L    Glucose 115 (H) 70 - 110 mg/dL    BUN, Bld 38 (H) 6 - 20 mg/dL    Creatinine 1.3 0.5 - 1.4 mg/dL    Calcium 7.3 (L) 8.7 - 10.5 mg/dL    Total Protein 6.6 6.0 - 8.4 g/dL    Albumin 1.6 (L) 3.5 - 5.2 g/dL     Total Bilirubin 4.0 (H) 0.1 - 1.0 mg/dL    Alkaline Phosphatase 138 (H) 55 - 135 U/L    AST 94 (H) 10 - 40 U/L    ALT 66 (H) 10 - 44 U/L    Anion Gap 9 8 - 16 mmol/L    eGFR if African American >60.0 >60 mL/min/1.73 m^2    eGFR if non African American >60.0 >60 mL/min/1.73 m^2   Troponin I    Collection Time: 12/10/19  4:48 PM   Result Value Ref Range    Troponin I 0.156 (H) 0.000 - 0.026 ng/mL   ISTAT PROCEDURE    Collection Time: 12/10/19  7:44 PM   Result Value Ref Range    POC PH 7.388 7.35 - 7.45    POC PCO2 33.9 (L) 35 - 45 mmHg    POC PO2 129 (H) 80 - 100 mmHg    POC HCO3 20.4 (L) 24 - 28 mmol/L    POC BE -5 -2 to 2 mmol/L    POC SATURATED O2 99 95 - 100 %    POC TCO2 21 (L) 23 - 27 mmol/L    Rate 22     Sample ARTERIAL     Site Mary/UAC     Allens Test N/A     DelSys Adult Vent     Mode AC/PRVC     Vt 420     PEEP 5     FiO2 30     Sp02 99    ISTAT PROCEDURE    Collection Time: 12/11/19  2:34 AM   Result Value Ref Range    POC PH 7.397 7.35 - 7.45    POC PCO2 34.3 (L) 35 - 45 mmHg    POC PO2 110 (H) 80 - 100 mmHg    POC HCO3 21.1 (L) 24 - 28 mmol/L    POC BE -4 -2 to 2 mmol/L    POC SATURATED O2 98 95 - 100 %    POC TCO2 22 (L) 23 - 27 mmol/L    Rate 22     Sample ARTERIAL     Site Mary/UAC     Allens Test N/A     DelSys Adult Vent     Mode AC/PRVC     Vt 420     PEEP 5     FiO2 30    Vancomycin, random    Collection Time: 12/11/19  3:07 AM   Result Value Ref Range    Vancomycin, Random 15.4 Not established ug/mL   CBC auto differential    Collection Time: 12/11/19  3:07 AM   Result Value Ref Range    WBC 23.36 (H) 3.90 - 12.70 K/uL    RBC 5.00 4.60 - 6.20 M/uL    Hemoglobin 12.9 (L) 14.0 - 18.0 g/dL    Hematocrit 40.0 40.0 - 54.0 %    Mean Corpuscular Volume 80 (L) 82 - 98 fL    Mean Corpuscular Hemoglobin 25.8 (L) 27.0 - 31.0 pg    Mean Corpuscular Hemoglobin Conc 32.3 32.0 - 36.0 g/dL    RDW 24.5 (H) 11.5 - 14.5 %    Platelets 256 150 - 350 K/uL    MPV 9.8 9.2 - 12.9 fL    Immature Granulocytes 0.9  (H) 0.0 - 0.5 %    Gran # (ANC) 20.4 (H) 1.8 - 7.7 K/uL    Immature Grans (Abs) 0.20 (H) 0.00 - 0.04 K/uL    Lymph # 1.3 1.0 - 4.8 K/uL    Mono # 1.3 (H) 0.3 - 1.0 K/uL    Eos # 0.1 0.0 - 0.5 K/uL    Baso # 0.03 0.00 - 0.20 K/uL    nRBC 0 0 /100 WBC    Gran% 87.3 (H) 38.0 - 73.0 %    Lymph% 5.7 (L) 18.0 - 48.0 %    Mono% 5.7 4.0 - 15.0 %    Eosinophil% 0.3 0.0 - 8.0 %    Basophil% 0.1 0.0 - 1.9 %    Aniso Slight     Poik Slight     Poly Occasional     Hypo Occasional     Ovalocytes Occasional     Target Cells Occasional     Tear Drop Cells Occasional     B[place]hem Cells Occasional     Differential Method Automated    Protime-INR    Collection Time: 12/11/19  3:07 AM   Result Value Ref Range    Prothrombin Time 19.2 (H) 9.0 - 12.5 sec    INR 2.0 (H) 0.8 - 1.2   Magnesium    Collection Time: 12/11/19  3:07 AM   Result Value Ref Range    Magnesium 1.8 1.6 - 2.6 mg/dL   Phosphorus    Collection Time: 12/11/19  3:07 AM   Result Value Ref Range    Phosphorus 4.7 (H) 2.7 - 4.5 mg/dL   Comprehensive Metabolic Panel (CMP)    Collection Time: 12/11/19  3:07 AM   Result Value Ref Range    Sodium 132 (L) 136 - 145 mmol/L    Potassium 4.4 3.5 - 5.1 mmol/L    Chloride 101 95 - 110 mmol/L    CO2 21 (L) 23 - 29 mmol/L    Glucose 78 70 - 110 mg/dL    BUN, Bld 40 (H) 6 - 20 mg/dL    Creatinine 1.4 0.5 - 1.4 mg/dL    Calcium 7.6 (L) 8.7 - 10.5 mg/dL    Total Protein 6.7 6.0 - 8.4 g/dL    Albumin 1.6 (L) 3.5 - 5.2 g/dL    Total Bilirubin 3.1 (H) 0.1 - 1.0 mg/dL    Alkaline Phosphatase 137 (H) 55 - 135 U/L    AST 97 (H) 10 - 40 U/L    ALT 66 (H) 10 - 44 U/L    Anion Gap 10 8 - 16 mmol/L    eGFR if African American >60.0 >60 mL/min/1.73 m^2    eGFR if non  59.8 (A) >60 mL/min/1.73 m^2   APTT    Collection Time: 12/11/19  9:15 AM   Result Value Ref Range    aPTT 32.6 (H) 21.0 - 32.0 sec   Protime-INR    Collection Time: 12/11/19  9:15 AM   Result Value Ref Range    Prothrombin Time 18.8 (H) 9.0 - 12.5 sec    INR 1.9 (H) 0.8 -  1.2   CBC auto differential    Collection Time: 12/11/19  9:15 AM   Result Value Ref Range    WBC 22.12 (H) 3.90 - 12.70 K/uL    RBC 4.89 4.60 - 6.20 M/uL    Hemoglobin 12.9 (L) 14.0 - 18.0 g/dL    Hematocrit 38.6 (L) 40.0 - 54.0 %    Mean Corpuscular Volume 79 (L) 82 - 98 fL    Mean Corpuscular Hemoglobin 26.4 (L) 27.0 - 31.0 pg    Mean Corpuscular Hemoglobin Conc 33.4 32.0 - 36.0 g/dL    RDW 24.3 (H) 11.5 - 14.5 %    Platelets 253 150 - 350 K/uL    MPV 9.3 9.2 - 12.9 fL    Immature Granulocytes 0.9 (H) 0.0 - 0.5 %    Gran # (ANC) 19.5 (H) 1.8 - 7.7 K/uL    Immature Grans (Abs) 0.19 (H) 0.00 - 0.04 K/uL    Lymph # 1.1 1.0 - 4.8 K/uL    Mono # 1.2 (H) 0.3 - 1.0 K/uL    Eos # 0.0 0.0 - 0.5 K/uL    Baso # 0.04 0.00 - 0.20 K/uL    nRBC 0 0 /100 WBC    Gran% 88.3 (H) 38.0 - 73.0 %    Lymph% 5.0 (L) 18.0 - 48.0 %    Mono% 5.5 4.0 - 15.0 %    Eosinophil% 0.1 0.0 - 8.0 %    Basophil% 0.2 0.0 - 1.9 %    Differential Method Automated    Culture, Respiratory    Collection Time: 12/11/19  2:41 PM   Result Value Ref Range    Respiratory Culture No S aureus or Pseudomonas isolated.     Respiratory Culture KEYSHAWN ALBICANS  Moderate   (A)     Respiratory Culture (A)      ENTEROBACTER CLOACAE  Few  susceptibility pending      Gram Stain (Respiratory) Few WBC's     Gram Stain (Respiratory) Few yeast        Susceptibility    Enterobacter cloacae - CULTURE, RESPIRATORY     Ceftriaxone <=1 Sensitive mcg/mL     Ciprofloxacin <=1 Sensitive mcg/mL     Cefepime <=2 Sensitive mcg/mL     Ertapenem <=0.5 Sensitive mcg/mL     Gentamicin <=4 Sensitive mcg/mL     Levofloxacin <=2 Sensitive mcg/mL     Meropenem <=1 Sensitive mcg/mL     Piperacillin/Tazo <=16 Sensitive mcg/mL     Trimeth/Sulfa <=2/38 Sensitive mcg/mL     Tetracycline <=4 Sensitive mcg/mL     Tobramycin <=4 Sensitive mcg/mL   AFB Culture & Smear    Collection Time: 12/11/19  2:41 PM   Result Value Ref Range    AFB Culture & Smear Culture in progress     AFB Culture & Smear Culture  in progress     AFB CULTURE STAIN No acid fast bacilli seen.    Direct AFB stain    Collection Time: 12/11/19  2:41 PM   Result Value Ref Range    Direct Acid Fast No acid fast bacilli seen.    Fungus culture    Collection Time: 12/11/19  2:41 PM   Result Value Ref Range    Fungus (Mycology) Culture CANDIDA ALBICANS  Many   (A)    KOH prep    Collection Time: 12/11/19  2:41 PM   Result Value Ref Range    KOH Prep Few Budding yeast    WBC & Diff,Body Fluid Bronchial Wash    Collection Time: 12/11/19  2:42 PM   Result Value Ref Range    Body Fluid Type Bronchial Wash     Fluid Appearance Bloody     Fluid Color Red     WBC, Body Fluid 3060 /cu mm    Segs, Fluid 83 %    Lymphs, Fluid 16 %    Monocytes/Macrophages, Fluid 1 %   APTT    Collection Time: 12/11/19  2:51 PM   Result Value Ref Range    aPTT 38.2 (H) 21.0 - 32.0 sec   APTT    Collection Time: 12/11/19  8:28 PM   Result Value Ref Range    aPTT 97.9 (H) 21.0 - 32.0 sec   CBC auto differential    Collection Time: 12/12/19  3:48 AM   Result Value Ref Range    WBC 21.14 (H) 3.90 - 12.70 K/uL    RBC 4.60 4.60 - 6.20 M/uL    Hemoglobin 11.6 (L) 14.0 - 18.0 g/dL    Hematocrit 37.2 (L) 40.0 - 54.0 %    Mean Corpuscular Volume 81 (L) 82 - 98 fL    Mean Corpuscular Hemoglobin 25.2 (L) 27.0 - 31.0 pg    Mean Corpuscular Hemoglobin Conc 31.2 (L) 32.0 - 36.0 g/dL    RDW 24.6 (H) 11.5 - 14.5 %    Platelets 219 150 - 350 K/uL    MPV 9.3 9.2 - 12.9 fL    Immature Granulocytes 0.7 (H) 0.0 - 0.5 %    Gran # (ANC) 18.5 (H) 1.8 - 7.7 K/uL    Immature Grans (Abs) 0.15 (H) 0.00 - 0.04 K/uL    Lymph # 1.3 1.0 - 4.8 K/uL    Mono # 1.1 (H) 0.3 - 1.0 K/uL    Eos # 0.1 0.0 - 0.5 K/uL    Baso # 0.02 0.00 - 0.20 K/uL    nRBC 0 0 /100 WBC    Gran% 87.5 (H) 38.0 - 73.0 %    Lymph% 6.0 (L) 18.0 - 48.0 %    Mono% 5.3 4.0 - 15.0 %    Eosinophil% 0.4 0.0 - 8.0 %    Basophil% 0.1 0.0 - 1.9 %    Differential Method Automated    CBC auto differential    Collection Time: 12/12/19  3:48 AM   Result  Value Ref Range    WBC 21.14 (H) 3.90 - 12.70 K/uL    RBC 4.60 4.60 - 6.20 M/uL    Hemoglobin 11.6 (L) 14.0 - 18.0 g/dL    Hematocrit 37.2 (L) 40.0 - 54.0 %    Mean Corpuscular Volume 81 (L) 82 - 98 fL    Mean Corpuscular Hemoglobin 25.2 (L) 27.0 - 31.0 pg    Mean Corpuscular Hemoglobin Conc 31.2 (L) 32.0 - 36.0 g/dL    RDW 24.6 (H) 11.5 - 14.5 %    Platelets 219 150 - 350 K/uL    MPV 9.3 9.2 - 12.9 fL    Immature Granulocytes 0.7 (H) 0.0 - 0.5 %    Gran # (ANC) 18.5 (H) 1.8 - 7.7 K/uL    Immature Grans (Abs) 0.15 (H) 0.00 - 0.04 K/uL    Lymph # 1.3 1.0 - 4.8 K/uL    Mono # 1.1 (H) 0.3 - 1.0 K/uL    Eos # 0.1 0.0 - 0.5 K/uL    Baso # 0.02 0.00 - 0.20 K/uL    nRBC 0 0 /100 WBC    Gran% 87.5 (H) 38.0 - 73.0 %    Lymph% 6.0 (L) 18.0 - 48.0 %    Mono% 5.3 4.0 - 15.0 %    Eosinophil% 0.4 0.0 - 8.0 %    Basophil% 0.1 0.0 - 1.9 %    Differential Method Automated    Comprehensive Metabolic Panel (CMP)    Collection Time: 12/12/19  3:48 AM   Result Value Ref Range    Sodium 134 (L) 136 - 145 mmol/L    Potassium 3.6 3.5 - 5.1 mmol/L    Chloride 103 95 - 110 mmol/L    CO2 25 23 - 29 mmol/L    Glucose 123 (H) 70 - 110 mg/dL    BUN, Bld 33 (H) 6 - 20 mg/dL    Creatinine 1.2 0.5 - 1.4 mg/dL    Calcium 7.5 (L) 8.7 - 10.5 mg/dL    Total Protein 5.9 (L) 6.0 - 8.4 g/dL    Albumin 1.4 (L) 3.5 - 5.2 g/dL    Total Bilirubin 2.9 (H) 0.1 - 1.0 mg/dL    Alkaline Phosphatase 112 55 - 135 U/L    AST 63 (H) 10 - 40 U/L    ALT 47 (H) 10 - 44 U/L    Anion Gap 6 (L) 8 - 16 mmol/L    eGFR if African American >60.0 >60 mL/min/1.73 m^2    eGFR if non African American >60.0 >60 mL/min/1.73 m^2   Magnesium    Collection Time: 12/12/19  3:48 AM   Result Value Ref Range    Magnesium 2.0 1.6 - 2.6 mg/dL   Phosphorus    Collection Time: 12/12/19  3:48 AM   Result Value Ref Range    Phosphorus 3.4 2.7 - 4.5 mg/dL   APTT    Collection Time: 12/12/19  5:18 AM   Result Value Ref Range    aPTT 54.0 (H) 21.0 - 32.0 sec   Protime-INR    Collection Time:  12/12/19  5:18 AM   Result Value Ref Range    Prothrombin Time 15.4 (H) 9.0 - 12.5 sec    INR 1.6 (H) 0.8 - 1.2   ISTAT PROCEDURE    Collection Time: 12/12/19  6:03 AM   Result Value Ref Range    POC PH 7.438 7.35 - 7.45    POC PCO2 39.3 35 - 45 mmHg    POC PO2 100 80 - 100 mmHg    POC HCO3 26.6 24 - 28 mmol/L    POC BE 2 -2 to 2 mmol/L    POC SATURATED O2 98 95 - 100 %    POC TCO2 28 (H) 23 - 27 mmol/L    Sample ARTERIAL     Site [place]     Allens Test N/A    APTT    Collection Time: 12/12/19 11:52 AM   Result Value Ref Range    aPTT 46.2 (H) 21.0 - 32.0 sec   CBC auto differential    Collection Time: 12/13/19  3:47 AM   Result Value Ref Range    WBC 22.41 (H) 3.90 - 12.70 K/uL    RBC 4.72 4.60 - 6.20 M/uL    Hemoglobin 11.9 (L) 14.0 - 18.0 g/dL    Hematocrit 38.2 (L) 40.0 - 54.0 %    Mean Corpuscular Volume 81 (L) 82 - 98 fL    Mean Corpuscular Hemoglobin 25.2 (L) 27.0 - 31.0 pg    Mean Corpuscular Hemoglobin Conc 31.2 (L) 32.0 - 36.0 g/dL    RDW 24.9 (H) 11.5 - 14.5 %    Platelets 263 150 - 350 K/uL    MPV 9.2 9.2 - 12.9 fL    Immature Granulocytes 0.6 (H) 0.0 - 0.5 %    Gran # (ANC) 19.2 (H) 1.8 - 7.7 K/uL    Immature Grans (Abs) 0.14 (H) 0.00 - 0.04 K/uL    Lymph # 1.6 1.0 - 4.8 K/uL    Mono # 1.4 (H) 0.3 - 1.0 K/uL    Eos # 0.1 0.0 - 0.5 K/uL    Baso # 0.03 0.00 - 0.20 K/uL    nRBC 0 0 /100 WBC    Gran% 85.6 (H) 38.0 - 73.0 %    Lymph% 7.1 (L) 18.0 - 48.0 %    Mono% 6.2 4.0 - 15.0 %    Eosinophil% 0.4 0.0 - 8.0 %    Basophil% 0.1 0.0 - 1.9 %    Platelet Estimate Appears normal     Aniso Slight     Poik Slight     Poly Occasional     Hypo Occasional     Target Cells Occasional     Jocelin Cells Occasional     Toxic Granulation Present     Vacuolated Granulocytes Present     Differential Method Automated    Protime-INR    Collection Time: 12/13/19  3:47 AM   Result Value Ref Range    Prothrombin Time 16.0 (H) 9.0 - 12.5 sec    INR 1.6 (H) 0.8 - 1.2   Comprehensive Metabolic Panel (CMP)    Collection Time:  12/13/19  3:47 AM   Result Value Ref Range    Sodium 129 (L) 136 - 145 mmol/L    Potassium 4.2 3.5 - 5.1 mmol/L    Chloride 99 95 - 110 mmol/L    CO2 23 23 - 29 mmol/L    Glucose 74 70 - 110 mg/dL    BUN, Bld 25 (H) 6 - 20 mg/dL    Creatinine 1.0 0.5 - 1.4 mg/dL    Calcium 7.7 (L) 8.7 - 10.5 mg/dL    Total Protein 6.8 6.0 - 8.4 g/dL    Albumin 1.6 (L) 3.5 - 5.2 g/dL    Total Bilirubin 4.2 (H) 0.1 - 1.0 mg/dL    Alkaline Phosphatase 112 55 - 135 U/L    AST 71 (H) 10 - 40 U/L    ALT 47 (H) 10 - 44 U/L    Anion Gap 7 (L) 8 - 16 mmol/L    eGFR if African American >60.0 >60 mL/min/1.73 m^2    eGFR if non African American >60.0 >60 mL/min/1.73 m^2     *Note: Due to a large number of results and/or encounters for the requested time period, some results have not been displayed. A complete set of results can be found in Results Review.          Assessment:  44 M with cardiomyopathy, RH Strain related to h/o PE with ascites and anasarca without obvious nodularity on imaging and normal plt count.  Etiology of elevated INR is challenging due to likely vitamin K deficiency, possible cirrhosis. Ascites SAAG is >1.1 indicating portal hypertension.  Total protein <2.5 indicating non cardiac ascites.    Plan:  Recommend monitoring INR, bilirubin.  No immediate plans for any intervention  On heparin drip currently and cannot get paracentesis.  No pain anyway.  Has empyema which is source of sepsis.   Checking liver doppler

## 2020-01-29 NOTE — CONSULTS
Consult noted.  Record reviewed.  He had a chest tube placed at [facility] last month for a complicated parapneumonia effusion and received 3 days of intrapleural tPA/DNAse.  At this point, he has clearly failed conservative Rx and warrants VATS/decortication.  Recommend CT surgery consult.    Full consult to follow.    Trae Steele MD  [facility]  01/29/2020  11:09 AM

## 2020-01-29 NOTE — CONSULTS
[facility]  Cardiology  Consult Note    Patient Name: Francis Daigle  MRN: [id number]  Admission Date: 1/27/2020  Hospital Length of Stay: 2 days  Code Status: Full Code   Attending Provider: Shawn Ching MD   Consulting Provider: Jose Altman MD  Primary Care Physician: Primary Doctor No  Principal Problem:Severe sepsis    Patient information was obtained from patient and ER records.     Consults  Subjective:     Chief Complaint:  Scrotal swelling     HPI:  This 44-year-old gentleman came to the emergency room here with complaint of 5-6 day history of increasing swelling of his scrotum and pain.  He admits to having shortness of breath on minimal exertion.  He denies any chest pain cough or fever.  I have reviewed his medical records from [facility].  In summary he has severe systolic congestive heart failure due to nonischemic cardiomyopathy, bilateral pulmonary emboli, hepatitis C.  Patient reports taking the medications as prescribed at discharge.  Is presently living in [place] with some of his cousins.  Past Medical History:   Diagnosis Date    Anxiety     Arthritis     CHF (congestive heart failure)     Cirrhosis     Coronary artery disease     Depression     DVT (deep venous thrombosis)     Hepatitis C     Hypertension        History reviewed. No pertinent surgical history.    Review of patient's allergies indicates:  No Known Allergies    No current facility-administered medications on file prior to encounter.      Current Outpatient Medications on File Prior to Encounter   Medication Sig    apixaban (ELIQUIS) 5 mg Tab Take 1 tablet (5 mg total) by mouth 2 (two) times daily.    enalapril (VASOTEC) 2.5 MG tablet Take 1 tablet (2.5 mg total) by mouth once daily.    furosemide (LASIX) 40 MG tablet Take 1 tablet (40 mg total) by mouth once daily.    gabapentin (NEURONTIN) 100 MG capsule Take 1 capsule (100 mg total) by mouth 3 (three) times daily as  needed (anxiety/agitation/neuropathic pain).    gabapentin (NEURONTIN) 300 MG capsule Take 1 capsule (300 mg total) by mouth every evening.    lactulose (CHRONULAC) 10 gram/15 mL solution Take 15 mLs (10 g total) by mouth 3 (three) times daily as needed (constipation).    metoprolol succinate (TOPROL-XL) 100 MG 24 hr tablet Take 1 tablet (100 mg total) by mouth once daily.    pantoprazole (PROTONIX) 40 MG tablet Take 1 tablet (40 mg total) by mouth once daily.    acetaminophen (TYLENOL) 325 MG tablet Take 325 mg by mouth every 6 (six) hours as needed for Pain.     Family History     Problem Relation (Age of Onset)    Arthritis Mother    Asthma Sister    Diabetes Sister    Heart disease Mother, Maternal Grandfather    Hyperlipidemia Mother    Hypertension Mother, Father    Kidney disease Mother        Tobacco Use    Smoking status: Former Smoker     Packs/day: 2.00     Years: 20.00     Pack years: 40.00     Types: Cigarettes     Start date: 1/23/1999     Last attempt to quit: 1/23/2017     Years since quitting: 3.0    Smokeless tobacco: Never Used   Substance and Sexual Activity    Alcohol use: Not Currently    Drug use: Not Currently     Frequency: 7.0 times per week     Types: Amphetamines     Comment: quit in december 2019    Sexual activity: Not Currently     Partners: Female     ROS  Objective:     Vital Signs (Most Recent):  Temp: 96 °F (35.6 °C) (01/29/20 0700)  Pulse: 84 (01/29/20 1000)  Resp: 15 (01/29/20 1000)  BP: 112/82 (01/29/20 1000)  SpO2: 98 % (01/29/20 0800) Vital Signs (24h Range):  Temp:  [96 °F (35.6 °C)-96.8 °F (36 °C)] 96 °F (35.6 °C)  Pulse:  [80-90] 84  Resp:  [13-31] 15  SpO2:  [83 %-99 %] 98 %  BP: ()/(56-84) 112/82     Weight: 82.8 kg (182 lb 8.7 oz)  Body mass index is 27.76 kg/m².    SpO2: 98 %  O2 Device (Oxygen Therapy): room air      Intake/Output Summary (Last 24 hours) at 1/29/2020 1444  Last data filed at 1/29/2020 0500  Gross per 24 hour   Intake 1690 ml   Output  2135 ml   Net -445 ml       Lines/Drains/Airways     Peripheral Intravenous Line                 Midline Catheter Insertion/Assessment  - Single Lumen 01/28/20 1140 Left basilic vein (medial side of arm) 20g x 8cm 1 day         Peripheral IV - Single Lumen 01/28/20 0401 20 G Left;Posterior;Proximal Forearm 1 day                Physical Exam   Constitutional: He is oriented to person, place, and time. He appears cachectic. He is cooperative. He appears ill.   Neck: JVD present.   Cardiovascular: Regular rhythm. Tachycardia present. PMI is displaced.   Murmur heard.   Holosystolic murmur is present with a grade of 1/6.  Pulmonary/Chest: He has decreased breath sounds in the right lower field and the left lower field. He has rales in the left lower field.   Abdominal: Soft. He exhibits ascites. There is no tenderness.   Neurological: He is alert and oriented to person, place, and time.       Significant Labs:   CMP   Recent Labs   Lab 01/28/20  0442 01/28/20  2140 01/29/20  0311   * 134* 134*   K 3.5 3.2* 3.2*   CL 98 96 93*   CO2 26 30* 30*   * 98 154*   BUN 32* 25* 22*   CREATININE 1.1 0.9 0.9   CALCIUM 7.9* 7.5* 7.5*   PROT 6.4  --  6.2   ALBUMIN 1.9*  --  1.9*   BILITOT 4.3*  --  2.8*   ALKPHOS 91  --  97   *  --  131*   *  --  98*   ANIONGAP 10 8 11   ESTGFRAFRICA >60.0 >60.0 >60.0   EGFRNONAA >60.0 >60.0 >60.0    and CBC   Recent Labs   Lab 01/28/20  0443 01/29/20  0311 01/29/20  1243   WBC 24.72* 21.71* 25.06*   HGB 10.3* 10.8* 10.9*   HCT 31.4* 33.7* 33.6*    165 184       Significant Imaging: X-Ray: CXR: X-Ray Chest 1 View (CXR): No results found for this visit on 01/27/20.  Assessment and Plan:  Acute on chronic systolic CHF New [place] heart Association class 3  Bilateral pneumonia with possible empyema.  Pulmonary embolism.  Recommendations  Obtain an echo to evaluate current ejection fraction and status of pulmonary pressures.  At this time he will be very high risk for  general anesthesia I would wait at least several days to diurese him and optimize his heart failure.  Lasix 20 mg IV q.12 and Aldactone 25 mg once daily     Active Diagnoses:    Diagnosis Date Noted POA    PRINCIPAL PROBLEM:  Severe sepsis [A41.9, R65.20] 12/10/2019 Unknown    Anasarca [R60.1] 01/27/2020 Yes    Liver enzyme elevation [R74.8] 01/27/2020 Yes    Chronic hepatitis C without hepatic coma [B18.2] 12/10/2019 Yes    Pneumonia [J18.9] 12/10/2019 Unknown    Essential hypertension [I10] 08/23/2019 Yes    Acute on chronic combined systolic and diastolic congestive heart failure [I50.43] 07/03/2019 Yes      Problems Resolved During this Admission:       VTE Risk Mitigation (From admission, onward)         Ordered     heparin 25,000 units in dextrose 5% 250 mL (100 units/mL) infusion HIGH INTENSITY nomogram - [facility]  Continuous     Question:  Heparin Infusion Adjustment (DO NOT MODIFY ANSWER)  Answer:  [email or web address]\Images\Pharmacy\HeparinInfusions\heparin HIGH INTENSITY nomogram for [facility] [id number].pdf    01/29/20 1126     heparin 25,000 units in dextrose 5% (100 units/ml) IV bolus from bag - ADDITIONAL PRN BOLUS - 60 units/kg  As needed (PRN)     Question:  Heparin Infusion Adjustment (DO NOT MODIFY ANSWER)  Answer:  [email or web address]\epic\Images\Pharmacy\HeparinInfusions\heparin HIGH INTENSITY nomogram for [facility] [id number].pdf    01/29/20 1126     heparin 25,000 units in dextrose 5% (100 units/ml) IV bolus from bag - ADDITIONAL PRN BOLUS - 30 units/kg  As needed (PRN)     Question:  Heparin Infusion Adjustment (DO NOT MODIFY ANSWER)  Answer:  [email or web address]\Images\Pharmacy\HeparinInfusions\heparin HIGH INTENSITY nomogram for [facility] [id number].pdf    01/29/20 1126     IP VTE HIGH RISK PATIENT  Once      01/27/20 1516     Reason for No Pharmacological VTE Prophylaxis  Once     Question:  Reasons:  Answer:  Already adequately anticoagulated on oral Anticoagulants    01/27/20 1516                Thank you for your  consult. I will follow-up with patient. Please contact us if you have any additional questions.    Jose Altman MD  Cardiology   [facility]

## 2020-01-29 NOTE — PLAN OF CARE
01/29/20 0757   PRE-TX-O2   O2 Device (Oxygen Therapy) room air   SpO2 97 %   Pulse Oximetry Type Continuous   $ Pulse Oximetry - Multiple Charge Pulse Oximetry - Multiple   Pulse 85   Resp 16

## 2020-01-29 NOTE — PROGRESS NOTES
[facility] Medicine  Progress Note    Patient Name: Francis Daigle  MRN: [id number]  Patient Class: IP- Inpatient   Admission Date: 1/27/2020  Length of Stay: 2 days  Attending Physician: Shawn Ching MD  Primary Care Provider: Primary Doctor No        Subjective:     Principal Problem:Severe sepsis        HPI:  44-year-old male with past medical history of combined diastolic and systolic CHF (EF 15% as at 12/2019), HCV, bilateral LE DVTs and bilateral PE, prior IVDU, HTn who presented with groin swelling of 5 days duration.    Prior hospital records reviewed and it reveals patient was discharged from [facility] one [place] ago after a complicated hospital course for acute cholecystits complicated by b/l DVT, bilateral PE s/p tPA, PEA cardiac arrest. HE was in the hospital for 20 days and was discharged home. He has not had any follow up visits since then.    He reports increasing lower extremity swelling progressing to scrotal swelling for the past 5 days. He endorses compliance with Lasix 40mg daily and states that he has been taking an extra 20mg daily for the past 5 days. He also endorses a dry cough, congestion and cold intolerance. He denies fever, chills, chest pain.    In the ED he has received vancomycin, zosyn and levaquin, fluid bolus and 40mg IV lasix. Chest xray shows a right middle lobe pneumonia.     Overview/Hospital Course:  1/27: IVF administered per sepsis protocol, diuresis also initiated at 40 mg lasix IVP; placed on abx for sepsis secondary to pneumonia unkonwn organism    1/28: IVF discontinued, lasix ordered as prn weight based parameters; hemodynamically stable, not requiring central line/pressors; cxr in am. Continue antibiotics for pneumonia with sepsis    1/29: cxr shows small right hydronpneumothorax; pulmonology consulted and CT shows possible empyema with a collection; surgery consulted, eliquis held, patient to be placed on heparin drip; staff are concerned about  possible fall risk while on heparin so fall risk order placed,as well as nursing communication that patient should be up with assistance. Cardiology consulted for clearance: echo ordered, lasix changed to scheduled, a dose of albumin is also ordered, will repeat albumin as needed and monitor kidney function.     S: no fever or chills, denied cough, swelling in the scrotum is still significant and does not seem to be getting any better.   Vitals:    01/29/20 1000   BP: 112/82   Pulse: 84   Resp: 15   Temp:      Gen: ao nad  HEENT: ncat  Lungs: diminished right, wheezing no crackles  ABD: distended, non-tympanic  Ext: rom wnl  UG: scrotal edema; no skin tear  Skin: tight swelling 2+ lower extremities  Neuro: cn 2-12 grossly intact    Assessment/Plan:      * Severe sepsis  Hydropneumothorax  Collection + empyema  As evidenced by tachycardia, lactic acidosis, liver injury. Pulmonary source most likely. Patient has a history of recent hospitalization with antibiotic use  - received fluids per sepsis protocol initially  IV fluids 30cc/kg, he already received broad spectrum antibiotics with vancomycin, zosyn and levaquin in the ED  -continue vancomycin and zosyn  -trend lactic acid levels  -blood cultures x 2: NGTD  - pulm + cardiothoracic surgery consutled  - eliquis changed to heparinfor any possible procedure with intent to revert back to eliquis when appropriate  - cardiology consulted for clearance for any possible procedure: echo ordered, lasix scheduled, spironolatone ordered and will monitor carefully in setting of sepsis with very low EF.      Liver enzyme elevation  ? Hepatic congestion vs early acute liver failure  Trend LFTS  Fulminant liver failure not suspected at this time  Poor liver synthetic function due to HCV.      Anasarca  Albumin is low, nutritional consultation  Give albumin as needed while on diuresis which is now scheduled 20 mg IVP q 12hr and spironolactone daily    Chronic hepatitis C without  hepatic coma, without liver failure, with cirrhosis  Patient has not seen a hepatologist for treatment of hepatitis C  GI not consulted on admission will consult now      Pneumonia due to unknown organism  Cover for HCAP with vancomycin and zosyn  Repeat CBC in AM      Essential hypertension  Resume home antihypertensives      Acute on chronic combined systolic and diastolic congestive heart failure  Patient has evidence of fluid overload with elevated BNP, EF is 15%  IV lasix 20 mg BID  Strict Is & O's monitoring  Daily weights  Cardiology consulted    VTE Risk Mitigation (From admission, onward)         Ordered     heparin 25,000 units in dextrose 5% 250 mL (100 units/mL) infusion HIGH INTENSITY nomogram - [facility]  Continuous     Question:  Heparin Infusion Adjustment (DO NOT MODIFY ANSWER)  Answer:  [email or web address]\Images\Pharmacy\HeparinInfusions\heparin HIGH INTENSITY nomogram for [facility] [id number].pdf    01/29/20 1126     heparin 25,000 units in dextrose 5% (100 units/ml) IV bolus from bag - ADDITIONAL PRN BOLUS - 60 units/kg  As needed (PRN)     Question:  Heparin Infusion Adjustment (DO NOT MODIFY ANSWER)  Answer:  [email or web address]\epic\Images\Pharmacy\HeparinInfusions\heparin HIGH INTENSITY nomogram for [facility] [id number].pdf    01/29/20 1126     heparin 25,000 units in dextrose 5% (100 units/ml) IV bolus from bag - ADDITIONAL PRN BOLUS - 30 units/kg  As needed (PRN)     Question:  Heparin Infusion Adjustment (DO NOT MODIFY ANSWER)  Answer:  [email or web address]\epic\Images\Pharmacy\HeparinInfusions\heparin HIGH INTENSITY nomogram for [facility] [id number].pdf    01/29/20 1126     IP VTE HIGH RISK PATIENT  Once      01/27/20 1516     Reason for No Pharmacological VTE Prophylaxis  Once     Question:  Reasons:  Answer:  Already adequately anticoagulated on oral Anticoagulants    01/27/20 1516                      Shawn Ching MD  Department of Hospital Medicine   [facility]

## 2020-01-29 NOTE — HOSPITAL COURSE
1/27: IVF administered per sepsis protocol, diuresis also initiated at 40 mg lasix IVP; placed on abx for sepsis secondary to pneumonia unkonwn organism    1/28: IVF discontinued, lasix ordered as prn weight based parameters; hemodynamically stable, not requiring central line/pressors; cxr in am. Continue antibiotics for pneumonia with sepsis    1/29: cxr shows small right hydronpneumothorax; pulmonology consulted and CT shows possible empyema with a collection; surgery consulted, eliquis held, patient to be placed on heparin drip; staff are concerned about possible fall risk while on heparin so fall risk order placed,as well as nursing communication that patient should be up with assistance. Cardiology consulted for clearance: echo ordered, lasix changed to scheduled, a dose of albumin is also ordered, will repeat albumin as needed and monitor kidney function.     1/30: patient is down 5.7 L thus far; albumin x 2 more doses, lasix. Echo is reviewed: ef 30% with G2DD and mild pulm htn. Discussed with pulm and cardiology note is reviewed, will need several days of diuresis and too high risk for anesthesia- after a period of time will await surgery recommendations wrt empyema ; pulm signing off- can contact as needed. Continue heparin for now, if it is decided patient does not need surgical intervention will dc heparin ggt and resume OAC. Will reassess ascites )admission CT reviewed) and order abdominal US, off loading fluid from abdomen may improve pressure on venous return from the extremities/scrotum.  Because of spironolactone intolerance Cards has changed additional diuretic to eplerenone.    1/31: edema of the scrotum has significantly improved, leukocytosis from chronic empyema? Will follow cardiology plans for diuresis and revisit any need for surgery with CTS- if not needed then will discontinue heparin and restart home eliquis, with possible discharge home at the completion of diuresis. Nutrition: improve  protein intake; diuresis+albumin; io: is net negative 9.8L today.   GI recommendations reviewed and appreciated.    2/1: patient states Dr. Light has discussed with him plans for Tuesday. Will follow up any pulm recommendations. For now - last dose of albumin and continued plans for diuresis per cardiology. Continuing heparin ggt for now.   Since admission has diuresed 21, in 10; net negative 11L    2/2: Today is day 7 of antibiotics, lactic acid has not been elevated so those labs are discontinued. WBC secondary to empyema? procalcitonin is ordered, if is not elevated will dc abx if pulm agrees.   Continues to be on IV heparin, ongoing diuresis io since admission is net -15L, swelling is reduced, tissue over the skin is more supple though pitting present, scrotum is back to normal size per patient.       2/11: Patient had 2 of the 3 chest tubes pulled.  IV abx changed to po cipro based on pleural fluid sensitivities. NGT removed after resolution of ileus and currently tolerating diet.  On room air. On oral diuresis for acute on chronic systolic and diastolic HF.  Does not want to go to a LTAC- he wants to go home.     2/12: Spoke with Dr. Rousseau and he has an air leak in the remaining tube and thus not ready to come out.     2/13: Chest tube clamped per CT surgery and will repeat CXR tonight and tomorrow AM to determine if this can be pulled.    2/14 chest tube was able to be pulled after repeat CXRs demonstrated stable small pneumothorax. He has been cleared for discharge home.  Will be discharged on cirpro to complete po course.  Reports he has eliquis at home and will continue.  Short course of Norco and Xanax prescribed.  Aldactone prescribed by Cardiology but juann does not believe he will take this as he does not like how it makes him feel- counseled this is our recommendation and i'll provide Rx but this is up to him. Will follow up with Dr. Steele in 2 weeks and Dr. Rousseau in 3 weeks.  Recommended  against driving to [place] until seen by Dr. Steele.  Return precautions given.  Examined on day of discharge and alert, NAD, non toxic appearing, comfortable respirations on room air.

## 2020-01-29 NOTE — PROGRESS NOTES
Pharmacokinetic Assessment Follow Up: IV Vancomycin    Vancomycin serum concentration assessment(s):    The trough level was drawn correctly and can be used to guide therapy at this time. The measurement is above the desired definitive target range of 10 to 15 mcg/mL.    Vancomycin Regimen Plan:    Change regimen to Vancomycin 1500 mg IV every 16 hours with next serum trough concentration measured at 23:00 prior to 7th dose on 1/31     Drug levels (last 3 results):  Recent Labs   Lab Result Units 01/29/20  1243   Vancomycin-Trough ug/mL 25.4*       Pharmacy will continue to follow and monitor vancomycin.    Please contact pharmacy at extension [phone] for questions regarding this assessment.    Thank you for the consult,   Aissatou Ellis       Patient brief summary:  Francis Daigle is a 44 y.o. male initiated on antimicrobial therapy with IV Vancomycin for treatment of Pneumonia     The patient's current regimen is 1500 mg IV every 12 hours    Drug Allergies:   Review of patient's allergies indicates:  No Known Allergies    Actual Body Weight:   82.8 kg    Renal Function:   Estimated Creatinine Clearance: 107.6 mL/min (based on SCr of 0.9 mg/dL).,     Dialysis Method (if applicable):  N/A    CBC (last 72 hours):  Recent Labs   Lab Result Units 01/27/20  1105 01/28/20  0443 01/29/20  0311 01/29/20  1243   WBC K/uL 36.92* 24.72* 21.71* 25.06*   Hemoglobin g/dL 11.9* 10.3* 10.8* 10.9*   Hematocrit % 36.3* 31.4* 33.7* 33.6*   Platelets K/uL 238 187 165 184   Gran% % 91.6* 86.6* 86.0* 88.7*   Lymph% % 3.9* 7.6* 7.5* 5.7*   Mono% % 3.6* 4.6 3.9* 3.5*   Eosinophil% % 0.0 0.5 1.7 1.1   Basophil% % 0.2 0.2 0.1 0.2   Differential Method  Automated Automated Automated Automated       Metabolic Panel (last 72 hours):  Recent Labs   Lab Result Units 01/27/20  1105 01/27/20  1555 01/28/20  0442 01/28/20  2140 01/29/20  0311   Sodium mmol/L 132*  --  134* 134* 134*   Potassium mmol/L 4.5  --  3.5 3.2* 3.2*   Chloride mmol/L 99  --  98  96 93*   CO2 mmol/L 22*  --  26 30* 30*   Glucose mg/dL 101  --  137* 98 154*   Glucose, UA   --  Negative  --   --   --    BUN, Bld mg/dL 34*  --  32* 25* 22*   Creatinine mg/dL 1.0  --  1.1 0.9 0.9   Albumin g/dL 2.1*  --  1.9*  --  1.9*   Total Bilirubin mg/dL 5.9*  --  4.3*  --  2.8*   Alkaline Phosphatase U/L 114  --  91  --  97   AST U/L 217*  --  167*  --  131*   ALT U/L 123*  --  108*  --  98*   Magnesium mg/dL  --   --  1.5* 1.4* 2.0   Phosphorus mg/dL  --   --  2.8  --  1.9*       Vancomycin Administrations:  vancomycin given in the last 96 hours                     vancomycin (VANCOCIN) 1,500 mg in dextrose 5 % 500 mL IVPB (mg) 1,500 mg New Bag 01/29/20 0337     1,500 mg New Bag 01/28/20 1649     1,500 mg New Bag  0455    vancomycin (VANCOCIN) 1,500 mg in dextrose 5 % 500 mL IVPB (mg) 1,500 mg New Bag 01/28/20 0330                      Microbiologic Results:  Microbiology Results (last 7 days)       Procedure Component Value Units Date/Time    Blood Culture #1 **CANNOT BE ORDERED STAT** [[id number]] Collected:  01/27/20 1235    Order Status:  Completed Specimen:  Blood from Peripheral, Antecubital, Right Updated:  01/29/20 1432     Blood Culture, Routine No Growth to date      No Growth to date      No Growth to date    Blood Culture #2 **CANNOT BE ORDERED STAT** [[id number]] Collected:  01/27/20 1242    Order Status:  Completed Specimen:  Blood from Peripheral, Antecubital, Left Updated:  01/29/20 1432     Blood Culture, Routine No Growth to date      No Growth to date      No Growth to date    Culture, Respiratory with Gram Stain [[id number]]     Order Status:  No result Specimen:  Respiratory     Blood culture [[id number]]     Order Status:  Canceled Specimen:  Blood     Blood culture [[id number]]     Order Status:  Canceled Specimen:  Blood

## 2020-01-29 NOTE — PROGRESS NOTES
S: no new issues    O:   01/28/20 1901  96.5 °F (35.8 °C)  82  17  92/59Abnormal   Lying  71  98 %  --  --  --  room air      Gen: ao nad  Heent: ncat  Lungs: minimal crakles, slight wheezingno ronchi  Car: nl s1s2   Abd: soft nt, somewhat distended  EXT; rom wnl, 2+ pitting edema  Skin:w arm+dry  Neuro: cn 2-12 grossly intact    A/p  Severe sepsis  As evidenced by tachycardia, lactic acidosis, liver injury. Pulmonary source most likely. Patient has a history of recent hospitalization with antibiotic use  -start sepsis protocol - IV fluids 30cc/kg,   he already received broad spectrum antibiotics with vancomycin, zosyn and levaquin in the ED  -continue vancomycin and zosyn  -trend lactic acid levels  -blood cultures x 2  1/29: IVF are discontinued, lasix is ordered as prn, abx are continued        Pneumonia  Cover for HCAP with vancomycin and zosyn  Repeat CBC in AM  1/29: repeat cxr in am        Acute on chronic combined systolic and diastolic congestive heart failure  Patient has evidence of fluid overload with elevated BNP, EF is 15%  IV lasix 40mg BID  Strict Is & O's monitoring  Daily weights  Consult cardiology  1/29: lasix chagned to prn in light of sepsis and dose reduced with paramters     Anasarca  Driven by liver injury?  Aggressive diuresis with IV lasix BID  Consult GI        Chronic hepatitis C without hepatic coma  Patient has not seen a hepatologist for treatment of hepatitis C  GI consulted        Liver enzyme elevation  ? Hepatic congestion vs early acute liver failure  Trend LFTS        Essential hypertension  Resume home antihypertensives

## 2020-01-30 PROBLEM — E87.70 VOLUME OVERLOAD: Status: ACTIVE | Noted: 2020-01-30

## 2020-01-30 LAB
ALBUMIN SERPL BCP-MCNC: 1.9 G/DL (ref 3.5–5.2)
ALP SERPL-CCNC: 85 U/L (ref 55–135)
ALT SERPL W/O P-5'-P-CCNC: 85 U/L (ref 10–44)
ANION GAP SERPL CALC-SCNC: 8 MMOL/L (ref 8–16)
APTT PPP: 136.5 SEC (ref 23.6–33.3)
APTT PPP: 136.5 SEC (ref 23.6–33.3)
APTT PPP: 150 SEC (ref 23.6–33.3)
APTT PPP: 51.6 SEC (ref 23.6–33.3)
AST SERPL-CCNC: 97 U/L (ref 10–40)
BASOPHILS # BLD AUTO: 0.03 K/UL (ref 0–0.2)
BASOPHILS NFR BLD: 0.1 % (ref 0–1.9)
BILIRUB SERPL-MCNC: 2.3 MG/DL (ref 0.1–1)
BUN SERPL-MCNC: 16 MG/DL (ref 6–20)
CALCIUM SERPL-MCNC: 7.3 MG/DL (ref 8.7–10.5)
CHLORIDE SERPL-SCNC: 94 MMOL/L (ref 95–110)
CO2 SERPL-SCNC: 30 MMOL/L (ref 23–29)
CREAT SERPL-MCNC: 0.8 MG/DL (ref 0.5–1.4)
DIFFERENTIAL METHOD: ABNORMAL
EOSINOPHIL # BLD AUTO: 0.4 K/UL (ref 0–0.5)
EOSINOPHIL NFR BLD: 1.8 % (ref 0–8)
ERYTHROCYTE [DISTWIDTH] IN BLOOD BY AUTOMATED COUNT: 22.2 % (ref 11.5–14.5)
EST. GFR  (AFRICAN AMERICAN): >60 ML/MIN/1.73 M^2
EST. GFR  (NON AFRICAN AMERICAN): >60 ML/MIN/1.73 M^2
GLUCOSE SERPL-MCNC: 93 MG/DL (ref 70–110)
HCT VFR BLD AUTO: 34.4 % (ref 40–54)
HGB BLD-MCNC: 10.8 G/DL (ref 14–18)
IMM GRANULOCYTES # BLD AUTO: 0.23 K/UL (ref 0–0.04)
IMM GRANULOCYTES NFR BLD AUTO: 1 % (ref 0–0.5)
LACTATE SERPL-SCNC: 1.5 MMOL/L (ref 0.5–1.9)
LYMPHOCYTES # BLD AUTO: 2 K/UL (ref 1–4.8)
LYMPHOCYTES NFR BLD: 8.6 % (ref 18–48)
MAGNESIUM SERPL-MCNC: 1.6 MG/DL (ref 1.6–2.6)
MCH RBC QN AUTO: 24.8 PG (ref 27–31)
MCHC RBC AUTO-ENTMCNC: 31.4 G/DL (ref 32–36)
MCV RBC AUTO: 79 FL (ref 82–98)
MONOCYTES # BLD AUTO: 0.9 K/UL (ref 0.3–1)
MONOCYTES NFR BLD: 3.7 % (ref 4–15)
NEUTROPHILS # BLD AUTO: 19.9 K/UL (ref 1.8–7.7)
NEUTROPHILS NFR BLD: 84.8 % (ref 38–73)
NRBC BLD-RTO: 0 /100 WBC
PHOSPHATE SERPL-MCNC: 2 MG/DL (ref 2.7–4.5)
PLATELET # BLD AUTO: 157 K/UL (ref 150–350)
PMV BLD AUTO: 10.8 FL (ref 9.2–12.9)
POTASSIUM SERPL-SCNC: 3.3 MMOL/L (ref 3.5–5.1)
PROT SERPL-MCNC: 6.2 G/DL (ref 6–8.4)
RBC # BLD AUTO: 4.35 M/UL (ref 4.6–6.2)
SODIUM SERPL-SCNC: 132 MMOL/L (ref 136–145)
WBC # BLD AUTO: 23.47 K/UL (ref 3.9–12.7)

## 2020-01-30 PROCEDURE — 99233 SBSQ HOSP IP/OBS HIGH 50: CPT | Mod: ,,, | Performed by: INTERNAL MEDICINE

## 2020-01-30 PROCEDURE — 85730 THROMBOPLASTIN TIME PARTIAL: CPT

## 2020-01-30 PROCEDURE — 99900035 HC TECH TIME PER 15 MIN (STAT)

## 2020-01-30 PROCEDURE — 25000003 PHARM REV CODE 250: Performed by: INTERNAL MEDICINE

## 2020-01-30 PROCEDURE — 25000003 PHARM REV CODE 250: Performed by: EMERGENCY MEDICINE

## 2020-01-30 PROCEDURE — 25000003 PHARM REV CODE 250

## 2020-01-30 PROCEDURE — 85025 COMPLETE CBC W/AUTO DIFF WBC: CPT

## 2020-01-30 PROCEDURE — 21400001 HC TELEMETRY ROOM

## 2020-01-30 PROCEDURE — 63600175 PHARM REV CODE 636 W HCPCS: Performed by: INTERNAL MEDICINE

## 2020-01-30 PROCEDURE — 84100 ASSAY OF PHOSPHORUS: CPT

## 2020-01-30 PROCEDURE — 80053 COMPREHEN METABOLIC PANEL: CPT

## 2020-01-30 PROCEDURE — 83735 ASSAY OF MAGNESIUM: CPT

## 2020-01-30 PROCEDURE — 63600175 PHARM REV CODE 636 W HCPCS: Performed by: EMERGENCY MEDICINE

## 2020-01-30 PROCEDURE — 12000002 HC ACUTE/MED SURGE SEMI-PRIVATE ROOM

## 2020-01-30 PROCEDURE — 83605 ASSAY OF LACTIC ACID: CPT

## 2020-01-30 PROCEDURE — 99233 PR SUBSEQUENT HOSPITAL CARE,LEVL III: ICD-10-PCS | Mod: ,,, | Performed by: INTERNAL MEDICINE

## 2020-01-30 PROCEDURE — 85730 THROMBOPLASTIN TIME PARTIAL: CPT | Mod: 91

## 2020-01-30 PROCEDURE — 94761 N-INVAS EAR/PLS OXIMETRY MLT: CPT

## 2020-01-30 RX ORDER — ALBUMIN HUMAN 250 G/1000ML
12.5 SOLUTION INTRAVENOUS DAILY
Status: COMPLETED | OUTPATIENT
Start: 2020-01-31 | End: 2020-02-01

## 2020-01-30 RX ORDER — EPLERENONE 25 MG/1
25 TABLET, FILM COATED ORAL DAILY
Status: DISCONTINUED | OUTPATIENT
Start: 2020-01-30 | End: 2020-02-05

## 2020-01-30 RX ORDER — EPLERENONE 25 MG/1
25 TABLET, FILM COATED ORAL DAILY
Status: DISCONTINUED | OUTPATIENT
Start: 2020-01-31 | End: 2020-01-30

## 2020-01-30 RX ADMIN — METOPROLOL SUCCINATE 100 MG: 50 TABLET, EXTENDED RELEASE ORAL at 09:01

## 2020-01-30 RX ADMIN — MUPIROCIN: 20 OINTMENT TOPICAL at 09:01

## 2020-01-30 RX ADMIN — FUROSEMIDE 20 MG: 20 INJECTION, SOLUTION INTRAMUSCULAR; INTRAVENOUS at 04:01

## 2020-01-30 RX ADMIN — ENALAPRIL MALEATE 2.5 MG: 2.5 TABLET ORAL at 09:01

## 2020-01-30 RX ADMIN — HYDROXYZINE HYDROCHLORIDE 25 MG: 25 TABLET, FILM COATED ORAL at 09:01

## 2020-01-30 RX ADMIN — PIPERACILLIN AND TAZOBACTAM 4.5 G: 4; .5 INJECTION, POWDER, LYOPHILIZED, FOR SOLUTION INTRAVENOUS; PARENTERAL at 06:01

## 2020-01-30 RX ADMIN — PANTOPRAZOLE SODIUM 40 MG: 40 TABLET, DELAYED RELEASE ORAL at 06:01

## 2020-01-30 RX ADMIN — SENNOSIDES AND DOCUSATE SODIUM 1 TABLET: 8.6; 5 TABLET ORAL at 09:01

## 2020-01-30 RX ADMIN — CHLORHEXIDINE GLUCONATE 15 ML: 1.2 RINSE ORAL at 09:01

## 2020-01-30 RX ADMIN — MAGNESIUM SULFATE 2 G: 2 INJECTION INTRAVENOUS at 04:01

## 2020-01-30 RX ADMIN — EPLERENONE 25 MG: 25 TABLET, FILM COATED ORAL at 04:01

## 2020-01-30 RX ADMIN — HEPARIN SODIUM 14 UNITS/KG/HR: 10000 INJECTION, SOLUTION INTRAVENOUS at 11:01

## 2020-01-30 RX ADMIN — PIPERACILLIN AND TAZOBACTAM 4.5 G: 4; .5 INJECTION, POWDER, LYOPHILIZED, FOR SOLUTION INTRAVENOUS; PARENTERAL at 01:01

## 2020-01-30 RX ADMIN — HYDROCODONE BITARTRATE AND ACETAMINOPHEN 1 TABLET: 5; 325 TABLET ORAL at 05:01

## 2020-01-30 RX ADMIN — PIPERACILLIN AND TAZOBACTAM 4.5 G: 4; .5 INJECTION, POWDER, LYOPHILIZED, FOR SOLUTION INTRAVENOUS; PARENTERAL at 10:01

## 2020-01-30 RX ADMIN — VANCOMYCIN HYDROCHLORIDE 1500 MG: 1 INJECTION, POWDER, LYOPHILIZED, FOR SOLUTION INTRAVENOUS at 01:01

## 2020-01-30 RX ADMIN — POTASSIUM CHLORIDE 40 MEQ: 20 TABLET, EXTENDED RELEASE ORAL at 04:01

## 2020-01-30 RX ADMIN — SODIUM PHOSPHATE, MONOBASIC, MONOHYDRATE AND SODIUM PHOSPHATE, DIBASIC, ANHYDROUS 20.01 MMOL: 276; 142 INJECTION, SOLUTION INTRAVENOUS at 06:01

## 2020-01-30 NOTE — NURSING
1/30/2020 0420 ptt 136.5 heparin drip to be stopped for 2 hours then resumed decreasing rate by 4 units an hour, then repeat ptt in 6 hours

## 2020-01-30 NOTE — PROGRESS NOTES
[facility] Medicine  Progress Note    Patient Name: Francis Daigle  MRN: [id number]  Patient Class: IP- Inpatient   Admission Date: 1/27/2020  Length of Stay: 3 days  Attending Physician: Shawn Ching MD  Primary Care Provider: Primary Doctor No        Subjective:     Principal Problem:Severe sepsis        HPI:  44-year-old male with past medical history of combined diastolic and systolic CHF (EF 15% as at 12/2019), HCV, bilateral LE DVTs and bilateral PE, prior IVDU, HTn who presented with groin swelling of 5 days duration.    Prior hospital records reviewed and it reveals patient was discharged from [facility] one month ago after a complicated hospital course for acute cholecystits complicated by b/l DVT, bilateral PE s/p tPA, PEA cardiac arrest. HE was in the hospital for 20 days and was discharged home. He has not had any follow up visits since then.    He reports increasing lower extremity swelling progressing to scrotal swelling for the past 5 days. He endorses compliance with Lasix 40mg daily and states that he has been taking an extra 20mg daily for the past 5 days. He also endorses a dry cough, congestion and cold intolerance. He denies fever, chills, chest pain.    In the ED he has received vancomycin, zosyn and levaquin, fluid bolus and 40mg IV lasix. Chest xray shows a right middle lobe pneumonia.     Overview/Hospital Course:  1/27: IVF administered per sepsis protocol, diuresis also initiated at 40 mg lasix IVP; placed on abx for sepsis secondary to pneumonia unkonwn organism    1/28: IVF discontinued, lasix ordered as prn weight based parameters; hemodynamically stable, not requiring central line/pressors; cxr in am. Continue antibiotics for pneumonia with sepsis    1/29: cxr shows small right hydronpneumothorax; pulmonology consulted and CT shows possible empyema with a collection; surgery consulted, eliquis held, patient to be placed on heparin drip; staff are concerned about  possible fall risk while on heparin so fall risk order placed,as well as nursing communication that patient should be up with assistance. Cardiology consulted for clearance: echo ordered, lasix changed to scheduled, a dose of albumin is also ordered, will repeat albumin as needed and monitor kidney function.     1/30: patient is down 5.7 L thus far; albumin x 2 more doses, lasix. Echo is reviewed: ef 30% with G2DD and mild pulm htn. Discussed with pulm and cardiology note is reviewed, will need several days of diuresis and too high risk for anesthesia- after a period of time will await surgery recommendations wrt empyema ; pulm signing off- can contact as needed. Continue heparin for now, if it is decided patient does not need surgical intervention will dc heparin ggt and resume OAC. Will reassess ascites )admission CT reviewed) and order abdominal US, off loading fluid from abdomen may improve pressure on venous return from the extremities/scrotum.  Because of spironolactone intolerance Cards has changed additional diuretic to eplerenone.    S: no fever or chills, denied cough, swelling in the scrotum is still significant and does not seem to be getting any better.   Vitals:    01/30/20 1105   BP: (!) 110/56   Pulse: 89   Resp: 16   Temp: 97.7 °F (36.5 °C)       Gen: ao nad  HEENT: ncat  Lungs: diminished right, wheezing is improved no crackles  ABD: distended, non-tympanic?fluid wave  Ext: rom wnl  UG: scrotal edema; no skin tear  Skin: tight swelling 2+ lower extremities  Neuro: cn 2-12 grossly intact        Assessment/Plan:   * Severe sepsis  Hydropneumothorax  Collection + empyema  As evidenced by tachycardia, lactic acidosis, liver injury. Pulmonary source most likely. Patient has a history of recent hospitalization with antibiotic use  - received fluids per sepsis protocol initially  IV fluids 30cc/kg, he already received broad spectrum antibiotics with vancomycin, zosyn and levaquin in the ED  -continue  "vancomycin and zosyn  -trend lactic acid levels  -blood cultures x 2: NGTD  - pulm + cardiothoracic surgery consutled; pulm will likely sign off, and given chronicity of collection/empyema without positive cultures and otehrwise nontoxic appearance on exam- may defer need for intervention. Will await CTS formal consultation recommendations and in meantime diurese.  - eliquis changed to heparinfor any possible procedure with intent to revert back to eliquis when appropriate  - cardiology consulted for clearance for any possible procedure: echo reviewed, lasix scheduled, spironolatone discontinued in favor of eplerenone and will monitor carefully in setting of sepsis with very low EF.        Liver enzyme elevation  ? Hepatic congestion vs early acute liver failure  Trend LFTS  Fulminant liver failure not suspected at this time  Poor liver synthetic function due to HCV.        Anasarca  Albumin is low, nutritional consultation  Give albumin as needed while on diuresis which is now scheduled 20 mg IVP q 12hr and spironolactone daily     Chronic hepatitis C without hepatic coma, without liver failure, with cirrhosis  Patient has not seen a hepatologist for treatment of hepatitis C  GI not consulted on admission will consult now; awaiting recommendations on order history "inpatient consultation Gastroenterology" is marked as completed.        Pneumonia due to unknown organism  Cover for HCAP with vancomycin and zosyn  Repeat CBC in AM        Essential hypertension  Resume home antihypertensives        Acute on chronic combined systolic and diastolic congestive heart failure  Patient has evidence of fluid overload with elevated BNP, EF is 15% now improved to 30# by echo on 1/29  IV lasix 20 mg BID, eplerenone  Strict Is & O's monitoring  Daily weights  Cardiology consulted  VTE Risk Mitigation (From admission, onward)         Ordered     heparin 25,000 units in dextrose 5% 250 mL (100 units/mL) infusion HIGH INTENSITY " nomogram - [facility]  Continuous     Question:  Heparin Infusion Adjustment (DO NOT MODIFY ANSWER)  Answer:  [email or web address]\Images\Pharmacy\HeparinInfusions\heparin HIGH INTENSITY nomogram for [facility] [id number]    01/29/20 1126     heparin 25,000 units in dextrose 5% (100 units/ml) IV bolus from bag - ADDITIONAL PRN BOLUS - 60 units/kg  As needed (PRN)     Question:  Heparin Infusion Adjustment (DO NOT MODIFY ANSWER)  Answer:  [email or web address]\Images\Pharmacy\HeparinInfusions\heparin HIGH INTENSITY nomogram for [facility] [id number].pdf    01/29/20 1126     heparin 25,000 units in dextrose 5% (100 units/ml) IV bolus from bag - ADDITIONAL PRN BOLUS - 30 units/kg  As needed (PRN)     Question:  Heparin Infusion Adjustment (DO NOT MODIFY ANSWER)  Answer:  [email or web address]\Images\Pharmacy\HeparinInfusions\heparin HIGH INTENSITY nomogram for [facility] [id number].pdf    01/29/20 1126     IP VTE HIGH RISK PATIENT  Once      01/27/20 1516     Reason for No Pharmacological VTE Prophylaxis  Once     Question:  Reasons:  Answer:  Already adequately anticoagulated on oral Anticoagulants    01/27/20 1516                      Shawn Ching MD  Department of Hospital Medicine   [facility]

## 2020-01-30 NOTE — PROGRESS NOTES
[facility]  Pulmonology  Progress Note    Subjective     No major issues overnight.  Subjectively improved over the past 24 hr.  Doing well this morning without any specific complaints.     Review of Systems   Constitutional: Negative for fever, chills and night sweats.   Respiratory: Positive for orthopnea. Negative for cough, hemoptysis, sputum production, shortness of breath and wheezing.    Cardiovascular: Positive for leg swelling. Negative for chest pain and palpitations.   Gastrointestinal: Positive for abdominal distention. Negative for nausea, vomiting and abdominal pain.   Neurological: Negative for headaches.   Psychiatric/Behavioral: Negative for confusion.   All other systems reviewed and are negative.       I have personally reviewed the following during today's evaluation:  past medical history, ROS, family history, social history, surgical history, current inpatient medications,drug allergies, vital signs over the past 24 hours, results of relevant diagnostic studies and nursing/provider documentation from the past 24 hours.     Objective     VS Temp:  [97.4 °F (36.3 °C)-97.7 °F (36.5 °C)]   Pulse:  [83-96]   Resp:  [15-24]   BP: (106-119)/(70-82)   SpO2:  [95 %-97 %]   Ideal body weight: 68.4 kg (150 lb 12.7 oz)  Adjusted ideal body weight: 74.4 kg (164 lb 1.8 oz)   I/O   Intake/Output Summary (Last 24 hours) at 1/30/2020 1212  Last data filed at 1/30/2020 0900  Gross per 24 hour   Intake 1742.74 ml   Output 4125 ml   Net -2382.26 ml        Vent SpO2 96% on room air   PE Physical Exam   Constitutional: He is oriented to person, place, and time. He appears well-developed and well-nourished. He appears not cachectic.  Non-toxic appearance. No distress.   Temporal wasting present He is not obese.   HENT:   Head: Normocephalic and atraumatic.   Right Ear: External ear normal.   Left Ear: External ear normal.   Nose: Nose normal.   Mouth/Throat: Uvula is midline, oropharynx is clear and  moist and mucous membranes are normal. Mallampati Score: II.   Neck: Trachea normal and normal range of motion. Neck supple. No JVD present. No tracheal deviation present. No thyromegaly present.   Cardiovascular: Normal rate, regular rhythm, normal heart sounds and intact distal pulses. Exam reveals no gallop and no friction rub.   No murmur heard.  Pulmonary/Chest: Normal expansion, symmetric chest wall expansion and effort normal. No stridor. He has decreased breath sounds (Over the right base). He has no wheezes. He has no rhonchi. He has no rales. He exhibits no tenderness.   Some mild soft tissue edema about the right lower hemithorax.  No fluctuance no tenderness no crepitus.   Abdominal: Soft. Bowel sounds are normal. He exhibits no distension. There is no tenderness. There is no guarding.   Musculoskeletal: Normal range of motion. He exhibits edema. He exhibits no tenderness or deformity.   Lymphadenopathy: No supraclavicular adenopathy is present.     He has no cervical adenopathy.     He has no axillary adenopathy.   Neurological: He is alert and oriented to person, place, and time. He has normal strength. No cranial nerve deficit or sensory deficit. He exhibits normal muscle tone. GCS eye subscore is 4. GCS verbal subscore is 5. GCS motor subscore is 6.   Skin: Skin is warm, dry and intact. No rash noted. He is not diaphoretic. No cyanosis. Nails show no clubbing.   Psychiatric: He has a normal mood and affect. His speech is normal and behavior is normal.   Nursing note and vitals reviewed.        Labs I have personally reviewed and interpreted all labs / diagnostic studies obtained over the past 24 hours, and relevant results are as follows:  Recent Labs   Lab 01/29/20  1243  01/30/20  0336   WBC 25.06*  --  23.47*   RBC 4.26*  --  4.35*   HGB 10.9*  --  10.8*   HCT 33.6*  --  34.4*     --  157   MCV 79*  --  79*   MCH 25.6*  --  24.8*   MCHC 32.4  --  31.4*   NA  --   --  132*   K  --   --  3.3*    CL  --   --  94*   CO2  --   --  30*   BUN  --   --  16   CREATININE  --   --  0.8   MG  --   --  1.6   ALT  --   --  85*   AST  --   --  97*   ALKPHOS  --   --  85   BILITOT  --   --  2.3*   PROT  --   --  6.2   ALBUMIN  --   --  1.9*   INR 2.2  --   --    APTT 41.6*   < > 136.5*  136.5*    < > = values in this interval not displayed.      Imaging I have personally reviewed and interpreted the following images and reviewed the associated Radiology report.  I have reviewed and interpreted all pertinent imaging results/findings within the past 24 hours.  Liver ultrasound:  Patent portal vein, with bidirectional pulsatile spectral Doppler  waveform. Differential considerations include sequela of cirrhosis,  right heart failure, or tricuspid regurgitation. Pulsatile flow within  the hepatic veins suggests etiology is more likely cardiac in nature     Micro I have personally reviewed and interpreted the available culture data.  Relevant results are as follows.  Blood Culture   Lab Results   Component Value Date    LABBLOO No Growth to date 01/27/2020    LABBLOO No Growth to date 01/27/2020    LABBLOO No Growth to date 01/27/2020   , Sputum Culture   Lab Results   Component Value Date    GSRESP Few WBC's 12/11/2019    GSRESP Few yeast 12/11/2019    RESPIRATORYC No S aureus or Pseudomonas isolated. 12/11/2019    RESPIRATORYC KEYSHAWN ALBICANS  Moderate   (A) 12/11/2019    RESPIRATORYC (A) 12/11/2019     ENTEROBACTER CLOACAE  Few  susceptibility pending      and Urine Culture  No results found for: LABURIN   Medications Scheduled    chlorhexidine  15 mL Mouth/Throat BID    enalapril  2.5 mg Oral Daily    furosemide (LASIX) IV  20 mg Intravenous Q12H    hydrOXYzine HCl  25 mg Oral Daily    metoprolol succinate  100 mg Oral Daily    mupirocin   Nasal BID    pantoprazole  40 mg Oral Daily    piperacillin-tazobactam (ZOSYN) IVPB  4.5 g Intravenous Q8H    senna-docusate 8.6-50 mg  1 tablet Oral BID    spironolactone   25 mg Oral Daily    vancomycin (VANCOCIN) IVPB  1,500 mg Intravenous Q16H      Continuous Infusions:    heparin (porcine) in D5W 14 Units/kg/hr (01/30/20 1133)      PRN   acetaminophen, acetaminophen, acetaminophen, calcium chloride IVPB, calcium chloride IVPB, calcium chloride IVPB, dextrose 50%, dextrose 50%, furosemide, gabapentin, glucagon (human recombinant), glucose, glucose, heparin (PORCINE), heparin (PORCINE), HYDROcodone-acetaminophen, HYDROcodone-acetaminophen, magnesium oxide, magnesium sulfate IVPB, magnesium sulfate IVPB, magnesium sulfate IVPB, magnesium sulfate IVPB, ondansetron, potassium chloride in water, potassium chloride in water, potassium chloride in water, potassium chloride in water, potassium chloride, potassium chloride, potassium chloride, potassium chloride, promethazine (PHENERGAN) IVPB, sodium chloride 0.9%, sodium phosphate IVPB, sodium phosphate IVPB, sodium phosphate IVPB, sodium phosphate IVPB, sodium phosphate IVPB, Pharmacy to dose Vancomycin consult **AND** vancomycin - pharmacy to dose        Assessment       Active Hospital Problems    Diagnosis    *Severe sepsis    Volume overload    Parapneumonic effusion    Anasarca    Liver enzyme elevation    Hydropneumothorax    Chronic hepatitis C without hepatic coma    Pneumonia    Essential hypertension    Acute on chronic combined systolic and diastolic congestive heart failure      My Impression:  Persistent right-sided hepatic hydrothorax the setting of recent complicated parapneumonic effusion that was not completely resolved with chest tube drainage in intrapleural thrombolytics.  Although my initial inclination is to consider surgical intervention, his overall non-toxic and absence of constitutional respiratory symptoms warrants pause.  His acute issues are relatively stable and he can be transferred out of the intensive care unit.    Plan     Pulmonary  · Continue antibiotics as ordered.  · Consultation with  thoracic surgery.  · Titrate supplemental oxygen.  · Aggressive diuresis.  · Observation intensive care unit for now.     Please call Dr. Oneill with any questions or concerns over the weekend.       Trae Steele MD  Pulmonary / Critical Care Medicine  [facility]

## 2020-01-30 NOTE — CARE UPDATE
01/29/20 2112   Patient Assessment/Suction   Level of Consciousness (AVPU) alert   Respiratory Effort Unlabored   Expansion/Accessory Muscles/Retractions no use of accessory muscles   All Lung Fields Breath Sounds coarse;clear   Rhythm/Pattern, Respiratory unlabored   Cough Frequency no cough   PRE-TX-O2   O2 Device (Oxygen Therapy) room air   SpO2 97 %   Pulse Oximetry Type Continuous   $ Pulse Oximetry - Multiple Charge Pulse Oximetry - Multiple   Pulse 96   Resp 16   Positioning   Head of Bed (HOB) HOB elevated   Respiratory Evaluation   $ Care Plan Tech Time 15 min

## 2020-01-30 NOTE — NURSING
1/29/2020 9pm PTT 89 within therapeutic range no change to rate on heparin drip repeat PTT in six hours  1/29/2020 Dr Rousseau in to see patient viewed chest CT, discussed possible surgery with patient will be back in am to speak to pt again

## 2020-01-30 NOTE — PROGRESS NOTES
Therapy with Vancomycin complete and / or consult / order discontinued by Dr Claros on 1-30 @ 14:00   Pharmacy will sign off, please re-consult as needed.  Thank you for allowing us to participate in this patient's care.  Roosevelt Tse 1/30/2020 3:53 PM  Dept of Pharmacy  Ext [phone]

## 2020-01-30 NOTE — PROGRESS NOTES
[facility]  Cardiology  Progress Note    Patient Name: Francis Daigle  MRN: [id number]  Admission Date: 1/27/2020  Hospital Length of Stay: 3 days  Code Status: Full Code   Attending Physician: Shawn Ching MD   Primary Care Physician: Primary Doctor No  Expected Discharge Date:   Principal Problem:Severe sepsis    Subjective:     Hospital Course:   Interval History:  Reports some of his swellings are going down.  Scrotum is still swollen.  ROS  Objective:     Vital Signs (Most Recent):  Temp: 97.7 °F (36.5 °C) (01/30/20 1105)  Pulse: 89 (01/30/20 1105)  Resp: 16 (01/30/20 1105)  BP: (!) 110/56 (01/30/20 1105)  SpO2: 97 % (01/30/20 1105) Vital Signs (24h Range):  Temp:  [97.4 °F (36.3 °C)-97.7 °F (36.5 °C)] 97.7 °F (36.5 °C)  Pulse:  [84-96] 89  Resp:  [15-24] 16  SpO2:  [95 %-97 %] 97 %  BP: (107-119)/(56-80) 110/56     Weight: 83.5 kg (184 lb 1.4 oz)  Body mass index is 27.99 kg/m².    SpO2: 97 %  O2 Device (Oxygen Therapy): room air      Intake/Output Summary (Last 24 hours) at 1/30/2020 1521  Last data filed at 1/30/2020 1319  Gross per 24 hour   Intake 1242.74 ml   Output 4425 ml   Net -3182.26 ml       Lines/Drains/Airways     Peripheral Intravenous Line                 Midline Catheter Insertion/Assessment  - Single Lumen 01/28/20 1140 Left basilic vein (medial side of arm) 20g x 8cm 2 days         Peripheral IV - Single Lumen 01/28/20 0401 20 G Left;Posterior;Proximal Forearm 2 days                Physical Exam there is a S3 gallop rhythm noted, soft systolic murmur in the left upper sternal edges noted  Decreased breath sounds in both bases.  Alert and oriented    Significant Labs:   CMP   Recent Labs   Lab 01/28/20  2140 01/29/20  0311 01/30/20  0336   * 134* 132*   K 3.2* 3.2* 3.3*   CL 96 93* 94*   CO2 30* 30* 30*   GLU 98 154* 93   BUN 25* 22* 16   CREATININE 0.9 0.9 0.8   CALCIUM 7.5* 7.5* 7.3*   PROT  --  6.2 6.2   ALBUMIN  --  1.9* 1.9*   BILITOT  --  2.8* 2.3*   ALKPHOS   --  97 85   AST  --  131* 97*   ALT  --  98* 85*   ANIONGAP 8 11 8   ESTGFRAFRICA >60.0 >60.0 >60.0   EGFRNONAA >60.0 >60.0 >60.0    and CBC   Recent Labs   Lab 01/29/20  0311 01/29/20  1243 01/30/20  0336   WBC 21.71* 25.06* 23.47*   HGB 10.8* 10.9* 10.8*   HCT 33.7* 33.6* 34.4*    184 157       Significant Imaging: X-Ray: CXR: X-Ray Chest 1 View (CXR): No results found for this visit on 01/27/20.  Assessment and Plan:  Acute on chronic systolic CHF [place] heart Association class 3  Bilateral pneumonia with possible empyema.  Pulmonary embolism.  Recommendations  Obtain an echo to evaluate current ejection fraction and status of pulmonary pressures.  At this time he will be very high risk for general anesthesia I would wait at least several days to diurese him and optimize his heart failure.  Lasix 20 mg IV q.12. Unable to tolerate aldactone, will change to Eplerenone.     Brief HPI:     Active Diagnoses:    Diagnosis Date Noted POA    PRINCIPAL PROBLEM:  Severe sepsis [A41.9, R65.20] 12/10/2019 Unknown    Volume overload [E87.70] 01/30/2020 Yes    Parapneumonic effusion [J18.9, J91.8] 01/29/2020 Yes    Anasarca [R60.1] 01/27/2020 Yes    Liver enzyme elevation [R74.8] 01/27/2020 Yes    Hydropneumothorax [J94.8] 12/21/2019 Unknown    Chronic hepatitis C without hepatic coma [B18.2] 12/10/2019 Yes    Pneumonia [J18.9] 12/10/2019 Yes    Essential hypertension [I10] 08/23/2019 Yes    Acute on chronic combined systolic and diastolic congestive heart failure [I50.43] 07/03/2019 Yes      Problems Resolved During this Admission:       VTE Risk Mitigation (From admission, onward)         Ordered     heparin 25,000 units in dextrose 5% 250 mL (100 units/mL) infusion HIGH INTENSITY nomogram - [facility]  Continuous     Question:  Heparin Infusion Adjustment (DO NOT MODIFY ANSWER)  Answer:  [email or web address]\epic\Images\Pharmacy\HeparinInfusions\heparin HIGH INTENSITY nomogram for [facility] [id number].pdf    01/29/20 1124      heparin 25,000 units in dextrose 5% (100 units/ml) IV bolus from bag - ADDITIONAL PRN BOLUS - 60 units/kg  As needed (PRN)     Question:  Heparin Infusion Adjustment (DO NOT MODIFY ANSWER)  Answer:  [email or web address]\epic\Images\Pharmacy\HeparinInfusions\heparin HIGH INTENSITY nomogram for [facility] [id number].pdf    01/29/20 1126     heparin 25,000 units in dextrose 5% (100 units/ml) IV bolus from bag - ADDITIONAL PRN BOLUS - 30 units/kg  As needed (PRN)     Question:  Heparin Infusion Adjustment (DO NOT MODIFY ANSWER)  Answer:  [email or web address]\epic\Images\Pharmacy\HeparinInfusions\heparin HIGH INTENSITY nomogram for [facility] [id number].pdf    01/29/20 1126     IP VTE HIGH RISK PATIENT  Once      01/27/20 1516     Reason for No Pharmacological VTE Prophylaxis  Once     Question:  Reasons:  Answer:  Already adequately anticoagulated on oral Anticoagulants    01/27/20 1516                Jose Altman MD  Cardiology  Critical access hospital

## 2020-01-31 LAB
ALBUMIN SERPL BCP-MCNC: 2.1 G/DL (ref 3.5–5.2)
ALP SERPL-CCNC: 78 U/L (ref 55–135)
ALT SERPL W/O P-5'-P-CCNC: 76 U/L (ref 10–44)
ANION GAP SERPL CALC-SCNC: 7 MMOL/L (ref 8–16)
APTT PPP: 150 SEC (ref 23.6–33.3)
APTT PPP: 175.2 SEC (ref 23.6–33.3)
APTT PPP: 46.8 SEC (ref 23.6–33.3)
APTT PPP: 90 SEC (ref 23.6–33.3)
AST SERPL-CCNC: 84 U/L (ref 10–40)
BASOPHILS # BLD AUTO: 0.04 K/UL (ref 0–0.2)
BASOPHILS NFR BLD: 0.2 % (ref 0–1.9)
BILIRUB SERPL-MCNC: 2.8 MG/DL (ref 0.1–1)
BUN SERPL-MCNC: 14 MG/DL (ref 6–20)
CALCIUM SERPL-MCNC: 7.8 MG/DL (ref 8.7–10.5)
CHLORIDE SERPL-SCNC: 90 MMOL/L (ref 95–110)
CO2 SERPL-SCNC: 34 MMOL/L (ref 23–29)
CREAT SERPL-MCNC: 0.9 MG/DL (ref 0.5–1.4)
DIFFERENTIAL METHOD: ABNORMAL
EOSINOPHIL # BLD AUTO: 0.3 K/UL (ref 0–0.5)
EOSINOPHIL NFR BLD: 1.5 % (ref 0–8)
ERYTHROCYTE [DISTWIDTH] IN BLOOD BY AUTOMATED COUNT: 22.3 % (ref 11.5–14.5)
EST. GFR  (AFRICAN AMERICAN): >60 ML/MIN/1.73 M^2
EST. GFR  (NON AFRICAN AMERICAN): >60 ML/MIN/1.73 M^2
GLUCOSE SERPL-MCNC: 90 MG/DL (ref 70–110)
HCT VFR BLD AUTO: 34.9 % (ref 40–54)
HGB BLD-MCNC: 11.1 G/DL (ref 14–18)
IMM GRANULOCYTES # BLD AUTO: 0.14 K/UL (ref 0–0.04)
IMM GRANULOCYTES NFR BLD AUTO: 0.7 % (ref 0–0.5)
LACTATE SERPL-SCNC: 1.5 MMOL/L (ref 0.5–1.9)
LYMPHOCYTES # BLD AUTO: 1.8 K/UL (ref 1–4.8)
LYMPHOCYTES NFR BLD: 8.4 % (ref 18–48)
MAGNESIUM SERPL-MCNC: 1.6 MG/DL (ref 1.6–2.6)
MCH RBC QN AUTO: 25 PG (ref 27–31)
MCHC RBC AUTO-ENTMCNC: 31.8 G/DL (ref 32–36)
MCV RBC AUTO: 79 FL (ref 82–98)
MONOCYTES # BLD AUTO: 1 K/UL (ref 0.3–1)
MONOCYTES NFR BLD: 4.4 % (ref 4–15)
NEUTROPHILS # BLD AUTO: 18.3 K/UL (ref 1.8–7.7)
NEUTROPHILS NFR BLD: 84.8 % (ref 38–73)
NRBC BLD-RTO: 0 /100 WBC
PHOSPHATE SERPL-MCNC: 2.2 MG/DL (ref 2.7–4.5)
PLATELET # BLD AUTO: 198 K/UL (ref 150–350)
PMV BLD AUTO: 10 FL (ref 9.2–12.9)
POTASSIUM SERPL-SCNC: 3.7 MMOL/L (ref 3.5–5.1)
PROT SERPL-MCNC: 7 G/DL (ref 6–8.4)
RBC # BLD AUTO: 4.44 M/UL (ref 4.6–6.2)
SODIUM SERPL-SCNC: 131 MMOL/L (ref 136–145)
WBC # BLD AUTO: 21.52 K/UL (ref 3.9–12.7)

## 2020-01-31 PROCEDURE — 99233 SBSQ HOSP IP/OBS HIGH 50: CPT | Mod: ,,, | Performed by: INTERNAL MEDICINE

## 2020-01-31 PROCEDURE — 85730 THROMBOPLASTIN TIME PARTIAL: CPT | Mod: 91

## 2020-01-31 PROCEDURE — 12000002 HC ACUTE/MED SURGE SEMI-PRIVATE ROOM

## 2020-01-31 PROCEDURE — 25000003 PHARM REV CODE 250: Performed by: INTERNAL MEDICINE

## 2020-01-31 PROCEDURE — P9047 ALBUMIN (HUMAN), 25%, 50ML: HCPCS | Mod: JG | Performed by: INTERNAL MEDICINE

## 2020-01-31 PROCEDURE — 84100 ASSAY OF PHOSPHORUS: CPT

## 2020-01-31 PROCEDURE — 85025 COMPLETE CBC W/AUTO DIFF WBC: CPT

## 2020-01-31 PROCEDURE — 99233 PR SUBSEQUENT HOSPITAL CARE,LEVL III: ICD-10-PCS | Mod: ,,, | Performed by: INTERNAL MEDICINE

## 2020-01-31 PROCEDURE — 80053 COMPREHEN METABOLIC PANEL: CPT

## 2020-01-31 PROCEDURE — 63600175 PHARM REV CODE 636 W HCPCS: Performed by: INTERNAL MEDICINE

## 2020-01-31 PROCEDURE — 83605 ASSAY OF LACTIC ACID: CPT

## 2020-01-31 PROCEDURE — 83735 ASSAY OF MAGNESIUM: CPT

## 2020-01-31 PROCEDURE — 85730 THROMBOPLASTIN TIME PARTIAL: CPT

## 2020-01-31 RX ADMIN — METOPROLOL SUCCINATE 100 MG: 50 TABLET, EXTENDED RELEASE ORAL at 10:01

## 2020-01-31 RX ADMIN — HYDROCODONE BITARTRATE AND ACETAMINOPHEN 1 TABLET: 10; 325 TABLET ORAL at 10:01

## 2020-01-31 RX ADMIN — CHLORHEXIDINE GLUCONATE 15 ML: 1.2 RINSE ORAL at 08:01

## 2020-01-31 RX ADMIN — HYDROCODONE BITARTRATE AND ACETAMINOPHEN 1 TABLET: 10; 325 TABLET ORAL at 04:01

## 2020-01-31 RX ADMIN — HEPARIN SODIUM 12.94 UNITS/KG/HR: 10000 INJECTION, SOLUTION INTRAVENOUS at 01:01

## 2020-01-31 RX ADMIN — POTASSIUM CHLORIDE 20 MEQ: 20 TABLET, EXTENDED RELEASE ORAL at 10:01

## 2020-01-31 RX ADMIN — PIPERACILLIN AND TAZOBACTAM 4.5 G: 4; .5 INJECTION, POWDER, LYOPHILIZED, FOR SOLUTION INTRAVENOUS; PARENTERAL at 10:01

## 2020-01-31 RX ADMIN — MAGNESIUM OXIDE 800 MG: 400 TABLET ORAL at 10:01

## 2020-01-31 RX ADMIN — FUROSEMIDE 20 MG: 20 INJECTION, SOLUTION INTRAMUSCULAR; INTRAVENOUS at 04:01

## 2020-01-31 RX ADMIN — EPLERENONE 25 MG: 25 TABLET, FILM COATED ORAL at 10:01

## 2020-01-31 RX ADMIN — PIPERACILLIN AND TAZOBACTAM 4.5 G: 4; .5 INJECTION, POWDER, LYOPHILIZED, FOR SOLUTION INTRAVENOUS; PARENTERAL at 02:01

## 2020-01-31 RX ADMIN — PIPERACILLIN AND TAZOBACTAM 4.5 G: 4; .5 INJECTION, POWDER, LYOPHILIZED, FOR SOLUTION INTRAVENOUS; PARENTERAL at 06:01

## 2020-01-31 RX ADMIN — CHLORHEXIDINE GLUCONATE 15 ML: 1.2 RINSE ORAL at 10:01

## 2020-01-31 RX ADMIN — GABAPENTIN 100 MG: 100 CAPSULE ORAL at 11:01

## 2020-01-31 RX ADMIN — ALBUMIN (HUMAN) 12.5 G: 25 SOLUTION INTRAVENOUS at 11:01

## 2020-01-31 RX ADMIN — MUPIROCIN: 20 OINTMENT TOPICAL at 10:01

## 2020-01-31 RX ADMIN — ENALAPRIL MALEATE 2.5 MG: 2.5 TABLET ORAL at 10:01

## 2020-01-31 RX ADMIN — HYDROXYZINE HYDROCHLORIDE 25 MG: 25 TABLET, FILM COATED ORAL at 10:01

## 2020-01-31 RX ADMIN — MUPIROCIN: 20 OINTMENT TOPICAL at 08:01

## 2020-01-31 RX ADMIN — SPIRONOLACTONE 25 MG: 25 TABLET ORAL at 10:01

## 2020-01-31 RX ADMIN — PANTOPRAZOLE SODIUM 40 MG: 40 TABLET, DELAYED RELEASE ORAL at 06:01

## 2020-01-31 NOTE — PLAN OF CARE
01/30/20 2000   Patient Assessment/Suction   Level of Consciousness (AVPU) alert   Respiratory Effort Normal;Unlabored   Expansion/Accessory Muscles/Retractions no use of accessory muscles   PRE-TX-O2   O2 Device (Oxygen Therapy) room air   SpO2 98 %   Pulse Oximetry Type Continuous   $ Pulse Oximetry - Multiple Charge Pulse Oximetry - Multiple   Respiratory Evaluation   $ Care Plan Tech Time 15 min   Evaluation For Re-Eval 3 day   Admitting Diagnosis Pneumonia,Sepsis   Cardiac Diagnosis CHF

## 2020-01-31 NOTE — ASSESSMENT & PLAN NOTE
· Difficult situation  · Has had CT with tPA without resolution  · May have to consider surgical intervention but he is not a great candidate for that

## 2020-01-31 NOTE — PROGRESS NOTES
LFTS improving  Mild ascites on follow up ultrasound  Doppler revealed    Patent portal vein, with bidirectional pulsatile spectral Doppler  waveform. Differential considerations include sequela of cirrhosis,  right heart failure, or tricuspid regurgitation. Pulsatile flow within  the hepatic veins suggests etiology is more likely cardiac in nature.      A/P  Anasarca with CHF and h/o hepatitis C  -recommend outpatient follow up with [facility]  -optimize cardiac status  -notify our service for rising bili/inr

## 2020-01-31 NOTE — NURSING
Pt arrived to rm 1215 from rm 3024. Report received from Brian REDDY. Pt in no distress, call light within reach, bed low, s/r up x 2. Pt denies any complaints or concerns. Pt request something to eat, provided pt with a snack. Heparin drip restarted per protocol, see MAR. Pt in no distress. Will continue to monitor.

## 2020-01-31 NOTE — CARE UPDATE
Readmission Prevention    DX: CHF, PNA-MADALYN dx.     PC reviewed pt's dx with articles and education on CHF and PNA. Pt understood that he has been dx w/CHF/PNA.  Pt encouraged to read, discuss and ask questions of his physicians while here in hospital and once discharged of his PCP ongoing. Pt is currently under medical management and the diagnostics continue.  Pt was reviewed his discharge recommendations, pt does not have a PCP. After discussion on whether to go to [place] [facility] or [place] ([place]ives in [place]), PC will [facility]; will track and follow until dcd.  Pt does not report any issues with obtaining medications at this time.  Pt received info on ACCESS/ HCR FQHCs should he change his mind and decide on [place] providers.  Pt received [place] CS&R list also.      Candy GARCIA, LPN    Transitions of Care Program   1/31/2020, 2:51 pm

## 2020-01-31 NOTE — SUBJECTIVE & OBJECTIVE
Interval History:     1/31/2020 - Stable overnight, no new issues reported.  Has some dyspnea.  Remains very edematous.    Review of Systems   Constitutional: Positive for malaise/fatigue. Negative for chills, diaphoresis, fever and weight loss.   HENT: Negative for congestion.    Eyes: Negative for pain.   Respiratory: Positive for cough and shortness of breath. Negative for hemoptysis, sputum production, wheezing and stridor.    Cardiovascular: Positive for leg swelling. Negative for chest pain, palpitations, orthopnea, claudication and PND.   Gastrointestinal: Negative for abdominal pain, constipation, diarrhea, heartburn, nausea and vomiting.   Genitourinary: Negative for dysuria, frequency and urgency.        Scrotal edema   Musculoskeletal: Negative for falls and myalgias.   Neurological: Positive for weakness. Negative for sensory change and focal weakness.   Psychiatric/Behavioral: Negative for depression, substance abuse and suicidal ideas. The patient is not nervous/anxious.          Objective:     Vital Signs (Most Recent):  Temp: 97.5 °F (36.4 °C) (01/31/20 1105)  Pulse: 104 (01/31/20 1105)  Resp: 17 (01/31/20 1105)  BP: 120/84 (01/31/20 1105)  SpO2: 96 % (01/31/20 1105) Vital Signs (24h Range):  Temp:  [97.5 °F (36.4 °C)-98.6 °F (37 °C)] 97.5 °F (36.4 °C)  Pulse:  [] 104  Resp:  [15-25] 17  SpO2:  [95 %-98 %] 96 %  BP: (100-130)/(55-93) 120/84     Weight: 78.5 kg (173 lb 1 oz)  Body mass index is 26.31 kg/m².      Intake/Output Summary (Last 24 hours) at 1/31/2020 1337  Last data filed at 1/31/2020 1200  Gross per 24 hour   Intake 1017.95 ml   Output 4725 ml   Net -3707.05 ml       Physical Exam   Constitutional: He is oriented to person, place, and time. He appears well-developed. No distress.   Chronically ill male, nad aao x 3   HENT:   Head: Normocephalic and atraumatic.   Nose: Nose normal.   Mouth/Throat: Oropharynx is clear and moist.   Eyes: Pupils are equal, round, and reactive to light.  EOM are normal.   Neck: Normal range of motion. Neck supple. No JVD present. No tracheal deviation present. No thyromegaly present.   Cardiovascular: Normal rate, regular rhythm and intact distal pulses. Exam reveals no gallop and no friction rub.   Murmur heard.  Pulmonary/Chest: Effort normal and breath sounds normal. No stridor. No respiratory distress. He has no wheezes. He has no rales. He exhibits no tenderness.   Decreased right base  No acc m use   Abdominal: Soft. Bowel sounds are normal. He exhibits no distension.   Musculoskeletal: Normal range of motion. He exhibits edema (3+). He exhibits no tenderness.   Lymphadenopathy:     He has no cervical adenopathy.   Neurological: He is alert and oriented to person, place, and time. He has normal reflexes. No cranial nerve deficit.   Skin: He is not diaphoretic.   Psychiatric: He has a normal mood and affect. His behavior is normal.   Nursing note and vitals reviewed.      Vents:       Lines/Drains/Airways     Peripheral Intravenous Line                 Midline Catheter Insertion/Assessment  - Single Lumen 01/28/20 1140 Left basilic vein (medial side of arm) 20g x 8cm 3 days         Peripheral IV - Single Lumen 01/28/20 0401 20 G Left;Posterior;Proximal Forearm 3 days                Significant Labs:    CBC/Anemia Profile:  Recent Labs   Lab 01/30/20  0336 01/31/20  0605   WBC 23.47* 21.52*   HGB 10.8* 11.1*   HCT 34.4* 34.9*    198   MCV 79* 79*   RDW 22.2* 22.3*        Chemistries:  Recent Labs   Lab 01/30/20  0336 01/31/20  0605   * 131*   K 3.3* 3.7   CL 94* 90*   CO2 30* 34*   BUN 16 14   CREATININE 0.8 0.9   CALCIUM 7.3* 7.8*   ALBUMIN 1.9* 2.1*   PROT 6.2 7.0   BILITOT 2.3* 2.8*   ALKPHOS 85 78   ALT 85* 76*   AST 97* 84*   MG 1.6 1.6   PHOS 2.0* 2.2*       Coagulation:   Recent Labs   Lab 01/31/20  0605   APTT 90.0*     Lactic Acid:   Recent Labs   Lab 01/30/20  0336 01/31/20  0605   LACTATE 1.5 1.5     Troponin: No results for input(s):  TROPONINI in the last 48 hours.  All pertinent labs within the past 24 hours have been reviewed.    Microbiology Results (last 7 days)     Procedure Component Value Units Date/Time    Blood Culture #2 **CANNOT BE ORDERED STAT** [[id number]] Collected:  01/27/20 1242    Order Status:  Completed Specimen:  Blood from Peripheral, Antecubital, Left Updated:  01/30/20 1432     Blood Culture, Routine No Growth to date      No Growth to date      No Growth to date      No Growth to date    Blood Culture #1 **CANNOT BE ORDERED STAT** [[id number]] Collected:  01/27/20 1235    Order Status:  Completed Specimen:  Blood from Peripheral, Antecubital, Right Updated:  01/30/20 1432     Blood Culture, Routine No Growth to date      No Growth to date      No Growth to date      No Growth to date    Culture, Respiratory with Gram Stain [[id number]]     Order Status:  No result Specimen:  Respiratory     Blood culture [[id number]]     Order Status:  Canceled Specimen:  Blood     Blood culture [[id number]]     Order Status:  Canceled Specimen:  Blood             Significant Imaging:  I have reviewed and interpreted all pertinent imaging results/findings within the past 24 hours.     ECHO  · Mild concentric left ventricular hypertrophy.  · Severely decreased left ventricular systolic function. The estimated ejection fraction is 30%.  · Grade II (moderate) left ventricular diastolic dysfunction consistent with pseudonormalization.  · Septal wall has abnormal motion. Diastolic flattening of the interventricular septum consistent with right ventricle volume overload.  · Mild right ventricular enlargement.  · Mildly reduced right ventricular systolic function.  · Mild left atrial enlargement.  · Mild right atrial enlargement.  · Mild-to-moderate mitral regurgitation.  · Moderate tricuspid regurgitation.  · Mild pulmonary hypertension present.  · Trivial circumferential pericardial effusion.

## 2020-01-31 NOTE — PLAN OF CARE
Problem: Wound  Goal: Optimal Wound Healing  Outcome: Ongoing, Progressing     Problem: Fall Injury Risk  Goal: Absence of Fall and Fall-Related Injury  Outcome: Ongoing, Progressing     Problem: Adult Inpatient Plan of Care  Goal: Plan of Care Review  Outcome: Ongoing, Progressing  Goal: Patient-Specific Goal (Individualization)  Outcome: Ongoing, Progressing  Goal: Absence of Hospital-Acquired Illness or Injury  Outcome: Ongoing, Progressing  Goal: Optimal Comfort and Wellbeing  Outcome: Ongoing, Progressing  Goal: Readiness for Transition of Care  Outcome: Ongoing, Progressing  Goal: Rounds/Family Conference  Outcome: Ongoing, Progressing     Problem: Skin Injury Risk Increased  Goal: Skin Health and Integrity  Outcome: Ongoing, Progressing     Problem: Infection  Goal: Infection Symptom Resolution  Outcome: Ongoing, Progressing     Problem: Pain Acute  Goal: Optimal Pain Control  Outcome: Ongoing, Progressing

## 2020-01-31 NOTE — PROGRESS NOTES
[facility]  Pulmonology  Progress Note    Patient Name: Francis Daigle  MRN: [id number]  Admission Date: 1/27/2020  Hospital Length of Stay: 4 days  Code Status: Full Code  Attending Provider: Lalo Hogan  Primary Care Provider: Primary Doctor No   Principal Problem: Severe sepsis    Subjective:     Interval History:     1/31/2020 - Stable overnight, no new issues reported.  Has some dyspnea.  Remains very edematous.    Review of Systems   Constitutional: Positive for malaise/fatigue. Negative for chills, diaphoresis, fever and weight loss.   HENT: Negative for congestion.    Eyes: Negative for pain.   Respiratory: Positive for cough and shortness of breath. Negative for hemoptysis, sputum production, wheezing and stridor.    Cardiovascular: Positive for leg swelling. Negative for chest pain, palpitations, orthopnea, claudication and PND.   Gastrointestinal: Negative for abdominal pain, constipation, diarrhea, heartburn, nausea and vomiting.   Genitourinary: Negative for dysuria, frequency and urgency.        Scrotal edema   Musculoskeletal: Negative for falls and myalgias.   Neurological: Positive for weakness. Negative for sensory change and focal weakness.   Psychiatric/Behavioral: Negative for depression, substance abuse and suicidal ideas. The patient is not nervous/anxious.          Objective:     Vital Signs (Most Recent):  Temp: 97.5 °F (36.4 °C) (01/31/20 1105)  Pulse: 104 (01/31/20 1105)  Resp: 17 (01/31/20 1105)  BP: 120/84 (01/31/20 1105)  SpO2: 96 % (01/31/20 1105) Vital Signs (24h Range):  Temp:  [97.5 °F (36.4 °C)-98.6 °F (37 °C)] 97.5 °F (36.4 °C)  Pulse:  [] 104  Resp:  [15-25] 17  SpO2:  [95 %-98 %] 96 %  BP: (100-130)/(55-93) 120/84     Weight: 78.5 kg (173 lb 1 oz)  Body mass index is 26.31 kg/m².      Intake/Output Summary (Last 24 hours) at 1/31/2020 1337  Last data filed at 1/31/2020 1200  Gross per 24 hour   Intake 1017.95 ml   Output 4725 ml   Net -3707.05  ml       Physical Exam   Constitutional: He is oriented to person, place, and time. He appears well-developed. No distress.   Chronically ill male, nad aao x 3   HENT:   Head: Normocephalic and atraumatic.   Nose: Nose normal.   Mouth/Throat: Oropharynx is clear and moist.   Eyes: Pupils are equal, round, and reactive to light. EOM are normal.   Neck: Normal range of motion. Neck supple. No JVD present. No tracheal deviation present. No thyromegaly present.   Cardiovascular: Normal rate, regular rhythm and intact distal pulses. Exam reveals no gallop and no friction rub.   Murmur heard.  Pulmonary/Chest: Effort normal and breath sounds normal. No stridor. No respiratory distress. He has no wheezes. He has no rales. He exhibits no tenderness.   Decreased right base  No acc m use   Abdominal: Soft. Bowel sounds are normal. He exhibits no distension.   Musculoskeletal: Normal range of motion. He exhibits edema (3+). He exhibits no tenderness.   Lymphadenopathy:     He has no cervical adenopathy.   Neurological: He is alert and oriented to person, place, and time. He has normal reflexes. No cranial nerve deficit.   Skin: He is not diaphoretic.   Psychiatric: He has a normal mood and affect. His behavior is normal.   Nursing note and vitals reviewed.      Vents:       Lines/Drains/Airways     Peripheral Intravenous Line                 Midline Catheter Insertion/Assessment  - Single Lumen 01/28/20 1140 Left basilic vein (medial side of arm) 20g x 8cm 3 days         Peripheral IV - Single Lumen 01/28/20 0401 20 G Left;Posterior;Proximal Forearm 3 days                Significant Labs:    CBC/Anemia Profile:  Recent Labs   Lab 01/30/20  0336 01/31/20  0605   WBC 23.47* 21.52*   HGB 10.8* 11.1*   HCT 34.4* 34.9*    198   MCV 79* 79*   RDW 22.2* 22.3*        Chemistries:  Recent Labs   Lab 01/30/20  0336 01/31/20  0605   * 131*   K 3.3* 3.7   CL 94* 90*   CO2 30* 34*   BUN 16 14   CREATININE 0.8 0.9   CALCIUM  7.3* 7.8*   ALBUMIN 1.9* 2.1*   PROT 6.2 7.0   BILITOT 2.3* 2.8*   ALKPHOS 85 78   ALT 85* 76*   AST 97* 84*   MG 1.6 1.6   PHOS 2.0* 2.2*       Coagulation:   Recent Labs   Lab 01/31/20  0605   APTT 90.0*     Lactic Acid:   Recent Labs   Lab 01/30/20  0336 01/31/20  0605   LACTATE 1.5 1.5     Troponin: No results for input(s): TROPONINI in the last 48 hours.  All pertinent labs within the past 24 hours have been reviewed.    Microbiology Results (last 7 days)     Procedure Component Value Units Date/Time    Blood Culture #2 **CANNOT BE ORDERED STAT** [[id number]] Collected:  01/27/20 1242    Order Status:  Completed Specimen:  Blood from Peripheral, Antecubital, Left Updated:  01/30/20 1432     Blood Culture, Routine No Growth to date      No Growth to date      No Growth to date      No Growth to date    Blood Culture #1 **CANNOT BE ORDERED STAT** [[id number]] Collected:  01/27/20 1235    Order Status:  Completed Specimen:  Blood from Peripheral, Antecubital, Right Updated:  01/30/20 1432     Blood Culture, Routine No Growth to date      No Growth to date      No Growth to date      No Growth to date    Culture, Respiratory with Gram Stain [[id number]]     Order Status:  No result Specimen:  Respiratory     Blood culture [[id number]]     Order Status:  Canceled Specimen:  Blood     Blood culture [[id number]]     Order Status:  Canceled Specimen:  Blood             Significant Imaging:  I have reviewed and interpreted all pertinent imaging results/findings within the past 24 hours.     ECHO  · Mild concentric left ventricular hypertrophy.  · Severely decreased left ventricular systolic function. The estimated ejection fraction is 30%.  · Grade II (moderate) left ventricular diastolic dysfunction consistent with pseudonormalization.  · Septal wall has abnormal motion. Diastolic flattening of the interventricular septum consistent with right ventricle volume overload.  · Mild right ventricular enlargement.  · Mildly  reduced right ventricular systolic function.  · Mild left atrial enlargement.  · Mild right atrial enlargement.  · Mild-to-moderate mitral regurgitation.  · Moderate tricuspid regurgitation.  · Mild pulmonary hypertension present.  · Trivial circumferential pericardial effusion.    Assessment/Plan:     * Severe sepsis  · Clinically better but overall condition is poor    Pneumonia  · Continue present treatment but not sure that this is an acute issue at this time    Hydropneumothorax  · Difficult situation  · Has had CT with tPA without resolution  · May have to consider surgical intervention but he is not a great candidate for that    Parapneumonic effusion  · As above    Acute massive pulmonary embolism  · Recent diagnosis  · On anticoagulation  · CTA noted  · Stable at this time    Anasarca  · Needs diuresis    Liver enzyme elevation  · Follow     Chronic hepatitis C without hepatic coma  · Aware     Essential hypertension  · Follow and treat    Acute on chronic combined systolic and diastolic congestive heart failure  · ECHO noted  · Decompensated     Pulmonary  · Continue antibiotics as ordered.  · Defer to the thoracic surgery regarding management of his pleural space infection.  · Not requiring supplemental oxygen.  · Aggressive diuresis.  · Stable to transfer out of the intensive care unit.    Difficult case, await CT surgery thoughts but will be high risk regardless of the decision.       Juan Oneill MD  Pulmonology  [facility]

## 2020-02-01 LAB
ALBUMIN SERPL BCP-MCNC: 2.3 G/DL (ref 3.5–5.2)
ALP SERPL-CCNC: 79 U/L (ref 55–135)
ALT SERPL W/O P-5'-P-CCNC: 72 U/L (ref 10–44)
ANION GAP SERPL CALC-SCNC: 9 MMOL/L (ref 8–16)
ANISOCYTOSIS BLD QL SMEAR: SLIGHT
APTT PPP: 150 SEC (ref 23.6–33.3)
APTT PPP: 158.4 SEC (ref 23.6–33.3)
AST SERPL-CCNC: 80 U/L (ref 10–40)
BACTERIA BLD CULT: NORMAL
BACTERIA BLD CULT: NORMAL
BASOPHILS # BLD AUTO: 0.03 K/UL (ref 0–0.2)
BASOPHILS NFR BLD: 0.2 % (ref 0–1.9)
BILIRUB SERPL-MCNC: 2.6 MG/DL (ref 0.1–1)
BNP SERPL-MCNC: 436 PG/ML (ref 0–99)
BUN SERPL-MCNC: 12 MG/DL (ref 6–20)
CALCIUM SERPL-MCNC: 8.5 MG/DL (ref 8.7–10.5)
CHLORIDE SERPL-SCNC: 89 MMOL/L (ref 95–110)
CO2 SERPL-SCNC: 34 MMOL/L (ref 23–29)
CREAT SERPL-MCNC: 0.8 MG/DL (ref 0.5–1.4)
DIFFERENTIAL METHOD: ABNORMAL
EOSINOPHIL # BLD AUTO: 0.3 K/UL (ref 0–0.5)
EOSINOPHIL NFR BLD: 1.7 % (ref 0–8)
ERYTHROCYTE [DISTWIDTH] IN BLOOD BY AUTOMATED COUNT: 25.7 % (ref 11.5–14.5)
EST. GFR  (AFRICAN AMERICAN): >60 ML/MIN/1.73 M^2
EST. GFR  (NON AFRICAN AMERICAN): >60 ML/MIN/1.73 M^2
GLUCOSE SERPL-MCNC: 102 MG/DL (ref 70–110)
HCT VFR BLD AUTO: 32.5 % (ref 40–54)
HGB BLD-MCNC: 10.4 G/DL (ref 14–18)
HYPOCHROMIA BLD QL SMEAR: ABNORMAL
IMM GRANULOCYTES # BLD AUTO: 0.1 K/UL (ref 0–0.04)
IMM GRANULOCYTES NFR BLD AUTO: 0.5 % (ref 0–0.5)
LACTATE SERPL-SCNC: 1.7 MMOL/L (ref 0.5–1.9)
LYMPHOCYTES # BLD AUTO: 1.6 K/UL (ref 1–4.8)
LYMPHOCYTES NFR BLD: 8.4 % (ref 18–48)
MAGNESIUM SERPL-MCNC: 1.7 MG/DL (ref 1.6–2.6)
MCH RBC QN AUTO: 25.1 PG (ref 27–31)
MCHC RBC AUTO-ENTMCNC: 32 G/DL (ref 32–36)
MCV RBC AUTO: 78 FL (ref 82–98)
MONOCYTES # BLD AUTO: 1.2 K/UL (ref 0.3–1)
MONOCYTES NFR BLD: 6 % (ref 4–15)
NEUTROPHILS # BLD AUTO: 16.1 K/UL (ref 1.8–7.7)
NEUTROPHILS NFR BLD: 83.2 % (ref 38–73)
NRBC BLD-RTO: 0 /100 WBC
PHOSPHATE SERPL-MCNC: 2.4 MG/DL (ref 2.7–4.5)
PLATELET # BLD AUTO: 458 K/UL (ref 150–350)
PLATELET BLD QL SMEAR: ABNORMAL
PMV BLD AUTO: ABNORMAL FL (ref 9.2–12.9)
POIKILOCYTOSIS BLD QL SMEAR: SLIGHT
POTASSIUM SERPL-SCNC: 5.2 MMOL/L (ref 3.5–5.1)
PROT SERPL-MCNC: 7.1 G/DL (ref 6–8.4)
RBC # BLD AUTO: 4.15 M/UL (ref 4.6–6.2)
SODIUM SERPL-SCNC: 132 MMOL/L (ref 136–145)
TARGETS BLD QL SMEAR: ABNORMAL
WBC # BLD AUTO: 19.29 K/UL (ref 3.9–12.7)

## 2020-02-01 PROCEDURE — 85730 THROMBOPLASTIN TIME PARTIAL: CPT

## 2020-02-01 PROCEDURE — P9047 ALBUMIN (HUMAN), 25%, 50ML: HCPCS | Mod: JG | Performed by: INTERNAL MEDICINE

## 2020-02-01 PROCEDURE — 25000003 PHARM REV CODE 250: Performed by: INTERNAL MEDICINE

## 2020-02-01 PROCEDURE — 63600175 PHARM REV CODE 636 W HCPCS: Performed by: INTERNAL MEDICINE

## 2020-02-01 PROCEDURE — 85025 COMPLETE CBC W/AUTO DIFF WBC: CPT

## 2020-02-01 PROCEDURE — 63600175 PHARM REV CODE 636 W HCPCS: Mod: JG | Performed by: INTERNAL MEDICINE

## 2020-02-01 PROCEDURE — 80053 COMPREHEN METABOLIC PANEL: CPT

## 2020-02-01 PROCEDURE — 83605 ASSAY OF LACTIC ACID: CPT

## 2020-02-01 PROCEDURE — 83880 ASSAY OF NATRIURETIC PEPTIDE: CPT

## 2020-02-01 PROCEDURE — 99232 PR SUBSEQUENT HOSPITAL CARE,LEVL II: ICD-10-PCS | Mod: ,,, | Performed by: INTERNAL MEDICINE

## 2020-02-01 PROCEDURE — 84100 ASSAY OF PHOSPHORUS: CPT

## 2020-02-01 PROCEDURE — 12000002 HC ACUTE/MED SURGE SEMI-PRIVATE ROOM

## 2020-02-01 PROCEDURE — 83735 ASSAY OF MAGNESIUM: CPT

## 2020-02-01 PROCEDURE — 93005 ELECTROCARDIOGRAM TRACING: CPT

## 2020-02-01 PROCEDURE — 99232 SBSQ HOSP IP/OBS MODERATE 35: CPT | Mod: ,,, | Performed by: INTERNAL MEDICINE

## 2020-02-01 RX ADMIN — FUROSEMIDE 20 MG: 20 INJECTION, SOLUTION INTRAMUSCULAR; INTRAVENOUS at 04:02

## 2020-02-01 RX ADMIN — METOPROLOL SUCCINATE 100 MG: 50 TABLET, EXTENDED RELEASE ORAL at 09:02

## 2020-02-01 RX ADMIN — MUPIROCIN: 20 OINTMENT TOPICAL at 09:02

## 2020-02-01 RX ADMIN — CHLORHEXIDINE GLUCONATE 15 ML: 1.2 RINSE ORAL at 09:02

## 2020-02-01 RX ADMIN — PIPERACILLIN AND TAZOBACTAM 4.5 G: 4; .5 INJECTION, POWDER, LYOPHILIZED, FOR SOLUTION INTRAVENOUS; PARENTERAL at 02:02

## 2020-02-01 RX ADMIN — ALBUMIN (HUMAN) 12.5 G: 25 SOLUTION INTRAVENOUS at 09:02

## 2020-02-01 RX ADMIN — HEPARIN SODIUM 3.9 UNITS/KG/HR: 10000 INJECTION, SOLUTION INTRAVENOUS at 09:02

## 2020-02-01 RX ADMIN — PIPERACILLIN AND TAZOBACTAM 4.5 G: 4; .5 INJECTION, POWDER, LYOPHILIZED, FOR SOLUTION INTRAVENOUS; PARENTERAL at 09:02

## 2020-02-01 RX ADMIN — HYDROXYZINE HYDROCHLORIDE 25 MG: 25 TABLET, FILM COATED ORAL at 09:02

## 2020-02-01 RX ADMIN — ENALAPRIL MALEATE 2.5 MG: 2.5 TABLET ORAL at 09:02

## 2020-02-01 RX ADMIN — PANTOPRAZOLE SODIUM 40 MG: 40 TABLET, DELAYED RELEASE ORAL at 04:02

## 2020-02-01 RX ADMIN — PIPERACILLIN AND TAZOBACTAM 4.5 G: 4; .5 INJECTION, POWDER, LYOPHILIZED, FOR SOLUTION INTRAVENOUS; PARENTERAL at 06:02

## 2020-02-01 RX ADMIN — SENNOSIDES AND DOCUSATE SODIUM 1 TABLET: 8.6; 5 TABLET ORAL at 09:02

## 2020-02-01 RX ADMIN — EPLERENONE 25 MG: 25 TABLET, FILM COATED ORAL at 09:02

## 2020-02-01 RX ADMIN — HEPARIN SODIUM 7.9 UNITS/KG/HR: 10000 INJECTION, SOLUTION INTRAVENOUS at 10:02

## 2020-02-01 RX ADMIN — FUROSEMIDE 20 MG: 20 INJECTION, SOLUTION INTRAMUSCULAR; INTRAVENOUS at 03:02

## 2020-02-01 NOTE — PROGRESS NOTES
[facility] Medicine  Progress Note    Patient Name: Francis Daigle  MRN: [id number]  Patient Class: IP- Inpatient   Admission Date: 1/27/2020  Length of Stay: 5 days  Attending Physician: Lalo Hogan  Primary Care Provider: Primary Doctor No        Subjective:     Principal Problem:Severe sepsis        HPI:  44-year-old male with past medical history of combined diastolic and systolic CHF (EF 15% as at 12/2019), HCV, bilateral LE DVTs and bilateral PE, prior IVDU, HTn who presented with groin swelling of 5 days duration.    Prior hospital records reviewed and it reveals patient was [place] [facility] [place] ago after a complicated hospital course for acute cholecystits complicated by b/l DVT, bilateral PE s/p tPA, PEA cardiac arrest. HE was in the hospital for 20 days and was discharged home. He has not had any follow up visits since then.    He reports increasing lower extremity swelling progressing to scrotal swelling for the past 5 days. He endorses compliance with Lasix 40mg daily and states that he has been taking an extra 20mg daily for the past 5 days. He also endorses a dry cough, congestion and cold intolerance. He denies fever, chills, chest pain.    In the ED he has received vancomycin, zosyn and levaquin, fluid bolus and 40mg IV lasix. Chest xray shows a right middle lobe pneumonia.     Overview/Hospital Course:  1/27: IVF administered per sepsis protocol, diuresis also initiated at 40 mg lasix IVP; placed on abx for sepsis secondary to pneumonia unkonwn organism    1/28: IVF discontinued, lasix ordered as prn weight based parameters; hemodynamically stable, not requiring central line/pressors; cxr in am. Continue antibiotics for pneumonia with sepsis    1/29: cxr shows small right hydronpneumothorax; pulmonology consulted and CT shows possible empyema with a collection; surgery consulted, eliquis held, patient to be placed on heparin drip; staff are concerned  about possible fall risk while on heparin so fall risk order placed,as well as nursing communication that patient should be up with assistance. Cardiology consulted for clearance: echo ordered, lasix changed to scheduled, a dose of albumin is also ordered, will repeat albumin as needed and monitor kidney function.     1/30: patient is down 5.7 L thus far; albumin x 2 more doses, lasix. Echo is reviewed: ef 30% with G2DD and mild pulm htn. Discussed with pulm and cardiology note is reviewed, will need several days of diuresis and too high risk for anesthesia- after a period of time will await surgery recommendations wrt empyema ; pulm signing off- can contact as needed. Continue heparin for now, if it is decided patient does not need surgical intervention will dc heparin ggt and resume OAC. Will reassess ascites )admission CT reviewed) and order abdominal US, off loading fluid from abdomen may improve pressure on venous return from the extremities/scrotum.  Because of spironolactone intolerance Cards has changed additional diuretic to eplerenone.    1/31: edema of the scrotum has significantly improved, leukocytosis from chronic empyema? Will follow cardiology plans for diuresis and revisit any need for surgery with CTS- if not needed then will discontinue heparin and restart home eliquis, with possible discharge home at the completion of diuresis. Nutrition: improve protein intake; diuresis+albumin; io: is net negative 9.8L today.   GI recommendations reviewed and appreciated.    2/1: patient states Dr. Light has discussed with him plans for Tuesday. Will follow up any pulm recommendations. For now - last dose of albumin and continued plans for diuresis per cardiology. Continuing heparin ggt for now.   Since admission has diuresed 21, in 10; net negative 11L    S: swelling continues to improve; left leg is more tender; no rash no blisters; has been wearing OLIVIA hose, has taken them down for a break    Vitals:     02/01/20 0710   BP: 112/76   Pulse: 94   Resp: 17   Temp: 97.6 °F (36.4 °C)       Gen: ao nad  HEENT: ncat  Lungs: diminished right, wheezing is improved no crackles  ABD: distended, non-tympanic?fluid wave  Ext: rom wnl  UG: scrotal edema continues to improve; significant reduction  Skin: pitting edema less tight on the right vs left  Neuro: cn 2-12 grossly intact  Dictation #1  MRN:[id number]  CSN:[id number]        Assessment/Plan:    * Severe sepsis: resolved  Collection + empyema: management decisions pending result of diuresis and overall clinical picture  As evidenced by tachycardia, lactic acidosis, liver injury. Pulmonary source most likely. Patient has a history of recent hospitalization with antibiotic use  - received fluids per sepsis protocol initially  IV fluids 30cc/kg, he already received broad spectrum antibiotics with vancomycin, zosyn and levaquin in the ED  -continue zosyn  -trend lactic acid levels  -blood cultures x 2: NGTD  - pulm + cardiothoracic surgery consutled; pulm will likely sign off per discussion with Dr. Steele, and as he stated in discussion given chronicity of collection/empyema without positive cultures and othrwise nontoxic appearance on exam- may defer need for intervention. Will await CTS formal consultation recommendations and in meantime diurese.  - eliquis changed to heparin for any possible procedure with intent to revert back to eliquis when appropriate  - cardiology consulted for clearance for any possible procedure: echo reviewed, lasix scheduled, spironolatone discontinued in favor of eplerenone and will monitor carefully in setting of sepsis with very low EF.        Liver enzyme elevation  Chronic hepatitis C without hepatic coma, without liver failure, with cirrhosis  ? Hepatic congestion vs early acute liver failure  Trend LFTS  Fulminant liver failure not suspected at this time  Poor liver synthetic function due to HCV.  GI has evaluated the case and recommend outpatient  follow up with [facility]  Liver doppler evaluated.   US abd evaluated no need for paracentesis at this time and will continue diuresis with aldactone+lasix     Anasarca  Albumin is low, nutritional consultation  Give albumin as needed while on diuresis which is now scheduled 20 mg IVP q 12hr and spironolactone daily     Pneumonia due to unknown organism  Pulm reomends continuing present abx for now  May consider changing zosyn to PO augmentin but in light of no decision being made about the empyema will hold off for now.     Essential hypertension  Resume home antihypertensives     Acute on chronic combined systolic and diastolic congestive heart failure  Patient has evidence of fluid overload with elevated BNP, 30% by echo on 1/29  IV lasix 20 mg BID, eplerenone  Strict Is & O's monitoring  Daily weights  Cardiology consulted    VTE Risk Mitigation (From admission, onward)         Ordered     heparin 25,000 units in dextrose 5% 250 mL (100 units/mL) infusion HIGH INTENSITY nomogram - [facility]  Continuous     Question:  Heparin Infusion Adjustment (DO NOT MODIFY ANSWER)  Answer:  \\[email or web address]\epic\Images\Pharmacy\HeparinInfusions\heparin HIGH INTENSITY nomogram for [facility] [id number].pdf    01/29/20 1126     heparin 25,000 units in dextrose 5% (100 units/ml) IV bolus from bag - ADDITIONAL PRN BOLUS - 60 units/kg  As needed (PRN)     Question:  Heparin Infusion Adjustment (DO NOT MODIFY ANSWER)  Answer:  \\[email or web address]\epic\Images\Pharmacy\HeparinInfusions\heparin HIGH INTENSITY nomogram for [facility] [id number].pdf    01/29/20 1126     heparin 25,000 units in dextrose 5% (100 units/ml) IV bolus from bag - ADDITIONAL PRN BOLUS - 30 units/kg  As needed (PRN)     Question:  Heparin Infusion Adjustment (DO NOT MODIFY ANSWER)  Answer:  [facility] [email or web address]\epic\Images\Pharmacy\HeparinInfusions\heparin HIGH INTENSITY nomogram for [facility] [id number].pdf    01/29/20 1126     IP VTE HIGH RISK PATIENT  Once      01/27/20 1516     Reason for No Pharmacological VTE  Prophylaxis  Once     Question:  Reasons:  Answer:  Already adequately anticoagulated on oral Anticoagulants    01/27/20 1516                      Shawn Ching MD  Department of Hospital Medicine   [facility]

## 2020-02-01 NOTE — PROGRESS NOTES
[facility] Medicine  Progress Note    Patient Name: Francis Daigle  MRN: [id number]  Patient Class: IP- Inpatient   Admission Date: 1/27/2020  Length of Stay: 4 days  Attending Physician: Lalo Hogan  Primary Care Provider: Primary Doctor No        Subjective:     Principal Problem:Severe sepsis        HPI:  44-year-old male with past medical history of combined diastolic and systolic CHF (EF 15% as at 12/2019), HCV, bilateral LE DVTs and bilateral PE, prior IVDU, HTn who presented with groin swelling of 5 days duration.    Prior hospital records reviewed and it reveals [place] [facility] [place] a complicated hospital course for acute cholecystits complicated by b/l DVT, bilateral PE s/p tPA, PEA cardiac arrest. HE was in the hospital for 20 days and was discharged home. He has not had any follow up visits since then.    He reports increasing lower extremity swelling progressing to scrotal swelling for the past 5 days. He endorses compliance with Lasix 40mg daily and states that he has been taking an extra 20mg daily for the past 5 days. He also endorses a dry cough, congestion and cold intolerance. He denies fever, chills, chest pain.    In the ED he has received vancomycin, zosyn and levaquin, fluid bolus and 40mg IV lasix. Chest xray shows a right middle lobe pneumonia.     Overview/Hospital Course:  1/27: IVF administered per sepsis protocol, diuresis also initiated at 40 mg lasix IVP; placed on abx for sepsis secondary to pneumonia unkonwn organism    1/28: IVF discontinued, lasix ordered as prn weight based parameters; hemodynamically stable, not requiring central line/pressors; cxr in am. Continue antibiotics for pneumonia with sepsis    1/29: cxr shows small right hydronpneumothorax; pulmonology consulted and CT shows possible empyema with a collection; surgery consulted, eliquis held, patient to be placed on heparin drip; staff are concerned  about possible fall risk while on heparin so fall risk order placed,as well as nursing communication that patient should be up with assistance. Cardiology consulted for clearance: echo ordered, lasix changed to scheduled, a dose of albumin is also ordered, will repeat albumin as needed and monitor kidney function.     1/30: patient is down 5.7 L thus far; albumin x 2 more doses, lasix. Echo is reviewed: ef 30% with G2DD and mild pulm htn. Discussed with pulm and cardiology note is reviewed, will need several days of diuresis and too high risk for anesthesia- after a period of time will await surgery recommendations wrt empyema ; pulm signing off- can contact as needed. Continue heparin for now, if it is decided patient does not need surgical intervention will dc heparin ggt and resume OAC. Will reassess ascites )admission CT reviewed) and order abdominal US, off loading fluid from abdomen may improve pressure on venous return from the extremities/scrotum.  Because of spironolactone intolerance Cards has changed additional diuretic to eplerenone.    1/31: edema of the scrotum has significantly improved, leukocytosis from chronic empyema? Will follow cardiology plans for diuresis and revisit any need for surgery with CTS- if not needed then will discontinue heparin and restart home eliquis, with possible discharge home at the completion of diuresis. Nutrition: improve protein intake; diuresis+albumin; io: is net negative 9.8L today.   GI recommendations reviewed and appreciated.    S: feels that swelling has gotten much better, no fever or chills    Vitals:    01/31/20 1624   BP: (!) 93/55   Pulse: 92   Resp: 16   Temp: 97.6 °F (36.4 °C)       Gen: ao nad  HEENT: ncat  Lungs: diminished right, wheezing is improved no crackles  ABD: distended, non-tympanic?fluid wave  Ext: rom wnl  UG: scrotal edema looks improved on interval assessment  Skin: pitting edema less tight  Neuro: cn 2-12 grossly intact    No new subjective  & objective note has been filed under this hospital service since the last note was generated.      Assessment/Plan:      * Severe sepsis: resolved  Collection + empyema: management decisions pending result of diuresis and overall clinical picture  As evidenced by tachycardia, lactic acidosis, liver injury. Pulmonary source most likely. Patient has a history of recent hospitalization with antibiotic use  - received fluids per sepsis protocol initially  IV fluids 30cc/kg, he already received broad spectrum antibiotics with vancomycin, zosyn and levaquin in the ED  -continue vancomycin and zosyn  -trend lactic acid levels  -blood cultures x 2: NGTD  - pulm + cardiothoracic surgery consutled; pulm will likely sign off, and given chronicity of collection/empyema without positive cultures and otehrwise nontoxic appearance on exam- may defer need for intervention. Will await CTS formal consultation recommendations and in meantime diurese.  - eliquis changed to heparin for any possible procedure with intent to revert back to eliquis when appropriate  - cardiology consulted for clearance for any possible procedure: echo reviewed, lasix scheduled, spironolatone discontinued in favor of eplerenone and will monitor carefully in setting of sepsis with very low EF.        Liver enzyme elevation  Chronic hepatitis C without hepatic coma, without liver failure, with cirrhosis  ? Hepatic congestion vs early acute liver failure  Trend LFTS  Fulminant liver failure not suspected at this time  Poor liver synthetic function due to HCV.  GI has evaluated the case and recommend [facility]  Liver doppler evaluated.   US abd evaluated no need for paracentesis at this time and will continue diuresis with aldactone+lasix     Anasarca  Albumin is low, nutritional consultation  Give albumin as needed while on diuresis which is now scheduled 20 mg IVP q 12hr and spironolactone daily     Pneumonia due to unknown  organism  Pulm reomends continuing present abx for now  May consider changing zosyn to PO augmentin but in light of no decision being made about the empyema will hold off for now.     Essential hypertension  Resume home antihypertensives     Acute on chronic combined systolic and diastolic congestive heart failure  Patient has evidence of fluid overload with elevated BNP, 30% by echo on 1/29  IV lasix 20 mg BID, eplerenone  Strict Is & O's monitoring  Daily weights  Cardiology consulted    VTE Risk Mitigation (From admission, onward)         Ordered     heparin 25,000 units in dextrose 5% 250 mL (100 units/mL) infusion HIGH INTENSITY nomogram - [facility]  Continuous     Question:  Heparin Infusion Adjustment (DO NOT MODIFY ANSWER)  Answer:  \\[email or web address]\epic\Images\Pharmacy\HeparinInfusions\heparin HIGH INTENSITY nomogram for [facility] [id number].pdf    01/29/20 1126     heparin 25,000 units in dextrose 5% (100 units/ml) IV bolus from bag - ADDITIONAL PRN BOLUS - 60 units/kg  As needed (PRN)     Question:  Heparin Infusion Adjustment (DO NOT MODIFY ANSWER)  Answer:  \\[email or web address]\epic\Images\Pharmacy\HeparinInfusions\heparin HIGH INTENSITY nomogram for [facility] [id number].pdf    01/29/20 1126     heparin 25,000 units in dextrose 5% (100 units/ml) IV bolus from bag - ADDITIONAL PRN BOLUS - 30 units/kg  As needed (PRN)     Question:  Heparin Infusion Adjustment (DO NOT MODIFY ANSWER)  Answer:  \\[email or web address]\epic\Images\Pharmacy\HeparinInfusions\heparin HIGH INTENSITY nomogram for [facility] [id number].pdf    01/29/20 1126     IP VTE HIGH RISK PATIENT  Once      01/27/20 1516     Reason for No Pharmacological VTE Prophylaxis  Once     Question:  Reasons:  Answer:  Already adequately anticoagulated on oral Anticoagulants    01/27/20 1516                      Shawn Ching MD  Department of Hospital Medicine   [facility]

## 2020-02-01 NOTE — ASSESSMENT & PLAN NOTE
· Difficult situation  · Has had CT with tPA without resolution  · For surgery next week  · Note platelets have increased and need to watched

## 2020-02-01 NOTE — PROGRESS NOTES
[facility]  Pulmonology  Progress Note    Patient Name: Francis Daigle  MRN: [id number]  Admission Date: 1/27/2020  Hospital Length of Stay: 5 days  Code Status: Full Code  Attending Provider: Lalo Hogan  Primary Care Provider: Primary Doctor No   Principal Problem: Severe sepsis    Subjective:     Interval History:     1/31/2020 - Stable overnight, no new issues reported.  Has some dyspnea.  Remains very edematous.    2/1/2020 - Stable overnight and he tell sme that there is a plan for surgery for next week.  No new complaints reported.    Review of Systems   Constitutional: Positive for malaise/fatigue. Negative for chills, diaphoresis, fever and weight loss.   HENT: Negative for congestion.    Eyes: Negative for pain.   Respiratory: Positive for cough and shortness of breath. Negative for hemoptysis, sputum production, wheezing and stridor.    Cardiovascular: Positive for leg swelling. Negative for chest pain, palpitations, orthopnea, claudication and PND.   Gastrointestinal: Negative for abdominal pain, constipation, diarrhea, heartburn, nausea and vomiting.   Genitourinary: Negative for dysuria, frequency and urgency.        Scrotal edema   Musculoskeletal: Negative for falls and myalgias.   Neurological: Positive for weakness. Negative for sensory change and focal weakness.   Psychiatric/Behavioral: Negative for depression, substance abuse and suicidal ideas. The patient is not nervous/anxious.          Objective:     Vital Signs (Most Recent):  Temp: 97.6 °F (36.4 °C) (02/01/20 1115)  Pulse: 97 (02/01/20 1115)  Resp: 17 (02/01/20 1115)  BP: 116/79 (02/01/20 1115)  SpO2: 98 % (02/01/20 1115) Vital Signs (24h Range):  Temp:  [97.5 °F (36.4 °C)-98.3 °F (36.8 °C)] 97.6 °F (36.4 °C)  Pulse:  [90-97] 97  Resp:  [16-18] 17  SpO2:  [96 %-98 %] 98 %  BP: ()/(55-81) 116/79     Weight: 76.5 kg (168 lb 10.4 oz)  Body mass index is 25.64 kg/m².      Intake/Output Summary (Last 24 hours)  at 2/1/2020 1503  Last data filed at 2/1/2020 1440  Gross per 24 hour   Intake 2064.52 ml   Output 6250 ml   Net -4185.48 ml       Physical Exam   Constitutional: He is oriented to person, place, and time. He appears well-developed. No distress.   Chronically ill male, nad aao x 3   HENT:   Head: Normocephalic and atraumatic.   Nose: Nose normal.   Mouth/Throat: Oropharynx is clear and moist.   Eyes: Pupils are equal, round, and reactive to light. EOM are normal.   Neck: Normal range of motion. Neck supple. No JVD present. No tracheal deviation present. No thyromegaly present.   Cardiovascular: Normal rate, regular rhythm and intact distal pulses. Exam reveals no gallop and no friction rub.   Murmur heard.  Pulmonary/Chest: Effort normal and breath sounds normal. No stridor. No respiratory distress. He has no wheezes. He has no rales. He exhibits no tenderness.   Decreased right base  No acc m use   Abdominal: Soft. Bowel sounds are normal. He exhibits no distension.   Musculoskeletal: Normal range of motion. He exhibits edema (3+). He exhibits no tenderness.   Lymphadenopathy:     He has no cervical adenopathy.   Neurological: He is alert and oriented to person, place, and time. He has normal reflexes. No cranial nerve deficit.   Skin: He is not diaphoretic.   Psychiatric: He has a normal mood and affect. His behavior is normal.   Nursing note and vitals reviewed.      Vents:       Lines/Drains/Airways     Peripheral Intravenous Line                 Midline Catheter Insertion/Assessment  - Single Lumen 01/28/20 1140 Left basilic vein (medial side of arm) 20g x 8cm 4 days         Peripheral IV - Single Lumen 01/28/20 0401 20 G Left;Posterior;Proximal Forearm 4 days                Significant Labs:    CBC/Anemia Profile:  Recent Labs   Lab 01/31/20  0605 02/01/20  0649   WBC 21.52* 19.29*   HGB 11.1* 10.4*   HCT 34.9* 32.5*    458*   MCV 79* 78*   RDW 22.3* 25.7*        Chemistries:  Recent Labs   Lab  01/31/20  0605 02/01/20  0649   * 132*   K 3.7 5.2*   CL 90* 89*   CO2 34* 34*   BUN 14 12   CREATININE 0.9 0.8   CALCIUM 7.8* 8.5*   ALBUMIN 2.1* 2.3*   PROT 7.0 7.1   BILITOT 2.8* 2.6*   ALKPHOS 78 79   ALT 76* 72*   AST 84* 80*   MG 1.6 1.7   PHOS 2.2* 2.4*       Coagulation:   Recent Labs   Lab 02/01/20  0649   APTT 158.4*     Lactic Acid:   Recent Labs   Lab 01/31/20  0605 02/01/20  0649   LACTATE 1.5 1.7     Troponin: No results for input(s): TROPONINI in the last 48 hours.  All pertinent labs within the past 24 hours have been reviewed.    Microbiology Results (last 7 days)     Procedure Component Value Units Date/Time    Blood Culture #1 **CANNOT BE ORDERED STAT** [[id number]] Collected:  01/27/20 1235    Order Status:  Completed Specimen:  Blood from Peripheral, Antecubital, Right Updated:  02/01/20 1432     Blood Culture, Routine No growth after 5 days.    Blood Culture #2 **CANNOT BE ORDERED STAT** [[id number]] Collected:  01/27/20 1242    Order Status:  Completed Specimen:  Blood from Peripheral, Antecubital, Left Updated:  02/01/20 1432     Blood Culture, Routine No growth after 5 days.    Culture, Respiratory with Gram Stain [[id number]]     Order Status:  No result Specimen:  Respiratory     Blood culture [[id number]]     Order Status:  Canceled Specimen:  Blood     Blood culture [[id number]]     Order Status:  Canceled Specimen:  Blood             Significant Imaging:  I have reviewed and interpreted all pertinent imaging results/findings within the past 24 hours.     ECHO  · Mild concentric left ventricular hypertrophy.  · Severely decreased left ventricular systolic function. The estimated ejection fraction is 30%.  · Grade II (moderate) left ventricular diastolic dysfunction consistent with pseudonormalization.  · Septal wall has abnormal motion. Diastolic flattening of the interventricular septum consistent with right ventricle volume overload.  · Mild right ventricular enlargement.  · Mildly  reduced right ventricular systolic function.  · Mild left atrial enlargement.  · Mild right atrial enlargement.  · Mild-to-moderate mitral regurgitation.  · Moderate tricuspid regurgitation.  · Mild pulmonary hypertension present.  · Trivial circumferential pericardial effusion.    Assessment/Plan:     * Severe sepsis  · Clinically better but overall condition is poor    Pneumonia  · Continue present treatment but not sure that this is an acute issue at this time    Hydropneumothorax  · Difficult situation  · Has had CT with tPA without resolution  · For surgery next week  · Note platelets have increased and need to watched    Parapneumonic effusion  · As above    Acute massive pulmonary embolism  · Recent diagnosis  · On anticoagulation  · CTA noted  · Stable at this time    Anasarca  · Needs diuresis  · Weight decreased 1.8 kg, follow    Liver enzyme elevation  · Follow     Chronic hepatitis C without hepatic coma  · Aware     Essential hypertension  · Follow and treat    Acute on chronic combined systolic and diastolic congestive heart failure  · ECHO noted  · Decompensated            Juan Oneill MD  Pulmonology  [facility]

## 2020-02-01 NOTE — SUBJECTIVE & OBJECTIVE
Interval History:     1/31/2020 - Stable overnight, no new issues reported.  Has some dyspnea.  Remains very edematous.    2/1/2020 - Stable overnight and he tell sme that there is a plan for surgery for next week.  No new complaints reported.    Review of Systems   Constitutional: Positive for malaise/fatigue. Negative for chills, diaphoresis, fever and weight loss.   HENT: Negative for congestion.    Eyes: Negative for pain.   Respiratory: Positive for cough and shortness of breath. Negative for hemoptysis, sputum production, wheezing and stridor.    Cardiovascular: Positive for leg swelling. Negative for chest pain, palpitations, orthopnea, claudication and PND.   Gastrointestinal: Negative for abdominal pain, constipation, diarrhea, heartburn, nausea and vomiting.   Genitourinary: Negative for dysuria, frequency and urgency.        Scrotal edema   Musculoskeletal: Negative for falls and myalgias.   Neurological: Positive for weakness. Negative for sensory change and focal weakness.   Psychiatric/Behavioral: Negative for depression, substance abuse and suicidal ideas. The patient is not nervous/anxious.          Objective:     Vital Signs (Most Recent):  Temp: 97.6 °F (36.4 °C) (02/01/20 1115)  Pulse: 97 (02/01/20 1115)  Resp: 17 (02/01/20 1115)  BP: 116/79 (02/01/20 1115)  SpO2: 98 % (02/01/20 1115) Vital Signs (24h Range):  Temp:  [97.5 °F (36.4 °C)-98.3 °F (36.8 °C)] 97.6 °F (36.4 °C)  Pulse:  [90-97] 97  Resp:  [16-18] 17  SpO2:  [96 %-98 %] 98 %  BP: ()/(55-81) 116/79     Weight: 76.5 kg (168 lb 10.4 oz)  Body mass index is 25.64 kg/m².      Intake/Output Summary (Last 24 hours) at 2/1/2020 1503  Last data filed at 2/1/2020 1440  Gross per 24 hour   Intake 2064.52 ml   Output 6250 ml   Net -4185.48 ml       Physical Exam   Constitutional: He is oriented to person, place, and time. He appears well-developed. No distress.   Chronically ill male, nad aao x 3   HENT:   Head: Normocephalic and atraumatic.    Nose: Nose normal.   Mouth/Throat: Oropharynx is clear and moist.   Eyes: Pupils are equal, round, and reactive to light. EOM are normal.   Neck: Normal range of motion. Neck supple. No JVD present. No tracheal deviation present. No thyromegaly present.   Cardiovascular: Normal rate, regular rhythm and intact distal pulses. Exam reveals no gallop and no friction rub.   Murmur heard.  Pulmonary/Chest: Effort normal and breath sounds normal. No stridor. No respiratory distress. He has no wheezes. He has no rales. He exhibits no tenderness.   Decreased right base  No acc m use   Abdominal: Soft. Bowel sounds are normal. He exhibits no distension.   Musculoskeletal: Normal range of motion. He exhibits edema (3+). He exhibits no tenderness.   Lymphadenopathy:     He has no cervical adenopathy.   Neurological: He is alert and oriented to person, place, and time. He has normal reflexes. No cranial nerve deficit.   Skin: He is not diaphoretic.   Psychiatric: He has a normal mood and affect. His behavior is normal.   Nursing note and vitals reviewed.      Vents:       Lines/Drains/Airways     Peripheral Intravenous Line                 Midline Catheter Insertion/Assessment  - Single Lumen 01/28/20 1140 Left basilic vein (medial side of arm) 20g x 8cm 4 days         Peripheral IV - Single Lumen 01/28/20 0401 20 G Left;Posterior;Proximal Forearm 4 days                Significant Labs:    CBC/Anemia Profile:  Recent Labs   Lab 01/31/20  0605 02/01/20  0649   WBC 21.52* 19.29*   HGB 11.1* 10.4*   HCT 34.9* 32.5*    458*   MCV 79* 78*   RDW 22.3* 25.7*        Chemistries:  Recent Labs   Lab 01/31/20  0605 02/01/20  0649   * 132*   K 3.7 5.2*   CL 90* 89*   CO2 34* 34*   BUN 14 12   CREATININE 0.9 0.8   CALCIUM 7.8* 8.5*   ALBUMIN 2.1* 2.3*   PROT 7.0 7.1   BILITOT 2.8* 2.6*   ALKPHOS 78 79   ALT 76* 72*   AST 84* 80*   MG 1.6 1.7   PHOS 2.2* 2.4*       Coagulation:   Recent Labs   Lab 02/01/20  0649   APTT 158.4*      Lactic Acid:   Recent Labs   Lab 01/31/20  0605 02/01/20  0649   LACTATE 1.5 1.7     Troponin: No results for input(s): TROPONINI in the last 48 hours.  All pertinent labs within the past 24 hours have been reviewed.    Microbiology Results (last 7 days)     Procedure Component Value Units Date/Time    Blood Culture #1 **CANNOT BE ORDERED STAT** [[id number]] Collected:  01/27/20 1235    Order Status:  Completed Specimen:  Blood from Peripheral, Antecubital, Right Updated:  02/01/20 1432     Blood Culture, Routine No growth after 5 days.    Blood Culture #2 **CANNOT BE ORDERED STAT** [[id number]] Collected:  01/27/20 1242    Order Status:  Completed Specimen:  Blood from Peripheral, Antecubital, Left Updated:  02/01/20 1432     Blood Culture, Routine No growth after 5 days.    Culture, Respiratory with Gram Stain [[id number]]     Order Status:  No result Specimen:  Respiratory     Blood culture [[id number]]     Order Status:  Canceled Specimen:  Blood     Blood culture [[id number]]     Order Status:  Canceled Specimen:  Blood             Significant Imaging:  I have reviewed and interpreted all pertinent imaging results/findings within the past 24 hours.     ECHO  · Mild concentric left ventricular hypertrophy.  · Severely decreased left ventricular systolic function. The estimated ejection fraction is 30%.  · Grade II (moderate) left ventricular diastolic dysfunction consistent with pseudonormalization.  · Septal wall has abnormal motion. Diastolic flattening of the interventricular septum consistent with right ventricle volume overload.  · Mild right ventricular enlargement.  · Mildly reduced right ventricular systolic function.  · Mild left atrial enlargement.  · Mild right atrial enlargement.  · Mild-to-moderate mitral regurgitation.  · Moderate tricuspid regurgitation.  · Mild pulmonary hypertension present.  · Trivial circumferential pericardial effusion.

## 2020-02-01 NOTE — PLAN OF CARE
Problem: Wound  Goal: Optimal Wound Healing  2/1/2020 1511 by Essence Mcginnis RN  Outcome: Ongoing, Progressing  2/1/2020 1511 by Essence Mcginnis RN  Outcome: Ongoing, Progressing     Problem: Fall Injury Risk  Goal: Absence of Fall and Fall-Related Injury  2/1/2020 1511 by Essence Mcginnis RN  Outcome: Ongoing, Progressing  2/1/2020 1511 by Essence Mcginnis RN  Outcome: Ongoing, Progressing     Problem: Adult Inpatient Plan of Care  Goal: Plan of Care Review  2/1/2020 1511 by Essence Mcginnis RN  Outcome: Ongoing, Progressing  2/1/2020 1511 by Essence Mcginnis RN  Outcome: Ongoing, Progressing  Goal: Patient-Specific Goal (Individualization)  2/1/2020 1511 by Essence Mcginnis RN  Outcome: Ongoing, Progressing  2/1/2020 1511 by Essence Mcginnis RN  Outcome: Ongoing, Progressing  Goal: Absence of Hospital-Acquired Illness or Injury  2/1/2020 1511 by Essence Mcginnis RN  Outcome: Ongoing, Progressing  2/1/2020 1511 by Essence Mcginnis RN  Outcome: Ongoing, Progressing  Goal: Optimal Comfort and Wellbeing  2/1/2020 1511 by Essence Mcginnis RN  Outcome: Ongoing, Progressing  2/1/2020 1511 by Essence Mcginnis RN  Outcome: Ongoing, Progressing  Goal: Readiness for Transition of Care  2/1/2020 1511 by Essence Mcginnis RN  Outcome: Ongoing, Progressing  2/1/2020 1511 by Essence Mcginnis RN  Outcome: Ongoing, Progressing  Goal: Rounds/Family Conference  2/1/2020 1511 by Essence Mcginnis RN  Outcome: Ongoing, Progressing  2/1/2020 1511 by Essence Mcginnis RN  Outcome: Ongoing, Progressing     Problem: Skin Injury Risk Increased  Goal: Skin Health and Integrity  2/1/2020 1511 by Essence Mcginnis RN  Outcome: Ongoing, Progressing  2/1/2020 1511 by Essence Mcginnis RN  Outcome: Ongoing, Progressing     Problem: Infection  Goal: Infection Symptom Resolution  2/1/2020 1511 by Essence Mcginnis RN  Outcome: Ongoing, Progressing  2/1/2020 1511 by Essence Mcginnis RN  Outcome: Ongoing, Progressing     Problem: Pain Acute  Goal: Optimal Pain  Control  2/1/2020 1511 by Essence Mcginnis, RN  Outcome: Ongoing, Progressing  2/1/2020 1511 by Essence Mcginnis, RN  Outcome: Ongoing, Progressing

## 2020-02-01 NOTE — PROGRESS NOTES
[facility]  Cardiology  Progress Note    Patient Name: Francis Daigle  MRN: [id number]  Admission Date: 1/27/2020  Hospital Length of Stay: 4 days  Code Status: Full Code   Attending Physician: Lalo Hogan   Primary Care Physician: Primary Doctor No  Expected Discharge Date:   Principal Problem:Severe sepsis    Subjective:     Hospital Course:  Interval History:  He feels much better, breathing easy, ambulated some, scrotal swelling has gone down  ROS  Objective:     Vital Signs (Most Recent):  Temp: 97.6 °F (36.4 °C) (01/31/20 1624)  Pulse: 92 (01/31/20 1624)  Resp: 16 (01/31/20 1624)  BP: (!) 93/55 (01/31/20 1624)  SpO2: 97 % (01/31/20 1624) Vital Signs (24h Range):  Temp:  [97.5 °F (36.4 °C)-98.6 °F (37 °C)] 97.6 °F (36.4 °C)  Pulse:  [] 92  Resp:  [15-22] 16  SpO2:  [95 %-98 %] 97 %  BP: ()/(55-84) 93/55     Weight: 78.5 kg (173 lb 1 oz)  Body mass index is 26.31 kg/m².    SpO2: 97 %  O2 Device (Oxygen Therapy): room air      Intake/Output Summary (Last 24 hours) at 1/31/2020 1844  Last data filed at 1/31/2020 1700  Gross per 24 hour   Intake 399.7 ml   Output 4525 ml   Net -4125.3 ml       Lines/Drains/Airways     Peripheral Intravenous Line                 Midline Catheter Insertion/Assessment  - Single Lumen 01/28/20 1140 Left basilic vein (medial side of arm) 20g x 8cm 3 days         Peripheral IV - Single Lumen 01/28/20 0401 20 G Left;Posterior;Proximal Forearm 3 days                Physical Exam vital signs are fairly stable  Abdominal girth has decreased  Scrotal swelling is diminished still has significant swelling of the penis.  He is alert and oriented    Significant Labs:   BMP:   Recent Labs   Lab 01/30/20  0336 01/31/20  0605   GLU 93 90   * 131*   K 3.3* 3.7   CL 94* 90*   CO2 30* 34*   BUN 16 14   CREATININE 0.8 0.9   CALCIUM 7.3* 7.8*   MG 1.6 1.6    and CBC   Recent Labs   Lab 01/30/20  0336 01/31/20  0605   WBC 23.47* 21.52*   HGB 10.8* 11.1*   HCT  34.4* 34.9*    198       Significant Imaging:   Assessment and Plan:  Acute on chronic systolic CHF [place] heart Association class 3  Bilateral pneumonia with possible empyema.  Pulmonary embolism.  Plan  He is diuresing well.  He is more comfortable.  Continue present medical regimen.  Have discussed with Dr. Rousseau plan to do thoracotomy on Tuesday 4th of February.     Brief HPI:     Active Diagnoses:    Diagnosis Date Noted POA    PRINCIPAL PROBLEM:  Severe sepsis [A41.9, R65.20] 12/10/2019 Yes    Parapneumonic effusion [J18.9, J91.8] 01/29/2020 Yes    Anasarca [R60.1] 01/27/2020 Yes    Liver enzyme elevation [R74.8] 01/27/2020 Yes    Hydropneumothorax [J94.8] 12/21/2019 Yes    Acute massive pulmonary embolism [I26.99] 12/11/2019 Yes    Chronic hepatitis C without hepatic coma [B18.2] 12/10/2019 Yes    Pneumonia [J18.9] 12/10/2019 Yes    Essential hypertension [I10] 08/23/2019 Yes    Acute on chronic combined systolic and diastolic congestive heart failure [I50.43] 07/03/2019 Yes      Problems Resolved During this Admission:       VTE Risk Mitigation (From admission, onward)         Ordered     heparin 25,000 units in dextrose 5% 250 mL (100 units/mL) infusion HIGH INTENSITY nomogram - [facility]  Continuous     Question:  Heparin Infusion Adjustment (DO NOT MODIFY ANSWER)  Answer:  \\[email or web address]\epic\Images\Pharmacy\HeparinInfusions\heparin HIGH INTENSITY nomogram for [facility] [id number].pdf    01/29/20 1126     heparin 25,000 units in dextrose 5% (100 units/ml) IV bolus from bag - ADDITIONAL PRN BOLUS - 60 units/kg  As needed (PRN)     Question:  Heparin Infusion Adjustment (DO NOT MODIFY ANSWER)  Answer:  \\[email or web address]\epic\Images\Pharmacy\HeparinInfusions\heparin HIGH INTENSITY nomogram for [facility] [id number].pdf    01/29/20 1126     heparin 25,000 units in dextrose 5% (100 units/ml) IV bolus from bag - ADDITIONAL PRN BOLUS - 30 units/kg  As needed (PRN)     Question:  Heparin Infusion Adjustment (DO NOT MODIFY  ANSWER)  Answer:  \\[email or web address]\epic\Images\Pharmacy\HeparinInfusions\heparin HIGH INTENSITY nomogram for [facility] [id number].pdf    01/29/20 1126     IP VTE HIGH RISK PATIENT  Once      01/27/20 1516     Reason for No Pharmacological VTE Prophylaxis  Once     Question:  Reasons:  Answer:  Already adequately anticoagulated on oral Anticoagulants    01/27/20 1516                Jose Altman MD  [facility]

## 2020-02-02 LAB
ALBUMIN SERPL BCP-MCNC: 2.3 G/DL (ref 3.5–5.2)
ALP SERPL-CCNC: 83 U/L (ref 55–135)
ALT SERPL W/O P-5'-P-CCNC: 65 U/L (ref 10–44)
ANION GAP SERPL CALC-SCNC: 8 MMOL/L (ref 8–16)
APTT PPP: 42 SEC (ref 23.6–33.3)
APTT PPP: 48.7 SEC (ref 23.6–33.3)
APTT PPP: 52.1 SEC (ref 23.6–33.3)
AST SERPL-CCNC: 68 U/L (ref 10–40)
BASOPHILS # BLD AUTO: 0.04 K/UL (ref 0–0.2)
BASOPHILS NFR BLD: 0.2 % (ref 0–1.9)
BILIRUB SERPL-MCNC: 2.5 MG/DL (ref 0.1–1)
BUN SERPL-MCNC: 13 MG/DL (ref 6–20)
CALCIUM SERPL-MCNC: 8.3 MG/DL (ref 8.7–10.5)
CHLORIDE SERPL-SCNC: 93 MMOL/L (ref 95–110)
CO2 SERPL-SCNC: 32 MMOL/L (ref 23–29)
CREAT SERPL-MCNC: 0.9 MG/DL (ref 0.5–1.4)
DIFFERENTIAL METHOD: ABNORMAL
EOSINOPHIL # BLD AUTO: 0.3 K/UL (ref 0–0.5)
EOSINOPHIL NFR BLD: 1.4 % (ref 0–8)
ERYTHROCYTE [DISTWIDTH] IN BLOOD BY AUTOMATED COUNT: 22.1 % (ref 11.5–14.5)
EST. GFR  (AFRICAN AMERICAN): >60 ML/MIN/1.73 M^2
EST. GFR  (NON AFRICAN AMERICAN): >60 ML/MIN/1.73 M^2
GLUCOSE SERPL-MCNC: 99 MG/DL (ref 70–110)
HCT VFR BLD AUTO: 30.3 % (ref 40–54)
HGB BLD-MCNC: 9.6 G/DL (ref 14–18)
IMM GRANULOCYTES # BLD AUTO: 0.15 K/UL (ref 0–0.04)
IMM GRANULOCYTES NFR BLD AUTO: 0.6 % (ref 0–0.5)
LACTATE SERPL-SCNC: 1.6 MMOL/L (ref 0.5–1.9)
LYMPHOCYTES # BLD AUTO: 2 K/UL (ref 1–4.8)
LYMPHOCYTES NFR BLD: 8.1 % (ref 18–48)
MAGNESIUM SERPL-MCNC: 1.6 MG/DL (ref 1.6–2.6)
MCH RBC QN AUTO: 24.7 PG (ref 27–31)
MCHC RBC AUTO-ENTMCNC: 31.7 G/DL (ref 32–36)
MCV RBC AUTO: 78 FL (ref 82–98)
MONOCYTES # BLD AUTO: 1.3 K/UL (ref 0.3–1)
MONOCYTES NFR BLD: 5.4 % (ref 4–15)
NEUTROPHILS # BLD AUTO: 20.3 K/UL (ref 1.8–7.7)
NEUTROPHILS NFR BLD: 84.3 % (ref 38–73)
NRBC BLD-RTO: 0 /100 WBC
PHOSPHATE SERPL-MCNC: 2.3 MG/DL (ref 2.7–4.5)
PLATELET # BLD AUTO: 210 K/UL (ref 150–350)
PLATELET BLD QL SMEAR: ABNORMAL
PMV BLD AUTO: 10.2 FL (ref 9.2–12.9)
POTASSIUM SERPL-SCNC: 4.5 MMOL/L (ref 3.5–5.1)
PROCALCITONIN SERPL IA-MCNC: 0.38 NG/ML (ref 0–0.5)
PROT SERPL-MCNC: 7 G/DL (ref 6–8.4)
RBC # BLD AUTO: 3.89 M/UL (ref 4.6–6.2)
SODIUM SERPL-SCNC: 133 MMOL/L (ref 136–145)
WBC # BLD AUTO: 24.11 K/UL (ref 3.9–12.7)

## 2020-02-02 PROCEDURE — 85730 THROMBOPLASTIN TIME PARTIAL: CPT

## 2020-02-02 PROCEDURE — 85730 THROMBOPLASTIN TIME PARTIAL: CPT | Mod: 91

## 2020-02-02 PROCEDURE — 99232 SBSQ HOSP IP/OBS MODERATE 35: CPT | Mod: ,,, | Performed by: INTERNAL MEDICINE

## 2020-02-02 PROCEDURE — 25000003 PHARM REV CODE 250: Performed by: INTERNAL MEDICINE

## 2020-02-02 PROCEDURE — 83605 ASSAY OF LACTIC ACID: CPT

## 2020-02-02 PROCEDURE — 36415 COLL VENOUS BLD VENIPUNCTURE: CPT

## 2020-02-02 PROCEDURE — 63600175 PHARM REV CODE 636 W HCPCS: Performed by: INTERNAL MEDICINE

## 2020-02-02 PROCEDURE — 12000002 HC ACUTE/MED SURGE SEMI-PRIVATE ROOM

## 2020-02-02 PROCEDURE — 99232 PR SUBSEQUENT HOSPITAL CARE,LEVL II: ICD-10-PCS | Mod: ,,, | Performed by: INTERNAL MEDICINE

## 2020-02-02 PROCEDURE — 84145 PROCALCITONIN (PCT): CPT

## 2020-02-02 PROCEDURE — 85025 COMPLETE CBC W/AUTO DIFF WBC: CPT

## 2020-02-02 PROCEDURE — 80053 COMPREHEN METABOLIC PANEL: CPT

## 2020-02-02 PROCEDURE — 83735 ASSAY OF MAGNESIUM: CPT

## 2020-02-02 PROCEDURE — 84100 ASSAY OF PHOSPHORUS: CPT

## 2020-02-02 RX ORDER — FUROSEMIDE 10 MG/ML
20 INJECTION INTRAMUSCULAR; INTRAVENOUS
Status: DISCONTINUED | OUTPATIENT
Start: 2020-02-02 | End: 2020-02-04

## 2020-02-02 RX ORDER — ALPRAZOLAM 0.25 MG/1
0.25 TABLET ORAL 3 TIMES DAILY PRN
Status: DISCONTINUED | OUTPATIENT
Start: 2020-02-02 | End: 2020-02-14 | Stop reason: HOSPADM

## 2020-02-02 RX ADMIN — METOPROLOL SUCCINATE 100 MG: 50 TABLET, EXTENDED RELEASE ORAL at 10:02

## 2020-02-02 RX ADMIN — MAGNESIUM OXIDE 800 MG: 400 TABLET ORAL at 10:02

## 2020-02-02 RX ADMIN — ALPRAZOLAM 0.25 MG: 0.25 TABLET ORAL at 09:02

## 2020-02-02 RX ADMIN — FUROSEMIDE 20 MG: 20 INJECTION, SOLUTION INTRAMUSCULAR; INTRAVENOUS at 04:02

## 2020-02-02 RX ADMIN — HEPARIN SODIUM 12.94 UNITS/KG/HR: 10000 INJECTION, SOLUTION INTRAVENOUS at 10:02

## 2020-02-02 RX ADMIN — PIPERACILLIN AND TAZOBACTAM 4.5 G: 4; .5 INJECTION, POWDER, LYOPHILIZED, FOR SOLUTION INTRAVENOUS; PARENTERAL at 02:02

## 2020-02-02 RX ADMIN — PIPERACILLIN AND TAZOBACTAM 4.5 G: 4; .5 INJECTION, POWDER, LYOPHILIZED, FOR SOLUTION INTRAVENOUS; PARENTERAL at 04:02

## 2020-02-02 RX ADMIN — HYDROXYZINE HYDROCHLORIDE 25 MG: 25 TABLET, FILM COATED ORAL at 10:02

## 2020-02-02 RX ADMIN — ENALAPRIL MALEATE 2.5 MG: 2.5 TABLET ORAL at 10:02

## 2020-02-02 RX ADMIN — PANTOPRAZOLE SODIUM 40 MG: 40 TABLET, DELAYED RELEASE ORAL at 04:02

## 2020-02-02 RX ADMIN — HYDROCODONE BITARTRATE AND ACETAMINOPHEN 1 TABLET: 10; 325 TABLET ORAL at 12:02

## 2020-02-02 RX ADMIN — HYDROCODONE BITARTRATE AND ACETAMINOPHEN 1 TABLET: 10; 325 TABLET ORAL at 08:02

## 2020-02-02 RX ADMIN — HYDROCODONE BITARTRATE AND ACETAMINOPHEN 1 TABLET: 10; 325 TABLET ORAL at 02:02

## 2020-02-02 NOTE — NURSING
Pt was refusing lab draws, screaming , acting out, wants nurse supervisor.  Explained that this is for his Heparin drip and that it needs to be in a diff arm.   He states everyone wakes him up all night and day and he is sick of it.   Explained that it is for his health and we want him to get better.   I called Ileana, the nurse supervisor , and advised.       Pt , after a lot of encouragement from lab, decided to let them get his labs.  He stated , this is the last time today!!!

## 2020-02-02 NOTE — NURSING
Pt is very agitated everytime someone comes in his room.  He starts screaming at everyone.   Lab tried to pull labs and he freaked out again.   He had to be calmed down to even pull them.   Dietary brought his dinner tray and he started screaming because they couldn't put the railing down.  Throws up his hands and yells and screams!!   Wants a NEW dinner tray now because it had water on the tray!!!

## 2020-02-02 NOTE — PROGRESS NOTES
[facility]  Pulmonology  Progress Note    Patient Name: Francis Daigle  MRN: [id number]  Admission Date: 1/27/2020  Hospital Length of Stay: 6 days  Code Status: Full Code  Attending Provider: Lalo Hogan  Primary Care Provider: Primary Doctor No   Principal Problem: Severe sepsis    Subjective:     Interval History:     1/31/2020 - Stable overnight, no new issues reported.  Has some dyspnea.  Remains very edematous.    2/1/2020 - Stable overnight and he tell sme that there is a plan for surgery for next week.  No new complaints reported.    2/2/2020 - Pt sleeping when I visited, no distress and no new symptoms reported.    Review of Systems   Constitutional: Positive for malaise/fatigue. Negative for chills, diaphoresis, fever and weight loss.   HENT: Negative for congestion.    Eyes: Negative for pain.   Respiratory: Positive for cough and shortness of breath. Negative for hemoptysis, sputum production, wheezing and stridor.    Cardiovascular: Positive for leg swelling. Negative for chest pain, palpitations, orthopnea, claudication and PND.   Gastrointestinal: Negative for abdominal pain, constipation, diarrhea, heartburn, nausea and vomiting.   Genitourinary: Negative for dysuria, frequency and urgency.        Scrotal edema   Musculoskeletal: Negative for falls and myalgias.   Neurological: Positive for weakness. Negative for sensory change and focal weakness.   Psychiatric/Behavioral: Negative for depression, substance abuse and suicidal ideas. The patient is not nervous/anxious.          Objective:     Vital Signs (Most Recent):  Temp: 97.5 °F (36.4 °C) (02/02/20 0845)  Pulse: 109 (02/02/20 0845)  Resp: 18 (02/02/20 0845)  BP: 113/75 (02/02/20 0845)  SpO2: 97 % (02/02/20 0845) Vital Signs (24h Range):  Temp:  [97.5 °F (36.4 °C)-99.2 °F (37.3 °C)] 97.5 °F (36.4 °C)  Pulse:  [] 109  Resp:  [18] 18  SpO2:  [95 %-99 %] 97 %  BP: (107-123)/(67-84) 113/75     Weight: 76.5 kg (168 lb  10.4 oz)  Body mass index is 25.64 kg/m².      Intake/Output Summary (Last 24 hours) at 2/2/2020 1159  Last data filed at 2/2/2020 1000  Gross per 24 hour   Intake 1290 ml   Output 4050 ml   Net -2760 ml       Physical Exam   Constitutional: He is oriented to person, place, and time. He appears well-developed. No distress.   Chronically ill male, nad aao x 3   HENT:   Head: Normocephalic and atraumatic.   Nose: Nose normal.   Mouth/Throat: Oropharynx is clear and moist.   Eyes: Pupils are equal, round, and reactive to light. EOM are normal.   Neck: Normal range of motion. Neck supple. No JVD present. No tracheal deviation present. No thyromegaly present.   Cardiovascular: Normal rate, regular rhythm and intact distal pulses. Exam reveals no gallop and no friction rub.   Murmur heard.  Pulmonary/Chest: Effort normal and breath sounds normal. No stridor. No respiratory distress. He has no wheezes. He has no rales. He exhibits no tenderness.   Decreased right base  No acc m use   Abdominal: Soft. Bowel sounds are normal. He exhibits no distension.   Musculoskeletal: Normal range of motion. He exhibits edema (3+). He exhibits no tenderness.   Lymphadenopathy:     He has no cervical adenopathy.   Neurological: He is alert and oriented to person, place, and time. He has normal reflexes. No cranial nerve deficit.   Skin: He is not diaphoretic.   Psychiatric: He has a normal mood and affect. His behavior is normal.   Nursing note and vitals reviewed.      Vents:       Lines/Drains/Airways     Peripheral Intravenous Line                 Midline Catheter Insertion/Assessment  - Single Lumen 01/28/20 1140 Left basilic vein (medial side of arm) 20g x 8cm 5 days         Peripheral IV - Single Lumen 01/28/20 0401 20 G Left;Posterior;Proximal Forearm 5 days                Significant Labs:    CBC/Anemia Profile:  Recent Labs   Lab 02/01/20  0649 02/02/20  0303   WBC 19.29* 24.11*   HGB 10.4* 9.6*   HCT 32.5* 30.3*   * 210    MCV 78* 78*   RDW 25.7* 22.1*        Chemistries:  Recent Labs   Lab 02/01/20  0649 02/02/20  0303   * 133*   K 5.2* 4.5   CL 89* 93*   CO2 34* 32*   BUN 12 13   CREATININE 0.8 0.9   CALCIUM 8.5* 8.3*   ALBUMIN 2.3* 2.3*   PROT 7.1 7.0   BILITOT 2.6* 2.5*   ALKPHOS 79 83   ALT 72* 65*   AST 80* 68*   MG 1.7 1.6   PHOS 2.4* 2.3*       Coagulation:   Recent Labs   Lab 02/02/20  1123   APTT 52.1*     Lactic Acid:   Recent Labs   Lab 02/01/20  0649 02/02/20  0303   LACTATE 1.7 1.6     Troponin: No results for input(s): TROPONINI in the last 48 hours.  All pertinent labs within the past 24 hours have been reviewed.    Microbiology Results (last 7 days)     Procedure Component Value Units Date/Time    Blood Culture #1 **CANNOT BE ORDERED STAT** [[id number]] Collected:  01/27/20 1235    Order Status:  Completed Specimen:  Blood from Peripheral, Antecubital, Right Updated:  02/01/20 1432     Blood Culture, Routine No growth after 5 days.    Blood Culture #2 **CANNOT BE ORDERED STAT** [[id number]] Collected:  01/27/20 1242    Order Status:  Completed Specimen:  Blood from Peripheral, Antecubital, Left Updated:  02/01/20 1432     Blood Culture, Routine No growth after 5 days.    Culture, Respiratory with Gram Stain [[id number]]     Order Status:  No result Specimen:  Respiratory     Blood culture [[id number]]     Order Status:  Canceled Specimen:  Blood     Blood culture [[id number]]     Order Status:  Canceled Specimen:  Blood             Significant Imaging:  I have reviewed and interpreted all pertinent imaging results/findings within the past 24 hours.     ECHO  · Mild concentric left ventricular hypertrophy.  · Severely decreased left ventricular systolic function. The estimated ejection fraction is 30%.  · Grade II (moderate) left ventricular diastolic dysfunction consistent with pseudonormalization.  · Septal wall has abnormal motion. Diastolic flattening of the interventricular septum consistent with right  ventricle volume overload.  · Mild right ventricular enlargement.  · Mildly reduced right ventricular systolic function.  · Mild left atrial enlargement.  · Mild right atrial enlargement.  · Mild-to-moderate mitral regurgitation.  · Moderate tricuspid regurgitation.  · Mild pulmonary hypertension present.  · Trivial circumferential pericardial effusion.    Assessment/Plan:     * Severe sepsis  · Clinically better but overall condition is poor  · WBC has increased some, follow    Pneumonia  · Continue present treatment but not sure that this is an acute issue at this time    Hydropneumothorax  · Difficult situation  · Has had CT with tPA without resolution  · For surgery next week  · Note platelets have increased and need to watched    Parapneumonic effusion  · As above    Acute massive pulmonary embolism  · Recent diagnosis  · On anticoagulation  · CTA noted  · Stable at this time    Anasarca  · Needs diuresis  · No new weight today    Liver enzyme elevation  · Follow, better     Chronic hepatitis C without hepatic coma  · Aware     Essential hypertension  · Follow and treat    Acute on chronic combined systolic and diastolic congestive heart failure  · ECHO noted  · Decompensated            Juan Oneill MD  Pulmonology  formerly [facility]

## 2020-02-02 NOTE — PROGRESS NOTES
[facility] Medicine  Progress Note    Patient Name: Francis Daigle  MRN: [id number]  Patient Class: IP- Inpatient   Admission Date: 1/27/2020  Length of Stay: 6 days  Attending Physician: Lalo Hogan  Primary Care Provider: Primary Doctor No        Subjective:     Principal Problem:Severe sepsis        HPI:  44-year-old male with past medical history of combined diastolic and systolic CHF (EF 15% as at 12/2019), HCV, bilateral LE DVTs and bilateral PE, prior IVDU, HTn who presented with groin swelling of 5 days duration.    Prior hospital records reviewed and it reveals [place] [facility] [place] a complicated hospital course for acute cholecystits complicated by b/l DVT, bilateral PE s/p tPA, PEA cardiac arrest. HE was in the hospital for 20 days and was discharged home. He has not had any follow up visits since then.    He reports increasing lower extremity swelling progressing to scrotal swelling for the past 5 days. He endorses compliance with Lasix 40mg daily and states that he has been taking an extra 20mg daily for the past 5 days. He also endorses a dry cough, congestion and cold intolerance. He denies fever, chills, chest pain.    In the ED he has received vancomycin, zosyn and levaquin, fluid bolus and 40mg IV lasix. Chest xray shows a right middle lobe pneumonia.     Overview/Hospital Course:  1/27: IVF administered per sepsis protocol, diuresis also initiated at 40 mg lasix IVP; placed on abx for sepsis secondary to pneumonia unkonwn organism    1/28: IVF discontinued, lasix ordered as prn weight based parameters; hemodynamically stable, not requiring central line/pressors; cxr in am. Continue antibiotics for pneumonia with sepsis    1/29: cxr shows small right hydronpneumothorax; pulmonology consulted and CT shows possible empyema with a collection; surgery consulted, eliquis held, patient to be placed on heparin drip; staff are concerned  about possible fall risk while on heparin so fall risk order placed,as well as nursing communication that patient should be up with assistance. Cardiology consulted for clearance: echo ordered, lasix changed to scheduled, a dose of albumin is also ordered, will repeat albumin as needed and monitor kidney function.     1/30: patient is down 5.7 L thus far; albumin x 2 more doses, lasix. Echo is reviewed: ef 30% with G2DD and mild pulm htn. Discussed with pulm and cardiology note is reviewed, will need several days of diuresis and too high risk for anesthesia- after a period of time will await surgery recommendations wrt empyema ; pulm signing off- can contact as needed. Continue heparin for now, if it is decided patient does not need surgical intervention will dc heparin ggt and resume OAC. Will reassess ascites )admission CT reviewed) and order abdominal US, off loading fluid from abdomen may improve pressure on venous return from the extremities/scrotum.  Because of spironolactone intolerance Cards has changed additional diuretic to eplerenone.    1/31: edema of the scrotum has significantly improved, leukocytosis from chronic empyema? Will follow cardiology plans for diuresis and revisit any need for surgery with CTS- if not needed then will discontinue heparin and restart home eliquis, with possible discharge home at the completion of diuresis. Nutrition: improve protein intake; diuresis+albumin; io: is net negative 9.8L today.   GI recommendations reviewed and appreciated.    2/1: patient states Dr. Light has discussed with him plans for Tuesday. Will follow up any pulm recommendations. For now - last dose of albumin and continued plans for diuresis per cardiology. Continuing heparin ggt for now.   Since admission has diuresed 21, in 10; net negative 11L    2/2: Today is day 7 of antibiotics, lactic acid has not been elevated so those labs are discontinued. WBC secondary to empyema? procalcitonin is ordered, if  is not elevated will dc abx if pulm agrees.   Continues to be on IV heparin, ongoing diuresis io since admission is net -15L, swelling is reduced, tissue over the skin is more supple though pitting present, scrotum is back to normal size per patient.       S: legs feel uncomfortable, scrotum size has gone back to normal size    Vitals:    02/02/20 0845   BP: 113/75   Pulse: 109   Resp: 18   Temp: 97.5 °F (36.4 °C)       Gen: ao nad  HEENT: ncat  Lungs: diminished right, wheezing is improved no crackles  ABD: soft nondistended non tender  Ext: rom wnl  UG: scrotal edema continues to improve; significant reduction  Skin: skin is more supple over the legs with 2+ pitting edema  Neuro: cn 2-12 grossly intact    No new subjective & objective note has been filed under this hospital service since the last note was generated.      Assessment/Plan:     * Severe sepsis: resolved  Collection + empyema: management decisions pending result of diuresis and overall clinical picture  As evidenced by tachycardia, lactic acidosis, liver injury. Pulmonary source most likely. Patient has a history of recent hospitalization with antibiotic use  - received fluids per sepsis protocol initially  IV fluids 30cc/kg, he already received broad spectrum antibiotics with vancomycin, zosyn and levaquin in the ED  -continue zosyn; has been on abx for a total of 7 days  -trend lactic acid levels: have normalized so are discontinued  - will repeat procalcitonin  -blood cultures x 2: NGTD  - pulm + cardiothoracic surgery consutled; pulm will likely sign off per discussion with Dr. Steele, and as he stated in discussion given chronicity of collection/empyema without positive cultures and othrwise nontoxic appearance on exam- may defer need for intervention. Will await CTS formal consultation recommendations and in meantime diurese.  - eliquis changed to heparin for any possible procedure with intent to revert back to eliquis when appropriate  -  cardiology consulted for clearance for any possible procedure: echo reviewed, lasix scheduled, spironolatone discontinued in favor of eplerenone and will monitor carefully in setting of sepsis with very low EF.        Liver enzyme elevation  Chronic hepatitis C without hepatic coma, without liver failure, with cirrhosis  ? Hepatic congestion vs early acute liver failure  Trend LFTS  Fulminant liver failure not suspected at this time  Poor liver synthetic function due to HCV.  GI has evaluated the case and recommend outpatient follow up with [facility]  Liver doppler evaluated.   US abd evaluated no need for paracentesis at this time and will continue diuresis with aldactone+lasix     Anasarca  Albumin is low, nutritional consultation  Give albumin as needed while on diuresis which is now scheduled 20 mg IVP q 12hr and eplerenone daily     Pneumonia due to unknown organism  Pulm reomends continuing present abx for now  May consider changing zosyn to PO augmentin but in light of no decision being made about the empyema will hold off for now.     Essential hypertension  Resume home antihypertensives     Acute on chronic combined systolic and diastolic congestive heart failure  Patient has evidence of fluid overload with elevated BNP, 30% by echo on 1/29  IV lasix 20 mg BID, eplerenone  Strict Is & O's monitoring  Daily weights  Cardiology consulted    VTE Risk Mitigation (From admission, onward)         Ordered     heparin 25,000 units in dextrose 5% 250 mL (100 units/mL) infusion HIGH INTENSITY nomogram - [facility]  Continuous     Question:  Heparin Infusion Adjustment (DO NOT MODIFY ANSWER)  Answer:  [email or web address]\epic\Images\Pharmacy\HeparinInfusions\heparin HIGH INTENSITY nomogram for [facility] [id number]    01/29/20 1126     heparin 25,000 units in dextrose 5% (100 units/ml) IV bolus from bag - ADDITIONAL PRN BOLUS - 60 units/kg  As needed (PRN)     Question:  Heparin Infusion Adjustment (DO NOT MODIFY ANSWER)  Answer:   [email or web address]\epic\Images\Pharmacy\HeparinInfusions\heparin HIGH INTENSITY nomogram for [facility] [id number].pdf    01/29/20 1126     heparin 25,000 units in dextrose 5% (100 units/ml) IV bolus from bag - ADDITIONAL PRN BOLUS - 30 units/kg  As needed (PRN)     Question:  Heparin Infusion Adjustment (DO NOT MODIFY ANSWER)  Answer:  [email or web address]\epic\Images\Pharmacy\HeparinInfusions\heparin HIGH INTENSITY nomogram for [facility] [id number]    01/29/20 1126     IP VTE HIGH RISK PATIENT  Once      01/27/20 1516     Reason for No Pharmacological VTE Prophylaxis  Once     Question:  Reasons:  Answer:  Already adequately anticoagulated on oral Anticoagulants    01/27/20 1516                      Shawn Ching MD  Department of Hospital Medicine   [facility]

## 2020-02-02 NOTE — SUBJECTIVE & OBJECTIVE
Interval History:     1/31/2020 - Stable overnight, no new issues reported.  Has some dyspnea.  Remains very edematous.    2/1/2020 - Stable overnight and he tell sme that there is a plan for surgery for next week.  No new complaints reported.    2/2/2020 - Pt sleeping when I visited, no distress and no new symptoms reported.    Review of Systems   Constitutional: Positive for malaise/fatigue. Negative for chills, diaphoresis, fever and weight loss.   HENT: Negative for congestion.    Eyes: Negative for pain.   Respiratory: Positive for cough and shortness of breath. Negative for hemoptysis, sputum production, wheezing and stridor.    Cardiovascular: Positive for leg swelling. Negative for chest pain, palpitations, orthopnea, claudication and PND.   Gastrointestinal: Negative for abdominal pain, constipation, diarrhea, heartburn, nausea and vomiting.   Genitourinary: Negative for dysuria, frequency and urgency.        Scrotal edema   Musculoskeletal: Negative for falls and myalgias.   Neurological: Positive for weakness. Negative for sensory change and focal weakness.   Psychiatric/Behavioral: Negative for depression, substance abuse and suicidal ideas. The patient is not nervous/anxious.          Objective:     Vital Signs (Most Recent):  Temp: 97.5 °F (36.4 °C) (02/02/20 0845)  Pulse: 109 (02/02/20 0845)  Resp: 18 (02/02/20 0845)  BP: 113/75 (02/02/20 0845)  SpO2: 97 % (02/02/20 0845) Vital Signs (24h Range):  Temp:  [97.5 °F (36.4 °C)-99.2 °F (37.3 °C)] 97.5 °F (36.4 °C)  Pulse:  [] 109  Resp:  [18] 18  SpO2:  [95 %-99 %] 97 %  BP: (107-123)/(67-84) 113/75     Weight: 76.5 kg (168 lb 10.4 oz)  Body mass index is 25.64 kg/m².      Intake/Output Summary (Last 24 hours) at 2/2/2020 1159  Last data filed at 2/2/2020 1000  Gross per 24 hour   Intake 1290 ml   Output 4050 ml   Net -2760 ml       Physical Exam   Constitutional: He is oriented to person, place, and time. He appears well-developed. No distress.    Chronically ill male, nad aao x 3   HENT:   Head: Normocephalic and atraumatic.   Nose: Nose normal.   Mouth/Throat: Oropharynx is clear and moist.   Eyes: Pupils are equal, round, and reactive to light. EOM are normal.   Neck: Normal range of motion. Neck supple. No JVD present. No tracheal deviation present. No thyromegaly present.   Cardiovascular: Normal rate, regular rhythm and intact distal pulses. Exam reveals no gallop and no friction rub.   Murmur heard.  Pulmonary/Chest: Effort normal and breath sounds normal. No stridor. No respiratory distress. He has no wheezes. He has no rales. He exhibits no tenderness.   Decreased right base  No acc m use   Abdominal: Soft. Bowel sounds are normal. He exhibits no distension.   Musculoskeletal: Normal range of motion. He exhibits edema (3+). He exhibits no tenderness.   Lymphadenopathy:     He has no cervical adenopathy.   Neurological: He is alert and oriented to person, place, and time. He has normal reflexes. No cranial nerve deficit.   Skin: He is not diaphoretic.   Psychiatric: He has a normal mood and affect. His behavior is normal.   Nursing note and vitals reviewed.      Vents:       Lines/Drains/Airways     Peripheral Intravenous Line                 Midline Catheter Insertion/Assessment  - Single Lumen 01/28/20 1140 Left basilic vein (medial side of arm) 20g x 8cm 5 days         Peripheral IV - Single Lumen 01/28/20 0401 20 G Left;Posterior;Proximal Forearm 5 days                Significant Labs:    CBC/Anemia Profile:  Recent Labs   Lab 02/01/20  0649 02/02/20  0303   WBC 19.29* 24.11*   HGB 10.4* 9.6*   HCT 32.5* 30.3*   * 210   MCV 78* 78*   RDW 25.7* 22.1*        Chemistries:  Recent Labs   Lab 02/01/20  0649 02/02/20  0303   * 133*   K 5.2* 4.5   CL 89* 93*   CO2 34* 32*   BUN 12 13   CREATININE 0.8 0.9   CALCIUM 8.5* 8.3*   ALBUMIN 2.3* 2.3*   PROT 7.1 7.0   BILITOT 2.6* 2.5*   ALKPHOS 79 83   ALT 72* 65*   AST 80* 68*   MG 1.7 1.6    PHOS 2.4* 2.3*       Coagulation:   Recent Labs   Lab 02/02/20  1123   APTT 52.1*     Lactic Acid:   Recent Labs   Lab 02/01/20  0649 02/02/20  0303   LACTATE 1.7 1.6     Troponin: No results for input(s): TROPONINI in the last 48 hours.  All pertinent labs within the past 24 hours have been reviewed.    Microbiology Results (last 7 days)     Procedure Component Value Units Date/Time    Blood Culture #1 **CANNOT BE ORDERED STAT** [[id number]] Collected:  01/27/20 1235    Order Status:  Completed Specimen:  Blood from Peripheral, Antecubital, Right Updated:  02/01/20 1432     Blood Culture, Routine No growth after 5 days.    Blood Culture #2 **CANNOT BE ORDERED STAT** [[id number]] Collected:  01/27/20 1242    Order Status:  Completed Specimen:  Blood from Peripheral, Antecubital, Left Updated:  02/01/20 1432     Blood Culture, Routine No growth after 5 days.    Culture, Respiratory with Gram Stain [[id number]]     Order Status:  No result Specimen:  Respiratory     Blood culture [[id number]]     Order Status:  Canceled Specimen:  Blood     Blood culture [[id number]]     Order Status:  Canceled Specimen:  Blood             Significant Imaging:  I have reviewed and interpreted all pertinent imaging results/findings within the past 24 hours.     ECHO  · Mild concentric left ventricular hypertrophy.  · Severely decreased left ventricular systolic function. The estimated ejection fraction is 30%.  · Grade II (moderate) left ventricular diastolic dysfunction consistent with pseudonormalization.  · Septal wall has abnormal motion. Diastolic flattening of the interventricular septum consistent with right ventricle volume overload.  · Mild right ventricular enlargement.  · Mildly reduced right ventricular systolic function.  · Mild left atrial enlargement.  · Mild right atrial enlargement.  · Mild-to-moderate mitral regurgitation.  · Moderate tricuspid regurgitation.  · Mild pulmonary hypertension present.  · Trivial  circumferential pericardial effusion.

## 2020-02-03 ENCOUNTER — ANESTHESIA EVENT (OUTPATIENT)
Dept: SURGERY | Facility: HOSPITAL | Age: 45
DRG: 853 | End: 2020-02-03
Payer: MEDICAID

## 2020-02-03 PROBLEM — R65.20 SEVERE SEPSIS: Status: RESOLVED | Noted: 2019-12-10 | Resolved: 2020-02-03

## 2020-02-03 PROBLEM — A41.9 SEVERE SEPSIS: Status: RESOLVED | Noted: 2019-12-10 | Resolved: 2020-02-03

## 2020-02-03 LAB
ABO + RH BLD: NORMAL
ALBUMIN SERPL BCP-MCNC: 2.1 G/DL (ref 3.5–5.2)
ALP SERPL-CCNC: 68 U/L (ref 55–135)
ALT SERPL W/O P-5'-P-CCNC: 59 U/L (ref 10–44)
ANION GAP SERPL CALC-SCNC: 8 MMOL/L (ref 8–16)
APTT PPP: 49.6 SEC (ref 23.6–33.3)
APTT PPP: 67.2 SEC (ref 23.6–33.3)
APTT PPP: 92.2 SEC (ref 23.6–33.3)
AST SERPL-CCNC: 61 U/L (ref 10–40)
BASOPHILS # BLD AUTO: 0.05 K/UL (ref 0–0.2)
BASOPHILS NFR BLD: 0.3 % (ref 0–1.9)
BILIRUB SERPL-MCNC: 2.4 MG/DL (ref 0.1–1)
BLD GP AB SCN CELLS X3 SERPL QL: NORMAL
BUN SERPL-MCNC: 13 MG/DL (ref 6–20)
CALCIUM SERPL-MCNC: 8.3 MG/DL (ref 8.7–10.5)
CHLORIDE SERPL-SCNC: 95 MMOL/L (ref 95–110)
CO2 SERPL-SCNC: 29 MMOL/L (ref 23–29)
CREAT SERPL-MCNC: 1 MG/DL (ref 0.5–1.4)
DIFFERENTIAL METHOD: ABNORMAL
EOSINOPHIL # BLD AUTO: 0.4 K/UL (ref 0–0.5)
EOSINOPHIL NFR BLD: 2.1 % (ref 0–8)
ERYTHROCYTE [DISTWIDTH] IN BLOOD BY AUTOMATED COUNT: 22.4 % (ref 11.5–14.5)
EST. GFR  (AFRICAN AMERICAN): >60 ML/MIN/1.73 M^2
EST. GFR  (NON AFRICAN AMERICAN): >60 ML/MIN/1.73 M^2
GLUCOSE SERPL-MCNC: 98 MG/DL (ref 70–110)
HCT VFR BLD AUTO: 31.7 % (ref 40–54)
HGB BLD-MCNC: 10.1 G/DL (ref 14–18)
IMM GRANULOCYTES # BLD AUTO: 0.09 K/UL (ref 0–0.04)
IMM GRANULOCYTES NFR BLD AUTO: 0.5 % (ref 0–0.5)
LYMPHOCYTES # BLD AUTO: 1.7 K/UL (ref 1–4.8)
LYMPHOCYTES NFR BLD: 9.9 % (ref 18–48)
MAGNESIUM SERPL-MCNC: 1.7 MG/DL (ref 1.6–2.6)
MCH RBC QN AUTO: 24.9 PG (ref 27–31)
MCHC RBC AUTO-ENTMCNC: 31.9 G/DL (ref 32–36)
MCV RBC AUTO: 78 FL (ref 82–98)
MONOCYTES # BLD AUTO: 1.2 K/UL (ref 0.3–1)
MONOCYTES NFR BLD: 6.9 % (ref 4–15)
NEUTROPHILS # BLD AUTO: 14.2 K/UL (ref 1.8–7.7)
NEUTROPHILS NFR BLD: 80.3 % (ref 38–73)
NRBC BLD-RTO: 0 /100 WBC
PHOSPHATE SERPL-MCNC: 3.2 MG/DL (ref 2.7–4.5)
PLATELET # BLD AUTO: 240 K/UL (ref 150–350)
PMV BLD AUTO: 9.7 FL (ref 9.2–12.9)
POTASSIUM SERPL-SCNC: 4.3 MMOL/L (ref 3.5–5.1)
PROT SERPL-MCNC: 7 G/DL (ref 6–8.4)
RBC # BLD AUTO: 4.05 M/UL (ref 4.6–6.2)
SODIUM SERPL-SCNC: 132 MMOL/L (ref 136–145)
WBC # BLD AUTO: 17.66 K/UL (ref 3.9–12.7)

## 2020-02-03 PROCEDURE — 63600175 PHARM REV CODE 636 W HCPCS: Performed by: INTERNAL MEDICINE

## 2020-02-03 PROCEDURE — 85730 THROMBOPLASTIN TIME PARTIAL: CPT | Mod: 91

## 2020-02-03 PROCEDURE — 25000003 PHARM REV CODE 250: Performed by: INTERNAL MEDICINE

## 2020-02-03 PROCEDURE — 84100 ASSAY OF PHOSPHORUS: CPT

## 2020-02-03 PROCEDURE — 99233 PR SUBSEQUENT HOSPITAL CARE,LEVL III: ICD-10-PCS | Mod: ,,, | Performed by: INTERNAL MEDICINE

## 2020-02-03 PROCEDURE — 36415 COLL VENOUS BLD VENIPUNCTURE: CPT

## 2020-02-03 PROCEDURE — 86920 COMPATIBILITY TEST SPIN: CPT

## 2020-02-03 PROCEDURE — 80053 COMPREHEN METABOLIC PANEL: CPT

## 2020-02-03 PROCEDURE — 83735 ASSAY OF MAGNESIUM: CPT

## 2020-02-03 PROCEDURE — 85025 COMPLETE CBC W/AUTO DIFF WBC: CPT

## 2020-02-03 PROCEDURE — 85730 THROMBOPLASTIN TIME PARTIAL: CPT

## 2020-02-03 PROCEDURE — 86850 RBC ANTIBODY SCREEN: CPT

## 2020-02-03 PROCEDURE — 12000002 HC ACUTE/MED SURGE SEMI-PRIVATE ROOM

## 2020-02-03 PROCEDURE — 99233 SBSQ HOSP IP/OBS HIGH 50: CPT | Mod: ,,, | Performed by: INTERNAL MEDICINE

## 2020-02-03 RX ORDER — MAGNESIUM SULFATE HEPTAHYDRATE 40 MG/ML
2 INJECTION, SOLUTION INTRAVENOUS ONCE
Status: COMPLETED | OUTPATIENT
Start: 2020-02-03 | End: 2020-02-03

## 2020-02-03 RX ADMIN — FUROSEMIDE 20 MG: 10 INJECTION, SOLUTION INTRAMUSCULAR; INTRAVENOUS at 09:02

## 2020-02-03 RX ADMIN — PANTOPRAZOLE SODIUM 40 MG: 40 TABLET, DELAYED RELEASE ORAL at 04:02

## 2020-02-03 RX ADMIN — HYDROCODONE BITARTRATE AND ACETAMINOPHEN 1 TABLET: 10; 325 TABLET ORAL at 04:02

## 2020-02-03 RX ADMIN — EPLERENONE 25 MG: 25 TABLET, FILM COATED ORAL at 09:02

## 2020-02-03 RX ADMIN — HYDROCODONE BITARTRATE AND ACETAMINOPHEN 1 TABLET: 10; 325 TABLET ORAL at 07:02

## 2020-02-03 RX ADMIN — ENALAPRIL MALEATE 2.5 MG: 2.5 TABLET ORAL at 09:02

## 2020-02-03 RX ADMIN — METOPROLOL SUCCINATE 100 MG: 50 TABLET, EXTENDED RELEASE ORAL at 09:02

## 2020-02-03 RX ADMIN — HEPARIN SODIUM 17.9 UNITS/KG/HR: 10000 INJECTION, SOLUTION INTRAVENOUS at 09:02

## 2020-02-03 RX ADMIN — MAGNESIUM SULFATE 2 G: 2 INJECTION INTRAVENOUS at 09:02

## 2020-02-03 RX ADMIN — HEPARIN SODIUM 15.93 UNITS/KG/HR: 10000 INJECTION, SOLUTION INTRAVENOUS at 04:02

## 2020-02-03 RX ADMIN — HYDROCODONE BITARTRATE AND ACETAMINOPHEN 1 TABLET: 10; 325 TABLET ORAL at 10:02

## 2020-02-03 RX ADMIN — HYDROXYZINE HYDROCHLORIDE 25 MG: 25 TABLET, FILM COATED ORAL at 09:02

## 2020-02-03 RX ADMIN — ALPRAZOLAM 0.25 MG: 0.25 TABLET ORAL at 09:02

## 2020-02-03 NOTE — ANESTHESIA PREPROCEDURE EVALUATION
02/04/2020  Francis Daigle is a 44 y.o., male.  Patient Active Problem List   Diagnosis    Acute on chronic combined systolic and diastolic congestive heart failure    Chest pain    Essential hypertension    Shortness of breath    Elevated white blood cell count    Troponin level elevated    Back pain    Abnormal LFTs    Acute on chronic combined systolic and diastolic heart failure    Pneumonia    Intravenous drug abuse    Chronic hepatitis C without hepatic coma    Hyponatremia    Cardiac arrest    Acute deep vein thrombosis (DVT) of proximal vein of lower extremity    Alteration in skin integrity    Acute massive pulmonary embolism    Wheezing    Impaired mobility and ADLs    Anxiety    Abdominal pain    Empyema    Hydropneumothorax    Leukocytosis    Anasarca    Liver enzyme elevation    Parapneumonic effusion    Volume overload       History reviewed. No pertinent surgical history.     Tobacco Use:  The patient  reports that he quit smoking about 3 years ago. His smoking use included cigarettes. He started smoking about 21 years ago. He has a 40.00 pack-year smoking history. He has never used smokeless tobacco.     Results for orders placed or performed during the hospital encounter of 01/27/20   EKG 12-lead    Collection Time: 02/01/20  6:24 AM    Narrative    Test Reason : I50.22,    Vent. Rate : 093 BPM     Atrial Rate : 093 BPM     P-R Int : 178 ms          QRS Dur : 104 ms      QT Int : 386 ms       P-R-T Axes : 041 020 130 degrees     QTc Int : 479 ms    Normal sinus rhythm  Incomplete right bundle branch block  Nonspecific ST and T wave abnormality  Abnormal ECG  When compared with ECG of 27-JAN-2020 11:02,  Criteria for Anteroseptal infarct are no longer Present  Nonspecific T wave abnormality has replaced inverted T waves in Inferior  leads  Inverted T waves have  replaced nonspecific T wave abnormality in Lateral  leads  Confirmed by Jose Altman MD ([id number]) on 2/1/2020 10:14:43 AM    Referred By: [id number]   SELF           Confirmed By:Jose Altman MD        Imaging Results          CT Abdomen Pelvis With Contrast (Final result)  Result time 01/27/20 12:19:29    Final result by Bette Herman MD (01/27/20 12:19:29)                 Impression:      Moderate ascites with diffuse subcutaneous edema throughout the abdomen, pelvis and scrotum    Fatty infiltration of the liver with stable 10 mm hypodense lesion in the dome of the liver    Removal of the right pleural catheter with persistent right hydropneumothorax and small left pleural effusion    Patchy alveolar opacities within the right middle lobe and lung bases suggestive of pneumonia      Electronically signed by: Bette Herman MD  Date:    01/27/2020  Time:    12:19             Narrative:      CMS MANDATED QUALITY DATA - CT RADIATION - 436    All CT scans at this facility utilize dose modulation, iterative reconstruction, and/or weight based dosing when appropriate to reduce radiation dose to as low as reasonably achievable.    EXAMINATION:  CT ABDOMEN PELVIS WITH CONTRAST    CLINICAL HISTORY:  scrotal edema;    TECHNIQUE:  CT abdomen and pelvis with 100 mL Omnipaque    COMPARISON:  12/23/2019    FINDINGS:  CT ABDOMEN:    The previously noted right pleural catheter has been removed.  There is persistent hydropneumothorax in the right lung base with small left pleural effusion.  There is nodular alveolar opacities within the right lower lobe and lung bases.    The liver is hypodense compatible with fatty infiltration.  There is a stable 1 cm hypodense lesion in the dome of the right lobe of the liver suggestive of hepatic cyst.  The spleen, pancreas, gallbladder and adrenal glands are normal.    The kidneys enhance symmetrically without hydronephrosis or calculi.  There is a 7 mm nonobstructing left  renal stone.    There is moderate ascites.  There are no thick-walled or dilated bowel loops.  There is diffuse anasarca.    There is no adenopathy.    CT PELVIS:    The bladder and prostate gland are normal.  There is no pelvic adenopathy.  There is moderate edema within the subcutaneous tissues in scrotum.  There are no acute osseous abnormalities.                               X-Ray Chest AP Portable (Final result)  Result time 01/27/20 10:49:55    Final result by Bette Herman MD (01/27/20 10:49:55)                 Impression:      Progression of the airspace disease within the right lower lobe with new airspace disease in left mid lung and small bilateral pleural effusions    Mild cardiomegaly      Electronically signed by: Bette Herman MD  Date:    01/27/2020  Time:    10:49             Narrative:    EXAMINATION:  XR CHEST AP PORTABLE    CLINICAL HISTORY:  sob;    FINDINGS:  Portable chest at 10:35 is compared to 12/27/2019 shows mild cardiomegaly    There is progression of the airspace disease within the right lung base with small right pleural effusion.  There is development of alveolar opacity in the left mid lung with tiny left pleural effusion.  The upper lobes are clear.  Pulmonary vasculature is normal. No acute osseous abnormality.                             Reason for Exam   Priority: Routine   Dx: CHF (congestive heart failure), NYHA class III, chronic, systolic [I50.22 (ICD-10-CM)]   Comments:    Conclusion     · Mild concentric left ventricular hypertrophy.  · Severely decreased left ventricular systolic function. The estimated ejection fraction is 30%.  · Grade II (moderate) left ventricular diastolic dysfunction consistent with pseudonormalization.  · Septal wall has abnormal motion. Diastolic flattening of the interventricular septum consistent with right ventricle volume overload.  · Mild right ventricular enlargement.  · Mildly reduced right ventricular systolic function.  · Mild  left atrial enlargement.  · Mild right atrial enlargement.  · Mild-to-moderate mitral regurgitation.  · Moderate tricuspid regurgitation.  · Mild pulmonary hypertension present.  · Trivial circumferential pericardial effusion.            Lab Results   Component Value Date    WBC 16.96 (H) 02/04/2020    WBC 16.96 (H) 02/04/2020    HGB 10.5 (L) 02/04/2020    HGB 10.5 (L) 02/04/2020    HCT 33.7 (L) 02/04/2020    HCT 33.7 (L) 02/04/2020    MCV 80 (L) 02/04/2020    MCV 80 (L) 02/04/2020     02/04/2020     02/04/2020     BMP  Lab Results   Component Value Date     02/04/2020     02/04/2020    K 4.5 02/04/2020    K 4.5 02/04/2020    CL 99 02/04/2020    CL 99 02/04/2020    CO2 32 (H) 02/04/2020    CO2 32 (H) 02/04/2020    BUN 12 02/04/2020    BUN 12 02/04/2020    CREATININE 0.8 02/04/2020    CREATININE 0.8 02/04/2020    CALCIUM 8.5 (L) 02/04/2020    CALCIUM 8.5 (L) 02/04/2020    ANIONGAP 5 (L) 02/04/2020    ANIONGAP 5 (L) 02/04/2020    ESTGFRAFRICA >60.0 02/04/2020    ESTGFRAFRICA >60.0 02/04/2020    EGFRNONAA >60.0 02/04/2020    EGFRNONAA >60.0 02/04/2020     Reason for Exam   Priority: Routine   Dx: CHF (congestive heart failure), NYHA class III, chronic, systolic [I50.22 (ICD-10-CM)]   Comments:    Conclusion     · Mild concentric left ventricular hypertrophy.  · Severely decreased left ventricular systolic function. The estimated ejection fraction is 30%.  · Grade II (moderate) left ventricular diastolic dysfunction consistent with pseudonormalization.  · Septal wall has abnormal motion. Diastolic flattening of the interventricular septum consistent with right ventricle volume overload.  · Mild right ventricular enlargement.  · Mildly reduced right ventricular systolic function.  · Mild left atrial enlargement.  · Mild right atrial enlargement.  · Mild-to-moderate mitral regurgitation.  · Moderate tricuspid regurgitation.  · Mild pulmonary hypertension present.  · Trivial circumferential  pericardial effusion.            Anesthesia Evaluation    I have reviewed the Patient Summary Reports.    I have reviewed the Nursing Notes.   I have reviewed the Medications.     Review of Systems  Anesthesia Hx:  No previous Anesthesia Denies Hx of Anesthetic complications  Denies Family Hx of Anesthesia complications.   Denies Personal Hx of Anesthesia complications.   Social:  Former Smoker, No Alcohol Use    Hematology/Oncology:  Hematology Normal   Oncology Normal   Hematology Comments: Hx. DVT  Eliqus Tx    EENT/Dental:EENT/Dental Normal   Cardiovascular:   Hypertension, poorly controlled CAD asymptomatic  CHF ECG has been reviewed. Patient followed by Dr. Altman   Acute on Chronic  systolic & Diastolic CHF   Pulmonary:   Pneumonia Shortness of breath Hx. PE    Active Pneumonia resulting in Empyema    Renal/:  Renal/ Normal     Hepatic/GI:   Liver Disease, Hepatitis, C Cirrhosis   Anasarca    Musculoskeletal:   Arthritis     Neurological:  Neurology Normal    Endocrine:  Endocrine Normal    Dermatological:  Skin Normal    Psych:   Psychiatric History anxiety          Physical Exam  General:  Well nourished    Airway/Jaw/Neck:  Airway Findings: Mouth Opening: Normal Tongue: Normal  General Airway Assessment: Adult  Mallampati: III  Improves to III with phonation.  TM Distance: < 4 cm  Jaw/Neck Findings:  Neck ROM: Normal ROM      Dental:  Dental Findings: Molar caps   Chest/Lungs:  Chest/Lungs Findings: Decreased Breathe Sounds Right, Normal Respiratory Rate  CTA anteriorly at Upper Lobes      Heart/Vascular:  Heart Findings: Rate: Normal  Rhythm: Regular Rhythm  Sounds: Normal        Mental Status:  Mental Status Findings:  Cooperative, Alert and Oriented         Anesthesia Plan  Type of Anesthesia, risks & benefits discussed:  Anesthesia Type:  general  Patient's Preference: General   Intra-op Monitoring Plan: arterial line, central line and standard ASA monitors  Intra-op Monitoring Plan Comments:    Post Op Pain Control Plan: multimodal analgesia, IV/PO Opioids PRN and per primary service following discharge from PACU  Post Op Pain Control Plan Comments:   Induction:   IV  Beta Blocker:  Patient is on a Beta-Blocker and has received one dose within the past 24 hours (No further documentation required).       Informed Consent: Patient understands risks and agrees with Anesthesia plan.  Questions answered. Anesthesia consent signed with patient.  ASA Score: 4     Day of Surgery Review of History & Physical:        Anesthesia Plan Notes: GETA   Radial Artery Catheter +/- TLC   Note EF 30% monitor fluids closely   No Tylenol products   Decadron 8mg iv   Zofran 4mg iv  Pepcid 20mg iv   Ketamine 25mg   CaCl 1g IV to correct hypocalemia.         Ready For Surgery From Anesthesia Perspective.

## 2020-02-03 NOTE — NURSING
Better compliance with care. Care clustered. Labs drawn. Lungs clear. Heparin continues. OLIVIA hose placed. Pitting edema remains in b/l  Lower legs and feet. Trace edema in thighs.  Support and encouragement continues.

## 2020-02-03 NOTE — PROGRESS NOTES
"[facility] Medicine Progress Note  Patient Name: Francis Daigle MRN: [id number]   Patient Class: IP- Inpatient  Length of Stay: 7   Admission Date: 1/27/2020 10:01 AM Attending Physician: Ebenezer Chen MD   Primary Care Provider: Primary Doctor No Face-to-Face encounter date: 02/03/2020   Chief Complaint: Groin Swelling (for several days)    Assessment & Plan:   Francis Daigle is a 44 y.o. male admitted for    1. Severe Sepsis due to Bilateral pneumonia with Para pneumonic effusion: CTS consulted for thoracotomy. Plan to take him to OR tomorrow. IV Zosyn    2. Bilateral Pulmonary Embolism: on heparin GTT    3.  Acute on Chronic systolic CHF NYHA III: IV Lasix 20mg BID along with Eplerenone 25mg    4. Hypertension: Enalapril 2.5mg    Discharge Planning:   Anticipated procedure tomorrow.     Subjective:    Interval History: Patient is doing fairly well. His swelling has significantly improved. No chest pain, shortness of breath or palpitations. No family present at bedside.No concerns/issues overnight reported by the patient or the nursing staff.    Review of Systems All other Review of Systems were found to be negative expect for that mentioned already in HPI.   Objective:   Physical Exam  /79   Pulse 103   Temp 97.7 °F (36.5 °C) (Oral)   Resp 18   Ht 5' 8" (1.727 m)   Wt 76.5 kg (168 lb 10.4 oz)   SpO2 97%   BMI 25.64 kg/m²   Vitals reviewed.    Constitutional: No distress.   HENT: Atraumatic.   Cardiovascular: Normal rate, regular rhythm and normal heart sounds.   Pulmonary/Chest: Effort normal. Decreased breath sounds on the right base. No wheezes.   Abdominal: Soft. Bowel sounds are normal. Exhibits no distension and no mass. No tenderness  Neurological: Alert.   Skin: Skin is warm and dry.     Following labs were Reviewed   Recent Labs   Lab 02/03/20  0459   WBC 17.66*   HGB 10.1*   HCT 31.7*      CALCIUM 8.3*   ALBUMIN 2.1*   PROT 7.0   *   K 4.3 "   CO2 29   CL 95   BUN 13   CREATININE 1.0   ALKPHOS 68   ALT 59*   AST 61*   BILITOT 2.4*     No results found for: POCTGLUCOSE     All labs within the past 24 hours have been reviewed  Microbiology Results (last 7 days)     Procedure Component Value Units Date/Time    Blood Culture #1 **CANNOT BE ORDERED STAT** [[id number]] Collected:  01/27/20 1235    Order Status:  Completed Specimen:  Blood from Peripheral, Antecubital, Right Updated:  02/01/20 1432     Blood Culture, Routine No growth after 5 days.    Blood Culture #2 **CANNOT BE ORDERED STAT** [[id number]] Collected:  01/27/20 1242    Order Status:  Completed Specimen:  Blood from Peripheral, Antecubital, Left Updated:  02/01/20 1432     Blood Culture, Routine No growth after 5 days.    Culture, Respiratory with Gram Stain [[id number]]     Order Status:  No result Specimen:  Respiratory     Blood culture [[id number]]     Order Status:  Canceled Specimen:  Blood     Blood culture [[id number]]     Order Status:  Canceled Specimen:  Blood         US Abdomen Limited   Final Result      US Liver with Doppler (xpd)   Final Result      CT Chest With Contrast   Final Result      Known acute bilateral pulmonary thromboembolism.      Loculated air/fluid collection within the pleural space of the right lower thorax.  Correlate with evidence of empyema.  There is associated compressive atelectatic change within the adjacent right lower lobe.      Small amounts of non loculated bilateral pleural fluid.      Left-sided airspace opacities suggesting acute infectious or inflammatory infiltrate with additional scattered bilateral parenchymal opacities which may reflect areas of atelectasis but require short-term CT follow-up to document resolution.      Cardiomegaly and small pericardial effusion.      Body wall edema and edematous changes within the mediastinal fat.  There are borderline prominent mediastinal lymph nodes.      Ascites.         Electronically signed by: Araseli  Jerald PRICE MD   Date:    01/29/2020   Time:    10:05      X-Ray Chest AP Portable   Final Result      CT Abdomen Pelvis With Contrast   Final Result      Moderate ascites with diffuse subcutaneous edema throughout the abdomen, pelvis and scrotum      Fatty infiltration of the liver with stable 10 mm hypodense lesion in the dome of the liver      Removal of the right pleural catheter with persistent right hydropneumothorax and small left pleural effusion      Patchy alveolar opacities within the right middle lobe and lung bases suggestive of pneumonia         Electronically signed by: Bette Herman MD   Date:    01/27/2020   Time:    12:19      X-Ray Chest AP Portable   Final Result      Progression of the airspace disease within the right lower lobe with new airspace disease in left mid lung and small bilateral pleural effusions      Mild cardiomegaly         Electronically signed by: Bette Herman MD   Date:    01/27/2020   Time:    10:49          Inpatient medications  Scheduled Meds:   enalapril  2.5 mg Oral Daily    eplerenone  25 mg Oral Daily    furosemide (LASIX) IV  20 mg Intravenous Q12H    hydrOXYzine HCl  25 mg Oral Daily    metoprolol succinate  100 mg Oral Daily    pantoprazole  40 mg Oral Daily    senna-docusate 8.6-50 mg  1 tablet Oral BID    sodium polystyrene  15 g Oral Once     Continuous Infusions:   heparin (porcine) in D5W 15.938 Units/kg/hr (02/03/20 0700)     PRN Meds:.acetaminophen, acetaminophen, acetaminophen, ALPRAZolam, calcium chloride IVPB, calcium chloride IVPB, calcium chloride IVPB, dextrose 50%, dextrose 50%, furosemide, gabapentin, glucagon (human recombinant), glucose, glucose, heparin (PORCINE), heparin (PORCINE), HYDROcodone-acetaminophen, HYDROcodone-acetaminophen, magnesium oxide, magnesium sulfate IVPB, magnesium sulfate IVPB, magnesium sulfate IVPB, magnesium sulfate IVPB, ondansetron, potassium chloride in water, potassium chloride in water, potassium chloride  in water, potassium chloride in water, potassium chloride, potassium chloride, potassium chloride, potassium chloride, promethazine (PHENERGAN) IVPB, sodium chloride 0.9%, sodium phosphate IVPB, sodium phosphate IVPB, sodium phosphate IVPB, sodium phosphate IVPB, sodium phosphate IVPB    Above encounter included review of the medical records, interviewing and examining the patient face-to-face, discussion with family and other health care providers, ordering and interpreting lab/test results and formulating a plan of care.     Medical Decision Making:    [] Low Complexity  [x] Moderate Complexity  [] High Complexity    Ebenezer Chen  [facility] Hospitalist  02/03/2020

## 2020-02-03 NOTE — NURSING
"Pt called to inform of alarm sounding on his IV pump. Noted Herparin needed a new bag. Bag changed. Appeared calm. However stated, " What's taking so long". Informed him I was changing his medication bag out. Reminded him of need for a repeat level to check his Heparin at [facility]. He stated, "No, I already told them I'm not taking any labs." I re-educated him on the rationale of the test, and he cut me off stating "This is defeating the purpose." Referring to his being unbothered and wanting to relax. I informed him I am just making sure he understands that he is refusing. Call light in reach, Informed to call for needs.   "

## 2020-02-03 NOTE — NURSING
"Noted pt hollering at the charge nurse that was attempting to care for him due to refusal of his primary nurse. Report was given to primary nurse that the pt had been unhappy for the most of the day and made statements of leaving AMA. Upon entering the room, noted the pt sitting at the side of his bed, failing his arms stating he was tired of being unfairly treated and staff not listening to him.  He questioned why he was taking Xanax and Lasix at the same time. He requested Xanax, however felt the Lasix shouldn't be given at the same time of the Xanax ,although the Lasix was being given as ordered. Stated "I don't need Lasix, look at me. I just want to get some rest and not be bothered."He stated his primary nurse wasn't listening to him and requested the supervisor. He remained hollering despite support and encouragement to relax. Xanax was in my possession, however he refused to take it because he wanted to continue to vent. I questioned him of  how he wishes for  his care to be guided so that he could get rest as he wishes. He continued to speak of the how long he's been here and that he has an upcoming surgery. He also stated he wishes he could go back to the third floor where his friends are. The supervisor entered the room as he continued to talk. He eventually calmed and allowed me to give him the Xanax. He stated, " I don't want to be bothered unless I call. I just want to be left alone." I informed him of vital sign times and the need for walking rounds. Again stated he didn't want to be bothered, "If I were going to die, I would have done that by now." Then requested that if he calls, that I would be the only one to respond. Voiced understanding and left him getting comfortable in bed, call bell at his side, and turned the lights off as requested. Informed to call for needs. Telemetry continues.   "

## 2020-02-03 NOTE — NURSING
"Went in to assess pt and adm meds. Pt is watching the super bowl and is frustrated and wants to know why I came so late to give him his lasix. I informed him it is due at 2100 and its 2040 and on time. He request a pain pill but refused his xanax and lasix. He states he isn't swollen anymore and he isn't taking lasix this late. He requested orange juice, I brought him one with some apple juice in case he needed more to drink. I informed him we only had one OJ on the unit, he states he isn't drinking the apple juice, I offered water, he refused. I told him I would go look on another unit in the hospital to see if I could find him somemore OJ. Pt was angry and hollering, he was mad that I interrupted him watching the game, he was mad that I "was late" bringing him his lasix. I tried to talk pt into the xanax but there was no cooperation. Pt requested the HS and stated he didn't want me back in his room. Reena charge nurse tried to calm pt down but he insisted on the HS, HS Jose A came and spoke with pt. Pt care turned over to Melany Hauser RN.  "

## 2020-02-04 ENCOUNTER — ANESTHESIA (OUTPATIENT)
Dept: SURGERY | Facility: HOSPITAL | Age: 45
DRG: 853 | End: 2020-02-04
Payer: MEDICAID

## 2020-02-04 LAB
ALBUMIN SERPL BCP-MCNC: 2.4 G/DL (ref 3.5–5.2)
ALP SERPL-CCNC: 76 U/L (ref 55–135)
ALT SERPL W/O P-5'-P-CCNC: 59 U/L (ref 10–44)
ANION GAP SERPL CALC-SCNC: 5 MMOL/L (ref 8–16)
ANION GAP SERPL CALC-SCNC: 5 MMOL/L (ref 8–16)
APTT PPP: 143.8 SEC (ref 23.6–33.3)
APTT PPP: 53.3 SEC (ref 23.6–33.3)
AST SERPL-CCNC: 62 U/L (ref 10–40)
BASOPHILS # BLD AUTO: 0.05 K/UL (ref 0–0.2)
BASOPHILS # BLD AUTO: 0.05 K/UL (ref 0–0.2)
BASOPHILS NFR BLD: 0.3 % (ref 0–1.9)
BASOPHILS NFR BLD: 0.3 % (ref 0–1.9)
BILIRUB SERPL-MCNC: 2 MG/DL (ref 0.1–1)
BUN SERPL-MCNC: 12 MG/DL (ref 6–20)
BUN SERPL-MCNC: 12 MG/DL (ref 6–20)
CALCIUM SERPL-MCNC: 8.5 MG/DL (ref 8.7–10.5)
CALCIUM SERPL-MCNC: 8.5 MG/DL (ref 8.7–10.5)
CHLORIDE SERPL-SCNC: 99 MMOL/L (ref 95–110)
CHLORIDE SERPL-SCNC: 99 MMOL/L (ref 95–110)
CO2 SERPL-SCNC: 32 MMOL/L (ref 23–29)
CO2 SERPL-SCNC: 32 MMOL/L (ref 23–29)
CREAT SERPL-MCNC: 0.8 MG/DL (ref 0.5–1.4)
CREAT SERPL-MCNC: 0.8 MG/DL (ref 0.5–1.4)
DIFFERENTIAL METHOD: ABNORMAL
DIFFERENTIAL METHOD: ABNORMAL
EOSINOPHIL # BLD AUTO: 0.4 K/UL (ref 0–0.5)
EOSINOPHIL # BLD AUTO: 0.4 K/UL (ref 0–0.5)
EOSINOPHIL NFR BLD: 2.2 % (ref 0–8)
EOSINOPHIL NFR BLD: 2.2 % (ref 0–8)
ERYTHROCYTE [DISTWIDTH] IN BLOOD BY AUTOMATED COUNT: 22.8 % (ref 11.5–14.5)
ERYTHROCYTE [DISTWIDTH] IN BLOOD BY AUTOMATED COUNT: 22.8 % (ref 11.5–14.5)
EST. GFR  (AFRICAN AMERICAN): >60 ML/MIN/1.73 M^2
EST. GFR  (AFRICAN AMERICAN): >60 ML/MIN/1.73 M^2
EST. GFR  (NON AFRICAN AMERICAN): >60 ML/MIN/1.73 M^2
EST. GFR  (NON AFRICAN AMERICAN): >60 ML/MIN/1.73 M^2
GLUCOSE SERPL-MCNC: 92 MG/DL (ref 70–110)
GLUCOSE SERPL-MCNC: 92 MG/DL (ref 70–110)
GLUCOSE SERPL-MCNC: 99 MG/DL (ref 70–110)
HCO3 UR-SCNC: 28.2 MMOL/L (ref 24–28)
HCT VFR BLD AUTO: 33.7 % (ref 40–54)
HCT VFR BLD AUTO: 33.7 % (ref 40–54)
HCT VFR BLD CALC: 34 %PCV (ref 36–54)
HGB BLD-MCNC: 10.5 G/DL (ref 14–18)
HGB BLD-MCNC: 10.5 G/DL (ref 14–18)
IMM GRANULOCYTES # BLD AUTO: 0.09 K/UL (ref 0–0.04)
IMM GRANULOCYTES # BLD AUTO: 0.09 K/UL (ref 0–0.04)
IMM GRANULOCYTES NFR BLD AUTO: 0.5 % (ref 0–0.5)
IMM GRANULOCYTES NFR BLD AUTO: 0.5 % (ref 0–0.5)
INR PPP: 1.3
LYMPHOCYTES # BLD AUTO: 1.9 K/UL (ref 1–4.8)
LYMPHOCYTES # BLD AUTO: 1.9 K/UL (ref 1–4.8)
LYMPHOCYTES NFR BLD: 11.4 % (ref 18–48)
LYMPHOCYTES NFR BLD: 11.4 % (ref 18–48)
MAGNESIUM SERPL-MCNC: 1.9 MG/DL (ref 1.6–2.6)
MCH RBC QN AUTO: 24.9 PG (ref 27–31)
MCH RBC QN AUTO: 24.9 PG (ref 27–31)
MCHC RBC AUTO-ENTMCNC: 31.2 G/DL (ref 32–36)
MCHC RBC AUTO-ENTMCNC: 31.2 G/DL (ref 32–36)
MCV RBC AUTO: 80 FL (ref 82–98)
MCV RBC AUTO: 80 FL (ref 82–98)
MONOCYTES # BLD AUTO: 1.4 K/UL (ref 0.3–1)
MONOCYTES # BLD AUTO: 1.4 K/UL (ref 0.3–1)
MONOCYTES NFR BLD: 8.3 % (ref 4–15)
MONOCYTES NFR BLD: 8.3 % (ref 4–15)
NEUTROPHILS # BLD AUTO: 13.1 K/UL (ref 1.8–7.7)
NEUTROPHILS # BLD AUTO: 13.1 K/UL (ref 1.8–7.7)
NEUTROPHILS NFR BLD: 77.3 % (ref 38–73)
NEUTROPHILS NFR BLD: 77.3 % (ref 38–73)
NRBC BLD-RTO: 0 /100 WBC
NRBC BLD-RTO: 0 /100 WBC
PCO2 BLDA: 46.1 MMHG (ref 35–45)
PH SMN: 7.39 [PH] (ref 7.35–7.45)
PHOSPHATE SERPL-MCNC: 3.2 MG/DL (ref 2.7–4.5)
PLATELET # BLD AUTO: 277 K/UL (ref 150–350)
PLATELET # BLD AUTO: 277 K/UL (ref 150–350)
PMV BLD AUTO: 9.6 FL (ref 9.2–12.9)
PMV BLD AUTO: 9.6 FL (ref 9.2–12.9)
PO2 BLDA: 76 MMHG (ref 80–100)
POC BE: 3 MMOL/L
POC IONIZED CALCIUM: 1.58 MMOL/L (ref 1.06–1.42)
POC SATURATED O2: 95 % (ref 95–100)
POC TCO2: 30 MMOL/L (ref 23–27)
POTASSIUM BLD-SCNC: 4.8 MMOL/L (ref 3.5–5.1)
POTASSIUM SERPL-SCNC: 4.5 MMOL/L (ref 3.5–5.1)
POTASSIUM SERPL-SCNC: 4.5 MMOL/L (ref 3.5–5.1)
PROT SERPL-MCNC: 7.3 G/DL (ref 6–8.4)
PROTHROMBIN TIME: 16 SEC (ref 10.6–14.8)
RBC # BLD AUTO: 4.21 M/UL (ref 4.6–6.2)
RBC # BLD AUTO: 4.21 M/UL (ref 4.6–6.2)
SAMPLE: ABNORMAL
SODIUM BLD-SCNC: 133 MMOL/L (ref 136–145)
SODIUM SERPL-SCNC: 136 MMOL/L (ref 136–145)
SODIUM SERPL-SCNC: 136 MMOL/L (ref 136–145)
WBC # BLD AUTO: 16.96 K/UL (ref 3.9–12.7)
WBC # BLD AUTO: 16.96 K/UL (ref 3.9–12.7)

## 2020-02-04 PROCEDURE — 25000003 PHARM REV CODE 250

## 2020-02-04 PROCEDURE — 87102 FUNGUS ISOLATION CULTURE: CPT

## 2020-02-04 PROCEDURE — 87205 SMEAR GRAM STAIN: CPT

## 2020-02-04 PROCEDURE — 27800505 HC CATH, RADIAL ARTERY KIT: Performed by: ANESTHESIOLOGY

## 2020-02-04 PROCEDURE — 87206 SMEAR FLUORESCENT/ACID STAI: CPT

## 2020-02-04 PROCEDURE — 27100025 HC TUBING, SET FLUID WARMER: Performed by: ANESTHESIOLOGY

## 2020-02-04 PROCEDURE — 94761 N-INVAS EAR/PLS OXIMETRY MLT: CPT

## 2020-02-04 PROCEDURE — 37000008 HC ANESTHESIA 1ST 15 MINUTES: Performed by: THORACIC SURGERY (CARDIOTHORACIC VASCULAR SURGERY)

## 2020-02-04 PROCEDURE — 20000000 HC ICU ROOM

## 2020-02-04 PROCEDURE — C2615 SEALANT, PULMONARY, LIQUID: HCPCS | Performed by: THORACIC SURGERY (CARDIOTHORACIC VASCULAR SURGERY)

## 2020-02-04 PROCEDURE — 37000009 HC ANESTHESIA EA ADD 15 MINS: Performed by: THORACIC SURGERY (CARDIOTHORACIC VASCULAR SURGERY)

## 2020-02-04 PROCEDURE — 63600175 PHARM REV CODE 636 W HCPCS: Performed by: THORACIC SURGERY (CARDIOTHORACIC VASCULAR SURGERY)

## 2020-02-04 PROCEDURE — 27201423 OPTIME MED/SURG SUP & DEVICES STERILE SUPPLY: Performed by: THORACIC SURGERY (CARDIOTHORACIC VASCULAR SURGERY)

## 2020-02-04 PROCEDURE — 85025 COMPLETE CBC W/AUTO DIFF WBC: CPT

## 2020-02-04 PROCEDURE — 87077 CULTURE AEROBIC IDENTIFY: CPT

## 2020-02-04 PROCEDURE — 80053 COMPREHEN METABOLIC PANEL: CPT

## 2020-02-04 PROCEDURE — 25000003 PHARM REV CODE 250: Performed by: THORACIC SURGERY (CARDIOTHORACIC VASCULAR SURGERY)

## 2020-02-04 PROCEDURE — 27202107 HC XP QUATRO SENSOR: Performed by: ANESTHESIOLOGY

## 2020-02-04 PROCEDURE — 87070 CULTURE OTHR SPECIMN AEROBIC: CPT | Mod: 59

## 2020-02-04 PROCEDURE — 99291 PR CRITICAL CARE, E/M 30-74 MINUTES: ICD-10-PCS | Mod: ,,, | Performed by: INTERNAL MEDICINE

## 2020-02-04 PROCEDURE — 84100 ASSAY OF PHOSPHORUS: CPT

## 2020-02-04 PROCEDURE — 85610 PROTHROMBIN TIME: CPT

## 2020-02-04 PROCEDURE — 99291 CRITICAL CARE FIRST HOUR: CPT | Mod: ,,, | Performed by: INTERNAL MEDICINE

## 2020-02-04 PROCEDURE — 87116 MYCOBACTERIA CULTURE: CPT

## 2020-02-04 PROCEDURE — 87070 CULTURE OTHR SPECIMN AEROBIC: CPT

## 2020-02-04 PROCEDURE — 27000673 HC TUBING BLOOD Y: Performed by: ANESTHESIOLOGY

## 2020-02-04 PROCEDURE — 27200677 HC TRANSDUCER MONITOR KIT SINGLE: Performed by: ANESTHESIOLOGY

## 2020-02-04 PROCEDURE — 83735 ASSAY OF MAGNESIUM: CPT

## 2020-02-04 PROCEDURE — 87118 MYCOBACTERIC IDENTIFICATION: CPT

## 2020-02-04 PROCEDURE — 27200695 HC TUBE, ENDOBRONCHIAL: Performed by: ANESTHESIOLOGY

## 2020-02-04 PROCEDURE — 36415 COLL VENOUS BLD VENIPUNCTURE: CPT

## 2020-02-04 PROCEDURE — C1729 CATH, DRAINAGE: HCPCS | Performed by: THORACIC SURGERY (CARDIOTHORACIC VASCULAR SURGERY)

## 2020-02-04 PROCEDURE — 27000666 HC INFUSOR PRESSURE BAG: Performed by: ANESTHESIOLOGY

## 2020-02-04 PROCEDURE — 63600175 PHARM REV CODE 636 W HCPCS: Performed by: NURSE ANESTHETIST, CERTIFIED REGISTERED

## 2020-02-04 PROCEDURE — 27000656 HC EYE GOGGLES: Performed by: ANESTHESIOLOGY

## 2020-02-04 PROCEDURE — 27000658 HC ARTERIAL LINE ALL: Performed by: ANESTHESIOLOGY

## 2020-02-04 PROCEDURE — 87075 CULTR BACTERIA EXCEPT BLOOD: CPT

## 2020-02-04 PROCEDURE — 27000221 HC OXYGEN, UP TO 24 HOURS

## 2020-02-04 PROCEDURE — 85730 THROMBOPLASTIN TIME PARTIAL: CPT | Mod: 91

## 2020-02-04 PROCEDURE — 25000003 PHARM REV CODE 250: Performed by: NURSE ANESTHETIST, CERTIFIED REGISTERED

## 2020-02-04 PROCEDURE — 94770 HC EXHALED C02 TEST: CPT

## 2020-02-04 PROCEDURE — 87186 SC STD MICRODIL/AGAR DIL: CPT

## 2020-02-04 PROCEDURE — 99900035 HC TECH TIME PER 15 MIN (STAT)

## 2020-02-04 PROCEDURE — 85730 THROMBOPLASTIN TIME PARTIAL: CPT

## 2020-02-04 PROCEDURE — 36000711: Performed by: THORACIC SURGERY (CARDIOTHORACIC VASCULAR SURGERY)

## 2020-02-04 PROCEDURE — 36000710: Performed by: THORACIC SURGERY (CARDIOTHORACIC VASCULAR SURGERY)

## 2020-02-04 RX ORDER — DIPHENHYDRAMINE HYDROCHLORIDE 50 MG/ML
12.5 INJECTION INTRAMUSCULAR; INTRAVENOUS EVERY 4 HOURS PRN
Status: DISCONTINUED | OUTPATIENT
Start: 2020-02-04 | End: 2020-02-14 | Stop reason: HOSPADM

## 2020-02-04 RX ORDER — HYDROMORPHONE HYDROCHLORIDE 1 MG/ML
0.2 INJECTION, SOLUTION INTRAMUSCULAR; INTRAVENOUS; SUBCUTANEOUS
Status: DISCONTINUED | OUTPATIENT
Start: 2020-02-04 | End: 2020-02-04

## 2020-02-04 RX ORDER — HYDROMORPHONE HCL IN 0.9% NACL 6 MG/30 ML
PATIENT CONTROLLED ANALGESIA SYRINGE INTRAVENOUS CONTINUOUS
Status: DISCONTINUED | OUTPATIENT
Start: 2020-02-04 | End: 2020-02-04

## 2020-02-04 RX ORDER — SODIUM CHLORIDE 9 MG/ML
INJECTION, SOLUTION INTRAVENOUS CONTINUOUS PRN
Status: DISCONTINUED | OUTPATIENT
Start: 2020-02-04 | End: 2020-02-04

## 2020-02-04 RX ORDER — MEPERIDINE HYDROCHLORIDE 50 MG/ML
12.5 INJECTION INTRAMUSCULAR; INTRAVENOUS; SUBCUTANEOUS EVERY 10 MIN PRN
Status: DISCONTINUED | OUTPATIENT
Start: 2020-02-04 | End: 2020-02-04

## 2020-02-04 RX ORDER — DIPHENHYDRAMINE HYDROCHLORIDE 50 MG/ML
12.5 INJECTION INTRAMUSCULAR; INTRAVENOUS
Status: DISCONTINUED | OUTPATIENT
Start: 2020-02-04 | End: 2020-02-04

## 2020-02-04 RX ORDER — MUPIROCIN 20 MG/G
OINTMENT TOPICAL 2 TIMES DAILY
Status: DISCONTINUED | OUTPATIENT
Start: 2020-02-04 | End: 2020-02-09

## 2020-02-04 RX ORDER — ONDANSETRON 2 MG/ML
4 INJECTION INTRAMUSCULAR; INTRAVENOUS DAILY PRN
Status: DISCONTINUED | OUTPATIENT
Start: 2020-02-04 | End: 2020-02-04

## 2020-02-04 RX ORDER — MIDAZOLAM HYDROCHLORIDE 1 MG/ML
INJECTION INTRAMUSCULAR; INTRAVENOUS
Status: DISCONTINUED | OUTPATIENT
Start: 2020-02-04 | End: 2020-02-04

## 2020-02-04 RX ORDER — SODIUM CHLORIDE 0.9 % (FLUSH) 0.9 %
10 SYRINGE (ML) INJECTION
Status: DISCONTINUED | OUTPATIENT
Start: 2020-02-04 | End: 2020-02-04

## 2020-02-04 RX ORDER — BACITRACIN 50000 [IU]/1
INJECTION, POWDER, FOR SOLUTION INTRAMUSCULAR
Status: DISCONTINUED | OUTPATIENT
Start: 2020-02-04 | End: 2020-02-04 | Stop reason: HOSPADM

## 2020-02-04 RX ORDER — SODIUM CHLORIDE, SODIUM LACTATE, POTASSIUM CHLORIDE, CALCIUM CHLORIDE 600; 310; 30; 20 MG/100ML; MG/100ML; MG/100ML; MG/100ML
INJECTION, SOLUTION INTRAVENOUS CONTINUOUS PRN
Status: DISCONTINUED | OUTPATIENT
Start: 2020-02-04 | End: 2020-02-04

## 2020-02-04 RX ORDER — PHENYLEPHRINE HYDROCHLORIDE 10 MG/ML
INJECTION INTRAVENOUS
Status: DISCONTINUED | OUTPATIENT
Start: 2020-02-04 | End: 2020-02-04

## 2020-02-04 RX ORDER — ETOMIDATE 2 MG/ML
INJECTION INTRAVENOUS
Status: DISCONTINUED | OUTPATIENT
Start: 2020-02-04 | End: 2020-02-04

## 2020-02-04 RX ORDER — CEFAZOLIN SODIUM 1 G/3ML
INJECTION, POWDER, FOR SOLUTION INTRAMUSCULAR; INTRAVENOUS
Status: DISCONTINUED | OUTPATIENT
Start: 2020-02-04 | End: 2020-02-04

## 2020-02-04 RX ORDER — LIDOCAINE HYDROCHLORIDE 20 MG/ML
INJECTION, SOLUTION EPIDURAL; INFILTRATION; INTRACAUDAL; PERINEURAL
Status: DISCONTINUED | OUTPATIENT
Start: 2020-02-04 | End: 2020-02-04

## 2020-02-04 RX ORDER — FENTANYL CITRATE 50 UG/ML
INJECTION, SOLUTION INTRAMUSCULAR; INTRAVENOUS
Status: DISCONTINUED | OUTPATIENT
Start: 2020-02-04 | End: 2020-02-04

## 2020-02-04 RX ORDER — ROCURONIUM BROMIDE 10 MG/ML
INJECTION, SOLUTION INTRAVENOUS
Status: DISCONTINUED | OUTPATIENT
Start: 2020-02-04 | End: 2020-02-04

## 2020-02-04 RX ORDER — ONDANSETRON 2 MG/ML
INJECTION INTRAMUSCULAR; INTRAVENOUS
Status: DISCONTINUED | OUTPATIENT
Start: 2020-02-04 | End: 2020-02-04

## 2020-02-04 RX ORDER — FUROSEMIDE 10 MG/ML
20 INJECTION INTRAMUSCULAR; INTRAVENOUS
Status: DISCONTINUED | OUTPATIENT
Start: 2020-02-04 | End: 2020-02-09

## 2020-02-04 RX ORDER — NALOXONE HCL 0.4 MG/ML
0.02 VIAL (ML) INJECTION ONCE
Status: DISCONTINUED | OUTPATIENT
Start: 2020-02-04 | End: 2020-02-09

## 2020-02-04 RX ORDER — DEXAMETHASONE SODIUM PHOSPHATE 4 MG/ML
INJECTION, SOLUTION INTRA-ARTICULAR; INTRALESIONAL; INTRAMUSCULAR; INTRAVENOUS; SOFT TISSUE
Status: DISCONTINUED | OUTPATIENT
Start: 2020-02-04 | End: 2020-02-04

## 2020-02-04 RX ORDER — CHLORHEXIDINE GLUCONATE ORAL RINSE 1.2 MG/ML
15 SOLUTION DENTAL 2 TIMES DAILY
Status: COMPLETED | OUTPATIENT
Start: 2020-02-04 | End: 2020-02-09

## 2020-02-04 RX ORDER — OXYCODONE HYDROCHLORIDE 5 MG/1
5 TABLET ORAL
Status: DISCONTINUED | OUTPATIENT
Start: 2020-02-04 | End: 2020-02-04

## 2020-02-04 RX ORDER — KETAMINE HYDROCHLORIDE 50 MG/ML
INJECTION, SOLUTION INTRAMUSCULAR; INTRAVENOUS
Status: DISCONTINUED | OUTPATIENT
Start: 2020-02-04 | End: 2020-02-04

## 2020-02-04 RX ORDER — ONDANSETRON 2 MG/ML
4 INJECTION INTRAMUSCULAR; INTRAVENOUS EVERY 6 HOURS PRN
Status: DISCONTINUED | OUTPATIENT
Start: 2020-02-04 | End: 2020-02-11

## 2020-02-04 RX ADMIN — Medication: at 04:02

## 2020-02-04 RX ADMIN — ETOMIDATE 30 MG: 2 INJECTION, SOLUTION INTRAVENOUS at 11:02

## 2020-02-04 RX ADMIN — PHENYLEPHRINE HYDROCHLORIDE 100 MCG: 10 INJECTION INTRAVENOUS at 01:02

## 2020-02-04 RX ADMIN — SODIUM CHLORIDE, SODIUM LACTATE, POTASSIUM CHLORIDE, AND CALCIUM CHLORIDE: .6; .31; .03; .02 INJECTION, SOLUTION INTRAVENOUS at 11:02

## 2020-02-04 RX ADMIN — MUPIROCIN 1 G: 20 OINTMENT TOPICAL at 09:02

## 2020-02-04 RX ADMIN — DIPHENHYDRAMINE HYDROCHLORIDE 12.5 MG: 50 INJECTION INTRAMUSCULAR; INTRAVENOUS at 03:02

## 2020-02-04 RX ADMIN — LIDOCAINE HYDROCHLORIDE 100 MG: 20 INJECTION, SOLUTION EPIDURAL; INFILTRATION; INTRACAUDAL; PERINEURAL at 11:02

## 2020-02-04 RX ADMIN — SODIUM CHLORIDE: 900 INJECTION INTRAVENOUS at 11:02

## 2020-02-04 RX ADMIN — FENTANYL CITRATE 100 MCG: 50 INJECTION INTRAMUSCULAR; INTRAVENOUS at 03:02

## 2020-02-04 RX ADMIN — CHLORHEXIDINE GLUCONATE 15 ML: 1.2 RINSE ORAL at 09:02

## 2020-02-04 RX ADMIN — ONDANSETRON 4 MG: 2 INJECTION INTRAMUSCULAR; INTRAVENOUS at 11:02

## 2020-02-04 RX ADMIN — KETAMINE HYDROCHLORIDE 30 MG: 50 INJECTION INTRAMUSCULAR; INTRAVENOUS at 12:02

## 2020-02-04 RX ADMIN — DEXAMETHASONE SODIUM PHOSPHATE 8 MG: 4 INJECTION, SOLUTION INTRAMUSCULAR; INTRAVENOUS at 11:02

## 2020-02-04 RX ADMIN — PHENYLEPHRINE HYDROCHLORIDE 200 MCG: 10 INJECTION INTRAVENOUS at 12:02

## 2020-02-04 RX ADMIN — CEFAZOLIN 2 G: 330 INJECTION, POWDER, FOR SOLUTION INTRAMUSCULAR; INTRAVENOUS at 12:02

## 2020-02-04 RX ADMIN — MIDAZOLAM 2 MG: 1 INJECTION INTRAMUSCULAR; INTRAVENOUS at 11:02

## 2020-02-04 RX ADMIN — ROCURONIUM BROMIDE 5 MG: 10 INJECTION, SOLUTION INTRAVENOUS at 11:02

## 2020-02-04 RX ADMIN — FENTANYL CITRATE 100 MCG: 50 INJECTION INTRAMUSCULAR; INTRAVENOUS at 11:02

## 2020-02-04 RX ADMIN — ROCURONIUM BROMIDE 30 MG: 10 INJECTION, SOLUTION INTRAVENOUS at 12:02

## 2020-02-04 NOTE — PROGRESS NOTES
[facility]  Pulmonology  Progress Note    Subjective     No major issues overnight.  Underwent an uncomplicated left thoracotomy with decortication this afternoon.  I examined him a short time afterwards and he was resting comfortably without any complaints other than some appropriate postoperative pain.    Review of Systems   Constitutional: Negative for fever, chills and night sweats.   Respiratory: Negative for cough, hemoptysis, sputum production, shortness of breath, wheezing and orthopnea.    Cardiovascular: Negative for chest pain, palpitations and leg swelling.   Gastrointestinal: Negative for nausea, vomiting, abdominal pain and abdominal distention.   Neurological: Negative for headaches.   Psychiatric/Behavioral: Negative for confusion.   All other systems reviewed and are negative.       I have personally reviewed the following during today's evaluation:  past medical history, ROS, family history, social history, surgical history, current inpatient medications,drug allergies, vital signs over the past 24 hours, results of relevant diagnostic studies and nursing/provider documentation from the past 24 hours.     Objective     VS Temp:  [97.4 °F (36.3 °C)-98.9 °F (37.2 °C)]   Pulse:  []   Resp:  [17-23]   BP: (103-141)/(51-80)   SpO2:  [92 %-100 %]   Arterial Line BP: (139-192)/()   Ideal body weight: 68.4 kg (150 lb 12.7 oz)  Adjusted ideal body weight: 71.6 kg (157 lb 15 oz)   I/O   Intake/Output Summary (Last 24 hours) at 2/4/2020 1802  Last data filed at 2/4/2020 1530  Gross per 24 hour   Intake 2000 ml   Output 650 ml   Net 1350 ml        Vent SpO2 100% on 3 L nasal cannula   PE Physical Exam   Constitutional: He is oriented to person, place, and time. He appears well-developed and well-nourished. He appears not cachectic.  Non-toxic appearance. No distress. Nasal cannula in place.   Temporal wasting present He is not obese.   HENT:   Head: Normocephalic and atraumatic.    Right Ear: External ear normal.   Left Ear: External ear normal.   Nose: Nose normal.   Mouth/Throat: Uvula is midline, oropharynx is clear and moist and mucous membranes are normal. Mallampati Score: II.   Neck: Trachea normal and normal range of motion. Neck supple. No JVD present. No tracheal deviation present. No thyromegaly present.   Cardiovascular: Normal rate, regular rhythm, normal heart sounds and intact distal pulses. Exam reveals no gallop and no friction rub.   No murmur heard.  Pulmonary/Chest: Normal expansion, symmetric chest wall expansion and effort normal. No stridor. He has decreased breath sounds (Over the right base). He has no wheezes. He has no rhonchi. He has no rales. Chest wall is dull to percussion. He exhibits tenderness.   Two surgical chest tubes on the right connected to low wall suction.  Both draining a little over 200 cc of kathie blood.  No air leak observed.  Right-sided thoracotomy dressing clean dry and intact.   Abdominal: Soft. Bowel sounds are normal. He exhibits no distension. There is no tenderness. There is no guarding.   Musculoskeletal: Normal range of motion. He exhibits edema. He exhibits no tenderness or deformity.   Lymphadenopathy: No supraclavicular adenopathy is present.     He has no cervical adenopathy.     He has no axillary adenopathy.   Neurological: He is alert and oriented to person, place, and time. He has normal strength. No cranial nerve deficit or sensory deficit. He exhibits normal muscle tone. GCS eye subscore is 4. GCS verbal subscore is 5. GCS motor subscore is 6.   Skin: Skin is warm, dry and intact. No rash noted. He is not diaphoretic. No cyanosis. Nails show no clubbing.   Psychiatric: He has a normal mood and affect. His speech is normal and behavior is normal.   Nursing note and vitals reviewed.      Labs I have personally reviewed and interpreted all labs / diagnostic studies obtained over the past 24 hours, and relevant results are as  follows:  Recent Labs   Lab 02/04/20  0546 02/04/20  0547 02/04/20  1408   WBC  --  16.96*  16.96*  --    RBC  --  4.21*  4.21*  --    HGB  --  10.5*  10.5*  --    HCT  --  33.7*  33.7* 34*   PLT  --  277  277  --    MCV  --  80*  80*  --    MCH  --  24.9*  24.9*  --    MCHC  --  31.2*  31.2*  --      136  --   --    K 4.5  4.5  --   --    CL 99  99  --   --    CO2 32*  32*  --   --    BUN 12  12  --   --    CREATININE 0.8  0.8  --   --    MG 1.9  --   --    ALT 59*  --   --    AST 62*  --   --    ALKPHOS 76  --   --    BILITOT 2.0*  --   --    PROT 7.3  --   --    ALBUMIN 2.4*  --   --    PH  --   --  7.395   PCO2  --   --  46.1*   PO2  --   --  76*   HCO3  --   --  28.2*   POCSATURATED  --   --  95   BE  --   --  3   INR 1.3  --   --    APTT 53.3*  --   --       Imaging I have personally reviewed and interpreted the following images and reviewed the associated Radiology report.  CXR: I have reviewed all pertinent results/findings within the past 24 hours and my personal findings are:  Faint hazy opacification in the right hemithorax with some mild blunting right costophrenic angle.  2 appropriately placed large bore surgical chest tubes without any appreciable pneumothorax.  I have reviewed and interpreted all pertinent imaging results/findings within the past 24 hours.     Micro I have personally reviewed and interpreted the available culture data.  Relevant results are as follows.  Blood Culture   Lab Results   Component Value Date    LABBLOO No growth after 5 days. 01/27/2020   , Sputum Culture   Lab Results   Component Value Date    GSRESP Few WBC's 12/11/2019    GSRESP Few yeast 12/11/2019    RESPIRATORYC No S aureus or Pseudomonas isolated. 12/11/2019    RESPIRATORYC KEYSHAWN ALBICANS  Moderate   (A) 12/11/2019    RESPIRATORYC (A) 12/11/2019     ENTEROBACTER CLOACAE  Few  susceptibility pending      and Urine Culture  No results found for: LABURIN   Medications Scheduled    chlorhexidine   15 mL Mouth/Throat BID    enalapril  2.5 mg Oral Daily    eplerenone  25 mg Oral Daily    furosemide (LASIX) IV  20 mg Intravenous BID AC    hydrOXYzine HCl  25 mg Oral Daily    metoprolol succinate  100 mg Oral Daily    mupirocin   Nasal BID    naloxone  0.02 mg Intravenous Once    pantoprazole  40 mg Oral Daily    senna-docusate 8.6-50 mg  1 tablet Oral BID    sodium polystyrene  15 g Oral Once      Continuous Infusions:    heparin (porcine) in D5W Stopped (02/04/20 0549)    HYDROmorphone in 0.9 % NaCl        PRN   acetaminophen, acetaminophen, acetaminophen, ALPRAZolam, calcium chloride IVPB, calcium chloride IVPB, calcium chloride IVPB, dextrose 50%, dextrose 50%, diphenhydrAMINE, furosemide, gabapentin, glucagon (human recombinant), glucose, glucose, heparin (PORCINE), heparin (PORCINE), HYDROcodone-acetaminophen, HYDROcodone-acetaminophen, magnesium oxide, magnesium sulfate IVPB, magnesium sulfate IVPB, magnesium sulfate IVPB, magnesium sulfate IVPB, ondansetron, ondansetron, potassium chloride in water, potassium chloride in water, potassium chloride in water, potassium chloride in water, potassium chloride, potassium chloride, potassium chloride, potassium chloride, promethazine (PHENERGAN) IVPB, promethazine (PHENERGAN) IVPB, sodium chloride 0.9%, sodium phosphate IVPB, sodium phosphate IVPB, sodium phosphate IVPB, sodium phosphate IVPB, sodium phosphate IVPB        Assessment       Active Hospital Problems    Diagnosis    *Hydropneumothorax    Volume overload    Parapneumonic effusion    Anasarca    Liver enzyme elevation    Empyema    Acute massive pulmonary embolism    Chronic hepatitis C without hepatic coma    Pneumonia    Essential hypertension    Acute on chronic combined systolic and diastolic congestive heart failure      My Impression:  Status post right-sided thoracotomy for empyema that failed chest tube drainage and intrapleural thrombolytics postop day 0.    Plan      Neuro  · Continue PCA for pain control.    Pulmonary  · Continue antibiotics as ordered.  · Thoracic surgery following.  Follow up on biology cultures obtained intraoperatively, but otherwise defer to their expertise regarding his postoperative management.  · Titrate supplemental oxygen to maintain an SpO2 greater than 92%.  · Continue diuresis.  · Resume heparin infusion as soon as possible once cleared by surgery.    Cardiac/Vascular  · Hemodynamically stable this time.  · Continue metoprolol and enalapril.    Renal/  · No acute issues.  · Observation for now.    Hematology  · No compelling indication for blood products at this time.  · Observation for now.    Endocrine  · Serial blood glucose monitoring.  · Sliding scale insulin if necessary.    GI  · Continue PPI.  · Close attention to maintaining a net neutral fluid balance.       Trae Steele MD  Pulmonary / Critical Care Medicine  [facility]            Critical Care Time: Approximately 43 minutes    The patient is critically ill due to the following conditions that actively pose threat to life and bodily function:    The patient is at high risk of death d and requiring ongoing treatment to prevent further life-threatening deterioration.  Due to a high probability of clinically significant, life threatening deterioration, the patient required my highest level of preparedness to intervene emergently and I personally spent this critical care time directly and personally managing the patient.     Critical care was time spent personally by me on the following activities:    Obtaining a history   Examination of patient.   Reviewing pulse oximetry.   Providing medical care at the patients bedside.   Developing a treatment plan with patient or surrogate and bedside caregivers   Ordering and reviewing laboratory studies, radiographic studies, pulse oximetry.   Ordering and performing treatments and interventions.   Evaluation of  patient's response to treatment.   Discussions with consultants while on the unit and immediately available to the patient.   Re-evaluation of the patient's condition.   Documentation in the medical record.    This critical care time did not overlap with that of any other provider or involve time for any procedures.     Trae Steele MD  Pulmonary / Critical Care Medicine  [facility]  02/04/2020  6:02 PM

## 2020-02-04 NOTE — OP NOTE
[facility]  Surgery Department  Operative Note    SUMMARY     Date of Procedure: 2/4/2020     Procedure: Procedure(s) (LRB):  DECORTICATION, LUNG (Right)     Surgeon(s) and Role:     * Rehan Rousseau MD - Primary    Assisting Surgeon: None    Pre-Operative Diagnosis: Parapneumonic effusion [J18.9, J91.8]    Post-Operative Diagnosis: Post-Op Diagnosis Codes:     * Parapneumonic effusion [J18.9, J91.8]    Anesthesia: General    Description of the Procedure:  Patient was taken the operating room placed in supine position after adequate induction of general anesthesia utilizing a double-lumen endotracheal tube the patient was repositioned with the right side up and prepped and draped in usual sterile fashion. Lateral thoracotomy incision was made hemostasis maintained electrocautery we continued down to the chest wall in a muscle sparing fashion and then the pleural cavity was entered approximately the 5th intercostal space. On entering we encountered the typical inflammatory fluid and tissue and began to evacuate this as we took down and lysed adhesions and entered multiple pockets.  Once this was accomplished anterior apical posterior lateral and inferior we then proceeded with the decortication which primarily involve the lower lobe and middle lobe and to some extent the the lower portion of the upper lobe was difficulty identifying a plane between the peel and the lung surface but after the tedious process was completed we felt like we had a fairly satisfactory result.  Anesthesia was asked to inflate the lung and the lungs actually all 3 lobes expanded quite nicely out to the chest wall. There were 2 other areas where it was felt to be beneficial to remove some more peel and we went ahead and did this with again a good result and for use at this point we proceeded to copiously irrigated with antibiotic solution and the re-evaluate there was some adhesion of the lip part of the lower lobe to the  diaphragm this was carefully mobilized and once the lung was off the diaphragm we identified what appeared to be a abscess cavity in the lower lobe which may have been the etiology of the patient's empyema from the start.  This cavity was unroofed and contents were sent separately for culture and path and we then again irrigated and then proceeded to now place our drains 2 straight 28 [place] drains were placed 1 anterior 1 posterior and then a angle drain was placed over the diaphragm all 3 drains were brought through separate stab incisions and secured to the skin using heavy silk suture with this we proceeded to place our pericostal sutures using 2.  Vicryl once they were all in place anesthesia was again asked to reinflate the lung once the lung was fully expanded the rib approximator was used to bring the ribs back together and the sutures were now tied and cut.  The muscles returned to normal anatomic position at which point they were tacked into place using interrupted 0 Vicryl suture the subcutaneous tissue was then irrigated with antibiotic solution and closed using 2 0 Vicryl running suture and the skin edges were reapproximated with 3 0 Vicryl running subcuticular stitch wounds are clean sterile dressings were applied multilevel intercostal nerve block was performed using a 0.5% Marcaine in the usual fashion chest tubes were attached to Pleur-Evac suction the patient was transported to the intensive care unit in stable condition    Complications: No    Estimated Blood Loss (EBL): 100 mL    Specimens:   Specimen (12h ago, onward)     Start     Ordered    02/04/20 1415  Specimen to Pathology - Surgery  Once     Comments:  Pre-op Diagnosis: Parapneumonic effusion [J18.9, J91.8]Procedure(s):DECORTICATION, LUNG Number of specimens: 1Name of specimens: abcess cavity contents      02/04/20 1421    02/04/20 1336  Specimen to Pathology - Surgery  Once     Comments:  Pre-op Diagnosis: Parapneumonic effusion [J18.9,  J91.8]Procedure(s):DECORTICATION, LUNG Number of specimens: 2Name of specimens: A)right lung inflammatory tissue, B) right lung peel      02/04/20 1341                        Condition: Stable    Disposition: ICU - extubated and stable.

## 2020-02-04 NOTE — PROGRESS NOTES
[facility]  Pulmonology  Progress Note    Subjective     No major issues overnight.  Subjectively unchanged over the past 24 hr.  No new complaints this morning.     Review of Systems   Constitutional: Negative for fever, chills and night sweats.   Respiratory: Positive for orthopnea. Negative for cough, hemoptysis, sputum production, shortness of breath and wheezing.    Cardiovascular: Positive for leg swelling. Negative for chest pain and palpitations.   Gastrointestinal: Positive for abdominal distention. Negative for nausea, vomiting and abdominal pain.   Neurological: Negative for headaches.   Psychiatric/Behavioral: Negative for confusion.   All other systems reviewed and are negative.     I have personally reviewed the following during today's evaluation:  past medical history, ROS, family history, social history, surgical history, current inpatient medications,drug allergies, vital signs over the past 24 hours, results of relevant diagnostic studies and nursing/provider documentation from the past 24 hours.     Objective     VS Temp:  [97.5 °F (36.4 °C)-98.4 °F (36.9 °C)]   Pulse:  []   Resp:  [18]   BP: (106-115)/(67-81)   SpO2:  [94 %-97 %]   Ideal body weight: 68.4 kg (150 lb 12.7 oz)  Adjusted ideal body weight: 71.6 kg (157 lb 15 oz)   I/O   Intake/Output Summary (Last 24 hours) at 2/3/2020 1909  Last data filed at 2/3/2020 1700  Gross per 24 hour   Intake 1921.6 ml   Output 3350 ml   Net -1428.4 ml        Vent SpO2 97% on room air   PE Physical Exam   Constitutional: He is oriented to person, place, and time. He appears well-developed and well-nourished. He appears not cachectic.  Non-toxic appearance. No distress.   Temporal wasting present He is not obese.   HENT:   Head: Normocephalic and atraumatic.   Right Ear: External ear normal.   Left Ear: External ear normal.   Nose: Nose normal.   Mouth/Throat: Uvula is midline, oropharynx is clear and moist and mucous membranes are  normal. Mallampati Score: II.   Neck: Trachea normal and normal range of motion. Neck supple. No JVD present. No tracheal deviation present. No thyromegaly present.   Cardiovascular: Normal rate, regular rhythm, normal heart sounds and intact distal pulses. Exam reveals no gallop and no friction rub.   No murmur heard.  Pulmonary/Chest: Normal expansion, symmetric chest wall expansion and effort normal. No stridor. He has decreased breath sounds (Over the right base). He has no wheezes. He has no rhonchi. He has no rales. He exhibits no tenderness.   Some mild soft tissue edema about the right lower hemithorax.  No fluctuance no tenderness no crepitus.   Abdominal: Soft. Bowel sounds are normal. He exhibits no distension. There is no tenderness. There is no guarding.   Musculoskeletal: Normal range of motion. He exhibits edema. He exhibits no tenderness or deformity.   Lymphadenopathy: No supraclavicular adenopathy is present.     He has no cervical adenopathy.     He has no axillary adenopathy.   Neurological: He is alert and oriented to person, place, and time. He has normal strength. No cranial nerve deficit or sensory deficit. He exhibits normal muscle tone. GCS eye subscore is 4. GCS verbal subscore is 5. GCS motor subscore is 6.   Skin: Skin is warm, dry and intact. No rash noted. He is not diaphoretic. No cyanosis. Nails show no clubbing.   Psychiatric: He has a normal mood and affect. His speech is normal and behavior is normal.   Nursing note and vitals reviewed.        Labs I have personally reviewed and interpreted all labs / diagnostic studies obtained over the past 24 hours, and relevant results are as follows:  Recent Labs   Lab 02/03/20  0459 02/03/20  1305   WBC 17.66*  --    RBC 4.05*  --    HGB 10.1*  --    HCT 31.7*  --      --    MCV 78*  --    MCH 24.9*  --    MCHC 31.9*  --    *  --    K 4.3  --    CL 95  --    CO2 29  --    BUN 13  --    CREATININE 1.0  --    MG 1.7  --    ALT 59*   --    AST 61*  --    ALKPHOS 68  --    BILITOT 2.4*  --    PROT 7.0  --    ALBUMIN 2.1*  --    APTT 49.6* 92.2*      Imaging I have personally reviewed and interpreted the following images and reviewed the associated Radiology report.  I have reviewed and interpreted all pertinent imaging results/findings within the past 24 hours.  No new images today.     Micro I have personally reviewed and interpreted the available culture data.  Relevant results are as follows.  No new positive culture data.   Medications Scheduled    enalapril  2.5 mg Oral Daily    eplerenone  25 mg Oral Daily    furosemide (LASIX) IV  20 mg Intravenous Q12H    hydrOXYzine HCl  25 mg Oral Daily    metoprolol succinate  100 mg Oral Daily    pantoprazole  40 mg Oral Daily    senna-docusate 8.6-50 mg  1 tablet Oral BID    sodium polystyrene  15 g Oral Once      Continuous Infusions:    heparin (porcine) in D5W 15.93 Units/kg/hr (02/03/20 1639)      PRN   acetaminophen, acetaminophen, acetaminophen, ALPRAZolam, calcium chloride IVPB, calcium chloride IVPB, calcium chloride IVPB, dextrose 50%, dextrose 50%, furosemide, gabapentin, glucagon (human recombinant), glucose, glucose, heparin (PORCINE), heparin (PORCINE), HYDROcodone-acetaminophen, HYDROcodone-acetaminophen, magnesium oxide, magnesium sulfate IVPB, magnesium sulfate IVPB, magnesium sulfate IVPB, magnesium sulfate IVPB, ondansetron, potassium chloride in water, potassium chloride in water, potassium chloride in water, potassium chloride in water, potassium chloride, potassium chloride, potassium chloride, potassium chloride, promethazine (PHENERGAN) IVPB, sodium chloride 0.9%, sodium phosphate IVPB, sodium phosphate IVPB, sodium phosphate IVPB, sodium phosphate IVPB, sodium phosphate IVPB        Assessment       Active Hospital Problems    Diagnosis    *Hydropneumothorax    Parapneumonic effusion    Anasarca    Liver enzyme elevation    Acute massive pulmonary embolism     Chronic hepatitis C without hepatic coma    Pneumonia    Essential hypertension    Acute on chronic combined systolic and diastolic congestive heart failure      My Impression:  Complicated right parapneumonic effusion that has failed intrapleural thrombolytics.    Plan     Pulmonary  · Continue antibiotics as ordered.  · Thoracic surgery following, and planning on VATS versus thoracotomy with decortication tomorrow.  · Not requiring supplemental oxygen.  · Continue diuresis.  · Continue heparin infusion for now and hold 6 hr prior to surgery.       Trae Steele MD  Pulmonary / Critical Care Medicine  [facility]

## 2020-02-04 NOTE — ANESTHESIA PROCEDURE NOTES
Arterial    Diagnosis: Empyema    Patient location during procedure: done in OR  Procedure start time: 2/4/2020 12:05 PM  Timeout: 2/4/2020 12:02 PM  Procedure end time: 2/4/2020 12:10 PM    Staffing  Authorizing Provider: Gregg Celis MD  Performing Provider: Gregg Celis MD    Anesthesiologist was present at the time of the procedure.    Preanesthetic Checklist  Completed: patient identified, site marked, surgical consent, pre-op evaluation, timeout performed, IV checked, risks and benefits discussed, monitors and equipment checked and anesthesia consent givenArterial  Skin Prep: chlorhexidine gluconate  Local Infiltration: lidocaine  Orientation: left  Location: radial  Catheter Size: 20 G  Catheter placement by Ultrasound guidance. Heme positive aspiration all ports.  Vessel Caliber: medium, patent, compressibility normal  Vascular Doppler:  not done  Needle advanced into vessel with real time Ultrasound guidance.  Sterile sheath used.Insertion Attempts: 1  Assessment  Dressing: sutured in place and taped, tegaderm and steri-strips  Patient: Tolerated well  Additional Notes  No apparent complications.

## 2020-02-04 NOTE — TRANSFER OF CARE
"Anesthesia Transfer of Care Note    Patient: Francis Daigle    Procedure(s) Performed: Procedure(s) (LRB):  DECORTICATION, LUNG (Right)    Patient location: ICU    Anesthesia Type: general    Transport from OR: Transported from OR on room air with adequate spontaneous ventilation    Post pain: adequate analgesia    Post assessment: no apparent anesthetic complications    Post vital signs: stable    Level of consciousness: awake and alert    Nausea/Vomiting: no nausea/vomiting    Complications: none    Transfer of care protocol was followed      Last vitals:   Visit Vitals  /77 (BP Location: Right arm, Patient Position: Lying)   Pulse 98   Temp 36.3 °C (97.4 °F) (Oral)   Resp 20   Ht 5' 8" (1.727 m)   Wt 76.5 kg (168 lb 10.4 oz)   SpO2 97%   BMI 25.64 kg/m²     "

## 2020-02-04 NOTE — PROGRESS NOTES
[facility] Medicine Progress Note    Subjective:   Seen just after arrival to the ICU postop. Mildly tachycardic and hypertensive. Afebrile. Complaining of some pain at the operative site.   R sided thoracostomy tube in place.      Objective:   Last 24 Hour Vital Signs:  BP  Min: 103/62  Max: 111/77  Temp  Av.1 °F (36.7 °C)  Min: 97.4 °F (36.3 °C)  Max: 98.9 °F (37.2 °C)  Pulse  Av.7  Min: 92  Max: 117  Resp  Av  Min: 17  Max: 23  SpO2  Av %  Min: 92 %  Max: 100 %  I/O last 3 completed shifts:  In: 1921.6 [P.O.:1680; I.V.:241.6]  Out: 3350 [Urine:3350]    Physical Examination:  Physical Exam   Constitutional:   Cachectic, frail appearing  Appears to be in mild pain   HENT:   Head: Normocephalic.   Mouth/Throat: Oropharynx is clear and moist.   Eyes: Pupils are equal, round, and reactive to light. EOM are normal.   Neck: Normal range of motion. Neck supple. No JVD present.   Cardiovascular: Regular rhythm and intact distal pulses.   Tachycardic     Pulmonary/Chest: No stridor. No respiratory distress.   Rhonchi + crackles to R base  Appropriately tender to R chest wall   Abdominal: Soft. He exhibits no distension. There is no tenderness. There is no guarding.   Musculoskeletal: He exhibits edema.   Neurological: He is alert. No cranial nerve deficit or sensory deficit. He exhibits normal muscle tone.   Skin: Skin is warm and dry. Capillary refill takes less than 2 seconds.   Vitals reviewed.        Laboratory:  Laboratory Data Reviewed: yes    Most Recent Data:  CBC:   Lab Results   Component Value Date    WBC 16.96 (H) 2020    WBC 16.96 (H) 2020    HGB 10.5 (L) 2020    HGB 10.5 (L) 2020    HCT 34 (L) 2020     2020     2020    MCV 80 (L) 2020    MCV 80 (L) 2020    RDW 22.8 (H) 2020    RDW 22.8 (H) 2020     BMP:   Lab Results   Component Value Date     2020     2020    K 4.5 2020    K  4.5 02/04/2020    CL 99 02/04/2020    CL 99 02/04/2020    CO2 32 (H) 02/04/2020    CO2 32 (H) 02/04/2020    BUN 12 02/04/2020    BUN 12 02/04/2020    GLU 92 02/04/2020    GLU 92 02/04/2020    CALCIUM 8.5 (L) 02/04/2020    CALCIUM 8.5 (L) 02/04/2020    MG 1.9 02/04/2020    PHOS 3.2 02/04/2020     LFTs:   Lab Results   Component Value Date    PROT 7.3 02/04/2020    ALBUMIN 2.4 (L) 02/04/2020    BILITOT 2.0 (H) 02/04/2020    AST 62 (H) 02/04/2020    ALKPHOS 76 02/04/2020    ALT 59 (H) 02/04/2020     Coags:   Lab Results   Component Value Date    INR 1.3 02/04/2020     FLP:   Lab Results   Component Value Date    CHOL 105 (L) 10/28/2019    HDL 33 (L) 10/28/2019    LDLCALC 56.6 (L) 10/28/2019    TRIG 77 10/28/2019    CHOLHDL 31.4 10/28/2019     DM:   Lab Results   Component Value Date    LDLCALC 56.6 (L) 10/28/2019    CREATININE 0.8 02/04/2020    CREATININE 0.8 02/04/2020     Thyroid:   Lab Results   Component Value Date    TSH 3.671 10/28/2019     Anemia: No results found for: IRON, TIBC, FERRITIN, [id number], FOLATE  Cardiac:   Lab Results   Component Value Date    TROPONINI 0.140 (HH) 01/27/2020     (H) 02/01/2020     Urinalysis:   Lab Results   Component Value Date    COLORU Yellow 01/27/2020    SPECGRAV 1.010 01/27/2020    NITRITE Negative 01/27/2020    KETONESU Negative 01/27/2020    UROBILINOGEN Negative 01/27/2020        Radiology:  Data Reviewed: yes  XR CHEST AP PORTABLE  CT ABDOMEN PELVIS WITH CONTRAST  XR CHEST AP PORTABLE  CT CHEST WITH CONTRAST  US LIVER WITH DOPPLER  US ABDOMEN LIMITED  XR CHEST AP PORTABLE  XR CHEST AP PORTABLE  Post op CXR reviewed, greatly improved aeration of the R lung base, CT in place tracking to the apex     Current Medications:     Infusions:   heparin (porcine) in D5W Stopped (02/04/20 0549)    HYDROmorphone in 0.9 % NaCl          Scheduled:   enalapril  2.5 mg Oral Daily    eplerenone  25 mg Oral Daily    furosemide (LASIX) IV  20 mg Intravenous BID AC     hydrOXYzine HCl  25 mg Oral Daily    metoprolol succinate  100 mg Oral Daily    naloxone  0.02 mg Intravenous Once    pantoprazole  40 mg Oral Daily    senna-docusate 8.6-50 mg  1 tablet Oral BID    sodium polystyrene  15 g Oral Once        PRN:  acetaminophen, acetaminophen, acetaminophen, ALPRAZolam, calcium chloride IVPB, calcium chloride IVPB, calcium chloride IVPB, dextrose 50%, dextrose 50%, diphenhydrAMINE, furosemide, gabapentin, glucagon (human recombinant), glucose, glucose, heparin (PORCINE), heparin (PORCINE), HYDROcodone-acetaminophen, HYDROcodone-acetaminophen, magnesium oxide, magnesium sulfate IVPB, magnesium sulfate IVPB, magnesium sulfate IVPB, magnesium sulfate IVPB, ondansetron, ondansetron, potassium chloride in water, potassium chloride in water, potassium chloride in water, potassium chloride in water, potassium chloride, potassium chloride, potassium chloride, potassium chloride, promethazine (PHENERGAN) IVPB, promethazine (PHENERGAN) IVPB, sodium chloride 0.9%, sodium phosphate IVPB, sodium phosphate IVPB, sodium phosphate IVPB, sodium phosphate IVPB, sodium phosphate IVPB    Lines and Day Number of Therapy:      Microbiology Data:  Reviewed: yes  Microbiology Results (last 7 days)     Procedure Component Value Units Date/Time    Culture, Body Fluid (Aerobic) w/ GS [[id number]] Collected:  02/04/20 1316    Order Status:  Completed Specimen:  Tissue from Peritoneal Fluid Updated:  02/04/20 1432     Gram Stain Result Many WBC's      No organisms seen    Tissue culture [[id number]] Collected:  02/04/20 1316    Order Status:  No result Specimen:  Tissue Updated:  02/04/20 1335    Fungus culture [[id number]] Collected:  02/04/20 1316    Order Status:  Sent Specimen:  Abscess from Lung, Right Updated:  02/04/20 1328    Culture, Anaerobe [[id number]] Collected:  02/04/20 1316    Order Status:  Sent Specimen:  Abscess from Lung, Right Updated:  02/04/20 1328    AFB Culture & Smear [[id number]]  Collected:  02/04/20 1316    Order Status:  Sent Specimen:  Abscess from Lung, Right Updated:  02/04/20 1328    Culture, Anaerobic [[id number]] Collected:  02/04/20 1316    Order Status:  Sent Specimen:  Abscess from Lung, Right Updated:  02/04/20 1327    Aerobic culture [[id number]] Collected:  02/04/20 1316    Order Status:  Sent Specimen:  Abscess from Lung, Right Updated:  02/04/20 1327    Blood Culture #1 **CANNOT BE ORDERED STAT** [[id number]] Collected:  01/27/20 1235    Order Status:  Completed Specimen:  Blood from Peripheral, Antecubital, Right Updated:  02/01/20 1432     Blood Culture, Routine No growth after 5 days.    Blood Culture #2 **CANNOT BE ORDERED STAT** [[id number]] Collected:  01/27/20 1242    Order Status:  Completed Specimen:  Blood from Peripheral, Antecubital, Left Updated:  02/01/20 1432     Blood Culture, Routine No growth after 5 days.           Antibiotics and Day Number of Therapy:  Antibiotics (From admission, onward)    Start     Stop Route Frequency Ordered    01/27/20 1200  levoFLOXacin 500 mg/100 mL IVPB 500 mg      01/27 2359 IV ED 1 Time 01/27/20 1153         Antivirals (From admission, onward)    None             Assessment/Plan:     Francis Daigle is a 44 y.o.male with     1. Severe Sepsis due to Bilateral pneumonia with R Parapneumonic effusion: Now POD#0 from thoracotomy, routine postop mgmt as per CTS. Continue IV Zosyn     2. Bilateral Pulmonary Embolism: on heparin GTT - held currently for surgery     3.  Acute on Chronic systolic CHF NYHA III: continue IV Lasix 20mg BID along with Eplerenone 25mg. Strict I/o and daily weights.      4. Hypertension: Enalapril 2.5mg       [place] CHANTALE [facility] Medicine

## 2020-02-04 NOTE — ASSESSMENT & PLAN NOTE
· Receiving ciprofloxacin. Will need to complete 6 weeks of abx in total.  · Thoracic surgery following and I will defer to their expertise regarding his postoperative chest tube management.  · Titrate supplemental oxygen to maintain an SpO2 greater than 92%.  · Continued oral diuretics  · Continue DOAC

## 2020-02-05 ENCOUNTER — ANESTHESIA (OUTPATIENT)
Dept: INTENSIVE CARE | Facility: HOSPITAL | Age: 45
End: 2020-02-05

## 2020-02-05 ENCOUNTER — ANESTHESIA EVENT (OUTPATIENT)
Dept: INTENSIVE CARE | Facility: HOSPITAL | Age: 45
End: 2020-02-05

## 2020-02-05 LAB
ALBUMIN SERPL BCP-MCNC: 2.1 G/DL (ref 3.5–5.2)
ALP SERPL-CCNC: 62 U/L (ref 55–135)
ALT SERPL W/O P-5'-P-CCNC: 45 U/L (ref 10–44)
ANION GAP SERPL CALC-SCNC: 9 MMOL/L (ref 8–16)
AST SERPL-CCNC: 66 U/L (ref 10–40)
BASOPHILS # BLD AUTO: 0.04 K/UL (ref 0–0.2)
BASOPHILS NFR BLD: 0.1 % (ref 0–1.9)
BILIRUB SERPL-MCNC: 2.2 MG/DL (ref 0.1–1)
BLD PROD TYP BPU: NORMAL
BLD PROD TYP BPU: NORMAL
BLOOD UNIT EXPIRATION DATE: NORMAL
BLOOD UNIT EXPIRATION DATE: NORMAL
BLOOD UNIT TYPE CODE: 9500
BLOOD UNIT TYPE CODE: 9500
BLOOD UNIT TYPE: NORMAL
BLOOD UNIT TYPE: NORMAL
BUN SERPL-MCNC: 15 MG/DL (ref 6–20)
CALCIUM SERPL-MCNC: 8.7 MG/DL (ref 8.7–10.5)
CHLORIDE SERPL-SCNC: 98 MMOL/L (ref 95–110)
CO2 SERPL-SCNC: 25 MMOL/L (ref 23–29)
CODING SYSTEM: NORMAL
CODING SYSTEM: NORMAL
CREAT SERPL-MCNC: 0.8 MG/DL (ref 0.5–1.4)
DIFFERENTIAL METHOD: ABNORMAL
DISPENSE STATUS: NORMAL
DISPENSE STATUS: NORMAL
EOSINOPHIL # BLD AUTO: 0 K/UL (ref 0–0.5)
EOSINOPHIL NFR BLD: 0 % (ref 0–8)
ERYTHROCYTE [DISTWIDTH] IN BLOOD BY AUTOMATED COUNT: 21.7 % (ref 11.5–14.5)
EST. GFR  (AFRICAN AMERICAN): >60 ML/MIN/1.73 M^2
EST. GFR  (NON AFRICAN AMERICAN): >60 ML/MIN/1.73 M^2
GLUCOSE SERPL-MCNC: 163 MG/DL (ref 70–110)
GRAM STN SPEC: NORMAL
GRAM STN SPEC: NORMAL
HCT VFR BLD AUTO: 23.8 % (ref 40–54)
HGB BLD-MCNC: 7.5 G/DL (ref 14–18)
IMM GRANULOCYTES # BLD AUTO: 0.25 K/UL (ref 0–0.04)
IMM GRANULOCYTES NFR BLD AUTO: 0.8 % (ref 0–0.5)
LYMPHOCYTES # BLD AUTO: 1.1 K/UL (ref 1–4.8)
LYMPHOCYTES NFR BLD: 3.7 % (ref 18–48)
MAGNESIUM SERPL-MCNC: 1.6 MG/DL (ref 1.6–2.6)
MCH RBC QN AUTO: 25 PG (ref 27–31)
MCHC RBC AUTO-ENTMCNC: 31.5 G/DL (ref 32–36)
MCV RBC AUTO: 79 FL (ref 82–98)
MONOCYTES # BLD AUTO: 1.6 K/UL (ref 0.3–1)
MONOCYTES NFR BLD: 5.3 % (ref 4–15)
NEUTROPHILS # BLD AUTO: 27.4 K/UL (ref 1.8–7.7)
NEUTROPHILS NFR BLD: 90.1 % (ref 38–73)
NRBC BLD-RTO: 0 /100 WBC
NUM UNITS TRANS PACKED RBC: NORMAL
NUM UNITS TRANS PACKED RBC: NORMAL
PHOSPHATE SERPL-MCNC: 4.6 MG/DL (ref 2.7–4.5)
PLATELET # BLD AUTO: 338 K/UL (ref 150–350)
PMV BLD AUTO: 9.9 FL (ref 9.2–12.9)
POTASSIUM SERPL-SCNC: 5.8 MMOL/L (ref 3.5–5.1)
PROT SERPL-MCNC: 6.6 G/DL (ref 6–8.4)
RBC # BLD AUTO: 3 M/UL (ref 4.6–6.2)
SODIUM SERPL-SCNC: 132 MMOL/L (ref 136–145)
WBC # BLD AUTO: 30.36 K/UL (ref 3.9–12.7)

## 2020-02-05 PROCEDURE — 20000000 HC ICU ROOM

## 2020-02-05 PROCEDURE — 63600175 PHARM REV CODE 636 W HCPCS: Performed by: INTERNAL MEDICINE

## 2020-02-05 PROCEDURE — 99900035 HC TECH TIME PER 15 MIN (STAT)

## 2020-02-05 PROCEDURE — 99291 CRITICAL CARE FIRST HOUR: CPT | Mod: ,,, | Performed by: INTERNAL MEDICINE

## 2020-02-05 PROCEDURE — 94770 HC EXHALED C02 TEST: CPT

## 2020-02-05 PROCEDURE — 25000003 PHARM REV CODE 250

## 2020-02-05 PROCEDURE — 94761 N-INVAS EAR/PLS OXIMETRY MLT: CPT

## 2020-02-05 PROCEDURE — 25000003 PHARM REV CODE 250: Performed by: THORACIC SURGERY (CARDIOTHORACIC VASCULAR SURGERY)

## 2020-02-05 PROCEDURE — 36430 TRANSFUSION BLD/BLD COMPNT: CPT

## 2020-02-05 PROCEDURE — 99291 PR CRITICAL CARE, E/M 30-74 MINUTES: ICD-10-PCS | Mod: ,,, | Performed by: INTERNAL MEDICINE

## 2020-02-05 PROCEDURE — 80053 COMPREHEN METABOLIC PANEL: CPT

## 2020-02-05 PROCEDURE — P9016 RBC LEUKOCYTES REDUCED: HCPCS

## 2020-02-05 PROCEDURE — 84100 ASSAY OF PHOSPHORUS: CPT

## 2020-02-05 PROCEDURE — 63600175 PHARM REV CODE 636 W HCPCS: Performed by: THORACIC SURGERY (CARDIOTHORACIC VASCULAR SURGERY)

## 2020-02-05 PROCEDURE — 27000221 HC OXYGEN, UP TO 24 HOURS

## 2020-02-05 PROCEDURE — 85025 COMPLETE CBC W/AUTO DIFF WBC: CPT

## 2020-02-05 PROCEDURE — 83735 ASSAY OF MAGNESIUM: CPT

## 2020-02-05 RX ORDER — HYDROCODONE BITARTRATE AND ACETAMINOPHEN 500; 5 MG/1; MG/1
TABLET ORAL
Status: DISCONTINUED | OUTPATIENT
Start: 2020-02-05 | End: 2020-02-14 | Stop reason: HOSPADM

## 2020-02-05 RX ORDER — FUROSEMIDE 10 MG/ML
20 INJECTION INTRAMUSCULAR; INTRAVENOUS ONCE
Status: DISCONTINUED | OUTPATIENT
Start: 2020-02-05 | End: 2020-02-09

## 2020-02-05 RX ADMIN — MUPIROCIN: 20 OINTMENT TOPICAL at 09:02

## 2020-02-05 RX ADMIN — PIPERACILLIN AND TAZOBACTAM 4.5 G: 4; .5 INJECTION, POWDER, LYOPHILIZED, FOR SOLUTION INTRAVENOUS; PARENTERAL at 09:02

## 2020-02-05 RX ADMIN — FUROSEMIDE 20 MG: 10 INJECTION, SOLUTION INTRAMUSCULAR; INTRAVENOUS at 01:02

## 2020-02-05 RX ADMIN — PANTOPRAZOLE SODIUM 40 MG: 40 TABLET, DELAYED RELEASE ORAL at 05:02

## 2020-02-05 RX ADMIN — CHLORHEXIDINE GLUCONATE 15 ML: 1.2 RINSE ORAL at 09:02

## 2020-02-05 RX ADMIN — HYDROXYZINE HYDROCHLORIDE 25 MG: 25 TABLET, FILM COATED ORAL at 09:02

## 2020-02-05 RX ADMIN — FUROSEMIDE 20 MG: 10 INJECTION, SOLUTION INTRAMUSCULAR; INTRAVENOUS at 09:02

## 2020-02-05 RX ADMIN — SENNOSIDES AND DOCUSATE SODIUM 1 TABLET: 8.6; 5 TABLET ORAL at 09:02

## 2020-02-05 RX ADMIN — METOPROLOL SUCCINATE 100 MG: 50 TABLET, EXTENDED RELEASE ORAL at 09:02

## 2020-02-05 RX ADMIN — HYDROCODONE BITARTRATE AND ACETAMINOPHEN 1 TABLET: 10; 325 TABLET ORAL at 01:02

## 2020-02-05 RX ADMIN — PIPERACILLIN AND TAZOBACTAM 4.5 G: 4; .5 INJECTION, POWDER, LYOPHILIZED, FOR SOLUTION INTRAVENOUS; PARENTERAL at 05:02

## 2020-02-05 NOTE — PROGRESS NOTES
Progress Note  Cardiothoracic Surgery    Admit Date: 1/27/2020  Post-operative Day: 1 Day Post-Op  Hospital Day: 10    SUBJECTIVE: Sitting up , comfortable, states he feels great     Follow-up For: Procedure(s) (LRB):  DECORTICATION, LUNG (Right)    Scheduled Meds:   chlorhexidine  15 mL Mouth/Throat BID    furosemide (LASIX) IV  20 mg Intravenous BID AC    furosemide (LASIX) IV  20 mg Intravenous Once    hydrOXYzine HCl  25 mg Oral Daily    metoprolol succinate  100 mg Oral Daily    mupirocin   Nasal BID    naloxone  0.02 mg Intravenous Once    pantoprazole  40 mg Oral Daily    piperacillin-tazobactam (ZOSYN) IVPB  4.5 g Intravenous Q8H    senna-docusate 8.6-50 mg  1 tablet Oral BID    sodium polystyrene  15 g Oral Once     Infusions/Drips:   heparin (porcine) in D5W Stopped (02/04/20 0549)    HYDROmorphone in 0.9 % NaCl       PRN Meds: sodium chloride, acetaminophen, acetaminophen, acetaminophen, ALPRAZolam, calcium chloride IVPB, calcium chloride IVPB, calcium chloride IVPB, dextrose 50%, dextrose 50%, diphenhydrAMINE, furosemide, gabapentin, glucagon (human recombinant), glucose, glucose, heparin (PORCINE), heparin (PORCINE), HYDROcodone-acetaminophen, HYDROcodone-acetaminophen, magnesium oxide, magnesium sulfate IVPB, magnesium sulfate IVPB, magnesium sulfate IVPB, magnesium sulfate IVPB, ondansetron, ondansetron, potassium chloride in water, potassium chloride in water, potassium chloride in water, potassium chloride in water, potassium chloride, potassium chloride, potassium chloride, potassium chloride, promethazine (PHENERGAN) IVPB, promethazine (PHENERGAN) IVPB, sodium chloride 0.9%, sodium phosphate IVPB, sodium phosphate IVPB, sodium phosphate IVPB, sodium phosphate IVPB, sodium phosphate IVPB    Review of patient's allergies indicates:  No Known Allergies    OBJECTIVE:     Vital Signs (Most Recent)  Temp: 98 °F (36.7 °C) (02/05/20 1545)  Pulse: 95 (02/05/20 1700)  Resp: (!) 23 (02/05/20  1700)  BP: 109/65 (02/05/20 1700)  SpO2: 96 % (02/05/20 1700)    Admission Weight: 72.6 kg (160 lb) (01/27/20 0956)   Most Recent Weight: 76.5 kg (168 lb 10.4 oz) (02/05/20 1100)    Vital Signs Range (Last 24H):  Temp:  [97 °F (36.1 °C)-98.1 °F (36.7 °C)]   Pulse:  []   Resp:  [10-29]   BP: (102-135)/(59-82)   SpO2:  [91 %-100 %]   Arterial Line BP: (100-150)/(53-93)     I & O (Last 24H):    Intake/Output Summary (Last 24 hours) at 2/5/2020 1724  Last data filed at 2/5/2020 1545  Gross per 24 hour   Intake 2260.45 ml   Output 2240 ml   Net 20.45 ml     Physical Exam:  NAD  BS   CV RRR  Ext no edema  Neuro intact    Wound/Incision:  Dressings dry    Laboratory:  CBC:   Recent Labs   Lab 02/05/20  0440   WBC 30.36*   RBC 3.00*   HGB 7.5*   HCT 23.8*      MCV 79*   MCH 25.0*   MCHC 31.5*     BMP:   Recent Labs   Lab 02/05/20  0346   *   *   K 5.8*   CL 98   CO2 25   BUN 15   CREATININE 0.8   CALCIUM 8.7   MG 1.6     Microbiology Results (last 7 days)     Procedure Component Value Units Date/Time    Culture, Body Fluid (Aerobic) w/ GS [[id number]] Collected:  02/04/20 1316    Order Status:  Completed Specimen:  Tissue from Peritoneal Fluid Updated:  02/05/20 1112     Gram Stain Result Many WBC's      Rare Gram negative rods    Aerobic culture [[id number]]  (Abnormal) Collected:  02/04/20 1316    Order Status:  Completed Specimen:  Drainage from Lung, Right Updated:  02/05/20 0738     Aerobic Bacterial Culture GRAM NEGATIVE ES, NON-LACTOSE   Few  For susceptibility see order #[id number]      Tissue culture [[id number]]  (Abnormal) Collected:  02/04/20 1316    Order Status:  Completed Specimen:  Tissue Updated:  02/05/20 0733     Aerobic Culture - Tissue GRAM NEGATIVE ES, NON-LACTOSE   Many  Identification and susceptibility pending      Fungus culture [[id number]] Collected:  02/04/20 1316    Order Status:  Sent Specimen:  Abscess from Lung, Right Updated:  02/04/20 1329     Culture, Anaerobe [[id number]] Collected:  02/04/20 1316    Order Status:  Sent Specimen:  Abscess from Lung, Right Updated:  02/04/20 1328    AFB Culture & Smear [[id number]] Collected:  02/04/20 1316    Order Status:  Sent Specimen:  Abscess from Lung, Right Updated:  02/04/20 1328    Culture, Anaerobic [[id number]] Collected:  02/04/20 1316    Order Status:  Sent Specimen:  Abscess from Lung, Right Updated:  02/04/20 1327    Blood Culture #1 **CANNOT BE ORDERED STAT** [[id number]] Collected:  01/27/20 1235    Order Status:  Completed Specimen:  Blood from Peripheral, Antecubital, Right Updated:  02/01/20 1432     Blood Culture, Routine No growth after 5 days.    Blood Culture #2 **CANNOT BE ORDERED STAT** [[id number]] Collected:  01/27/20 1242    Order Status:  Completed Specimen:  Blood from Peripheral, Antecubital, Left Updated:  02/01/20 1432     Blood Culture, Routine No growth after 5 days.        Specimen (12h ago, onward)    None          Diagnostic Results:  X-Ray: Reviewed    ASSESSMENT/PLAN:     Assessment: uncomplicated post-operative course.    Plan: Continue present management

## 2020-02-05 NOTE — PROGRESS NOTES
[facility]  Pulmonology  Progress Note    Subjective     No major issues overnight.  Drinking impressive amount warrants use drove his potassium of this morning but this has since improved.  No complaints at this time.     Review of Systems   Constitutional: Negative for fever, chills and night sweats.   Respiratory: Negative for cough, hemoptysis, sputum production, shortness of breath, wheezing and orthopnea.    Cardiovascular: Negative for chest pain, palpitations and leg swelling.   Gastrointestinal: Negative for nausea, vomiting, abdominal pain and abdominal distention.   Neurological: Negative for headaches.   Psychiatric/Behavioral: Negative for confusion.   All other systems reviewed and are negative.     I have personally reviewed the following during today's evaluation:  past medical history, ROS, family history, social history, surgical history, current inpatient medications,drug allergies, vital signs over the past 24 hours, results of relevant diagnostic studies and nursing/provider documentation from the past 24 hours.     Objective     VS Temp:  [97 °F (36.1 °C)-98.2 °F (36.8 °C)]   Pulse:  []   Resp:  [10-29]   BP: (111-141)/(63-82)   SpO2:  [95 %-100 %]   Arterial Line BP: (112-192)/()   Ideal body weight: 68.4 kg (150 lb 12.7 oz)  Adjusted ideal body weight: 71.6 kg (157 lb 15 oz)   I/O   Intake/Output Summary (Last 24 hours) at 2/5/2020 1343  Last data filed at 2/5/2020 1145  Gross per 24 hour   Intake 3949.45 ml   Output 2790 ml   Net 1159.45 ml        Vent SpO2 99% on 2L NC   PE Physical Exam   Constitutional: He is oriented to person, place, and time. He appears well-developed and well-nourished. He appears not cachectic.  Non-toxic appearance. No distress. Nasal cannula in place.   Temporal wasting present He is not obese.   HENT:   Head: Normocephalic and atraumatic.   Right Ear: External ear normal.   Left Ear: External ear normal.   Nose: Nose normal.    Mouth/Throat: Uvula is midline, oropharynx is clear and moist and mucous membranes are normal. Mallampati Score: II.   Neck: Trachea normal and normal range of motion. Neck supple. No JVD present. No tracheal deviation present. No thyromegaly present.   Cardiovascular: Normal rate, regular rhythm, normal heart sounds and intact distal pulses. Exam reveals no gallop and no friction rub.   No murmur heard.  Pulmonary/Chest: Normal expansion, symmetric chest wall expansion and effort normal. No stridor. He has decreased breath sounds (Over the right base). He has no wheezes. He has no rhonchi. He has no rales. Chest wall is dull to percussion. He exhibits tenderness.   Two surgical chest tubes on the right connected to low wall suction.  Both draining a little over >300 cc of kathie blood.  No air leak observed.  Right-sided thoracotomy dressing clean dry and intact.  Pleural friction rub over the right base.   Abdominal: Soft. Bowel sounds are normal. He exhibits no distension. There is no tenderness. There is no guarding.   Musculoskeletal: Normal range of motion. He exhibits edema. He exhibits no tenderness or deformity.   Lymphadenopathy: No supraclavicular adenopathy is present.     He has no cervical adenopathy.     He has no axillary adenopathy.   Neurological: He is alert and oriented to person, place, and time. He has normal strength. No cranial nerve deficit or sensory deficit. He exhibits normal muscle tone. GCS eye subscore is 4. GCS verbal subscore is 5. GCS motor subscore is 6.   Skin: Skin is warm, dry and intact. No rash noted. He is not diaphoretic. No cyanosis. Nails show no clubbing.   Psychiatric: He has a normal mood and affect. His speech is normal and behavior is normal.   Nursing note and vitals reviewed.        Labs I have personally reviewed and interpreted all labs / diagnostic studies obtained over the past 24 hours, and relevant results are as follows:  Recent Labs   Lab 02/04/20  3777  02/05/20  0346 02/05/20  0440   WBC  --   --  30.36*   RBC  --   --  3.00*   HGB  --   --  7.5*   HCT 34*  --  23.8*   PLT  --   --  338   MCV  --   --  79*   MCH  --   --  25.0*   MCHC  --   --  31.5*   NA  --  132*  --    K  --  5.8*  --    CL  --  98  --    CO2  --  25  --    BUN  --  15  --    CREATININE  --  0.8  --    MG  --  1.6  --    ALT  --  45*  --    AST  --  66*  --    ALKPHOS  --  62  --    BILITOT  --  2.2*  --    PROT  --  6.6  --    ALBUMIN  --  2.1*  --    PH 7.395  --   --    PCO2 46.1*  --   --    PO2 76*  --   --    HCO3 28.2*  --   --    POCSATURATED 95  --   --    BE 3  --   --       Imaging I have personally reviewed and interpreted the following images and reviewed the associated Radiology report.  CXR:  Radiology report:  3 right-sided chest tubes are unchanged. Air within the right chest wall and base of the neck is similar. Previous tiny apical  pneumothorax as resolved. Opacities of the right lower lung, and oval-shaped opacity of the left mid lung are stable. Small bilateral  pleural effusions are stable.    My impression:  Stable/expected post-operative radiograph.     Micro I have personally reviewed and interpreted the available culture data.  Relevant results are as follows.  Pleural fluid:  GRAM NEGATIVE ES, NON-LACTOSE    Many   Identification and susceptibility pending      Medications Scheduled    chlorhexidine  15 mL Mouth/Throat BID    furosemide (LASIX) IV  20 mg Intravenous BID AC    furosemide (LASIX) IV  20 mg Intravenous Once    hydrOXYzine HCl  25 mg Oral Daily    metoprolol succinate  100 mg Oral Daily    mupirocin   Nasal BID    naloxone  0.02 mg Intravenous Once    pantoprazole  40 mg Oral Daily    piperacillin-tazobactam (ZOSYN) IVPB  4.5 g Intravenous Q8H    senna-docusate 8.6-50 mg  1 tablet Oral BID    sodium polystyrene  15 g Oral Once      Continuous Infusions:    heparin (porcine) in D5W Stopped (02/04/20 0549)    HYDROmorphone in 0.9 %  NaCl        PRN   sodium chloride, acetaminophen, acetaminophen, acetaminophen, ALPRAZolam, calcium chloride IVPB, calcium chloride IVPB, calcium chloride IVPB, dextrose 50%, dextrose 50%, diphenhydrAMINE, furosemide, gabapentin, glucagon (human recombinant), glucose, glucose, heparin (PORCINE), heparin (PORCINE), HYDROcodone-acetaminophen, HYDROcodone-acetaminophen, magnesium oxide, magnesium sulfate IVPB, magnesium sulfate IVPB, magnesium sulfate IVPB, magnesium sulfate IVPB, ondansetron, ondansetron, potassium chloride in water, potassium chloride in water, potassium chloride in water, potassium chloride in water, potassium chloride, potassium chloride, potassium chloride, potassium chloride, promethazine (PHENERGAN) IVPB, promethazine (PHENERGAN) IVPB, sodium chloride 0.9%, sodium phosphate IVPB, sodium phosphate IVPB, sodium phosphate IVPB, sodium phosphate IVPB, sodium phosphate IVPB        Assessment       Active Hospital Problems    Diagnosis    *Hydropneumothorax    Volume overload    Parapneumonic effusion    Anasarca    Liver enzyme elevation    Empyema    Acute massive pulmonary embolism    Chronic hepatitis C without hepatic coma    Pneumonia    Essential hypertension    Acute on chronic combined systolic and diastolic congestive heart failure      My Impression:  S/p right thoracotomy with decortication and drainage of empyema.    Plan     Neuro  · Continue PCA for pain control.    Pulmonary  · Continue antibiotics as ordered.  · Thoracic surgery following.  Follow up on biology cultures obtained intraoperatively, but otherwise defer to their expertise regarding his postoperative management.  · Titrate supplemental oxygen to maintain an SpO2 greater than 92%.  · Continue diuresis.  · Resume heparin infusion as soon as possible once cleared by surgery.    Cardiac/Vascular  · Hemodynamically stable this time.  · Continue metoprolol and enalapril.    Renal/  · Hyperkalemia noted, and being  managed by primary service.  · Observation for now.    ID  · Receiving Zosyn.  · Cultures noted.  Awaiting sensitivities, but will need 6 weeks of ABx.    Hematology  · 2 units of PRBCs ordered by surgery.  · No clear evidence of active hemorrhage.    Endocrine  · Serial blood glucose monitoring.  · Sliding scale insulin if necessary.    GI  · Continue PPI.  · Close attention to maintaining a net neutral fluid balance.     Trae Steele MD  Pulmonary / Critical Care Medicine  Former[facility]          Critical Care Time: Approximately 39 minutes    The patient is critically ill due to the following conditions that actively pose threat to life and bodily function:  Hemothorax requiring tube thoracostomy    The patient is at high risk of death due to central nervous system failure, respiratory failure and requiring ongoing treatment including frequent assessment and management of volume status to prevent further life-threatening deterioration.  Due to a high probability of clinically significant, life threatening deterioration, the patient required my highest level of preparedness to intervene emergently and I personally spent this critical care time directly and personally managing the patient.     Critical care was time spent personally by me on the following activities:    Obtaining a history   Examination of patient.   Reviewing pulse oximetry.   Providing medical care at the patients bedside.   Developing a treatment plan with patient or surrogate and bedside caregivers   Ordering and reviewing laboratory studies, radiographic studies, pulse oximetry.   Ordering and performing treatments and interventions.   Evaluation of patient's response to treatment.   Discussions with consultants while on the unit and immediately available to the patient.   Re-evaluation of the patient's condition.   Documentation in the medical record.    This critical care time did not overlap with that of any other  provider or involve time for any procedures.     Trae Steele MD  Pulmonary / Critical Care Medicine  [facility]  02/05/2020  1:43 PM

## 2020-02-05 NOTE — ANESTHESIA POST-OP PAIN MANAGEMENT
Acute Pain Service Progress Note    Francis Daigle is a 44 y.o., male, [id number].    Postop day 1.  Patient status post right thoracotomy for decortication.  Patient doing well this morning.  He has good pain control with a Dilaudid PCA.  He has no nausea vomiting.  He has no over sedation.  He did report pruritus, but he has not needed Benadryl for it.  The plan is to continue with the Dilaudid PCA.  If his diet gets advance, he can initiate oral analgesics.  Will follow the patient again tomorrow.

## 2020-02-05 NOTE — PLAN OF CARE
02/05/20 0731   Patient Assessment/Suction   Level of Consciousness (AVPU) alert   Respiratory Effort Unlabored   Expansion/Accessory Muscles/Retractions no use of accessory muscles   PRE-TX-O2   O2 Device (Oxygen Therapy) nasal cannula   $ Is the patient on Low Flow Oxygen? Yes   Flow (L/min) 2   SpO2 98 %   Pulse Oximetry Type Continuous   $ Pulse Oximetry - Multiple Charge Pulse Oximetry - Multiple   Pulse (!) 117   Resp (!) 22   ETCO2   $ ETCO2 Charge Exhaled CO2 Monitoring   $ ETCO2 Usage Currently wearing   ETCO2 (mmHg) 31 mmHg   ETCO2 Device Type Monitor:;Bedside Monitor;Delivery Device:;Nasal Cannula

## 2020-02-05 NOTE — PROGRESS NOTES
[facility] Medicine Progress Note    Subjective:     No major issues overnight. Afebrile. WBC up.   About 1L output from both chest tubes since surgery.   R sided thoracostomy tubes in place.     Objective:   Last 24 Hour Vital Signs:  BP  Min: 111/75  Max: 141/80  Temp  Av.7 °F (36.5 °C)  Min: 97 °F (36.1 °C)  Max: 98.2 °F (36.8 °C)  Pulse  Av.7  Min: 95  Max: 117  Resp  Av.5  Min: 10  Max: 23  SpO2  Av.5 %  Min: 96 %  Max: 100 %  I/O last 3 completed shifts:  In: 3376.2 [P.O.:1375; I.V.:2001.2]  Out: 2890 [Urine:1650; Blood:450; Chest Tube:790]    Physical Examination:  Physical Exam   Constitutional:   Cachectic, frail appearing  Appears to be in mild pain   HENT:   Head: Normocephalic.   Mouth/Throat: Oropharynx is clear and moist.   Eyes: Pupils are equal, round, and reactive to light. EOM are normal.   Neck: Normal range of motion. Neck supple. No JVD present.   Cardiovascular: Regular rhythm and intact distal pulses.   Tachycardic     Pulmonary/Chest: No stridor. No respiratory distress.   Rhonchi + crackles to R base  Appropriately tender to R chest wall   Abdominal: Soft. He exhibits no distension. There is no tenderness. There is no guarding.   Musculoskeletal: He exhibits edema.   Neurological: He is alert. No cranial nerve deficit or sensory deficit. He exhibits normal muscle tone.   Skin: Skin is warm and dry. Capillary refill takes less than 2 seconds.   Vitals reviewed.        Laboratory:  Laboratory Data Reviewed: yes    Most Recent Data:  CBC:   Lab Results   Component Value Date    WBC 30.36 (H) 2020    HGB 7.5 (L) 2020    HCT 23.8 (L) 2020     2020    MCV 79 (L) 2020    RDW 21.7 (H) 2020     BMP:   Lab Results   Component Value Date     (L) 2020    K 5.8 (H) 2020    CL 98 2020    CO2 25 2020    BUN 15 2020     (H) 2020    CALCIUM 8.7 2020    MG 1.6 2020    PHOS 4.6 (H)  02/05/2020     LFTs:   Lab Results   Component Value Date    PROT 6.6 02/05/2020    ALBUMIN 2.1 (L) 02/05/2020    BILITOT 2.2 (H) 02/05/2020    AST 66 (H) 02/05/2020    ALKPHOS 62 02/05/2020    ALT 45 (H) 02/05/2020     Coags:   Lab Results   Component Value Date    INR 1.3 02/04/2020     FLP:   Lab Results   Component Value Date    CHOL 105 (L) 10/28/2019    HDL 33 (L) 10/28/2019    LDLCALC 56.6 (L) 10/28/2019    TRIG 77 10/28/2019    CHOLHDL 31.4 10/28/2019     DM:   Lab Results   Component Value Date    LDLCALC 56.6 (L) 10/28/2019    CREATININE 0.8 02/05/2020     Thyroid:   Lab Results   Component Value Date    TSH 3.671 10/28/2019     Anemia: No results found for: IRON, TIBC, FERRITIN, OIUYCKOF34, FOLATE  Cardiac:   Lab Results   Component Value Date    TROPONINI 0.140 (HH) 01/27/2020     (H) 02/01/2020     Urinalysis:   Lab Results   Component Value Date    COLORU Yellow 01/27/2020    SPECGRAV 1.010 01/27/2020    NITRITE Negative 01/27/2020    KETONESU Negative 01/27/2020    UROBILINOGEN Negative 01/27/2020        Radiology:  Data Reviewed: yes  XR CHEST AP PORTABLE  CT ABDOMEN PELVIS WITH CONTRAST  XR CHEST AP PORTABLE  CT CHEST WITH CONTRAST  US LIVER WITH DOPPLER  US ABDOMEN LIMITED  XR CHEST AP PORTABLE  XR CHEST AP PORTABLE  X-Ray Chest AP Portable   Order: [id number]   Status:  Final result   Visible to patient:  No (Not Released) Next appt:  05/19/2020 at 09:40 AM in Hepatology (Jennifer B Scheuermann, PA)   Details     Reading Physician Reading Date Result Priority   Leslie Horne MD 2/5/2020       Narrative     PROCEDURE:   XR CHEST AP PORTABLE  dated  2/5/2020 4:22 AM    CLINICAL HISTORY:   Male 44 years of age.   chest tubes    TECHNIQUE: AP view of the chest obtained portably at 4:22 AM.    PREVIOUS STUDIES:  February 4, 2020    FINDINGS:    3 right-sided chest tubes are unchanged. Air within the right chest  wall and base of the neck is similar. Previous tiny apical  pneumothorax as  resolved. Opacities of the right lower lung, and  oval-shaped opacity of the left mid lung are stable. Small bilateral  pleural effusions are stable.    Mild cardiomegaly is stable.    IMPRESSION: Resolution of tiny right apical pneumothorax. Otherwise  unchanged, with small bilateral pleural effusions, right lower lung  and left midlung opacities, and mild cardiomegaly.               Current Medications:     Infusions:   heparin (porcine) in D5W Stopped (02/04/20 0549)    HYDROmorphone in 0.9 % NaCl          Scheduled:   chlorhexidine  15 mL Mouth/Throat BID    enalapril  2.5 mg Oral Daily    eplerenone  25 mg Oral Daily    furosemide (LASIX) IV  20 mg Intravenous BID AC    hydrOXYzine HCl  25 mg Oral Daily    metoprolol succinate  100 mg Oral Daily    mupirocin   Nasal BID    naloxone  0.02 mg Intravenous Once    pantoprazole  40 mg Oral Daily    piperacillin-tazobactam (ZOSYN) IVPB  4.5 g Intravenous Q6H    senna-docusate 8.6-50 mg  1 tablet Oral BID    sodium polystyrene  15 g Oral Once        PRN:  acetaminophen, acetaminophen, acetaminophen, ALPRAZolam, calcium chloride IVPB, calcium chloride IVPB, calcium chloride IVPB, dextrose 50%, dextrose 50%, diphenhydrAMINE, furosemide, gabapentin, glucagon (human recombinant), glucose, glucose, heparin (PORCINE), heparin (PORCINE), HYDROcodone-acetaminophen, HYDROcodone-acetaminophen, magnesium oxide, magnesium sulfate IVPB, magnesium sulfate IVPB, magnesium sulfate IVPB, magnesium sulfate IVPB, ondansetron, ondansetron, potassium chloride in water, potassium chloride in water, potassium chloride in water, potassium chloride in water, potassium chloride, potassium chloride, potassium chloride, potassium chloride, promethazine (PHENERGAN) IVPB, promethazine (PHENERGAN) IVPB, sodium chloride 0.9%, sodium phosphate IVPB, sodium phosphate IVPB, sodium phosphate IVPB, sodium phosphate IVPB, sodium phosphate IVPB    Microbiology Data:  Reviewed:  yes  Microbiology Results (last 7 days)     Procedure Component Value Units Date/Time    Aerobic culture [[id number]]  (Abnormal) Collected:  02/04/20 1316    Order Status:  Completed Specimen:  Drainage from Lung, Right Updated:  02/05/20 0738     Aerobic Bacterial Culture GRAM NEGATIVE ES, NON-LACTOSE   Few  For susceptibility see order #[id number]      Tissue culture [[id number]]  (Abnormal) Collected:  02/04/20 1316    Order Status:  Completed Specimen:  Tissue Updated:  02/05/20 0733     Aerobic Culture - Tissue GRAM NEGATIVE ES, NON-LACTOSE   Many  Identification and susceptibility pending      Culture, Body Fluid (Aerobic) w/ GS [[id number]] Collected:  02/04/20 1316    Order Status:  Completed Specimen:  Tissue from Peritoneal Fluid Updated:  02/04/20 1432     Gram Stain Result Many WBC's      No organisms seen    Fungus culture [[id number]] Collected:  02/04/20 1316    Order Status:  Sent Specimen:  Abscess from Lung, Right Updated:  02/04/20 1328    Culture, Anaerobe [[id number]] Collected:  02/04/20 1316    Order Status:  Sent Specimen:  Abscess from Lung, Right Updated:  02/04/20 1328    AFB Culture & Smear [[id number]] Collected:  02/04/20 1316    Order Status:  Sent Specimen:  Abscess from Lung, Right Updated:  02/04/20 1328    Culture, Anaerobic [[id number]] Collected:  02/04/20 1316    Order Status:  Sent Specimen:  Abscess from Lung, Right Updated:  02/04/20 1327    Blood Culture #1 **CANNOT BE ORDERED STAT** [[id number]] Collected:  01/27/20 1235    Order Status:  Completed Specimen:  Blood from Peripheral, Antecubital, Right Updated:  02/01/20 1432     Blood Culture, Routine No growth after 5 days.    Blood Culture #2 **CANNOT BE ORDERED STAT** [[id number]] Collected:  01/27/20 1242    Order Status:  Completed Specimen:  Blood from Peripheral, Antecubital, Left Updated:  02/01/20 1432     Blood Culture, Routine No growth after 5 days.           Antibiotics and Day Number of  Therapy:  Antibiotics (From admission, onward)    Start     Stop Route Frequency Ordered    20 0930  piperacillin-tazobactam 4.5 g in dextrose 5 % 100 mL IVPB (ready to mix system)      -- IV Every 6 hours (non-standard times) 20 0822    20 2100  mupirocin 2 % ointment  (MRSA Decolonization Orders [facility])       205 Nasl 2 times daily 20 1644    20 1200  levoFLOXacin 500 mg/100 mL IVPB 500 mg       2359 IV ED 1 Time 20 1153           Assessment/Plan:     Francis Daigle is a 44 y.o.male with:    Severe Sepsis due to Bilateral pneumonia with R parapneumonic effusion: Now POD#1 from thoracotomy, routine postop mgmt as per CTS. Zosyn , will restart today. GNR growing from pleural fluid.     Bilateral Pulmonary Embolism: on heparin GTT - restart when ok with CTS     Acute on Chronic systolic CHF NYHA III: continue IV Lasix 20mg BID. Hold eplerenone for high K. Strict I/o and daily weights.      Hypertension: Enalapril 2.5mg. Hold for high K.     Hyperkalemia - getting lasix this morning, hold aldactone and ACEi as above      Acute Blood loss anemia - expected blood loss post operatively, transfuse for hgb <7    Leopoldo Dalton  [facility] Medicine

## 2020-02-05 NOTE — PROGRESS NOTES
"[facility]  Adult Nutrition   Progress Note (Follow-Up)    SUMMARY     Recommendations  1. RD recommended advancing diet as medically able.-- diet advanced to cardiac diet by RN during rounds.   2. Encourage PO intake of meals.   3.  to assist with meal choices daily.    Goals:   1. Pt to meet at least 75% estimated needs via PO intake of meals.     Nutrition Goal Status: progressing towards goal    Dietitian Rounds Brief  Pt seen 2' f/u. Pt asleeping during time of rounds. Patient status post right thoracotomy for decortication per progress notes. Tolerating clear liquid diet per RN. RD suggested advancing pt's diet during rounds. Elevated Potassium level due to consuming excessive orange juice (RN reported pt drank 15 orange juice cartons), now getting lasix. Will continue to monitor potassium level and consider potassium restriction diet if /when apporirate. Will continue monitor intake, plan of care, and labs.     Reason for Assessment  Reason For Assessment: RD follow-up  Interdisciplinary Rounds: attended    Nutrition Risk Screen  Nutrition Risk Screen: no indicators present     MST Score: 0  Have you recently lost weight without trying?: No  Weight loss score: 0  Have you been eating poorly because of a decreased appetite?: No  Appetite score: 0       Nutrition/Diet History  Patient Reported Diet/Restrictions/Preferences: low salt  Spiritual, Cultural Beliefs, Confucianist Practices, Values that Affect Care: no  Food Allergies: NKFA  Factors Affecting Nutritional Intake: None identified at this time    Anthropometrics  Temp: 97.9 °F (36.6 °C)  Height Method: Stated  Height: 5' 8" (172.7 cm)  Height (inches): 68 in  Weight Method: Bed Scale  Weight: 76.5 kg (168 lb 10.4 oz)  Weight (lb): 168.65 lb  Ideal Body Weight (IBW), Male: 154 lb  % Ideal Body Weight, Male (lb): 118.53 %  BMI (Calculated): 25.6  BMI Grade: 25 - 29.9 - overweight       Weight History:  Wt Readings from Last 10 " Encounters:   02/05/20 76.5 kg (168 lb 10.4 oz)   12/28/19 75.2 kg (165 lb 12.6 oz)   12/09/19 74.8 kg (165 lb)   10/30/19 69.8 kg (153 lb 14.1 oz)   08/25/19 70.5 kg (155 lb 6.8 oz)   07/06/19 74.8 kg (165 lb)   07/06/19 74.8 kg (165 lb)   07/04/19 73.1 kg (161 lb 1.6 oz)       Lab/Procedures/Meds: Pertinent Labs Reviewed  Clinical Chemistry:  Recent Labs   Lab 02/03/20  0459 02/04/20  0546 02/05/20  0346   * 136  136 132*   K 4.3 4.5  4.5 5.8*   CL 95 99  99 98   CO2 29 32*  32* 25   GLU 98 92  92 163*   BUN 13 12  12 15   CREATININE 1.0 0.8  0.8 0.8   CALCIUM 8.3* 8.5*  8.5* 8.7   PROT 7.0 7.3 6.6   ALBUMIN 2.1* 2.4* 2.1*   BILITOT 2.4* 2.0* 2.2*   ALKPHOS 68 76 62   AST 61* 62* 66*   ALT 59* 59* 45*   ANIONGAP 8 5*  5* 9   ESTGFRAFRICA >60.0 >60.0  >60.0 >60.0   EGFRNONAA >60.0 >60.0  >60.0 >60.0   MG 1.7 1.9 1.6   PHOS 3.2 3.2 4.6*     CBC:   Recent Labs   Lab 02/05/20  0440   WBC 30.36*   RBC 3.00*   HGB 7.5*   HCT 23.8*      MCV 79*   MCH 25.0*   MCHC 31.5*     Cardiac Profile:  Recent Labs   Lab 02/01/20  0649   *     Medications: Pertinent Medications reviewed  Scheduled Meds:   chlorhexidine  15 mL Mouth/Throat BID    furosemide (LASIX) IV  20 mg Intravenous BID AC    furosemide (LASIX) IV  20 mg Intravenous Once    hydrOXYzine HCl  25 mg Oral Daily    metoprolol succinate  100 mg Oral Daily    mupirocin   Nasal BID    naloxone  0.02 mg Intravenous Once    pantoprazole  40 mg Oral Daily    piperacillin-tazobactam (ZOSYN) IVPB  4.5 g Intravenous Q8H    senna-docusate 8.6-50 mg  1 tablet Oral BID    sodium polystyrene  15 g Oral Once     Continuous Infusions:   heparin (porcine) in D5W Stopped (02/04/20 0549)    HYDROmorphone in 0.9 % NaCl       PRN Meds:.sodium chloride, acetaminophen, acetaminophen, acetaminophen, ALPRAZolam, calcium chloride IVPB, calcium chloride IVPB, calcium chloride IVPB, dextrose 50%, dextrose 50%, diphenhydrAMINE, furosemide, gabapentin,  glucagon (human recombinant), glucose, glucose, heparin (PORCINE), heparin (PORCINE), HYDROcodone-acetaminophen, HYDROcodone-acetaminophen, magnesium oxide, magnesium sulfate IVPB, magnesium sulfate IVPB, magnesium sulfate IVPB, magnesium sulfate IVPB, ondansetron, ondansetron, potassium chloride in water, potassium chloride in water, potassium chloride in water, potassium chloride in water, potassium chloride, potassium chloride, potassium chloride, potassium chloride, promethazine (PHENERGAN) IVPB, promethazine (PHENERGAN) IVPB, sodium chloride 0.9%, sodium phosphate IVPB, sodium phosphate IVPB, sodium phosphate IVPB, sodium phosphate IVPB, sodium phosphate IVPB    Estimated/Assessed Needs    Weight Used For Calorie Calculations: 76.5 kg (168 lb 10.4 oz)  Energy Calorie Requirements (kcal): 9892-2421 kcal (25-30 kcal/kg)  Energy Need Method: Kcal/kg  Protein Requirements: 92 g (1.2 g/kg)  Weight Used For Protein Calculations: 76.5 kg (168 lb 10.4 oz)     Estimated Fluid Requirement Method: RDA Method  RDA Method (mL): 1912       Nutrition Prescription Ordered  Current Diet Order: Clear Liquid Diet    Evaluation of Received Nutrient/Fluid Intake  Energy Calories Required: not meeting needs  Protein Required: not meeting needs  Fluid Required: meeting needs     Intake/Output Summary (Last 24 hours) at 2/5/2020 1208  Last data filed at 2/5/2020 0930  Gross per 24 hour   Intake 3668.2 ml   Output 2890 ml   Net 778.2 ml      % Intake of Estimated Energy Needs: 0 - 25 %  % Meal Intake: 75 - 100 %  (clear liquid diet)      Nutrition Risk  Level of Risk/Frequency of Follow-up: high     Monitor and Evaluation  Food and Nutrient Intake: energy intake, food and beverage intake  Food and Nutrient Adminstration: diet order  Knowledge/Beliefs/Attitudes: food and nutrition knowledge/skill, beliefs and attitudes  Physical Activity and Function: nutrition-related ADLs and IADLs, factors affecting access to physical  activity  Anthropometric Measurements: weight, weight change, body mass index  Biochemical Data, Medical Tests and Procedures: electrolyte and renal panel, gastrointestinal profile, glucose/endocrine profile, inflammatory profile, lipid profile  Nutrition-Focused Physical Findings: overall appearance     Nutrition Follow-Up  RD Follow-up?: Yes  Mitul Springer  02/05/2020  12:08 PM

## 2020-02-05 NOTE — PLAN OF CARE
Plan of care reviewed with patient and family. Time allowed for questions and concerns to be addressed

## 2020-02-05 NOTE — PLAN OF CARE
Plan of care reviewed with patient at bedside          Problem: Wound  Goal: Optimal Wound Healing  Outcome: Ongoing, Progressing     Problem: Fall Injury Risk  Goal: Absence of Fall and Fall-Related Injury  Outcome: Ongoing, Progressing     Problem: Adult Inpatient Plan of Care  Goal: Plan of Care Review  Outcome: Ongoing, Progressing  Goal: Patient-Specific Goal (Individualization)  Outcome: Ongoing, Progressing  Goal: Absence of Hospital-Acquired Illness or Injury  Outcome: Ongoing, Progressing  Goal: Optimal Comfort and Wellbeing  Outcome: Ongoing, Progressing  Goal: Readiness for Transition of Care  Outcome: Ongoing, Progressing  Goal: Rounds/Family Conference  Outcome: Ongoing, Progressing     Problem: Skin Injury Risk Increased  Goal: Skin Health and Integrity  Outcome: Ongoing, Progressing     Problem: Infection  Goal: Infection Symptom Resolution  Outcome: Ongoing, Progressing     Problem: Pain Acute  Goal: Optimal Pain Control  Outcome: Ongoing, Progressing

## 2020-02-05 NOTE — PLAN OF CARE
Problem: Oral Intake Inadequate  Goal: Improved Oral Intake  Outcome: Ongoing, Progressing  Intervention: Promote and Optimize Oral Intake  Flowsheets (Taken 2/5/2020 1210)  Oral Nutrition Promotion: other (see comments)     Recommendations  RD recommended advancing diet as medically able.-- diet advanced to cardiac diet by RN during rounds.   Encourage PO intake of meals.    to assist with meal choices daily.     Goals:   Pt to meet at least 75% estimated needs via PO intake of meals.

## 2020-02-06 LAB
ALBUMIN SERPL BCP-MCNC: 2.3 G/DL (ref 3.5–5.2)
ALP SERPL-CCNC: 57 U/L (ref 55–135)
ALT SERPL W/O P-5'-P-CCNC: 41 U/L (ref 10–44)
ANION GAP SERPL CALC-SCNC: 7 MMOL/L (ref 8–16)
ANION GAP SERPL CALC-SCNC: 8 MMOL/L (ref 8–16)
ANISOCYTOSIS BLD QL SMEAR: ABNORMAL
AST SERPL-CCNC: 48 U/L (ref 10–40)
BACTERIA SPEC AEROBE CULT: ABNORMAL
BACTERIA THROAT CULT: ABNORMAL
BASOPHILS NFR BLD: 0 % (ref 0–1.9)
BILIRUB SERPL-MCNC: 2.2 MG/DL (ref 0.1–1)
BUN SERPL-MCNC: 13 MG/DL (ref 6–20)
BUN SERPL-MCNC: 14 MG/DL (ref 6–20)
CALCIUM SERPL-MCNC: 8.1 MG/DL (ref 8.7–10.5)
CALCIUM SERPL-MCNC: 8.3 MG/DL (ref 8.7–10.5)
CHLORIDE SERPL-SCNC: 92 MMOL/L (ref 95–110)
CHLORIDE SERPL-SCNC: 93 MMOL/L (ref 95–110)
CO2 SERPL-SCNC: 31 MMOL/L (ref 23–29)
CO2 SERPL-SCNC: 34 MMOL/L (ref 23–29)
CREAT SERPL-MCNC: 0.8 MG/DL (ref 0.5–1.4)
CREAT SERPL-MCNC: 0.8 MG/DL (ref 0.5–1.4)
DIFFERENTIAL METHOD: ABNORMAL
EOSINOPHIL NFR BLD: 0 % (ref 0–8)
ERYTHROCYTE [DISTWIDTH] IN BLOOD BY AUTOMATED COUNT: 20.2 % (ref 11.5–14.5)
EST. GFR  (AFRICAN AMERICAN): >60 ML/MIN/1.73 M^2
EST. GFR  (AFRICAN AMERICAN): >60 ML/MIN/1.73 M^2
EST. GFR  (NON AFRICAN AMERICAN): >60 ML/MIN/1.73 M^2
EST. GFR  (NON AFRICAN AMERICAN): >60 ML/MIN/1.73 M^2
GLUCOSE SERPL-MCNC: 100 MG/DL (ref 70–110)
GLUCOSE SERPL-MCNC: 98 MG/DL (ref 70–110)
HCT VFR BLD AUTO: 26.2 % (ref 40–54)
HGB BLD-MCNC: 8.6 G/DL (ref 14–18)
HYPOCHROMIA BLD QL SMEAR: ABNORMAL
IMM GRANULOCYTES # BLD AUTO: ABNORMAL K/UL (ref 0–0.04)
IMM GRANULOCYTES NFR BLD AUTO: ABNORMAL % (ref 0–0.5)
LYMPHOCYTES NFR BLD: 5 % (ref 18–48)
MAGNESIUM SERPL-MCNC: 1.6 MG/DL (ref 1.6–2.6)
MAGNESIUM SERPL-MCNC: 1.8 MG/DL (ref 1.6–2.6)
MCH RBC QN AUTO: 26.8 PG (ref 27–31)
MCHC RBC AUTO-ENTMCNC: 32.8 G/DL (ref 32–36)
MCV RBC AUTO: 82 FL (ref 82–98)
MONOCYTES NFR BLD: 8 % (ref 4–15)
NEUTROPHILS NFR BLD: 87 % (ref 38–73)
NRBC BLD-RTO: 0 /100 WBC
PHOSPHATE SERPL-MCNC: 2.9 MG/DL (ref 2.7–4.5)
PLATELET # BLD AUTO: 325 K/UL (ref 150–350)
PMV BLD AUTO: 9.3 FL (ref 9.2–12.9)
POIKILOCYTOSIS BLD QL SMEAR: SLIGHT
POTASSIUM SERPL-SCNC: 4.2 MMOL/L (ref 3.5–5.1)
POTASSIUM SERPL-SCNC: 4.8 MMOL/L (ref 3.5–5.1)
PROT SERPL-MCNC: 7 G/DL (ref 6–8.4)
RBC # BLD AUTO: 3.21 M/UL (ref 4.6–6.2)
SODIUM SERPL-SCNC: 132 MMOL/L (ref 136–145)
SODIUM SERPL-SCNC: 133 MMOL/L (ref 136–145)
WBC # BLD AUTO: 28.09 K/UL (ref 3.9–12.7)

## 2020-02-06 PROCEDURE — 25000003 PHARM REV CODE 250

## 2020-02-06 PROCEDURE — 63600175 PHARM REV CODE 636 W HCPCS: Performed by: THORACIC SURGERY (CARDIOTHORACIC VASCULAR SURGERY)

## 2020-02-06 PROCEDURE — 25000003 PHARM REV CODE 250: Performed by: THORACIC SURGERY (CARDIOTHORACIC VASCULAR SURGERY)

## 2020-02-06 PROCEDURE — 84100 ASSAY OF PHOSPHORUS: CPT

## 2020-02-06 PROCEDURE — 85027 COMPLETE CBC AUTOMATED: CPT

## 2020-02-06 PROCEDURE — 99900035 HC TECH TIME PER 15 MIN (STAT)

## 2020-02-06 PROCEDURE — 80053 COMPREHEN METABOLIC PANEL: CPT

## 2020-02-06 PROCEDURE — 83735 ASSAY OF MAGNESIUM: CPT

## 2020-02-06 PROCEDURE — 20000000 HC ICU ROOM

## 2020-02-06 PROCEDURE — 63600175 PHARM REV CODE 636 W HCPCS: Performed by: INTERNAL MEDICINE

## 2020-02-06 PROCEDURE — 99233 PR SUBSEQUENT HOSPITAL CARE,LEVL III: ICD-10-PCS | Mod: ,,, | Performed by: INTERNAL MEDICINE

## 2020-02-06 PROCEDURE — 83735 ASSAY OF MAGNESIUM: CPT | Mod: 91

## 2020-02-06 PROCEDURE — 85007 BL SMEAR W/DIFF WBC COUNT: CPT

## 2020-02-06 PROCEDURE — 80048 BASIC METABOLIC PNL TOTAL CA: CPT

## 2020-02-06 PROCEDURE — 99233 SBSQ HOSP IP/OBS HIGH 50: CPT | Mod: ,,, | Performed by: INTERNAL MEDICINE

## 2020-02-06 PROCEDURE — 94761 N-INVAS EAR/PLS OXIMETRY MLT: CPT

## 2020-02-06 RX ADMIN — PANTOPRAZOLE SODIUM 40 MG: 40 TABLET, DELAYED RELEASE ORAL at 06:02

## 2020-02-06 RX ADMIN — ALPRAZOLAM 0.25 MG: 0.25 TABLET ORAL at 10:02

## 2020-02-06 RX ADMIN — MAGNESIUM OXIDE 800 MG: 400 TABLET ORAL at 08:02

## 2020-02-06 RX ADMIN — MAGNESIUM SULFATE 2 G: 2 INJECTION INTRAVENOUS at 04:02

## 2020-02-06 RX ADMIN — ALPRAZOLAM 0.25 MG: 0.25 TABLET ORAL at 03:02

## 2020-02-06 RX ADMIN — PIPERACILLIN AND TAZOBACTAM 4.5 G: 4; .5 INJECTION, POWDER, LYOPHILIZED, FOR SOLUTION INTRAVENOUS; PARENTERAL at 05:02

## 2020-02-06 RX ADMIN — HYDROCODONE BITARTRATE AND ACETAMINOPHEN 1 TABLET: 10; 325 TABLET ORAL at 09:02

## 2020-02-06 RX ADMIN — MUPIROCIN: 20 OINTMENT TOPICAL at 08:02

## 2020-02-06 RX ADMIN — SENNOSIDES AND DOCUSATE SODIUM 1 TABLET: 8.6; 5 TABLET ORAL at 08:02

## 2020-02-06 RX ADMIN — FUROSEMIDE 20 MG: 10 INJECTION, SOLUTION INTRAMUSCULAR; INTRAVENOUS at 06:02

## 2020-02-06 RX ADMIN — CHLORHEXIDINE GLUCONATE 15 ML: 1.2 RINSE ORAL at 08:02

## 2020-02-06 RX ADMIN — FUROSEMIDE 20 MG: 10 INJECTION, SOLUTION INTRAMUSCULAR; INTRAVENOUS at 05:02

## 2020-02-06 RX ADMIN — PIPERACILLIN AND TAZOBACTAM 4.5 G: 4; .5 INJECTION, POWDER, LYOPHILIZED, FOR SOLUTION INTRAVENOUS; PARENTERAL at 12:02

## 2020-02-06 RX ADMIN — METOPROLOL SUCCINATE 100 MG: 50 TABLET, EXTENDED RELEASE ORAL at 08:02

## 2020-02-06 RX ADMIN — HYDROXYZINE HYDROCHLORIDE 25 MG: 25 TABLET, FILM COATED ORAL at 08:02

## 2020-02-06 RX ADMIN — HYDROCODONE BITARTRATE AND ACETAMINOPHEN 1 TABLET: 10; 325 TABLET ORAL at 01:02

## 2020-02-06 RX ADMIN — HYDROCODONE BITARTRATE AND ACETAMINOPHEN 1 TABLET: 10; 325 TABLET ORAL at 07:02

## 2020-02-06 RX ADMIN — Medication: at 12:02

## 2020-02-06 RX ADMIN — PIPERACILLIN AND TAZOBACTAM 4.5 G: 4; .5 INJECTION, POWDER, LYOPHILIZED, FOR SOLUTION INTRAVENOUS; PARENTERAL at 08:02

## 2020-02-06 NOTE — PROGRESS NOTES
[facility]  Pulmonology  Progress Note    Subjective     No major issues overnight.  Subjectively unchanged over the past 24 hr.  No new complaints this morning.     Review of Systems   Constitutional: Negative for fever, chills and night sweats.   Respiratory: Positive for pleurisy. Negative for cough, hemoptysis, sputum production, shortness of breath, wheezing and orthopnea.    Cardiovascular: Negative for chest pain, palpitations and leg swelling.   Gastrointestinal: Negative for nausea, vomiting, abdominal pain and abdominal distention.   Neurological: Negative for headaches.   Psychiatric/Behavioral: Negative for confusion.   All other systems reviewed and are negative.     I have personally reviewed the following during today's evaluation:  past medical history, ROS, family history, social history, surgical history, current inpatient medications,drug allergies, vital signs over the past 24 hours, results of relevant diagnostic studies and nursing/provider documentation from the past 24 hours.     Objective     VS Temp:  [97.4 °F (36.3 °C)-98.4 °F (36.9 °C)]   Pulse:  []   Resp:  [1-45]   BP: ()/(55-76)   SpO2:  [91 %-98 %]   Arterial Line BP: (106-157)/(48-79)   Ideal body weight: 68.4 kg (150 lb 12.7 oz)   I/O   Intake/Output Summary (Last 24 hours) at 2/6/2020 1703  Last data filed at 2/6/2020 0600  Gross per 24 hour   Intake 635.6 ml   Output 6025 ml   Net -5389.4 ml        Vent SpO2 98% on room air   PE Physical Exam   Constitutional: He is oriented to person, place, and time. He appears well-developed and well-nourished. He appears not cachectic.  Non-toxic appearance. No distress. Nasal cannula in place.   Temporal wasting present He is not obese.   HENT:   Head: Normocephalic and atraumatic.   Right Ear: External ear normal.   Left Ear: External ear normal.   Nose: Nose normal.   Mouth/Throat: Uvula is midline, oropharynx is clear and moist and mucous membranes are normal.  Mallampati Score: II.   Neck: Trachea normal and normal range of motion. Neck supple. No JVD present. No tracheal deviation present. No thyromegaly present.   Cardiovascular: Normal rate, regular rhythm, normal heart sounds and intact distal pulses. Exam reveals no gallop and no friction rub.   No murmur heard.  Pulmonary/Chest: Normal expansion, symmetric chest wall expansion and effort normal. No stridor. He has decreased breath sounds (Over the right base). He has no wheezes. He has no rhonchi. He has no rales. Chest wall is dull to percussion. He exhibits tenderness.   Two surgical chest tubes on the right connected to low wall suction.  Both draining a little over >300 cc of kathie blood.  No air leak observed.  Right-sided thoracotomy dressing clean dry and intact.  Pleural friction rub over the right base.   Abdominal: Soft. Bowel sounds are normal. He exhibits no distension. There is no tenderness. There is no guarding.   Musculoskeletal: Normal range of motion. He exhibits edema. He exhibits no tenderness or deformity.   Lymphadenopathy: No supraclavicular adenopathy is present.     He has no cervical adenopathy.     He has no axillary adenopathy.   Neurological: He is alert and oriented to person, place, and time. He has normal strength. No cranial nerve deficit or sensory deficit. He exhibits normal muscle tone. GCS eye subscore is 4. GCS verbal subscore is 5. GCS motor subscore is 6.   Skin: Skin is warm, dry and intact. No rash noted. He is not diaphoretic. No cyanosis. Nails show no clubbing.   Psychiatric: He has a normal mood and affect. His speech is normal and behavior is normal.   Nursing note and vitals reviewed.        Labs I have personally reviewed and interpreted all labs / diagnostic studies obtained over the past 24 hours, and relevant results are as follows:  Recent Labs   Lab 02/06/20  0310 02/06/20  1401   WBC 28.09*  --    RBC 3.21*  --    HGB 8.6*  --    HCT 26.2*  --      --     MCV 82  --    MCH 26.8*  --    MCHC 32.8  --    * 133*   K 4.8 4.2   CL 93* 92*   CO2 31* 34*   BUN 13 14   CREATININE 0.8 0.8   MG 1.6 1.8   ALT 41  --    AST 48*  --    ALKPHOS 57  --    BILITOT 2.2*  --    PROT 7.0  --    ALBUMIN 2.3*  --       Imaging I have personally reviewed and interpreted the following images and reviewed the associated Radiology report.  CXR: I have reviewed all pertinent results/findings within the past 24 hours and my personal findings are:  Relatively unchanged with appropriately placed thoracostomies and small amount of right-sided pleural fluid.     Micro I have personally reviewed and interpreted the available culture data.  Relevant results are as follows.  Pleural fluid:  ENTEROBACTER CLOACAE without any antibiotic resistance observed.  Many   Abnormal    Medications Scheduled    chlorhexidine  15 mL Mouth/Throat BID    furosemide (LASIX) IV  20 mg Intravenous BID AC    furosemide (LASIX) IV  20 mg Intravenous Once    hydrOXYzine HCl  25 mg Oral Daily    metoprolol succinate  100 mg Oral Daily    mupirocin   Nasal BID    naloxone  0.02 mg Intravenous Once    pantoprazole  40 mg Oral Daily    piperacillin-tazobactam (ZOSYN) IVPB  4.5 g Intravenous Q8H    senna-docusate 8.6-50 mg  1 tablet Oral BID    sodium polystyrene  15 g Oral Once      Continuous Infusions:    heparin (porcine) in D5W Stopped (02/04/20 0549)    HYDROmorphone in 0.9 % NaCl        PRN   sodium chloride, acetaminophen, acetaminophen, acetaminophen, ALPRAZolam, calcium chloride IVPB, calcium chloride IVPB, calcium chloride IVPB, dextrose 50%, dextrose 50%, diphenhydrAMINE, furosemide, gabapentin, glucagon (human recombinant), glucose, glucose, heparin (PORCINE), heparin (PORCINE), HYDROcodone-acetaminophen, HYDROcodone-acetaminophen, magnesium oxide, magnesium sulfate IVPB, magnesium sulfate IVPB, magnesium sulfate IVPB, magnesium sulfate IVPB, ondansetron, ondansetron, potassium chloride in  water, potassium chloride in water, potassium chloride in water, potassium chloride in water, potassium chloride, potassium chloride, potassium chloride, potassium chloride, promethazine (PHENERGAN) IVPB, promethazine (PHENERGAN) IVPB, sodium chloride 0.9%, sodium phosphate IVPB, sodium phosphate IVPB, sodium phosphate IVPB, sodium phosphate IVPB, sodium phosphate IVPB        Assessment       Active Hospital Problems    Diagnosis    *Hydropneumothorax    Volume overload    Parapneumonic effusion    Anasarca    Liver enzyme elevation    Empyema    Acute massive pulmonary embolism    Chronic hepatitis C without hepatic coma    Pneumonia    Essential hypertension    Acute on chronic combined systolic and diastolic congestive heart failure        My Impression:  Status post thoracotomy with decortication for right-sided empyema postoperative day 3.  Recovering appropriately.    Plan     Neuro  · Continue PCA for pain control.    Pulmonary  · Continue antibiotics as ordered.  · Thoracic surgery following.  Follow up on biology cultures obtained intraoperatively, but otherwise defer to their expertise regarding his postoperative management.  · Titrate supplemental oxygen to maintain an SpO2 greater than 92%.  · Continue diuresis.  · Resume heparin infusion as soon as possible once cleared by surgery.    Cardiac/Vascular  · Hemodynamically stable this time.  · Continue metoprolol and enalapril.    Renal/  · Hyperkalemia resolved.    ID  · Receiving Zosyn.  · Cultures noted.  Awaiting sensitivities, but will need 6 weeks of ABx.    Hematology  · No clear evidence of active hemorrhage.    Endocrine  · Serial blood glucose monitoring.  · Sliding scale insulin if necessary.    GI  · Continue PPI.  · Close attention to maintaining a net neutral fluid balance.       Trae Steele MD  Pulmonary / Critical Care Medicine  [facility]

## 2020-02-06 NOTE — PLAN OF CARE
No major issues overnight. Pt's pain controlled with pca. Asif in place for post-op I/O's. 2gm mag rider given this morning prn. Pt c/o of some anxiety this morning. Prn xanax given with relief noted.

## 2020-02-06 NOTE — ANESTHESIA POST-OP PAIN MANAGEMENT
Acute Pain Service Progress Note    Postop day 2 status post right thoracotomy.  Patient reports good pain control with PCA hydromorphone (only used 3 mg over the past 12 hr).  Patient is tolerating food with no nausea, but has not started pain pills.  No sedation or respiratory depression.  Patient does have itching and plans to ask for the ordered antihistamine soon.  I instructed patient to start asking for pain pills in order to wean from the PCA.  Will follow.

## 2020-02-06 NOTE — CARE UPDATE
02/05/20 2116   Patient Assessment/Suction   Level of Consciousness (AVPU) alert   PRE-TX-O2   O2 Device (Oxygen Therapy) room air   SpO2 (!) 94 %   Pulse Oximetry Type Continuous   $ Pulse Oximetry - Multiple Charge Pulse Oximetry - Multiple   Pulse 94   Resp 12   ETCO2   $ ETCO2 Charge Exhaled CO2 Monitoring   $ ETCO2 Usage Currently wearing   ETCO2 (mmHg) 35 mmHg   ETCO2 Device Type Bedside Monitor   Respiratory Evaluation   $ Care Plan Tech Time 15 min   Evaluation For Re-Eval 3 day

## 2020-02-06 NOTE — PLAN OF CARE
Progressing with activity states had the best day yet Up in chair, Ambulated around [place] of unit with nurse tolerated No SOB tolerated well   Chest tubes minimal drainage today Pain covered with Hydrocodone PO

## 2020-02-07 LAB
ALBUMIN SERPL BCP-MCNC: 2.2 G/DL (ref 3.5–5.2)
ALP SERPL-CCNC: 64 U/L (ref 55–135)
ALT SERPL W/O P-5'-P-CCNC: 36 U/L (ref 10–44)
ANION GAP SERPL CALC-SCNC: 7 MMOL/L (ref 8–16)
ANISOCYTOSIS BLD QL SMEAR: ABNORMAL
AST SERPL-CCNC: 41 U/L (ref 10–40)
BACTERIA SPEC ANAEROBE CULT: NORMAL
BACTERIA SPEC ANAEROBE CULT: NORMAL
BASOPHILS # BLD AUTO: 0.04 K/UL (ref 0–0.2)
BASOPHILS NFR BLD: 0.1 % (ref 0–1.9)
BILIRUB SERPL-MCNC: 1.8 MG/DL (ref 0.1–1)
BUN SERPL-MCNC: 13 MG/DL (ref 6–20)
CALCIUM SERPL-MCNC: 8.1 MG/DL (ref 8.7–10.5)
CHLORIDE SERPL-SCNC: 90 MMOL/L (ref 95–110)
CO2 SERPL-SCNC: 36 MMOL/L (ref 23–29)
CREAT SERPL-MCNC: 0.8 MG/DL (ref 0.5–1.4)
DIFFERENTIAL METHOD: ABNORMAL
EOSINOPHIL # BLD AUTO: 0.2 K/UL (ref 0–0.5)
EOSINOPHIL NFR BLD: 0.8 % (ref 0–8)
ERYTHROCYTE [DISTWIDTH] IN BLOOD BY AUTOMATED COUNT: 21.6 % (ref 11.5–14.5)
EST. GFR  (AFRICAN AMERICAN): >60 ML/MIN/1.73 M^2
EST. GFR  (NON AFRICAN AMERICAN): >60 ML/MIN/1.73 M^2
GLUCOSE SERPL-MCNC: 97 MG/DL (ref 70–110)
HCT VFR BLD AUTO: 27.6 % (ref 40–54)
HGB BLD-MCNC: 8.7 G/DL (ref 14–18)
HYPOCHROMIA BLD QL SMEAR: ABNORMAL
IMM GRANULOCYTES # BLD AUTO: 0.25 K/UL (ref 0–0.04)
IMM GRANULOCYTES NFR BLD AUTO: 0.9 % (ref 0–0.5)
LYMPHOCYTES # BLD AUTO: 1.9 K/UL (ref 1–4.8)
LYMPHOCYTES NFR BLD: 6.9 % (ref 18–48)
MAGNESIUM SERPL-MCNC: 1.6 MG/DL (ref 1.6–2.6)
MCH RBC QN AUTO: 26.8 PG (ref 27–31)
MCHC RBC AUTO-ENTMCNC: 31.5 G/DL (ref 32–36)
MCV RBC AUTO: 85 FL (ref 82–98)
MONOCYTES # BLD AUTO: 1.8 K/UL (ref 0.3–1)
MONOCYTES NFR BLD: 6.7 % (ref 4–15)
NEUTROPHILS # BLD AUTO: 22.9 K/UL (ref 1.8–7.7)
NEUTROPHILS NFR BLD: 84.6 % (ref 38–73)
NRBC BLD-RTO: 0 /100 WBC
PHOSPHATE SERPL-MCNC: 3.2 MG/DL (ref 2.7–4.5)
PLATELET # BLD AUTO: 364 K/UL (ref 150–350)
PMV BLD AUTO: 9.8 FL (ref 9.2–12.9)
POIKILOCYTOSIS BLD QL SMEAR: SLIGHT
POLYCHROMASIA BLD QL SMEAR: ABNORMAL
POTASSIUM SERPL-SCNC: 4 MMOL/L (ref 3.5–5.1)
PROT SERPL-MCNC: 6.7 G/DL (ref 6–8.4)
RBC # BLD AUTO: 3.25 M/UL (ref 4.6–6.2)
SODIUM SERPL-SCNC: 133 MMOL/L (ref 136–145)
TARGETS BLD QL SMEAR: ABNORMAL
WBC # BLD AUTO: 27.13 K/UL (ref 3.9–12.7)

## 2020-02-07 PROCEDURE — 85025 COMPLETE CBC W/AUTO DIFF WBC: CPT

## 2020-02-07 PROCEDURE — 83735 ASSAY OF MAGNESIUM: CPT

## 2020-02-07 PROCEDURE — 63600175 PHARM REV CODE 636 W HCPCS: Performed by: THORACIC SURGERY (CARDIOTHORACIC VASCULAR SURGERY)

## 2020-02-07 PROCEDURE — 84100 ASSAY OF PHOSPHORUS: CPT

## 2020-02-07 PROCEDURE — 25000003 PHARM REV CODE 250: Performed by: THORACIC SURGERY (CARDIOTHORACIC VASCULAR SURGERY)

## 2020-02-07 PROCEDURE — 20000000 HC ICU ROOM

## 2020-02-07 PROCEDURE — 99233 PR SUBSEQUENT HOSPITAL CARE,LEVL III: ICD-10-PCS | Mod: ,,, | Performed by: INTERNAL MEDICINE

## 2020-02-07 PROCEDURE — 63600175 PHARM REV CODE 636 W HCPCS: Performed by: INTERNAL MEDICINE

## 2020-02-07 PROCEDURE — 94761 N-INVAS EAR/PLS OXIMETRY MLT: CPT

## 2020-02-07 PROCEDURE — 99233 SBSQ HOSP IP/OBS HIGH 50: CPT | Mod: ,,, | Performed by: INTERNAL MEDICINE

## 2020-02-07 PROCEDURE — 25000003 PHARM REV CODE 250

## 2020-02-07 PROCEDURE — 25000003 PHARM REV CODE 250: Performed by: INTERNAL MEDICINE

## 2020-02-07 PROCEDURE — 80053 COMPREHEN METABOLIC PANEL: CPT

## 2020-02-07 RX ORDER — LACTULOSE 10 G/15ML
30 SOLUTION ORAL ONCE
Status: COMPLETED | OUTPATIENT
Start: 2020-02-07 | End: 2020-02-07

## 2020-02-07 RX ORDER — POLYETHYLENE GLYCOL 3350 17 G/17G
17 POWDER, FOR SOLUTION ORAL DAILY
Status: DISCONTINUED | OUTPATIENT
Start: 2020-02-08 | End: 2020-02-08

## 2020-02-07 RX ORDER — MIDAZOLAM HYDROCHLORIDE 1 MG/ML
2 INJECTION INTRAMUSCULAR; INTRAVENOUS ONCE
Status: COMPLETED | OUTPATIENT
Start: 2020-02-07 | End: 2020-02-07

## 2020-02-07 RX ORDER — POLYETHYLENE GLYCOL 3350 17 G/17G
17 POWDER, FOR SOLUTION ORAL DAILY
Status: DISCONTINUED | OUTPATIENT
Start: 2020-02-07 | End: 2020-02-07

## 2020-02-07 RX ORDER — POLYETHYLENE GLYCOL 3350, SODIUM CHLORIDE, SODIUM BICARBONATE, POTASSIUM CHLORIDE 420; 11.2; 5.72; 1.48 G/4L; G/4L; G/4L; G/4L
4000 POWDER, FOR SOLUTION ORAL ONCE
Status: DISCONTINUED | OUTPATIENT
Start: 2020-02-07 | End: 2020-02-09

## 2020-02-07 RX ORDER — METOCLOPRAMIDE HYDROCHLORIDE 5 MG/ML
10 INJECTION INTRAMUSCULAR; INTRAVENOUS EVERY 6 HOURS
Status: DISCONTINUED | OUTPATIENT
Start: 2020-02-07 | End: 2020-02-09

## 2020-02-07 RX ORDER — MORPHINE SULFATE 2 MG/ML
2 INJECTION, SOLUTION INTRAMUSCULAR; INTRAVENOUS ONCE
Status: COMPLETED | OUTPATIENT
Start: 2020-02-07 | End: 2020-02-07

## 2020-02-07 RX ORDER — PROPOFOL 10 MG/ML
INJECTION, EMULSION INTRAVENOUS
Status: DISCONTINUED
Start: 2020-02-07 | End: 2020-02-07 | Stop reason: WASHOUT

## 2020-02-07 RX ORDER — DEXMEDETOMIDINE HYDROCHLORIDE 4 UG/ML
0.2 INJECTION, SOLUTION INTRAVENOUS CONTINUOUS
Status: DISCONTINUED | OUTPATIENT
Start: 2020-02-07 | End: 2020-02-11

## 2020-02-07 RX ADMIN — METHYLNALTREXONE BROMIDE 8 MG: 12 INJECTION, SOLUTION SUBCUTANEOUS at 01:02

## 2020-02-07 RX ADMIN — MORPHINE SULFATE 2 MG: 2 INJECTION, SOLUTION INTRAMUSCULAR; INTRAVENOUS at 04:02

## 2020-02-07 RX ADMIN — ALPRAZOLAM 0.25 MG: 0.25 TABLET ORAL at 05:02

## 2020-02-07 RX ADMIN — METOCLOPRAMIDE 10 MG: 5 INJECTION, SOLUTION INTRAMUSCULAR; INTRAVENOUS at 06:02

## 2020-02-07 RX ADMIN — HYDROCODONE BITARTRATE AND ACETAMINOPHEN 1 TABLET: 10; 325 TABLET ORAL at 08:02

## 2020-02-07 RX ADMIN — HEPARIN SODIUM 18 UNITS/KG/HR: 10000 INJECTION, SOLUTION INTRAVENOUS at 09:02

## 2020-02-07 RX ADMIN — CHLORHEXIDINE GLUCONATE 15 ML: 1.2 RINSE ORAL at 08:02

## 2020-02-07 RX ADMIN — PANTOPRAZOLE SODIUM 40 MG: 40 TABLET, DELAYED RELEASE ORAL at 05:02

## 2020-02-07 RX ADMIN — SENNOSIDES AND DOCUSATE SODIUM 1 TABLET: 8.6; 5 TABLET ORAL at 08:02

## 2020-02-07 RX ADMIN — AZITHROMYCIN MONOHYDRATE 250 MG: 500 INJECTION, POWDER, LYOPHILIZED, FOR SOLUTION INTRAVENOUS at 09:02

## 2020-02-07 RX ADMIN — FUROSEMIDE 20 MG: 10 INJECTION, SOLUTION INTRAMUSCULAR; INTRAVENOUS at 04:02

## 2020-02-07 RX ADMIN — DEXMEDETOMIDINE HYDROCHLORIDE 0.2 MCG/KG/HR: 400 INJECTION INTRAVENOUS at 06:02

## 2020-02-07 RX ADMIN — LACTULOSE 30 G: 20 SOLUTION ORAL at 10:02

## 2020-02-07 RX ADMIN — CHLORHEXIDINE GLUCONATE 15 ML: 1.2 RINSE ORAL at 09:02

## 2020-02-07 RX ADMIN — MAGNESIUM SULFATE 2 G: 2 INJECTION INTRAVENOUS at 04:02

## 2020-02-07 RX ADMIN — MUPIROCIN: 20 OINTMENT TOPICAL at 08:02

## 2020-02-07 RX ADMIN — HYDROCODONE BITARTRATE AND ACETAMINOPHEN 1 TABLET: 10; 325 TABLET ORAL at 03:02

## 2020-02-07 RX ADMIN — MIDAZOLAM HYDROCHLORIDE 2 MG: 1 INJECTION, SOLUTION INTRAMUSCULAR; INTRAVENOUS at 04:02

## 2020-02-07 RX ADMIN — CEFTRIAXONE 2 G: 2 INJECTION, SOLUTION INTRAVENOUS at 10:02

## 2020-02-07 RX ADMIN — PIPERACILLIN AND TAZOBACTAM 4.5 G: 4; .5 INJECTION, POWDER, LYOPHILIZED, FOR SOLUTION INTRAVENOUS; PARENTERAL at 12:02

## 2020-02-07 RX ADMIN — METOPROLOL SUCCINATE 100 MG: 50 TABLET, EXTENDED RELEASE ORAL at 10:02

## 2020-02-07 RX ADMIN — FUROSEMIDE 20 MG: 10 INJECTION, SOLUTION INTRAMUSCULAR; INTRAVENOUS at 06:02

## 2020-02-07 RX ADMIN — DEXMEDETOMIDINE HYDROCHLORIDE 0.7 MCG/KG/HR: 400 INJECTION INTRAVENOUS at 11:02

## 2020-02-07 RX ADMIN — HYDROXYZINE HYDROCHLORIDE 25 MG: 25 TABLET, FILM COATED ORAL at 08:02

## 2020-02-07 RX ADMIN — MUPIROCIN: 20 OINTMENT TOPICAL at 09:02

## 2020-02-07 RX ADMIN — PIPERACILLIN AND TAZOBACTAM 4.5 G: 4; .5 INJECTION, POWDER, LYOPHILIZED, FOR SOLUTION INTRAVENOUS; PARENTERAL at 08:02

## 2020-02-07 NOTE — PLAN OF CARE
02/07/20 1534   Discharge Reassessment   Assessment Type Discharge Planning Reassessment   Pt states that he lives with his cousin Tana Linares [phone].

## 2020-02-07 NOTE — PLAN OF CARE
Problem: Oral Intake Inadequate  Goal: Improved Oral Intake  Outcome: Ongoing, Progressing  Intervention: Promote and Optimize Oral Intake  Flowsheets (Taken 2/7/2020 1416)  Oral Nutrition Promotion: calorie dense liquids provided     Recommendation/Intervention:   RD added ensure enlive TID to better meet needs (to provide 1050 kcal/day and 60 g/day protein).  Recommend continued medical management of constipation.  Suspect malnutrition due to hx of intravenous drug abuse, edema, inadequate oral intake, visually thin appearance. RD plans to better assess nutritional status if/when able to interview patient and perform NFPE.    Goals:   Patient to meet at least 75% of estimated energy and protein needs via PO intake of meals and supplements.   Constipation to be relieved via medical management.   RD to perform NFPE if/when appropriate.

## 2020-02-07 NOTE — PROGRESS NOTES
[facility] Medicine Progress Note    Subjective:     No major issues overnight. Afebrile.   R sided thoracostomy tubes in place.   A-line and munoz removed.   Ambulated around the unit this afternoon.   Seems to be in better spirits.     Objective:   Last 24 Hour Vital Signs:  BP  Min: 89/55  Max: 174/76  Temp  Av.9 °F (36.6 °C)  Min: 97.4 °F (36.3 °C)  Max: 98.4 °F (36.9 °C)  Pulse  Av.5  Min: 94  Max: 108  Resp  Av.2  Min: 1  Max: 45  SpO2  Av.7 %  Min: 91 %  Max: 98 %  Weight  Av kg (138 lb 14.2 oz)  Min: 63 kg (138 lb 14.2 oz)  Max: 63 kg (138 lb 14.2 oz)  I/O last 3 completed shifts:  In: 1719.9 [P.O.:472; I.V.:63.6; Blood:884.3; IV Piggyback:300]  Out: 6025 [Urine:5425; Chest Tube:600]    Physical Examination:  Physical Exam   Constitutional:   Cachectic, frail appearing  Appears to be in mild pain   HENT:   Head: Normocephalic.   Mouth/Throat: Oropharynx is clear and moist.   Eyes: Pupils are equal, round, and reactive to light. EOM are normal.   Neck: Normal range of motion. Neck supple. No JVD present.   Cardiovascular: Regular rhythm and intact distal pulses.   Tachycardic     Pulmonary/Chest: No stridor. No respiratory distress.   Rhonchi + crackles to R base  Appropriately tender to R chest wall   Abdominal: Soft. He exhibits no distension. There is no tenderness. There is no guarding.   Musculoskeletal: He exhibits edema.   Neurological: He is alert. No cranial nerve deficit or sensory deficit. He exhibits normal muscle tone.   Skin: Skin is warm and dry. Capillary refill takes less than 2 seconds.   Vitals reviewed.        Laboratory:  Laboratory Data Reviewed: yes    Most Recent Data:  CBC:   Lab Results   Component Value Date    WBC 28.09 (H) 2020    HGB 8.6 (L) 2020    HCT 26.2 (L) 2020     2020    MCV 82 2020    RDW 20.2 (H) 2020     BMP:   Lab Results   Component Value Date     (L) 2020    K 4.2 2020    CL 92  (L) 02/06/2020    CO2 34 (H) 02/06/2020    BUN 14 02/06/2020    GLU 98 02/06/2020    CALCIUM 8.1 (L) 02/06/2020    MG 1.8 02/06/2020    PHOS 2.9 02/06/2020     LFTs:   Lab Results   Component Value Date    PROT 7.0 02/06/2020    ALBUMIN 2.3 (L) 02/06/2020    BILITOT 2.2 (H) 02/06/2020    AST 48 (H) 02/06/2020    ALKPHOS 57 02/06/2020    ALT 41 02/06/2020     Coags:   Lab Results   Component Value Date    INR 1.3 02/04/2020     FLP:   Lab Results   Component Value Date    CHOL 105 (L) 10/28/2019    HDL 33 (L) 10/28/2019    LDLCALC 56.6 (L) 10/28/2019    TRIG 77 10/28/2019    CHOLHDL 31.4 10/28/2019     DM:   Lab Results   Component Value Date    LDLCALC 56.6 (L) 10/28/2019    CREATININE 0.8 02/06/2020     Thyroid:   Lab Results   Component Value Date    TSH 3.671 10/28/2019     Anemia: No results found for: IRON, TIBC, FERRITIN, [id number], FOLATE  Cardiac:   Lab Results   Component Value Date    TROPONINI 0.140 (HH) 01/27/2020     (H) 02/01/2020     Urinalysis:   Lab Results   Component Value Date    COLORU Yellow 01/27/2020    SPECGRAV 1.010 01/27/2020    NITRITE Negative 01/27/2020    KETONESU Negative 01/27/2020    UROBILINOGEN Negative 01/27/2020        Radiology:  Data Reviewed: yes  XR CHEST AP PORTABLE  CT ABDOMEN PELVIS WITH CONTRAST  XR CHEST AP PORTABLE  CT CHEST WITH CONTRAST  US LIVER WITH DOPPLER  US ABDOMEN LIMITED  XR CHEST AP PORTABLE  XR CHEST AP PORTABLE  XR CHEST AP PORTABLE  X-Ray Chest AP Portable   Order: [id number]   Status:  Final result   Visible to patient:  No (Not Released) Next appt:  05/19/2020 at 09:40 AM in Hepatology (Jennifer B Scheuermann, [place])   Details     Reading Physician Reading Date Result Priority   Leslie Horne MD 2/5/2020       Narrative     PROCEDURE:   XR CHEST AP PORTABLE  dated  2/5/2020 4:22 AM    CLINICAL HISTORY:   Male 44 years of age.   chest tubes    TECHNIQUE: AP view of the chest obtained portably at 4:22 AM.    PREVIOUS STUDIES:  February 4,  2020    FINDINGS:    3 right-sided chest tubes are unchanged. Air within the right chest  wall and base of the neck is similar. Previous tiny apical  pneumothorax as resolved. Opacities of the right lower lung, and  oval-shaped opacity of the left mid lung are stable. Small bilateral  pleural effusions are stable.    Mild cardiomegaly is stable.    IMPRESSION: Resolution of tiny right apical pneumothorax. Otherwise  unchanged, with small bilateral pleural effusions, right lower lung  and left midlung opacities, and mild cardiomegaly.               Current Medications:     Infusions:   heparin (porcine) in D5W Stopped (02/04/20 0549)    HYDROmorphone in 0.9 % NaCl          Scheduled:   chlorhexidine  15 mL Mouth/Throat BID    furosemide (LASIX) IV  20 mg Intravenous BID AC    furosemide (LASIX) IV  20 mg Intravenous Once    hydrOXYzine HCl  25 mg Oral Daily    metoprolol succinate  100 mg Oral Daily    mupirocin   Nasal BID    naloxone  0.02 mg Intravenous Once    pantoprazole  40 mg Oral Daily    piperacillin-tazobactam (ZOSYN) IVPB  4.5 g Intravenous Q8H    senna-docusate 8.6-50 mg  1 tablet Oral BID    sodium polystyrene  15 g Oral Once        PRN:  sodium chloride, acetaminophen, acetaminophen, acetaminophen, ALPRAZolam, calcium chloride IVPB, calcium chloride IVPB, calcium chloride IVPB, dextrose 50%, dextrose 50%, diphenhydrAMINE, furosemide, gabapentin, glucagon (human recombinant), glucose, glucose, heparin (PORCINE), heparin (PORCINE), HYDROcodone-acetaminophen, HYDROcodone-acetaminophen, magnesium oxide, magnesium sulfate IVPB, magnesium sulfate IVPB, magnesium sulfate IVPB, magnesium sulfate IVPB, ondansetron, ondansetron, potassium chloride in water, potassium chloride in water, potassium chloride in water, potassium chloride in water, potassium chloride, potassium chloride, potassium chloride, potassium chloride, promethazine (PHENERGAN) IVPB, promethazine (PHENERGAN) IVPB, sodium chloride  0.9%, sodium phosphate IVPB, sodium phosphate IVPB, sodium phosphate IVPB, sodium phosphate IVPB, sodium phosphate IVPB    Microbiology Data:  Reviewed: yes  Microbiology Results (last 7 days)     Procedure Component Value Units Date/Time    AFB Culture & Smear [[id number]] Collected:  02/04/20 1316    Order Status:  Completed Specimen:  Tissue from Lung, Right Updated:  02/06/20 1418     AFB CULTURE STAIN No acid fast bacilli seen.     AFB CULTURE STAIN Testing performed by:     AFB CULTURE STAIN Lab [facility] [place]     [facility]     [place] [address]     AFB CULTURE STAIN Dr.Brian Gallito MD    Tissue culture [[id number]]  (Abnormal)  (Susceptibility) Collected:  02/04/20 1316    Order Status:  Completed Specimen:  Tissue Updated:  02/06/20 0837     Aerobic Culture - Tissue ENTEROBACTER CLOACAE  Many      Aerobic culture [[id number]]  (Abnormal) Collected:  02/04/20 1316    Order Status:  Completed Specimen:  Drainage from Lung, Right Updated:  02/06/20 0837     Aerobic Bacterial Culture ENTEROBACTER CLOACAE  Few  For susceptibility see order #[id number]      Culture, Body Fluid (Aerobic) w/ GS [[id number]] Collected:  02/04/20 1316    Order Status:  Completed Specimen:  Tissue from Peritoneal Fluid Updated:  02/05/20 1112     Gram Stain Result Many WBC's      Rare Gram negative rods    Fungus culture [[id number]] Collected:  02/04/20 1316    Order Status:  Sent Specimen:  Abscess from Lung, Right Updated:  02/04/20 1328    Culture, Anaerobe [[id number]] Collected:  02/04/20 1316    Order Status:  Sent Specimen:  Abscess from Lung, Right Updated:  02/04/20 1328    Culture, Anaerobic [[id number]] Collected:  02/04/20 1316    Order Status:  Sent Specimen:  Abscess from Lung, Right Updated:  02/04/20 1327    Blood Culture #1 **CANNOT BE ORDERED STAT** [[id number]] Collected:  01/27/20 1235    Order Status:  Completed Specimen:  Blood from Peripheral, Antecubital, Right  Updated:  02/01/20 1432     Blood Culture, Routine No growth after 5 days.    Blood Culture #2 **CANNOT BE ORDERED STAT** [[id number]] Collected:  01/27/20 1242    Order Status:  Completed Specimen:  Blood from Peripheral, Antecubital, Left Updated:  02/01/20 1432     Blood Culture, Routine No growth after 5 days.           Antibiotics and Day Number of Therapy:  Antibiotics (From admission, onward)    Start     Stop Route Frequency Ordered    02/05/20 0900  piperacillin-tazobactam 4.5 g in dextrose 5 % 100 mL IVPB (ready to mix system)      -- IV Every 8 hours (non-standard times) 02/05/20 0830    02/04/20 2100  mupirocin 2 % ointment  ([facility] Decolonization Orders ST)      02/09 2059 Nasl 2 times daily 02/04/20 1644    01/27/20 1200  levoFLOXacin 500 mg/100 mL IVPB 500 mg      01/27 2359 IV ED 1 Time 01/27/20 1153           Assessment/Plan:     Francis Daigle is a 44 y.o.male with:    Severe Sepsis due to Bilateral pneumonia with R parapneumonic effusion: Now POD#2 from thoracotomy, routine postop mgmt as per CTS. Cont zosyn. GNR growing from pleural fluid.     Bilateral Pulmonary Embolism: on heparin GTT - restart asap when ok with CTS     Acute on Chronic systolic CHF NYHA III: continue IV Lasix 20mg BID. Hold eplerenone for high K. Strict I/o and daily weights.      Hypertension: Enalapril 2.5mg. Hold for high K.     Hyperkalemia - getting lasix,, hold aldactone and ACEi as above      Acute Blood loss anemia - expected blood loss post operatively - was transfused yesterday per CTS, transfuse for hgb <7    Leopoldo Dalton  [facility]

## 2020-02-07 NOTE — PROGRESS NOTES
[facility]  Pulmonology  Progress Note    Subjective     No major issues overnight.  Subjectively unchanged over the past 24 hours.  No new complaints.     Review of Systems   Constitutional: Negative for fever, chills and night sweats.   Respiratory: Positive for pleurisy. Negative for cough, hemoptysis, sputum production, shortness of breath, wheezing and orthopnea.    Cardiovascular: Negative for chest pain, palpitations and leg swelling.   Gastrointestinal: Negative for nausea, vomiting, abdominal pain and abdominal distention.   Neurological: Negative for headaches.   Psychiatric/Behavioral: Negative for confusion.   All other systems reviewed and are negative.     I have personally reviewed the following during today's evaluation:  past medical history, ROS, family history, social history, surgical history, current inpatient medications,drug allergies, vital signs over the past 24 hours, results of relevant diagnostic studies and nursing/provider documentation from the past 24 hours.     Objective     VS Temp:  [97.5 °F (36.4 °C)-98 °F (36.7 °C)]   Pulse:  []   Resp:  [11-45]   BP: ()/(50-84)   SpO2:  [79 %-98 %]   Arterial Line BP: (128)/(58)   Ideal body weight: 68.4 kg (150 lb 12.7 oz)   I/O   Intake/Output Summary (Last 24 hours) at 2/7/2020 1052  Last data filed at 2/7/2020 0700  Gross per 24 hour   Intake 2207.4 ml   Output 3950 ml   Net -1742.6 ml        Vent SpO2 95% on room air   PE Physical Exam   Constitutional: He is oriented to person, place, and time. He appears well-developed and well-nourished.  Non-toxic appearance. No distress. Nasal cannula in place.   HENT:   Head: Normocephalic and atraumatic.   Right Ear: External ear normal.   Left Ear: External ear normal.   Nose: Nose normal.   Mouth/Throat: Uvula is midline, oropharynx is clear and moist and mucous membranes are normal. Mallampati Score: II.   Neck: Trachea normal and normal range of motion. Neck supple. No  JVD present. No tracheal deviation present. No thyromegaly present.   Cardiovascular: Normal rate, regular rhythm, normal heart sounds and intact distal pulses. Exam reveals no gallop and no friction rub.   No murmur heard.  Pulmonary/Chest: Normal expansion, symmetric chest wall expansion and effort normal. No stridor. He has no decreased breath sounds. He has no wheezes. He has no rhonchi. He has no rales. Chest wall is not dull to percussion. He exhibits tenderness.   Two surgical chest tubes on the right connected to low wall suction.  Old blood in atrium.  No air leak observed.    Right-sided thoracotomy dressing clean dry and intact.   Abdominal: Soft. Bowel sounds are normal. He exhibits no distension. There is no tenderness. There is no guarding.   Musculoskeletal: Normal range of motion. He exhibits no edema or deformity.   Lymphadenopathy: No supraclavicular adenopathy is present.     He has no cervical adenopathy.     He has no axillary adenopathy.   Neurological: He is alert and oriented to person, place, and time. He has normal strength. No cranial nerve deficit or sensory deficit. He exhibits normal muscle tone. GCS eye subscore is 4. GCS verbal subscore is 5. GCS motor subscore is 6.   Skin: Skin is warm, dry and intact. No rash noted. No cyanosis. Nails show no clubbing.   Psychiatric: He has a normal mood and affect. His speech is normal and behavior is normal.   Nursing note and vitals reviewed.        Labs I have personally reviewed and interpreted all labs / diagnostic studies obtained over the past 24 hours, and relevant results are as follows:  Recent Labs   Lab 02/07/20  0310   WBC 27.13*   RBC 3.25*   HGB 8.7*   HCT 27.6*   *   MCV 85   MCH 26.8*   MCHC 31.5*   *   K 4.0   CL 90*   CO2 36*   BUN 13   CREATININE 0.8   MG 1.6   ALT 36   AST 41*   ALKPHOS 64   BILITOT 1.8*   PROT 6.7   ALBUMIN 2.2*      Imaging I have personally reviewed and interpreted the following images and  reviewed the associated Radiology report.  CXR: I have reviewed all pertinent results/findings within the past 24 hours and my personal findings are:  slight increase in right basilar airspace disease.  I have reviewed and interpreted all pertinent imaging results/findings within the past 24 hours.     Micro I have personally reviewed and interpreted the available culture data.  Relevant results are as follows.  Blood Culture   Lab Results   Component Value Date    LABBLOO No growth after 5 days. 01/27/2020   , Sputum Culture   Lab Results   Component Value Date    GSRESP Few WBC's 12/11/2019    GSRESP Few yeast 12/11/2019    RESPIRATORYC No S aureus or Pseudomonas isolated. 12/11/2019    RESPIRATORYC KEYSHAWN ALBICANS  Moderate   (A) 12/11/2019    RESPIRATORYC (A) 12/11/2019     ENTEROBACTER CLOACAE  Few  susceptibility pending      and Urine Culture  No results found for: LABURIN   Medications Scheduled    cefTRIAXone (ROCEPHIN) IVPB  2 g Intravenous Q24H    chlorhexidine  15 mL Mouth/Throat BID    furosemide (LASIX) IV  20 mg Intravenous BID AC    furosemide (LASIX) IV  20 mg Intravenous Once    hydrOXYzine HCl  25 mg Oral Daily    metoprolol succinate  100 mg Oral Daily    mupirocin   Nasal BID    naloxone  0.02 mg Intravenous Once    pantoprazole  40 mg Oral Daily    [START ON 2/8/2020] polyethylene glycol  17 g Oral Daily    senna-docusate 8.6-50 mg  1 tablet Oral BID    sodium polystyrene  15 g Oral Once      Continuous Infusions:    heparin (porcine) in D5W Stopped (02/04/20 0549)      PRN   sodium chloride, acetaminophen, acetaminophen, acetaminophen, ALPRAZolam, calcium chloride IVPB, calcium chloride IVPB, calcium chloride IVPB, dextrose 50%, dextrose 50%, diphenhydrAMINE, furosemide, gabapentin, glucagon (human recombinant), glucose, glucose, heparin (PORCINE), heparin (PORCINE), HYDROcodone-acetaminophen, HYDROcodone-acetaminophen, magnesium oxide, magnesium sulfate IVPB, magnesium sulfate  IVPB, magnesium sulfate IVPB, magnesium sulfate IVPB, ondansetron, ondansetron, potassium chloride in water, potassium chloride in water, potassium chloride in water, potassium chloride in water, potassium chloride, potassium chloride, potassium chloride, potassium chloride, promethazine (PHENERGAN) IVPB, promethazine (PHENERGAN) IVPB, sodium chloride 0.9%, sodium phosphate IVPB, sodium phosphate IVPB, sodium phosphate IVPB, sodium phosphate IVPB, sodium phosphate IVPB        Assessment       Active Hospital Problems    Diagnosis    *Hydropneumothorax    Volume overload    Parapneumonic effusion    Anasarca    Liver enzyme elevation    Empyema    Acute massive pulmonary embolism    Chronic hepatitis C without hepatic coma    Pneumonia    Essential hypertension    Acute on chronic combined systolic and diastolic congestive heart failure      My Impression:  S/p right sided thoracotomy with decortication for empyema POD #3.  Recovering appropriately.    Plan     Neuro  · Off of PCA.  · Norco for pain.    Pulmonary  · Continue antibiotics as ordered.  Culture data noted.  Will need 6 weeks of abx total.  · Thoracic surgery following and I will defer to their expertise regarding his postoperative management.  · Titrate supplemental oxygen to maintain an SpO2 greater than 92%.  · Continued diuresis.  · Resume heparin infusion as soon as possible once cleared by surgery.    Cardiac/Vascular  · Hemodynamically stable this time.  · Continue metoprolol and enalapril.    Renal/  · Hyperkalemia resolved with decrease orange juice consumption.    ID  · Receiving ceftriaxone.  · Cultures noted.  Awaiting sensitivities, but will need 6 weeks of ABx.    Hematology  · No clear evidence of active hemorrhage.    Endocrine  · Serial blood glucose monitoring.  · Sliding scale insulin if necessary.    GI  · Continue PPI.  · Start bowel regimen and limit narcotics with development of ileus.     Dr. Light will be seeing the  patient over the weekend.    Trae Steele MD  Pulmonary / Critical Care Medicine  [facility]

## 2020-02-07 NOTE — PROGRESS NOTES
[facility] Medicine Progress Note    Subjective:     No major issues overnight.   R sided thoracostomy tubes in place, output slowed down.  C/o abd cramping and bloating. No BM since the day before surgery. + Flatus.    Objective:   Last 24 Hour Vital Signs:  BP  Min: 87/50  Max: 129/84  Temp  Av.8 °F (36.6 °C)  Min: 97.5 °F (36.4 °C)  Max: 98 °F (36.7 °C)  Pulse  Av.7  Min: 96  Max: 114  Resp  Av  Min: 11  Max: 45  SpO2  Av.8 %  Min: 79 %  Max: 98 %  Weight  Av.3 kg (148 lb 5.9 oz)  Min: 67.3 kg (148 lb 5.9 oz)  Max: 67.3 kg (148 lb 5.9 oz)  I/O last 3 completed shifts:  In: 3075.8 [P.O.:2232; I.V.:343.8; IV Piggyback:500]  Out: 5470 [Urine:4890; Chest Tube:580]      Physical Examination:  Physical Exam   Constitutional:   Cachectic, frail appearing  Appears to be in mild pain   HENT:   Head: Normocephalic.   Mouth/Throat: Oropharynx is clear and moist.   Eyes: Pupils are equal, round, and reactive to light. EOM are normal.   Neck: Normal range of motion. Neck supple. No JVD present.   Cardiovascular: Regular rhythm and intact distal pulses.   Tachycardic     Pulmonary/Chest: No stridor. No respiratory distress.   Rhonchi + crackles to R base  Appropriately tender to R chest wall   Abdominal: Soft. He exhibits no distension. There is no tenderness. There is no guarding.   Musculoskeletal: He exhibits edema.   Neurological: He is alert. No cranial nerve deficit or sensory deficit. He exhibits normal muscle tone.   Skin: Skin is warm and dry. Capillary refill takes less than 2 seconds.   Vitals reviewed.      Laboratory:  Laboratory Data Reviewed: yes      Most Recent Data:  CBC:   Lab Results   Component Value Date    WBC 27.13 (H) 2020    HGB 8.7 (L) 2020    HCT 27.6 (L) 2020     (H) 2020    MCV 85 2020    RDW 21.6 (H) 2020     BMP:   Lab Results   Component Value Date     (L) 2020    K 4.0 2020    CL 90 (L) 2020    CO2  36 (H) 02/07/2020    BUN 13 02/07/2020    GLU 97 02/07/2020    CALCIUM 8.1 (L) 02/07/2020    MG 1.6 02/07/2020    PHOS 3.2 02/07/2020     LFTs:   Lab Results   Component Value Date    PROT 6.7 02/07/2020    ALBUMIN 2.2 (L) 02/07/2020    BILITOT 1.8 (H) 02/07/2020    AST 41 (H) 02/07/2020    ALKPHOS 64 02/07/2020    ALT 36 02/07/2020     Coags:   Lab Results   Component Value Date    INR 1.3 02/04/2020     FLP:   Lab Results   Component Value Date    CHOL 105 (L) 10/28/2019    HDL 33 (L) 10/28/2019    LDLCALC 56.6 (L) 10/28/2019    TRIG 77 10/28/2019    CHOLHDL 31.4 10/28/2019     DM:   Lab Results   Component Value Date    LDLCALC 56.6 (L) 10/28/2019    CREATININE 0.8 02/07/2020     Thyroid:   Lab Results   Component Value Date    TSH 3.671 10/28/2019     Anemia: No results found for: IRON, TIBC, FERRITIN, [id number], FOLATE  Cardiac:   Lab Results   Component Value Date    TROPONINI 0.140 (HH) 01/27/2020     (H) 02/01/2020     Urinalysis:   Lab Results   Component Value Date    COLORU Yellow 01/27/2020    SPECGRAV 1.010 01/27/2020    NITRITE Negative 01/27/2020    KETONESU Negative 01/27/2020    UROBILINOGEN Negative 01/27/2020        Radiology:  Data Reviewed: yes  XR CHEST AP PORTABLE  CT ABDOMEN PELVIS WITH CONTRAST  XR CHEST AP PORTABLE  CT CHEST WITH CONTRAST  US LIVER WITH DOPPLER  US ABDOMEN LIMITED  XR CHEST AP PORTABLE  XR CHEST AP PORTABLE  XR CHEST AP PORTABLE  XR CHEST AP PORTABLE  XR KUB  X-Ray Chest AP Portable   Status: Final result   MyChart Results Release     MyChart Status: Active  Results Release   PACS Images for [facility] [place] Viewer      Show images for X-Ray Chest AP Portable   X-Ray Chest AP Portable   Order: [id number]   Status:  Final result   Visible to patient:  No (Not Released) Next appt:  05/19/2020 at 09:40 AM in Hepatology (Jennifer B Scheuermann, [place])   Details     Reading Physician Reading Date Result Priority   Trung Driver MD 2/7/2020       Narrative     XR CHEST AP  PORTABLE    CLINICAL HISTORY:  44 years Male chest tubes    COMPARISON: February 6, 2020    FINDINGS: Cardiopericardial silhouette is enlarged and stable compared  to prior. There are three right-sided chest tubes which are in stable  position. Right chest wall subcutaneous emphysema is again evident  with no appreciable pneumothorax on the right. Parenchymal opacities  involving the mid and right lower lung zones appears slightly worsened  compared to prior. Small right pleural effusion. Left midlung zone  parenchymal opacity and retrocardiac left lower lobe airspace disease  appears stable compared to prior.    IMPRESSION:    Slight interval worsening of right lung base airspace disease.    Otherwise stable chest radiograph.               Current Medications:     Infusions:   heparin (porcine) in D5W Stopped (02/04/20 0549)        Scheduled:   chlorhexidine  15 mL Mouth/Throat BID    furosemide (LASIX) IV  20 mg Intravenous BID AC    furosemide (LASIX) IV  20 mg Intravenous Once    hydrOXYzine HCl  25 mg Oral Daily    metoprolol succinate  100 mg Oral Daily    mupirocin   Nasal BID    naloxone  0.02 mg Intravenous Once    pantoprazole  40 mg Oral Daily    piperacillin-tazobactam (ZOSYN) IVPB  4.5 g Intravenous Q8H    polyethylene glycol  17 g Oral Daily    senna-docusate 8.6-50 mg  1 tablet Oral BID    sodium polystyrene  15 g Oral Once        PRN:  sodium chloride, acetaminophen, acetaminophen, acetaminophen, ALPRAZolam, calcium chloride IVPB, calcium chloride IVPB, calcium chloride IVPB, dextrose 50%, dextrose 50%, diphenhydrAMINE, furosemide, gabapentin, glucagon (human recombinant), glucose, glucose, heparin (PORCINE), heparin (PORCINE), HYDROcodone-acetaminophen, HYDROcodone-acetaminophen, magnesium oxide, magnesium sulfate IVPB, magnesium sulfate IVPB, magnesium sulfate IVPB, magnesium sulfate IVPB, ondansetron, ondansetron, potassium chloride in water, potassium chloride in water, potassium  chloride in water, potassium chloride in water, potassium chloride, potassium chloride, potassium chloride, potassium chloride, promethazine (PHENERGAN) IVPB, promethazine (PHENERGAN) IVPB, sodium chloride 0.9%, sodium phosphate IVPB, sodium phosphate IVPB, sodium phosphate IVPB, sodium phosphate IVPB, sodium phosphate IVPB    Microbiology Data:  Reviewed: yes  Microbiology Results (last 7 days)     Procedure Component Value Units Date/Time    AFB Culture & Smear [[id number]] Collected:  02/04/20 1316    Order Status:  Completed Specimen:  Tissue from Lung, Right Updated:  02/06/20 1418     AFB CULTURE STAIN No acid fast bacilli seen.     AFB CULTURE STAIN Testing performed by:     AFB CULTURE STAIN Lab Aleida Butler     [facility] [address] [facility]     [place] [address]     AFB CULTURE STAIN Dr.Brian Gallito MD    Tissue culture [[id number]]  (Abnormal)  (Susceptibility) Collected:  02/04/20 1316    Order Status:  Completed Specimen:  Tissue Updated:  02/06/20 0837     Aerobic Culture - Tissue ENTEROBACTER CLOACAE  Many      Aerobic culture [[id number]]  (Abnormal) Collected:  02/04/20 1316    Order Status:  Completed Specimen:  Drainage from Lung, Right Updated:  02/06/20 0837     Aerobic Bacterial Culture ENTEROBACTER CLOACAE  Few  For susceptibility see order #[id number]      Culture, Body Fluid (Aerobic) w/ GS [[id number]] Collected:  02/04/20 1316    Order Status:  Completed Specimen:  Tissue from Peritoneal Fluid Updated:  02/05/20 1112     Gram Stain Result Many WBC's      Rare Gram negative rods    Fungus culture [[id number]] Collected:  02/04/20 1316    Order Status:  Sent Specimen:  Abscess from Lung, Right Updated:  02/04/20 1328    Culture, Anaerobe [[id number]] Collected:  02/04/20 1316    Order Status:  Sent Specimen:  Abscess from Lung, Right Updated:  02/04/20 1328    Culture, Anaerobic [[id number]] Collected:  02/04/20 1316    Order Status:  Sent Specimen:  Abscess  from Lung, Right Updated:  02/04/20 1327    Blood Culture #1 **CANNOT BE ORDERED STAT** [[id number]] Collected:  01/27/20 1235    Order Status:  Completed Specimen:  Blood from Peripheral, Antecubital, Right Updated:  02/01/20 1432     Blood Culture, Routine No growth after 5 days.    Blood Culture #2 **CANNOT BE ORDERED STAT** [[id number]] Collected:  01/27/20 1242    Order Status:  Completed Specimen:  Blood from Peripheral, Antecubital, Left Updated:  02/01/20 1432     Blood Culture, Routine No growth after 5 days.           Antibiotics and Day Number of Therapy:  Antibiotics (From admission, onward)    Start     Stop Route Frequency Ordered    02/05/20 0900  piperacillin-tazobactam 4.5 g in dextrose 5 % 100 mL IVPB (ready to mix system)      -- IV Every 8 hours (non-standard times) 02/05/20 0830    02/04/20 2100  mupirocin 2 % ointment  ([facility] Decolonization Orders ST)      02/09 2059 Nasl 2 times daily 02/04/20 1644    01/27/20 1200  levoFLOXacin 500 mg/100 mL IVPB 500 mg      01/27 2359 IV ED 1 Time 01/27/20 1153           Assessment/Plan:     Francis Daigle is a 44 y.o.male with:      Severe Sepsis due to Bilateral pneumonia with R parapneumonic effusion: Now POD#3 from thoracotomy, routine postop mgmt as per CTS. Enterobacter growing from pleural fluid. Change abx to rocephin per sensitivities.      Bilateral Pulmonary Embolism: on heparin GTT - restart asap when ok with CTS     Acute on Chronic systolic CHF NYHA III: continue IV Lasix 20mg BID. Hold eplerenone for high K. Strict I/o and daily weights.      Hypertension: Enalapril 2.5mg. Hold for high K.     Hyperkalemia - getting lasix,, hold aldactone and ACEi as above      Acute Blood loss anemia - expected blood loss post operatively - was transfused yesterday per CTS, transfuse for hgb <7    Postop ileus - bowel regimen     Leopoldo Dalton  [facility]

## 2020-02-07 NOTE — CARE UPDATE
02/06/20 2116   Patient Assessment/Suction   Level of Consciousness (AVPU) alert   All Lung Fields Breath Sounds coarse   PRE-TX-O2   O2 Device (Oxygen Therapy) room air   SpO2 (!) 93 %   Pulse Oximetry Type Continuous   $ Pulse Oximetry - Multiple Charge Pulse Oximetry - Multiple   Pulse 101   Resp 20   Respiratory Evaluation   $ Care Plan Tech Time 15 min   Evaluation For Re-Eval 3 day

## 2020-02-07 NOTE — PROGRESS NOTES
[facility]  Adult Nutrition   Progress Note (Follow-Up)    SUMMARY     Recommendations  Recommendation/Intervention:   1. RD added ensure enlive TID to better meet needs (to provide 1050 kcal/day and 60 g/day protein).  2. Recommend continued medical management of constipation.  3. Suspect malnutrition due to hx of intravenous drug abuse, edema, inadequate oral intake, visually thin appearance. RD plans to better assess nutritional status if/when able to interview patient and perform NFPE.    Goals:   1. Patient to meet at least 75% of estimated energy and protein needs via PO intake of meals and supplements.   2. Constipation to be relieved via medical management.   3. RD to perform NFPE if/when appropriate.        Nutrition Goal Status: new  Communication of RD Recs: reviewed with RN    Dietitian Rounds Brief  RD follow up at time of rounds. RN advised RD not to disturb patient at time of rounds. PO intake fair ~50% due to severe constipation due to opioid use per rounds. Post-op ileus also reported. Pt received medication for constipation (lactulose) but no relief yet. Dr. Light recommend trying RELISTOR®. Methylnaltrexone injection started. RD added ensure enlive oral nutrition supplement to better meet needs. Patient appears malnourished and has had 5.3 kg weight loss since admission. Most likely some weight loss is fluid as patient admitted with volume overload. Will continue to monitor weight and intake. If able to interview patient, will obtain consent to perform NFPE to better assess nutritional status.      Reason for Assessment  Reason For Assessment: RD follow-up  Interdisciplinary Rounds: attended  Nutrition Discharge Planning: Cardiac Diet     Nutrition Risk Screen  Nutrition Risk Screen: no indicators present     MST Score: 0  Have you recently lost weight without trying?: No  Weight loss score: 0  Have you been eating poorly because of a decreased appetite?: No  Appetite score: 0    "    Nutrition/Diet History  Patient Reported Diet/Restrictions/Preferences: low salt  Spiritual, Cultural Beliefs, Druze Practices, Values that Affect Care: no  Food Allergies: NKFA  Factors Affecting Nutritional Intake: constipation    Anthropometrics  Temp: 97.5 °F (36.4 °C)  Height Method: Stated  Height: 5' 8" (172.7 cm)  Height (inches): 68 in  Weight Method: Bed Scale  Weight: 67.3 kg (148 lb 5.9 oz)  Weight (lb): 148.37 lb  Ideal Body Weight (IBW), Male: 154 lb  % Ideal Body Weight, Male (lb): 96.34 %  BMI (Calculated): 22.6  BMI Grade: 18.5-24.9 - normal  Weight Loss Since Admission: 11 lb 11 oz  % Weight Change Since Admission: 7.88       Weight History:  Wt Readings from Last 10 Encounters:   02/07/20 67.3 kg (148 lb 5.9 oz)   12/28/19 75.2 kg (165 lb 12.6 oz)   12/09/19 74.8 kg (165 lb)   10/30/19 69.8 kg (153 lb 14.1 oz)   08/25/19 70.5 kg (155 lb 6.8 oz)   07/06/19 74.8 kg (165 lb)   07/06/19 74.8 kg (165 lb)   07/04/19 73.1 kg (161 lb 1.6 oz)       Lab/Procedures/Meds: Pertinent Labs Reviewed  Clinical Chemistry:  Recent Labs   Lab 02/05/20  0346 02/06/20  0310  02/07/20  0310   * 132*   < > 133*   K 5.8* 4.8   < > 4.0   CL 98 93*   < > 90*   CO2 25 31*   < > 36*   * 100   < > 97   BUN 15 13   < > 13   CREATININE 0.8 0.8   < > 0.8   CALCIUM 8.7 8.3*   < > 8.1*   PROT 6.6 7.0  --  6.7   ALBUMIN 2.1* 2.3*  --  2.2*   BILITOT 2.2* 2.2*  --  1.8*   ALKPHOS 62 57  --  64   AST 66* 48*  --  41*   ALT 45* 41  --  36   ANIONGAP 9 8   < > 7*   ESTGFRAFRICA >60.0 >60.0   < > >60.0   EGFRNONAA >60.0 >60.0   < > >60.0   MG 1.6 1.6   < > 1.6   PHOS 4.6* 2.9  --  3.2    < > = values in this interval not displayed.     CBC:   Recent Labs   Lab 02/07/20  0310   WBC 27.13*   RBC 3.25*   HGB 8.7*   HCT 27.6*   *   MCV 85   MCH 26.8*   MCHC 31.5*     Cardiac Profile:  Recent Labs   Lab 02/01/20  0649   *     Medications: Pertinent Medications reviewed  Scheduled Meds:   cefTRIAXone " (ROCEPHIN) IVPB  2 g Intravenous Q24H    chlorhexidine  15 mL Mouth/Throat BID    furosemide (LASIX) IV  20 mg Intravenous BID AC    furosemide (LASIX) IV  20 mg Intravenous Once    hydrOXYzine HCl  25 mg Oral Daily    methylnaltrexone  8 mg Subcutaneous Once    metoprolol succinate  100 mg Oral Daily    mupirocin   Nasal BID    naloxone  0.02 mg Intravenous Once    pantoprazole  40 mg Oral Daily    [START ON 2/8/2020] polyethylene glycol  17 g Oral Daily    senna-docusate 8.6-50 mg  1 tablet Oral BID    sodium polystyrene  15 g Oral Once     Continuous Infusions:   heparin (porcine) in D5W Stopped (02/04/20 0549)     PRN Meds:.sodium chloride, acetaminophen, acetaminophen, acetaminophen, ALPRAZolam, calcium chloride IVPB, calcium chloride IVPB, calcium chloride IVPB, dextrose 50%, dextrose 50%, diphenhydrAMINE, furosemide, gabapentin, glucagon (human recombinant), glucose, glucose, heparin (PORCINE), heparin (PORCINE), HYDROcodone-acetaminophen, HYDROcodone-acetaminophen, magnesium oxide, magnesium sulfate IVPB, magnesium sulfate IVPB, magnesium sulfate IVPB, magnesium sulfate IVPB, ondansetron, ondansetron, potassium chloride in water, potassium chloride in water, potassium chloride in water, potassium chloride in water, potassium chloride, potassium chloride, potassium chloride, potassium chloride, promethazine (PHENERGAN) IVPB, promethazine (PHENERGAN) IVPB, sodium chloride 0.9%, sodium phosphate IVPB, sodium phosphate IVPB, sodium phosphate IVPB, sodium phosphate IVPB, sodium phosphate IVPB    Estimated/Assessed Needs    Weight Used For Calorie Calculations: 67.3 kg (148 lb 5.9 oz)  Energy Calorie Requirements (kcal): 2019 (30 kcal/kg)   Energy Need Method: Kcal/kg  Protein Requirements: 81 - 101 (1.2 - 1.5g/kg)   Weight Used For Protein Calculations: 67.3 kg (148 lb 5.9 oz)     Estimated Fluid Requirement Method: RDA Method  RDA Method (mL): 2019       Nutrition Prescription Ordered  Current Diet  Order: Cardiac     Evaluation of Received Nutrient/Fluid Intake  Energy Calories Required: not meeting needs  Protein Required: not meeting needs  Fluid Required: meeting needs  Tolerance: tolerating     Intake/Output Summary (Last 24 hours) at 2/7/2020 1413  Last data filed at 2/7/2020 0700  Gross per 24 hour   Intake 1887.4 ml   Output 2150 ml   Net -262.6 ml      % Intake of Estimated Energy Needs: 25 - 50 %  % Meal Intake: 25 - 50 %    Nutrition Risk  Level of Risk/Frequency of Follow-up: high     Monitor and Evaluation  Food and Nutrient Intake: energy intake, food and beverage intake  Food and Nutrient Adminstration: diet order  Knowledge/Beliefs/Attitudes: food and nutrition knowledge/skill, beliefs and attitudes  Physical Activity and Function: nutrition-related ADLs and IADLs, factors affecting access to physical activity  Anthropometric Measurements: weight, weight change, body mass index  Biochemical Data, Medical Tests and Procedures: electrolyte and renal panel, glucose/endocrine profile, lipid profile, gastrointestinal profile, inflammatory profile  Nutrition-Focused Physical Findings: overall appearance, extremities, muscles and bones, head and eyes, skin     Nutrition Follow-Up  RD Follow-up?: Yes    Jacque Sousa RD 02/07/2020 2:15 PM

## 2020-02-07 NOTE — PROGRESS NOTES
Pt unable to have BM despite ambulation/PRN meds and is having abdominal pain. GI consulted. Pt made NPO

## 2020-02-07 NOTE — PROGRESS NOTES
Progress Note  Cardiothoracic Surgery    Admit Date: 1/27/2020  Post-operative Day: 3 Days Post-Op  Hospital Day: 12    SUBJECTIVE:     Follow-up For: Procedure(s) (LRB):  DECORTICATION, LUNG (Right)    Scheduled Meds:   cefTRIAXone (ROCEPHIN) IVPB  2 g Intravenous Q24H    chlorhexidine  15 mL Mouth/Throat BID    furosemide (LASIX) IV  20 mg Intravenous BID AC    furosemide (LASIX) IV  20 mg Intravenous Once    hydrOXYzine HCl  25 mg Oral Daily    metoprolol succinate  100 mg Oral Daily    midazolam  2 mg Intravenous Once    morphine  2 mg Intravenous Once    mupirocin   Nasal BID    naloxone  0.02 mg Intravenous Once    pantoprazole  40 mg Oral Daily    [START ON 2/8/2020] polyethylene glycol  17 g Oral Daily    senna-docusate 8.6-50 mg  1 tablet Oral BID    sodium polystyrene  15 g Oral Once     Infusions/Drips:   heparin (porcine) in D5W Stopped (02/04/20 0549)     PRN Meds: sodium chloride, acetaminophen, acetaminophen, acetaminophen, ALPRAZolam, calcium chloride IVPB, calcium chloride IVPB, calcium chloride IVPB, dextrose 50%, dextrose 50%, diphenhydrAMINE, furosemide, gabapentin, glucagon (human recombinant), glucose, glucose, heparin (PORCINE), heparin (PORCINE), HYDROcodone-acetaminophen, HYDROcodone-acetaminophen, magnesium oxide, magnesium sulfate IVPB, magnesium sulfate IVPB, magnesium sulfate IVPB, magnesium sulfate IVPB, ondansetron, ondansetron, potassium chloride in water, potassium chloride in water, potassium chloride in water, potassium chloride in water, potassium chloride, potassium chloride, potassium chloride, potassium chloride, promethazine (PHENERGAN) IVPB, promethazine (PHENERGAN) IVPB, sodium chloride 0.9%, sodium phosphate IVPB, sodium phosphate IVPB, sodium phosphate IVPB, sodium phosphate IVPB, sodium phosphate IVPB    Review of patient's allergies indicates:  No Known Allergies    OBJECTIVE:     Vital Signs (Most Recent)  Temp: 97.5 °F (36.4 °C) (02/07/20 0400)  Pulse:  101 (02/07/20 1200)  Resp: 12 (02/07/20 1200)  BP: 126/79 (02/07/20 1030)  SpO2: 95 % (02/07/20 0806)    Admission Weight: 72.6 kg (160 lb) (01/27/20 0956)   Most Recent Weight: 67.3 kg (148 lb 5.9 oz) (02/07/20 0952)    Vital Signs Range (Last 24H):  Temp:  [97.5 °F (36.4 °C)-98 °F (36.7 °C)]   Pulse:  []   Resp:  [11-28]   BP: ()/(50-84)   SpO2:  [79 %-97 %]     I & O (Last 24H):    Intake/Output Summary (Last 24 hours) at 2/7/2020 1556  Last data filed at 2/7/2020 0700  Gross per 24 hour   Intake 1887.4 ml   Output 2150 ml   Net -262.6 ml     Physical Exam:  NAD  BS Decreased on right  CV RRR  Abd soft  Ext +edema  Neuro     Wound/Incision:  Dressing intact and dry    Laboratory:  CBC:   Recent Labs   Lab 02/07/20  0310   WBC 27.13*   RBC 3.25*   HGB 8.7*   HCT 27.6*   *   MCV 85   MCH 26.8*   MCHC 31.5*     BMP:   Recent Labs   Lab 02/07/20  0310   GLU 97   *   K 4.0   CL 90*   CO2 36*   BUN 13   CREATININE 0.8   CALCIUM 8.1*   MG 1.6     Microbiology Results (last 7 days)     Procedure Component Value Units Date/Time    Culture, Anaerobe [[id number]] Collected:  02/04/20 1316    Order Status:  Completed Specimen:  Tissue from Lung, Right Updated:  02/07/20 1120     Anaerobic Culture No anaerobes isolated    Culture, Anaerobic [[id number]] Collected:  02/04/20 1316    Order Status:  Completed Specimen:  Drainage from Lung, Right Updated:  02/07/20 1120     Anaerobic Culture No anaerobes isolated    AFB Culture & Smear [[id number]] Collected:  02/04/20 1316    Order Status:  Completed Specimen:  Tissue from Lung, Right Updated:  02/06/20 1418     AFB CULTURE STAIN No acid fast bacilli seen.     AFB CULTURE STAIN Testing performed by:     AFB CULTURE STAIN Lab [facility]     AFB CULTURE STAIN [address] [facility]     [place] [address]     AFB CULTURE STAIN Dr.Brian Gallito MD    Tissue culture [[id number]]  (Abnormal)  (Susceptibility) Collected:  02/04/20 1316     Order Status:  Completed Specimen:  Tissue Updated:  02/06/20 0837     Aerobic Culture - Tissue ENTEROBACTER CLOACAE  Many      Aerobic culture [[id number]]  (Abnormal) Collected:  02/04/20 1316    Order Status:  Completed Specimen:  Drainage from Lung, Right Updated:  02/06/20 0837     Aerobic Bacterial Culture ENTEROBACTER CLOACAE  Few  For susceptibility see order #[id number]      Culture, Body Fluid (Aerobic) w/ GS [[id number]] Collected:  02/04/20 1316    Order Status:  Completed Specimen:  Tissue from Peritoneal Fluid Updated:  02/05/20 1112     Gram Stain Result Many WBC's      Rare Gram negative rods    Fungus culture [[id number]] Collected:  02/04/20 1316    Order Status:  Sent Specimen:  Abscess from Lung, Right Updated:  02/04/20 1328    Blood Culture #1 **CANNOT BE ORDERED STAT** [[id number]] Collected:  01/27/20 1235    Order Status:  Completed Specimen:  Blood from Peripheral, Antecubital, Right Updated:  02/01/20 1432     Blood Culture, Routine No growth after 5 days.    Blood Culture #2 **CANNOT BE ORDERED STAT** [[id number]] Collected:  01/27/20 1242    Order Status:  Completed Specimen:  Blood from Peripheral, Antecubital, Left Updated:  02/01/20 1432     Blood Culture, Routine No growth after 5 days.        Specimen (12h ago, onward)    None          Diagnostic Results:  X-Ray: Reviewed    ASSESSMENT/PLAN:     Assessment: s/p decortication    Plan: Continue present management            Consider drains to water seal

## 2020-02-07 NOTE — ANESTHESIA POST-OP PAIN MANAGEMENT
Acute Pain Service Progress Note    Patient reports good pain control with pain pills and very little PCA hydromorphone usage.  No sedation or respiratory depression.  No nausea or severe itching.  Discontinue PCA and continue pain pills.  Signing off.

## 2020-02-07 NOTE — PLAN OF CARE
Pain tolerable with po hydrocodone and pca dilaudid. Pt. Assisted to the commode this morning without BM. Pt. Did pass flatus, however. CT's in place to suction. No major issues overnight.

## 2020-02-08 LAB
ALBUMIN SERPL BCP-MCNC: 1.9 G/DL (ref 3.5–5.2)
ALP SERPL-CCNC: 66 U/L (ref 55–135)
ALT SERPL W/O P-5'-P-CCNC: 103 U/L (ref 10–44)
ANION GAP SERPL CALC-SCNC: 8 MMOL/L (ref 8–16)
ANION GAP SERPL CALC-SCNC: 8 MMOL/L (ref 8–16)
APTT PPP: 76.3 SEC (ref 23.6–33.3)
AST SERPL-CCNC: 98 U/L (ref 10–40)
BASOPHILS # BLD AUTO: 0.05 K/UL (ref 0–0.2)
BASOPHILS NFR BLD: 0.2 % (ref 0–1.9)
BILIRUB SERPL-MCNC: 1.6 MG/DL (ref 0.1–1)
BUN SERPL-MCNC: 10 MG/DL (ref 6–20)
BUN SERPL-MCNC: 16 MG/DL (ref 6–20)
CALCIUM SERPL-MCNC: 7.9 MG/DL (ref 8.7–10.5)
CALCIUM SERPL-MCNC: 8 MG/DL (ref 8.7–10.5)
CHLORIDE SERPL-SCNC: 93 MMOL/L (ref 95–110)
CHLORIDE SERPL-SCNC: 94 MMOL/L (ref 95–110)
CO2 SERPL-SCNC: 31 MMOL/L (ref 23–29)
CO2 SERPL-SCNC: 33 MMOL/L (ref 23–29)
CREAT SERPL-MCNC: 0.6 MG/DL (ref 0.5–1.4)
CREAT SERPL-MCNC: 0.7 MG/DL (ref 0.5–1.4)
DIFFERENTIAL METHOD: ABNORMAL
EOSINOPHIL # BLD AUTO: 0.2 K/UL (ref 0–0.5)
EOSINOPHIL NFR BLD: 1 % (ref 0–8)
ERYTHROCYTE [DISTWIDTH] IN BLOOD BY AUTOMATED COUNT: 22.5 % (ref 11.5–14.5)
EST. GFR  (AFRICAN AMERICAN): >60 ML/MIN/1.73 M^2
EST. GFR  (AFRICAN AMERICAN): >60 ML/MIN/1.73 M^2
EST. GFR  (NON AFRICAN AMERICAN): >60 ML/MIN/1.73 M^2
EST. GFR  (NON AFRICAN AMERICAN): >60 ML/MIN/1.73 M^2
GLUCOSE SERPL-MCNC: 91 MG/DL (ref 70–110)
GLUCOSE SERPL-MCNC: 95 MG/DL (ref 70–110)
HCT VFR BLD AUTO: 25.3 % (ref 40–54)
HGB BLD-MCNC: 8.2 G/DL (ref 14–18)
IMM GRANULOCYTES # BLD AUTO: 0.15 K/UL (ref 0–0.04)
IMM GRANULOCYTES NFR BLD AUTO: 0.7 % (ref 0–0.5)
LYMPHOCYTES # BLD AUTO: 1.7 K/UL (ref 1–4.8)
LYMPHOCYTES NFR BLD: 7.7 % (ref 18–48)
MAGNESIUM SERPL-MCNC: 1.7 MG/DL (ref 1.6–2.6)
MAGNESIUM SERPL-MCNC: 1.7 MG/DL (ref 1.6–2.6)
MCH RBC QN AUTO: 27.1 PG (ref 27–31)
MCHC RBC AUTO-ENTMCNC: 32.4 G/DL (ref 32–36)
MCV RBC AUTO: 84 FL (ref 82–98)
MONOCYTES # BLD AUTO: 1.3 K/UL (ref 0.3–1)
MONOCYTES NFR BLD: 5.9 % (ref 4–15)
NEUTROPHILS # BLD AUTO: 18.1 K/UL (ref 1.8–7.7)
NEUTROPHILS NFR BLD: 84.5 % (ref 38–73)
NRBC BLD-RTO: 0 /100 WBC
PHOSPHATE SERPL-MCNC: 3.3 MG/DL (ref 2.7–4.5)
PLATELET # BLD AUTO: 382 K/UL (ref 150–350)
PMV BLD AUTO: 9.5 FL (ref 9.2–12.9)
POTASSIUM SERPL-SCNC: 3.7 MMOL/L (ref 3.5–5.1)
POTASSIUM SERPL-SCNC: 3.8 MMOL/L (ref 3.5–5.1)
PROT SERPL-MCNC: 6 G/DL (ref 6–8.4)
RBC # BLD AUTO: 3.03 M/UL (ref 4.6–6.2)
SODIUM SERPL-SCNC: 133 MMOL/L (ref 136–145)
SODIUM SERPL-SCNC: 134 MMOL/L (ref 136–145)
WBC # BLD AUTO: 21.46 K/UL (ref 3.9–12.7)

## 2020-02-08 PROCEDURE — 25000003 PHARM REV CODE 250: Performed by: THORACIC SURGERY (CARDIOTHORACIC VASCULAR SURGERY)

## 2020-02-08 PROCEDURE — 25000003 PHARM REV CODE 250: Performed by: INTERNAL MEDICINE

## 2020-02-08 PROCEDURE — C9113 INJ PANTOPRAZOLE SODIUM, VIA: HCPCS | Performed by: INTERNAL MEDICINE

## 2020-02-08 PROCEDURE — 83735 ASSAY OF MAGNESIUM: CPT

## 2020-02-08 PROCEDURE — 63600175 PHARM REV CODE 636 W HCPCS: Performed by: INTERNAL MEDICINE

## 2020-02-08 PROCEDURE — 20000000 HC ICU ROOM

## 2020-02-08 PROCEDURE — 85025 COMPLETE CBC W/AUTO DIFF WBC: CPT

## 2020-02-08 PROCEDURE — 94761 N-INVAS EAR/PLS OXIMETRY MLT: CPT

## 2020-02-08 PROCEDURE — 25000003 PHARM REV CODE 250

## 2020-02-08 PROCEDURE — 83735 ASSAY OF MAGNESIUM: CPT | Mod: 91

## 2020-02-08 PROCEDURE — 80048 BASIC METABOLIC PNL TOTAL CA: CPT

## 2020-02-08 PROCEDURE — 63600175 PHARM REV CODE 636 W HCPCS: Performed by: THORACIC SURGERY (CARDIOTHORACIC VASCULAR SURGERY)

## 2020-02-08 PROCEDURE — 36415 COLL VENOUS BLD VENIPUNCTURE: CPT

## 2020-02-08 PROCEDURE — 84100 ASSAY OF PHOSPHORUS: CPT

## 2020-02-08 PROCEDURE — 80053 COMPREHEN METABOLIC PANEL: CPT

## 2020-02-08 PROCEDURE — 85730 THROMBOPLASTIN TIME PARTIAL: CPT

## 2020-02-08 RX ORDER — LACTULOSE 10 G/15ML
30 SOLUTION ORAL EVERY 6 HOURS PRN
Status: DISCONTINUED | OUTPATIENT
Start: 2020-02-08 | End: 2020-02-14 | Stop reason: HOSPADM

## 2020-02-08 RX ORDER — PANTOPRAZOLE SODIUM 40 MG/10ML
40 INJECTION, POWDER, LYOPHILIZED, FOR SOLUTION INTRAVENOUS DAILY
Status: DISCONTINUED | OUTPATIENT
Start: 2020-02-08 | End: 2020-02-09

## 2020-02-08 RX ORDER — ENOXAPARIN SODIUM 100 MG/ML
1 INJECTION SUBCUTANEOUS
Status: DISCONTINUED | OUTPATIENT
Start: 2020-02-08 | End: 2020-02-11

## 2020-02-08 RX ORDER — LACTULOSE 10 G/15ML
30 SOLUTION ORAL EVERY 6 HOURS
Status: DISCONTINUED | OUTPATIENT
Start: 2020-02-08 | End: 2020-02-08

## 2020-02-08 RX ORDER — POLYETHYLENE GLYCOL 3350 17 G/17G
17 POWDER, FOR SOLUTION ORAL 2 TIMES DAILY
Status: DISCONTINUED | OUTPATIENT
Start: 2020-02-08 | End: 2020-02-14

## 2020-02-08 RX ADMIN — MUPIROCIN: 20 OINTMENT TOPICAL at 09:02

## 2020-02-08 RX ADMIN — ALPRAZOLAM 0.25 MG: 0.25 TABLET ORAL at 10:02

## 2020-02-08 RX ADMIN — METOCLOPRAMIDE 10 MG: 5 INJECTION, SOLUTION INTRAMUSCULAR; INTRAVENOUS at 12:02

## 2020-02-08 RX ADMIN — CHLORHEXIDINE GLUCONATE 15 ML: 1.2 RINSE ORAL at 10:02

## 2020-02-08 RX ADMIN — HYDROCODONE BITARTRATE AND ACETAMINOPHEN 1 TABLET: 5; 325 TABLET ORAL at 03:02

## 2020-02-08 RX ADMIN — FUROSEMIDE 20 MG: 10 INJECTION, SOLUTION INTRAMUSCULAR; INTRAVENOUS at 03:02

## 2020-02-08 RX ADMIN — METOCLOPRAMIDE 10 MG: 5 INJECTION, SOLUTION INTRAMUSCULAR; INTRAVENOUS at 05:02

## 2020-02-08 RX ADMIN — AZITHROMYCIN MONOHYDRATE 250 MG: 500 INJECTION, POWDER, LYOPHILIZED, FOR SOLUTION INTRAVENOUS at 05:02

## 2020-02-08 RX ADMIN — ENOXAPARIN SODIUM 80 MG: 80 INJECTION, SOLUTION INTRAVENOUS; SUBCUTANEOUS at 10:02

## 2020-02-08 RX ADMIN — PANTOPRAZOLE SODIUM 40 MG: 40 INJECTION, POWDER, FOR SOLUTION INTRAVENOUS at 10:02

## 2020-02-08 RX ADMIN — ENOXAPARIN SODIUM 70 MG: 80 INJECTION, SOLUTION INTRAVENOUS; SUBCUTANEOUS at 10:02

## 2020-02-08 RX ADMIN — SENNOSIDES AND DOCUSATE SODIUM 1 TABLET: 8.6; 5 TABLET ORAL at 10:02

## 2020-02-08 RX ADMIN — DEXMEDETOMIDINE HYDROCHLORIDE 0.7 MCG/KG/HR: 400 INJECTION INTRAVENOUS at 12:02

## 2020-02-08 RX ADMIN — CEFTRIAXONE 2 G: 2 INJECTION, SOLUTION INTRAVENOUS at 10:02

## 2020-02-08 RX ADMIN — DEXMEDETOMIDINE HYDROCHLORIDE 0.2 MCG/KG/HR: 400 INJECTION INTRAVENOUS at 06:02

## 2020-02-08 RX ADMIN — AZITHROMYCIN MONOHYDRATE 250 MG: 500 INJECTION, POWDER, LYOPHILIZED, FOR SOLUTION INTRAVENOUS at 01:02

## 2020-02-08 RX ADMIN — HYDROCODONE BITARTRATE AND ACETAMINOPHEN 1 TABLET: 10; 325 TABLET ORAL at 10:02

## 2020-02-08 RX ADMIN — LACTULOSE 30 G: 20 SOLUTION ORAL at 10:02

## 2020-02-08 RX ADMIN — FUROSEMIDE 20 MG: 10 INJECTION, SOLUTION INTRAMUSCULAR; INTRAVENOUS at 05:02

## 2020-02-08 RX ADMIN — MUPIROCIN: 20 OINTMENT TOPICAL at 10:02

## 2020-02-08 RX ADMIN — POLYETHYLENE GLYCOL 3350 17 G: 17 POWDER, FOR SOLUTION ORAL at 10:02

## 2020-02-08 NOTE — PROGRESS NOTES
Progress Note  Cardiothoracic Surgery    Admit Date: 1/27/2020  Post-operative Day: 4 Days Post-Op  Hospital Day: 13    SUBJECTIVE:     Follow-up For: Procedure(s) (LRB):  DECORTICATION, LUNG (Right)    Scheduled Meds:   azithromycin  250 mg Intravenous Q6H    cefTRIAXone (ROCEPHIN) IVPB  2 g Intravenous Q24H    chlorhexidine  15 mL Mouth/Throat BID    enoxparin  1 mg/kg Subcutaneous Q12H    furosemide (LASIX) IV  20 mg Intravenous BID AC    furosemide (LASIX) IV  20 mg Intravenous Once    hydrOXYzine HCl  25 mg Oral Daily    metoclopramide HCl  10 mg Intravenous Q6H    metoprolol succinate  100 mg Oral Daily    mupirocin   Nasal BID    naloxone  0.02 mg Intravenous Once    pantoprazole  40 mg Intravenous Daily    peg-electrolyte soln  4,000 mL Oral Once    polyethylene glycol  17 g Oral BID    senna-docusate 8.6-50 mg  1 tablet Oral BID    sodium polystyrene  15 g Oral Once     Infusions/Drips:   dexmedetomidine (PRECEDEX) infusion 0.7 mcg/kg/hr (02/08/20 1228)     PRN Meds: sodium chloride, acetaminophen, acetaminophen, acetaminophen, ALPRAZolam, calcium chloride IVPB, calcium chloride IVPB, calcium chloride IVPB, dextrose 50%, dextrose 50%, diphenhydrAMINE, furosemide, gabapentin, glucagon (human recombinant), glucose, glucose, HYDROcodone-acetaminophen, HYDROcodone-acetaminophen, lactulose, magnesium oxide, magnesium sulfate IVPB, magnesium sulfate IVPB, magnesium sulfate IVPB, magnesium sulfate IVPB, ondansetron, ondansetron, potassium chloride in water, potassium chloride in water, potassium chloride in water, potassium chloride in water, potassium chloride, potassium chloride, potassium chloride, potassium chloride, promethazine (PHENERGAN) IVPB, promethazine (PHENERGAN) IVPB, sodium chloride 0.9%, sodium phosphate IVPB, sodium phosphate IVPB, sodium phosphate IVPB, sodium phosphate IVPB, sodium phosphate IVPB    Review of patient's allergies indicates:  No Known Allergies    OBJECTIVE:      Vital Signs (Most Recent)  Temp: 98 °F (36.7 °C) (02/08/20 0701)  Pulse: 70 (02/08/20 0830)  Resp: 18 (02/08/20 0830)  BP: (!) 86/54 (02/08/20 0830)  SpO2: 96 % (02/08/20 0830)    Admission Weight: 72.6 kg (160 lb) (01/27/20 0956)   Most Recent Weight: 67.3 kg (148 lb 5.9 oz) (02/07/20 0952)    Vital Signs Range (Last 24H):  Temp:  [98 °F (36.7 °C)-98.6 °F (37 °C)]   Pulse:  [70-98]   Resp:  [16-19]   BP: ()/(50-80)   SpO2:  [81 %-100 %]     I & O (Last 24H):    Intake/Output Summary (Last 24 hours) at 2/8/2020 1711  Last data filed at 2/8/2020 0815  Gross per 24 hour   Intake 593.97 ml   Output 1270 ml   Net -676.03 ml     Physical Exam:      Wound/Incision:      Laboratory:  CBC:   Recent Labs   Lab 02/08/20  0345   WBC 21.46*   RBC 3.03*   HGB 8.2*   HCT 25.3*   *   MCV 84   MCH 27.1   MCHC 32.4     BMP:   Recent Labs   Lab 02/08/20  0345   GLU 91  95   *  134*   K 3.7  3.8   CL 94*  93*   CO2 31*  33*   BUN 16  10   CREATININE 0.7  0.6   CALCIUM 7.9*  8.0*   MG 1.7  1.7     Microbiology Results (last 7 days)     Procedure Component Value Units Date/Time    Culture, Anaerobe [[id number]] Collected:  02/04/20 1316    Order Status:  Completed Specimen:  Tissue from Lung, Right Updated:  02/07/20 1120     Anaerobic Culture No anaerobes isolated    Culture, Anaerobic [[id number]] Collected:  02/04/20 1316    Order Status:  Completed Specimen:  Drainage from Lung, Right Updated:  02/07/20 1120     Anaerobic Culture No anaerobes isolated    AFB Culture & Smear [[id number]] Collected:  02/04/20 1316    Order Status:  Completed Specimen:  Tissue from Lung, Right Updated:  02/06/20 1418     AFB CULTURE STAIN No acid fast bacilli seen.     AFB CULTURE STAIN Testing performed by:     AFB CULTURE STAIN Lab [facility] [place]     AFB CULTURE STAIN [address] [facility]     [place] [address]     AFB CULTURE STAIN Dr.Brian Gallito MD    Tissue culture [[id number]]  (Abnormal)   (Susceptibility) Collected:  02/04/20 1316    Order Status:  Completed Specimen:  Tissue Updated:  02/06/20 0837     Aerobic Culture - Tissue ENTEROBACTER CLOACAE  Many      Aerobic culture [[id number]]  (Abnormal) Collected:  02/04/20 1316    Order Status:  Completed Specimen:  Drainage from Lung, Right Updated:  02/06/20 0837     Aerobic Bacterial Culture ENTEROBACTER CLOACAE  Few  For susceptibility see order #[id number]      Culture, Body Fluid (Aerobic) w/ GS [[id number]] Collected:  02/04/20 1316    Order Status:  Completed Specimen:  Tissue from Peritoneal Fluid Updated:  02/05/20 1112     Gram Stain Result Many WBC's      Rare Gram negative rods    Fungus culture [[id number]] Collected:  02/04/20 1316    Order Status:  Sent Specimen:  Abscess from Lung, Right Updated:  02/04/20 1328        Specimen (12h ago, onward)    None          Diagnostic Results:  X-Ray: Reviewed    ASSESSMENT/PLAN:     Assessment:     Plan:

## 2020-02-08 NOTE — CONSULTS
[facility]  Gastroenterology  Consult Note    Patient Name: Francis Daigle  MRN: [id number]  Admission Date: 1/27/2020  Hospital Length of Stay: 12 days  Code Status: Full Code   Attending Provider: Leopoldo Dalton MD   Consulting Provider: Cindi Delacruz MD  Primary Care Physician: Primary Doctor No  Principal Problem:Hydropneumothorax    Consults  Subjective:     HPI: Mr. Francis Daigle is a 44-year-old gentleman with past medical history hepatitis C, chronic liver disease, and systolic heart failure who was admitted to [facility] last month after sustaining massive pulmonary embolus with subsequent PA arrest.  He survived this but developed a right-sided pleural effusion afterwards.  Pleural fluid analysis demonstrated AA exudate with low glucose which was suspected to be from his pulmonary embolism, but was also treated with a course of intrapleural thrombolytics out of an abundance of caution.  [place] [facility] [place] and has been gradually developing increasing scrotal and lower extremity edema.      The patient has undergone decortication for empyema.  His postop course has been complicated with apparently need for high levels of opiate medication resulting in obstipation.  We were consulted yesterday in communication with nursing staff resulted and review of administration of Relistor or.  An NG-tube was placed with intention for GoLYTELY infusion but patient refused.  As of today he did receive a dose of lactulose Senokot and nursing reports a very large voluminous bowel movement result      Past Medical History:   Diagnosis Date    Anxiety     Arthritis     CHF (congestive heart failure)     Cirrhosis     Coronary artery disease     Depression     DVT (deep venous thrombosis)     Hepatitis C     Hypertension        Past Surgical History:   Procedure Laterality Date    LUNG DECORTICATION Right 2/4/2020    Procedure: DECORTICATION, LUNG;   Surgeon: Rehan Rousseau MD;  [place] [facility] [place]  Service: Thoracic;  Laterality: Right;       Review of patient's allergies indicates:  No Known Allergies  Family History     Problem Relation (Age of Onset)    Arthritis Mother    Asthma Sister    Diabetes Sister    Heart disease Mother, Maternal Grandfather    Hyperlipidemia Mother    Hypertension Mother, Father    Kidney disease Mother        Tobacco Use    Smoking status: Former Smoker     Packs/day: 2.00     Years: 20.00     Pack years: 40.00     Types: Cigarettes     Start date: 1/23/1999     Last attempt to quit: 1/23/2017     Years since quitting: 3.0    Smokeless tobacco: Never Used   Substance and Sexual Activity    Alcohol use: Not Currently    Drug use: Not Currently     Frequency: 7.0 times per week     Types: Amphetamines     Comment: quit in december 2019    Sexual activity: Not Currently     Partners: Female     Review of Systems  Objective:     Vital Signs (Most Recent):  Temp: 98 °F (36.7 °C) (02/08/20 0701)  Pulse: 70 (02/08/20 0830)  Resp: 18 (02/08/20 0830)  BP: (!) 86/54 (02/08/20 0830)  SpO2: 96 % (02/08/20 0830) Vital Signs (24h Range):  Temp:  [98 °F (36.7 °C)-98.6 °F (37 °C)] 98 °F (36.7 °C)  Pulse:  [] 70  Resp:  [14-19] 18  SpO2:  [81 %-100 %] 96 %  BP: ()/(50-80) 86/54     Weight: 67.3 kg (148 lb 5.9 oz) (02/07/20 0952)  Body mass index is 22.56 kg/m².      Intake/Output Summary (Last 24 hours) at 2/8/2020 1659  Last data filed at 2/8/2020 0815  Gross per 24 hour   Intake 593.97 ml   Output 1270 ml   Net -676.03 ml       Lines/Drains/Airways     Drain                 Chest Tube 02/04/20 3 Right Midaxillary;Fourth intercostal space 4 days         Y Chest Tube 1 and 2 02/04/20  Right Midaxillary Right Midaxillary 4 days          Peripheral Intravenous Line                 Midline Catheter Insertion/Assessment  - Single Lumen 01/28/20 1140 Left basilic vein (medial side of arm) 20g x 8cm 11 days         Peripheral IV -  Single Lumen 01/28/20 0401 20 G Left;Posterior;Proximal Forearm 11 days                Physical Exam  Physical Exam:  General- Patient alert and oriented x3 in NAD, slightly sedated  HEENT- PERRLA, EOMI, OP clear, MMM nasogastric tube in place  Neck- No JVD, Lymphadenopathy, Thyromegaly  CV- Regular rate and rhythm, No Murmur/steffanie/rubs  Resp- Lungs CTA Bilaterally, No increased WOB  GI- Non tender/non-distended, BS normoactive x4 quads, no HSM  Extrem- No cyanosis, clubbing, edema. Pulses 2+ and symmetric  Neuro- Strength 5/5 flexors/extensors, DTRs 2+ and symmetric, Intact sensation to light touch grossly   Significant Labs:  CBC:   Recent Labs   Lab 02/07/20  0310 02/08/20  0345   WBC 27.13* 21.46*   HGB 8.7* 8.2*   HCT 27.6* 25.3*   * 382*     CMP:   Recent Labs   Lab 02/08/20  0345   GLU 91  95   CALCIUM 7.9*  8.0*   ALBUMIN 1.9*   PROT 6.0   *  134*   K 3.7  3.8   CO2 31*  33*   CL 94*  93*   BUN 16  10   CREATININE 0.7  0.6   ALKPHOS 66   *   AST 98*   BILITOT 1.6*       Significant Imaging:  Abdominal Film: I have reviewed all results within the past 24 hours and my personal findings are:  Newly inserted orogastric tube in optimal position.  Prominent retention of stool is noted most profound within the ascending segment.    Assessment/Plan:     Active Diagnoses:    Diagnosis Date Noted POA    PRINCIPAL PROBLEM:  Hydropneumothorax [J94.8] 12/21/2019 Yes    Volume overload [E87.70] 01/30/2020 Yes    Parapneumonic effusion [J18.9, J91.8] 01/29/2020 Yes    Anasarca [R60.1] 01/27/2020 Yes    Liver enzyme elevation [R74.8] 01/27/2020 Yes    Empyema [J86.9] 12/19/2019 Yes    Acute massive pulmonary embolism [I26.99] 12/11/2019 Yes    Chronic hepatitis C without hepatic coma [B18.2] 12/10/2019 Yes    Pneumonia [J18.9] 12/10/2019 Yes    Essential hypertension [I10] 08/23/2019 Yes    Acute on chronic combined systolic and diastolic congestive heart failure [I50.43] 07/03/2019  Yes      Problems Resolved During this Admission:    Diagnosis Date Noted Date Resolved POA    Severe sepsis [A41.9, R65.20] 12/10/2019 02/03/2020 Yes       Obstipation complicated by postop ileus/opiate-patient is responding to current bowel regimen but we counseled him that he would need to continue taking lactulose or potentially need replacement of his injury 2 in the interim he agrees to be compliant with his contorted bowel regimen if we take out his NG tube her.    Repeat well as store in a.m..  Lactulose 20 mL twice a day.  Repeat abdominal x-ray in a.m. I will follow up with patient in imaging and nursing availability as needed    Thank you for your consult.     Cindi Delacruz MD  Gastroenterology  [facility]

## 2020-02-08 NOTE — PLAN OF CARE
Problem: Wound  Goal: Optimal Wound Healing  Outcome: Ongoing, Progressing     Problem: Fall Injury Risk  Goal: Absence of Fall and Fall-Related Injury  Outcome: Ongoing, Progressing     Problem: Adult Inpatient Plan of Care  Goal: Plan of Care Review  Outcome: Ongoing, Progressing  Goal: Patient-Specific Goal (Individualization)  Outcome: Ongoing, Progressing  Goal: Absence of Hospital-Acquired Illness or Injury  Outcome: Ongoing, Progressing  Goal: Optimal Comfort and Wellbeing  Outcome: Ongoing, Progressing  Goal: Readiness for Transition of Care  Outcome: Ongoing, Progressing  Goal: Rounds/Family Conference  Outcome: Ongoing, Progressing     Problem: Skin Injury Risk Increased  Goal: Skin Health and Integrity  Outcome: Ongoing, Progressing     Problem: Infection  Goal: Infection Symptom Resolution  Outcome: Ongoing, Progressing     Problem: Pain Acute  Goal: Optimal Pain Control  Outcome: Ongoing, Progressing     Problem: Oral Intake Inadequate  Goal: Improved Oral Intake  Outcome: Ongoing, Progressing     Problem: Restraint, Behavioral  Goal: Discontinuation Criteria Achieved  Outcome: Ongoing, Progressing  Goal: Personal Dignity and Safety Maintained  Outcome: Ongoing, Progressing

## 2020-02-08 NOTE — PROGRESS NOTES
"Progress Note  Pulmonary/Critical Care      Admit Date: 1/27/2020      SUBJECTIVE:      Patient ID: Francis Daigle is a 44 y.o. male.    HPI/Interval history (See H&P for complete P,F,SHx) :     The patient is a 44-year-old male with absolutely no pain tolerance.  He developed an ileus secondary to opioids and is having abdominal pain.  Now, after I placed an NG tube yesterday which required propofol to place, he is only complaining of throat pain and knee pain where I touched his knee.   Patient underwent decortication for an Enterobacter empyema.   is a sensitive Enterococcus cloacae  and is he is on Rocephin    ROS     General: Feeling terrible  Eyes: Vision is good.  ENT:  Pharyngitis secondary to NG tube   Heart:: No chest pain or palpitations.  Lungs: No cough, sputum, or wheezing.  GI:  The nurses report flatus.  He is not yet had a bowel movement.  : No dysuria, hesitancy, or nocturia.  Skin: No lesions or rashes.  Musculoskeletal: No joint pain or myalgias.  Neuro: No headaches or neuropathy.  Lymph: No edema or adenopathy.  Psych: No anxiety or depression.  Endo: No weight change.      OBJECTIVE:     Vital Signs Range (Last 24H):  Temp:  [98.2 °F (36.8 °C)-98.9 °F (37.2 °C)]   Pulse:  []   Resp:  [12-24]   BP: ()/(54-80)   SpO2:  [81 %-100 %]     I & O (Last 24H):    Intake/Output Summary (Last 24 hours) at 2/8/2020 0915  Last data filed at 2/8/2020 0600  Gross per 24 hour   Intake 593.97 ml   Output 845 ml   Net -251.03 ml        Estimated body mass index is 22.56 kg/m² as calculated from the following:    Height as of this encounter: 5' 8" (1.727 m).    Weight as of this encounter: 67.3 kg (148 lb 5.9 oz).    Physical Exam  GENERAL: Older patient in great distress per his perception.  HEENT: Pupils equal and reactive. Extraocular movements intact. Nose intact.  Pharynx moist.  NECK: Supple.   HEART: Regular rate and rhythm. No murmur or gallop auscultated.  LUNGS:  The right base is " consolidated.. Lung excursion symmetrical. No change in fremitus.  Thoracostomy tubes:  70 cc of drainage over the last 24 hr  ABDOMEN: Bowel sounds present. Non-tender, no masses palpated.  : Normal anatomy.  EXTREMITIES: Normal muscle tone and joint movement, no cyanosis or clubbing.   LYMPHATICS: No adenopathy palpated, no edema.  SKIN: Dry, intact, no lesions.   NEURO: Cranial nerves II-XII intact. Motor strength 5/5 bilaterally, upper and lower extremities.  PSYCH:  Whiny    Laboratory/Diagnostic Data:    Vent Settings-      ABG  Recent Labs   Lab 02/04/20  1408   PH 7.395   PO2 76*   PCO2 46.1*   HCO3 28.2*   BE 3       Recent Labs   Lab 02/08/20  0345   WBC 21.46*   HGB 8.2*   HCT 25.3*   *   *  134*   K 3.7  3.8   CL 94*  93*   CO2 31*  33*   BUN 16  10   CREATININE 0.7  0.6   AST 98*   *   PROT 6.0   ALBUMIN 1.9*   BILITOT 1.6*   PHOS 3.3      Recent Labs   Lab 02/08/20  0345   APTT 76.3*     No results for input(s): BNP in the last 72 hours.    Microbiology:    Microbiology Results (last 7 days)     Procedure Component Value Units Date/Time    Culture, Anaerobe [[id number]] Collected:  02/04/20 1316    Order Status:  Completed Specimen:  Tissue from Lung, Right Updated:  02/07/20 1120     Anaerobic Culture No anaerobes isolated    Culture, Anaerobic [[id number]] Collected:  02/04/20 1316    Order Status:  Completed Specimen:  Drainage from Lung, Right Updated:  02/07/20 1120     Anaerobic Culture No anaerobes isolated    AFB Culture & Smear [[id number]] Collected:  02/04/20 1316    Order Status:  Completed Specimen:  Tissue from Lung, Right Updated:  02/06/20 1418     AFB CULTURE STAIN No acid fast bacilli seen.     AFB CULTURE STAIN Testing performed by:     AFB CULTURE STAIN Lab [facility] [place]     [facility] [address] [facility]     [place] [address]     AFB CULTURE STAIN Dr.Brian Gallito MD    Tissue culture [[id number]]  (Abnormal)   (Susceptibility) Collected:  02/04/20 1316    Order Status:  Completed Specimen:  Tissue Updated:  02/06/20 0837     Aerobic Culture - Tissue ENTEROBACTER CLOACAE  Many      Aerobic culture [[id number]]  (Abnormal) Collected:  02/04/20 1316    Order Status:  Completed Specimen:  Drainage from Lung, Right Updated:  02/06/20 0837     Aerobic Bacterial Culture ENTEROBACTER CLOACAE  Few  For susceptibility see order #[id number]      Culture, Body Fluid (Aerobic) w/ GS [[id number]] Collected:  02/04/20 1316    Order Status:  Completed Specimen:  Tissue from Peritoneal Fluid Updated:  02/05/20 1112     Gram Stain Result Many WBC's      Rare Gram negative rods    Fungus culture [[id number]] Collected:  02/04/20 1316    Order Status:  Sent Specimen:  Abscess from Lung, Right Updated:  02/04/20 1328    Blood Culture #1 **CANNOT BE ORDERED STAT** [[id number]] Collected:  01/27/20 1235    Order Status:  Completed Specimen:  Blood from Peripheral, Antecubital, Right Updated:  02/01/20 1432     Blood Culture, Routine No growth after 5 days.    Blood Culture #2 **CANNOT BE ORDERED STAT** [[id number]] Collected:  01/27/20 1242    Order Status:  Completed Specimen:  Blood from Peripheral, Antecubital, Left Updated:  02/01/20 1432     Blood Culture, Routine No growth after 5 days.          Radiology:  No chest x-ray today    ASSESSMENT/PLAN:     Active Problems:    Enterobacter cloacae key a empyema status post decortication and drainage   On Rocephin    Thoracostomy is tubes put out 70 cc in the last 24 hr  Ileus secondary to opioids   Passing flatus but no BM   NG tube in place to suction   Patient has refused medications down his tube overnight but is now agreed  to this so that he can have a bowel movement so that is NG tube can be  Removed  Chronic liver disease secondary to hepatitis-C   Systolic heart failure  Massive pulmonary emboli with PEA arrest   Currently on a heparin drip  Coronary artery  disease  Hypertension  Leukocytosis improving  Anemia, chronic disease plus acute illness  Hyponatremia  Hypercapnia  Hypomagnesemia    Hopefully the patient will allow was to place the lactulose through his NG tube so he can have a bowel movement.    Hopefully Dr. Rousseau will remove his thoracostomy tube so he can leave the ICU  Would like to change heparin drip back to Lasix  Ambulate the patient  Chest x-ray tomorrow

## 2020-02-08 NOTE — NURSING
No issues overnight. Pt OOB to void per urinal twice with no difficulty, able to ambulate with minimal assist. Pt continues to refuse RN to touch NG tube, refusing medication to be administered via NG tube. Pt denies ABD pain, nausea, or abdominal discomfort. Negative for a BM overnight, but continues to pass flatus.

## 2020-02-09 LAB
ALBUMIN SERPL BCP-MCNC: 2.2 G/DL (ref 3.5–5.2)
ALP SERPL-CCNC: 70 U/L (ref 55–135)
ALT SERPL W/O P-5'-P-CCNC: 36 U/L (ref 10–44)
ANION GAP SERPL CALC-SCNC: 13 MMOL/L (ref 8–16)
AST SERPL-CCNC: 50 U/L (ref 10–40)
BASOPHILS # BLD AUTO: 0.06 K/UL (ref 0–0.2)
BASOPHILS NFR BLD: 0.3 % (ref 0–1.9)
BILIRUB SERPL-MCNC: 1.6 MG/DL (ref 0.1–1)
BUN SERPL-MCNC: 9 MG/DL (ref 6–20)
CALCIUM SERPL-MCNC: 8.3 MG/DL (ref 8.7–10.5)
CHLORIDE SERPL-SCNC: 91 MMOL/L (ref 95–110)
CO2 SERPL-SCNC: 31 MMOL/L (ref 23–29)
CREAT SERPL-MCNC: 0.6 MG/DL (ref 0.5–1.4)
DIFFERENTIAL METHOD: ABNORMAL
EOSINOPHIL # BLD AUTO: 0.3 K/UL (ref 0–0.5)
EOSINOPHIL NFR BLD: 1.6 % (ref 0–8)
ERYTHROCYTE [DISTWIDTH] IN BLOOD BY AUTOMATED COUNT: 23.2 % (ref 11.5–14.5)
EST. GFR  (AFRICAN AMERICAN): >60 ML/MIN/1.73 M^2
EST. GFR  (NON AFRICAN AMERICAN): >60 ML/MIN/1.73 M^2
GLUCOSE SERPL-MCNC: 89 MG/DL (ref 70–110)
HCT VFR BLD AUTO: 28 % (ref 40–54)
HGB BLD-MCNC: 8.8 G/DL (ref 14–18)
IMM GRANULOCYTES # BLD AUTO: 0.12 K/UL (ref 0–0.04)
IMM GRANULOCYTES NFR BLD AUTO: 0.6 % (ref 0–0.5)
LYMPHOCYTES # BLD AUTO: 1.9 K/UL (ref 1–4.8)
LYMPHOCYTES NFR BLD: 10 % (ref 18–48)
MAGNESIUM SERPL-MCNC: 1.7 MG/DL (ref 1.6–2.6)
MCH RBC QN AUTO: 26.4 PG (ref 27–31)
MCHC RBC AUTO-ENTMCNC: 31.4 G/DL (ref 32–36)
MCV RBC AUTO: 84 FL (ref 82–98)
MONOCYTES # BLD AUTO: 1.5 K/UL (ref 0.3–1)
MONOCYTES NFR BLD: 7.7 % (ref 4–15)
NEUTROPHILS # BLD AUTO: 14.9 K/UL (ref 1.8–7.7)
NEUTROPHILS NFR BLD: 79.8 % (ref 38–73)
NRBC BLD-RTO: 0 /100 WBC
PHOSPHATE SERPL-MCNC: 3.8 MG/DL (ref 2.7–4.5)
PLATELET # BLD AUTO: 508 K/UL (ref 150–350)
PMV BLD AUTO: 9.3 FL (ref 9.2–12.9)
POTASSIUM SERPL-SCNC: 3.2 MMOL/L (ref 3.5–5.1)
PROT SERPL-MCNC: 6.8 G/DL (ref 6–8.4)
RBC # BLD AUTO: 3.33 M/UL (ref 4.6–6.2)
SODIUM SERPL-SCNC: 135 MMOL/L (ref 136–145)
WBC # BLD AUTO: 18.73 K/UL (ref 3.9–12.7)

## 2020-02-09 PROCEDURE — C9113 INJ PANTOPRAZOLE SODIUM, VIA: HCPCS | Performed by: INTERNAL MEDICINE

## 2020-02-09 PROCEDURE — 25000003 PHARM REV CODE 250: Performed by: INTERNAL MEDICINE

## 2020-02-09 PROCEDURE — 85025 COMPLETE CBC W/AUTO DIFF WBC: CPT

## 2020-02-09 PROCEDURE — 99900035 HC TECH TIME PER 15 MIN (STAT)

## 2020-02-09 PROCEDURE — 25000003 PHARM REV CODE 250

## 2020-02-09 PROCEDURE — 63600175 PHARM REV CODE 636 W HCPCS: Performed by: INTERNAL MEDICINE

## 2020-02-09 PROCEDURE — 83735 ASSAY OF MAGNESIUM: CPT

## 2020-02-09 PROCEDURE — 94761 N-INVAS EAR/PLS OXIMETRY MLT: CPT

## 2020-02-09 PROCEDURE — 25000003 PHARM REV CODE 250: Performed by: THORACIC SURGERY (CARDIOTHORACIC VASCULAR SURGERY)

## 2020-02-09 PROCEDURE — 94664 DEMO&/EVAL PT USE INHALER: CPT

## 2020-02-09 PROCEDURE — 94799 UNLISTED PULMONARY SVC/PX: CPT

## 2020-02-09 PROCEDURE — 63600175 PHARM REV CODE 636 W HCPCS: Performed by: THORACIC SURGERY (CARDIOTHORACIC VASCULAR SURGERY)

## 2020-02-09 PROCEDURE — 80053 COMPREHEN METABOLIC PANEL: CPT

## 2020-02-09 PROCEDURE — 84100 ASSAY OF PHOSPHORUS: CPT

## 2020-02-09 PROCEDURE — 36415 COLL VENOUS BLD VENIPUNCTURE: CPT

## 2020-02-09 PROCEDURE — 12000002 HC ACUTE/MED SURGE SEMI-PRIVATE ROOM

## 2020-02-09 RX ORDER — SPIRONOLACTONE 25 MG/1
25 TABLET ORAL DAILY
Status: DISCONTINUED | OUTPATIENT
Start: 2020-02-09 | End: 2020-02-14 | Stop reason: HOSPADM

## 2020-02-09 RX ORDER — PANTOPRAZOLE SODIUM 40 MG/1
40 TABLET, DELAYED RELEASE ORAL DAILY
Status: DISCONTINUED | OUTPATIENT
Start: 2020-02-10 | End: 2020-02-14 | Stop reason: HOSPADM

## 2020-02-09 RX ORDER — ENALAPRIL MALEATE 2.5 MG/1
2.5 TABLET ORAL DAILY
Status: DISCONTINUED | OUTPATIENT
Start: 2020-02-09 | End: 2020-02-14 | Stop reason: HOSPADM

## 2020-02-09 RX ORDER — METOCLOPRAMIDE 10 MG/1
10 TABLET ORAL
Status: DISCONTINUED | OUTPATIENT
Start: 2020-02-10 | End: 2020-02-14 | Stop reason: HOSPADM

## 2020-02-09 RX ORDER — FUROSEMIDE 20 MG/1
20 TABLET ORAL DAILY
Status: DISCONTINUED | OUTPATIENT
Start: 2020-02-10 | End: 2020-02-14 | Stop reason: HOSPADM

## 2020-02-09 RX ADMIN — METOCLOPRAMIDE 10 MG: 5 INJECTION, SOLUTION INTRAMUSCULAR; INTRAVENOUS at 05:02

## 2020-02-09 RX ADMIN — HYDROCODONE BITARTRATE AND ACETAMINOPHEN 1 TABLET: 10; 325 TABLET ORAL at 05:02

## 2020-02-09 RX ADMIN — ENOXAPARIN SODIUM 70 MG: 80 INJECTION, SOLUTION INTRAVENOUS; SUBCUTANEOUS at 11:02

## 2020-02-09 RX ADMIN — HYDROCODONE BITARTRATE AND ACETAMINOPHEN 1 TABLET: 10; 325 TABLET ORAL at 12:02

## 2020-02-09 RX ADMIN — AZITHROMYCIN MONOHYDRATE 250 MG: 500 INJECTION, POWDER, LYOPHILIZED, FOR SOLUTION INTRAVENOUS at 05:02

## 2020-02-09 RX ADMIN — ALPRAZOLAM 0.25 MG: 0.25 TABLET ORAL at 11:02

## 2020-02-09 RX ADMIN — POLYETHYLENE GLYCOL 3350 17 G: 17 POWDER, FOR SOLUTION ORAL at 08:02

## 2020-02-09 RX ADMIN — FUROSEMIDE 20 MG: 10 INJECTION, SOLUTION INTRAMUSCULAR; INTRAVENOUS at 05:02

## 2020-02-09 RX ADMIN — SENNOSIDES AND DOCUSATE SODIUM 1 TABLET: 8.6; 5 TABLET ORAL at 08:02

## 2020-02-09 RX ADMIN — AZITHROMYCIN MONOHYDRATE 250 MG: 500 INJECTION, POWDER, LYOPHILIZED, FOR SOLUTION INTRAVENOUS at 02:02

## 2020-02-09 RX ADMIN — MAGNESIUM OXIDE 800 MG: 400 TABLET ORAL at 06:02

## 2020-02-09 RX ADMIN — AZITHROMYCIN MONOHYDRATE 250 MG: 500 INJECTION, POWDER, LYOPHILIZED, FOR SOLUTION INTRAVENOUS at 11:02

## 2020-02-09 RX ADMIN — AZITHROMYCIN MONOHYDRATE 250 MG: 500 INJECTION, POWDER, LYOPHILIZED, FOR SOLUTION INTRAVENOUS at 12:02

## 2020-02-09 RX ADMIN — HYDROCODONE BITARTRATE AND ACETAMINOPHEN 1 TABLET: 10; 325 TABLET ORAL at 09:02

## 2020-02-09 RX ADMIN — SENNOSIDES AND DOCUSATE SODIUM 1 TABLET: 8.6; 5 TABLET ORAL at 09:02

## 2020-02-09 RX ADMIN — POTASSIUM CHLORIDE 40 MEQ: 7.46 INJECTION, SOLUTION INTRAVENOUS at 07:02

## 2020-02-09 RX ADMIN — METOCLOPRAMIDE 10 MG: 5 INJECTION, SOLUTION INTRAMUSCULAR; INTRAVENOUS at 11:02

## 2020-02-09 RX ADMIN — METOPROLOL SUCCINATE 100 MG: 50 TABLET, EXTENDED RELEASE ORAL at 08:02

## 2020-02-09 RX ADMIN — MUPIROCIN: 20 OINTMENT TOPICAL at 09:02

## 2020-02-09 RX ADMIN — CHLORHEXIDINE GLUCONATE 15 ML: 1.2 RINSE ORAL at 08:02

## 2020-02-09 RX ADMIN — ENALAPRIL MALEATE 2.5 MG: 2.5 TABLET ORAL at 09:02

## 2020-02-09 RX ADMIN — HYDROXYZINE HYDROCHLORIDE 25 MG: 25 TABLET, FILM COATED ORAL at 08:02

## 2020-02-09 RX ADMIN — CEFTRIAXONE 2 G: 2 INJECTION, SOLUTION INTRAVENOUS at 11:02

## 2020-02-09 RX ADMIN — METOCLOPRAMIDE 10 MG: 5 INJECTION, SOLUTION INTRAMUSCULAR; INTRAVENOUS at 02:02

## 2020-02-09 RX ADMIN — PANTOPRAZOLE SODIUM 40 MG: 40 INJECTION, POWDER, FOR SOLUTION INTRAVENOUS at 08:02

## 2020-02-09 NOTE — PLAN OF CARE
Pt had huge BM today with soft brown stool after receiving laxatives via NGT  Remains NPO except sips water and ice chips  Much less C/O pain issues today. Dr Rousseau saw pt this evening and plan is to remove chest tubes tomorrow

## 2020-02-09 NOTE — PROGRESS NOTES
[facility] Medicine Progress Note    Subjective:     Had a large BM yesterday afternoon and continues to pass flatus. NGT now out. Denies abd pain. Asking to advance diet. Dr. Rousseau planning on likely removal of chest tubes tmrw.   Afebrile. HD stable.     Objective:   Last 24 Hour Vital Signs:  BP  Min: 116/63  Max: 186/107  Temp  Av.8 °F (36.6 °C)  Min: 97.1 °F (36.2 °C)  Max: 98.2 °F (36.8 °C)  Pulse  Av.3  Min: 91  Max: 123  Resp  Av.4  Min: 13  Max: 26  SpO2  Av.3 %  Min: 96 %  Max: 100 %  I/O last 3 completed shifts:  In: 2187 [P.O.:1000; I.V.:137; IV Piggyback:1050]  Out: 2786 [Urine:2425; Stool:1; Chest Tube:360]      Physical Examination:  Physical Exam   Constitutional:   Cachectic, frail appearing  Appears to be in significant pain due to abd distention   HENT:   Head: Normocephalic.   Mouth/Throat: Oropharynx is clear and moist.   Eyes: Pupils are equal, round, and reactive to light. EOM are normal.   Neck: Normal range of motion. Neck supple. No JVD present.   Cardiovascular: Regular rhythm and intact distal pulses.   Tachycardic     Pulmonary/Chest: No stridor. No respiratory distress.   Rhonchi + crackles to R base  Appropriately tender to R chest wall   Abdominal: Soft. He exhibits no distension. There is no tenderness. There is no guarding.   Musculoskeletal: He exhibits edema.   Neurological: He is alert. No cranial nerve deficit or sensory deficit. He exhibits normal muscle tone.   Skin: Skin is warm and dry. Capillary refill takes less than 2 seconds.   Vitals reviewed.      Laboratory:  Laboratory Data Reviewed: yes      Most Recent Data:  CBC:   Lab Results   Component Value Date    WBC 18.73 (H) 2020    HGB 8.8 (L) 2020    HCT 28.0 (L) 2020     (H) 2020    MCV 84 2020    RDW 23.2 (H) 2020     BMP:   Lab Results   Component Value Date     (L) 2020    K 3.2 (L) 2020    CL 91 (L) 2020    CO2 31 (H)  02/09/2020    BUN 9 02/09/2020    GLU 89 02/09/2020    CALCIUM 8.3 (L) 02/09/2020    MG 1.7 02/09/2020    PHOS 3.8 02/09/2020     LFTs:   Lab Results   Component Value Date    PROT 6.8 02/09/2020    ALBUMIN 2.2 (L) 02/09/2020    BILITOT 1.6 (H) 02/09/2020    AST 50 (H) 02/09/2020    ALKPHOS 70 02/09/2020    ALT 36 02/09/2020     Coags:   Lab Results   Component Value Date    INR 1.3 02/04/2020     FLP:   Lab Results   Component Value Date    CHOL 105 (L) 10/28/2019    HDL 33 (L) 10/28/2019    LDLCALC 56.6 (L) 10/28/2019    TRIG 77 10/28/2019    CHOLHDL 31.4 10/28/2019     DM:   Lab Results   Component Value Date    LDLCALC 56.6 (L) 10/28/2019    CREATININE 0.6 02/09/2020     Thyroid:   Lab Results   Component Value Date    TSH 3.671 10/28/2019     Anemia: No results found for: IRON, TIBC, FERRITIN, [id number], FOLATE  Cardiac:   Lab Results   Component Value Date    TROPONINI 0.140 (HH) 01/27/2020     (H) 02/01/2020     Urinalysis:   Lab Results   Component Value Date    COLORU Yellow 01/27/2020    SPECGRAV 1.010 01/27/2020    NITRITE Negative 01/27/2020    KETONESU Negative 01/27/2020    UROBILINOGEN Negative 01/27/2020        Radiology:  Data Reviewed: yes  XR CHEST AP PORTABLE  CT ABDOMEN PELVIS WITH CONTRAST  XR CHEST AP PORTABLE  CT CHEST WITH CONTRAST  US LIVER WITH DOPPLER  US ABDOMEN LIMITED  XR CHEST AP PORTABLE  XR CHEST AP PORTABLE  XR CHEST AP PORTABLE  XR CHEST AP PORTABLE  XR KUB  XR ABDOMEN AP 1 VIEW  XR CHEST AP PORTABLE  X-Ray Chest AP Portable   Status: Final result   MyChart Results Release     [employer] Status: Active  Results Release   PACS Images for [facility] Viewer      Show images for X-Ray Chest AP Portable   X-Ray Chest AP Portable   Order: [id number]   Status:  Final result   Visible to patient:  No (Not Released) Next appt:  05/19/2020 at 09:40 AM in Hepatology (Jennifer B Scheuermann, PA)   Details     Reading Physician Reading Date Result Priority   Trung Driver MD  2/7/2020       Narrative     XR CHEST AP PORTABLE    CLINICAL HISTORY:  44 years Male chest tubes    COMPARISON: February 6, 2020    FINDINGS: Cardiopericardial silhouette is enlarged and stable compared  to prior. There are three right-sided chest tubes which are in stable  position. Right chest wall subcutaneous emphysema is again evident  with no appreciable pneumothorax on the right. Parenchymal opacities  involving the mid and right lower lung zones appears slightly worsened  compared to prior. Small right pleural effusion. Left midlung zone  parenchymal opacity and retrocardiac left lower lobe airspace disease  appears stable compared to prior.    IMPRESSION:    Slight interval worsening of right lung base airspace disease.    Otherwise stable chest radiograph.               Current Medications:     Infusions:   dexmedetomidine (PRECEDEX) infusion Stopped (02/08/20 1415)        Scheduled:   azithromycin  250 mg Intravenous Q6H    cefTRIAXone (ROCEPHIN) IVPB  2 g Intravenous Q24H    enoxparin  1 mg/kg Subcutaneous Q12H    furosemide (LASIX) IV  20 mg Intravenous BID AC    hydrOXYzine HCl  25 mg Oral Daily    metoclopramide HCl  10 mg Intravenous Q6H    metoprolol succinate  100 mg Oral Daily    pantoprazole  40 mg Intravenous Daily    peg-electrolyte soln  4,000 mL Oral Once    polyethylene glycol  17 g Oral BID    senna-docusate 8.6-50 mg  1 tablet Oral BID        PRN:  sodium chloride, acetaminophen, acetaminophen, acetaminophen, ALPRAZolam, calcium chloride IVPB, calcium chloride IVPB, calcium chloride IVPB, dextrose 50%, dextrose 50%, diphenhydrAMINE, furosemide, gabapentin, glucagon (human recombinant), glucose, glucose, HYDROcodone-acetaminophen, HYDROcodone-acetaminophen, lactulose, magnesium oxide, magnesium sulfate IVPB, magnesium sulfate IVPB, magnesium sulfate IVPB, magnesium sulfate IVPB, ondansetron, ondansetron, potassium chloride in water, potassium chloride in water, potassium  chloride in water, potassium chloride in water, potassium chloride, potassium chloride, potassium chloride, potassium chloride, promethazine (PHENERGAN) IVPB, promethazine (PHENERGAN) IVPB, sodium chloride 0.9%, sodium phosphate IVPB, sodium phosphate IVPB, sodium phosphate IVPB, sodium phosphate IVPB, sodium phosphate IVPB    Microbiology Data:  Reviewed: yes  Microbiology Results (last 7 days)     Procedure Component Value Units Date/Time    Culture, Anaerobe [[id number]] Collected:  02/04/20 1316    Order Status:  Completed Specimen:  Tissue from Lung, Right Updated:  02/07/20 1120     Anaerobic Culture No anaerobes isolated    Culture, Anaerobic [[id number]] Collected:  02/04/20 1316    Order Status:  Completed Specimen:  Drainage from Lung, Right Updated:  02/07/20 1120     Anaerobic Culture No anaerobes isolated    AFB Culture & Smear [[id number]] Collected:  02/04/20 1316    Order Status:  Completed Specimen:  Tissue from Lung, Right Updated:  02/06/20 1418     AFB CULTURE STAIN No acid fast bacilli seen.     AFB CULTURE STAIN Testing performed by:     AFB CULTURE STAIN Lab [facility]     [facility] [address] [facility]     [place] [address]     AFB CULTURE STAIN Dr.Brian Gallito MD    Tissue culture [[id number]]  (Abnormal)  (Susceptibility) Collected:  02/04/20 1316    Order Status:  Completed Specimen:  Tissue Updated:  02/06/20 0837     Aerobic Culture - Tissue ENTEROBACTER CLOACAE  Many      Aerobic culture [[id number]]  (Abnormal) Collected:  02/04/20 1316    Order Status:  Completed Specimen:  Drainage from Lung, Right Updated:  02/06/20 0837     Aerobic Bacterial Culture ENTEROBACTER CLOACAE  Few  For susceptibility see order #[id number]      Culture, Body Fluid (Aerobic) w/ GS [[id number]] Collected:  02/04/20 1316    Order Status:  Completed Specimen:  Tissue from Peritoneal Fluid Updated:  02/05/20 1112     Gram Stain Result Many WBC's      Rare Gram negative rods     Fungus culture [[id number]] Collected:  02/04/20 1316    Order Status:  Sent Specimen:  Abscess from Lung, Right Updated:  02/04/20 1328           Antibiotics and Day Number of Therapy:  Antibiotics (From admission, onward)    Start     Stop Route Frequency Ordered    02/07/20 2100  azithromycin (ZITHROMAX) 250 mg in dextrose 5 % 250 mL IVPB      -- IV Every 6 hours 02/07/20 1733    02/07/20 1100  cefTRIAXone (ROCEPHIN) 2 g in dextrose 5 % 50 mL IVPB      -- IV Every 24 hours (non-standard times) 02/07/20 0954    01/27/20 1200  levoFLOXacin 500 mg/100 mL IVPB 500 mg      01/27 2359 IV ED 1 Time 01/27/20 1153           Assessment/Plan:     Francis Daigle is a 44 y.o.male with:      Severe Sepsis due to Bilateral pneumonia with R parapneumonic effusion: Now POD#5 from thoracotomy, routine postop mgmt as per CTS. Enterobacter growing from pleural fluid. Changed abx to rocephin per sensitivities.      Bilateral Pulmonary Embolism: restarted on lovenox      Acute on Chronic systolic CHF NYHA III: continue IV Lasix 20mg BID. Hold eplerenone for high K. Strict I/o and daily weights.      Hypertension: Enalapril 2.5mg.     Hyperkalemia - resolved     Acute Blood loss anemia - expected blood loss post operatively - , transfuse for hgb <7    Postop ileus - continue bowel regimen, cont reglan, macrolide, given relistor     Leopoldo Dalton  [facility] Medicine

## 2020-02-09 NOTE — NURSING TRANSFER
Nursing Transfer Note      2/9/2020     Transfer To: 1206    Transfer via bed    Transfer with     Transported by NAVEED Munguia RN    Medicines sent: IVPB Zithromycin    Chart send with patient: Yes    Notified: Patient notified family by personal cell phone    Patient reassessed at: 02/09/2020 at 1305      Upon arrival to floor: patient oriented to room, call bell in reach and bed in lowest position.  MAUREEN Jiang at bedside

## 2020-02-09 NOTE — PROGRESS NOTES
[facility] Medicine Progress Note    Subjective:     Had NGT placed yest afternoon. Had refused laxatives placed through NGT but now willing to take. Abd pain about the same.   Afebrile. HD stable.     Objective:   Last 24 Hour Vital Signs:  BP  Min: 85/49  Max: 135/81  Temp  Av.1 °F (36.7 °C)  Min: 97.8 °F (36.6 °C)  Max: 98.6 °F (37 °C)  Pulse  Av.1  Min: 68  Max: 92  Resp  Av.3  Min: 16  Max: 25  SpO2  Av.6 %  Min: 81 %  Max: 100 %  I/O last 3 completed shifts:  In: 937 [I.V.:137; IV Piggyback:800]  Out: 239 [Urine:; Stool:1; Chest Tube:365]      Physical Examination:  Physical Exam   Constitutional:   Cachectic, frail appearing  Appears to be in significant pain due to abd distention   HENT:   Head: Normocephalic.   Mouth/Throat: Oropharynx is clear and moist.   Eyes: Pupils are equal, round, and reactive to light. EOM are normal.   Neck: Normal range of motion. Neck supple. No JVD present.   Cardiovascular: Regular rhythm and intact distal pulses.   Tachycardic     Pulmonary/Chest: No stridor. No respiratory distress.   Rhonchi + crackles to R base  Appropriately tender to R chest wall   Abdominal: Soft. He exhibits no distension. There is no tenderness. There is no guarding.   Musculoskeletal: He exhibits edema.   Neurological: He is alert. No cranial nerve deficit or sensory deficit. He exhibits normal muscle tone.   Skin: Skin is warm and dry. Capillary refill takes less than 2 seconds.   Vitals reviewed.      Laboratory:  Laboratory Data Reviewed: yes      Most Recent Data:  CBC:   Lab Results   Component Value Date    WBC 21.46 (H) 2020    HGB 8.2 (L) 2020    HCT 25.3 (L) 2020     (H) 2020    MCV 84 2020    RDW 22.5 (H) 2020     BMP:   Lab Results   Component Value Date     (L) 2020     (L) 2020    K 3.8 2020    K 3.7 2020    CL 93 (L) 2020    CL 94 (L) 2020    CO2 33 (H) 2020    CO2  31 (H) 02/08/2020    BUN 10 02/08/2020    BUN 16 02/08/2020    GLU 95 02/08/2020    GLU 91 02/08/2020    CALCIUM 8.0 (L) 02/08/2020    CALCIUM 7.9 (L) 02/08/2020    MG 1.7 02/08/2020    MG 1.7 02/08/2020    PHOS 3.3 02/08/2020     LFTs:   Lab Results   Component Value Date    PROT 6.0 02/08/2020    ALBUMIN 1.9 (L) 02/08/2020    BILITOT 1.6 (H) 02/08/2020    AST 98 (H) 02/08/2020    ALKPHOS 66 02/08/2020     (H) 02/08/2020     Coags:   Lab Results   Component Value Date    INR 1.3 02/04/2020     FLP:   Lab Results   Component Value Date    CHOL 105 (L) 10/28/2019    HDL 33 (L) 10/28/2019    LDLCALC 56.6 (L) 10/28/2019    TRIG 77 10/28/2019    CHOLHDL 31.4 10/28/2019     DM:   Lab Results   Component Value Date    LDLCALC 56.6 (L) 10/28/2019    CREATININE 0.6 02/08/2020    CREATININE 0.7 02/08/2020     Thyroid:   Lab Results   Component Value Date    TSH 3.671 10/28/2019     Anemia: No results found for: IRON, TIBC, FERRITIN, NQFEVBSO32, FOLATE  Cardiac:   Lab Results   Component Value Date    TROPONINI 0.140 (HH) 01/27/2020     (H) 02/01/2020     Urinalysis:   Lab Results   Component Value Date    COLORU Yellow 01/27/2020    SPECGRAV 1.010 01/27/2020    NITRITE Negative 01/27/2020    KETONESU Negative 01/27/2020    UROBILINOGEN Negative 01/27/2020        Radiology:  Data Reviewed: yes  XR CHEST AP PORTABLE  CT ABDOMEN PELVIS WITH CONTRAST  XR CHEST AP PORTABLE  CT CHEST WITH CONTRAST  US LIVER WITH DOPPLER  US ABDOMEN LIMITED  XR CHEST AP PORTABLE  XR CHEST AP PORTABLE  XR CHEST AP PORTABLE  XR CHEST AP PORTABLE  XR KUB  XR ABDOMEN AP 1 VIEW  X-Ray Chest AP Portable   Status: Final result   MyChart Results Release     [employer] Status: Active  Results Release   PACS Images for [facility] [place] Viewer      Show images for X-Ray Chest AP Portable   X-Ray Chest AP Portable   Order: [id number]   Status:  Final result   Visible to patient:  No (Not Released) Next appt:  05/19/2020 at 09:40 AM in Hepatology  (Jennifer B Scheuermann, PA)   Details     Reading Physician Reading Date Result Priority   Trung Driver MD 2/7/2020       Narrative     XR CHEST AP PORTABLE    CLINICAL HISTORY:  44 years Male chest tubes    COMPARISON: February 6, 2020    FINDINGS: Cardiopericardial silhouette is enlarged and stable compared  to prior. There are three right-sided chest tubes which are in stable  position. Right chest wall subcutaneous emphysema is again evident  with no appreciable pneumothorax on the right. Parenchymal opacities  involving the mid and right lower lung zones appears slightly worsened  compared to prior. Small right pleural effusion. Left midlung zone  parenchymal opacity and retrocardiac left lower lobe airspace disease  appears stable compared to prior.    IMPRESSION:    Slight interval worsening of right lung base airspace disease.    Otherwise stable chest radiograph.               Current Medications:     Infusions:   dexmedetomidine (PRECEDEX) infusion Stopped (02/08/20 1415)        Scheduled:   azithromycin  250 mg Intravenous Q6H    cefTRIAXone (ROCEPHIN) IVPB  2 g Intravenous Q24H    chlorhexidine  15 mL Mouth/Throat BID    enoxparin  1 mg/kg Subcutaneous Q12H    furosemide (LASIX) IV  20 mg Intravenous BID AC    furosemide (LASIX) IV  20 mg Intravenous Once    hydrOXYzine HCl  25 mg Oral Daily    metoclopramide HCl  10 mg Intravenous Q6H    metoprolol succinate  100 mg Oral Daily    mupirocin   Nasal BID    naloxone  0.02 mg Intravenous Once    pantoprazole  40 mg Intravenous Daily    peg-electrolyte soln  4,000 mL Oral Once    polyethylene glycol  17 g Oral BID    senna-docusate 8.6-50 mg  1 tablet Oral BID    sodium polystyrene  15 g Oral Once        PRN:  sodium chloride, acetaminophen, acetaminophen, acetaminophen, ALPRAZolam, calcium chloride IVPB, calcium chloride IVPB, calcium chloride IVPB, dextrose 50%, dextrose 50%, diphenhydrAMINE, furosemide, gabapentin, glucagon (human  recombinant), glucose, glucose, HYDROcodone-acetaminophen, HYDROcodone-acetaminophen, lactulose, magnesium oxide, magnesium sulfate IVPB, magnesium sulfate IVPB, magnesium sulfate IVPB, magnesium sulfate IVPB, ondansetron, ondansetron, potassium chloride in water, potassium chloride in water, potassium chloride in water, potassium chloride in water, potassium chloride, potassium chloride, potassium chloride, potassium chloride, promethazine (PHENERGAN) IVPB, promethazine (PHENERGAN) IVPB, sodium chloride 0.9%, sodium phosphate IVPB, sodium phosphate IVPB, sodium phosphate IVPB, sodium phosphate IVPB, sodium phosphate IVPB    Microbiology Data:  Reviewed: yes  Microbiology Results (last 7 days)     Procedure Component Value Units Date/Time    Culture, Anaerobe [[id number]] Collected:  02/04/20 1316    Order Status:  Completed Specimen:  Tissue from Lung, Right Updated:  02/07/20 1120     Anaerobic Culture No anaerobes isolated    Culture, Anaerobic [[id number]] Collected:  02/04/20 1316    Order Status:  Completed Specimen:  Drainage from Lung, Right Updated:  02/07/20 1120     Anaerobic Culture No anaerobes isolated    AFB Culture & Smear [[id number]] Collected:  02/04/20 1316    Order Status:  Completed Specimen:  Tissue from Lung, Right Updated:  02/06/20 1418     AFB CULTURE STAIN No acid fast bacilli seen.     AFB CULTURE STAIN Testing performed by:     AFB CULTURE STAIN Lab [facility]     AFB CULT[facility] STAIN [address] [place] [address] [facility]     [place] [address]     AFB CULTURE STAIN Dr.Brian Gallito MD    Tissue culture [[id number]]  (Abnormal)  (Susceptibility) Collected:  02/04/20 1316    Order Status:  Completed Specimen:  Tissue Updated:  02/06/20 0837     Aerobic Culture - Tissue ENTEROBACTER CLOACAE  Many      Aerobic culture [[id number]]  (Abnormal) Collected:  02/04/20 1316    Order Status:  Completed Specimen:  Drainage from Lung, Right Updated:  02/06/20 0837     Aerobic Bacterial  Culture ENTEROBACTER CLOACAE  Few  For susceptibility see order #[id number]      Culture, Body Fluid (Aerobic) w/ GS [[id number]] Collected:  02/04/20 1316    Order Status:  Completed Specimen:  Tissue from Peritoneal Fluid Updated:  02/05/20 1112     Gram Stain Result Many WBC's      Rare Gram negative rods    Fungus culture [[id number]] Collected:  02/04/20 1316    Order Status:  Sent Specimen:  Abscess from Lung, Right Updated:  02/04/20 1328           Antibiotics and Day Number of Therapy:  Antibiotics (From admission, onward)    Start     Stop Route Frequency Ordered    02/07/20 2100  azithromycin (ZITHROMAX) 250 mg in dextrose 5 % 250 mL IVPB      -- IV Every 6 hours 02/07/20 1733    02/07/20 1100  cefTRIAXone (ROCEPHIN) 2 g in dextrose 5 % 50 mL IVPB      -- IV Every 24 hours (non-standard times) 02/07/20 0954    02/04/20 2100  mupirocin 2 % ointment  (MRSA Decolonization Orders ST)      02/09 2059 Nasl 2 times daily 02/04/20 1644    01/27/20 1200  levoFLOXacin 500 mg/100 mL IVPB 500 mg      01/27 2359 IV ED 1 Time 01/27/20 1153           Assessment/Plan:     Francis Daigle is a 44 y.o.male with:      Severe Sepsis due to Bilateral pneumonia with R parapneumonic effusion: Now POD#4 from thoracotomy, routine postop mgmt as per CTS. Enterobacter growing from pleural fluid. Change abx to rocephin per sensitivities.      Bilateral Pulmonary Embolism: on heparin GTT - restart asap when ok with CTS     Acute on Chronic systolic CHF NYHA III: continue IV Lasix 20mg BID. Hold eplerenone for high K. Strict I/o and daily weights.      Hypertension: Enalapril 2.5mg. Hold for high K.     Hyperkalemia - getting lasix,, hold aldactone and ACEi as above      Acute Blood loss anemia - expected blood loss post operatively - was transfused yesterday per CTS, transfuse for hgb <7    Postop ileus - bowel regimen, cont NGT, GI consulted, cont reglan, macrolide, given relistor     Leopoldo Dalton  [facility] Medicine

## 2020-02-09 NOTE — PROGRESS NOTES
"Progress Note  Pulmonary/Critical Care      Admit Date: 1/27/2020      SUBJECTIVE:      Patient ID: Francis Daigle is a 44 y.o. male.    HPI/Interval history (See H&P for complete P,F,SHx) :     The patient is up and complaining about his diet.  His right base is still very consolidated    ROS     General: Feeling better  Eyes: Vision is good.  ENT:  Pharyngitis better  Heart:: No chest pain or palpitations.  Lungs: No cough, sputum, or wheezing.  GI:  The nurses report a large bowel movement last night  : No dysuria, hesitancy, or nocturia.  Skin: No lesions or rashes.  Musculoskeletal: No joint pain or myalgias.  Neuro: No headaches or neuropathy.  Lymph: No edema or adenopathy.  Psych: No anxiety or depression.  Endo: No weight change.      OBJECTIVE:     Vital Signs Range (Last 24H):  Temp:  [98 °F (36.7 °C)]   Pulse:  []   Resp:  [15-26]   BP: ()/()   SpO2:  [96 %-100 %]     I & O (Last 24H):    Intake/Output Summary (Last 24 hours) at 2/9/2020 1113  Last data filed at 2/9/2020 0900  Gross per 24 hour   Intake 1593.03 ml   Output 2441 ml   Net -847.97 ml        Estimated body mass index is 22.56 kg/m² as calculated from the following:    Height as of this encounter: 5' 8" (1.727 m).    Weight as of this encounter: 67.3 kg (148 lb 5.9 oz).    Physical Exam  GENERAL: Older patient not happy with his food  HEENT: Pupils equal and reactive. Extraocular movements intact. Nose intact.  Pharynx moist.  NECK: Supple.   HEART: Regular rate and rhythm. No murmur or gallop auscultated.  LUNGS:  The right base is very consolidated.. Lung excursion symmetrical. No change in fremitus.  Thoracostomy tubes:  120 cc of drainage over the last 24 hr  ABDOMEN: Bowel sounds present. Non-tender, no masses palpated.  : Normal anatomy.  EXTREMITIES: Normal muscle tone and joint movement, no cyanosis or clubbing.   LYMPHATICS: No adenopathy palpated, no edema.  SKIN: Dry, intact, no lesions.   NEURO: Cranial " nerves II-XII intact. Motor strength 5/5 bilaterally, upper and lower extremities.  PSYCH:  Quiet    Laboratory/Diagnostic Data:    Vent Settings-      ABG  Recent Labs   Lab 02/04/20  1408   PH 7.395   PO2 76*   PCO2 46.1*   HCO3 28.2*   BE 3       Recent Labs   Lab 02/09/20  0432   WBC 18.73*   HGB 8.8*   HCT 28.0*   *   *   K 3.2*   CL 91*   CO2 31*   BUN 9   CREATININE 0.6   AST 50*   ALT 36   PROT 6.8   ALBUMIN 2.2*   BILITOT 1.6*   PHOS 3.8      Recent Labs   Lab 02/08/20  0345   APTT 76.3*     No results for input(s): BNP in the last 72 hours.    Microbiology:    Microbiology Results (last 7 days)     Procedure Component Value Units Date/Time    Culture, Anaerobe [[id number]] Collected:  02/04/20 1316    Order Status:  Completed Specimen:  Tissue from Lung, Right Updated:  02/07/20 1120     Anaerobic Culture No anaerobes isolated    Culture, Anaerobic [[id number]] Collected:  02/04/20 1316    Order Status:  Completed Specimen:  Drainage from Lung, Right Updated:  02/07/20 1120     Anaerobic Culture No anaerobes isolated    AFB Culture & Smear [[id number]] Collected:  02/04/20 1316    Order Status:  Completed Specimen:  Tissue from Lung, Right Updated:  02/06/20 1418     AFB CULTURE STAIN No acid fast bacilli seen.     AFB CULTURE STAIN Testing performed by:     AFB CULTURE STAIN Lab [facility]     AFB CULTURE STAIN [address] [place] [address]     [place] [address]     AFB CULTURE STAIN Dr.Brian Gallito MD    Tissue culture [[id number]]  (Abnormal)  (Susceptibility) Collected:  02/04/20 1316    Order Status:  Completed Specimen:  Tissue Updated:  02/06/20 0837     Aerobic Culture - Tissue ENTEROBACTER CLOACAE  Many      Aerobic culture [[id number]]  (Abnormal) Collected:  02/04/20 1316    Order Status:  Completed Specimen:  Drainage from Lung, Right Updated:  02/06/20 0837     Aerobic Bacterial Culture ENTEROBACTER CLOACAE  Few  For susceptibility see order #[id number]       Culture, Body Fluid (Aerobic) w/ GS [[id number]] Collected:  02/04/20 1316    Order Status:  Completed Specimen:  Tissue from Peritoneal Fluid Updated:  02/05/20 1112     Gram Stain Result Many WBC's      Rare Gram negative rods    Fungus culture [[id number]] Collected:  02/04/20 1316    Order Status:  Sent Specimen:  Abscess from Lung, Right Updated:  02/04/20 1328          Radiology:  Thoracostomy tubes in good position.  Some further clearing of the pleural densities in the right lower lobe.    ASSESSMENT/PLAN:     Active Problems:    Enterobacter cloacae key a empyema status post decortication and drainage   On Rocephin   Consolidated right lower lobe   Ileus secondary to opioids   Resolved  Chronic liver disease secondary to hepatitis-C   Systolic heart failure  Massive pulmonary emboli with PEA arrest   On Lovenox  Coronary artery disease  Hypertension  Leukocytosis improving  Anemia, chronic disease plus acute illness  Hyponatremia  Hypokalemia  Hypercapnia  Hypomagnesemia    Texted with Dr. Rousseau who is comfortable with the patient moving to med surg  Add incentive spirometer and Acapella  Chest x-ray in a.m.

## 2020-02-10 LAB
ALBUMIN SERPL BCP-MCNC: 1.9 G/DL (ref 3.5–5.2)
ALP SERPL-CCNC: 63 U/L (ref 55–135)
ALT SERPL W/O P-5'-P-CCNC: 32 U/L (ref 10–44)
ANION GAP SERPL CALC-SCNC: 5 MMOL/L (ref 8–16)
AST SERPL-CCNC: 41 U/L (ref 10–40)
BASOPHILS # BLD AUTO: 0.05 K/UL (ref 0–0.2)
BASOPHILS NFR BLD: 0.3 % (ref 0–1.9)
BILIRUB SERPL-MCNC: 1.3 MG/DL (ref 0.1–1)
BUN SERPL-MCNC: 8 MG/DL (ref 6–20)
CALCIUM SERPL-MCNC: 7.9 MG/DL (ref 8.7–10.5)
CHLORIDE SERPL-SCNC: 95 MMOL/L (ref 95–110)
CO2 SERPL-SCNC: 33 MMOL/L (ref 23–29)
CREAT SERPL-MCNC: 0.6 MG/DL (ref 0.5–1.4)
DIFFERENTIAL METHOD: ABNORMAL
EOSINOPHIL # BLD AUTO: 0.3 K/UL (ref 0–0.5)
EOSINOPHIL NFR BLD: 1.8 % (ref 0–8)
ERYTHROCYTE [DISTWIDTH] IN BLOOD BY AUTOMATED COUNT: 22.7 % (ref 11.5–14.5)
EST. GFR  (AFRICAN AMERICAN): >60 ML/MIN/1.73 M^2
EST. GFR  (NON AFRICAN AMERICAN): >60 ML/MIN/1.73 M^2
GLUCOSE SERPL-MCNC: 106 MG/DL (ref 70–110)
HCT VFR BLD AUTO: 27.4 % (ref 40–54)
HGB BLD-MCNC: 8.4 G/DL (ref 14–18)
IMM GRANULOCYTES # BLD AUTO: 0.12 K/UL (ref 0–0.04)
IMM GRANULOCYTES NFR BLD AUTO: 0.7 % (ref 0–0.5)
LYMPHOCYTES # BLD AUTO: 2.1 K/UL (ref 1–4.8)
LYMPHOCYTES NFR BLD: 11.7 % (ref 18–48)
MAGNESIUM SERPL-MCNC: 1.5 MG/DL (ref 1.6–2.6)
MCH RBC QN AUTO: 26.3 PG (ref 27–31)
MCHC RBC AUTO-ENTMCNC: 30.7 G/DL (ref 32–36)
MCV RBC AUTO: 86 FL (ref 82–98)
MONOCYTES # BLD AUTO: 1.5 K/UL (ref 0.3–1)
MONOCYTES NFR BLD: 8.4 % (ref 4–15)
NEUTROPHILS # BLD AUTO: 14 K/UL (ref 1.8–7.7)
NEUTROPHILS NFR BLD: 77.1 % (ref 38–73)
NRBC BLD-RTO: 0 /100 WBC
PHOSPHATE SERPL-MCNC: 3.1 MG/DL (ref 2.7–4.5)
PLATELET # BLD AUTO: 538 K/UL (ref 150–350)
PMV BLD AUTO: 9.1 FL (ref 9.2–12.9)
POTASSIUM SERPL-SCNC: 4 MMOL/L (ref 3.5–5.1)
PROT SERPL-MCNC: 6.3 G/DL (ref 6–8.4)
RBC # BLD AUTO: 3.19 M/UL (ref 4.6–6.2)
SODIUM SERPL-SCNC: 133 MMOL/L (ref 136–145)
WBC # BLD AUTO: 18.08 K/UL (ref 3.9–12.7)

## 2020-02-10 PROCEDURE — 94799 UNLISTED PULMONARY SVC/PX: CPT

## 2020-02-10 PROCEDURE — 25000003 PHARM REV CODE 250: Performed by: THORACIC SURGERY (CARDIOTHORACIC VASCULAR SURGERY)

## 2020-02-10 PROCEDURE — 63600175 PHARM REV CODE 636 W HCPCS: Performed by: INTERNAL MEDICINE

## 2020-02-10 PROCEDURE — 83735 ASSAY OF MAGNESIUM: CPT

## 2020-02-10 PROCEDURE — 85025 COMPLETE CBC W/AUTO DIFF WBC: CPT

## 2020-02-10 PROCEDURE — 99233 SBSQ HOSP IP/OBS HIGH 50: CPT | Mod: ,,, | Performed by: INTERNAL MEDICINE

## 2020-02-10 PROCEDURE — 94664 DEMO&/EVAL PT USE INHALER: CPT

## 2020-02-10 PROCEDURE — 80053 COMPREHEN METABOLIC PANEL: CPT

## 2020-02-10 PROCEDURE — 25000003 PHARM REV CODE 250: Performed by: INTERNAL MEDICINE

## 2020-02-10 PROCEDURE — 99900035 HC TECH TIME PER 15 MIN (STAT)

## 2020-02-10 PROCEDURE — 12000002 HC ACUTE/MED SURGE SEMI-PRIVATE ROOM

## 2020-02-10 PROCEDURE — 99233 PR SUBSEQUENT HOSPITAL CARE,LEVL III: ICD-10-PCS | Mod: ,,, | Performed by: INTERNAL MEDICINE

## 2020-02-10 PROCEDURE — 84100 ASSAY OF PHOSPHORUS: CPT

## 2020-02-10 PROCEDURE — 94761 N-INVAS EAR/PLS OXIMETRY MLT: CPT

## 2020-02-10 RX ADMIN — METOCLOPRAMIDE HYDROCHLORIDE 10 MG: 10 TABLET ORAL at 04:02

## 2020-02-10 RX ADMIN — ALPRAZOLAM 0.25 MG: 0.25 TABLET ORAL at 09:02

## 2020-02-10 RX ADMIN — HYDROCODONE BITARTRATE AND ACETAMINOPHEN 1 TABLET: 10; 325 TABLET ORAL at 10:02

## 2020-02-10 RX ADMIN — METOCLOPRAMIDE HYDROCHLORIDE 10 MG: 10 TABLET ORAL at 06:02

## 2020-02-10 RX ADMIN — PANTOPRAZOLE SODIUM 40 MG: 40 TABLET, DELAYED RELEASE ORAL at 06:02

## 2020-02-10 RX ADMIN — HYDROCODONE BITARTRATE AND ACETAMINOPHEN 1 TABLET: 10; 325 TABLET ORAL at 03:02

## 2020-02-10 RX ADMIN — AZITHROMYCIN MONOHYDRATE 250 MG: 500 INJECTION, POWDER, LYOPHILIZED, FOR SOLUTION INTRAVENOUS at 12:02

## 2020-02-10 RX ADMIN — HYDROCODONE BITARTRATE AND ACETAMINOPHEN 1 TABLET: 10; 325 TABLET ORAL at 04:02

## 2020-02-10 RX ADMIN — ENOXAPARIN SODIUM 70 MG: 80 INJECTION, SOLUTION INTRAVENOUS; SUBCUTANEOUS at 10:02

## 2020-02-10 RX ADMIN — CEFTRIAXONE 2 G: 2 INJECTION, SOLUTION INTRAVENOUS at 10:02

## 2020-02-10 RX ADMIN — HYDROXYZINE HYDROCHLORIDE 25 MG: 25 TABLET, FILM COATED ORAL at 08:02

## 2020-02-10 RX ADMIN — GABAPENTIN 100 MG: 100 CAPSULE ORAL at 09:02

## 2020-02-10 RX ADMIN — AZITHROMYCIN MONOHYDRATE 250 MG: 500 INJECTION, POWDER, LYOPHILIZED, FOR SOLUTION INTRAVENOUS at 05:02

## 2020-02-10 RX ADMIN — METOCLOPRAMIDE HYDROCHLORIDE 10 MG: 10 TABLET ORAL at 10:02

## 2020-02-10 RX ADMIN — SENNOSIDES AND DOCUSATE SODIUM 1 TABLET: 8.6; 5 TABLET ORAL at 09:02

## 2020-02-10 RX ADMIN — ALPRAZOLAM 0.25 MG: 0.25 TABLET ORAL at 10:02

## 2020-02-10 RX ADMIN — FUROSEMIDE 20 MG: 20 TABLET ORAL at 08:02

## 2020-02-10 RX ADMIN — METOPROLOL SUCCINATE 100 MG: 50 TABLET, EXTENDED RELEASE ORAL at 08:02

## 2020-02-10 RX ADMIN — AZITHROMYCIN MONOHYDRATE 250 MG: 500 INJECTION, POWDER, LYOPHILIZED, FOR SOLUTION INTRAVENOUS at 06:02

## 2020-02-10 RX ADMIN — ENALAPRIL MALEATE 2.5 MG: 2.5 TABLET ORAL at 08:02

## 2020-02-10 RX ADMIN — SENNOSIDES AND DOCUSATE SODIUM 1 TABLET: 8.6; 5 TABLET ORAL at 08:02

## 2020-02-10 NOTE — PROGRESS NOTES
"[facility]  Adult Nutrition   Progress Note (Follow-Up)    SUMMARY     Recommendations  Recommendation/Intervention: 1.) Continue diet as tolerated by patient;  to assist with meal selections daily   Goals: 1.) Patient to meet at least 75% estimated needs via PO diet and supplements   Nutrition Goal Status: progressing towards goal  Communication of RD Recs: reviewed with RN    Dietitian Rounds Brief    Pt seen for f/u. Tolerating diet; >75% intake noted per RD visual. Not consuming oral supplement--dislikes, RD to discontinue since appetite/intake good. No N/V. + BM noted. Tubes remain in place.  No other needs voiced at this time.    Reason for Assessment  Reason For Assessment: RD follow-up  Diagnosis: infection/sepsis  Relevant Medical History: CHF, HCV, DVT, HTN    Nutrition Risk Screen  Nutrition Risk Screen: no indicators present     MST Score: 0  Have you recently lost weight without trying?: No  Weight loss score: 0  Have you been eating poorly because of a decreased appetite?: No  Appetite score: 0       Nutrition/Diet History  Patient Reported Diet/Restrictions/Preferences: low salt  Spiritual, Cultural Beliefs, Anabaptist Practices, Values that Affect Care: no  Food Allergies: NKFA  Factors Affecting Nutritional Intake: None identified at this time    Anthropometrics  Temp: 97.9 °F (36.6 °C)  Height Method: Stated  Height: 5' 8" (172.7 cm)  Height (inches): 68 in  Weight Method: Bed Scale  Weight: 67.3 kg (148 lb 5.9 oz)  Weight (lb): 148.37 lb  Ideal Body Weight (IBW), Male: 154 lb  % Ideal Body Weight, Male (lb): 96.34 %  BMI (Calculated): 22.6  BMI Grade: 18.5-24.9 - normal  Weight Loss Since Admission: 11 lb 11 oz  % Weight Change Since Admission: 7.88       Weight History:  Wt Readings from Last 10 Encounters:   02/07/20 67.3 kg (148 lb 5.9 oz)   12/28/19 75.2 kg (165 lb 12.6 oz)   12/09/19 74.8 kg (165 lb)   10/30/19 69.8 kg (153 lb 14.1 oz)   08/25/19 70.5 kg (155 lb 6.8 oz) "   07/06/19 74.8 kg (165 lb)   07/06/19 74.8 kg (165 lb)   07/04/19 73.1 kg (161 lb 1.6 oz)       Lab/Procedures/Meds: Pertinent Labs Reviewed  Clinical Chemistry:  Recent Labs   Lab 02/08/20  0345 02/09/20  0432 02/10/20  0620   *  134* 135* 133*   K 3.7  3.8 3.2* 4.0   CL 94*  93* 91* 95   CO2 31*  33* 31* 33*   GLU 91  95 89 106   BUN 16  10 9 8   CREATININE 0.7  0.6 0.6 0.6   CALCIUM 7.9*  8.0* 8.3* 7.9*   PROT 6.0 6.8 6.3   ALBUMIN 1.9* 2.2* 1.9*   BILITOT 1.6* 1.6* 1.3*   ALKPHOS 66 70 63   AST 98* 50* 41*   * 36 32   ANIONGAP 8  8 13 5*   ESTGFRAFRICA >60.0  >60.0 >60.0 >60.0   EGFRNONAA >60.0  >60.0 >60.0 >60.0   MG 1.7  1.7 1.7 1.5*   PHOS 3.3 3.8 3.1     CBC:   Recent Labs   Lab 02/10/20  0620   WBC 18.08*   RBC 3.19*   HGB 8.4*   HCT 27.4*   *   MCV 86   MCH 26.3*   MCHC 30.7*       Medications: Pertinent Medications reviewed  Scheduled Meds:   azithromycin  250 mg Intravenous Q6H    cefTRIAXone (ROCEPHIN) IVPB  2 g Intravenous Q24H    enalapril  2.5 mg Oral Daily    enoxparin  1 mg/kg Subcutaneous Q12H    furosemide  20 mg Oral Daily    hydrOXYzine HCl  25 mg Oral Daily    metoclopramide HCl  10 mg Oral TID AC    metoprolol succinate  100 mg Oral Daily    pantoprazole  40 mg Oral Daily    polyethylene glycol  17 g Oral BID    senna-docusate 8.6-50 mg  1 tablet Oral BID    spironolactone  25 mg Oral Daily     Continuous Infusions:   dexmedetomidine (PRECEDEX) infusion Stopped (02/08/20 1415)     PRN Meds:.sodium chloride, acetaminophen, acetaminophen, acetaminophen, ALPRAZolam, calcium chloride IVPB, calcium chloride IVPB, calcium chloride IVPB, dextrose 50%, dextrose 50%, diphenhydrAMINE, furosemide, gabapentin, glucagon (human recombinant), glucose, glucose, HYDROcodone-acetaminophen, HYDROcodone-acetaminophen, lactulose, magnesium oxide, magnesium sulfate IVPB, magnesium sulfate IVPB, magnesium sulfate IVPB, magnesium sulfate IVPB, ondansetron, ondansetron,  potassium chloride in water, potassium chloride in water, potassium chloride in water, potassium chloride in water, potassium chloride, potassium chloride, potassium chloride, potassium chloride, promethazine (PHENERGAN) IVPB, promethazine (PHENERGAN) IVPB, sodium chloride 0.9%, sodium phosphate IVPB, sodium phosphate IVPB, sodium phosphate IVPB, sodium phosphate IVPB, sodium phosphate IVPB    Estimated/Assessed Needs    Weight Used For Calorie Calculations: 67.3 kg (148 lb 5.9 oz)  Energy Calorie Requirements (kcal): 2019 (30 kcal/kg)   Energy Need Method: Kcal/kg  Protein Requirements: 81 - 101 (1.2 - 1.5g/kg)   Weight Used For Protein Calculations: 67.3 kg (148 lb 5.9 oz)     Estimated Fluid Requirement Method: RDA Method  RDA Method (mL): 2019       Nutrition Prescription Ordered  Current Diet Order: regular  Oral Nutrition Supplement: Ensure Enlive TID (1050 kcal, 60 g pro)    Evaluation of Received Nutrient/Fluid Intake  Energy Calories Required: meeting needs  Protein Required: meeting needs  Fluid Required: meeting needs  Tolerance: tolerating     Intake/Output Summary (Last 24 hours) at 2/10/2020 0927  Last data filed at 2/10/2020 0700  Gross per 24 hour   Intake 1420 ml   Output 875 ml   Net 545 ml        % Meal Intake: 75 - 100 %      Nutrition Risk  Level of Risk/Frequency of Follow-up: moderate - high       Monitor and Evaluation  Food and Nutrient Intake: food and beverage intake, energy intake  Food and Nutrient Adminstration: diet order  Knowledge/Beliefs/Attitudes: food and nutrition knowledge/skill, beliefs and attitudes  Physical Activity and Function: nutrition-related ADLs and IADLs  Anthropometric Measurements: weight, weight change  Biochemical Data, Medical Tests and Procedures: gastrointestinal profile, electrolyte and renal panel, glucose/endocrine profile  Nutrition-Focused Physical Findings: overall appearance     Nutrition Follow-Up  RD Follow-up?: Yes    Tiffanie Pena RD 02/10/2020 9:27  AM

## 2020-02-10 NOTE — PLAN OF CARE
02/09/20 1900   Patient Assessment/Suction   Level of Consciousness (AVPU) alert   Respiratory Effort Normal;Unlabored   Expansion/Accessory Muscles/Retractions no use of accessory muscles   All Lung Fields Breath Sounds clear;equal bilaterally;diminished   Rhythm/Pattern, Respiratory unlabored   Cough Frequency infrequent   Cough Type dry;fair;nonproductive   PRE-TX-O2   O2 Device (Oxygen Therapy) room air   SpO2 98 %   Pulse Oximetry Type Intermittent   $ Pulse Oximetry - Multiple Charge Pulse Oximetry - Multiple   Pulse 102   Resp 18   Incentive Spirometer   $ Incentive Spirometer Charges done with encouragement   Administration (IS) instruction provided, follow-up   Number of Repetitions (IS) 10   Level Incentive Spirometer (mL) 750   Patient Tolerance (IS) fair   Vibratory PEP Therapy   $ Vibratory PEP Charges Aerobika Therapy   $ Vibratory PEP Tech Time Charges 15 min   Daily Review of Necessity (PEP Therapy) completed   Type (PEP Therapy) vibratory/oscillatory   Device (PEP Therapy) flutter   Route (PEP Therapy) mouthpiece;instruction provided, follow-up   Breaths per Cycle (PEP Therapy) 10   Cycles (PEP Therapy) 1   Patient Position (PEP Therapy) HOB elevated   Post Treatment Assessment (PEP) breath sounds unchanged   Signs of Intolerance (PEP Therapy) none   Respiratory Evaluation   $ Care Plan Tech Time 15 min   Evaluation For New Orders

## 2020-02-10 NOTE — PLAN OF CARE
02/10/20 0832   Patient Assessment/Suction   Level of Consciousness (AVPU) alert   Respiratory Effort Normal;Unlabored   Expansion/Accessory Muscles/Retractions expansion symmetric;no use of accessory muscles   All Lung Fields Breath Sounds clear;equal bilaterally   Rhythm/Pattern, Respiratory pattern regular   Cough Frequency infrequent  (per pt)   Cough Type nonproductive  (per pt)   PRE-TX-O2   O2 Device (Oxygen Therapy) room air   SpO2 98 %   Pulse Oximetry Type Intermittent   $ Pulse Oximetry - Multiple Charge Pulse Oximetry - Multiple   Pulse 100   Resp 17   Positioning HOB elevated 45 degrees   Incentive Spirometer   $ Incentive Spirometer Charges done with encouragement   Administration (IS) mouthpiece;proper technique demonstrated   Number of Repetitions (IS) 10   Level Incentive Spirometer (mL) 750   Patient Tolerance (IS) good   Vibratory PEP Therapy   $ Vibratory PEP Charges Aerobika Therapy   $ Vibratory PEP Tech Time Charges 15 min   Daily Review of Necessity (PEP Therapy) completed   Type (PEP Therapy) vibratory/oscillatory   Device (PEP Therapy) flutter   Route (PEP Therapy) mouthpiece;proper technique demonstrated   Breaths per Cycle (PEP Therapy) 10   Cycles (PEP Therapy) 1   Patient Position (PEP Therapy) HOB elevated   Post Treatment Assessment (PEP) breath sounds unchanged   Signs of Intolerance (PEP Therapy) none

## 2020-02-10 NOTE — PROGRESS NOTES
[facility]  Pulmonology  Progress Note    Subjective     No major issues overnight.  Subjectively unchanged over the weekend.  No new complaints.     Review of Systems   Constitutional: Negative for fever, chills and night sweats.   Respiratory: Negative for cough, hemoptysis, sputum production, shortness of breath, wheezing, orthopnea and pleurisy.    Cardiovascular: Negative for chest pain, palpitations and leg swelling.   Gastrointestinal: Negative for nausea, vomiting, abdominal pain and abdominal distention.   Neurological: Negative for headaches.   Psychiatric/Behavioral: Negative for confusion.   All other systems reviewed and are negative.       I have personally reviewed the following during today's evaluation:  past medical history, ROS, family history, social history, surgical history, current inpatient medications,drug allergies, vital signs over the past 24 hours, results of relevant diagnostic studies and nursing/provider documentation from the past 24 hours.     Objective     VS Temp:  [97.8 °F (36.6 °C)-98.2 °F (36.8 °C)]   Pulse:  []   Resp:  [16-21]   BP: (101-148)/(65-88)   SpO2:  [96 %-99 %]   Ideal body weight: 68.4 kg (150 lb 12.7 oz)   I/O   Intake/Output Summary (Last 24 hours) at 2/10/2020 1226  Last data filed at 2/10/2020 1200  Gross per 24 hour   Intake 720 ml   Output 1050 ml   Net -330 ml        Vent SpO2 96% on room air   PE Physical Exam   Constitutional: He is oriented to person, place, and time. He appears well-developed and well-nourished.  Non-toxic appearance. No distress. Nasal cannula in place.   HENT:   Head: Normocephalic and atraumatic.   Right Ear: External ear normal.   Left Ear: External ear normal.   Nose: Nose normal.   Mouth/Throat: Uvula is midline, oropharynx is clear and moist and mucous membranes are normal. Mallampati Score: II.   Neck: Trachea normal and normal range of motion. Neck supple. No JVD present. No tracheal deviation present. No  thyromegaly present.   Cardiovascular: Normal rate, regular rhythm, normal heart sounds and intact distal pulses. Exam reveals no gallop and no friction rub.   No murmur heard.  Pulmonary/Chest: Normal expansion, symmetric chest wall expansion and effort normal. No stridor. He has no decreased breath sounds. He has no wheezes. He has no rhonchi. He has no rales. Chest wall is not dull to percussion. He exhibits tenderness.   Two surgical chest tubes on the right connected to water seal.  Old blood in atrium.  No air leak observed.    Right-sided thoracotomy dressing clean dry and intact.   Abdominal: Soft. Bowel sounds are normal. He exhibits no distension. There is no tenderness. There is no guarding.   Musculoskeletal: Normal range of motion. He exhibits no edema or deformity.   Lymphadenopathy: No supraclavicular adenopathy is present.     He has no cervical adenopathy.     He has no axillary adenopathy.   Neurological: He is alert and oriented to person, place, and time. He has normal strength. No cranial nerve deficit or sensory deficit. He exhibits normal muscle tone. GCS eye subscore is 4. GCS verbal subscore is 5. GCS motor subscore is 6.   Skin: Skin is warm, dry and intact. No rash noted. No cyanosis. Nails show no clubbing.   Psychiatric: He has a normal mood and affect. His speech is normal and behavior is normal.   Nursing note and vitals reviewed.      Labs I have personally reviewed and interpreted all labs / diagnostic studies obtained over the past 24 hours, and relevant results are as follows:  Recent Labs   Lab 02/10/20  0620   WBC 18.08*   RBC 3.19*   HGB 8.4*   HCT 27.4*   *   MCV 86   MCH 26.3*   MCHC 30.7*   *   K 4.0   CL 95   CO2 33*   BUN 8   CREATININE 0.6   MG 1.5*   ALT 32   AST 41*   ALKPHOS 63   BILITOT 1.3*   PROT 6.3   ALBUMIN 1.9*      Imaging I have personally reviewed and interpreted the following images and reviewed the associated Radiology report.  CXR:  Stable  positioning of right-sided chest tubes with no appreciable pneumothorax.  Slightly improved aeration of right lung base with persistent right pleural fluid and right lung base airspace disease.  Stable appearance of the left lung.     Micro I have personally reviewed and interpreted the available culture data.  Relevant results are as follows.  No new positive cultures.   Medications Scheduled    azithromycin  250 mg Intravenous Q6H    cefTRIAXone (ROCEPHIN) IVPB  2 g Intravenous Q24H    enalapril  2.5 mg Oral Daily    enoxparin  1 mg/kg Subcutaneous Q12H    furosemide  20 mg Oral Daily    hydrOXYzine HCl  25 mg Oral Daily    metoclopramide HCl  10 mg Oral TID AC    metoprolol succinate  100 mg Oral Daily    pantoprazole  40 mg Oral Daily    polyethylene glycol  17 g Oral BID    senna-docusate 8.6-50 mg  1 tablet Oral BID    spironolactone  25 mg Oral Daily         PRN   sodium chloride, acetaminophen, acetaminophen, acetaminophen, ALPRAZolam, calcium chloride IVPB, calcium chloride IVPB, calcium chloride IVPB, dextrose 50%, dextrose 50%, diphenhydrAMINE, furosemide, gabapentin, glucagon (human recombinant), glucose, glucose, HYDROcodone-acetaminophen, HYDROcodone-acetaminophen, lactulose, magnesium oxide, magnesium sulfate IVPB, magnesium sulfate IVPB, magnesium sulfate IVPB, magnesium sulfate IVPB, ondansetron, ondansetron, potassium chloride in water, potassium chloride in water, potassium chloride in water, potassium chloride in water, potassium chloride, potassium chloride, potassium chloride, potassium chloride, promethazine (PHENERGAN) IVPB, promethazine (PHENERGAN) IVPB, sodium chloride 0.9%, sodium phosphate IVPB, sodium phosphate IVPB, sodium phosphate IVPB, sodium phosphate IVPB, sodium phosphate IVPB        Assessment       Active Hospital Problems    Diagnosis    *Hydropneumothorax    Volume overload    Parapneumonic effusion    Anasarca    Liver enzyme elevation    Empyema    Acute  massive pulmonary embolism    Chronic hepatitis C without hepatic coma    Pneumonia    Essential hypertension    Acute on chronic combined systolic and diastolic congestive heart failure        My Impression:  Recovering appropriately.    Plan     Neuro  · Off of PCA.  · Norco for pain.    Pulmonary  · Continue antibiotics as ordered.  Culture data noted.  Will need 6 weeks of abx total.  · Thoracic surgery following and I will defer to their expertise regarding his postoperative management.  · Titrate supplemental oxygen to maintain an SpO2 greater than 92%.  · Continued oral diuresis.  · Continue therapeutically dosed LMWH.    Cardiac/Vascular  · Hemodynamically stable this time.  · Continue metoprolol and enalapril.    Renal/  · Hyperkalemia resolved with decrease orange juice consumption.    ID  · Receiving ceftriaxone.  · Cultures noted.    · Will need 6 weeks of oral ABx.    Hematology  · No clear evidence of active hemorrhage.    Endocrine  · Serial blood glucose monitoring.  · Sliding scale insulin if necessary.    GI  · Continue PPI.  · Continue bowel regimen and limit narcotics.       Trae Steele MD  Pulmonary / Critical Care Medicine  [facility]

## 2020-02-11 PROBLEM — K56.7 ILEUS: Status: ACTIVE | Noted: 2020-02-11

## 2020-02-11 PROCEDURE — 63600175 PHARM REV CODE 636 W HCPCS: Performed by: INTERNAL MEDICINE

## 2020-02-11 PROCEDURE — 99233 PR SUBSEQUENT HOSPITAL CARE,LEVL III: ICD-10-PCS | Mod: ,,, | Performed by: INTERNAL MEDICINE

## 2020-02-11 PROCEDURE — 25000003 PHARM REV CODE 250: Performed by: THORACIC SURGERY (CARDIOTHORACIC VASCULAR SURGERY)

## 2020-02-11 PROCEDURE — 12000002 HC ACUTE/MED SURGE SEMI-PRIVATE ROOM

## 2020-02-11 PROCEDURE — 25000003 PHARM REV CODE 250: Performed by: INTERNAL MEDICINE

## 2020-02-11 PROCEDURE — 99233 SBSQ HOSP IP/OBS HIGH 50: CPT | Mod: ,,, | Performed by: INTERNAL MEDICINE

## 2020-02-11 RX ORDER — CIPROFLOXACIN 500 MG/1
500 TABLET ORAL EVERY 12 HOURS
Status: DISCONTINUED | OUTPATIENT
Start: 2020-02-11 | End: 2020-02-14 | Stop reason: HOSPADM

## 2020-02-11 RX ORDER — ENOXAPARIN SODIUM 100 MG/ML
1 INJECTION SUBCUTANEOUS ONCE
Status: COMPLETED | OUTPATIENT
Start: 2020-02-11 | End: 2020-02-11

## 2020-02-11 RX ADMIN — SPIRONOLACTONE 25 MG: 25 TABLET ORAL at 08:02

## 2020-02-11 RX ADMIN — METOCLOPRAMIDE HYDROCHLORIDE 10 MG: 10 TABLET ORAL at 04:02

## 2020-02-11 RX ADMIN — ALPRAZOLAM 0.25 MG: 0.25 TABLET ORAL at 08:02

## 2020-02-11 RX ADMIN — SENNOSIDES AND DOCUSATE SODIUM 1 TABLET: 8.6; 5 TABLET ORAL at 08:02

## 2020-02-11 RX ADMIN — ENALAPRIL MALEATE 2.5 MG: 2.5 TABLET ORAL at 08:02

## 2020-02-11 RX ADMIN — CEFTRIAXONE 2 G: 2 INJECTION, SOLUTION INTRAVENOUS at 10:02

## 2020-02-11 RX ADMIN — GABAPENTIN 100 MG: 100 CAPSULE ORAL at 03:02

## 2020-02-11 RX ADMIN — METOCLOPRAMIDE HYDROCHLORIDE 10 MG: 10 TABLET ORAL at 10:02

## 2020-02-11 RX ADMIN — ENOXAPARIN SODIUM 70 MG: 80 INJECTION, SOLUTION INTRAVENOUS; SUBCUTANEOUS at 10:02

## 2020-02-11 RX ADMIN — PANTOPRAZOLE SODIUM 40 MG: 40 TABLET, DELAYED RELEASE ORAL at 05:02

## 2020-02-11 RX ADMIN — HYDROCODONE BITARTRATE AND ACETAMINOPHEN 1 TABLET: 10; 325 TABLET ORAL at 10:02

## 2020-02-11 RX ADMIN — HYDROXYZINE HYDROCHLORIDE 25 MG: 25 TABLET, FILM COATED ORAL at 08:02

## 2020-02-11 RX ADMIN — AZITHROMYCIN MONOHYDRATE 250 MG: 500 INJECTION, POWDER, LYOPHILIZED, FOR SOLUTION INTRAVENOUS at 05:02

## 2020-02-11 RX ADMIN — FUROSEMIDE 20 MG: 20 TABLET ORAL at 08:02

## 2020-02-11 RX ADMIN — HYDROCODONE BITARTRATE AND ACETAMINOPHEN 1 TABLET: 10; 325 TABLET ORAL at 04:02

## 2020-02-11 RX ADMIN — ENOXAPARIN SODIUM 70 MG: 80 INJECTION, SOLUTION INTRAVENOUS; SUBCUTANEOUS at 08:02

## 2020-02-11 RX ADMIN — METOPROLOL SUCCINATE 100 MG: 50 TABLET, EXTENDED RELEASE ORAL at 08:02

## 2020-02-11 RX ADMIN — AZITHROMYCIN MONOHYDRATE 250 MG: 500 INJECTION, POWDER, LYOPHILIZED, FOR SOLUTION INTRAVENOUS at 12:02

## 2020-02-11 RX ADMIN — HYDROCODONE BITARTRATE AND ACETAMINOPHEN 1 TABLET: 10; 325 TABLET ORAL at 07:02

## 2020-02-11 RX ADMIN — CIPROFLOXACIN HYDROCHLORIDE 500 MG: 500 TABLET, FILM COATED ORAL at 08:02

## 2020-02-11 RX ADMIN — METOCLOPRAMIDE HYDROCHLORIDE 10 MG: 10 TABLET ORAL at 05:02

## 2020-02-11 NOTE — PROGRESS NOTES
[facility] Medicine Progress Note    Subjective:     Continues to pass flatus. NGT now out. Denies abd pain. Asking to advance diet. Dr. Rousseau planning on likely removal of chest tubes today.  Afebrile. HD stable.     Objective:   Last 24 Hour Vital Signs:  BP  Min: 101/63  Max: 136/77  Temp  Av.9 °F (36.6 °C)  Min: 97.7 °F (36.5 °C)  Max: 98.1 °F (36.7 °C)  Pulse  Av.4  Min: 93  Max: 102  Resp  Av.7  Min: 16  Max: 20  SpO2  Av.1 %  Min: 96 %  Max: 98 %  I/O last 3 completed shifts:  In: 2670 [P.O.:2370; IV Piggyback:300]  Out: 5 [Urine:; Chest Tube:170]      Physical Examination:  Physical Exam   Constitutional:   Cachectic, frail appearing     HENT:   Head: Normocephalic.   Mouth/Throat: Oropharynx is clear and moist.   Eyes: Pupils are equal, round, and reactive to light. EOM are normal.   Neck: Normal range of motion. Neck supple. No JVD present.   Cardiovascular: Regular rhythm and intact distal pulses.   Tachycardic     Pulmonary/Chest: No stridor. No respiratory distress.   Rhonchi + crackles to R base  Appropriately tender to R chest wall   Abdominal: Soft. He exhibits no distension. There is no tenderness. There is no guarding.   Musculoskeletal: He exhibits edema.   Neurological: He is alert. No cranial nerve deficit or sensory deficit. He exhibits normal muscle tone.   Skin: Skin is warm and dry. Capillary refill takes less than 2 seconds.   Vitals reviewed.      Laboratory:  Laboratory Data Reviewed: yes      Most Recent Data:  CBC:   Lab Results   Component Value Date    WBC 18.08 (H) 02/10/2020    HGB 8.4 (L) 02/10/2020    HCT 27.4 (L) 02/10/2020     (H) 02/10/2020    MCV 86 02/10/2020    RDW 22.7 (H) 02/10/2020     BMP:   Lab Results   Component Value Date     (L) 02/10/2020    K 4.0 02/10/2020    CL 95 02/10/2020    CO2 33 (H) 02/10/2020    BUN 8 02/10/2020     02/10/2020    CALCIUM 7.9 (L) 02/10/2020    MG 1.5 (L) 02/10/2020    PHOS 3.1 02/10/2020      LFTs:   Lab Results   Component Value Date    PROT 6.3 02/10/2020    ALBUMIN 1.9 (L) 02/10/2020    BILITOT 1.3 (H) 02/10/2020    AST 41 (H) 02/10/2020    ALKPHOS 63 02/10/2020    ALT 32 02/10/2020     Coags:   Lab Results   Component Value Date    INR 1.3 02/04/2020     FLP:   Lab Results   Component Value Date    CHOL 105 (L) 10/28/2019    HDL 33 (L) 10/28/2019    LDLCALC 56.6 (L) 10/28/2019    TRIG 77 10/28/2019    CHOLHDL 31.4 10/28/2019     DM:   Lab Results   Component Value Date    LDLCALC 56.6 (L) 10/28/2019    CREATININE 0.6 02/10/2020     Thyroid:   Lab Results   Component Value Date    TSH 3.671 10/28/2019     Anemia: No results found for: IRON, TIBC, FERRITIN, COTOMULC63, FOLATE  Cardiac:   Lab Results   Component Value Date    TROPONINI 0.140 (HH) 01/27/2020     (H) 02/01/2020     Urinalysis:   Lab Results   Component Value Date    COLORU Yellow 01/27/2020    SPECGRAV 1.010 01/27/2020    NITRITE Negative 01/27/2020    KETONESU Negative 01/27/2020    UROBILINOGEN Negative 01/27/2020        Radiology:  Data Reviewed: yes  XR CHEST AP PORTABLE  CT ABDOMEN PELVIS WITH CONTRAST  XR CHEST AP PORTABLE  CT CHEST WITH CONTRAST  US LIVER WITH DOPPLER  US ABDOMEN LIMITED  XR CHEST AP PORTABLE  XR CHEST AP PORTABLE  XR CHEST AP PORTABLE  XR CHEST AP PORTABLE  XR KUB  XR ABDOMEN AP 1 VIEW  XR CHEST AP PORTABLE  X-Ray Chest AP Portable   Status: Final result   MyChart Results Release     NERI Status: Active  Results Release   PACS Images for [facility] Viewer      Show images for X-Ray Chest AP Portable   X-Ray Chest AP Portable   Order: [id number]   Status:  Final result   Visible to patient:  No (Not Released) Next appt:  05/19/2020 at 09:40 AM in Hepatology (Jennifer B Scheuermann, [place])   Details     Reading Physician Reading Date Result Priority   Trung Driver MD 2/7/2020       Narrative     XR CHEST AP PORTABLE    CLINICAL HISTORY:  44 years Male chest tubes    COMPARISON: February 6,  2020    FINDINGS: Cardiopericardial silhouette is enlarged and stable compared  to prior. There are three right-sided chest tubes which are in stable  position. Right chest wall subcutaneous emphysema is again evident  with no appreciable pneumothorax on the right. Parenchymal opacities  involving the mid and right lower lung zones appears slightly worsened  compared to prior. Small right pleural effusion. Left midlung zone  parenchymal opacity and retrocardiac left lower lobe airspace disease  appears stable compared to prior.    IMPRESSION:    Slight interval worsening of right lung base airspace disease.    Otherwise stable chest radiograph.               Current Medications:     Infusions:   dexmedetomidine (PRECEDEX) infusion Stopped (02/08/20 1415)        Scheduled:   azithromycin  250 mg Intravenous Q6H    cefTRIAXone (ROCEPHIN) IVPB  2 g Intravenous Q24H    enalapril  2.5 mg Oral Daily    enoxparin  1 mg/kg Subcutaneous Q12H    furosemide  20 mg Oral Daily    hydrOXYzine HCl  25 mg Oral Daily    metoclopramide HCl  10 mg Oral TID AC    metoprolol succinate  100 mg Oral Daily    pantoprazole  40 mg Oral Daily    polyethylene glycol  17 g Oral BID    senna-docusate 8.6-50 mg  1 tablet Oral BID    spironolactone  25 mg Oral Daily        PRN:  sodium chloride, acetaminophen, acetaminophen, acetaminophen, ALPRAZolam, calcium chloride IVPB, calcium chloride IVPB, calcium chloride IVPB, dextrose 50%, dextrose 50%, diphenhydrAMINE, furosemide, gabapentin, glucagon (human recombinant), glucose, glucose, HYDROcodone-acetaminophen, HYDROcodone-acetaminophen, lactulose, magnesium oxide, magnesium sulfate IVPB, magnesium sulfate IVPB, magnesium sulfate IVPB, magnesium sulfate IVPB, ondansetron, ondansetron, potassium chloride in water, potassium chloride in water, potassium chloride in water, potassium chloride in water, potassium chloride, potassium chloride, potassium chloride, potassium chloride,  promethazine (PHENERGAN) IVPB, promethazine (PHENERGAN) IVPB, sodium chloride 0.9%, sodium phosphate IVPB, sodium phosphate IVPB, sodium phosphate IVPB, sodium phosphate IVPB, sodium phosphate IVPB    Microbiology Data:  Reviewed: yes  Microbiology Results (last 7 days)     Procedure Component Value Units Date/Time    Culture, Anaerobe [[id number]] Collected:  02/04/20 1316    Order Status:  Completed Specimen:  Tissue from Lung, Right Updated:  02/07/20 1120     Anaerobic Culture No anaerobes isolated    Culture, Anaerobic [[id number]] Collected:  02/04/20 1316    Order Status:  Completed Specimen:  Drainage from Lung, Right Updated:  02/07/20 1120     Anaerobic Culture No anaerobes isolated    AFB Culture & Smear [[id number]] Collected:  02/04/20 1316    Order Status:  Completed Specimen:  Tissue from Lung, Right Updated:  02/06/20 1418     AFB CULTURE STAIN No acid fast bacilli seen.     AFB CULTURE STAIN Testing performed by:     AFB CULTURE STAIN Lab [facility]     [facility] [address] [facility]     [place] [address]     AFB CULTURE STAIN Dr.Brian Gallito MD    Tissue culture [[id number]]  (Abnormal)  (Susceptibility) Collected:  02/04/20 1316    Order Status:  Completed Specimen:  Tissue Updated:  02/06/20 0837     Aerobic Culture - Tissue ENTEROBACTER CLOACAE  Many      Aerobic culture [[id number]]  (Abnormal) Collected:  02/04/20 1316    Order Status:  Completed Specimen:  Drainage from Lung, Right Updated:  02/06/20 0837     Aerobic Bacterial Culture ENTEROBACTER CLOACAE  Few  For susceptibility see order #[id number]      Culture, Body Fluid (Aerobic) w/ GS [[id number]] Collected:  02/04/20 1316    Order Status:  Completed Specimen:  Tissue from Peritoneal Fluid Updated:  02/05/20 1112     Gram Stain Result Many WBC's      Rare Gram negative rods    Fungus culture [[id number]] Collected:  02/04/20 1316    Order Status:  Sent Specimen:  Abscess from Lung, Right Updated:   02/04/20 1328           Antibiotics and Day Number of Therapy:  Antibiotics (From admission, onward)    Start     Stop Route Frequency Ordered    02/07/20 2100  azithromycin (ZITHROMAX) 250 mg in dextrose 5 % 250 mL IVPB      -- IV Every 6 hours 02/07/20 1733    02/07/20 1100  cefTRIAXone (ROCEPHIN) 2 g in dextrose 5 % 50 mL IVPB      -- IV Every 24 hours (non-standard times) 02/07/20 0954    01/27/20 1200  levoFLOXacin 500 mg/100 mL IVPB 500 mg      01/27 2359 IV ED 1 Time 01/27/20 1153           Assessment/Plan:     Francis Daigle is a 44 y.o.male with:      Severe Sepsis due to Bilateral pneumonia with R parapneumonic effusion: Now POD#6 from thoracotomy, routine postop mgmt as per CTS. Enterobacter growing from pleural fluid. Changed abx to rocephin per sensitivities. Will need long term abx, consult placed for LTAC eval      Bilateral Pulmonary Embolism: restarted on lovenox      Acute on Chronic systolic CHF NYHA III: continue Lasix 20mg BID, changed to PO. Restarted aldactone now that K back down. Strict I/o and daily weights.      Hypertension: Enalapril 2.5mg.     Hyperkalemia - resolved     Acute Blood loss anemia - expected blood loss post operatively - , transfuse for hgb <7    Postop ileus - continue bowel regimen, cont reglan, macrolide, given relistor     Leopoldo Dalton  [facility] Medicine

## 2020-02-11 NOTE — PT/OT/SLP PROGRESS
Physical Therapy      Patient Name:  Francis Daigle   MRN:  [id number]    Patient not seen today secondary to Patient ill (Comment), Other (Comment)(Pt declined PT eval due to pain. ). Will follow-up 02/12/20.    Marilyn Higgins, PT

## 2020-02-11 NOTE — PROGRESS NOTES
[facility]  Pulmonology  Progress Note    Subjective     No major issues overnight.  Subjectively unchanged over the past 24 hr.  No new complaints this morning, but becoming frustrated with his prolonged hospital stay.     Review of Systems   Constitutional: Negative for fever, chills and night sweats.   Respiratory: Negative for cough, hemoptysis, sputum production, shortness of breath, wheezing, orthopnea and pleurisy.    Cardiovascular: Negative for chest pain, palpitations and leg swelling.   Gastrointestinal: Negative for nausea, vomiting, abdominal pain and abdominal distention.   Neurological: Negative for headaches.   Psychiatric/Behavioral: Negative for confusion.   All other systems reviewed and are negative.     I have personally reviewed the following during today's evaluation:  past medical history, ROS, family history, social history, surgical history, current inpatient medications,drug allergies, vital signs over the past 24 hours, results of relevant diagnostic studies and nursing/provider documentation from the past 24 hours.     Objective     VS Temp:  [97.5 °F (36.4 °C)-98.3 °F (36.8 °C)]   Pulse:  []   Resp:  [16-18]   BP: (101-134)/(63-89)   SpO2:  [96 %-99 %]   Ideal body weight: 68.4 kg (150 lb 12.7 oz)   I/O   Intake/Output Summary (Last 24 hours) at 2/11/2020 1307  Last data filed at 2/11/2020 0900  Gross per 24 hour   Intake 750 ml   Output 2375 ml   Net -1625 ml    Chest tube output:  100 cc of serosanguineous fluid     Vent SpO2 99% on room air   PE Physical Exam   Constitutional: He is oriented to person, place, and time. He appears well-developed and well-nourished.  Non-toxic appearance. No distress. Nasal cannula in place.   HENT:   Head: Normocephalic and atraumatic.   Right Ear: External ear normal.   Left Ear: External ear normal.   Nose: Nose normal.   Mouth/Throat: Uvula is midline, oropharynx is clear and moist and mucous membranes are normal. Mallampati  Score: II.   Neck: Trachea normal and normal range of motion. Neck supple. No JVD present. No tracheal deviation present. No thyromegaly present.   Cardiovascular: Normal rate, regular rhythm, normal heart sounds and intact distal pulses. Exam reveals no gallop and no friction rub.   No murmur heard.  Pulmonary/Chest: Normal expansion, symmetric chest wall expansion and effort normal. No stridor. He has no decreased breath sounds. He has no wheezes. He has no rhonchi. He has no rales. Chest wall is not dull to percussion. He exhibits tenderness.   Two surgical chest tubes on the right connected to water seal.  Old blood in atrium.  No air leak observed.    Right-sided thoracotomy dressing clean dry and intact.   Abdominal: Soft. Bowel sounds are normal. He exhibits no distension. There is no tenderness. There is no guarding.   Musculoskeletal: Normal range of motion. He exhibits no edema or deformity.   Lymphadenopathy: No supraclavicular adenopathy is present.     He has no cervical adenopathy.     He has no axillary adenopathy.   Neurological: He is alert and oriented to person, place, and time. He has normal strength. No cranial nerve deficit or sensory deficit. He exhibits normal muscle tone. GCS eye subscore is 4. GCS verbal subscore is 5. GCS motor subscore is 6.   Skin: Skin is warm, dry and intact. No rash noted. No cyanosis. Nails show no clubbing.   Psychiatric: He has a normal mood and affect. His speech is normal and behavior is normal.   Nursing note and vitals reviewed.      Labs I have personally reviewed and interpreted all labs / diagnostic studies obtained over the past 24 hours, and relevant results are as follows:  No new labs today.   Imaging I have personally reviewed and interpreted the following images and reviewed the associated Radiology report.  I have reviewed and interpreted all pertinent imaging results/findings within the past 24 hours.  No new images today.     Micro I have personally  reviewed and interpreted the available culture data.  Relevant results are as follows.  Pleural fluid:  Enterobacter cloaca a.  No antibiotic resistance noted.   Medications Scheduled    ciprofloxacin HCl  500 mg Oral Q12H    enalapril  2.5 mg Oral Daily    enoxparin  1 mg/kg Subcutaneous Q12H    furosemide  20 mg Oral Daily    hydrOXYzine HCl  25 mg Oral Daily    metoclopramide HCl  10 mg Oral TID AC    metoprolol succinate  100 mg Oral Daily    pantoprazole  40 mg Oral Daily    polyethylene glycol  17 g Oral BID    senna-docusate 8.6-50 mg  1 tablet Oral BID    spironolactone  25 mg Oral Daily      Continuous Infusions:      PRN   sodium chloride, acetaminophen, acetaminophen, acetaminophen, ALPRAZolam, calcium chloride IVPB, calcium chloride IVPB, calcium chloride IVPB, dextrose 50%, dextrose 50%, diphenhydrAMINE, furosemide, gabapentin, glucagon (human recombinant), glucose, glucose, HYDROcodone-acetaminophen, HYDROcodone-acetaminophen, lactulose, magnesium oxide, magnesium sulfate IVPB, magnesium sulfate IVPB, magnesium sulfate IVPB, magnesium sulfate IVPB, ondansetron, potassium chloride in water, potassium chloride in water, potassium chloride in water, potassium chloride in water, potassium chloride, potassium chloride, potassium chloride, potassium chloride, promethazine (PHENERGAN) IVPB, sodium chloride 0.9%, sodium phosphate IVPB, sodium phosphate IVPB, sodium phosphate IVPB, sodium phosphate IVPB, sodium phosphate IVPB        Assessment       Active Hospital Problems    Diagnosis    *Hydropneumothorax    Volume overload    Parapneumonic effusion    Anasarca    Liver enzyme elevation    Empyema    Acute massive pulmonary embolism    Chronic hepatitis C without hepatic coma    Pneumonia    Essential hypertension    Acute on chronic combined systolic and diastolic congestive heart failure      My Impression:  Status post right thoracotomy decortication for empyema postoperative day  6.  Recovering appropriately.    Plan     Neuro  · Off of PCA.  · Norco for pain.    Pulmonary  · Transition to ciprofloxacin..  Will complete 6 weeks of abx total.  · Thoracic surgery following and I will defer to their expertise regarding his postoperative management.  · Titrate supplemental oxygen to maintain an SpO2 greater than 92%.  · Continued oral diuresis.  · Continue therapeutically dosed LMWH.    Cardiac/Vascular  · Hemodynamically stable this time.  · Continue metoprolol and enalapril.    Renal/  · Hyperkalemia resolved with decrease orange juice consumption.    ID  · Receiving ceftriaxone.  · Cultures noted.    · Will need 6 weeks of oral ABx.    Hematology  · No clear evidence of active hemorrhage.    Endocrine  · Serial blood glucose monitoring.  · Sliding scale insulin if necessary.    GI  · Continue PPI.  · Continue bowel regimen and limit narcotics.       Trae Steele MD  Pulmonary / Critical Care Medicine  [facility]

## 2020-02-11 NOTE — PT/OT/SLP PROGRESS
Occupational Therapy      Patient Name:  Francis Daigle   MRN:  [id number]    Patient not seen today secondary to (nurse hold 2/2 pt too aggressive and aggrvated.). Will follow-up 2/12.    Nicole Hunt OT  2/11/2020

## 2020-02-11 NOTE — PLAN OF CARE
Problem: Wound  Goal: Optimal Wound Healing  Outcome: Ongoing, Progressing     Problem: Fall Injury Risk  Goal: Absence of Fall and Fall-Related Injury  Outcome: Ongoing, Progressing     Problem: Adult Inpatient Plan of Care  Goal: Plan of Care Review  Outcome: Ongoing, Progressing  Goal: Patient-Specific Goal (Individualization)  Outcome: Ongoing, Progressing  Goal: Absence of Hospital-Acquired Illness or Injury  Outcome: Ongoing, Progressing  Goal: Optimal Comfort and Wellbeing  Outcome: Ongoing, Progressing  Goal: Readiness for Transition of Care  Outcome: Ongoing, Progressing  Goal: Rounds/Family Conference  Outcome: Ongoing, Progressing     Problem: Skin Injury Risk Increased  Goal: Skin Health and Integrity  Outcome: Ongoing, Progressing     Problem: Infection  Goal: Infection Symptom Resolution  Outcome: Ongoing, Progressing     Problem: Pain Acute  Goal: Optimal Pain Control  Outcome: Ongoing, Progressing     Problem: Oral Intake Inadequate  Goal: Improved Oral Intake  Outcome: Ongoing, Progressing     Problem: Restraint, Behavioral  Goal: Discontinuation Criteria Achieved  Outcome: Ongoing, Progressing  Goal: Personal Dignity and Safety Maintained  Outcome: Ongoing, Progressing    Pt remains free of injury in NAD, VSS. Y site chest tube removed per Dr Rousseau at bedside, tolerated well. Pain controlled with PO pain medicine. Will continue to monitor and report to oncoming KEV Hampton  02/11/2020  5:12 PM

## 2020-02-11 NOTE — PLAN OF CARE
02/11/20 1056   Discharge Reassessment   Assessment Type Discharge Planning Reassessment   Anticipated Discharge Disposition LTAC   Patient choice form signed by patient/caregiver List from [employer] Compare   Post-Acute Status   Post-Acute Authorization Placement   Post-Acute Placement Status Patient List Provided     SW consulted for LTAC and met with Pt at bedside to discuss.  Discussed consult and offered list of local options for LTAC placement.  However, Pt was agitated at this time and focused on recent news that his Social Security Disability claim has been denied.  SW advised Pt to have someone bring paperwork in so that SW can advise on how to start appeal process, and Pt stated he does not have time to do it right now.  SW to follow up with Pt after further discussion with MD.

## 2020-02-12 LAB
ACID FAST MOD KINY STN SPEC: NORMAL
ALBUMIN SERPL BCP-MCNC: 2 G/DL (ref 3.5–5.2)
ALP SERPL-CCNC: 69 U/L (ref 55–135)
ALT SERPL W/O P-5'-P-CCNC: 30 U/L (ref 10–44)
ANION GAP SERPL CALC-SCNC: 7 MMOL/L (ref 8–16)
AST SERPL-CCNC: 42 U/L (ref 10–40)
BASOPHILS # BLD AUTO: 0.06 K/UL (ref 0–0.2)
BASOPHILS NFR BLD: 0.4 % (ref 0–1.9)
BILIRUB SERPL-MCNC: 1 MG/DL (ref 0.1–1)
BUN SERPL-MCNC: 9 MG/DL (ref 6–20)
CALCIUM SERPL-MCNC: 8.4 MG/DL (ref 8.7–10.5)
CHLORIDE SERPL-SCNC: 96 MMOL/L (ref 95–110)
CO2 SERPL-SCNC: 33 MMOL/L (ref 23–29)
CREAT SERPL-MCNC: 0.6 MG/DL (ref 0.5–1.4)
DIFFERENTIAL METHOD: ABNORMAL
EOSINOPHIL # BLD AUTO: 0.4 K/UL (ref 0–0.5)
EOSINOPHIL NFR BLD: 2.5 % (ref 0–8)
ERYTHROCYTE [DISTWIDTH] IN BLOOD BY AUTOMATED COUNT: 22.5 % (ref 11.5–14.5)
EST. GFR  (AFRICAN AMERICAN): >60 ML/MIN/1.73 M^2
EST. GFR  (NON AFRICAN AMERICAN): >60 ML/MIN/1.73 M^2
GLUCOSE SERPL-MCNC: 97 MG/DL (ref 70–110)
HCT VFR BLD AUTO: 29.6 % (ref 40–54)
HGB BLD-MCNC: 9 G/DL (ref 14–18)
IMM GRANULOCYTES # BLD AUTO: 0.13 K/UL (ref 0–0.04)
IMM GRANULOCYTES NFR BLD AUTO: 0.8 % (ref 0–0.5)
LYMPHOCYTES # BLD AUTO: 2.1 K/UL (ref 1–4.8)
LYMPHOCYTES NFR BLD: 12.8 % (ref 18–48)
MAGNESIUM SERPL-MCNC: 1.7 MG/DL (ref 1.6–2.6)
MCH RBC QN AUTO: 26.5 PG (ref 27–31)
MCHC RBC AUTO-ENTMCNC: 30.4 G/DL (ref 32–36)
MCV RBC AUTO: 87 FL (ref 82–98)
MONOCYTES # BLD AUTO: 1.2 K/UL (ref 0.3–1)
MONOCYTES NFR BLD: 7.5 % (ref 4–15)
MYCOBACTERIUM SPEC QL CULT: NORMAL
NEUTROPHILS # BLD AUTO: 12.5 K/UL (ref 1.8–7.7)
NEUTROPHILS NFR BLD: 76 % (ref 38–73)
NRBC BLD-RTO: 0 /100 WBC
PHOSPHATE SERPL-MCNC: 3.6 MG/DL (ref 2.7–4.5)
PLATELET # BLD AUTO: 647 K/UL (ref 150–350)
PMV BLD AUTO: 9 FL (ref 9.2–12.9)
POTASSIUM SERPL-SCNC: 4.3 MMOL/L (ref 3.5–5.1)
PROT SERPL-MCNC: 6.5 G/DL (ref 6–8.4)
RBC # BLD AUTO: 3.4 M/UL (ref 4.6–6.2)
SODIUM SERPL-SCNC: 136 MMOL/L (ref 136–145)
WBC # BLD AUTO: 16.48 K/UL (ref 3.9–12.7)

## 2020-02-12 PROCEDURE — 97530 THERAPEUTIC ACTIVITIES: CPT

## 2020-02-12 PROCEDURE — 97162 PT EVAL MOD COMPLEX 30 MIN: CPT

## 2020-02-12 PROCEDURE — 12000002 HC ACUTE/MED SURGE SEMI-PRIVATE ROOM

## 2020-02-12 PROCEDURE — 97116 GAIT TRAINING THERAPY: CPT

## 2020-02-12 PROCEDURE — 80053 COMPREHEN METABOLIC PANEL: CPT

## 2020-02-12 PROCEDURE — 25000003 PHARM REV CODE 250: Performed by: INTERNAL MEDICINE

## 2020-02-12 PROCEDURE — 83735 ASSAY OF MAGNESIUM: CPT

## 2020-02-12 PROCEDURE — 97165 OT EVAL LOW COMPLEX 30 MIN: CPT

## 2020-02-12 PROCEDURE — 99233 SBSQ HOSP IP/OBS HIGH 50: CPT | Mod: ,,, | Performed by: INTERNAL MEDICINE

## 2020-02-12 PROCEDURE — 84100 ASSAY OF PHOSPHORUS: CPT

## 2020-02-12 PROCEDURE — 25000003 PHARM REV CODE 250: Performed by: THORACIC SURGERY (CARDIOTHORACIC VASCULAR SURGERY)

## 2020-02-12 PROCEDURE — 99233 PR SUBSEQUENT HOSPITAL CARE,LEVL III: ICD-10-PCS | Mod: ,,, | Performed by: INTERNAL MEDICINE

## 2020-02-12 PROCEDURE — 85025 COMPLETE CBC W/AUTO DIFF WBC: CPT

## 2020-02-12 RX ADMIN — HYDROCODONE BITARTRATE AND ACETAMINOPHEN 1 TABLET: 10; 325 TABLET ORAL at 10:02

## 2020-02-12 RX ADMIN — APIXABAN 10 MG: 5 TABLET, FILM COATED ORAL at 08:02

## 2020-02-12 RX ADMIN — GABAPENTIN 100 MG: 100 CAPSULE ORAL at 07:02

## 2020-02-12 RX ADMIN — PANTOPRAZOLE SODIUM 40 MG: 40 TABLET, DELAYED RELEASE ORAL at 05:02

## 2020-02-12 RX ADMIN — HYDROCODONE BITARTRATE AND ACETAMINOPHEN 1 TABLET: 10; 325 TABLET ORAL at 04:02

## 2020-02-12 RX ADMIN — SENNOSIDES AND DOCUSATE SODIUM 1 TABLET: 8.6; 5 TABLET ORAL at 09:02

## 2020-02-12 RX ADMIN — METOCLOPRAMIDE HYDROCHLORIDE 10 MG: 10 TABLET ORAL at 11:02

## 2020-02-12 RX ADMIN — FUROSEMIDE 20 MG: 20 TABLET ORAL at 09:02

## 2020-02-12 RX ADMIN — HYDROXYZINE HYDROCHLORIDE 25 MG: 25 TABLET, FILM COATED ORAL at 09:02

## 2020-02-12 RX ADMIN — GABAPENTIN 100 MG: 100 CAPSULE ORAL at 11:02

## 2020-02-12 RX ADMIN — CIPROFLOXACIN HYDROCHLORIDE 500 MG: 500 TABLET, FILM COATED ORAL at 08:02

## 2020-02-12 RX ADMIN — SENNOSIDES AND DOCUSATE SODIUM 1 TABLET: 8.6; 5 TABLET ORAL at 08:02

## 2020-02-12 RX ADMIN — ALPRAZOLAM 0.25 MG: 0.25 TABLET ORAL at 08:02

## 2020-02-12 RX ADMIN — METOPROLOL SUCCINATE 100 MG: 50 TABLET, EXTENDED RELEASE ORAL at 09:02

## 2020-02-12 RX ADMIN — METOCLOPRAMIDE HYDROCHLORIDE 10 MG: 10 TABLET ORAL at 04:02

## 2020-02-12 RX ADMIN — CIPROFLOXACIN HYDROCHLORIDE 500 MG: 500 TABLET, FILM COATED ORAL at 09:02

## 2020-02-12 RX ADMIN — HYDROCODONE BITARTRATE AND ACETAMINOPHEN 1 TABLET: 10; 325 TABLET ORAL at 12:02

## 2020-02-12 RX ADMIN — ENALAPRIL MALEATE 2.5 MG: 2.5 TABLET ORAL at 09:02

## 2020-02-12 RX ADMIN — APIXABAN 10 MG: 5 TABLET, FILM COATED ORAL at 09:02

## 2020-02-12 RX ADMIN — METOCLOPRAMIDE HYDROCHLORIDE 10 MG: 10 TABLET ORAL at 05:02

## 2020-02-12 RX ADMIN — HYDROCODONE BITARTRATE AND ACETAMINOPHEN 1 TABLET: 10; 325 TABLET ORAL at 07:02

## 2020-02-12 NOTE — PROGRESS NOTES
[facility] Medicine  Progress Note    Patient Name: Francis Daigle  MRN: [id number]  Patient Class: IP- Inpatient   Admission Date: 1/27/2020  Length of Stay: 15 days  Attending Physician: Lex Brenner MD  Primary Care Provider: Primary Doctor No        Subjective:     Principal Problem:Hydropneumothorax        HPI:  44-year-old male with past medical history of combined diastolic and systolic CHF (EF 15% as at 12/2019), HCV, bilateral LE DVTs and bilateral PE, prior IVDU, HTn who presented with groin swelling of 5 days duration.    Prior hospital records reviewed and it reveals patient was discharged from [facility] one month ago after a complicated hospital course for acute cholecystits complicated by b/l DVT, bilateral PE s/p tPA, PEA cardiac arrest. HE was in the hospital for 20 days and was discharged home. He has not had any follow up visits since then.    He reports increasing lower extremity swelling progressing to scrotal swelling for the past 5 days. He endorses compliance with Lasix 40mg daily and states that he has been taking an extra 20mg daily for the past 5 days. He also endorses a dry cough, congestion and cold intolerance. He denies fever, chills, chest pain.    In the ED he has received vancomycin, zosyn and levaquin, fluid bolus and 40mg IV lasix. Chest xray shows a right middle lobe pneumonia.     Overview/Hospital Course:  1/27: IVF administered per sepsis protocol, diuresis also initiated at 40 mg lasix IVP; placed on abx for sepsis secondary to pneumonia unkonwn organism    1/28: IVF discontinued, lasix ordered as prn weight based parameters; hemodynamically stable, not requiring central line/pressors; cxr in am. Continue antibiotics for pneumonia with sepsis    1/29: cxr shows small right hydronpneumothorax; pulmonology consulted and CT shows possible empyema with a collection; surgery consulted, eliquis held, patient to be placed on heparin drip; staff are concerned  about possible fall risk while on heparin so fall risk order placed,as well as nursing communication that patient should be up with assistance. Cardiology consulted for clearance: echo ordered, lasix changed to scheduled, a dose of albumin is also ordered, will repeat albumin as needed and monitor kidney function.     1/30: patient is down 5.7 L thus far; albumin x 2 more doses, lasix. Echo is reviewed: ef 30% with G2DD and mild pulm htn. Discussed with pulm and cardiology note is reviewed, will need several days of diuresis and too high risk for anesthesia- after a period of time will await surgery recommendations wrt empyema ; pulm signing off- can contact as needed. Continue heparin for now, if it is decided patient does not need surgical intervention will dc heparin ggt and resume OAC. Will reassess ascites )admission CT reviewed) and order abdominal US, off loading fluid from abdomen may improve pressure on venous return from the extremities/scrotum.  Because of spironolactone intolerance Cards has changed additional diuretic to eplerenone.    1/31: edema of the scrotum has significantly improved, leukocytosis from chronic empyema? Will follow cardiology plans for diuresis and revisit any need for surgery with CTS- if not needed then will discontinue heparin and restart home eliquis, with possible discharge home at the completion of diuresis. Nutrition: improve protein intake; diuresis+albumin; io: is net negative 9.8L today.   GI recommendations reviewed and appreciated.    2/1: patient states Dr. Light has discussed with him plans for Tuesday. Will follow up any pulm recommendations. For now - last dose of albumin and continued plans for diuresis per cardiology. Continuing heparin ggt for now.   Since admission has diuresed 21, in 10; net negative 11L    2/2: Today is day 7 of antibiotics, lactic acid has not been elevated so those labs are discontinued. WBC secondary to empyema? procalcitonin is ordered, if  is not elevated will dc abx if pulm agrees.   Continues to be on IV heparin, ongoing diuresis io since admission is net -15L, swelling is reduced, tissue over the skin is more supple though pitting present, scrotum is back to normal size per patient.       2/11: Patient had 2 of the 3 chest tubes pulled.  IV abx changed to po cipro based on pleural fluid sensitivities. NGT removed after resolution of ileus and currently tolerating diet.  On room air. On oral diuresis for acute on chronic systolic and diastolic HF.  Does not want to go to a LTAC- he wants to go home.     Interval History: Pain to chest tube site that is sharp, persistent, moderately severe, non radiating.  2 of 3 chest tubes pulled.  NGT removed and tolerating diet.  Tells me he is moving around but minimally- therapy consulted. On room air.  Does not want to go to LTAC.  Wants to go home.     Review of Systems   Constitutional: Negative.    HENT: Negative.    Eyes: Negative.    Respiratory: Negative.         Pain to chest tube site   Cardiovascular: Negative.    Gastrointestinal: Negative.    Endocrine: Negative.    Genitourinary: Negative.    Musculoskeletal: Negative.    Skin: Negative.    Allergic/Immunologic: Negative.    Neurological: Negative.    Hematological: Negative.    Psychiatric/Behavioral: Negative.      Objective:     Vital Signs (Most Recent):  Temp: 97.6 °F (36.4 °C) (02/11/20 1600)  Pulse: 98 (02/11/20 1600)  Resp: 16 (02/11/20 1600)  BP: 123/71 (02/11/20 1600)  SpO2: 98 % (02/11/20 1600) Vital Signs (24h Range):  Temp:  [97.5 °F (36.4 °C)-98.3 °F (36.8 °C)] 97.6 °F (36.4 °C)  Pulse:  [] 98  Resp:  [16-18] 16  SpO2:  [96 %-99 %] 98 %  BP: (118-134)/(70-89) 123/71     Weight: 67.3 kg (148 lb 5.9 oz)  Body mass index is 22.56 kg/m².    Intake/Output Summary (Last 24 hours) at 2/11/2020 1940  Last data filed at 2/11/2020 1600  Gross per 24 hour   Intake 1840 ml   Output 2200 ml   Net -360 ml      Physical Exam    Significant  Labs:   CBC:   Recent Labs   Lab 02/10/20  0620   WBC 18.08*   HGB 8.4*   HCT 27.4*   *     CMP:   Recent Labs   Lab 02/10/20  0620   *   K 4.0   CL 95   CO2 33*      BUN 8   CREATININE 0.6   CALCIUM 7.9*   PROT 6.3   ALBUMIN 1.9*   BILITOT 1.3*   ALKPHOS 63   AST 41*   ALT 32   ANIONGAP 5*   EGFRNONAA >60.0       Significant Imaging: I have reviewed all pertinent imaging results/findings within the past 24 hours.      Assessment/Plan:      * Hydropneumothorax        Ileus  Post op ileus- resolved  NGT out and tolerating diet      Volume overload        Parapneumonic effusion        Liver enzyme elevation        Anasarca  On diuretic      Empyema    S/p right thoracotomy decortication   2 of 3 chest tubes pulled 2/11  Will follow up with Dr. Rousseau regarding plan for last chest tube    Acute massive pulmonary embolism  Heparin gtt transitioned to lovenox  Transitioning lovenox to eliquis  Echo done with some evidence of right sided overload  Good room air saturations     Chronic hepatitis C without hepatic coma  Patient has not seen a hepatologist for treatment of hepatitis C  Outpatient followup      Pneumonia  Abx transitioned to po cipro based on culture from empyema.    Essential hypertension  Resume home antihypertensives      Acute on chronic combined systolic and diastolic congestive heart failure  Patient has evidence of fluid overload with elevated BNP, EF is 15%  IV lasix transitioned to po laisx  Strict Is & O's monitoring  Daily weights        VTE Risk Mitigation (From admission, onward)         Ordered     enoxaparin injection 70 mg  Every 12 hours (non-standard times)      02/08/20 0954     IP VTE HIGH RISK PATIENT  Once      01/27/20 1516     Reason for No Pharmacological VTE Prophylaxis  Once     Question:  Reasons:  Answer:  Already adequately anticoagulated on oral Anticoagulants    01/27/20 1516                      Lex Brenner MD  Department of Hospital Medicine   [place]  [facility]

## 2020-02-12 NOTE — PLAN OF CARE
Problem: Wound  Goal: Optimal Wound Healing  Outcome: Ongoing, Progressing     Problem: Fall Injury Risk  Goal: Absence of Fall and Fall-Related Injury  Outcome: Ongoing, Progressing     Problem: Adult Inpatient Plan of Care  Goal: Plan of Care Review  Outcome: Ongoing, Progressing  Goal: Patient-Specific Goal (Individualization)  Outcome: Ongoing, Progressing  Goal: Absence of Hospital-Acquired Illness or Injury  Outcome: Ongoing, Progressing  Goal: Optimal Comfort and Wellbeing  Outcome: Ongoing, Progressing  Goal: Readiness for Transition of Care  Outcome: Ongoing, Progressing  Goal: Rounds/Family Conference  Outcome: Ongoing, Progressing     Problem: Skin Injury Risk Increased  Goal: Skin Health and Integrity  Outcome: Ongoing, Progressing     Problem: Infection  Goal: Infection Symptom Resolution  Outcome: Ongoing, Progressing     Problem: Pain Acute  Goal: Optimal Pain Control  Outcome: Ongoing, Progressing     Problem: Oral Intake Inadequate  Goal: Improved Oral Intake  Outcome: Ongoing, Progressing

## 2020-02-12 NOTE — ASSESSMENT & PLAN NOTE
S/p right thoracotomy decortication   2 of 3 chest tubes pulled 2/11  Will follow up with Dr. Rousseau regarding plan for last chest tube

## 2020-02-12 NOTE — SUBJECTIVE & OBJECTIVE
Interval History: Pain to chest tube site that is sharp, persistent, moderately severe, non radiating.  2 of 3 chest tubes pulled.  NGT removed and tolerating diet.  Tells me he is moving around but minimally- therapy consulted. On room air.  Does not want to go to LTAC.  Wants to go home.     Review of Systems   Constitutional: Negative.    HENT: Negative.    Eyes: Negative.    Respiratory: Negative.         Pain to chest tube site   Cardiovascular: Negative.    Gastrointestinal: Negative.    Endocrine: Negative.    Genitourinary: Negative.    Musculoskeletal: Negative.    Skin: Negative.    Allergic/Immunologic: Negative.    Neurological: Negative.    Hematological: Negative.    Psychiatric/Behavioral: Negative.      Objective:     Vital Signs (Most Recent):  Temp: 97.6 °F (36.4 °C) (02/11/20 1600)  Pulse: 98 (02/11/20 1600)  Resp: 16 (02/11/20 1600)  BP: 123/71 (02/11/20 1600)  SpO2: 98 % (02/11/20 1600) Vital Signs (24h Range):  Temp:  [97.5 °F (36.4 °C)-98.3 °F (36.8 °C)] 97.6 °F (36.4 °C)  Pulse:  [] 98  Resp:  [16-18] 16  SpO2:  [96 %-99 %] 98 %  BP: (118-134)/(70-89) 123/71     Weight: 67.3 kg (148 lb 5.9 oz)  Body mass index is 22.56 kg/m².    Intake/Output Summary (Last 24 hours) at 2/11/2020 1940  Last data filed at 2/11/2020 1600  Gross per 24 hour   Intake 1840 ml   Output 2200 ml   Net -360 ml      Physical Exam    Significant Labs:   CBC:   Recent Labs   Lab 02/10/20  0620   WBC 18.08*   HGB 8.4*   HCT 27.4*   *     CMP:   Recent Labs   Lab 02/10/20  0620   *   K 4.0   CL 95   CO2 33*      BUN 8   CREATININE 0.6   CALCIUM 7.9*   PROT 6.3   ALBUMIN 1.9*   BILITOT 1.3*   ALKPHOS 63   AST 41*   ALT 32   ANIONGAP 5*   EGFRNONAA >60.0       Significant Imaging: I have reviewed all pertinent imaging results/findings within the past 24 hours.

## 2020-02-12 NOTE — ASSESSMENT & PLAN NOTE
Patient has evidence of fluid overload with elevated BNP, EF is 15%  IV lasix transitioned to po laisx  Strict Is & O's monitoring  Daily weights

## 2020-02-12 NOTE — PT/OT/SLP EVAL
Physical Therapy Evaluation and Discharge Note    Patient Name:  Francis Daigle   MRN:  [id number]    Recommendations:     Discharge Recommendations:  (home or LTAC whatever MD decides is appropriate for medical care. )   Discharge Equipment Recommendations: (Pt using RW today but declines one today. will cont to assess)   Barriers to discharge: None    Assessment:     Francis Daigle is a 44 y.o. male admitted with a medical diagnosis of Hydropneumothorax. .  At this time, patient is functioning at their prior level of function and does not require further acute PT services.     Recent Surgery: Procedure(s) (LRB):  DECORTICATION, LUNG (Right) 8 Days Post-Op    Plan:     During this hospitalization, patient does not require further acute PT services.  Please re-consult if situation changes.      Subjective     Chief Complaint: chest tube discomfort mild  Patient/Family Comments/goals: to go home  Pain/Comfort:  · Pain Rating 1: 0/10    Patients cultural, spiritual, Nondenominational conflicts given the current situation:      Living Environment:  Pt lives with family, 12 steps to enter with rail.   Prior to admission, patients level of function was modified independent with cane.  Equipment used at home: cane, straight.  DME owned (not currently used): none.  Upon discharge, patient will have assistance from family.    Objective:     Communicated with rn prior to session.  Patient found HOB elevated with telemetry, chest tube upon PT entry to room.    General Precautions: Standard, fall   Orthopedic Precautions:N/A   Braces: N/A     Exams:  · Cognitive Exam:  Patient is oriented to Person, Place, Time and Situation  · Gross Motor Coordination:  WFL  · RLE ROM: WFL  · RLE Strength: WFL  · LLE ROM: WFL  · LLE Strength: WFL    Functional Mobility:  · Bed Mobility:     · Rolling Right: modified independence  · Scooting: modified independence  · Supine to Sit: modified independence  · Sit to Supine: modified  independence  · Transfers:     · Sit to Stand:  modified independence with no AD  · Gait: pt able to ambulate 300 ft RW supervision, no lob, no sob, no increase in pain, no dizziness. Education provided on energy conservation and exercise dosing.     AM-PAC 6 CLICK MOBILITY  Total Score:24       Therapeutic Activities and Exercises:   education, fall prevention, poc, dc planning.     AM-PAC 6 CLICK MOBILITY  Total Score:24     Patient left HOB elevated with all lines intact, call button in reach and rn notified.    GOALS:   Multidisciplinary Problems     Physical Therapy Goals     Not on file                History:     Past Medical History:   Diagnosis Date    Anxiety     Arthritis     CHF (congestive heart failure)     Cirrhosis     Coronary artery disease     Depression     DVT (deep venous thrombosis)     Hepatitis C     Hypertension        Past Surgical History:   Procedure Laterality Date    LUNG DECORTICATION Right 2/4/2020    Procedure: DECORTICATION, LUNG;  Surgeon: Rehan Rousseau MD;  [facility];  Service: Thoracic;  Laterality: Right;       Time Tracking:     PT Received On: 02/12/20  PT Start Time: 1138     PT Stop Time: 1158  PT Total Time (min): 20 min     Billable Minutes: Evaluation 7 and Gait Training 13      Marilyn Higgins, PT  02/12/2020

## 2020-02-12 NOTE — PROGRESS NOTES
[facility]  Pulmonology  Progress Note    Subjective     No major issues overnight.  Two of 3 chest tubes removed yesterday evening and doing well since.  No new complaints today.     Review of Systems   Constitutional: Negative for fever, chills and night sweats.   Respiratory: Negative for cough, hemoptysis, sputum production, shortness of breath, wheezing, orthopnea and pleurisy.    Cardiovascular: Negative for chest pain, palpitations and leg swelling.   Gastrointestinal: Negative for nausea, vomiting, abdominal pain and abdominal distention.   Neurological: Negative for headaches.   Psychiatric/Behavioral: Negative for confusion.   All other systems reviewed and are negative.     I have personally reviewed the following during today's evaluation:  past medical history, ROS, family history, social history, surgical history, current inpatient medications,drug allergies, vital signs over the past 24 hours, results of relevant diagnostic studies and nursing/provider documentation from the past 24 hours.     Objective     VS Temp:  [97.3 °F (36.3 °C)-98.4 °F (36.9 °C)]   Pulse:  []   Resp:  [16-18]   BP: (115-131)/(71-82)   SpO2:  [93 %-98 %]   Ideal body weight: 68.4 kg (150 lb 12.7 oz)   I/O   Intake/Output Summary (Last 24 hours) at 2/12/2020 1536  Last data filed at 2/12/2020 0600  Gross per 24 hour   Intake 1820 ml   Output 1130 ml   Net 690 ml        Vent SpO2 (!) 93% on room air   PE Physical Exam   Constitutional: He is oriented to person, place, and time. He appears well-developed and well-nourished.  Non-toxic appearance. No distress. Nasal cannula in place.   HENT:   Head: Normocephalic and atraumatic.   Right Ear: External ear normal.   Left Ear: External ear normal.   Nose: Nose normal.   Mouth/Throat: Uvula is midline, oropharynx is clear and moist and mucous membranes are normal. Mallampati Score: II.   Neck: Trachea normal and normal range of motion. Neck supple. No JVD present.  No tracheal deviation present. No thyromegaly present.   Cardiovascular: Normal rate, regular rhythm, normal heart sounds and intact distal pulses. Exam reveals no gallop and no friction rub.   No murmur heard.  Pulmonary/Chest: Normal expansion, symmetric chest wall expansion and effort normal. No stridor. He has no decreased breath sounds. He has no wheezes. He has no rhonchi. He has no rales. Chest wall is not dull to percussion. He exhibits tenderness.   One surgical chest tube on the right connected to water seal.  Old blood in atrium.  No air leak observed.    Right-sided thoracotomy dressing clean dry and intact.   Abdominal: Soft. Bowel sounds are normal. He exhibits no distension. There is no tenderness. There is no guarding.   Musculoskeletal: Normal range of motion. He exhibits no edema or deformity.   Lymphadenopathy: No supraclavicular adenopathy is present.     He has no cervical adenopathy.     He has no axillary adenopathy.   Neurological: He is alert and oriented to person, place, and time. He has normal strength. No cranial nerve deficit or sensory deficit. He exhibits normal muscle tone. GCS eye subscore is 4. GCS verbal subscore is 5. GCS motor subscore is 6.   Skin: Skin is warm, dry and intact. No rash noted. No cyanosis. Nails show no clubbing.   Psychiatric: He has a normal mood and affect. His speech is normal and behavior is normal.   Nursing note and vitals reviewed.        Labs I have personally reviewed and interpreted all labs / diagnostic studies obtained over the past 24 hours, and relevant results are as follows:  Recent Labs   Lab 02/12/20  0530   WBC 16.48*   RBC 3.40*   HGB 9.0*   HCT 29.6*   *   MCV 87   MCH 26.5*   MCHC 30.4*      K 4.3   CL 96   CO2 33*   BUN 9   CREATININE 0.6   MG 1.7   ALT 30   AST 42*   ALKPHOS 69   BILITOT 1.0   PROT 6.5   ALBUMIN 2.0*      Imaging I have personally reviewed and interpreted the following images and reviewed the associated  Radiology report.  CXR: I have reviewed all pertinent results/findings within the past 24 hours and my personal findings are:  Tiny right-sided pleural effusion with some adjacent atelectasis which is unchanged compared to prior exam.  Interval removal of right-sided chest tube with 1 residual chest to appropriately placed.  Tiny right apical pneumothorax.     Micro I have personally reviewed and interpreted the available culture data.  Relevant results are as follows.  No new positive culture data.   Medications Scheduled    apixaban  10 mg Oral BID    ciprofloxacin HCl  500 mg Oral Q12H    enalapril  2.5 mg Oral Daily    furosemide  20 mg Oral Daily    hydrOXYzine HCl  25 mg Oral Daily    metoclopramide HCl  10 mg Oral TID AC    metoprolol succinate  100 mg Oral Daily    pantoprazole  40 mg Oral Daily    polyethylene glycol  17 g Oral BID    senna-docusate 8.6-50 mg  1 tablet Oral BID    spironolactone  25 mg Oral Daily      Continuous Infusions:      PRN   sodium chloride, acetaminophen, acetaminophen, acetaminophen, ALPRAZolam, calcium chloride IVPB, calcium chloride IVPB, calcium chloride IVPB, dextrose 50%, dextrose 50%, diphenhydrAMINE, furosemide, gabapentin, glucagon (human recombinant), glucose, glucose, HYDROcodone-acetaminophen, HYDROcodone-acetaminophen, lactulose, magnesium oxide, magnesium sulfate IVPB, magnesium sulfate IVPB, magnesium sulfate IVPB, magnesium sulfate IVPB, ondansetron, potassium chloride in water, potassium chloride in water, potassium chloride in water, potassium chloride in water, potassium chloride, potassium chloride, potassium chloride, potassium chloride, promethazine (PHENERGAN) IVPB, sodium chloride 0.9%, sodium phosphate IVPB, sodium phosphate IVPB, sodium phosphate IVPB, sodium phosphate IVPB, sodium phosphate IVPB        Assessment       Active Hospital Problems    Diagnosis    *Hydropneumothorax    Ileus    Volume overload    Parapneumonic effusion     Anasarca    Liver enzyme elevation    Empyema    Acute massive pulmonary embolism    Chronic hepatitis C without hepatic coma    Pneumonia    Essential hypertension    Acute on chronic combined systolic and diastolic congestive heart failure        My Impression:  Status post thoracotomy with decortication for empyema 1 week ago, and recovering appropriately.    Plan     Pulmonary  · Receiving iprofloxacin.. Will need to complete 6 weeks of abx in total.  · Thoracic surgery following and I will defer to their expertise regarding his postoperative management.  · Titrate supplemental oxygen to maintain an SpO2 greater than 92%.  · Continued oral diuresis.  · Continue DOAC    Cardiac/Vascular  · Hemodynamically stable this time.  · Continue metoprolol and enalapril.      please call me directly if I can be of any additional assistance.    Trae Steele MD  Pulmonary / Critical Care Medicine  [facility]

## 2020-02-12 NOTE — ASSESSMENT & PLAN NOTE
Heparin gtt transitioned to lovenox  Transitioning lovenox to eliquis  Echo done with some evidence of right sided overload  Good room air saturations

## 2020-02-12 NOTE — PLAN OF CARE
02/12/20 0917   Discharge Reassessment   Assessment Type Discharge Planning Reassessment   Anticipated Discharge Disposition LTAC   Patient choice form signed by patient/caregiver List from [employer]  (Pt Choice Form signed by Pt at 0916.)   Post-Acute Status   Post-Acute Authorization Placement   Post-Acute Placement Status Referrals Sent     SW following up with Pt this date and he reports preference in LTAC is [facility].  SW sending referral via fax, and will await response.

## 2020-02-12 NOTE — PT/OT/SLP EVAL
"Occupational Therapy   Evaluation and Discharge Note    Name: Francis Daigle  MRN: [id number]  Admitting Diagnosis:  Hydropneumothorax 8 Days Post-Op    Recommendations:     Discharge Recommendations: home  Discharge Equipment Recommendations:  none  Barriers to discharge:  None    Assessment:     Francis Daigel is a 44 y.o. male with a medical diagnosis of Hydropneumothorax. At this time, patient is functioning at their prior level of function and does not require further acute OT services.     Plan:     During this hospitalization, patient does not require further acute OT services.  Please re-consult if situation changes.    · Plan of Care Reviewed with: patient    Subjective     Chief Complaint: "I feel great"   Patient/Family Comments/goals: home     Occupational Profile:  Living Environment: one story raised home with 11 steps to enter, tub/shower combination  Previous level of function: independent  Roles and Routines: pt lives with cousins, cooks, grocery shops  Equipment Used at home:  cane, straight  Assistance upon Discharge: none needed    Pain/Comfort:  · Pain Rating 1: 4/10  · Location - Side 1: Right  · Location 1: flank  · Pain Addressed 1: Cessation of Activity  · Pain Rating Post-Intervention 1: 4/10    Patients cultural, spiritual, Christianity conflicts given the current situation: no    Objective:     Communicated with: Nursing, Pt prior to session.  Patient found HOB elevated with chest tube, telemetry, peripheral IV upon OT entry to room.    General Precautions: Standard, fall   Orthopedic Precautions:N/A   Braces: N/A     Occupational Performance:    Bed Mobility:    · Patient completed Rolling/Turning to Right with independence  · Patient completed Scooting/Bridging with independence  · Patient completed Supine to Sit with independence  · Patient completed Sit to Supine with independence    Functional Mobility/Transfers:  · Patient completed Sit <> Stand Transfer with independence  with "  no assistive device   · Patient completed Bed <> Chair Transfer using Step Transfer technique with independence with no assistive device  · Patient completed Toilet Transfer Step Transfer technique with independence with  no AD regular toilet without use of wall bar.   · Functional Mobility: room ambulation from bed to bathroom holding his chest tube receptacle below his hips with Mod I ability then back to his bed for a total x 28 feet    Activities of Daily Living:  · Feeding:  independence    · Grooming: independence standing at the sink  · Lower Body Dressing: modified independence sitting on side of the bed pt donned his slip on shoes   · Toileting: modified independence      Cognitive/Visual Perceptual:  Cognitive/Psychosocial Skills:     -       Oriented to: Person, Place, Time and Situation   -       Follows Commands/attention:Follows two-step commands  -       Communication: clear/fluent  -       Memory: No Deficits noted  -       Safety awareness/insight to disability: intact   -       Mood/Affect/Coping skills/emotional control: Appropriate to situation and Pleasant  Visual/Perceptual:      -Intact      Physical Exam:  Balance: -       good dynamic sitting and good - dynamic standing   Postural examination/scapula alignment:    -       No postural abnormalities identified  -       however, slight trunk flexion guarding due to chest tube   Motor Planning: -       intact   Dominant hand: -       right   Upper Extremity Range of Motion:  -       Right Upper Extremity: WFL  -       Left Upper Extremity: WFL  Upper Extremity Strength:    -       Right Upper Extremity: WFL  -       Left Upper Extremity: WFL   Strength:    -       Right Upper Extremity: WFL  -       Left Upper Extremity: WFL  Fine Motor Coordination:    -       Intact  Gross motor coordination:   WFL    AMPAC 6 Click ADL:  AMPAC Total Score: 24    Treatment & Education: Benefits of being out the bed, role of OT; managing the chest tube  below his hips using the long loops    Issued and visual demonstration only needed for education for UE horz AB/AD, bicep curls, LE ankle presses (plantar flexion)  with yellow theraband   Education:    Patient left HOB elevated with all lines intact, call button in reach and nurse notified    GOALS:   Multidisciplinary Problems     Occupational Therapy Goals     Not on file                History:     Past Medical History:   Diagnosis Date    Anxiety     Arthritis     CHF (congestive heart failure)     Cirrhosis     Coronary artery disease     Depression     DVT (deep venous thrombosis)     Hepatitis C     Hypertension        Past Surgical History:   Procedure Laterality Date    LUNG DECORTICATION Right 2/4/2020    Procedure: DECORTICATION, LUNG;  Surgeon: Rehan Rousseau MD;  Location: [facility];  Service: Thoracic;  Laterality: Right;       Time Tracking:     OT Date of Treatment: 02/12/20  OT Start Time: 1158  OT Stop Time: 1211  OT Total Time (min): 13 min    Billable Minutes:Evaluation 4  Therapeutic Activity 9    Jacque Winters OT  2/12/20205:08 PM

## 2020-02-13 PROBLEM — R74.8 LIVER ENZYME ELEVATION: Status: RESOLVED | Noted: 2020-01-27 | Resolved: 2020-02-13

## 2020-02-13 PROBLEM — K56.7 ILEUS: Status: RESOLVED | Noted: 2020-02-11 | Resolved: 2020-02-13

## 2020-02-13 LAB
ALBUMIN SERPL BCP-MCNC: 2 G/DL (ref 3.5–5.2)
ALBUMIN SERPL BCP-MCNC: 2 G/DL (ref 3.5–5.2)
ALP SERPL-CCNC: 71 U/L (ref 55–135)
ALP SERPL-CCNC: 71 U/L (ref 55–135)
ALT SERPL W/O P-5'-P-CCNC: 30 U/L (ref 10–44)
ALT SERPL W/O P-5'-P-CCNC: 30 U/L (ref 10–44)
ANION GAP SERPL CALC-SCNC: 5 MMOL/L (ref 8–16)
ANION GAP SERPL CALC-SCNC: 5 MMOL/L (ref 8–16)
AST SERPL-CCNC: 41 U/L (ref 10–40)
AST SERPL-CCNC: 41 U/L (ref 10–40)
BASOPHILS # BLD AUTO: 0.09 K/UL (ref 0–0.2)
BASOPHILS # BLD AUTO: 0.09 K/UL (ref 0–0.2)
BASOPHILS NFR BLD: 0.6 % (ref 0–1.9)
BASOPHILS NFR BLD: 0.6 % (ref 0–1.9)
BILIRUB SERPL-MCNC: 1.3 MG/DL (ref 0.1–1)
BILIRUB SERPL-MCNC: 1.3 MG/DL (ref 0.1–1)
BUN SERPL-MCNC: 10 MG/DL (ref 6–20)
BUN SERPL-MCNC: 10 MG/DL (ref 6–20)
CALCIUM SERPL-MCNC: 8.1 MG/DL (ref 8.7–10.5)
CALCIUM SERPL-MCNC: 8.1 MG/DL (ref 8.7–10.5)
CHLORIDE SERPL-SCNC: 97 MMOL/L (ref 95–110)
CHLORIDE SERPL-SCNC: 97 MMOL/L (ref 95–110)
CO2 SERPL-SCNC: 31 MMOL/L (ref 23–29)
CO2 SERPL-SCNC: 31 MMOL/L (ref 23–29)
CREAT SERPL-MCNC: 0.7 MG/DL (ref 0.5–1.4)
CREAT SERPL-MCNC: 0.7 MG/DL (ref 0.5–1.4)
DIFFERENTIAL METHOD: ABNORMAL
DIFFERENTIAL METHOD: ABNORMAL
EOSINOPHIL # BLD AUTO: 0.4 K/UL (ref 0–0.5)
EOSINOPHIL # BLD AUTO: 0.4 K/UL (ref 0–0.5)
EOSINOPHIL NFR BLD: 2.4 % (ref 0–8)
EOSINOPHIL NFR BLD: 2.4 % (ref 0–8)
ERYTHROCYTE [DISTWIDTH] IN BLOOD BY AUTOMATED COUNT: 22.3 % (ref 11.5–14.5)
ERYTHROCYTE [DISTWIDTH] IN BLOOD BY AUTOMATED COUNT: 22.3 % (ref 11.5–14.5)
EST. GFR  (AFRICAN AMERICAN): >60 ML/MIN/1.73 M^2
EST. GFR  (AFRICAN AMERICAN): >60 ML/MIN/1.73 M^2
EST. GFR  (NON AFRICAN AMERICAN): >60 ML/MIN/1.73 M^2
EST. GFR  (NON AFRICAN AMERICAN): >60 ML/MIN/1.73 M^2
GLUCOSE SERPL-MCNC: 90 MG/DL (ref 70–110)
GLUCOSE SERPL-MCNC: 90 MG/DL (ref 70–110)
HCT VFR BLD AUTO: 28.3 % (ref 40–54)
HCT VFR BLD AUTO: 28.3 % (ref 40–54)
HGB BLD-MCNC: 8.7 G/DL (ref 14–18)
HGB BLD-MCNC: 8.7 G/DL (ref 14–18)
IMM GRANULOCYTES # BLD AUTO: 0.15 K/UL (ref 0–0.04)
IMM GRANULOCYTES # BLD AUTO: 0.15 K/UL (ref 0–0.04)
IMM GRANULOCYTES NFR BLD AUTO: 0.9 % (ref 0–0.5)
IMM GRANULOCYTES NFR BLD AUTO: 0.9 % (ref 0–0.5)
LYMPHOCYTES # BLD AUTO: 2 K/UL (ref 1–4.8)
LYMPHOCYTES # BLD AUTO: 2 K/UL (ref 1–4.8)
LYMPHOCYTES NFR BLD: 12.5 % (ref 18–48)
LYMPHOCYTES NFR BLD: 12.5 % (ref 18–48)
MAGNESIUM SERPL-MCNC: 1.5 MG/DL (ref 1.6–2.6)
MAGNESIUM SERPL-MCNC: 1.5 MG/DL (ref 1.6–2.6)
MCH RBC QN AUTO: 26.9 PG (ref 27–31)
MCH RBC QN AUTO: 26.9 PG (ref 27–31)
MCHC RBC AUTO-ENTMCNC: 30.7 G/DL (ref 32–36)
MCHC RBC AUTO-ENTMCNC: 30.7 G/DL (ref 32–36)
MCV RBC AUTO: 88 FL (ref 82–98)
MCV RBC AUTO: 88 FL (ref 82–98)
MONOCYTES # BLD AUTO: 1.4 K/UL (ref 0.3–1)
MONOCYTES # BLD AUTO: 1.4 K/UL (ref 0.3–1)
MONOCYTES NFR BLD: 8.7 % (ref 4–15)
MONOCYTES NFR BLD: 8.7 % (ref 4–15)
NEUTROPHILS # BLD AUTO: 12 K/UL (ref 1.8–7.7)
NEUTROPHILS # BLD AUTO: 12 K/UL (ref 1.8–7.7)
NEUTROPHILS NFR BLD: 74.9 % (ref 38–73)
NEUTROPHILS NFR BLD: 74.9 % (ref 38–73)
NRBC BLD-RTO: 0 /100 WBC
NRBC BLD-RTO: 0 /100 WBC
PHOSPHATE SERPL-MCNC: 3.5 MG/DL (ref 2.7–4.5)
PHOSPHATE SERPL-MCNC: 3.5 MG/DL (ref 2.7–4.5)
PLATELET # BLD AUTO: 682 K/UL (ref 150–350)
PLATELET # BLD AUTO: 682 K/UL (ref 150–350)
PMV BLD AUTO: 9 FL (ref 9.2–12.9)
PMV BLD AUTO: 9 FL (ref 9.2–12.9)
POTASSIUM SERPL-SCNC: 4.3 MMOL/L (ref 3.5–5.1)
POTASSIUM SERPL-SCNC: 4.3 MMOL/L (ref 3.5–5.1)
PROT SERPL-MCNC: 6.5 G/DL (ref 6–8.4)
PROT SERPL-MCNC: 6.5 G/DL (ref 6–8.4)
RBC # BLD AUTO: 3.23 M/UL (ref 4.6–6.2)
RBC # BLD AUTO: 3.23 M/UL (ref 4.6–6.2)
SODIUM SERPL-SCNC: 133 MMOL/L (ref 136–145)
SODIUM SERPL-SCNC: 133 MMOL/L (ref 136–145)
WBC # BLD AUTO: 15.97 K/UL (ref 3.9–12.7)
WBC # BLD AUTO: 15.97 K/UL (ref 3.9–12.7)

## 2020-02-13 PROCEDURE — 25000003 PHARM REV CODE 250: Performed by: INTERNAL MEDICINE

## 2020-02-13 PROCEDURE — 99233 PR SUBSEQUENT HOSPITAL CARE,LEVL III: ICD-10-PCS | Mod: ,,, | Performed by: INTERNAL MEDICINE

## 2020-02-13 PROCEDURE — 99233 SBSQ HOSP IP/OBS HIGH 50: CPT | Mod: ,,, | Performed by: INTERNAL MEDICINE

## 2020-02-13 PROCEDURE — 83735 ASSAY OF MAGNESIUM: CPT

## 2020-02-13 PROCEDURE — 85025 COMPLETE CBC W/AUTO DIFF WBC: CPT

## 2020-02-13 PROCEDURE — 12000002 HC ACUTE/MED SURGE SEMI-PRIVATE ROOM

## 2020-02-13 PROCEDURE — 25000003 PHARM REV CODE 250: Performed by: THORACIC SURGERY (CARDIOTHORACIC VASCULAR SURGERY)

## 2020-02-13 PROCEDURE — 84100 ASSAY OF PHOSPHORUS: CPT

## 2020-02-13 PROCEDURE — 80053 COMPREHEN METABOLIC PANEL: CPT

## 2020-02-13 RX ADMIN — CIPROFLOXACIN HYDROCHLORIDE 500 MG: 500 TABLET, FILM COATED ORAL at 08:02

## 2020-02-13 RX ADMIN — PANTOPRAZOLE SODIUM 40 MG: 40 TABLET, DELAYED RELEASE ORAL at 05:02

## 2020-02-13 RX ADMIN — FUROSEMIDE 20 MG: 20 TABLET ORAL at 08:02

## 2020-02-13 RX ADMIN — METOPROLOL SUCCINATE 100 MG: 50 TABLET, EXTENDED RELEASE ORAL at 08:02

## 2020-02-13 RX ADMIN — HYDROCODONE BITARTRATE AND ACETAMINOPHEN 1 TABLET: 10; 325 TABLET ORAL at 11:02

## 2020-02-13 RX ADMIN — ALPRAZOLAM 0.25 MG: 0.25 TABLET ORAL at 09:02

## 2020-02-13 RX ADMIN — METOCLOPRAMIDE HYDROCHLORIDE 10 MG: 10 TABLET ORAL at 05:02

## 2020-02-13 RX ADMIN — ENALAPRIL MALEATE 2.5 MG: 2.5 TABLET ORAL at 08:02

## 2020-02-13 RX ADMIN — HYDROXYZINE HYDROCHLORIDE 25 MG: 25 TABLET, FILM COATED ORAL at 08:02

## 2020-02-13 RX ADMIN — SENNOSIDES AND DOCUSATE SODIUM 1 TABLET: 8.6; 5 TABLET ORAL at 08:02

## 2020-02-13 RX ADMIN — APIXABAN 10 MG: 5 TABLET, FILM COATED ORAL at 08:02

## 2020-02-13 RX ADMIN — APIXABAN 10 MG: 5 TABLET, FILM COATED ORAL at 09:02

## 2020-02-13 RX ADMIN — GABAPENTIN 100 MG: 100 CAPSULE ORAL at 03:02

## 2020-02-13 RX ADMIN — HYDROCODONE BITARTRATE AND ACETAMINOPHEN 1 TABLET: 10; 325 TABLET ORAL at 05:02

## 2020-02-13 RX ADMIN — CIPROFLOXACIN HYDROCHLORIDE 500 MG: 500 TABLET, FILM COATED ORAL at 09:02

## 2020-02-13 NOTE — PLAN OF CARE
Problem: Fall Injury Risk  Goal: Absence of Fall and Fall-Related Injury  Outcome: Ongoing, Progressing     Problem: Adult Inpatient Plan of Care  Goal: Patient-Specific Goal (Individualization)  Outcome: Ongoing, Progressing     Problem: Wound  Goal: Optimal Wound Healing  Outcome: Ongoing, Progressing     Problem: Adult Inpatient Plan of Care  Goal: Absence of Hospital-Acquired Illness or Injury  Outcome: Ongoing, Progressing     Problem: Skin Injury Risk Increased  Goal: Skin Health and Integrity  Outcome: Ongoing, Progressing     Problem: Infection  Goal: Infection Symptom Resolution  Outcome: Ongoing, Progressing     Problem: Pain Acute  Goal: Optimal Pain Control  Outcome: Ongoing, Progressing

## 2020-02-13 NOTE — PROGRESS NOTES
Progress Note  Cardiothoracic Surgery    Admit Date: 1/27/2020  Post-operative Day: 9 Days Post-Op  Hospital Day: 18    SUBJECTIVE: Wants to go home     Follow-up For: Procedure(s) (LRB):  DECORTICATION, LUNG (Right)    Scheduled Meds:   apixaban  10 mg Oral BID    ciprofloxacin HCl  500 mg Oral Q12H    enalapril  2.5 mg Oral Daily    furosemide  20 mg Oral Daily    hydrOXYzine HCl  25 mg Oral Daily    metoclopramide HCl  10 mg Oral TID AC    metoprolol succinate  100 mg Oral Daily    pantoprazole  40 mg Oral Daily    polyethylene glycol  17 g Oral BID    senna-docusate 8.6-50 mg  1 tablet Oral BID    spironolactone  25 mg Oral Daily     Infusions/Drips:  PRN Meds: sodium chloride, acetaminophen, acetaminophen, acetaminophen, ALPRAZolam, calcium chloride IVPB, calcium chloride IVPB, calcium chloride IVPB, dextrose 50%, dextrose 50%, diphenhydrAMINE, furosemide, gabapentin, glucagon (human recombinant), glucose, glucose, HYDROcodone-acetaminophen, HYDROcodone-acetaminophen, lactulose, magnesium oxide, magnesium sulfate IVPB, magnesium sulfate IVPB, magnesium sulfate IVPB, magnesium sulfate IVPB, ondansetron, potassium chloride in water, potassium chloride in water, potassium chloride in water, potassium chloride in water, potassium chloride, potassium chloride, potassium chloride, potassium chloride, promethazine (PHENERGAN) IVPB, sodium chloride 0.9%, sodium phosphate IVPB, sodium phosphate IVPB, sodium phosphate IVPB, sodium phosphate IVPB, sodium phosphate IVPB    Review of patient's allergies indicates:  No Known Allergies    OBJECTIVE:     Vital Signs (Most Recent)  Temp: 98.7 °F (37.1 °C) (02/13/20 0754)  Pulse: 103 (02/13/20 0754)  Resp: 16 (02/13/20 0754)  BP: 127/75 (02/13/20 0754)  SpO2: 95 % (02/13/20 0754)    Admission Weight: 72.6 kg (160 lb) (01/27/20 0956)   Most Recent Weight: 67.3 kg (148 lb 5.9 oz) (02/07/20 0952)    Vital Signs Range (Last 24H):  Temp:  [97.5 °F (36.4 °C)-98.7 °F (37.1  °C)]   Pulse:  []   Resp:  [16-18]   BP: (112-138)/(69-78)   SpO2:  [93 %-97 %]     I & O (Last 24H):    Intake/Output Summary (Last 24 hours) at 2/13/2020 1036  Last data filed at 2/13/2020 0500  Gross per 24 hour   Intake 500 ml   Output 350 ml   Net 150 ml     Physical Exam:  NAD  BS coarse  CV RRR  Incision healing well    Drain minimal serous output  No air leak today    Laboratory:  CBC:   Recent Labs   Lab 02/13/20 0513   WBC 15.97*  15.97*   RBC 3.23*  3.23*   HGB 8.7*  8.7*   HCT 28.3*  28.3*   *  682*   MCV 88  88   MCH 26.9*  26.9*   MCHC 30.7*  30.7*     BMP:   Recent Labs   Lab 02/13/20 0513   GLU 90  90   *  133*   K 4.3  4.3   CL 97  97   CO2 31*  31*   BUN 10  10   CREATININE 0.7  0.7   CALCIUM 8.1*  8.1*   MG 1.5*  1.5*     Microbiology Results (last 7 days)     ** No results found for the last 168 hours. **        Specimen (12h ago, onward)    None          Diagnostic Results:  X-Ray: Reviewed    ASSESSMENT/PLAN:     Assessment: s/p decortication     Plan: Clamp remaining tube           Repeat CXR later today and if no change repeat CXR again in AM. If stable will pull tube tomorrow

## 2020-02-13 NOTE — ASSESSMENT & PLAN NOTE
I spoke to Dr. Rousseau and remaining chest tube has an air leak.  Not ready to come out.  Given this new fact, discussed again with patient possibility of LTAC.  CM consulted.  If tube is able to come out in interim, will revise discharge plan.

## 2020-02-13 NOTE — PROGRESS NOTES
[facility]  Pulmonology  Progress Note    Subjective     No major issues overnight.  Subjectively unchanged over the past 24 hr.  No new complaints.     Review of Systems   Constitutional: Negative for fever, chills and night sweats.   Respiratory: Negative for cough, hemoptysis, sputum production, shortness of breath, wheezing, orthopnea and pleurisy.    Cardiovascular: Negative for chest pain, palpitations and leg swelling.   Gastrointestinal: Negative for nausea, vomiting, abdominal pain and abdominal distention.   Neurological: Negative for headaches.   Psychiatric/Behavioral: Negative for confusion.   All other systems reviewed and are negative.     I have personally reviewed the following during today's evaluation:  past medical history, ROS, family history, social history, surgical history, current inpatient medications,drug allergies, vital signs over the past 24 hours, results of relevant diagnostic studies and nursing/provider documentation from the past 24 hours.     Objective     VS Temp:  [97.8 °F (36.6 °C)-98.7 °F (37.1 °C)]   Pulse:  []   Resp:  [16-18]   BP: (112-138)/(69-75)   SpO2:  [95 %-97 %]   Ideal body weight: 68.4 kg (150 lb 12.7 oz)   I/O   Intake/Output Summary (Last 24 hours) at 2/13/2020 1200  Last data filed at 2/13/2020 0500  Gross per 24 hour   Intake 500 ml   Output 350 ml   Net 150 ml        Vent SpO2 95% on RA   PE Physical Exam   Constitutional: He is oriented to person, place, and time. He appears well-developed and well-nourished.  Non-toxic appearance. No distress. Nasal cannula in place.   HENT:   Head: Normocephalic and atraumatic.   Right Ear: External ear normal.   Left Ear: External ear normal.   Nose: Nose normal.   Mouth/Throat: Uvula is midline, oropharynx is clear and moist and mucous membranes are normal. Mallampati Score: II.   Neck: Trachea normal and normal range of motion. Neck supple. No JVD present. No tracheal deviation present. No  thyromegaly present.   Cardiovascular: Normal rate, regular rhythm, normal heart sounds and intact distal pulses. Exam reveals no gallop and no friction rub.   No murmur heard.  Pulmonary/Chest: Normal expansion, symmetric chest wall expansion and effort normal. No stridor. He has no decreased breath sounds. He has no wheezes. He has no rhonchi. He has no rales. Chest wall is not dull to percussion. He exhibits tenderness.   One surgical chest tube on the right connected to water seal.  Old blood in atrium.  No air leak observed.    Right-sided thoracotomy dressing clean dry and intact.   Abdominal: Soft. Bowel sounds are normal. He exhibits no distension. There is no tenderness. There is no guarding.   Musculoskeletal: Normal range of motion. He exhibits no edema or deformity.   Lymphadenopathy: No supraclavicular adenopathy is present.     He has no cervical adenopathy.     He has no axillary adenopathy.   Neurological: He is alert and oriented to person, place, and time. He has normal strength. No cranial nerve deficit or sensory deficit. He exhibits normal muscle tone. GCS eye subscore is 4. GCS verbal subscore is 5. GCS motor subscore is 6.   Skin: Skin is warm, dry and intact. No rash noted. No cyanosis. Nails show no clubbing.   Psychiatric: He has a normal mood and affect. His speech is normal and behavior is normal.   Nursing note and vitals reviewed.      Labs I have personally reviewed and interpreted all labs / diagnostic studies obtained over the past 24 hours, and relevant results are as follows:  Recent Labs   Lab 02/13/20  0513   WBC 15.97*  15.97*   RBC 3.23*  3.23*   HGB 8.7*  8.7*   HCT 28.3*  28.3*   *  682*   MCV 88  88   MCH 26.9*  26.9*   MCHC 30.7*  30.7*   *  133*   K 4.3  4.3   CL 97  97   CO2 31*  31*   BUN 10  10   CREATININE 0.7  0.7   MG 1.5*  1.5*   ALT 30  30   AST 41*  41*   ALKPHOS 71  71   BILITOT 1.3*  1.3*   PROT 6.5  6.5   ALBUMIN 2.0*  2.0*       Imaging I have personally reviewed and interpreted the following images and reviewed the associated Radiology report.  CXR: I have reviewed all pertinent results/findings within the past 24 hours and my personal findings are:  Persistent right apical pneumothorax which is unchanged from yesterday.  Small amount of right basilar pleural fluid with adjacent atelectasis.     Micro I have personally reviewed and interpreted the available culture data.  Relevant results are as follows.  No new positive cultures   Medications Scheduled    apixaban  10 mg Oral BID    ciprofloxacin HCl  500 mg Oral Q12H    enalapril  2.5 mg Oral Daily    furosemide  20 mg Oral Daily    hydrOXYzine HCl  25 mg Oral Daily    metoclopramide HCl  10 mg Oral TID AC    metoprolol succinate  100 mg Oral Daily    pantoprazole  40 mg Oral Daily    polyethylene glycol  17 g Oral BID    senna-docusate 8.6-50 mg  1 tablet Oral BID    spironolactone  25 mg Oral Daily      Continuous Infusions:      PRN   sodium chloride, acetaminophen, acetaminophen, acetaminophen, ALPRAZolam, calcium chloride IVPB, calcium chloride IVPB, calcium chloride IVPB, dextrose 50%, dextrose 50%, diphenhydrAMINE, furosemide, gabapentin, glucagon (human recombinant), glucose, glucose, HYDROcodone-acetaminophen, HYDROcodone-acetaminophen, lactulose, magnesium oxide, magnesium sulfate IVPB, magnesium sulfate IVPB, magnesium sulfate IVPB, magnesium sulfate IVPB, ondansetron, potassium chloride in water, potassium chloride in water, potassium chloride in water, potassium chloride in water, potassium chloride, potassium chloride, potassium chloride, potassium chloride, promethazine (PHENERGAN) IVPB, sodium chloride 0.9%, sodium phosphate IVPB, sodium phosphate IVPB, sodium phosphate IVPB, sodium phosphate IVPB, sodium phosphate IVPB        Assessment       Active Hospital Problems    Diagnosis    *Hydropneumothorax    Volume overload    Parapneumonic effusion     Anasarca    Empyema    Acute massive pulmonary embolism    Chronic hepatitis C without hepatic coma    Pneumonia    Essential hypertension    Acute on chronic combined systolic and diastolic congestive heart failure      My Impression:  Persistent pneumothorax questionable tiny air leak.    Plan     Pulmonary  · Receiving ciprofloxacin. Will need to complete 6 weeks of abx in total.  · Thoracic surgery following and I will defer to their expertise regarding his postoperative chest tube management.  · Titrate supplemental oxygen to maintain an SpO2 greater than 92%.  · Continued oral diuresis.  · Continue DOAC    Cardiac/Vascular  · Hemodynamically stable this time.  · Continue metoprolol and enalapril.       Trae Steele MD  Pulmonary / Critical Care Medicine  [facility]

## 2020-02-13 NOTE — SUBJECTIVE & OBJECTIVE
Interval History: Patient very frustrated regarding having to be hospitalized.  He tells me he has spent the last few months in and out of hospitals. Hard to redirect him.  He is also very upset he got denied disability after waiting for a year in this application process. Has chest tube that has an air leak and thus not ready to come out per CT surgery.  Some discomfort at site of tube that is sharp, persistent, moderately severe, mostly under control with medication. Denies any significant SOB.     Review of Systems   Constitutional: Positive for fatigue.   HENT: Negative.    Eyes: Negative.    Respiratory: Negative.    Cardiovascular: Negative.    Gastrointestinal: Negative.    Endocrine: Negative.    Genitourinary: Negative.    Musculoskeletal: Negative.    Skin: Negative.    Allergic/Immunologic: Negative.    Neurological: Negative.    Hematological: Negative.    Psychiatric/Behavioral: Negative.      Objective:     Vital Signs (Most Recent):  Temp: 97.8 °F (36.6 °C) (02/12/20 1600)  Pulse: 96 (02/12/20 1600)  Resp: 16 (02/12/20 1600)  BP: 138/69 (02/12/20 1600)  SpO2: 97 % (02/12/20 1600) Vital Signs (24h Range):  Temp:  [97.3 °F (36.3 °C)-98.4 °F (36.9 °C)] 97.8 °F (36.6 °C)  Pulse:  [] 96  Resp:  [16-18] 16  SpO2:  [93 %-98 %] 97 %  BP: (115-138)/(69-82) 138/69     Weight: 67.3 kg (148 lb 5.9 oz)  Body mass index is 22.56 kg/m².    Intake/Output Summary (Last 24 hours) at 2/12/2020 1820  Last data filed at 2/12/2020 1600  Gross per 24 hour   Intake 480 ml   Output 1130 ml   Net -650 ml      Physical Exam   Constitutional: He is oriented to person, place, and time. He appears well-developed. No distress.   HENT:   Head: Normocephalic and atraumatic.   Mouth/Throat: Oropharynx is clear and moist.   Eyes: Pupils are equal, round, and reactive to light. EOM are normal.   Neck: Normal range of motion.   Cardiovascular: Regular rhythm.   No murmur heard.  Pulmonary/Chest: Effort normal and breath sounds  normal.   Chest tube   Abdominal: Soft. He exhibits no distension. There is no tenderness. There is no rebound and no guarding.   Musculoskeletal: Normal range of motion.   Neurological: He is alert and oriented to person, place, and time. No cranial nerve deficit or sensory deficit.   Skin: No rash noted.   Psychiatric: He has a normal mood and affect.   Nursing note and vitals reviewed.      Significant Labs:   CBC:   Recent Labs   Lab 02/12/20  0530   WBC 16.48*   HGB 9.0*   HCT 29.6*   *     CMP:   Recent Labs   Lab 02/12/20  0530      K 4.3   CL 96   CO2 33*   GLU 97   BUN 9   CREATININE 0.6   CALCIUM 8.4*   PROT 6.5   ALBUMIN 2.0*   BILITOT 1.0   ALKPHOS 69   AST 42*   ALT 30   ANIONGAP 7*   EGFRNONAA >60.0       Significant Imaging: I have reviewed all pertinent imaging results/findings within the past 24 hours.     CXR:  Slightly worse pneumothorax, unchanged infiltrates

## 2020-02-13 NOTE — PLAN OF CARE
Pt made several laps around the unit this evening.  Problem: Wound  Goal: Optimal Wound Healing  Outcome: Ongoing, Progressing     Problem: Fall Injury Risk  Goal: Absence of Fall and Fall-Related Injury  Outcome: Ongoing, Progressing     Problem: Adult Inpatient Plan of Care  Goal: Plan of Care Review  Outcome: Ongoing, Progressing  Goal: Patient-Specific Goal (Individualization)  Outcome: Ongoing, Progressing  Goal: Absence of Hospital-Acquired Illness or Injury  Outcome: Ongoing, Progressing  Goal: Optimal Comfort and Wellbeing  Outcome: Ongoing, Progressing  Goal: Readiness for Transition of Care  Outcome: Ongoing, Progressing  Goal: Rounds/Family Conference  Outcome: Ongoing, Progressing     Problem: Skin Injury Risk Increased  Goal: Skin Health and Integrity  Outcome: Ongoing, Progressing     Problem: Infection  Goal: Infection Symptom Resolution  Outcome: Ongoing, Progressing     Problem: Pain Acute  Goal: Optimal Pain Control  Outcome: Ongoing, Progressing     Problem: Oral Intake Inadequate  Goal: Improved Oral Intake  Outcome: Ongoing, Progressing

## 2020-02-13 NOTE — PROGRESS NOTES
[facility]  Progress Note    Patient Name: Francis Daigle  MRN: [id number]  Patient Class: IP- Inpatient   Admission Date: 1/27/2020  Length of Stay: 16 days  Attending Physician: Lex Brenner MD  Primary Care Provider: Primary Doctor No        Subjective:     Principal Problem:Hydropneumothorax        HPI:  44-year-old male with past medical history of combined diastolic and systolic CHF (EF 15% as at 12/2019), HCV, bilateral LE DVTs and bilateral PE, prior IVDU, HTn who presented with groin swelling of 5 days duration.    Prior hospital records reviewed and it reveals patient was discharged from [facility] one month ago after a complicated hospital course for acute cholecystits complicated by b/l DVT, bilateral PE s/p tPA, PEA cardiac arrest. HE was in the hospital for 20 days and was discharged home. He has not had any follow up visits since then.    He reports increasing lower extremity swelling progressing to scrotal swelling for the past 5 days. He endorses compliance with Lasix 40mg daily and states that he has been taking an extra 20mg daily for the past 5 days. He also endorses a dry cough, congestion and cold intolerance. He denies fever, chills, chest pain.    In the ED he has received vancomycin, zosyn and levaquin, fluid bolus and 40mg IV lasix. Chest xray shows a right middle lobe pneumonia.     Overview/Hospital Course:  1/27: IVF administered per sepsis protocol, diuresis also initiated at 40 mg lasix IVP; placed on abx for sepsis secondary to pneumonia unkonwn organism    1/28: IVF discontinued, lasix ordered as prn weight based parameters; hemodynamically stable, not requiring central line/pressors; cxr in am. Continue antibiotics for pneumonia with sepsis    1/29: cxr shows small right hydronpneumothorax; pulmonology consulted and CT shows possible empyema with a collection; surgery consulted, eliquis held, patient to be placed on heparin drip; staff are concerned  about possible fall risk while on heparin so fall risk order placed,as well as nursing communication that patient should be up with assistance. Cardiology consulted for clearance: echo ordered, lasix changed to scheduled, a dose of albumin is also ordered, will repeat albumin as needed and monitor kidney function.     1/30: patient is down 5.7 L thus far; albumin x 2 more doses, lasix. Echo is reviewed: ef 30% with G2DD and mild pulm htn. Discussed with pulm and cardiology note is reviewed, will need several days of diuresis and too high risk for anesthesia- after a period of time will await surgery recommendations wrt empyema ; pulm signing off- can contact as needed. Continue heparin for now, if it is decided patient does not need surgical intervention will dc heparin ggt and resume OAC. Will reassess ascites )admission CT reviewed) and order abdominal US, off loading fluid from abdomen may improve pressure on venous return from the extremities/scrotum.  Because of spironolactone intolerance Cards has changed additional diuretic to eplerenone.    1/31: edema of the scrotum has significantly improved, leukocytosis from chronic empyema? Will follow cardiology plans for diuresis and revisit any need for surgery with CTS- if not needed then will discontinue heparin and restart home eliquis, with possible discharge home at the completion of diuresis. Nutrition: improve protein intake; diuresis+albumin; io: is net negative 9.8L today.   GI recommendations reviewed and appreciated.    2/1: patient states Dr. Light has discussed with him plans for Tuesday. Will follow up any pulm recommendations. For now - last dose of albumin and continued plans for diuresis per cardiology. Continuing heparin ggt for now.   Since admission has diuresed 21, in 10; net negative 11L    2/2: Today is day 7 of antibiotics, lactic acid has not been elevated so those labs are discontinued. WBC secondary to empyema? procalcitonin is ordered, if  is not elevated will dc abx if pulm agrees.   Continues to be on IV heparin, ongoing diuresis io since admission is net -15L, swelling is reduced, tissue over the skin is more supple though pitting present, scrotum is back to normal size per patient.       2/11: Patient had 2 of the 3 chest tubes pulled.  IV abx changed to po cipro based on pleural fluid sensitivities. NGT removed after resolution of ileus and currently tolerating diet.  On room air. On oral diuresis for acute on chronic systolic and diastolic HF.  Does not want to go to a LTAC- he wants to go home.     2/12: Spoke with Dr. Rousseau and he has an air leak in the remaining tube and thus not ready to come out.     Interval History: Patient very frustrated regarding having to be hospitalized.  He tells me he has spent the last few months in and out of hospitals. Hard to redirect him.  He is also very upset he got denied disability after waiting for a year in this application process. Has chest tube that has an air leak and thus not ready to come out per CT surgery.  Some discomfort at site of tube that is sharp, persistent, moderately severe, mostly under control with medication. Denies any significant SOB.     Review of Systems   Constitutional: Positive for fatigue.   HENT: Negative.    Eyes: Negative.    Respiratory: Negative.    Cardiovascular: Negative.    Gastrointestinal: Negative.    Endocrine: Negative.    Genitourinary: Negative.    Musculoskeletal: Negative.    Skin: Negative.    Allergic/Immunologic: Negative.    Neurological: Negative.    Hematological: Negative.    Psychiatric/Behavioral: Negative.      Objective:     Vital Signs (Most Recent):  Temp: 97.8 °F (36.6 °C) (02/12/20 1600)  Pulse: 96 (02/12/20 1600)  Resp: 16 (02/12/20 1600)  BP: 138/69 (02/12/20 1600)  SpO2: 97 % (02/12/20 1600) Vital Signs (24h Range):  Temp:  [97.3 °F (36.3 °C)-98.4 °F (36.9 °C)] 97.8 °F (36.6 °C)  Pulse:  [] 96  Resp:  [16-18] 16  SpO2:  [93 %-98 %]  97 %  BP: (115-138)/(69-82) 138/69     Weight: 67.3 kg (148 lb 5.9 oz)  Body mass index is 22.56 kg/m².    Intake/Output Summary (Last 24 hours) at 2/12/2020 1820  Last data filed at 2/12/2020 1600  Gross per 24 hour   Intake 480 ml   Output 1130 ml   Net -650 ml      Physical Exam   Constitutional: He is oriented to person, place, and time. He appears well-developed. No distress.   HENT:   Head: Normocephalic and atraumatic.   Mouth/Throat: Oropharynx is clear and moist.   Eyes: Pupils are equal, round, and reactive to light. EOM are normal.   Neck: Normal range of motion.   Cardiovascular: Regular rhythm.   No murmur heard.  Pulmonary/Chest: Effort normal and breath sounds normal.   Chest tube   Abdominal: Soft. He exhibits no distension. There is no tenderness. There is no rebound and no guarding.   Musculoskeletal: Normal range of motion.   Neurological: He is alert and oriented to person, place, and time. No cranial nerve deficit or sensory deficit.   Skin: No rash noted.   Psychiatric: He has a normal mood and affect.   Nursing note and vitals reviewed.      Significant Labs:   CBC:   Recent Labs   Lab 02/12/20  0530   WBC 16.48*   HGB 9.0*   HCT 29.6*   *     CMP:   Recent Labs   Lab 02/12/20  0530      K 4.3   CL 96   CO2 33*   GLU 97   BUN 9   CREATININE 0.6   CALCIUM 8.4*   PROT 6.5   ALBUMIN 2.0*   BILITOT 1.0   ALKPHOS 69   AST 42*   ALT 30   ANIONGAP 7*   EGFRNONAA >60.0       Significant Imaging: I have reviewed all pertinent imaging results/findings within the past 24 hours.     CXR:  Slightly worse pneumothorax, unchanged infiltrates         Assessment/Plan:      * Hydropneumothorax  I spoke to Dr. Rousseau and remaining chest tube has an air leak.  Not ready to come out.  Given this new fact, discussed again with patient possibility of LTAC.  CM consulted.  If tube is able to come out in interim, will revise discharge plan.       Ileus  Post op ileus- resolved  NGT out and tolerating  diet      Volume overload        Parapneumonic effusion        Liver enzyme elevation        Anasarca  On diuretic      Empyema    S/p right thoracotomy decortication   2 of 3 chest tubes pulled 2/11  Will follow up with Dr. Rousseau regarding plan for last chest tube    Acute massive pulmonary embolism  Heparin gtt transitioned to lovenox  Transitioning lovenox to eliquis  Echo done with some evidence of right sided overload  Good room air saturations     Chronic hepatitis C without hepatic coma  Patient has not seen a hepatologist for treatment of hepatitis C  Outpatient followup      Pneumonia  Abx transitioned to po cipro based on culture from empyema.    Essential hypertension  Resume home antihypertensives      Acute on chronic combined systolic and diastolic congestive heart failure  Patient has evidence of fluid overload with elevated BNP, EF is 15%  IV lasix transitioned to po laisx  Strict Is & O's monitoring  Daily weights        VTE Risk Mitigation (From admission, onward)         Ordered     apixaban tablet 10 mg  2 times daily      02/11/20 1959     IP VTE HIGH RISK PATIENT  Once      01/27/20 1516     Reason for No Pharmacological VTE Prophylaxis  Once     Question:  Reasons:  Answer:  Already adequately anticoagulated on oral Anticoagulants    01/27/20 1516                      Lex Brenner MD  Department of Hospital Medicine   [facility]

## 2020-02-13 NOTE — PROGRESS NOTES
"[facility]  Adult Nutrition   Progress Note (Follow-Up)    SUMMARY     Recommendations  Recommendation/Intervention: 1.) Continue current diet as tolerated by pt;  to assist with meal choices daily   Goals: 1.) Patient to meet at least 75% estimated nutritional needs via PO diet  Nutrition Goal Status: progressing towards goal  Communication of RD Recs: reviewed with RN    Dietitian Rounds Brief    Pt seen for f/u. Resting during visit; not wanting to be disturbed. Observed bkfst tray; 100% consumed. Intake good per I&Os. CT remains in place. Pt up walking with PT yesterday. No needs voiced at this time. Will continue to follow.      Reason for Assessment  Reason For Assessment: RD follow-up  Diagnosis: infection/sepsis  Relevant Medical History: CHF, HCV, DVT, HTN    Nutrition Risk Screen  Nutrition Risk Screen: no indicators present     MST Score: 0  Have you recently lost weight without trying?: No  Weight loss score: 0  Have you been eating poorly because of a decreased appetite?: No  Appetite score: 0       Nutrition/Diet History  Patient Reported Diet/Restrictions/Preferences: low salt  Spiritual, Cultural Beliefs, Judaism Practices, Values that Affect Care: no  Food Allergies: NKFA  Factors Affecting Nutritional Intake: None identified at this time    Anthropometrics  Temp: 98.7 °F (37.1 °C)  Height Method: Stated  Height: 5' 8" (172.7 cm)  Height (inches): 68 in  Weight Method: Bed Scale  Weight: 67.3 kg (148 lb 5.9 oz)  Weight (lb): 148.37 lb  Ideal Body Weight (IBW), Male: 154 lb  % Ideal Body Weight, Male (lb): 96.34 %  BMI (Calculated): 22.6  BMI Grade: 18.5-24.9 - normal  Weight Loss Since Admission: 11 lb 11 oz  % Weight Change Since Admission: 7.88       Weight History:  Wt Readings from Last 10 Encounters:   02/07/20 67.3 kg (148 lb 5.9 oz)   12/28/19 75.2 kg (165 lb 12.6 oz)   12/09/19 74.8 kg (165 lb)   10/30/19 69.8 kg (153 lb 14.1 oz)   08/25/19 70.5 kg (155 lb 6.8 oz) "   07/06/19 74.8 kg (165 lb)   07/06/19 74.8 kg (165 lb)   07/04/19 73.1 kg (161 lb 1.6 oz)       Lab/Procedures/Meds: Pertinent Labs Reviewed  Clinical Chemistry:  Recent Labs   Lab 02/10/20  0620 02/12/20  0530 02/13/20  0513   * 136 133*  133*   K 4.0 4.3 4.3  4.3   CL 95 96 97  97   CO2 33* 33* 31*  31*    97 90  90   BUN 8 9 10  10   CREATININE 0.6 0.6 0.7  0.7   CALCIUM 7.9* 8.4* 8.1*  8.1*   PROT 6.3 6.5 6.5  6.5   ALBUMIN 1.9* 2.0* 2.0*  2.0*   BILITOT 1.3* 1.0 1.3*  1.3*   ALKPHOS 63 69 71  71   AST 41* 42* 41*  41*   ALT 32 30 30  30   ANIONGAP 5* 7* 5*  5*   ESTGFRAFRICA >60.0 >60.0 >60.0  >60.0   EGFRNONAA >60.0 >60.0 >60.0  >60.0   MG 1.5* 1.7 1.5*  1.5*   PHOS 3.1 3.6 3.5  3.5     CBC:   Recent Labs   Lab 02/13/20  0513   WBC 15.97*  15.97*   RBC 3.23*  3.23*   HGB 8.7*  8.7*   HCT 28.3*  28.3*   *  682*   MCV 88  88   MCH 26.9*  26.9*   MCHC 30.7*  30.7*       Medications: Pertinent Medications reviewed  Scheduled Meds:   apixaban  10 mg Oral BID    ciprofloxacin HCl  500 mg Oral Q12H    enalapril  2.5 mg Oral Daily    furosemide  20 mg Oral Daily    hydrOXYzine HCl  25 mg Oral Daily    metoclopramide HCl  10 mg Oral TID AC    metoprolol succinate  100 mg Oral Daily    pantoprazole  40 mg Oral Daily    polyethylene glycol  17 g Oral BID    senna-docusate 8.6-50 mg  1 tablet Oral BID    spironolactone  25 mg Oral Daily     Continuous Infusions:  PRN Meds:.sodium chloride, acetaminophen, acetaminophen, acetaminophen, ALPRAZolam, calcium chloride IVPB, calcium chloride IVPB, calcium chloride IVPB, dextrose 50%, dextrose 50%, diphenhydrAMINE, furosemide, gabapentin, glucagon (human recombinant), glucose, glucose, HYDROcodone-acetaminophen, HYDROcodone-acetaminophen, lactulose, magnesium oxide, magnesium sulfate IVPB, magnesium sulfate IVPB, magnesium sulfate IVPB, magnesium sulfate IVPB, ondansetron, potassium chloride in water, potassium chloride  in water, potassium chloride in water, potassium chloride in water, potassium chloride, potassium chloride, potassium chloride, potassium chloride, promethazine (PHENERGAN) IVPB, sodium chloride 0.9%, sodium phosphate IVPB, sodium phosphate IVPB, sodium phosphate IVPB, sodium phosphate IVPB, sodium phosphate IVPB    Estimated/Assessed Needs    Weight Used For Calorie Calculations: 67.3 kg (148 lb 5.9 oz)  Energy Calorie Requirements (kcal): 2019 (30 kcal/kg)   Energy Need Method: Kcal/kg  Protein Requirements: 81 - 101 (1.2 - 1.5g/kg)   Weight Used For Protein Calculations: 67.3 kg (148 lb 5.9 oz)     Estimated Fluid Requirement Method: RDA Method  RDA Method (mL): 2019       Nutrition Prescription Ordered  Current Diet Order: regular   Oral Nutrition Supplement: Ensure Enlive TID (1050 kcal, 60 g pro)    Evaluation of Received Nutrient/Fluid Intake  Energy Calories Required: meeting needs  Protein Required: meeting needs  Fluid Required: meeting needs  Tolerance: tolerating     Intake/Output Summary (Last 24 hours) at 2/13/2020 0956  Last data filed at 2/13/2020 0500  Gross per 24 hour   Intake 500 ml   Output 350 ml   Net 150 ml        % Meal Intake: 75 - 100 %      Nutrition Risk  Level of Risk/Frequency of Follow-up: moderate       Monitor and Evaluation  Food and Nutrient Intake: food and beverage intake, energy intake  Food and Nutrient Adminstration: diet order  Knowledge/Beliefs/Attitudes: food and nutrition knowledge/skill, beliefs and attitudes  Physical Activity and Function: nutrition-related ADLs and IADLs  Anthropometric Measurements: weight change, weight  Biochemical Data, Medical Tests and Procedures: gastrointestinal profile, electrolyte and renal panel, glucose/endocrine profile  Nutrition-Focused Physical Findings: overall appearance     Nutrition Follow-Up  RD Follow-up?: Yes    Tiffanie Pena, PO 02/13/2020 9:56 AM

## 2020-02-13 NOTE — PROGRESS NOTES
Feeling better overall  Afebrile VSS  BS clear anterior  CV RRR  Chest tubes with minimal serous output  Small leak from tube over diapgragm    Anterior and posterior tubes removed    Will leave last tube till leak seals Hope to get it out in 48 - 72 hours    Will follow

## 2020-02-14 VITALS
DIASTOLIC BLOOD PRESSURE: 76 MMHG | OXYGEN SATURATION: 98 % | HEIGHT: 68 IN | RESPIRATION RATE: 17 BRPM | TEMPERATURE: 98 F | BODY MASS INDEX: 22.49 KG/M2 | SYSTOLIC BLOOD PRESSURE: 118 MMHG | WEIGHT: 148.38 LBS | HEART RATE: 91 BPM

## 2020-02-14 PROBLEM — E87.70 VOLUME OVERLOAD: Status: RESOLVED | Noted: 2020-01-30 | Resolved: 2020-02-14

## 2020-02-14 PROBLEM — J18.9 PARAPNEUMONIC EFFUSION: Status: RESOLVED | Noted: 2020-01-29 | Resolved: 2020-02-14

## 2020-02-14 PROBLEM — J86.9 EMPYEMA: Status: RESOLVED | Noted: 2019-12-19 | Resolved: 2020-02-14

## 2020-02-14 PROBLEM — J94.8 HYDROPNEUMOTHORAX: Status: RESOLVED | Noted: 2019-12-21 | Resolved: 2020-02-14

## 2020-02-14 PROBLEM — J91.8 PARAPNEUMONIC EFFUSION: Status: RESOLVED | Noted: 2020-01-29 | Resolved: 2020-02-14

## 2020-02-14 PROBLEM — I50.43 ACUTE ON CHRONIC COMBINED SYSTOLIC AND DIASTOLIC CONGESTIVE HEART FAILURE: Status: RESOLVED | Noted: 2019-07-03 | Resolved: 2020-02-14

## 2020-02-14 PROCEDURE — 25000003 PHARM REV CODE 250: Performed by: THORACIC SURGERY (CARDIOTHORACIC VASCULAR SURGERY)

## 2020-02-14 PROCEDURE — 25000003 PHARM REV CODE 250: Performed by: INTERNAL MEDICINE

## 2020-02-14 PROCEDURE — 99233 SBSQ HOSP IP/OBS HIGH 50: CPT | Mod: ,,, | Performed by: INTERNAL MEDICINE

## 2020-02-14 PROCEDURE — 99233 PR SUBSEQUENT HOSPITAL CARE,LEVL III: ICD-10-PCS | Mod: ,,, | Performed by: INTERNAL MEDICINE

## 2020-02-14 RX ORDER — SPIRONOLACTONE 25 MG/1
25 TABLET ORAL DAILY
Qty: 30 TABLET | Refills: 0
Start: 2020-02-14 | End: 2020-03-16 | Stop reason: CLARIF

## 2020-02-14 RX ORDER — HYDROCODONE BITARTRATE AND ACETAMINOPHEN 10; 325 MG/1; MG/1
1 TABLET ORAL EVERY 6 HOURS PRN
Qty: 20 TABLET | Refills: 0 | Status: SHIPPED | OUTPATIENT
Start: 2020-02-14

## 2020-02-14 RX ORDER — CIPROFLOXACIN 500 MG/1
500 TABLET ORAL EVERY 12 HOURS
Qty: 48 TABLET | Refills: 0 | Status: SHIPPED | OUTPATIENT
Start: 2020-02-14 | End: 2020-03-23

## 2020-02-14 RX ORDER — SPIRONOLACTONE 25 MG/1
25 TABLET ORAL DAILY
Qty: 30 TABLET | Refills: 1 | Status: SHIPPED | OUTPATIENT
Start: 2020-02-15 | End: 2020-03-16 | Stop reason: CLARIF

## 2020-02-14 RX ORDER — ALPRAZOLAM 0.25 MG/1
0.25 TABLET ORAL
Qty: 7 TABLET | Refills: 0 | Status: ON HOLD | OUTPATIENT
Start: 2020-02-14 | End: 2020-03-16

## 2020-02-14 RX ADMIN — APIXABAN 10 MG: 5 TABLET, FILM COATED ORAL at 08:02

## 2020-02-14 RX ADMIN — CIPROFLOXACIN HYDROCHLORIDE 500 MG: 500 TABLET, FILM COATED ORAL at 08:02

## 2020-02-14 RX ADMIN — METOCLOPRAMIDE HYDROCHLORIDE 10 MG: 10 TABLET ORAL at 05:02

## 2020-02-14 RX ADMIN — METOCLOPRAMIDE HYDROCHLORIDE 10 MG: 10 TABLET ORAL at 10:02

## 2020-02-14 RX ADMIN — METOPROLOL SUCCINATE 100 MG: 50 TABLET, EXTENDED RELEASE ORAL at 08:02

## 2020-02-14 RX ADMIN — GABAPENTIN 100 MG: 100 CAPSULE ORAL at 02:02

## 2020-02-14 RX ADMIN — HYDROXYZINE HYDROCHLORIDE 25 MG: 25 TABLET, FILM COATED ORAL at 08:02

## 2020-02-14 RX ADMIN — FUROSEMIDE 20 MG: 20 TABLET ORAL at 08:02

## 2020-02-14 RX ADMIN — HYDROCODONE BITARTRATE AND ACETAMINOPHEN 1 TABLET: 10; 325 TABLET ORAL at 05:02

## 2020-02-14 RX ADMIN — GABAPENTIN 100 MG: 100 CAPSULE ORAL at 11:02

## 2020-02-14 RX ADMIN — HYDROCODONE BITARTRATE AND ACETAMINOPHEN 1 TABLET: 10; 325 TABLET ORAL at 12:02

## 2020-02-14 RX ADMIN — PANTOPRAZOLE SODIUM 40 MG: 40 TABLET, DELAYED RELEASE ORAL at 05:02

## 2020-02-14 NOTE — ANESTHESIA POSTPROCEDURE EVALUATION
Anesthesia Post Evaluation    Patient: Francis Daigle    Procedure(s) Performed: Procedure(s) (LRB):  DECORTICATION, LUNG (Right)    Final Anesthesia Type: general    Patient location during evaluation: PACU  Patient participation: Yes- Able to Participate  Level of consciousness: awake and alert  Post-procedure vital signs: reviewed and stable  Pain management: adequate  Airway patency: patent    PONV status at discharge: No PONV  Anesthetic complications: no      Cardiovascular status: blood pressure returned to baseline and stable  Respiratory status: unassisted and room air  Hydration status: euvolemic  Follow-up not needed.          Vitals Value Taken Time   /64 2/14/2020  7:48 AM   Temp 36.7 °C (98.1 °F) 2/14/2020  7:48 AM   Pulse 96 2/14/2020  7:48 AM   Resp 17 2/14/2020  7:48 AM   SpO2 97 % 2/14/2020  7:48 AM         No case tracking events are documented in the log.      Pain/Ladonna Score: Pain Rating Prior to Med Admin: 6 (2/14/2020  5:53 AM)  Pain Rating Post Med Admin: 4 (2/14/2020 12:34 AM)

## 2020-02-14 NOTE — PROGRESS NOTES
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    Prior hospital records reviewed and it reveals patient was discharged from [facility] one month ago after a complicated hospital course for acute cholecystits complicated by b/l DVT, bilateral PE s/p tPA, PEA cardiac arrest. HE was in the hospital for 20 days and was discharged home. He has not had any follow up visits since then.    He reports increasing lower extremity swelling progressing to scrotal swelling for the past 5 days. He endorses compliance with Lasix 40mg daily and states that he has been taking an extra 20mg daily for the past 5 days. He also endorses a dry cough, congestion and cold intolerance. He denies fever, chills, chest pain.    In the ED he has received vancomycin, zosyn and levaquin, fluid bolus and 40mg IV lasix. Chest xray shows a right middle lobe pneumonia.     Overview/Hospital Course:  1/27: IVF administered per sepsis protocol, diuresis also initiated at 40 mg lasix IVP; placed on abx for sepsis secondary to pneumonia unkonwn organism    1/28: IVF discontinued, lasix ordered as prn weight based parameters; hemodynamically stable, not requiring central line/pressors; cxr in am. Continue antibiotics for pneumonia with sepsis    1/29: cxr shows small right hydronpneumothorax; pulmonology consulted and CT shows possible empyema with a collection; surgery consulted, eliquis held, patient to be placed on heparin drip; staff are concerned  about possible fall risk while on heparin so fall risk order placed,as well as nursing communication that patient should be up with assistance. Cardiology consulted for clearance: echo ordered, lasix changed to scheduled, a dose of albumin is also ordered, will repeat albumin as needed and monitor kidney function.     1/30: patient is down 5.7 L thus far; albumin x 2 more doses, lasix. Echo is reviewed: ef 30% with G2DD and mild pulm htn. Discussed with pulm and cardiology note is reviewed, will need several days of diuresis and too high risk for anesthesia- after a period of time will await surgery recommendations wrt empyema ; pulm signing off- can contact as needed. Continue heparin for now, if it is decided patient does not need surgical intervention will dc heparin ggt and resume OAC. Will reassess ascites )admission CT reviewed) and order abdominal US, off loading fluid from abdomen may improve pressure on venous return from the extremities/scrotum.  Because of spironolactone intolerance Cards has changed additional diuretic to eplerenone.    1/31: edema of the scrotum has significantly improved, leukocytosis from chronic empyema? Will follow cardiology plans for diuresis and revisit any need for surgery with CTS- if not needed then will discontinue heparin and restart home eliquis, with possible discharge home at the completion of diuresis. Nutrition: improve protein intake; diuresis+albumin; io: is net negative 9.8L today.   GI recommendations reviewed and appreciated.    2/1: patient states Dr. Light has discussed with him plans for Tuesday. Will follow up any pulm recommendations. For now - last dose of albumin and continued plans for diuresis per cardiology. Continuing heparin ggt for now.   Since admission has diuresed 21, in 10; net negative 11L    2/2: Today is day 7 of antibiotics, lactic acid has not been elevated so those labs are discontinued. WBC secondary to empyema? procalcitonin is ordered, if  is not elevated will dc abx if pulm agrees.   Continues to be on IV heparin, ongoing diuresis io since admission is net -15L, swelling is reduced, tissue over the skin is more supple though pitting present, scrotum is back to normal size per patient.       2/11: Patient had 2 of the 3 chest tubes pulled.  IV abx changed to po cipro based on pleural fluid sensitivities. NGT removed after resolution of ileus and currently tolerating diet.  On room air. On oral diuresis for acute on chronic systolic and diastolic HF.  Does not want to go to a LTAC- he wants to go home.     2/12: Spoke with Dr. Rousseau and he has an air leak in the remaining tube and thus not ready to come out.     2/13: Chest tube clamped per CT surgery and will repeat CXR tonight and tomorrow AM to determine if this can be pulled.    Interval History: Patient reports some discomfort to the chest tube site that is sharp, persistent, moderately severe.  No increase in pain or SOB since being clamped.  Ambulating in [facility].  Wants to go home.     Review of Systems   Constitutional: Negative.    HENT: Negative.    Eyes: Negative.    Respiratory: Positive for cough and shortness of breath.    Cardiovascular: Negative.         Pain to chest tube site   Gastrointestinal: Negative.    Endocrine: Negative.    Genitourinary: Negative.    Musculoskeletal: Negative.    Skin: Negative.    Allergic/Immunologic: Negative.    Neurological: Negative.    Hematological: Negative.    Psychiatric/Behavioral: Negative.      Objective:     Vital Signs (Most Recent):  Temp: (Pt.refused) (02/13/20 1600)  Pulse: 99 (02/13/20 1200)  Resp: 20 (02/13/20 1200)  BP: 119/77 (02/13/20 1200)  SpO2: 99 % (02/13/20 1200) Vital Signs (24h Range):  Temp:  [97.4 °F (36.3 °C)-98.7 °F (37.1 °C)] 97.4 °F (36.3 °C)  Pulse:  [] 99  Resp:  [16-20] 20  SpO2:  [95 %-99 %] 99 %  BP: (112-127)/(71-77) 119/77     Weight: 67.3 kg (148 lb 5.9 oz)  Body mass index is 22.56 kg/m².    Intake/Output  Summary (Last 24 hours) at 2/13/2020 1915  Last data filed at 2/13/2020 1600  Gross per 24 hour   Intake 1700 ml   Output 350 ml   Net 1350 ml      Physical Exam   Constitutional: He is oriented to person, place, and time. He appears well-developed. No distress.   HENT:   Head: Normocephalic and atraumatic.   Mouth/Throat: Oropharynx is clear and moist.   Eyes: Pupils are equal, round, and reactive to light. EOM are normal.   Neck: Normal range of motion.   Cardiovascular: Regular rhythm.   No murmur heard.  Right sided chest tube   Pulmonary/Chest: Effort normal and breath sounds normal. He has no wheezes.   Abdominal: Soft. He exhibits no distension. There is no tenderness. There is no rebound and no guarding.   Musculoskeletal: Normal range of motion.   Neurological: He is alert and oriented to person, place, and time. No cranial nerve deficit or sensory deficit.   Skin: No rash noted.   Psychiatric: He has a normal mood and affect.   Nursing note and vitals reviewed.      Significant Labs:   CBC:   Recent Labs   Lab 02/12/20 0530 02/13/20 0513   WBC 16.48* 15.97*  15.97*   HGB 9.0* 8.7*  8.7*   HCT 29.6* 28.3*  28.3*   * 682*  682*     CMP:   Recent Labs   Lab 02/12/20 0530 02/13/20 0513    133*  133*   K 4.3 4.3  4.3   CL 96 97  97   CO2 33* 31*  31*   GLU 97 90  90   BUN 9 10  10   CREATININE 0.6 0.7  0.7   CALCIUM 8.4* 8.1*  8.1*   PROT 6.5 6.5  6.5   ALBUMIN 2.0* 2.0*  2.0*   BILITOT 1.0 1.3*  1.3*   ALKPHOS 69 71  71   AST 42* 41*  41*   ALT 30 30  30   ANIONGAP 7* 5*  5*   EGFRNONAA >60.0 >60.0  >60.0       Significant Imaging: CXR: I have reviewed all pertinent results/findings within the past 24 hours and my personal findings are:  Persistent but unchanged small pneumothorax      Assessment/Plan:      * Hydropneumothorax  I spoke to Dr. Rousseau and remaining chest tube has an air leak.  Not ready to come out.  Given this new fact, discussed again with patient  possibility of LTAC.  CM consulted.  If tube is able to come out in interim, will revise discharge plan.      2/13 chest tube clamped. Repeating CXR tonight and tomorrow to see if tube can come out.  If it can come out, will discharge patient home.       Volume overload        Parapneumonic effusion        Anasarca  On diuretic      Empyema    S/p right thoracotomy decortication   2 of 3 chest tubes pulled 2/11  Will follow up with Dr. Rousseau regarding plan for last chest tube    Acute massive pulmonary embolism  Heparin gtt transitioned to lovenox  Transitioning lovenox to eliquis  Echo done with some evidence of right sided overload  Good room air saturations     Chronic hepatitis C without hepatic coma  Patient has not seen a hepatologist for treatment of hepatitis C  Outpatient followup      Pneumonia  Abx transitioned to po cipro based on culture from empyema.    Essential hypertension  Resume home antihypertensives      Acute on chronic combined systolic and diastolic congestive heart failure  Patient has evidence of fluid overload with elevated BNP, EF is 15%  IV lasix transitioned to po laisx  Strict Is & O's monitoring  Daily weights        VTE Risk Mitigation (From admission, onward)         Ordered     apixaban tablet 10 mg  2 times daily      02/11/20 1959     IP VTE HIGH RISK PATIENT  Once      01/27/20 1516     Reason for No Pharmacological VTE Prophylaxis  Once     Question:  Reasons:  Answer:  Already adequately anticoagulated on oral Anticoagulants    01/27/20 1516                      Lex Brenner MD  Department of Hospital Medicine   [facility]

## 2020-02-14 NOTE — PLAN OF CARE
02/14/20 1518   Discharge Reassessment   Assessment Type Final Discharge Note   Anticipated Discharge Disposition Home     Pt no longer in need of LTAC due to chest tube being pulled.  Pt discharged home this date and discharge orders reviewed.  No SS / CM orders noted at this time.

## 2020-02-14 NOTE — PROGRESS NOTES
[facility]  Pulmonology  Progress Note    Subjective     No major issues overnight.  Chest tube clamped yesterday.  No new symptoms or complaints since.     Review of Systems   Constitutional: Negative for fever.   Respiratory: Negative for sputum production, shortness of breath, pleurisy and dyspnea on extertion.    Cardiovascular: Negative for chest pain.   Gastrointestinal: Negative for abdominal pain.   Psychiatric/Behavioral: Negative for confusion.      I have personally reviewed the following during today's evaluation:  past medical history, ROS, family history, social history, surgical history, current inpatient medications,drug allergies, vital signs over the past 24 hours, results of relevant diagnostic studies and nursing/provider documentation from the past 24 hours.     Objective     VS Temp:  [98 °F (36.7 °C)-98.1 °F (36.7 °C)]   Pulse:  []   Resp:  [16-20]   BP: (106-135)/(64-90)   SpO2:  [96 %-97 %]   Ideal body weight: 68.4 kg (150 lb 12.7 oz)   I/O   Intake/Output Summary (Last 24 hours) at 2/14/2020 1245  Last data filed at 2/14/2020 0118  Gross per 24 hour   Intake 1200 ml   Output 1200 ml   Net 0 ml        Vent SpO2 97% on room air   PE Physical Exam   Constitutional: He is oriented to person, place, and time. He appears well-developed and well-nourished. No distress.   HENT:   Head: Normocephalic.   Mouth/Throat: Oropharynx is clear and moist. Mallampati Score: II.   Neck: Normal range of motion. Neck supple. No JVD present.   Cardiovascular: Normal rate, regular rhythm, normal heart sounds and intact distal pulses.   Pulmonary/Chest: Normal expansion, symmetric chest wall expansion, effort normal and breath sounds normal. He has no decreased breath sounds.   Right-sided chest tube clamped.   Abdominal: Soft. Bowel sounds are normal. He exhibits no distension. There is no tenderness.   Musculoskeletal: Normal range of motion. He exhibits no edema.   Lymphadenopathy:     He  has no cervical adenopathy.   Neurological: He is alert and oriented to person, place, and time.   Skin: Skin is warm and dry. No rash noted.   Psychiatric: He has a normal mood and affect.   Nursing note and vitals reviewed.        Labs I have personally reviewed and interpreted all labs / diagnostic studies obtained over the past 24 hours, and relevant results are as follows:  No new labs.   Imaging I have personally reviewed and interpreted the following images and reviewed the associated Radiology report.  CXR:  Small right apical pneumothorax is not increased in size compared to previous films.     Micro I have personally reviewed and interpreted the available culture data.  Relevant results are as follows.  Blood Culture   Lab Results   Component Value Date    LABBLOO No growth after 5 days. 01/27/2020   , Sputum Culture   Lab Results   Component Value Date    GSRESP Few WBC's 12/11/2019    GSRESP Few yeast 12/11/2019    RESPIRATORYC No S aureus or Pseudomonas isolated. 12/11/2019    RESPIRATORYC KEYSHAWN ALBICANS  Moderate   (A) 12/11/2019    RESPIRATORYC (A) 12/11/2019     ENTEROBACTER CLOACAE  Few  susceptibility pending      and Urine Culture  No results found for: LABURIN   Medications Scheduled    apixaban  10 mg Oral BID    ciprofloxacin HCl  500 mg Oral Q12H    enalapril  2.5 mg Oral Daily    furosemide  20 mg Oral Daily    hydrOXYzine HCl  25 mg Oral Daily    metoclopramide HCl  10 mg Oral TID AC    metoprolol succinate  100 mg Oral Daily    pantoprazole  40 mg Oral Daily    senna-docusate 8.6-50 mg  1 tablet Oral BID    spironolactone  25 mg Oral Daily      Continuous Infusions:      PRN   sodium chloride, acetaminophen, acetaminophen, acetaminophen, ALPRAZolam, calcium chloride IVPB, calcium chloride IVPB, calcium chloride IVPB, dextrose 50%, dextrose 50%, diphenhydrAMINE, furosemide, gabapentin, glucagon (human recombinant), glucose, glucose, HYDROcodone-acetaminophen,  HYDROcodone-acetaminophen, lactulose, magnesium oxide, magnesium sulfate IVPB, magnesium sulfate IVPB, magnesium sulfate IVPB, magnesium sulfate IVPB, ondansetron, potassium chloride in water, potassium chloride in water, potassium chloride in water, potassium chloride in water, potassium chloride, potassium chloride, potassium chloride, potassium chloride, promethazine (PHENERGAN) IVPB, sodium chloride 0.9%, sodium phosphate IVPB, sodium phosphate IVPB, sodium phosphate IVPB, sodium phosphate IVPB, sodium phosphate IVPB        Assessment       Active Hospital Problems    Diagnosis    *Hydropneumothorax    Volume overload    Parapneumonic effusion    Anasarca    Empyema    Acute massive pulmonary embolism    Chronic hepatitis C without hepatic coma    Pneumonia    Essential hypertension    Acute on chronic combined systolic and diastolic congestive heart failure      My Impression:  Recovering appropriately.  I suspect that his pneumothorax is ex vacuo from his chest tube being removed a couple days ago as is not extubated with the chest tube clamped.    Plan     Pulmonary  · Receiving ciprofloxacin. Will need to complete 6 weeks of abx in total.  · Thoracic surgery following and I will defer to their expertise regarding his postoperative chest tube management.  · Titrate supplemental oxygen to maintain an SpO2 greater than 92%.  · Continued oral diuresis.  · Continue DOAC    Cardiac/Vascular  · Hemodynamically stable this time.  · Continue metoprolol and enalapril.     Please arrange follow-up with me in clinic in 1-2 weeks.         Trae Steele MD  Pulmonary / Critical Care Medicine  [facility]

## 2020-02-14 NOTE — SUBJECTIVE & OBJECTIVE
Interval History: Patient reports some discomfort to the chest tube site that is sharp, persistent, moderately severe.  No increase in pain or SOB since being clamped.  Ambulating in [place].  Wants to go home.     Review of Systems   Constitutional: Negative.    HENT: Negative.    Eyes: Negative.    Respiratory: Positive for cough and shortness of breath.    Cardiovascular: Negative.         Pain to chest tube site   Gastrointestinal: Negative.    Endocrine: Negative.    Genitourinary: Negative.    Musculoskeletal: Negative.    Skin: Negative.    Allergic/Immunologic: Negative.    Neurological: Negative.    Hematological: Negative.    Psychiatric/Behavioral: Negative.      Objective:     Vital Signs (Most Recent):  Temp: (Pt.refused) (02/13/20 1600)  Pulse: 99 (02/13/20 1200)  Resp: 20 (02/13/20 1200)  BP: 119/77 (02/13/20 1200)  SpO2: 99 % (02/13/20 1200) Vital Signs (24h Range):  Temp:  [97.4 °F (36.3 °C)-98.7 °F (37.1 °C)] 97.4 °F (36.3 °C)  Pulse:  [] 99  Resp:  [16-20] 20  SpO2:  [95 %-99 %] 99 %  BP: (112-127)/(71-77) 119/77     Weight: 67.3 kg (148 lb 5.9 oz)  Body mass index is 22.56 kg/m².    Intake/Output Summary (Last 24 hours) at 2/13/2020 1915  Last data filed at 2/13/2020 1600  Gross per 24 hour   Intake 1700 ml   Output 350 ml   Net 1350 ml      Physical Exam   Constitutional: He is oriented to person, place, and time. He appears well-developed. No distress.   HENT:   Head: Normocephalic and atraumatic.   Mouth/Throat: Oropharynx is clear and moist.   Eyes: Pupils are equal, round, and reactive to light. EOM are normal.   Neck: Normal range of motion.   Cardiovascular: Regular rhythm.   No murmur heard.  Right sided chest tube   Pulmonary/Chest: Effort normal and breath sounds normal. He has no wheezes.   Abdominal: Soft. He exhibits no distension. There is no tenderness. There is no rebound and no guarding.   Musculoskeletal: Normal range of motion.   Neurological: He is alert and oriented to  person, place, and time. No cranial nerve deficit or sensory deficit.   Skin: No rash noted.   Psychiatric: He has a normal mood and affect.   Nursing note and vitals reviewed.      Significant Labs:   CBC:   Recent Labs   Lab 02/12/20 0530 02/13/20 0513   WBC 16.48* 15.97*  15.97*   HGB 9.0* 8.7*  8.7*   HCT 29.6* 28.3*  28.3*   * 682*  682*     CMP:   Recent Labs   Lab 02/12/20 0530 02/13/20  0513    133*  133*   K 4.3 4.3  4.3   CL 96 97  97   CO2 33* 31*  31*   GLU 97 90  90   BUN 9 10  10   CREATININE 0.6 0.7  0.7   CALCIUM 8.4* 8.1*  8.1*   PROT 6.5 6.5  6.5   ALBUMIN 2.0* 2.0*  2.0*   BILITOT 1.0 1.3*  1.3*   ALKPHOS 69 71  71   AST 42* 41*  41*   ALT 30 30  30   ANIONGAP 7* 5*  5*   EGFRNONAA >60.0 >60.0  >60.0       Significant Imaging: CXR: I have reviewed all pertinent results/findings within the past 24 hours and my personal findings are:  Persistent but unchanged small pneumothorax

## 2020-02-14 NOTE — ASSESSMENT & PLAN NOTE
I spoke to Dr. Rousseau and remaining chest tube has an air leak.  Not ready to come out.  Given this new fact, discussed again with patient possibility of LTAC.  CM consulted.  If tube is able to come out in interim, will revise discharge plan.      2/13 chest tube clamped. Repeating CXR tonight and tomorrow to see if tube can come out.  If it can come out, will discharge patient home.

## 2020-02-15 NOTE — DISCHARGE SUMMARY
[facility] Medicine  Discharge Summary      Patient Name: Francis Daigle  MRN: [id number]  Admission Date: 1/27/2020  Hospital Length of Stay: 18 days  Discharge Date and Time: 2/14/2020  1:36 PM  Attending Physician: Cece att. providers found   Discharging Provider: Lex Brenner MD  Primary Care Provider: Primary Doctor Cece      HPI:   44-year-old male with past medical history of combined diastolic and systolic CHF (EF 15% as at 12/2019), HCV, bilateral LE DVTs and bilateral PE, prior IVDU, HTn who presented with groin swelling of 5 days duration.    Prior hospital records reviewed and it reveals patient was discharged from [facility] one month ago after a complicated hospital course for acute cholecystits complicated by b/l DVT, bilateral PE s/p tPA, PEA cardiac arrest. HE was in the hospital for 20 days and was discharged home. He has not had any follow up visits since then.    He reports increasing lower extremity swelling progressing to scrotal swelling for the past 5 days. He endorses compliance with Lasix 40mg daily and states that he has been taking an extra 20mg daily for the past 5 days. He also endorses a dry cough, congestion and cold intolerance. He denies fever, chills, chest pain.    In the ED he has received vancomycin, zosyn and levaquin, fluid bolus and 40mg IV lasix. Chest xray shows a right middle lobe pneumonia.     Procedure(s) (LRB):  DECORTICATION, LUNG (Right)      Hospital Course:   1/27: IVF administered per sepsis protocol, diuresis also initiated at 40 mg lasix IVP; placed on abx for sepsis secondary to pneumonia unkonwn organism    1/28: IVF discontinued, lasix ordered as prn weight based parameters; hemodynamically stable, not requiring central line/pressors; cxr in am. Continue antibiotics for pneumonia with sepsis    1/29: cxr shows small right hydronpneumothorax; pulmonology consulted and CT shows possible empyema with a collection; surgery consulted, eliquis  held, patient to be placed on heparin drip; staff are concerned about possible fall risk while on heparin so fall risk order placed,as well as nursing communication that patient should be up with assistance. Cardiology consulted for clearance: echo ordered, lasix changed to scheduled, a dose of albumin is also ordered, will repeat albumin as needed and monitor kidney function.     1/30: patient is down 5.7 L thus far; albumin x 2 more doses, lasix. Echo is reviewed: ef 30% with G2DD and mild pulm htn. Discussed with pulm and cardiology note is reviewed, will need several days of diuresis and too high risk for anesthesia- after a period of time will await surgery recommendations wrt empyema ; pulm signing off- can contact as needed. Continue heparin for now, if it is decided patient does not need surgical intervention will dc heparin ggt and resume OAC. Will reassess ascites )admission CT reviewed) and order abdominal US, off loading fluid from abdomen may improve pressure on venous return from the extremities/scrotum.  Because of spironolactone intolerance Cards has changed additional diuretic to eplerenone.    1/31: edema of the scrotum has significantly improved, leukocytosis from chronic empyema? Will follow cardiology plans for diuresis and revisit any need for surgery with CTS- if not needed then will discontinue heparin and restart home eliquis, with possible discharge home at the completion of diuresis. Nutrition: improve protein intake; diuresis+albumin; io: is net negative 9.8L today.   GI recommendations reviewed and appreciated.    2/1: patient states Dr. Light has discussed with him plans for Tuesday. Will follow up any pulm recommendations. For now - last dose of albumin and continued plans for diuresis per cardiology. Continuing heparin ggt for now.   Since admission has diuresed 21, in 10; net negative 11L    2/2: Today is day 7 of antibiotics, lactic acid has not been elevated so those labs are  discontinued. WBC secondary to empyema? procalcitonin is ordered, if is not elevated will dc abx if pulm agrees.   Continues to be on IV heparin, ongoing diuresis io since admission is net -15L, swelling is reduced, tissue over the skin is more supple though pitting present, scrotum is back to normal size per patient.       2/11: Patient had 2 of the 3 chest tubes pulled.  IV abx changed to po cipro based on pleural fluid sensitivities. NGT removed after resolution of ileus and currently tolerating diet.  On room air. On oral diuresis for acute on chronic systolic and diastolic HF.  Does not want to go to a LTAC- he wants to go home.     2/12: Spoke with Dr. Rousseau and he has an air leak in the remaining tube and thus not ready to come out.     2/13: Chest tube clamped per CT surgery and will repeat CXR tonight and tomorrow AM to determine if this can be pulled.    2/14 chest tube was able to be pulled after repeat CXRs demonstrated stable small pneumothorax. He has been cleared for discharge home.  Will be discharged on cirpro to complete po course.  Reports he has eliquis at home and will continue.  Short course of Norco and Xanax prescribed.  Aldactone prescribed by Cardiology but jaycob does not believe he will take this as he does not like how it makes him feel- counseled this is our recommendation and i'll provide Rx but this is up to him. Will follow up with Dr. Steele in 2 weeks and Dr. Rousseau in 3 weeks.  Recommended against driving to [place] until seen by Dr. Steele.  Return precautions given.  Examined on day of discharge and alert, NAD, non toxic appearing, comfortable respirations on room air.      Consults:   Consults (From admission, onward)        Status Ordering Provider     Inpatient consult to Cardiology  Once     Provider:  Korey Vines MD    Completed VANESSA SHAIKH     Inpatient consult to Gastroenterology  Once     Provider:  Chapincito Ahn MD    Completed VANESSA SHAIKH     Inpatient  consult to Gastroenterology  Once     Provider:  Cindi Delacruz MD    Completed ELEAZAR DIAZ     Inpatient consult to Pulmonology  Once     Provider:  Trae Steele MD    Completed VANESSA SHAIKH     Inpatient consult to Registered Dietitian/Nutritionist  Once     Provider:  (Not yet assigned)    Completed VANESSA SHAIKH     Inpatient consult to Social Work/Case Management  Once     Provider:  (Not yet assigned)    Completed ERICKSONUNKELLEE, NISA O.     Inpatient consult to Social Work/Case Management  Once     Provider:  (Not yet assigned)    Completed OGUNWOLE, NISA O.          No new Assessment & Plan notes have been filed under this hospital service since the last note was generated.  Service: Hospital Medicine    Final Active Diagnoses:    Diagnosis Date Noted POA    Anasarca [R60.1] 01/27/2020 Yes    Acute massive pulmonary embolism [I26.99] 12/11/2019 Yes    Chronic hepatitis C without hepatic coma [B18.2] 12/10/2019 Yes    Pneumonia [J18.9] 12/10/2019 Yes    Essential hypertension [I10] 08/23/2019 Yes      Problems Resolved During this Admission:    Diagnosis Date Noted Date Resolved POA    PRINCIPAL PROBLEM:  Hydropneumothorax [J94.8] 12/21/2019 02/14/2020 Yes    Ileus [K56.7] 02/11/2020 02/13/2020 No    Volume overload [E87.70] 01/30/2020 02/14/2020 Yes    Parapneumonic effusion [J18.9, J91.8] 01/29/2020 02/14/2020 Yes    Liver enzyme elevation [R74.8] 01/27/2020 02/13/2020 Yes    Empyema [J86.9] 12/19/2019 02/14/2020 Yes    Severe sepsis [A41.9, R65.20] 12/10/2019 02/03/2020 Yes    Acute on chronic combined systolic and diastolic congestive heart failure [I50.43] 07/03/2019 02/14/2020 Yes       Discharged Condition: good    Disposition: Home or Self Care    Follow Up:  Follow-up Information     Trae Steele MD In 2 weeks.    Specialties:  Hospitalist, Pulmonary Disease  Contact information:  [address] [place] [id number]  [phone]             Rehan Rousseau  MD In 3 weeks.    Specialty:  Cardiothoracic Surgery  Contact information:  [place] [address]  [phone]             Primary Doctor No In 2 weeks.               Patient Instructions:      Diet Cardiac     Notify your health care provider if you experience any of the following:  temperature >100.4     Notify your health care provider if you experience any of the following:  persistent nausea and vomiting or diarrhea     Notify your health care provider if you experience any of the following:  severe uncontrolled pain     Notify your health care provider if you experience any of the following:  persistent dizziness, light-headedness, or visual disturbances     Activity as tolerated       Significant Diagnostic Studies: Labs:   CMP   Recent Labs   Lab 02/13/20  0513   *  133*   K 4.3  4.3   CL 97  97   CO2 31*  31*   GLU 90  90   BUN 10  10   CREATININE 0.7  0.7   CALCIUM 8.1*  8.1*   PROT 6.5  6.5   ALBUMIN 2.0*  2.0*   BILITOT 1.3*  1.3*   ALKPHOS 71  71   AST 41*  41*   ALT 30  30   ANIONGAP 5*  5*   ESTGFRAFRICA >60.0  >60.0   EGFRNONAA >60.0  >60.0    and CBC   Recent Labs   Lab 02/13/20  0513   WBC 15.97*  15.97*   HGB 8.7*  8.7*   HCT 28.3*  28.3*   *  682*       Pending Diagnostic Studies:     Procedure Component Value Units Date/Time    APTT [[id number]] Collected:  02/04/20 0546    Order Status:  Sent Lab Status:  In process Updated:  02/04/20 0551    Specimen:  Blood     Narrative:       Collection has been rescheduled by JUAQUIN at 02/04/2020 05:40 Reason: pt   wants to use bathroom first    CBC auto differential [[id number]] Collected:  01/30/20 0947    Order Status:  Sent Lab Status:  In process Updated:  01/30/20 0948    Specimen:  Blood          Medications:  Reconciled Home Medications:      Medication List      START taking these medications    ALPRAZolam 0.25 MG tablet  Commonly known as:  XANAX  Take 1 tablet (0.25 mg total) by mouth 3 (three) times  daily as needed for Anxiety.     ciprofloxacin HCl 500 MG tablet  Commonly known as:  CIPRO  Take 1 tablet (500 mg total) by mouth every 12 (twelve) hours. for 24 days     HYDROcodone-acetaminophen  mg per tablet  Commonly known as:  NORCO  Take 1 tablet by mouth every 6 (six) hours as needed.     spironolactone 25 MG tablet  Commonly known as:  ALDACTONE  Take 1 tablet (25 mg total) by mouth once daily.  Start taking on:  February 15, 2020        CONTINUE taking these medications    acetaminophen 325 MG tablet  Commonly known as:  TYLENOL  Take 325 mg by mouth every 6 (six) hours as needed for Pain.     Eliquis 5 mg Tab  Generic drug:  apixaban  Take 1 tablet (5 mg total) by mouth 2 (two) times daily.     enalapril 2.5 MG tablet  Commonly known as:  VASOTEC  Take 1 tablet (2.5 mg total) by mouth once daily.     furosemide 40 MG tablet  Commonly known as:  LASIX  Take 1 tablet (40 mg total) by mouth once daily.     * gabapentin 300 MG capsule  Commonly known as:  NEURONTIN  Take 1 capsule (300 mg total) by mouth every evening.     * gabapentin 100 MG capsule  Commonly known as:  NEURONTIN  Take 1 capsule (100 mg total) by mouth 3 (three) times daily as needed (anxiety/agitation/neuropathic pain).     hydrOXYzine HCl 25 MG tablet  Commonly known as:  ATARAX  Take 1 tablet (25 mg total) by mouth once daily.     lactulose 10 gram/15 mL solution  Commonly known as:  CHRONULAC  Take 15 mLs (10 g total) by mouth 3 (three) times daily as needed (constipation).     metoprolol succinate 100 MG 24 hr tablet  Commonly known as:  TOPROL-XL  Take 1 tablet (100 mg total) by mouth once daily.     pantoprazole 40 MG tablet  Commonly known as:  PROTONIX  Take 1 tablet (40 mg total) by mouth once daily.         * This list has 2 medication(s) that are the same as other medications prescribed for you. Read the directions carefully, and ask your doctor or other care provider to review them with you.                Indwelling  Lines/Drains at time of discharge:   Lines/Drains/Airways     None                 Time spent on the discharge of patient: 33minutes  Patient was seen and examined on the date of discharge and determined to be suitable for discharge.         Lex Brenner MD  Department of Hospital Medicine  [facility]

## 2020-02-21 LAB
ACID FAST MOD KINY STN SPEC: NORMAL
MYCOBACTERIUM SPEC QL CULT: NORMAL

## 2020-02-28 NOTE — CARE UPDATE
Readmission Prevention    DX PNA-MADALYN dx.    Pt was called back to make a drs appt in follow up for patient after hospitalization.  An appt at [facility], @ 1:00 pm was made for 3/09/2020. [phone]. Pt was contact called before, agreed to go anytime.  Pt was contacted with information and agreed to appt date and time.        Candy GARCIA, LPN    Transitions of Care Program  2/28/2020, 2:43 pm

## 2020-03-10 ENCOUNTER — ANESTHESIA (OUTPATIENT)
Dept: EMERGENCY MEDICINE | Facility: HOSPITAL | Age: 45
DRG: 871 | End: 2020-03-10
Payer: MEDICAID

## 2020-03-10 ENCOUNTER — ANESTHESIA EVENT (OUTPATIENT)
Dept: EMERGENCY MEDICINE | Facility: HOSPITAL | Age: 45
DRG: 871 | End: 2020-03-10
Payer: MEDICAID

## 2020-03-10 ENCOUNTER — HOSPITAL ENCOUNTER (INPATIENT)
Facility: HOSPITAL | Age: 45
LOS: 4 days | Discharge: HOME | DRG: 871 | End: 2020-03-14
Attending: EMERGENCY MEDICINE | Admitting: HOSPITALIST
Payer: MEDICAID

## 2020-03-10 DIAGNOSIS — A41.9 SEPSIS: ICD-10-CM

## 2020-03-10 DIAGNOSIS — R41.82 ALTERED MENTAL STATUS, UNSPECIFIED ALTERED MENTAL STATUS TYPE: Primary | ICD-10-CM

## 2020-03-10 DIAGNOSIS — A41.9 SEPSIS, DUE TO UNSPECIFIED ORGANISM, UNSPECIFIED WHETHER ACUTE ORGAN DYSFUNCTION PRESENT: ICD-10-CM

## 2020-03-10 DIAGNOSIS — R05.9 COUGH: ICD-10-CM

## 2020-03-10 DIAGNOSIS — J18.9 PNEUMONIA OF RIGHT MIDDLE LOBE DUE TO INFECTIOUS ORGANISM: ICD-10-CM

## 2020-03-10 DIAGNOSIS — J96.01 ACUTE HYPOXEMIC RESPIRATORY FAILURE: ICD-10-CM

## 2020-03-10 DIAGNOSIS — R41.82 ALTERED MENTAL STATUS: ICD-10-CM

## 2020-03-10 DIAGNOSIS — J96.90 RESPIRATORY FAILURE, UNSPECIFIED CHRONICITY, UNSPECIFIED WHETHER WITH HYPOXIA OR HYPERCAPNIA: ICD-10-CM

## 2020-03-10 PROBLEM — I50.42 CHRONIC COMBINED SYSTOLIC AND DIASTOLIC CONGESTIVE HEART FAILURE: Status: ACTIVE | Noted: 2020-03-10

## 2020-03-10 PROBLEM — T68.XXXA HYPOTHERMIA: Status: ACTIVE | Noted: 2020-03-10

## 2020-03-10 PROBLEM — I74.9 THROMBOEMBOLISM: Status: ACTIVE | Noted: 2020-03-10

## 2020-03-10 LAB
ALBUMIN SERPL BCP-MCNC: 2.7 G/DL (ref 3.5–5.2)
ALLENS TEST: ABNORMAL
ALP SERPL-CCNC: 151 U/L (ref 55–135)
ALT SERPL W/O P-5'-P-CCNC: 57 U/L (ref 10–44)
AMMONIA PLAS-SCNC: 19 UMOL/L (ref 10–50)
AMPHET+METHAMPHET UR QL: NORMAL
ANION GAP SERPL CALC-SCNC: 15 MMOL/L (ref 8–16)
AST SERPL-CCNC: 83 U/L (ref 10–40)
BACTERIA #/AREA URNS HPF: NEGATIVE /HPF
BARBITURATES UR QL SCN>200 NG/ML: NEGATIVE
BASOPHILS # BLD AUTO: 0.04 K/UL (ref 0–0.2)
BASOPHILS NFR BLD: 0.2 % (ref 0–1.9)
BENZODIAZ UR QL SCN>200 NG/ML: NEGATIVE
BILIRUB SERPL-MCNC: 2.9 MG/DL (ref 0.1–1)
BILIRUB UR QL STRIP: NEGATIVE
BUN SERPL-MCNC: 33 MG/DL (ref 6–20)
BZE UR QL SCN: NEGATIVE
CALCIUM SERPL-MCNC: 8.6 MG/DL (ref 8.7–10.5)
CANNABINOIDS UR QL SCN: NEGATIVE
CHLORIDE SERPL-SCNC: 101 MMOL/L (ref 95–110)
CLARITY UR: ABNORMAL
CO2 SERPL-SCNC: 16 MMOL/L (ref 23–29)
COLOR UR: YELLOW
CREAT SERPL-MCNC: 1.2 MG/DL (ref 0.5–1.4)
CREAT UR-MCNC: 100 MG/DL (ref 23–375)
DELSYS: ABNORMAL
DIFFERENTIAL METHOD: ABNORMAL
EOSINOPHIL # BLD AUTO: 0 K/UL (ref 0–0.5)
EOSINOPHIL NFR BLD: 0.1 % (ref 0–8)
ERYTHROCYTE [DISTWIDTH] IN BLOOD BY AUTOMATED COUNT: 19.4 % (ref 11.5–14.5)
ERYTHROCYTE [SEDIMENTATION RATE] IN BLOOD BY WESTERGREN METHOD: 30 MM/H
ERYTHROCYTE [SEDIMENTATION RATE] IN BLOOD BY WESTERGREN METHOD: 30 MM/H
EST. GFR  (AFRICAN AMERICAN): >60 ML/MIN/1.73 M^2
EST. GFR  (NON AFRICAN AMERICAN): >60 ML/MIN/1.73 M^2
FIO2: 0.8
FIO2: 1
FUNGUS SPEC CULT: NORMAL
GLUCOSE SERPL-MCNC: 126 MG/DL (ref 70–110)
GLUCOSE SERPL-MCNC: 137 MG/DL (ref 70–110)
GLUCOSE SERPL-MCNC: 167 MG/DL (ref 70–110)
GLUCOSE SERPL-MCNC: 171 MG/DL (ref 70–110)
GLUCOSE SERPL-MCNC: 190 MG/DL (ref 70–110)
GLUCOSE SERPL-MCNC: 198 MG/DL (ref 70–110)
GLUCOSE SERPL-MCNC: 213 MG/DL (ref 70–110)
GLUCOSE SERPL-MCNC: 242 MG/DL (ref 70–110)
GLUCOSE UR QL STRIP: NEGATIVE
HCO3 UR-SCNC: 17.5 MMOL/L (ref 24–28)
HCO3 UR-SCNC: 18.3 MMOL/L (ref 24–28)
HCO3 UR-SCNC: 21.5 MMOL/L (ref 24–28)
HCT VFR BLD AUTO: 33.9 % (ref 40–54)
HCT VFR BLD CALC: 30 %PCV (ref 36–54)
HCT VFR BLD CALC: 35 %PCV (ref 36–54)
HCT VFR BLD CALC: 38 %PCV (ref 36–54)
HGB BLD-MCNC: 10.1 G/DL (ref 14–18)
HGB UR QL STRIP: ABNORMAL
HYALINE CASTS #/AREA URNS LPF: 200 /LPF
HYPOCHROMIA BLD QL SMEAR: ABNORMAL
IMM GRANULOCYTES # BLD AUTO: 0.22 K/UL (ref 0–0.04)
IMM GRANULOCYTES NFR BLD AUTO: 1 % (ref 0–0.5)
KETONES UR QL STRIP: ABNORMAL
LACTATE SERPL-SCNC: 2.6 MMOL/L (ref 0.5–1.9)
LACTATE SERPL-SCNC: 2.7 MMOL/L (ref 0.5–1.9)
LACTATE SERPL-SCNC: 4.6 MMOL/L (ref 0.5–1.9)
LACTATE SERPL-SCNC: 8.5 MMOL/L (ref 0.5–1.9)
LEUKOCYTE ESTERASE UR QL STRIP: NEGATIVE
LYMPHOCYTES # BLD AUTO: 1.1 K/UL (ref 1–4.8)
LYMPHOCYTES NFR BLD: 4.8 % (ref 18–48)
MCH RBC QN AUTO: 25.8 PG (ref 27–31)
MCHC RBC AUTO-ENTMCNC: 29.8 G/DL (ref 32–36)
MCV RBC AUTO: 87 FL (ref 82–98)
MICROSCOPIC COMMENT: ABNORMAL
MODE: ABNORMAL
MODE: ABNORMAL
MONOCYTES # BLD AUTO: 2.1 K/UL (ref 0.3–1)
MONOCYTES NFR BLD: 9.5 % (ref 4–15)
NEUTROPHILS # BLD AUTO: 19 K/UL (ref 1.8–7.7)
NEUTROPHILS NFR BLD: 84.4 % (ref 38–73)
NITRITE UR QL STRIP: NEGATIVE
NRBC BLD-RTO: 0 /100 WBC
OPIATES UR QL SCN: NEGATIVE
PCO2 BLDA: 36.9 MMHG (ref 35–45)
PCO2 BLDA: 41.1 MMHG (ref 35–45)
PCO2 BLDA: 63.6 MMHG (ref 35–45)
PCP UR QL SCN>25 NG/ML: NEGATIVE
PEEP: 10
PEEP: 8
PH SMN: 7.07 [PH] (ref 7.35–7.45)
PH SMN: 7.24 [PH] (ref 7.35–7.45)
PH SMN: 7.37 [PH] (ref 7.35–7.45)
PH UR STRIP: 6 [PH] (ref 5–8)
PLATELET # BLD AUTO: 436 K/UL (ref 150–350)
PMV BLD AUTO: 9.1 FL (ref 9.2–12.9)
PO2 BLDA: 25 MMHG (ref 40–60)
PO2 BLDA: 372 MMHG (ref 80–100)
PO2 BLDA: 525 MMHG (ref 80–100)
POC BE: -10 MMOL/L
POC BE: -12 MMOL/L
POC BE: -4 MMOL/L
POC IONIZED CALCIUM: 1.04 MMOL/L (ref 1.06–1.42)
POC IONIZED CALCIUM: 1.06 MMOL/L (ref 1.06–1.42)
POC IONIZED CALCIUM: 1.18 MMOL/L (ref 1.06–1.42)
POC SATURATED O2: 100 % (ref 95–100)
POC SATURATED O2: 100 % (ref 95–100)
POC SATURATED O2: 26 % (ref 95–100)
POC TCO2: 19 MMOL/L (ref 23–27)
POC TCO2: 20 MMOL/L (ref 24–29)
POC TCO2: 23 MMOL/L (ref 23–27)
POTASSIUM BLD-SCNC: 5 MMOL/L (ref 3.5–5.1)
POTASSIUM BLD-SCNC: 5.1 MMOL/L (ref 3.5–5.1)
POTASSIUM BLD-SCNC: 5.2 MMOL/L (ref 3.5–5.1)
POTASSIUM SERPL-SCNC: 5.4 MMOL/L (ref 3.5–5.1)
PROCALCITONIN SERPL IA-MCNC: 1.12 NG/ML (ref 0–0.5)
PROT SERPL-MCNC: 8.1 G/DL (ref 6–8.4)
PROT UR QL STRIP: ABNORMAL
RBC # BLD AUTO: 3.92 M/UL (ref 4.6–6.2)
RBC #/AREA URNS HPF: >100 /HPF (ref 0–4)
SAMPLE: ABNORMAL
SITE: ABNORMAL
SODIUM BLD-SCNC: 132 MMOL/L (ref 136–145)
SODIUM BLD-SCNC: 132 MMOL/L (ref 136–145)
SODIUM BLD-SCNC: 135 MMOL/L (ref 136–145)
SODIUM SERPL-SCNC: 132 MMOL/L (ref 136–145)
SP GR UR STRIP: 1.02 (ref 1–1.03)
SQUAMOUS #/AREA URNS HPF: 50 /HPF
TOXICOLOGY INFORMATION: NORMAL
TROPONIN I SERPL DL<=0.01 NG/ML-MCNC: 0.03 NG/ML
URN SPEC COLLECT METH UR: ABNORMAL
UROBILINOGEN UR STRIP-ACNC: NEGATIVE EU/DL
VT: 460
VT: 460
WBC # BLD AUTO: 22.52 K/UL (ref 3.9–12.7)
WBC #/AREA URNS HPF: 6 /HPF (ref 0–5)

## 2020-03-10 PROCEDURE — 25000003 PHARM REV CODE 250: Performed by: EMERGENCY MEDICINE

## 2020-03-10 PROCEDURE — 27000221 HC OXYGEN, UP TO 24 HOURS

## 2020-03-10 PROCEDURE — 84145 PROCALCITONIN (PCT): CPT

## 2020-03-10 PROCEDURE — 96366 THER/PROPH/DIAG IV INF ADDON: CPT

## 2020-03-10 PROCEDURE — 82330 ASSAY OF CALCIUM: CPT

## 2020-03-10 PROCEDURE — 96368 THER/DIAG CONCURRENT INF: CPT

## 2020-03-10 PROCEDURE — 80307 DRUG TEST PRSMV CHEM ANLYZR: CPT

## 2020-03-10 PROCEDURE — 51702 INSERT TEMP BLADDER CATH: CPT

## 2020-03-10 PROCEDURE — 85014 HEMATOCRIT: CPT

## 2020-03-10 PROCEDURE — 63600175 PHARM REV CODE 636 W HCPCS: Performed by: EMERGENCY MEDICINE

## 2020-03-10 PROCEDURE — 85025 COMPLETE CBC W/AUTO DIFF WBC: CPT

## 2020-03-10 PROCEDURE — 43752 NASAL/OROGASTRIC W/TUBE PLMT: CPT

## 2020-03-10 PROCEDURE — 94761 N-INVAS EAR/PLS OXIMETRY MLT: CPT

## 2020-03-10 PROCEDURE — 36415 COLL VENOUS BLD VENIPUNCTURE: CPT

## 2020-03-10 PROCEDURE — 84295 ASSAY OF SERUM SODIUM: CPT

## 2020-03-10 PROCEDURE — 96367 TX/PROPH/DG ADDL SEQ IV INF: CPT

## 2020-03-10 PROCEDURE — 63600175 PHARM REV CODE 636 W HCPCS: Performed by: HOSPITALIST

## 2020-03-10 PROCEDURE — 83605 ASSAY OF LACTIC ACID: CPT | Mod: 91

## 2020-03-10 PROCEDURE — 36556 INSERT NON-TUNNEL CV CATH: CPT

## 2020-03-10 PROCEDURE — 96365 THER/PROPH/DIAG IV INF INIT: CPT | Mod: 59

## 2020-03-10 PROCEDURE — 63600175 PHARM REV CODE 636 W HCPCS: Performed by: INTERNAL MEDICINE

## 2020-03-10 PROCEDURE — 82140 ASSAY OF AMMONIA: CPT

## 2020-03-10 PROCEDURE — 99285 EMERGENCY DEPT VISIT HI MDM: CPT | Mod: 25

## 2020-03-10 PROCEDURE — 99900026 HC AIRWAY MAINTENANCE (STAT)

## 2020-03-10 PROCEDURE — 99291 PR CRITICAL CARE, E/M 30-74 MINUTES: ICD-10-PCS | Mod: ,,, | Performed by: INTERNAL MEDICINE

## 2020-03-10 PROCEDURE — 31500 INSERT EMERGENCY AIRWAY: CPT

## 2020-03-10 PROCEDURE — 99900035 HC TECH TIME PER 15 MIN (STAT)

## 2020-03-10 PROCEDURE — 82803 BLOOD GASES ANY COMBINATION: CPT

## 2020-03-10 PROCEDURE — 93005 ELECTROCARDIOGRAM TRACING: CPT | Performed by: INTERNAL MEDICINE

## 2020-03-10 PROCEDURE — 99291 CRITICAL CARE FIRST HOUR: CPT | Mod: ,,, | Performed by: INTERNAL MEDICINE

## 2020-03-10 PROCEDURE — 84484 ASSAY OF TROPONIN QUANT: CPT

## 2020-03-10 PROCEDURE — 94002 VENT MGMT INPAT INIT DAY: CPT

## 2020-03-10 PROCEDURE — 20000000 HC ICU ROOM

## 2020-03-10 PROCEDURE — 84132 ASSAY OF SERUM POTASSIUM: CPT

## 2020-03-10 PROCEDURE — 87070 CULTURE OTHR SPECIMN AEROBIC: CPT

## 2020-03-10 PROCEDURE — 87205 SMEAR GRAM STAIN: CPT

## 2020-03-10 PROCEDURE — 82962 GLUCOSE BLOOD TEST: CPT

## 2020-03-10 PROCEDURE — 37799 UNLISTED PX VASCULAR SURGERY: CPT

## 2020-03-10 PROCEDURE — 80053 COMPREHEN METABOLIC PANEL: CPT

## 2020-03-10 PROCEDURE — 81001 URINALYSIS AUTO W/SCOPE: CPT

## 2020-03-10 PROCEDURE — 25500020 PHARM REV CODE 255: Performed by: HOSPITALIST

## 2020-03-10 PROCEDURE — 87040 BLOOD CULTURE FOR BACTERIA: CPT | Mod: 59

## 2020-03-10 PROCEDURE — 94770 HC EXHALED C02 TEST: CPT

## 2020-03-10 RX ORDER — LEVOFLOXACIN 5 MG/ML
750 INJECTION, SOLUTION INTRAVENOUS
Status: COMPLETED | OUTPATIENT
Start: 2020-03-10 | End: 2020-03-10

## 2020-03-10 RX ORDER — PROPOFOL 10 MG/ML
5 INJECTION, EMULSION INTRAVENOUS CONTINUOUS PRN
Status: DISCONTINUED | OUTPATIENT
Start: 2020-03-10 | End: 2020-03-14

## 2020-03-10 RX ORDER — LANOLIN ALCOHOL/MO/W.PET/CERES
800 CREAM (GRAM) TOPICAL
Status: DISCONTINUED | OUTPATIENT
Start: 2020-03-10 | End: 2020-03-14 | Stop reason: HOSPADM

## 2020-03-10 RX ORDER — BISACODYL 10 MG
10 SUPPOSITORY, RECTAL RECTAL DAILY PRN
Status: DISCONTINUED | OUTPATIENT
Start: 2020-03-10 | End: 2020-03-14 | Stop reason: HOSPADM

## 2020-03-10 RX ORDER — SUCCINYLCHOLINE CHLORIDE 20 MG/ML
100 INJECTION INTRAMUSCULAR; INTRAVENOUS
Status: COMPLETED | OUTPATIENT
Start: 2020-03-10 | End: 2020-03-10

## 2020-03-10 RX ORDER — FUROSEMIDE 10 MG/ML
40 INJECTION INTRAMUSCULAR; INTRAVENOUS ONCE
Status: COMPLETED | OUTPATIENT
Start: 2020-03-10 | End: 2020-03-10

## 2020-03-10 RX ORDER — VANCOMYCIN HCL IN 5 % DEXTROSE 1G/250ML
1000 PLASTIC BAG, INJECTION (ML) INTRAVENOUS
Status: COMPLETED | OUTPATIENT
Start: 2020-03-10 | End: 2020-03-10

## 2020-03-10 RX ORDER — IPRATROPIUM BROMIDE AND ALBUTEROL SULFATE 2.5; .5 MG/3ML; MG/3ML
3 SOLUTION RESPIRATORY (INHALATION) EVERY 6 HOURS
Status: DISCONTINUED | OUTPATIENT
Start: 2020-03-10 | End: 2020-03-10

## 2020-03-10 RX ORDER — SODIUM,POTASSIUM PHOSPHATES 280-250MG
2 POWDER IN PACKET (EA) ORAL
Status: DISCONTINUED | OUTPATIENT
Start: 2020-03-10 | End: 2020-03-14 | Stop reason: HOSPADM

## 2020-03-10 RX ORDER — INSULIN ASPART 100 [IU]/ML
1-12 INJECTION, SOLUTION INTRAVENOUS; SUBCUTANEOUS
Status: DISCONTINUED | OUTPATIENT
Start: 2020-03-10 | End: 2020-03-14 | Stop reason: HOSPADM

## 2020-03-10 RX ORDER — ENOXAPARIN SODIUM 100 MG/ML
1 INJECTION SUBCUTANEOUS
Status: DISCONTINUED | OUTPATIENT
Start: 2020-03-11 | End: 2020-03-11

## 2020-03-10 RX ORDER — SODIUM CHLORIDE 9 MG/ML
INJECTION, SOLUTION INTRAVENOUS CONTINUOUS
Status: DISCONTINUED | OUTPATIENT
Start: 2020-03-10 | End: 2020-03-10

## 2020-03-10 RX ORDER — ENOXAPARIN SODIUM 100 MG/ML
40 INJECTION SUBCUTANEOUS EVERY 24 HOURS
Status: DISCONTINUED | OUTPATIENT
Start: 2020-03-10 | End: 2020-03-10

## 2020-03-10 RX ORDER — SODIUM CHLORIDE 9 MG/ML
INJECTION, SOLUTION INTRAVENOUS CONTINUOUS
Status: DISCONTINUED | OUTPATIENT
Start: 2020-03-10 | End: 2020-03-11

## 2020-03-10 RX ORDER — ETOMIDATE 2 MG/ML
20 INJECTION INTRAVENOUS
Status: COMPLETED | OUTPATIENT
Start: 2020-03-10 | End: 2020-03-10

## 2020-03-10 RX ORDER — POTASSIUM CHLORIDE 20 MEQ/15ML
40 SOLUTION ORAL
Status: DISCONTINUED | OUTPATIENT
Start: 2020-03-10 | End: 2020-03-14 | Stop reason: HOSPADM

## 2020-03-10 RX ORDER — ACETAMINOPHEN 325 MG/1
650 TABLET ORAL EVERY 4 HOURS PRN
Status: DISCONTINUED | OUTPATIENT
Start: 2020-03-10 | End: 2020-03-14 | Stop reason: HOSPADM

## 2020-03-10 RX ORDER — DEXTROSE MONOHYDRATE 50 MG/ML
1000 INJECTION, SOLUTION INTRAVENOUS
Status: COMPLETED | OUTPATIENT
Start: 2020-03-10 | End: 2020-03-10

## 2020-03-10 RX ORDER — VANCOMYCIN HCL IN 5 % DEXTROSE 1G/250ML
1000 PLASTIC BAG, INJECTION (ML) INTRAVENOUS ONCE
Status: DISCONTINUED | OUTPATIENT
Start: 2020-03-10 | End: 2020-03-10

## 2020-03-10 RX ORDER — ONDANSETRON 2 MG/ML
4 INJECTION INTRAMUSCULAR; INTRAVENOUS EVERY 8 HOURS PRN
Status: DISCONTINUED | OUTPATIENT
Start: 2020-03-10 | End: 2020-03-14 | Stop reason: HOSPADM

## 2020-03-10 RX ADMIN — ETOMIDATE 20 MG: 2 INJECTION, SOLUTION INTRAVENOUS at 10:03

## 2020-03-10 RX ADMIN — SUCCINYLCHOLINE CHLORIDE 100 MG: 20 INJECTION, SOLUTION INTRAMUSCULAR; INTRAVENOUS at 10:03

## 2020-03-10 RX ADMIN — SODIUM CHLORIDE: 0.9 INJECTION, SOLUTION INTRAVENOUS at 02:03

## 2020-03-10 RX ADMIN — FUROSEMIDE 40 MG: 10 INJECTION, SOLUTION INTRAMUSCULAR; INTRAVENOUS at 03:03

## 2020-03-10 RX ADMIN — SODIUM CHLORIDE 2205 ML: 0.9 INJECTION, SOLUTION INTRAVENOUS at 10:03

## 2020-03-10 RX ADMIN — IOHEXOL 100 ML: 350 INJECTION, SOLUTION INTRAVENOUS at 12:03

## 2020-03-10 RX ADMIN — PROPOFOL 50 MCG/KG/MIN: 10 INJECTION, EMULSION INTRAVENOUS at 05:03

## 2020-03-10 RX ADMIN — VANCOMYCIN HYDROCHLORIDE 1000 MG: 1 INJECTION, POWDER, LYOPHILIZED, FOR SOLUTION INTRAVENOUS at 01:03

## 2020-03-10 RX ADMIN — SODIUM CHLORIDE: 0.9 INJECTION, SOLUTION INTRAVENOUS at 04:03

## 2020-03-10 RX ADMIN — PROPOFOL 5 MCG/KG/MIN: 10 INJECTION, EMULSION INTRAVENOUS at 10:03

## 2020-03-10 RX ADMIN — PIPERACILLIN AND TAZOBACTAM 4.5 G: 4; .5 INJECTION, POWDER, LYOPHILIZED, FOR SOLUTION INTRAVENOUS; PARENTERAL at 10:03

## 2020-03-10 RX ADMIN — VANCOMYCIN HYDROCHLORIDE 500 MG: 500 INJECTION, POWDER, LYOPHILIZED, FOR SOLUTION INTRAVENOUS at 02:03

## 2020-03-10 RX ADMIN — PROPOFOL 50 MCG/KG/MIN: 10 INJECTION, EMULSION INTRAVENOUS at 08:03

## 2020-03-10 RX ADMIN — LEVOFLOXACIN 750 MG: 750 INJECTION, SOLUTION INTRAVENOUS at 11:03

## 2020-03-10 RX ADMIN — INSULIN ASPART 4 UNITS: 100 INJECTION, SOLUTION INTRAVENOUS; SUBCUTANEOUS at 09:03

## 2020-03-10 RX ADMIN — PIPERACILLIN AND TAZOBACTAM 3.38 G: 3; .375 INJECTION, POWDER, LYOPHILIZED, FOR SOLUTION INTRAVENOUS; PARENTERAL at 06:03

## 2020-03-10 RX ADMIN — DEXTROSE 1000 ML: 5 SOLUTION INTRAVENOUS at 09:03

## 2020-03-10 RX ADMIN — ENOXAPARIN SODIUM 40 MG: 100 INJECTION SUBCUTANEOUS at 05:03

## 2020-03-10 NOTE — PROGRESS NOTES
Date of service: 03/10/2020 at 3:30pm      Reassessment of Volume Status and Tissue Perfusion    Following initial fluid resuscitation, I have reassessed the patients hemodynamic status. I have reviewed the patients capillary refill, skin color, vital signs, peripheral pulses and urinary output. The patient has adequate perfusion and is no longer in shock. He is making good urine. Fio2 is 50% on ventilator and ABGs are improving. Will continue gentle IVF until lactic acid normal considering low systolic function(30%)

## 2020-03-10 NOTE — ANESTHESIA PROCEDURE NOTES
Arterial    Diagnosis: respiratory failure    Patient location during procedure: ICU  Procedure start time: 3/10/2020 12:22 PM  Timeout: 3/10/2020 12:22 PM  Procedure end time: 3/10/2020 12:22 PM    Staffing  Authorizing Provider: Dusty Brock MD  Performing Provider: Dusty Brock MD    Anesthesiologist was present at the time of the procedure.    Preanesthetic Checklist  Completed: patient identified, site marked, surgical consent, pre-op evaluation, timeout performed, IV checked, risks and benefits discussed, monitors and equipment checked and anesthesia consent givenArterial  Skin Prep: chlorhexidine gluconate  Local Infiltration: lidocaine  Orientation: right  Location: radial  Catheter Size: 20 G  Catheter placement by Ultrasound guidance. Heme positive aspiration all ports.  Vessel Caliber: small, patent, compressibility normal  Needle advanced into vessel with real time Ultrasound guidance.  Sterile sheath used.Insertion Attempts: 2  Assessment  Dressing: secured with tape and tegaderm and sutured in place and taped  Patient: Tolerated well

## 2020-03-10 NOTE — ASSESSMENT & PLAN NOTE
As per last echo patient had 30% EF with grade 2 diastolic dysfunction  Presented with shock, sepsis currently on IV fluids  Once patient is out of shock we can discontinue IV fluids  Already intubated

## 2020-03-10 NOTE — CARE UPDATE
MD Steele at bedside making vent changes. MD Whitley unable to obtain artline. Unable to obtain true sat. MD's aware.       03/10/20 1108   Patient Assessment/Suction   Level of Consciousness (AVPU)   (sedated)   Rhythm/Pattern, Respiratory assisted mechanically   PRE-TX-O2   O2 Device (Oxygen Therapy) ventilator   Oxygen Concentration (%) 100   Pulse 103   Resp (!) 30   Vent Select   Conventional Vent Y   Charged w/in last 24h YES   Preset Conventional Ventilator Settings   Vent ID [id number]   Vent Type    Ventilation Type VC   Vent Mode A/C   Set Rate 30 BPM   Vt Set 460 mL   PEEP/CPAP 10.5 cmH20   Pressure Support 0 cmH20   Waveform RAMP   Peak Flow 60 L/min   Plateau Set/Insp. Hold (sec) 0   Trigger Sensitivity Flow/I-Trigger 3 L/min   Patient Ventilator Parameters   Resp Rate Total 35 br/min   Peak Airway Pressure 41 cmH2O   Mean Airway Pressure 20 cmH20   Plateau Pressure 30 cmH20   Exhaled Vt 17 mL   Total Ve 16.4 mL   I:E Ratio Measured 4.10:1   Conventional Ventilator Alarms   Resp Rate High Alarm 45 br/min   Press High Alarm 50 cmH2O   Apnea Rate 10   Apnea Volume (mL) 0 mL   Apnea Oxygen Concentration  100   Apnea Flow Rate (L/min) 52   T Apnea 20 sec(s)   Ready to Wean/Extubation Screen   FIO2<=50 (chart decimal) (!) 1   MV<16L (chart vol.) (!) 16.4   PEEP <=8 (chart #) (!) 10.5   Ready to Wean Parameters   F/VT Ratio<105 (RSBI) 1764.71

## 2020-03-10 NOTE — ED NOTES
Patient moved to room 2 for intubation assumed care from Moraima DEAN rn, Dr. carrington and Jessica at bedside

## 2020-03-10 NOTE — NURSING
Pt. Arrived on vent and sedated with propofol. Vitals stable. Bed low locked position side rails up x2

## 2020-03-10 NOTE — SUBJECTIVE & OBJECTIVE
Past Medical History:   Diagnosis Date    Anxiety     Arthritis     CHF (congestive heart failure)     Cirrhosis     Coronary artery disease     Depression     DVT (deep venous thrombosis)     Hepatitis C     Hypertension        Past Surgical History:   Procedure Laterality Date    LUNG DECORTICATION Right 2/4/2020    Procedure: DECORTICATION, LUNG;  Surgeon: Rehan Rousseau MD;  Location: [facility];  Service: Thoracic;  Laterality: Right;       Review of patient's allergies indicates:  No Known Allergies    No current facility-administered medications on file prior to encounter.      Current Outpatient Medications on File Prior to Encounter   Medication Sig    acetaminophen (TYLENOL) 325 MG tablet Take 325 mg by mouth every 6 (six) hours as needed for Pain.    ALPRAZolam (XANAX) 0.25 MG tablet Take 1 tablet by mouth   at bedtime as needed for anxiety.    apixaban (ELIQUIS) 5 mg Tab Take 1 tablet (5 mg total) by mouth 2 (two) times daily.    ciprofloxacin HCl (CIPRO) 500 MG tablet Take 1 tablet (500 mg total) by mouth every 12 (twelve) hours. for 24 days    enalapril (VASOTEC) 2.5 MG tablet Take 1 tablet (2.5 mg total) by mouth once daily.    furosemide (LASIX) 40 MG tablet Take 1 tablet (40 mg total) by mouth once daily.    gabapentin (NEURONTIN) 100 MG capsule Take 1 capsule (100 mg total) by mouth 3 (three) times daily as needed (anxiety/agitation/neuropathic pain).    gabapentin (NEURONTIN) 300 MG capsule Take 1 capsule (300 mg total) by mouth every evening.    HYDROcodone-acetaminophen (NORCO)  mg per tablet Take 1 tablet by mouth every 6 (six) hours as needed.    spironolactone (ALDACTONE) 25 MG tablet Take 1 tablet (25 mg total) by mouth once daily.    lactulose (CHRONULAC) 10 gram/15 mL solution Take 15 mLs (10 g total) by mouth 3 (three) times daily as needed (constipation).    metoprolol succinate (TOPROL-XL) 100 MG 24 hr tablet Take 1 tablet (100 mg total) by mouth once daily.     pantoprazole (PROTONIX) 40 MG tablet Take 1 tablet (40 mg total) by mouth once daily.    spironolactone (ALDACTONE) 25 MG tablet Take 1 tablet (25 mg total) by mouth once daily.     Family History     Problem Relation (Age of Onset)    Arthritis Mother    Asthma Sister    Diabetes Sister    Heart disease Mother, Maternal Grandfather    Hyperlipidemia Mother    Hypertension Mother, Father    Kidney disease Mother        Tobacco Use    Smoking status: Former Smoker     Packs/day: 2.00     Years: 20.00     Pack years: 40.00     Types: Cigarettes     Start date: 1/23/1999     Last attempt to quit: 1/23/2017     Years since quitting: 3.1    Smokeless tobacco: Never Used   Substance and Sexual Activity    Alcohol use: Not Currently    Drug use: Not Currently     Frequency: 7.0 times per week     Types: Amphetamines     Comment: quit in december 2019    Sexual activity: Not Currently     Partners: Female     Review of Systems   Unable to perform ROS: Intubated     Objective:     Vital Signs (Most Recent):  Temp: (!) 91.9 °F (33.3 °C) (03/10/20 1128)  Pulse: 104 (03/10/20 1424)  Resp: (!) 30 (03/10/20 1424)  BP: (!) 134/96 (03/10/20 1315)  SpO2: (!) 73 % (03/10/20 1424) Vital Signs (24h Range):  Temp:  [91.9 °F (33.3 °C)] 91.9 °F (33.3 °C)  Pulse:  [] 104  Resp:  [16-43] 30  SpO2:  [53 %-99 %] 73 %  BP: (134-172)/() 134/96  Arterial Line BP: ()/() 123/84     Weight: 73.5 kg (162 lb)  Body mass index is 23.92 kg/m².    Physical Exam   Constitutional: He appears well-developed and well-nourished.   Chronically ill-appearing gentleman, intubated, sedated and very hypothermic   HENT:   Head: Atraumatic.   Mouth/Throat: Oropharynx is clear and moist.   Eyes: Pupils are equal, round, and reactive to light. EOM are normal. Scleral icterus is present.   Neck: Neck supple. No JVD present.   Right IJ subclavian   Cardiovascular: Normal rate, regular rhythm and normal heart sounds. Exam reveals no  gallop.   No murmur heard.  Pulmonary/Chest: Breath sounds normal. No respiratory distress. He has no wheezes.   Intubated on 100% FiO2 upon my encounter   Abdominal: Bowel sounds are normal. He exhibits distension. There is no rebound and no guarding.   Distended, tympanic, abdominal wall edema noted   Genitourinary:   Genitourinary Comments: Asif's catheter in place   Musculoskeletal: He exhibits no edema.   Lymphadenopathy:     He has no cervical adenopathy.   Neurological: No cranial nerve deficit.   Pupils bilaterally equal reactive to light, unable to do full neuro exam   Skin: Capillary refill takes more than 3 seconds. No rash noted.   Cold skin, capillary refill more than 3 sec when I saw him around 11:00 a.m.   Psychiatric: His behavior is normal.   Nursing note and vitals reviewed.        CRANIAL NERVES     CN III, IV, VI   Pupils are equal, round, and reactive to light.  Extraocular motions are normal.        Significant Labs: All pertinent labs within the past 24 hours have been reviewed.    Significant Imaging: I have reviewed all pertinent imaging results/findings within the past 24 hours.

## 2020-03-10 NOTE — PROGRESS NOTES
VANCOMYCIN PHARMACOKINETIC NOTE:  Vancomycin Day # 1    Objective/Assessment:    Diagnosis/Indication for Vancomycin: Pneumonia and Sepsis     44 y.o., male; Actual Body Weight = 73.5 kg (162 lb).    The patient has the following labs:  3/10/2020 Estimated Creatinine Clearance: 76.9 mL/min (based on SCr of 1.2 mg/dL). Lab Results   Component Value Date    BUN 33 (H) 03/10/2020       Lab Results   Component Value Date    WBC 22.52 (H) 03/10/2020          Plan:  Adjust vancomycin dose and/or frequency based on the patient's actual weight and renal function:  Initiate Vancomycin 1250 mg IV every 18 hours.  Orders have been entered into patient's chart.    Vancomycin dose = 17 mg/kg actual body weight    Vancomycin trough level has been ordered for prior to the 4th dose.    Pharmacy will manage vancomycin therapy, monitor serum vancomycin levels, monitor renal function and adjust regimen as necessary.    Thank you for allowing us to participate in this patient's care.     Roland Mendoza 3/10/2020 4:46 PM  Department of Pharmacy  Ext [phone]

## 2020-03-10 NOTE — CONSULTS
[facility]  Pulmonology   Consult Note    Inpatient consult to Pulmonology  Consult performed by: Trae Steele MD  Consult ordered by: Gonsalo Lopez Jr., MD  Reason for consult: Pneumonia with sepsis and acute hypoxemic respiratory failure  Assessment/Recommendations: -  Empiric ABx.  -  Volume resuscitation  -  LPV         Subjective     Chief Complaint   Patient presents with    Altered Mental Status      History of Present Illness:  Consult noted.  Patient seen, examined and medical record reviewed.  Has a history of chronic HCV/cirrhosis, recent massive PE with PEA arrest and thoracotomy for empyema.  He was doing well last week when I ran into him while he was visiting a friend in the hospital.  EMS was called today because he was encephalopathic.  They found him to be hypoglycemic en route to [facility].  He remained obtunded despite correction of his CBG and upon arrival in the [facility] ED, he was promptly intubated.  He is unable to provide any history at this time and no friends/family are present to provide collateral history.  OPE he is jaundiced with a firm/distended/tympanic.  He is draining feculent fluid from his NGT.  He is in shock and has refractory hypoxemia despite maximal ventilatory support. His plateau pressures are elevated.     Past Medical History:   Diagnosis Date    Anxiety     Arthritis     CHF (congestive heart failure)     Cirrhosis     Coronary artery disease     Depression     DVT (deep venous thrombosis)     Hepatitis C     Hypertension      Past Surgical History:   Procedure Laterality Date    LUNG DECORTICATION Right 2/4/2020    Procedure: DECORTICATION, LUNG;  Surgeon: Rehan Rousseau MD;  Location: [facility] OR;  Service: Thoracic;  Laterality: Right;     Family History   Problem Relation Age of Onset    Arthritis Mother     Heart disease Mother     Hyperlipidemia Mother     Hypertension Mother     Kidney disease Mother     Hypertension Father     Asthma  Sister     Diabetes Sister     Heart disease Maternal Grandfather       Social History     Tobacco Use    Smoking status: Former Smoker     Packs/day: 2.00     Years: 20.00     Pack years: 40.00     Types: Cigarettes     Start date: 1/23/1999     Last attempt to quit: 1/23/2017     Years since quitting: 3.1    Smokeless tobacco: Never Used   Substance and Sexual Activity    Alcohol use: Not Currently    Drug use: Not Currently     Frequency: 7.0 times per week     Types: Amphetamines     Comment: quit in december 2019    Sexual activity: Not Currently     Partners: Female      Allergies:  Patient has no known allergies.     Facility-Administered Medications as of 3/10/2020   Medication Route Frequency    0.9%  NaCl infusion Intravenous Continuous    acetaminophen tablet 650 mg Oral Q4H PRN    bisacodyL suppository 10 mg Rectal Daily PRN    [COMPLETED] dextrose 5 % infusion 1,000 mL Intravenous ED 1 Time    dextrose 50% injection 25 g Intravenous PRN    enoxaparin injection 40 mg Subcutaneous Daily    [COMPLETED] etomidate injection 20 mg Intravenous ED 1 Time    [COMPLETED] furosemide injection 40 mg Intravenous Once    insulin aspart U-100 pen 1-12 Units Subcutaneous PRN    [COMPLETED] iohexoL (OMNIPAQUE 350) injection 100 mL Intravenous ONCE PRN    [COMPLETED] levoFLOXacin 750 mg/150 mL IVPB 750 mg Intravenous ED 1 Time    magnesium oxide tablet 800 mg Oral PRN    magnesium oxide tablet 800 mg Oral PRN    norepinephrine 4 mg in dextrose 5 % 250 mL infusion Intravenous Continuous PRN    ondansetron injection 4 mg Intravenous Q8H PRN    piperacillin-tazobactam 3.375 g in dextrose 5 % 50 mL IVPB (ready to mix system) Intravenous Q8H    [COMPLETED] piperacillin-tazobactam 4.5 g in dextrose 5 % 100 mL IVPB (ready to mix system) Intravenous ED 1 Time    potassium chloride 10% oral solution 40 mEq Oral PRN    potassium chloride 10% oral solution 40 mEq Oral PRN    potassium, sodium phosphates  280-160-250 mg packet 2 packet Oral PRN    potassium, sodium phosphates 280-160-250 mg packet 2 packet Oral PRN    propofol (DIPRIVAN) 10 mg/mL infusion Intravenous Continuous PRN    [COMPLETED] sodium chloride 0.9% bolus 2,205 mL Intravenous ED 1 Time    [COMPLETED] succinylcholine injection 100 mg Intravenous ED 1 Time    [START ON 3/11/2020] vancomycin (VANCOCIN) 1,250 mg in dextrose 5 % 250 mL IVPB Intravenous Q18H    vancomycin - pharmacy to dose Intravenous pharmacy to manage frequency    [COMPLETED] vancomycin in dextrose 5 % 1 gram/250 mL IVPB 1,000 mg Intravenous ED 1 Time    And    [COMPLETED] vancomycin 500 mg in dextrose 5 % 100 mL IVPB (ready to mix system) Intravenous ED 1 Time     Outpatient Medications as of 3/10/2020   Medication    acetaminophen (TYLENOL) 325 MG tablet    ALPRAZolam (XANAX) 0.25 MG tablet    apixaban (ELIQUIS) 5 mg Tab    ciprofloxacin HCl (CIPRO) 500 MG tablet    enalapril (VASOTEC) 2.5 MG tablet    furosemide (LASIX) 40 MG tablet    gabapentin (NEURONTIN) 100 MG capsule    gabapentin (NEURONTIN) 300 MG capsule    HYDROcodone-acetaminophen (NORCO)  mg per tablet    spironolactone (ALDACTONE) 25 MG tablet    lactulose (CHRONULAC) 10 gram/15 mL solution    metoprolol succinate (TOPROL-XL) 100 MG 24 hr tablet    pantoprazole (PROTONIX) 40 MG tablet    spironolactone (ALDACTONE) 25 MG tablet      Review of Systems   Unable to perform ROS: Intubated      I have personally reviewed the following: active problem list, medication list, allergies, family history, social history, health maintenance, notes from last encounter, lab results, endoscopy procedure notes, imaging and nursing/provider documentation .    Objective     VS: Temp:  [91.9 °F (33.3 °C)-96.9 °F (36.1 °C)]   Pulse:  []   Resp:  [16-43]   BP: (127-172)/()   SpO2:  [53 %-100 %]   Arterial Line BP: ()/()    Estimated body mass index is 23.92 kg/m² as calculated from the  "following:    Height as of this encounter: 5' 9" (1.753 m).    Weight as of this encounter: 73.5 kg (162 lb).   Ideal body weight: 70.7 kg (155 lb 13.8 oz)  Adjusted ideal body weight: 71.8 kg (158 lb 5.1 oz)   I/O:   Intake/Output Summary (Last 24 hours) at 3/10/2020 1741  Last data filed at 3/10/2020 1523  Gross per 24 hour   Intake 3298.33 ml   Output 250 ml   Net 3048.33 ml        Vent: SpO2 100%   Vent Mode: A/C  Oxygen Concentration (%):  [] 50  Resp Rate Total:  [23 br/min-35 br/min] 30 br/min  Vt Set:  [450 mL-460 mL] 460 mL  PEEP/CPAP:  [5 cmH20-10.5 cmH20] 5 cmH20  Pressure Support:  [0 cmH20] 0 cmH20  Mean Airway Pressure:  [12 haQ32-50 cmH20] 13 cmH20     PE Physical Exam   Constitutional: He appears well-developed and well-nourished. He appears toxic. No distress. He is sedated, intubated and restrained.   HENT:   Head: Normocephalic.   Right Ear: External ear normal.   Left Ear: External ear normal.   Mouth/Throat: Mallampati Score: unable to assess.   Neck: Normal range of motion. Neck supple. No JVD present.   Cardiovascular: Normal rate, regular rhythm, normal heart sounds and intact distal pulses.   Pulmonary/Chest: Normal expansion, symmetric chest wall expansion and effort normal. He is intubated. He has no decreased breath sounds. He has no wheezes. He has rhonchi. He has no rales.   Healed right thoracotomy   Abdominal: Soft. Bowel sounds are normal. He exhibits distension, fluid wave and ascites. There is no tenderness.   Musculoskeletal: Normal range of motion. He exhibits no edema.   Lymphadenopathy:     He has no cervical adenopathy.   Neurological: He is unresponsive. He displays atrophy. He displays no seizure activity. GCS eye subscore is 1. GCS verbal subscore is 1. GCS motor subscore is 1.   Skin: Skin is warm and dry. No rash noted.   Psychiatric: He has a normal mood and affect.   Nursing note and vitals reviewed.       Recent Diagnostic Studies:  Labs I have personally " reviewed and interpreted all recent labs / diagnostic studies, and noted the following relevant results:  Lactate:  8.0, 4.6  Utox:  + amphetamine  PCT:  1.12  Trop:  0.031  NH3:  19  Recent Labs   Lab 03/10/20  0900  03/10/20  1412   WBC 22.52*  --   --    RBC 3.92*  --   --    HGB 10.1*  --   --    HCT 33.9*   < > 30*   *  --   --    MCV 87  --   --    MCH 25.8*  --   --    MCHC 29.8*  --   --    *  --   --    K 5.4*  --   --      --   --    CO2 16*  --   --    BUN 33*  --   --    CREATININE 1.2  --   --    ALT 57*  --   --    AST 83*  --   --    ALKPHOS 151*  --   --    BILITOT 2.9*  --   --    PROT 8.1  --   --    ALBUMIN 2.7*  --   --    PH  --    < > 7.373   PCO2  --    < > 36.9   PO2  --    < > 372*   HCO3  --    < > 21.5*   POCSATURATED  --    < > 100   BE  --    < > -4   TROPONINI 0.031  --   --     < > = values in this interval not displayed.      Imaging I have personally reviewed and interpreted all available images and reviewed the associated Radiology reports.  My personal impression of the relevant studies is as follows:  CT Abdomen/Pelvis:  1.  Findings of volume/fluid overload and/or hypoproteinemic state, consider correlation with a serum laboratory values and history.  These findings include a small to moderate volume of abdominopelvic ascites, marked diffuse whole body wall edema (anasarca), hepatic caval reflux of contrast and a nutmeg appearance of the liver.  2.  Enteric tube with tip in the body of the stomach.  3.  Additional, and incidental findings as above.    CT Chest:  1.  Improvement of the subsegmental pulmonary emboli within the lung bases with no new pulmonary emboli  2.  Moderate cardiomegaly with pericardial effusion  3.  Slight decrease in size of the loculated air-fluid collection in the right lung base  4.  Improvement of the airspace disease in the lung bases and right upper lobe with resolution of the ground-glass opacities in left upper lobe  5.  Diffuse  anasarca    CT Brain:  1. Normal CT appearance of the brain.    CXR:  Interval resolution of small right apical pneumothorax.  Pleural/parenchymal opacities in the right mid and lower lung zone.  There has been slight interval progression of airspace opacities or volume loss.  There has been a slight decrease in right-sided pleural fluid.  Small components of apparent extrapulmonary air in the right lung base persist.  Linear opacities in the left mid and lower lung zone likely reflect platelike atelectasis.  There has been mild progression of atelectatic changes at the left lung base in the interval.  Stable mild cardiomegaly.     Micro I have personally reviewed and interpreted the available culture data.  Relevant results are as follows.  Blood Culture   Lab Results   Component Value Date    LABBLOO No Growth to date 03/10/2020   , Sputum Culture   Lab Results   Component Value Date    GSRESP Few WBC's 12/11/2019    GSRESP Few yeast 12/11/2019    RESPIRATORYC No S aureus or Pseudomonas isolated. 12/11/2019    RESPIRATORYC KEYSHAWN ALBICANS  Moderate   (A) 12/11/2019    RESPIRATORYC (A) 12/11/2019     ENTEROBACTER CLOACAE  Few  susceptibility pending      and Urine Culture  No results found for: LABURIN     Previous Diagnostic Studies:  I have personally reviewed and interpreted the following labs/studies/images from previous encounters:  TTE:  · Mild concentric left ventricular hypertrophy.  · Severely decreased left ventricular systolic function. The estimated ejection fraction is 30%.  · Grade II (moderate) left ventricular diastolic dysfunction consistent with pseudonormalization.  · Septal wall has abnormal motion. Diastolic flattening of the interventricular septum consistent with right ventricle volume overload.  · Mild right ventricular enlargement.  · Mildly reduced right ventricular systolic function.  · Mild left atrial enlargement.  · Mild right atrial enlargement.  · Mild-to-moderate mitral  regurgitation.  · Moderate tricuspid regurgitation.  · Mild pulmonary hypertension present.  · Trivial circumferential pericardial effusion.    Assessment       Active Hospital Problems    Diagnosis    *Acute hypoxemic respiratory failure    Altered mental status    Hypothermia    Thromboembolism    Chronic combined systolic and diastolic congestive heart failure    Pneumonia    Chronic hepatitis C without hepatic coma    Severe sepsis    Essential hypertension      My Impression:  Aspiration pneumonia with septic shock and acute hypoxemic respiratory failure.    Plan     Neuro  · Titrate propofol to maintain light sedation.  · Daily SAT.    Pulmonary  · Continue LPV for now  · Daily SBT.  · Empiric broad spectrum ABx.    Cardiac/Vascular  · No evidence of ACS.  · Close attention to avoiding volume overload.    Renal/  · Renal function at baseline.    ID  · Empiric broad spectrum antibiotics    Hematology  · Continue DOAC.  · No indication for blood products.    Endocrine  · Serial blood glucose monitoring.  · D5/D10 prn.    GI  · Known HCV cirrhosis.       Thank you for your consult. I will follow-up with patient. Please contact us if you have any additional questions.    Trae Steele MD  Pulmonary / Critical Care Medicine  [facility]

## 2020-03-10 NOTE — HPI
Upon my entering in the room patient was already intubated, sedated.  Most of the history was obtained from ER physician and prior records.     According to ED physician patient was brought from [place] due to reported history of confusion, aggression and he requested EMS to bring him here to [facility].  No family members are available at bedside or on the phone.  In ED patient was found to have severe hypoxia requiring intubation.  Lactic acid more than 8.  Sepsis protocol was initiated.  Subclavian line and art line was placed.  Pulmonary was consulted.     As per prior records it appears that patient has chronic HCV/cirrhosis, CHF(15% EF), recent massive pulmonary embolism with PE a cardiac arrest and thoracotomy for empyema and was recently discharged 3 weeks ago from our facility.  According to pulmonologist he saw this patient in a parking lot when he was visiting another friend.     Unable to obtain past medical history, past surgical history, family history except obtained from prior records

## 2020-03-10 NOTE — ED PROVIDER NOTES
Encounter Date: 3/10/2020       History     Chief Complaint   Patient presents with    Altered Mental Status     46-year-old male who has a history of CHF, depression, anxiety, hepatitis C, hypertension, coronary artery disease, liver failure, is brought to emergency room by [facility] from [facility] with a history that the patient requested this facility.  He was noted to have had an altered mental status.  Patient has been thrashing.  He is noted to have had a blood sugar initially of 29 for which she ultimately received D 50.  His blood sugar improved to 60-70 he had his altered mental status did not change.  The patient was incapable of providing any history and no family members were present to assist.        Review of patient's allergies indicates:  No Known Allergies  Past Medical History:   Diagnosis Date    Anxiety     Arthritis     CHF (congestive heart failure)     Cirrhosis     Coronary artery disease     Depression     DVT (deep venous thrombosis)     Hepatitis C     Hypertension      Past Surgical History:   Procedure Laterality Date    LUNG DECORTICATION Right 2/4/2020    Procedure: DECORTICATION, LUNG;  Surgeon: Rehan Rousseau MD;  Location: [facility];  Service: Thoracic;  Laterality: Right;     Family History   Problem Relation Age of Onset    Arthritis Mother     Heart disease Mother     Hyperlipidemia Mother     Hypertension Mother     Kidney disease Mother     Hypertension Father     Asthma Sister     Diabetes Sister     Heart disease Maternal Grandfather      Social History     Tobacco Use    Smoking status: Former Smoker     Packs/day: 2.00     Years: 20.00     Pack years: 40.00     Types: Cigarettes     Start date: 1/23/1999     Last attempt to quit: 1/23/2017     Years since quitting: 3.1    Smokeless tobacco: Never Used   Substance Use Topics    Alcohol use: Not Currently    Drug use: Not Currently     Frequency: 7.0 times per week     Types: Amphetamines      Comment: quit in december 2019     Review of Systems   Unable to perform ROS: Acuity of condition       Physical Exam     Initial Vitals [03/10/20 0845]   BP Pulse Resp Temp SpO2   (!) 161/114 104 16 -- 95 %      MAP       --         Physical Exam    Constitutional: He appears well-developed and well-nourished. He is not diaphoretic. He appears distressed.   Thoracic about the bed unable to follow any commands or answer any questions   HENT:   Head: Normocephalic and atraumatic.   Right Ear: External ear normal.   Left Ear: External ear normal.   Nose: Nose normal.   Mouth/Throat: Oropharynx is clear and moist.   Upper and lower dentures present   Eyes: Conjunctivae and EOM are normal. Pupils are equal, round, and reactive to light. Right eye exhibits no discharge. Left eye exhibits no discharge.   Neck: Normal range of motion. Neck supple. No JVD present.   Cardiovascular: Normal rate, regular rhythm, normal heart sounds and intact distal pulses. Exam reveals no friction rub.    No murmur heard.  Pulmonary/Chest: No respiratory distress. He has wheezes. He has no rhonchi. He has no rales.   Abdominal: Soft. Bowel sounds are normal. He exhibits distension. There is no tenderness. There is guarding. There is no rebound.   Musculoskeletal: Normal range of motion. He exhibits no edema or tenderness.   Lymphadenopathy:     He has no cervical adenopathy.   Neurological: He is alert. He has normal strength. GCS score is 15. GCS eye subscore is 4. GCS verbal subscore is 5. GCS motor subscore is 6.   Skin: Skin is warm and dry. Capillary refill takes less than 2 seconds. No rash noted. No erythema. No pallor.         ED Course   Central Line  Date/Time: 3/10/2020 11:02 AM  Location procedure was performed: [facility] EMERGENCY DEPARTMENT  Performed by: Gonsalo Lopez Jr., MD  Pre-operative Diagnosis: Respiratory failure  Post-operative diagnosis: Respiratory failure/pneumonia  Consent Done: Emergent Situation  Indications:  vascular access, hemodynamic monitoring and med administration  Preparation: skin prepped with ChloraPrep  Location details: right subclavian  Catheter type: triple lumen        Labs Reviewed   POCT GLUCOSE - Abnormal; Notable for the following components:       Result Value    POC Glucose 137 (*)     All other components within normal limits   CULTURE, BLOOD   CULTURE, BLOOD   CBC W/ AUTO DIFFERENTIAL   COMPREHENSIVE METABOLIC PANEL   AMMONIA   URINALYSIS, REFLEX TO URINE CULTURE   DRUG SCREEN PANEL, URINE EMERGENCY   LACTIC ACID, PLASMA          Imaging Results    None                       Attending Attestation:             Attending ED Notes:   Prior to intubation to x-ray showed atelectasis in le atelectasis in the right lower lobe and right middle lobe area.  ft mid lung field and also an infiltrate or atelectasis.  The patient's labs were significant that he had a white count of 22.5 and an H&H of 10.1 and 33.9.  Lactate is high at 8.5.  His ammonia level is normal at 19.  The patient's ABGs showed a pH of 7.07 and a pCO2 of 60 for the PO2 25.  Because of the patient's condition he was brought into the trauma room and endotracheally intubated using a Arteaga blade and a 7 and half ET tube.  Breath sounds were symmetrical.  A right subclavian IV was established.  Attempts are being made to establish an A-line.  Consult of being placed to Critical Care/Pulmonary and Hospital Medicine.  The patient will require admission to ICU.  During the ED course blood cultures obtained and a Asif catheter will be established.  Antibiotic ox ordered including Zosyn, vancomycin, Levaquin.  An influenza screen is also ordered.                        Clinical Impression:       ICD-10-CM ICD-9-CM   1. Altered mental status, unspecified altered mental status type R41.82 780.97   2. Altered mental status R41.82 780.97   3. Respiratory failure, unspecified chronicity, unspecified whether with hypoxia or hypercapnia J96.90 518.81    4. Sepsis, due to unspecified organism, unspecified whether acute organ dysfunction present A41.9 038.9     995.91   5. Pneumonia of right middle lobe due to infectious organism J18.1 486                                Gonsalo Lopez Jr., MD  03/10/20 1103

## 2020-03-10 NOTE — ED NOTES
Son called from [facility] patient was in icu for sepsis approx 2 months ago and cardiac arrest, informed will have admitting doctor call for further information

## 2020-03-10 NOTE — ASSESSMENT & PLAN NOTE
Suspected right middle lobe pneumonia with severe sepsis and shock  Broad-spectrum antibiotics, IV fluids, ventilator support  Respiratory cultures, procalcitonin

## 2020-03-10 NOTE — ASSESSMENT & PLAN NOTE
Presented with severe hypotension, hypothermia, leukocytosis, hypoxia and lactic acid more than 8  30 cc/kilos fluid bolus was initiated, continue sepsis protocol  Intubated  Blood cultures, procalcitonin, respiratory cultures  Broad-spectrum antibiotics  Levophed standby  CTA chest, CT abdomen and pelvis ruled out any perforated viscus or other etiology

## 2020-03-10 NOTE — ASSESSMENT & PLAN NOTE
Patient recently had massive PE requiring tPA, had cardiac arrest  Will switch anticoagulation to Lovenox while inpatient  CT chest today showed improvement in prior thromboembolism

## 2020-03-10 NOTE — H&P
[facility] Medicine  History & Physical    DOS: 03/10/2020 AT 11:30AM    Patient Name: Francis Daigle  MRN: [id number]  Admission Date: 3/10/2020  Attending Physician: Isabel Savage MD   Primary Care Provider: Primary Doctor No         Patient information was obtained from ER records.     Subjective:     Principal Problem:Acute hypoxemic respiratory failure    Chief Complaint:   Chief Complaint   Patient presents with    Altered Mental Status        HPI: Upon my entering in the room patient was already intubated, sedated.  Most of the history was obtained from ER physician and prior records.     According to ED physician patient was brought from [place] due to reported history of confusion, aggression and he requested EMS to bring him here to [facility].  No family members are available at bedside or on the phone.  In ED patient was found to have severe hypoxia requiring intubation.  Lactic acid more than 8.  Sepsis protocol was initiated.  Subclavian line and art line was placed.  Pulmonary was consulted.     As per prior records it appears that patient has chronic HCV/cirrhosis, CHF(15% EF), recent massive pulmonary embolism with PE a cardiac arrest and thoracotomy for empyema and was recently discharged 3 weeks ago from our facility.  According to pulmonologist he saw this patient in a parking lot when he was visiting another friend.     Unable to obtain past medical history, past surgical history, family history except obtained from prior records    Past Medical History:   Diagnosis Date    Anxiety     Arthritis     CHF (congestive heart failure)     Cirrhosis     Coronary artery disease     Depression     DVT (deep venous thrombosis)     Hepatitis C     Hypertension        Past Surgical History:   Procedure Laterality Date    LUNG DECORTICATION Right 2/4/2020    Procedure: DECORTICATION, LUNG;  Surgeon: Rehan Rousseau MD;  Location: [facility] OR;  Service: Thoracic;   Laterality: Right;       Review of patient's allergies indicates:  No Known Allergies    No current facility-administered medications on file prior to encounter.      Current Outpatient Medications on File Prior to Encounter   Medication Sig    acetaminophen (TYLENOL) 325 MG tablet Take 325 mg by mouth every 6 (six) hours as needed for Pain.    ALPRAZolam (XANAX) 0.25 MG tablet Take 1 tablet by mouth   at bedtime as needed for anxiety.    apixaban (ELIQUIS) 5 mg Tab Take 1 tablet (5 mg total) by mouth 2 (two) times daily.    ciprofloxacin HCl (CIPRO) 500 MG tablet Take 1 tablet (500 mg total) by mouth every 12 (twelve) hours. for 24 days    enalapril (VASOTEC) 2.5 MG tablet Take 1 tablet (2.5 mg total) by mouth once daily.    furosemide (LASIX) 40 MG tablet Take 1 tablet (40 mg total) by mouth once daily.    gabapentin (NEURONTIN) 100 MG capsule Take 1 capsule (100 mg total) by mouth 3 (three) times daily as needed (anxiety/agitation/neuropathic pain).    gabapentin (NEURONTIN) 300 MG capsule Take 1 capsule (300 mg total) by mouth every evening.    HYDROcodone-acetaminophen (NORCO)  mg per tablet Take 1 tablet by mouth every 6 (six) hours as needed.    spironolactone (ALDACTONE) 25 MG tablet Take 1 tablet (25 mg total) by mouth once daily.    lactulose (CHRONULAC) 10 gram/15 mL solution Take 15 mLs (10 g total) by mouth 3 (three) times daily as needed (constipation).    metoprolol succinate (TOPROL-XL) 100 MG 24 hr tablet Take 1 tablet (100 mg total) by mouth once daily.    pantoprazole (PROTONIX) 40 MG tablet Take 1 tablet (40 mg total) by mouth once daily.    spironolactone (ALDACTONE) 25 MG tablet Take 1 tablet (25 mg total) by mouth once daily.     Family History     Problem Relation (Age of Onset)    Arthritis Mother    Asthma Sister    Diabetes Sister    Heart disease Mother, Maternal Grandfather    Hyperlipidemia Mother    Hypertension Mother, Father    Kidney disease Mother        Tobacco  Use    Smoking status: Former Smoker     Packs/day: 2.00     Years: 20.00     Pack years: 40.00     Types: Cigarettes     Start date: 1/23/1999     Last attempt to quit: 1/23/2017     Years since quitting: 3.1    Smokeless tobacco: Never Used   Substance and Sexual Activity    Alcohol use: Not Currently    Drug use: Not Currently     Frequency: 7.0 times per week     Types: Amphetamines     Comment: quit in december 2019    Sexual activity: Not Currently     Partners: Female     Review of Systems   Unable to perform ROS: Intubated     Objective:     Vital Signs (Most Recent):  Temp: (!) 91.9 °F (33.3 °C) (03/10/20 1128)  Pulse: 104 (03/10/20 1424)  Resp: (!) 30 (03/10/20 1424)  BP: (!) 134/96 (03/10/20 1315)  SpO2: (!) 73 % (03/10/20 1424) Vital Signs (24h Range):  Temp:  [91.9 °F (33.3 °C)] 91.9 °F (33.3 °C)  Pulse:  [] 104  Resp:  [16-43] 30  SpO2:  [53 %-99 %] 73 %  BP: (134-172)/() 134/96  Arterial Line BP: ()/() 123/84     Weight: 73.5 kg (162 lb)  Body mass index is 23.92 kg/m².    Physical Exam   Constitutional: He appears well-developed and well-nourished.   Chronically ill-appearing gentleman, intubated, sedated and very hypothermic   HENT:   Head: Atraumatic.   Mouth/Throat: Oropharynx is clear and moist.   Eyes: Pupils are equal, round, and reactive to light. EOM are normal. Scleral icterus is present.   Neck: Neck supple. No JVD present.   Right IJ subclavian   Cardiovascular: Normal rate, regular rhythm and normal heart sounds. Exam reveals no gallop.   No murmur heard.  Pulmonary/Chest: Breath sounds normal. No respiratory distress. He has no wheezes.   Intubated on 100% FiO2 upon my encounter   Abdominal: Bowel sounds are normal. He exhibits distension. There is no rebound and no guarding.   Distended, tympanic, abdominal wall edema noted   Genitourinary:   Genitourinary Comments: Asif's catheter in place   Musculoskeletal: He exhibits no edema.   Lymphadenopathy:     He  has no cervical adenopathy.   Neurological: No cranial nerve deficit.   Pupils bilaterally equal reactive to light, unable to do full neuro exam   Skin: Capillary refill takes more than 3 seconds. No rash noted.   Cold skin, capillary refill more than 3 sec when I saw him around 11:00 a.m.   Psychiatric: His behavior is normal.   Nursing note and vitals reviewed.        CRANIAL NERVES     CN III, IV, VI   Pupils are equal, round, and reactive to light.  Extraocular motions are normal.        Significant Labs: All pertinent labs within the past 24 hours have been reviewed.    Significant Imaging: I have reviewed all pertinent imaging results/findings within the past 24 hours.    Assessment/Plan:     * Acute hypoxemic respiratory failure  Likely due to sepsis, pneumonia  Intubated, sedated  Vent management as per Pulmonary  On broad-spectrum antibiotics  Continue sepsis protocol  CT chest showed improvement in prior pulmonary emboli      Severe sepsis  Presented with severe hypotension, hypothermia, leukocytosis, hypoxia and lactic acid more than 8  30 cc/kilos fluid bolus was initiated, continue sepsis protocol  Intubated  Blood cultures, procalcitonin, respiratory cultures  Broad-spectrum antibiotics  Levophed standby  CTA chest, CT abdomen and pelvis ruled out any perforated viscus or other etiology      Hypothermia  Unknown etiology  Yelitza sanchez      Pneumonia  Suspected right middle lobe pneumonia with severe sepsis and shock  Broad-spectrum antibiotics, IV fluids, ventilator support  Respiratory cultures, procalcitonin      Altered mental status  As per report from ED physician, likely due to sepsis  Currently intubated and sedated      Chronic hepatitis C without hepatic coma  Prior history, has liver cirrhosis, anasarca      Thromboembolism  Patient recently had massive PE requiring tPA, had cardiac arrest  Will switch anticoagulation to Lovenox while inpatient  CT chest today showed improvement in prior  thromboembolism      Chronic combined systolic and diastolic congestive heart failure  As per last echo patient had 30% EF with grade 2 diastolic dysfunction  Presented with shock, sepsis currently on IV fluids  Once patient is out of shock we can discontinue IV fluids  Already intubated      Essential hypertension  Hold antihypertensives in light of shock        VTE Risk Mitigation (From admission, onward)         Ordered     enoxaparin injection 40 mg  Daily      03/10/20 1148     IP VTE HIGH RISK PATIENT  Once      03/10/20 1148     Place OLIVIA hose  Until discontinued      03/10/20 1148     Place sequential compression device  Until discontinued      03/10/20 1148                   Isabel Savage MD  Department of Hospital Medicine   [facility]

## 2020-03-10 NOTE — ASSESSMENT & PLAN NOTE
Likely due to sepsis, pneumonia  Intubated, sedated  Vent management as per Pulmonary  On broad-spectrum antibiotics  Continue sepsis protocol  CT chest showed improvement in prior pulmonary emboli

## 2020-03-10 NOTE — ED NOTES
Spoke with Dr. Savage to put up normal saline at 100cc/hr and to call with second lactic result, awaiting admission

## 2020-03-10 NOTE — HPI
Consult noted.  Patient seen, examined and medical record reviewed.  Has a history of chronic HCV/cirrhosis, recent massive PE with PEA arrest and thoracotomy for empyema.  He was doing well last week when I ran into him while he was visiting a friend in the hospital.  EMS was called today because he was encephalopathic.  They found him to be hypoglycemic en route to [facility].  He remained obtunded despite correction of his CBG and upon arrival in the [facility] ED, he was promptly intubated.  He is unable to provide any history at this time and no friends/family are present to provide collateral history.  OPE he is jaundiced with a firm/distended/tympanic.  He is draining feculent fluid from his NGT.  He is in shock and has refractory hypoxemia despite maximal ventilatory support. His plateau pressures are elevated.

## 2020-03-10 NOTE — CONSULTS
Consult noted.  Patient seen, examined and medical record reviewed.  Has a history of chronic HCV/cirrhosis, recent massive PE with PEA arrest and thoracotomy for empyema.  He was doing well last week when I ran into him while he was visiting a friend in the hospital.  EMS was called today because he was encephalopathic.  They found him to be hypoglycemic en route to [facility].  He remained obtunded despite correction of his CBG and upon arrival in the [facility] ED, he was promptly intubated.  He is unable to provide any history at this time and no friends/family are present to provide collateral history.  OPE he is jaundiced with a firm/distended/tympanic.  He is draining feculent fluid from his NGT.  He is in shock and has refractory hypoxemia despite maximal ventilatory support. His plateau pressures are elevated.    I suspect he has a perforated hollow viscus and peritonitis.   -  Stat CT chest/abd/pelvis.  -  +/- surgery consultation based on results.  -  Broad spectrum empiric ABx.  -  Start NE GTT.  -  Bolus balanced crystalloid.  -  Continue LPV for now.    Full consult to follow.    Trae Steele MD  Pulmonary / Critical Care Medicine  [facility]  03/10/2020  11:37 AM      Critical Care Time: Approximately >35 minutes    The patient is critically ill due to the following conditions that actively pose threat to life and bodily function:  Acidosis with a pH < 7.25, Hypoxia/hypoxemia with SpO2 < 80%    The patient is at high risk of death due to central nervous system failure, circulatory failure, shock, respiratory failure, metabolic decompensation and requiring ongoing treatment including invasive mechanical ventilation, titration of vasoactive medications, frequent neurologic assessment to prevent further life-threatening deterioration.  Due to a high probability of clinically significant, life threatening deterioration, the patient required my highest level of preparedness to intervene emergently and I  personally spent this critical care time directly and personally managing the patient.     Critical care was time spent personally by me on the following activities:    Obtaining a history   Examination of patient.   Reviewing pulse oximetry.   Providing medical care at the patients bedside.   Developing a treatment plan with patient or surrogate and bedside caregivers   Ordering and reviewing laboratory studies, radiographic studies, pulse oximetry.   Ordering and performing treatments and interventions.   Evaluation of patient's response to treatment.   Discussions with consultants while on the unit and immediately available to the patient.   Re-evaluation of the patient's condition.   Documentation in the medical record.    This critical care time did not overlap with that of any other provider or involve time for any procedures.     Trae Steele MD  Pulmonary / Critical Care Medicine  [facility]  03/10/2020  11:38 AM

## 2020-03-11 PROBLEM — T68.XXXA HYPOTHERMIA: Status: RESOLVED | Noted: 2020-03-10 | Resolved: 2020-03-11

## 2020-03-11 PROBLEM — F19.10 POLYSUBSTANCE ABUSE: Status: ACTIVE | Noted: 2020-03-11

## 2020-03-11 LAB
ALBUMIN SERPL BCP-MCNC: 2.4 G/DL (ref 3.5–5.2)
ALLENS TEST: ABNORMAL
ALP SERPL-CCNC: 121 U/L (ref 55–135)
ALT SERPL W/O P-5'-P-CCNC: 51 U/L (ref 10–44)
ANION GAP SERPL CALC-SCNC: 11 MMOL/L (ref 8–16)
ANISOCYTOSIS BLD QL SMEAR: SLIGHT
AST SERPL-CCNC: 67 U/L (ref 10–40)
BASOPHILS # BLD AUTO: 0.04 K/UL (ref 0–0.2)
BASOPHILS NFR BLD: 0.2 % (ref 0–1.9)
BILIRUB SERPL-MCNC: 2.2 MG/DL (ref 0.1–1)
BUN SERPL-MCNC: 39 MG/DL (ref 6–20)
CALCIUM SERPL-MCNC: 8.4 MG/DL (ref 8.7–10.5)
CHLORIDE SERPL-SCNC: 102 MMOL/L (ref 95–110)
CO2 SERPL-SCNC: 21 MMOL/L (ref 23–29)
CREAT SERPL-MCNC: 1.1 MG/DL (ref 0.5–1.4)
DELSYS: ABNORMAL
DIFFERENTIAL METHOD: ABNORMAL
EOSINOPHIL # BLD AUTO: 0 K/UL (ref 0–0.5)
EOSINOPHIL NFR BLD: 0.2 % (ref 0–8)
ERYTHROCYTE [DISTWIDTH] IN BLOOD BY AUTOMATED COUNT: 19 % (ref 11.5–14.5)
ERYTHROCYTE [SEDIMENTATION RATE] IN BLOOD BY WESTERGREN METHOD: 14 MM/H
ERYTHROCYTE [SEDIMENTATION RATE] IN BLOOD BY WESTERGREN METHOD: 18 MM/H
ERYTHROCYTE [SEDIMENTATION RATE] IN BLOOD BY WESTERGREN METHOD: 22 MM/H
ERYTHROCYTE [SEDIMENTATION RATE] IN BLOOD BY WESTERGREN METHOD: 30 MM/H
EST. GFR  (AFRICAN AMERICAN): >60 ML/MIN/1.73 M^2
EST. GFR  (NON AFRICAN AMERICAN): >60 ML/MIN/1.73 M^2
FIO2: 25
FIO2: 30
FIO2: 40
GLUCOSE SERPL-MCNC: 105 MG/DL (ref 70–110)
GLUCOSE SERPL-MCNC: 130 MG/DL (ref 70–110)
GLUCOSE SERPL-MCNC: 203 MG/DL (ref 70–110)
GLUCOSE SERPL-MCNC: 65 MG/DL (ref 70–110)
GLUCOSE SERPL-MCNC: 71 MG/DL (ref 70–110)
GLUCOSE SERPL-MCNC: 81 MG/DL (ref 70–110)
GLUCOSE SERPL-MCNC: 89 MG/DL (ref 70–110)
HCO3 UR-SCNC: 22.8 MMOL/L (ref 24–28)
HCO3 UR-SCNC: 23.9 MMOL/L (ref 24–28)
HCO3 UR-SCNC: 24.2 MMOL/L (ref 24–28)
HCO3 UR-SCNC: 24.9 MMOL/L (ref 24–28)
HCO3 UR-SCNC: 24.9 MMOL/L (ref 24–28)
HCT VFR BLD AUTO: 30.3 % (ref 40–54)
HCT VFR BLD CALC: 33 %PCV (ref 36–54)
HCT VFR BLD CALC: 35 %PCV (ref 36–54)
HCT VFR BLD CALC: 35 %PCV (ref 36–54)
HCT VFR BLD CALC: 36 %PCV (ref 36–54)
HCT VFR BLD CALC: 37 %PCV (ref 36–54)
HGB BLD-MCNC: 10 G/DL (ref 14–18)
HYPOCHROMIA BLD QL SMEAR: ABNORMAL
IMM GRANULOCYTES # BLD AUTO: 0.18 K/UL (ref 0–0.04)
IMM GRANULOCYTES NFR BLD AUTO: 0.7 % (ref 0–0.5)
INR PPP: 2.6
LACTATE SERPL-SCNC: 1.9 MMOL/L (ref 0.5–1.9)
LYMPHOCYTES # BLD AUTO: 1.5 K/UL (ref 1–4.8)
LYMPHOCYTES NFR BLD: 5.7 % (ref 18–48)
MAGNESIUM SERPL-MCNC: 1.9 MG/DL (ref 1.6–2.6)
MCH RBC QN AUTO: 25.8 PG (ref 27–31)
MCHC RBC AUTO-ENTMCNC: 33 G/DL (ref 32–36)
MCV RBC AUTO: 78 FL (ref 82–98)
MIN VOL: 14.1
MODE: ABNORMAL
MONOCYTES # BLD AUTO: 1.1 K/UL (ref 0.3–1)
MONOCYTES NFR BLD: 4.2 % (ref 4–15)
NEUTROPHILS # BLD AUTO: 23.6 K/UL (ref 1.8–7.7)
NEUTROPHILS NFR BLD: 89 % (ref 38–73)
NRBC BLD-RTO: 0 /100 WBC
PCO2 BLDA: 29 MMHG (ref 35–45)
PCO2 BLDA: 30.4 MMHG (ref 35–45)
PCO2 BLDA: 32.7 MMHG (ref 35–45)
PCO2 BLDA: 34 MMHG (ref 35–45)
PCO2 BLDA: 34.6 MMHG (ref 35–45)
PEEP: 5
PH SMN: 7.46 [PH] (ref 7.35–7.45)
PH SMN: 7.47 [PH] (ref 7.35–7.45)
PH SMN: 7.48 [PH] (ref 7.35–7.45)
PH SMN: 7.48 [PH] (ref 7.35–7.45)
PH SMN: 7.52 [PH] (ref 7.35–7.45)
PHOSPHATE SERPL-MCNC: 4.3 MG/DL (ref 2.7–4.5)
PIP: 28
PLATELET # BLD AUTO: 431 K/UL (ref 150–350)
PMV BLD AUTO: 9.3 FL (ref 9.2–12.9)
PO2 BLDA: 101 MMHG (ref 80–100)
PO2 BLDA: 104 MMHG (ref 80–100)
PO2 BLDA: 108 MMHG (ref 80–100)
PO2 BLDA: 119 MMHG (ref 80–100)
PO2 BLDA: 183 MMHG (ref 80–100)
POC BE: -1 MMOL/L
POC BE: 1 MMOL/L
POC IONIZED CALCIUM: 0.96 MMOL/L (ref 1.06–1.42)
POC IONIZED CALCIUM: 1.08 MMOL/L (ref 1.06–1.42)
POC IONIZED CALCIUM: 1.09 MMOL/L (ref 1.06–1.42)
POC SATURATED O2: 100 % (ref 95–100)
POC SATURATED O2: 98 % (ref 95–100)
POC SATURATED O2: 98 % (ref 95–100)
POC SATURATED O2: 99 % (ref 95–100)
POC SATURATED O2: 99 % (ref 95–100)
POC TCO2: 24 MMOL/L (ref 23–27)
POC TCO2: 25 MMOL/L (ref 23–27)
POC TCO2: 25 MMOL/L (ref 23–27)
POC TCO2: 26 MMOL/L (ref 23–27)
POC TCO2: 26 MMOL/L (ref 23–27)
POTASSIUM BLD-SCNC: 4.5 MMOL/L (ref 3.5–5.1)
POTASSIUM BLD-SCNC: 4.7 MMOL/L (ref 3.5–5.1)
POTASSIUM BLD-SCNC: 4.9 MMOL/L (ref 3.5–5.1)
POTASSIUM BLD-SCNC: 5.1 MMOL/L (ref 3.5–5.1)
POTASSIUM BLD-SCNC: 5.1 MMOL/L (ref 3.5–5.1)
POTASSIUM SERPL-SCNC: 5.3 MMOL/L (ref 3.5–5.1)
PROT SERPL-MCNC: 7.1 G/DL (ref 6–8.4)
PROTHROMBIN TIME: 26.8 SEC (ref 10.6–14.8)
PS: 20
RBC # BLD AUTO: 3.88 M/UL (ref 4.6–6.2)
SAMPLE: ABNORMAL
SITE: ABNORMAL
SODIUM BLD-SCNC: 134 MMOL/L (ref 136–145)
SODIUM BLD-SCNC: 135 MMOL/L (ref 136–145)
SODIUM BLD-SCNC: 136 MMOL/L (ref 136–145)
SODIUM SERPL-SCNC: 134 MMOL/L (ref 136–145)
SP02: 100
SPONT RATE: 22
VT: 460
WBC # BLD AUTO: 26.44 K/UL (ref 3.9–12.7)

## 2020-03-11 PROCEDURE — 80053 COMPREHEN METABOLIC PANEL: CPT

## 2020-03-11 PROCEDURE — 99900026 HC AIRWAY MAINTENANCE (STAT)

## 2020-03-11 PROCEDURE — 84100 ASSAY OF PHOSPHORUS: CPT

## 2020-03-11 PROCEDURE — 82803 BLOOD GASES ANY COMBINATION: CPT

## 2020-03-11 PROCEDURE — 94761 N-INVAS EAR/PLS OXIMETRY MLT: CPT

## 2020-03-11 PROCEDURE — 37799 UNLISTED PX VASCULAR SURGERY: CPT

## 2020-03-11 PROCEDURE — 85025 COMPLETE CBC W/AUTO DIFF WBC: CPT

## 2020-03-11 PROCEDURE — 84295 ASSAY OF SERUM SODIUM: CPT

## 2020-03-11 PROCEDURE — 63600175 PHARM REV CODE 636 W HCPCS: Performed by: EMERGENCY MEDICINE

## 2020-03-11 PROCEDURE — 63600175 PHARM REV CODE 636 W HCPCS: Performed by: HOSPITALIST

## 2020-03-11 PROCEDURE — 84132 ASSAY OF SERUM POTASSIUM: CPT

## 2020-03-11 PROCEDURE — 83735 ASSAY OF MAGNESIUM: CPT

## 2020-03-11 PROCEDURE — 94003 VENT MGMT INPAT SUBQ DAY: CPT

## 2020-03-11 PROCEDURE — 85014 HEMATOCRIT: CPT

## 2020-03-11 PROCEDURE — 82330 ASSAY OF CALCIUM: CPT

## 2020-03-11 PROCEDURE — 25000003 PHARM REV CODE 250: Performed by: HOSPITALIST

## 2020-03-11 PROCEDURE — 27000221 HC OXYGEN, UP TO 24 HOURS

## 2020-03-11 PROCEDURE — 20000000 HC ICU ROOM

## 2020-03-11 PROCEDURE — 99900035 HC TECH TIME PER 15 MIN (STAT)

## 2020-03-11 PROCEDURE — 25000003 PHARM REV CODE 250

## 2020-03-11 PROCEDURE — 85610 PROTHROMBIN TIME: CPT

## 2020-03-11 RX ORDER — FUROSEMIDE 10 MG/ML
20 INJECTION INTRAMUSCULAR; INTRAVENOUS
Status: DISCONTINUED | OUTPATIENT
Start: 2020-03-11 | End: 2020-03-14

## 2020-03-11 RX ORDER — MUPIROCIN 20 MG/G
OINTMENT TOPICAL 2 TIMES DAILY
Status: DISCONTINUED | OUTPATIENT
Start: 2020-03-11 | End: 2020-03-14 | Stop reason: HOSPADM

## 2020-03-11 RX ORDER — CHLORHEXIDINE GLUCONATE ORAL RINSE 1.2 MG/ML
15 SOLUTION DENTAL 2 TIMES DAILY
Status: DISCONTINUED | OUTPATIENT
Start: 2020-03-11 | End: 2020-03-14 | Stop reason: HOSPADM

## 2020-03-11 RX ADMIN — PROPOFOL 20 MCG/KG/MIN: 10 INJECTION, EMULSION INTRAVENOUS at 04:03

## 2020-03-11 RX ADMIN — CHLORHEXIDINE GLUCONATE 15 ML: 1.2 RINSE ORAL at 12:03

## 2020-03-11 RX ADMIN — DEXTROSE MONOHYDRATE 25 G: 25 INJECTION, SOLUTION INTRAVENOUS at 09:03

## 2020-03-11 RX ADMIN — PIPERACILLIN AND TAZOBACTAM 3.38 G: 3; .375 INJECTION, POWDER, LYOPHILIZED, FOR SOLUTION INTRAVENOUS; PARENTERAL at 03:03

## 2020-03-11 RX ADMIN — PROPOFOL 50 MCG/KG/MIN: 10 INJECTION, EMULSION INTRAVENOUS at 01:03

## 2020-03-11 RX ADMIN — FUROSEMIDE 20 MG: 10 INJECTION, SOLUTION INTRAMUSCULAR; INTRAVENOUS at 12:03

## 2020-03-11 RX ADMIN — PIPERACILLIN AND TAZOBACTAM 3.38 G: 3; .375 INJECTION, POWDER, LYOPHILIZED, FOR SOLUTION INTRAVENOUS; PARENTERAL at 08:03

## 2020-03-11 RX ADMIN — PROPOFOL 5 MCG/KG/MIN: 10 INJECTION, EMULSION INTRAVENOUS at 08:03

## 2020-03-11 RX ADMIN — CHLORHEXIDINE GLUCONATE 15 ML: 1.2 RINSE ORAL at 08:03

## 2020-03-11 RX ADMIN — VANCOMYCIN HYDROCHLORIDE 1250 MG: 1 INJECTION, POWDER, LYOPHILIZED, FOR SOLUTION INTRAVENOUS at 07:03

## 2020-03-11 RX ADMIN — MUPIROCIN: 20 OINTMENT TOPICAL at 08:03

## 2020-03-11 RX ADMIN — MUPIROCIN: 20 OINTMENT TOPICAL at 09:03

## 2020-03-11 RX ADMIN — PIPERACILLIN AND TAZOBACTAM 3.38 G: 3; .375 INJECTION, POWDER, LYOPHILIZED, FOR SOLUTION INTRAVENOUS; PARENTERAL at 12:03

## 2020-03-11 RX ADMIN — PROPOFOL 25 MCG/KG/MIN: 10 INJECTION, EMULSION INTRAVENOUS at 12:03

## 2020-03-11 NOTE — CARE UPDATE
03/10/20 2055   Patient Assessment/Suction   Level of Consciousness (AVPU) unresponsive   All Lung Fields Breath Sounds clear   PRE-TX-O2   O2 Device (Oxygen Therapy) ventilator   $ Is the patient on Low Flow Oxygen? Yes   Oxygen Concentration (%) 40   SpO2 100 %   Pulse Oximetry Type Continuous   $ Pulse Oximetry - Multiple Charge Pulse Oximetry - Multiple   Pulse (!) 134   Resp (!) 24   Vent Select   Conventional Vent Y   Charged w/in last 24h YES   Preset Conventional Ventilator Settings   Vent ID [id number]   Vent Type    Ventilation Type VC   Vent Mode A/C   Humidity HME   Set Rate 30 BPM   Vt Set 460 mL   PEEP/CPAP 5 cmH20   Pressure Support 0 cmH20   Waveform RAMP   Peak Flow 60 L/min   Plateau Set/Insp. Hold (sec) 0   Trigger Sensitivity Flow/I-Trigger 3 L/min   Patient Ventilator Parameters   Resp Rate Total 30 br/min   Peak Airway Pressure 25 cmH2O   Mean Airway Pressure 14 cmH20   Plateau Pressure 0 cmH20   Exhaled Vt 475 mL   Total Ve 14.3 mL   I:E Ratio Measured 1:1.40   Conventional Ventilator Alarms   Alarms On Y   Resp Rate High Alarm 54 br/min   Press High Alarm 44 cmH2O   Apnea Rate 10   Apnea Volume (mL) 1 mL   Apnea Oxygen Concentration  100   Apnea Flow Rate (L/min) 74   T Apnea 20 sec(s)   Ready to Wean/Extubation Screen   FIO2<=50 (chart decimal) 0.4   MV<16L (chart vol.) 14.3   PEEP <=8 (chart #) 5   Ready to Wean Parameters   F/VT Ratio<105 (RSBI) (!) 50.53   Respiratory Evaluation   $ Care Plan Tech Time 15 min   Evaluation For New Orders

## 2020-03-11 NOTE — ASSESSMENT & PLAN NOTE
Right middle lobe pneumonia with severe sepsis and shock(SHOCK RESOLVED)  Broad-spectrum antibiotics, IV fluids, ventilator support  Respiratory cultures

## 2020-03-11 NOTE — ASSESSMENT & PLAN NOTE
Patient recently had massive PE requiring tPA, had cardiac arrest  Will switch anticoagulation to Lovenox while inpatient-holding anticoagulation in light of high INR  CT chest today showed improvement in prior thromboembolism

## 2020-03-11 NOTE — ASSESSMENT & PLAN NOTE
Likely due to sepsis, pneumonia  Intubated, sedated  Vent management as per Pulmonary-failed weaning trial today  On broad-spectrum antibiotics  CT chest showed improvement in prior pulmonary emboli.  Anticoagulation is on hold due to high INR

## 2020-03-11 NOTE — PLAN OF CARE
Unsure of dc plan at this time. Pt is still on vent. Pt lives with cousin Estevan Linares and he can go back home if stable. PT/OT to assess for dc DME needs. Cm to follow until dc from discharge.      03/11/20 7234   Discharge Assessment   Assessment Type Discharge Planning Assessment   Confirmed/corrected address and phone number on facesheet? Yes   Assessment information obtained from? Patient   Communicated expected length of stay with patient/caregiver no   Prior to hospitilization cognitive status: Alert/Oriented   Prior to hospitalization functional status: Independent;Assistive Equipment  (Used cane when needed.)   Current cognitive status: Coma/Sedated/Intubated   Current Functional Status: Completely Dependent   Lives With other relative(s)  (Cousin who pt lives with Estevan Linares)   Able to Return to Prior Arrangements yes   Is patient able to care for self after discharge? Yes   Patient's perception of discharge disposition home or selfcare   Readmission Within the Last 30 Days no previous admission in last 30 days   Patient currently being followed by outpatient case management? No   Patient currently receives any other outside agency services? No   Equipment Currently Used at Home cane, straight   Part D Coverage Medicaid   Do you have any problems affording any of your prescribed medications? No   Is the patient taking medications as prescribed? yes   Does the patient have transportation home? Yes   Transportation Anticipated family or friend will provide   Does the patient receive services at the [facility]? No   Discharge Plan A Home with family   Discharge Plan B Home with family   DME Needed Upon Discharge  none   Patient/Family in Agreement with Plan yes

## 2020-03-11 NOTE — PLAN OF CARE
This note also relates to the following rows which could not be included:  Oxygen Concentration (%) - Cannot attach notes to unvalidated device data  SpO2 - Cannot attach notes to unvalidated device data  Pulse - Cannot attach notes to unvalidated device data  Resp - Cannot attach notes to unvalidated device data  Ventilation Type - Cannot attach notes to unvalidated device data  Vent Mode - Cannot attach notes to unvalidated device data  Set Rate - Cannot attach notes to unvalidated device data  Vt Set - Cannot attach notes to unvalidated device data  PEEP/CPAP - Cannot attach notes to unvalidated device data  Pressure Support - Cannot attach notes to unvalidated device data  Waveform - Cannot attach notes to unvalidated device data  Peak Flow - Cannot attach notes to unvalidated device data  Plateau Set/Insp. Hold (sec) - Cannot attach notes to unvalidated device data  Trigger Sensitivity Flow/I-Trigger - Cannot attach notes to unvalidated device data  Resp Rate Total - Cannot attach notes to unvalidated device data  Peak Airway Pressure - Cannot attach notes to unvalidated device data  Mean Airway Pressure - Cannot attach notes to unvalidated device data  Plateau Pressure - Cannot attach notes to unvalidated device data  Exhaled Vt - Cannot attach notes to unvalidated device data  Total Ve - Cannot attach notes to unvalidated device data  I:E Ratio Measured - Cannot attach notes to unvalidated device data  Resp Rate High Alarm - Cannot attach notes to unvalidated device data  Press High Alarm - Cannot attach notes to unvalidated device data  Apnea Rate - Cannot attach notes to unvalidated device data  Apnea Volume (mL) - Cannot attach notes to unvalidated device data  Apnea Oxygen Concentration  - Cannot attach notes to unvalidated device data  Apnea Flow Rate (L/min) - Cannot attach notes to unvalidated device data  T Apnea - Cannot attach notes to unvalidated device data       03/11/20 0658   Patient  Assessment/Suction   Level of Consciousness (AVPU) responds to voice   Respiratory Effort Unlabored;Normal   Expansion/Accessory Muscles/Retractions no use of accessory muscles;no retractions;expansion symmetric   All Lung Fields Breath Sounds diminished;clear   Rhythm/Pattern, Respiratory assisted mechanically   Cough Frequency infrequent   Cough Type dry   Suction Method tracheal   $ Suction Charges Inline Suction Procedure Stat Charge   Secretions Amount scant   Secretions Color yellow   Secretions Characteristics thick   Aspirate Toleration BERNIE (no adverse reactions)   PRE-TX-O2   O2 Device (Oxygen Therapy) ventilator   $ Is the patient on Low Flow Oxygen? Yes   Wound Care   $ Wound Care Tech Time 15 min   Area of Concern Corner lip;Lower lip;Upper lip   Skin Color/Characteristics redness blanchable   Skin Temperature warm        Airway - Non-Surgical 03/10/20 1018 Endotracheal Tube   Placement Date/Time: 03/10/20 1018   Present Prior to Hospital Arrival?: No  Method of Intubation: Direct laryngoscopy  Inserted by: MD  Staff/Resident Name(s): katheryn  Airway Device: Endotracheal Tube  Airway Device Size: 7.5  Style: Cuffed  Cuff ...   Secured at 23 cm   Measured At Lips   Secured Location Left   Secured by Commercial tube barros   Bite Block left   Site Condition Dry   Status Intact;Secured;Patent   Site Assessment Clean   Vent Select   $ Ventilator Subsequent 1   Charged w/in last 24h YES   IHI Ventilator Associated Pneumonia Bundle   Head of Bed Elevated  HOB 30   Oral Care Lip moisturizer applied;Mouth moisturizer   Additional VAP Prevention Documentation Clean equipment maintained   Preset Conventional Ventilator Settings   Vent ID [id number]   Vent Type    Humidity HME   continue ventilator support

## 2020-03-11 NOTE — PROGRESS NOTES
[facility]  Adult Nutrition   Progress Note (Initial Assessment)     SUMMARY     Recommendations  Recommendation/Intervention:   1. Recommend enteral nutrition be initiated if patient remains intubated within next 24 hours. Propofol providing ~244 kcal/day; insufficient to meet calorie and protein needs.   2. Hyponatremia and hyperkalemia. Recommend continued monitoring and management of electrolytes. If diet is able to be initiated, recommend Renal diet restrictions if K+ remains elevated. Otherwise, recommend cardiac diet with additional protein to best meet needs.   Goals:   1. Enteral nutrition or oral diet to be initiated.   2. Patient to meet at least 75% of estimated energy and protein needs.   3. Electrolytes to trend towards normal limits/ target ranges.   Nutrition Goal Status: new  Communication of RD Recs: reviewed with RN    Dietitian Rounds Brief  Patient assessed 2' ICU status. Patient intubated and sedated. Patient appears malnourished per RD visual assessment. Will try to better assess nutritional status with NFPE and obtain more details regarding weight and diet history if/when appropriate. If patient remains intubated within next 24 hours, will recommend initiation of enteral nutrition. (Nepro with GR of 30 ml/hr + liqucael TID and 30 ml water flush Q4H (1566 kcal/day, 106 g/day protein, 711 ml/day free water, 793 mg potassium)     Reason for Assessment  Reason For Assessment: other (see comments)(ICU)  Diagnosis: pulmonary disease  Relevant Medical History: CHF, cirrhosis, CAD, hepatitis C, HTN, depression, severe sepsis, pneumonia, polysubstance abuse, AMS, Acute hypoxemic respiratory failure, Thromboembolism    Nutrition Risk Screen  Nutrition Risk Screen: other (see comments)(suspect malnutrition )             Nutrition/Diet History  Spiritual, Cultural Beliefs, Church Practices, Values that Affect Care: yes  Food Allergies: NKFA  Factors Affecting Nutritional Intake: NPO, on  "mechanical ventilation    Anthropometrics  Temp: 98.8 °F (37.1 °C)  Height Method: Stated  Height: 5' 9" (175.3 cm)  Height (inches): 69 in  Weight Method: Stated  Weight: 73.5 kg (162 lb)  Weight (lb): 162 lb  Ideal Body Weight (IBW), Male: 160 lb  % Ideal Body Weight, Male (lb): 101.25 %  BMI (Calculated): 23.9  BMI Grade: 18.5-24.9 - normal  Weight Loss: other (see comments)(unable to accurately assess weight history at this time )       Weight History:  Wt Readings from Last 10 Encounters:   03/10/20 73.5 kg (162 lb)   02/07/20 67.3 kg (148 lb 5.9 oz)   12/28/19 75.2 kg (165 lb 12.6 oz)   12/09/19 74.8 kg (165 lb)   10/30/19 69.8 kg (153 lb 14.1 oz)   08/25/19 70.5 kg (155 lb 6.8 oz)   07/06/19 74.8 kg (165 lb)   07/06/19 74.8 kg (165 lb)   07/04/19 73.1 kg (161 lb 1.6 oz)     RD interpretation of weight history: Current weight is a stated weight. Unable to obtain additional weight history or diet history from patient due to intubation and sedation. Some weights are stated and bed weights. Patient has hx of CHF, anasarca, cirrhosis, and polysubstance abuse. Likely weight fluctuation due to fluid retention and losses. May be difficult to determine actual weight loss.     Lab/Procedures/Meds: Pertinent Labs Reviewed  Clinical Chemistry:  Recent Labs   Lab 03/11/20  0354   *   K 5.3*      CO2 21*   GLU 65*   BUN 39*   CREATININE 1.1   CALCIUM 8.4*   PROT 7.1   ALBUMIN 2.4*   BILITOT 2.2*   ALKPHOS 121   AST 67*   ALT 51*   ANIONGAP 11   ESTGFRAFRICA >60.0   EGFRNONAA >60.0   MG 1.9   PHOS 4.3     CBC:   Recent Labs   Lab 03/11/20  0354  03/11/20  1306   WBC 26.44*  --   --    RBC 3.88*  --   --    HGB 10.0*  --   --    HCT 30.3*   < > 36   *  --   --    MCV 78*  --   --    MCH 25.8*  --   --    MCHC 33.0  --   --     < > = values in this interval not displayed.     Cardiac Profile:  Recent Labs   Lab 03/10/20  0900   TROPONINI 0.031     Medications: Pertinent Medications reviewed  Scheduled " Meds:   chlorhexidine  15 mL Mouth/Throat BID    furosemide (LASIX) IV  20 mg Intravenous Q12H    mupirocin   Nasal BID    piperacillin-tazobactam (ZOSYN) IVPB  3.375 g Intravenous Q8H    vancomycin (VANCOCIN) IVPB  1,250 mg Intravenous Q18H     Continuous Infusions:   norepinephrine bitartrate-D5W      propofoL 25 mcg/kg/min (03/11/20 1203)     PRN Meds:.acetaminophen, bisacodyL, dextrose 50%, insulin aspart U-100, magnesium oxide, magnesium oxide, norepinephrine bitartrate-D5W, ondansetron, potassium chloride 10%, potassium chloride 10%, potassium, sodium phosphates, potassium, sodium phosphates, propofoL, Pharmacy to dose Vancomycin consult **AND** vancomycin - pharmacy to dose    Estimated/Assessed Needs  Weight Used For Calorie Calculations: 73.5 kg (162 lb 0.6 oz)  Energy Calorie Requirements (kcal): 1936 ([facility] - intubated); 2205 - 2573 (30 - 35 once extubated)   Energy Need Method: [facility]  Protein Requirements: 110 - 147 (1.5 - 2 g/kg)   Weight Used For Protein Calculations: 73.5 kg (162 lb 0.6 oz)  Fluid Requirements (mL): 1838 (25 ml/kg or per MD)   Estimated Fluid Requirement Method: other (see comments)  RDA Method (mL): 1936       Nutrition Prescription Ordered  Current Diet Order: NPO     Evaluation of Received Nutrient/Fluid Intake  Other Protein (gm): 244(diprovan @ 9.23 ml/hr )  Energy Calories Required: not meeting needs  Protein Required: not meeting needs  Fluid Required: meeting needs     Intake/Output Summary (Last 24 hours) at 3/11/2020 1722  Last data filed at 3/11/2020 0600  Gross per 24 hour   Intake 1101.32 ml   Output 1400 ml   Net -298.68 ml      Nutrition Risk  Level of Risk/Frequency of Follow-up: high     Monitor and Evaluation  Food and Nutrient Intake: energy intake, food and beverage intake, enteral nutrition intake  Food and Nutrient Adminstration: diet order, enteral and parenteral nutrition administration  Physical Activity and Function: nutrition-related ADLs  and IADLs, factors affecting access to physical activity  Anthropometric Measurements: weight, weight change, body mass index  Biochemical Data, Medical Tests and Procedures: electrolyte and renal panel, glucose/endocrine profile, lipid profile, gastrointestinal profile, inflammatory profile  Nutrition-Focused Physical Findings: overall appearance, extremities, muscles and bones, head and eyes, skin     Nutrition Follow-Up  RD Follow-up?: Yes     Jacque Sousa RD 03/11/2020 5:22 PM

## 2020-03-11 NOTE — SUBJECTIVE & OBJECTIVE
Interval History: Patient was seen and examined bedside.  He still remains intubated and sedated.  He failed weaning trial today.  Lactic acid has normalized and he is not hypothermic any more.  Good urine output.  Remains on broad-spectrum antibiotics.  Blood pressure and urine output is good.  Still remains tachycardic.       Review of Systems   Unable to perform ROS: Intubated     Objective:     Vital Signs (Most Recent):  Temp: 98.8 °F (37.1 °C) (03/11/20 1100)  Pulse: (!) 118 (03/11/20 1400)  Resp: (!) 25 (03/11/20 1400)  BP: (!) 130/93 (03/11/20 1400)  SpO2: 98 % (03/11/20 1400) Vital Signs (24h Range):  Temp:  [96.9 °F (36.1 °C)-99.3 °F (37.4 °C)] 98.8 °F (37.1 °C)  Pulse:  [113-134] 118  Resp:  [0-30] 25  SpO2:  [97 %-100 %] 98 %  BP: (117-142)/() 130/93  Arterial Line BP: (111-156)/(59-97) 132/77     Weight: 73.5 kg (162 lb)  Body mass index is 23.92 kg/m².    Intake/Output Summary (Last 24 hours) at 3/11/2020 1500  Last data filed at 3/11/2020 0600  Gross per 24 hour   Intake 1201.32 ml   Output 1400 ml   Net -198.68 ml      Physical Exam   Constitutional: He appears well-developed and well-nourished.   Chronically ill-appearing gentleman, intubated, sedated   HENT:   Head: Atraumatic.   Mouth/Throat: Oropharynx is clear and moist.   Eyes: Pupils are equal, round, and reactive to light. EOM are normal. Scleral icterus is present.   Neck: Neck supple. No JVD present.   Right subclavian   Cardiovascular: Normal rate, regular rhythm and normal heart sounds. Exam reveals no gallop.   No murmur heard.  Pulmonary/Chest: Breath sounds normal. No respiratory distress. He has no wheezes.   Intubated on 30% FiO2   Abdominal: Bowel sounds are normal. He exhibits distension. There is no rebound and no guarding.   Distended,  abdominal wall edema noted   Genitourinary:   Genitourinary Comments: Asif's catheter in place   Musculoskeletal: He exhibits no edema.   Lymphadenopathy:     He has no cervical adenopathy.    Neurological: No cranial nerve deficit.   Pupils bilaterally equal reactive to light, unable to do full neuro exam   Skin: Capillary refill takes less than 2 seconds. No rash noted.   Psychiatric: His behavior is normal.   Nursing note and vitals reviewed.      Significant Labs: All pertinent labs within the past 24 hours have been reviewed.    Significant Imaging: I have reviewed all pertinent imaging results/findings within the past 24 hours.

## 2020-03-11 NOTE — PROGRESS NOTES
[facility]  Progress Note    DOS: 03/11/2020    Patient Name: Francis Daigle  MRN: [id number]  Patient Class: IP- Inpatient   Admission Date: 3/10/2020  Length of Stay: 1 days  Attending Physician: Isabel Savage MD  Primary Care Provider: Primary Doctor No        Subjective:     Principal Problem:Acute hypoxemic respiratory failure        HPI:  Upon my entering in the room patient was already intubated, sedated.  Most of the history was obtained from ER physician and prior records.     According to ED physician patient was brought from [place] due to reported history of confusion, aggression and he requested EMS to bring him here to [facility].  No family members are available at bedside or on the phone.  In ED patient was found to have severe hypoxia requiring intubation.  Lactic acid more than 8.  Sepsis protocol was initiated.  Subclavian line and art line was placed.  Pulmonary was consulted.     As per prior records it appears that patient has chronic HCV/cirrhosis, CHF(15% EF), recent massive pulmonary embolism with PE a cardiac arrest and thoracotomy for empyema and was recently discharged 3 weeks ago from our facility.  According to pulmonologist he saw this patient in a parking lot when he was visiting another friend.     Unable to obtain past medical history, past surgical history, family history except obtained from prior records    Overview/Hospital Course:  03/11:  Patient was seen and examined bedside.  He still remains intubated and sedated.  He failed weaning trial today.  Lactic acid has normalized and he is not hypothermic any more.  Good urine output.  Remains on broad-spectrum antibiotics.  Blood pressure and urine output is good.  Still remains tachycardic.     Interval History: Patient was seen and examined bedside.  He still remains intubated and sedated.  He failed weaning trial today.  Lactic acid has normalized and he is not hypothermic any more.  Good  urine output.  Remains on broad-spectrum antibiotics.  Blood pressure and urine output is good.  Still remains tachycardic.       Review of Systems   Unable to perform ROS: Intubated     Objective:     Vital Signs (Most Recent):  Temp: 98.8 °F (37.1 °C) (03/11/20 1100)  Pulse: (!) 118 (03/11/20 1400)  Resp: (!) 25 (03/11/20 1400)  BP: (!) 130/93 (03/11/20 1400)  SpO2: 98 % (03/11/20 1400) Vital Signs (24h Range):  Temp:  [96.9 °F (36.1 °C)-99.3 °F (37.4 °C)] 98.8 °F (37.1 °C)  Pulse:  [113-134] 118  Resp:  [0-30] 25  SpO2:  [97 %-100 %] 98 %  BP: (117-142)/() 130/93  Arterial Line BP: (111-156)/(59-97) 132/77     Weight: 73.5 kg (162 lb)  Body mass index is 23.92 kg/m².    Intake/Output Summary (Last 24 hours) at 3/11/2020 1500  Last data filed at 3/11/2020 0600  Gross per 24 hour   Intake 1201.32 ml   Output 1400 ml   Net -198.68 ml      Physical Exam   Constitutional: He appears well-developed and well-nourished.   Chronically ill-appearing gentleman, intubated, sedated   HENT:   Head: Atraumatic.   Mouth/Throat: Oropharynx is clear and moist.   Eyes: Pupils are equal, round, and reactive to light. EOM are normal. Scleral icterus is present.   Neck: Neck supple. No JVD present.   Right subclavian   Cardiovascular: Normal rate, regular rhythm and normal heart sounds. Exam reveals no gallop.   No murmur heard.  Pulmonary/Chest: Breath sounds normal. No respiratory distress. He has no wheezes.   Intubated on 30% FiO2   Abdominal: Bowel sounds are normal. He exhibits distension. There is no rebound and no guarding.   Distended,  abdominal wall edema noted   Genitourinary:   Genitourinary Comments: Asif's catheter in place   Musculoskeletal: He exhibits no edema.   Lymphadenopathy:     He has no cervical adenopathy.   Neurological: No cranial nerve deficit.   Pupils bilaterally equal reactive to light, unable to do full neuro exam   Skin: Capillary refill takes less than 2 seconds. No rash noted.   Psychiatric:  His behavior is normal.   Nursing note and vitals reviewed.      Significant Labs: All pertinent labs within the past 24 hours have been reviewed.    Significant Imaging: I have reviewed all pertinent imaging results/findings within the past 24 hours.      Assessment/Plan:      * Acute hypoxemic respiratory failure  Likely due to sepsis, pneumonia  Intubated, sedated  Vent management as per Pulmonary-failed weaning trial today  On broad-spectrum antibiotics  CT chest showed improvement in prior pulmonary emboli.  Anticoagulation is on hold due to high INR      Severe sepsis  Resolved  Continue antibiotics-deescalate once cultures finalize  Follow-up on blood cultures      Pneumonia  Right middle lobe pneumonia with severe sepsis and shock(SHOCK RESOLVED)  Broad-spectrum antibiotics, IV fluids, ventilator support  Respiratory cultures    Altered mental status  As per report from ED physician, likely due to sepsis  Currently intubated and sedated      Chronic hepatitis C without hepatic coma  Prior history, has liver cirrhosis, anasarca      Thromboembolism  Patient recently had massive PE requiring tPA, had cardiac arrest  Will switch anticoagulation to Lovenox while inpatient-holding anticoagulation in light of high INR  CT chest today showed improvement in prior thromboembolism      Chronic combined systolic and diastolic congestive heart failure  As per last echo patient had 30% EF with grade 2 diastolic dysfunction  Presented with shock, sepsis and received aggressive IV hydration  Starting gentle diuresis today    Essential hypertension  Hold antihypertensives in light of sepsis    Polysubstance abuse  Ongoing issue  This time urine drug screen is positive for methamphetamine        VTE Risk Mitigation (From admission, onward)         Ordered     IP VTE HIGH RISK PATIENT  Once      03/10/20 1148     Place OLIVIA hose  Until discontinued      03/10/20 1148     Place sequential compression device  Until discontinued       03/10/20 1148                      Isabel Savage MD  Department of Hospital Medicine   [facility]

## 2020-03-11 NOTE — PROGRESS NOTES
"Progress Note  Pulmonary/Critical Care      Admit Date: 3/10/2020      SUBJECTIVE:      Patient ID: Francis Daigle is a 44 y.o. male.    HPI/Interval history (See H&P for complete P,F,SHx) :     The patient remains on the ventilator.  He presented with an agitated delirium presumably secondary to methamphetamine in the face of hypoglycemia.  Today he remains on the ventilator because his respiratory efforts were felt to be insufficient for his CPAP trial this morning.    ROS  Unable to assess.      OBJECTIVE:     Vital Signs Range (Last 24H):  Temp:  [94.9 °F (34.9 °C)-99.3 °F (37.4 °C)]   Pulse:  []   Resp:  [0-31]   BP: (117-142)/()   SpO2:  [59 %-100 %]   Arterial Line BP: ()/()     I & O (Last 24H):    Intake/Output Summary (Last 24 hours) at 3/11/2020 1158  Last data filed at 3/11/2020 0600  Gross per 24 hour   Intake 4299.65 ml   Output 1650 ml   Net 2649.65 ml        Estimated body mass index is 23.92 kg/m² as calculated from the following:    Height as of this encounter: 5' 9" (1.753 m).    Weight as of this encounter: 73.5 kg (162 lb).    Physical Exam  GENERAL: Older patient in no distress.  HEENT: Pupils equal and reactive. Extraocular movements intact. Nose intact.  Pharynx intubated with endotracheal tube and OG tube.  NECK: Supple.   HEART: Regular rate and rhythm. No murmur or gallop auscultated.  LUNGS: Clear to auscultation and percussion. Lung excursion symmetrical. No change in fremitus. No adventitial noises.  ABDOMEN: Bowel sounds present.  OG tube continues to drainage large volumes of material. Non-tender, no masses palpated.  : Normal anatomy.  Asif with yellow urine.  The scrotum is edematous.  EXTREMITIES: Normal muscle tone and joint movement, no cyanosis or clubbing.   LYMPHATICS: No adenopathy palpated, diffuse edema.  SKIN: Dry, intact, there is erythema to the legs bilaterally.  NEURO:  Unable to assess.  PSYCH:  Unable to " assess.    Laboratory/Diagnostic Data:    Vent Settings- Vent Mode: A/C  Oxygen Concentration (%):  [] 25  Resp Rate Total:  [22 br/min-30 br/min] 24 br/min  Vt Set:  [460 mL] 460 mL  PEEP/CPAP:  [5 cmH20-10.5 cmH20] 5 cmH20  Pressure Support:  [0 cmH20] 0 cmH20  Mean Airway Pressure:  [12 prY49-72 cmH20] 12 cmH20    ABG  Recent Labs   Lab 03/11/20  0938   PH 7.477*   PO2 104*   PCO2 32.7*   HCO3 24.2   BE 1       Recent Labs   Lab 03/11/20 0354  03/11/20 0938   WBC 26.44*  --   --    HGB 10.0*  --   --    HCT 30.3*   < > 33*   *  --   --    *  --   --    K 5.3*  --   --      --   --    CO2 21*  --   --    BUN 39*  --   --    CREATININE 1.1  --   --    AST 67*  --   --    ALT 51*  --   --    PROT 7.1  --   --    ALBUMIN 2.4*  --   --    BILITOT 2.2*  --   --    PHOS 4.3  --   --     < > = values in this interval not displayed.      Recent Labs   Lab 03/11/20  0354   INR 2.6       Microbiology:    Microbiology Results (last 7 days)     Procedure Component Value Units Date/Time    Blood culture #1 **CANNOT BE ORDERED STAT** [[id number]] Collected:  03/10/20 0900    Order Status:  Completed Specimen:  Blood from Peripheral, Antecubital, Right Updated:  03/11/20 1032     Blood Culture, Routine No Growth to date      No Growth to date    Culture, Respiratory with Gram Stain [[id number]] Collected:  03/10/20 1414    Order Status:  Completed Specimen:  Respiratory from Sputum, Expectorated Updated:  03/11/20 0855     Respiratory Culture Normal respiratory shaye     Gram Stain (Respiratory) Normal respiratory shaye    Blood culture #2 **CANNOT BE ORDERED STAT** [[id number]] Collected:  03/10/20 1045    Order Status:  Completed Specimen:  Blood from Line, Jugular, Internal Right Updated:  03/10/20 1717     Blood Culture, Routine No Growth to date    Gram stain [[id number]]     Order Status:  Sent Specimen:  Sputum     Respiratory Viral Panel by PCR [place]; Tracheal Aspirate [[id number]]     Order  Status:  No result Specimen:  Respiratory     Blood culture [[id number]]     Order Status:  No result Specimen:  Blood     Blood culture [[id number]]     Order Status:  Canceled Specimen:  Blood         INR 2.6  Radiology:      ASSESSMENT/PLAN:     Active Problems:    Altered mental status secondary to methamphetamine +hypoglycemia  Acute respiratory failure with mechanical ventilation  Anasarca with a small right pleural effusion and fluid in the fissures  Minimal basilar airspace disease  Severe systolic and diastolic heart failure, EF of 30%, using methamphetamine  Hepatitis C  Cirrhosis with ascites  DVT and PE  S/P Empyema with recent VATS  Severe sepsis with leukocytosis, source unclear  Anemia of chronic disease  Hyponatremia  Hyperkalemia  Moderate hypoalbuminemia  Elevated transaminases secondary to cirrhosis  Alkalotic on current ventilator settings    Changed to pressure support ventilation  Alarms adjusted  Continue antibiotics pending final cultures  Repeat sedation vacation to assess level of neurologic functioning  Agree with diuretics  Anticoagulation is on hold pending drop in INR    Critical care time spent reviewing the chart, examining the patient, reviewing the labs, reviewing the radiological findings, discussing care with nursing, physicians, and respiratory and creating the note and  has been greater than 35 min

## 2020-03-11 NOTE — ASSESSMENT & PLAN NOTE
As per last echo patient had 30% EF with grade 2 diastolic dysfunction  Presented with shock, sepsis and received aggressive IV hydration  Starting gentle diuresis today

## 2020-03-11 NOTE — HOSPITAL COURSE
03/11:  Patient was seen and examined bedside.  He still remains intubated and sedated.  He failed weaning trial today.  Lactic acid has normalized and he is not hypothermic any more.  Good urine output.  Remains on broad-spectrum antibiotics.  Blood pressure and urine output is good.  Still remains tachycardic.     03/12:  Patient was seen and examined bedside.  He was extubated in the morning however later he became very psychotic and was placed on Precedex drip.  Upon my interview he opens eyes, follows simple commands and cooperates with physical exam.  Denies any new symptoms.  No acute events overnight as per nursing staff.     03/13:  Patient was seen and examined bedside.  He was extubated yesterday and was transitioned to Precedex drip due to agitation.  Today he is back to his baseline and he is off any sedation as well as Precedex drip.  He is breathing comfortably on room air.  Cooperates with physical exam.  Denies any new symptoms.  No acute events overnight as per nursing staff except 10 beats of V-tach.     03/14:  Patient was seen and examined at bedside.  He just walked from the bathroom unassisted on room air.  Denies any new symptoms.  No acute events overnight as per nursing staff.  Patient is breathing comfortably on room air since yesterday.  He is not on any IV medicines.  He diuresed very well in last 24 hrs.  We were holding his metoprolol hands he was becoming tachycardic which got better after resuming his Toprol-XL.  No acute events overnight as per nursing staff.  Patient was cleared for discharge by intensivist.  He was discharged home in hemodynamically stable condition with a new prescription of Augmentin.  He will ready take ciprofloxacin for his SBP prophylaxis.  I also offered if he needs any other prescriptions but he said he has enough supply.  He was advised to follow-up with his PCP.  He was counseled heavily on smoking cessation and polysubstance abuse.     Review of systems:  Comprehensive review of system negative except mild weakness    Physical Exam   Constitutional: He is oriented to person, place, and time. He appears well-developed and well-nourished.   Chronically ill-appearing gentleman, in no acute distress   HENT:   Head: Atraumatic.   Mouth/Throat: Oropharynx is clear and moist.   Eyes: Pupils are equal, round, and reactive to light. EOM are normal.    Neck: Normal range of motion. Neck supple. No JVD present.   Right subclavian   Cardiovascular: Normal rate, regular rhythm and normal heart sounds. Exam reveals no gallop.   No murmur heard.  Mild tachycardia   Pulmonary/Chest: Effort normal and breath sounds normal. No respiratory distress. He has no wheezes.   Extubated, breathing comfortably on room air   Abdominal: Bowel sounds are normal. There is no rebound and no guarding.   Distended,  abdominal wall edema noted   Genitourinary:  No suprapubic tenderness  Musculoskeletal: He exhibits no edema.   Lymphadenopathy: He has no cervical adenopathy.   Neurological: He is alert and oriented to person, place, and time. No cranial nerve deficit.   Moving all extremities, pupils are equal and reactive   Skin: Skin is dry. Capillary refill takes less than 2 seconds. No rash noted.   Psychiatric: He has a normal mood and affect. His behavior is normal.     Nursing note and vitals reviewed.

## 2020-03-12 LAB
ALBUMIN SERPL BCP-MCNC: 2.1 G/DL (ref 3.5–5.2)
ALLENS TEST: ABNORMAL
ALLENS TEST: ABNORMAL
ALP SERPL-CCNC: 94 U/L (ref 55–135)
ALT SERPL W/O P-5'-P-CCNC: 41 U/L (ref 10–44)
ANION GAP SERPL CALC-SCNC: 6 MMOL/L (ref 8–16)
AST SERPL-CCNC: 82 U/L (ref 10–40)
BASOPHILS # BLD AUTO: 0.04 K/UL (ref 0–0.2)
BASOPHILS NFR BLD: 0.2 % (ref 0–1.9)
BILIRUB SERPL-MCNC: 2 MG/DL (ref 0.1–1)
BUN SERPL-MCNC: 35 MG/DL (ref 6–20)
CALCIUM SERPL-MCNC: 7.8 MG/DL (ref 8.7–10.5)
CHLORIDE SERPL-SCNC: 101 MMOL/L (ref 95–110)
CO2 SERPL-SCNC: 26 MMOL/L (ref 23–29)
CREAT SERPL-MCNC: 1.1 MG/DL (ref 0.5–1.4)
DELSYS: ABNORMAL
DIFFERENTIAL METHOD: ABNORMAL
EOSINOPHIL # BLD AUTO: 0.2 K/UL (ref 0–0.5)
EOSINOPHIL NFR BLD: 1.1 % (ref 0–8)
ERYTHROCYTE [DISTWIDTH] IN BLOOD BY AUTOMATED COUNT: 18.9 % (ref 11.5–14.5)
EST. GFR  (AFRICAN AMERICAN): >60 ML/MIN/1.73 M^2
EST. GFR  (NON AFRICAN AMERICAN): >60 ML/MIN/1.73 M^2
FIO2: 60
FLOW: 10
GLUCOSE SERPL-MCNC: 148 MG/DL (ref 70–110)
GLUCOSE SERPL-MCNC: 153 MG/DL (ref 70–110)
GLUCOSE SERPL-MCNC: 86 MG/DL (ref 70–110)
GLUCOSE SERPL-MCNC: 94 MG/DL (ref 70–110)
GLUCOSE SERPL-MCNC: 96 MG/DL (ref 70–110)
HCO3 UR-SCNC: 28.1 MMOL/L (ref 24–28)
HCO3 UR-SCNC: 31.5 MMOL/L (ref 24–28)
HCT VFR BLD AUTO: 29.3 % (ref 40–54)
HCT VFR BLD CALC: 32 %PCV (ref 36–54)
HCT VFR BLD CALC: 34 %PCV (ref 36–54)
HGB BLD-MCNC: 9.4 G/DL (ref 14–18)
IMM GRANULOCYTES # BLD AUTO: 0.1 K/UL (ref 0–0.04)
IMM GRANULOCYTES NFR BLD AUTO: 0.5 % (ref 0–0.5)
INR PPP: 1.9
LYMPHOCYTES # BLD AUTO: 1.8 K/UL (ref 1–4.8)
LYMPHOCYTES NFR BLD: 10 % (ref 18–48)
MAGNESIUM SERPL-MCNC: 1.9 MG/DL (ref 1.6–2.6)
MCH RBC QN AUTO: 25.3 PG (ref 27–31)
MCHC RBC AUTO-ENTMCNC: 32.1 G/DL (ref 32–36)
MCV RBC AUTO: 79 FL (ref 82–98)
MODE: ABNORMAL
MONOCYTES # BLD AUTO: 1.2 K/UL (ref 0.3–1)
MONOCYTES NFR BLD: 6.3 % (ref 4–15)
NEUTROPHILS # BLD AUTO: 14.9 K/UL (ref 1.8–7.7)
NEUTROPHILS NFR BLD: 81.9 % (ref 38–73)
NRBC BLD-RTO: 0 /100 WBC
PCO2 BLDA: 38.4 MMHG (ref 35–45)
PCO2 BLDA: 39.2 MMHG (ref 35–45)
PH SMN: 7.47 [PH] (ref 7.35–7.45)
PH SMN: 7.51 [PH] (ref 7.35–7.45)
PHOSPHATE SERPL-MCNC: 4.4 MG/DL (ref 2.7–4.5)
PLATELET # BLD AUTO: 441 K/UL (ref 150–350)
PMV BLD AUTO: 9.9 FL (ref 9.2–12.9)
PO2 BLDA: 272 MMHG (ref 80–100)
PO2 BLDA: 82 MMHG (ref 80–100)
POC BE: 4 MMOL/L
POC BE: 9 MMOL/L
POC IONIZED CALCIUM: 1.04 MMOL/L (ref 1.06–1.42)
POC IONIZED CALCIUM: 1.15 MMOL/L (ref 1.06–1.42)
POC SATURATED O2: 100 % (ref 95–100)
POC SATURATED O2: 97 % (ref 95–100)
POC TCO2: 29 MMOL/L (ref 23–27)
POC TCO2: 33 MMOL/L (ref 23–27)
POTASSIUM BLD-SCNC: 4.1 MMOL/L (ref 3.5–5.1)
POTASSIUM BLD-SCNC: 4.2 MMOL/L (ref 3.5–5.1)
POTASSIUM SERPL-SCNC: 4.7 MMOL/L (ref 3.5–5.1)
PROT SERPL-MCNC: 6.3 G/DL (ref 6–8.4)
PROTHROMBIN TIME: 21.3 SEC (ref 10.6–14.8)
RBC # BLD AUTO: 3.71 M/UL (ref 4.6–6.2)
SAMPLE: ABNORMAL
SAMPLE: ABNORMAL
SITE: ABNORMAL
SITE: ABNORMAL
SODIUM BLD-SCNC: 137 MMOL/L (ref 136–145)
SODIUM BLD-SCNC: 137 MMOL/L (ref 136–145)
SODIUM SERPL-SCNC: 133 MMOL/L (ref 136–145)
VANCOMYCIN TROUGH SERPL-MCNC: 16.9 UG/ML (ref 10–22)
WBC # BLD AUTO: 18.22 K/UL (ref 3.9–12.7)

## 2020-03-12 PROCEDURE — 84295 ASSAY OF SERUM SODIUM: CPT

## 2020-03-12 PROCEDURE — 84132 ASSAY OF SERUM POTASSIUM: CPT

## 2020-03-12 PROCEDURE — 82330 ASSAY OF CALCIUM: CPT

## 2020-03-12 PROCEDURE — 82803 BLOOD GASES ANY COMBINATION: CPT

## 2020-03-12 PROCEDURE — 80053 COMPREHEN METABOLIC PANEL: CPT

## 2020-03-12 PROCEDURE — 63600175 PHARM REV CODE 636 W HCPCS: Performed by: HOSPITALIST

## 2020-03-12 PROCEDURE — 83735 ASSAY OF MAGNESIUM: CPT

## 2020-03-12 PROCEDURE — 85025 COMPLETE CBC W/AUTO DIFF WBC: CPT

## 2020-03-12 PROCEDURE — 94761 N-INVAS EAR/PLS OXIMETRY MLT: CPT

## 2020-03-12 PROCEDURE — 80202 ASSAY OF VANCOMYCIN: CPT

## 2020-03-12 PROCEDURE — 94003 VENT MGMT INPAT SUBQ DAY: CPT

## 2020-03-12 PROCEDURE — 27000221 HC OXYGEN, UP TO 24 HOURS

## 2020-03-12 PROCEDURE — 85610 PROTHROMBIN TIME: CPT

## 2020-03-12 PROCEDURE — 85014 HEMATOCRIT: CPT

## 2020-03-12 PROCEDURE — 84100 ASSAY OF PHOSPHORUS: CPT

## 2020-03-12 PROCEDURE — 99900026 HC AIRWAY MAINTENANCE (STAT)

## 2020-03-12 PROCEDURE — 25000003 PHARM REV CODE 250: Performed by: INTERNAL MEDICINE

## 2020-03-12 PROCEDURE — 99900035 HC TECH TIME PER 15 MIN (STAT)

## 2020-03-12 PROCEDURE — 37799 UNLISTED PX VASCULAR SURGERY: CPT

## 2020-03-12 PROCEDURE — 20000000 HC ICU ROOM

## 2020-03-12 PROCEDURE — 25000003 PHARM REV CODE 250

## 2020-03-12 RX ORDER — ENOXAPARIN SODIUM 100 MG/ML
1 INJECTION SUBCUTANEOUS
Status: DISCONTINUED | OUTPATIENT
Start: 2020-03-12 | End: 2020-03-14

## 2020-03-12 RX ADMIN — FUROSEMIDE 20 MG: 10 INJECTION, SOLUTION INTRAMUSCULAR; INTRAVENOUS at 01:03

## 2020-03-12 RX ADMIN — ENOXAPARIN SODIUM 70 MG: 80 INJECTION, SOLUTION INTRAVENOUS; SUBCUTANEOUS at 06:03

## 2020-03-12 RX ADMIN — DEXMEDETOMIDINE HYDROCHLORIDE 0.6 MCG/KG/HR: 400 INJECTION INTRAVENOUS at 09:03

## 2020-03-12 RX ADMIN — CHLORHEXIDINE GLUCONATE 15 ML: 1.2 RINSE ORAL at 08:03

## 2020-03-12 RX ADMIN — PIPERACILLIN AND TAZOBACTAM 3.38 G: 3; .375 INJECTION, POWDER, LYOPHILIZED, FOR SOLUTION INTRAVENOUS; PARENTERAL at 07:03

## 2020-03-12 RX ADMIN — PROPOFOL 5 MCG/KG/MIN: 10 INJECTION, EMULSION INTRAVENOUS at 03:03

## 2020-03-12 RX ADMIN — INSULIN ASPART 2 UNITS: 100 INJECTION, SOLUTION INTRAVENOUS; SUBCUTANEOUS at 10:03

## 2020-03-12 RX ADMIN — PIPERACILLIN AND TAZOBACTAM 3.38 G: 3; .375 INJECTION, POWDER, LYOPHILIZED, FOR SOLUTION INTRAVENOUS; PARENTERAL at 03:03

## 2020-03-12 RX ADMIN — VANCOMYCIN HYDROCHLORIDE 1250 MG: 1 INJECTION, POWDER, LYOPHILIZED, FOR SOLUTION INTRAVENOUS at 12:03

## 2020-03-12 RX ADMIN — DEXMEDETOMIDINE HYDROCHLORIDE 0.2 MCG/KG/HR: 400 INJECTION INTRAVENOUS at 09:03

## 2020-03-12 RX ADMIN — MUPIROCIN: 20 OINTMENT TOPICAL at 08:03

## 2020-03-12 RX ADMIN — VANCOMYCIN HYDROCHLORIDE 1250 MG: 1 INJECTION, POWDER, LYOPHILIZED, FOR SOLUTION INTRAVENOUS at 07:03

## 2020-03-12 RX ADMIN — FUROSEMIDE 20 MG: 10 INJECTION, SOLUTION INTRAMUSCULAR; INTRAVENOUS at 12:03

## 2020-03-12 RX ADMIN — PIPERACILLIN AND TAZOBACTAM 3.38 G: 3; .375 INJECTION, POWDER, LYOPHILIZED, FOR SOLUTION INTRAVENOUS; PARENTERAL at 01:03

## 2020-03-12 NOTE — NURSING
Pt. precedex has been weaned down. Small dose infusing pt. Is much more cooperative and pleasant. He is very emotional. Will continue to monitor.

## 2020-03-12 NOTE — ASSESSMENT & PLAN NOTE
Patient recently had massive PE requiring tPA, had cardiac arrest  Will switch anticoagulation to Lovenox while inpatient-starting anticoagulation from today as INR dropped below 2  CTA chest on admission showed improvement in prior thromboembolism

## 2020-03-12 NOTE — ASSESSMENT & PLAN NOTE
Right middle lobe pneumonia with severe sepsis and shock(sepsis and shock resolved)  Extubated  Broad-spectrum antibiotics until cultures finalize

## 2020-03-12 NOTE — ASSESSMENT & PLAN NOTE
As per report from ED physician, likely due to sepsis  Currently extubated and had an episode of agitation now on Precedex drip  Wean Precedex drip  Fall precautions, seizure precautions

## 2020-03-12 NOTE — PLAN OF CARE
Problem: Oral Intake Inadequate  Goal: Improved Oral Intake  Outcome: Ongoing, Progressing  Intervention: Promote and Optimize Oral Intake  Flowsheets (Taken 3/12/2020 1617)  Oral Nutrition Promotion: calorie dense liquids provided

## 2020-03-12 NOTE — PROGRESS NOTES
[facility] Medicine  Progress Note    DOS: 03/12/2020    Patient Name: Francis Daigle  MRN: [id number]  Patient Class: IP- Inpatient   Admission Date: 3/10/2020  Length of Stay: 2 days  Attending Physician: Isabel Savage MD  Primary Care Provider: Primary Doctor No        Subjective:     Principal Problem:Acute hypoxemic respiratory failure        HPI:  Upon my entering in the room patient was already intubated, sedated.  Most of the history was obtained from ER physician and prior records.     According to ED physician patient was brought from [place] due to reported history of confusion, aggression and he requested EMS to bring him here to [facility].  No family members are available at bedside or on the phone.  In ED patient was found to have severe hypoxia requiring intubation.  Lactic acid more than 8.  Sepsis protocol was initiated.  Subclavian line and art line was placed.  Pulmonary was consulted.     As per prior records it appears that patient has chronic HCV/cirrhosis, CHF(15% EF), recent massive pulmonary embolism with PE a cardiac arrest and thoracotomy for empyema and was recently discharged 3 weeks ago from our facility.  According to pulmonologist he saw this patient in a parking lot when he was visiting another friend.     Unable to obtain past medical history, past surgical history, family history except obtained from prior records    Overview/Hospital Course:  03/11:  Patient was seen and examined bedside.  He still remains intubated and sedated.  He failed weaning trial today.  Lactic acid has normalized and he is not hypothermic any more.  Good urine output.  Remains on broad-spectrum antibiotics.  Blood pressure and urine output is good.  Still remains tachycardic.     03/12:  Patient was seen and examined bedside.  He was extubated in the morning however later he became very psychotic and was placed on Precedex drip.  Upon my interview he opens eyes, follows  simple commands and cooperates with physical exam.  Denies any new symptoms.  No acute events overnight as per nursing staff.     Interval History: Patient was seen and examined bedside.  He was extubated in the morning however later he became very psychotic and was placed on Precedex drip.  Upon my interview he opens eyes, follows simple commands and cooperates with physical exam.  Denies any new symptoms.  No acute events overnight as per nursing staff.       Review of Systems   Constitutional: Negative for activity change and appetite change.   HENT: Negative for congestion and sore throat.    Eyes: Negative for discharge and visual disturbance.   Respiratory: Negative for cough and chest tightness.    Cardiovascular: Negative for chest pain and leg swelling.   Gastrointestinal: Negative for abdominal pain and nausea.   Genitourinary: Negative for dysuria and hematuria.   Musculoskeletal: Positive for back pain. Negative for myalgias.   Skin: Negative for pallor and rash.   Neurological: Negative for dizziness and weakness.   Psychiatric/Behavioral: Positive for behavioral problems. The patient is nervous/anxious.    All other systems reviewed and are negative.    Objective:     Vital Signs (Most Recent):  Temp: 97.7 °F (36.5 °C) (03/12/20 0515)  Pulse: 74 (03/12/20 1400)  Resp: 17 (03/12/20 1400)  BP: 121/85 (03/12/20 0700)  SpO2: 96 % (03/12/20 1400) Vital Signs (24h Range):  Temp:  [97.7 °F (36.5 °C)-98.4 °F (36.9 °C)] 97.7 °F (36.5 °C)  Pulse:  [] 74  Resp:  [0-26] 17  SpO2:  [89 %-100 %] 96 %  BP: (120-136)/() 121/85  Arterial Line BP: ()/(52-90) 109/67     Weight: 73.5 kg (162 lb)  Body mass index is 23.92 kg/m².    Intake/Output Summary (Last 24 hours) at 3/12/2020 1536  Last data filed at 3/12/2020 0510  Gross per 24 hour   Intake 640.78 ml   Output 3300 ml   Net -2659.22 ml      Physical Exam   Constitutional: He is oriented to person, place, and time. He appears well-developed and  well-nourished.   Chronically ill-appearing gentleman, extubated, drowsy   HENT:   Head: Atraumatic.   Mouth/Throat: Oropharynx is clear and moist.   Eyes: Pupils are equal, round, and reactive to light. EOM are normal. Scleral icterus is present.   Neck: Normal range of motion. Neck supple. No JVD present.   Right subclavian   Cardiovascular: Normal rate, regular rhythm and normal heart sounds. Exam reveals no gallop.   No murmur heard.  Mild tachycardia   Pulmonary/Chest: Effort normal and breath sounds normal. No respiratory distress. He has no wheezes.   Extubated, breathing comfortably on room air   Abdominal: Bowel sounds are normal. He exhibits distension. There is no rebound and no guarding.   Distended,  abdominal wall edema noted   Genitourinary:   Genitourinary Comments: Asif's catheter in place   Musculoskeletal: He exhibits no edema.   Lymphadenopathy:     He has no cervical adenopathy.   Neurological: He is alert and oriented to person, place, and time. No cranial nerve deficit.   Moving all extremities, pupils are equal and reactive   Skin: Skin is dry. Capillary refill takes less than 2 seconds. No rash noted.   Psychiatric: His behavior is normal.   Had 1 episode of psychosis in the morning   Nursing note and vitals reviewed.      Significant Labs: All pertinent labs within the past 24 hours have been reviewed.    Significant Imaging: I have reviewed all pertinent imaging results/findings within the past 24 hours.      Assessment/Plan:      * Acute hypoxemic respiratory failure  Resolved, now extubated, breathing comfortably on room air    Severe sepsis  Resolved  Continue antibiotics-deescalate once cultures finalize  Follow-up on blood cultures.      Pneumonia  Right middle lobe pneumonia with severe sepsis and shock(sepsis and shock resolved)  Extubated  Broad-spectrum antibiotics until cultures finalize    Altered mental status  As per report from ED physician, likely due to sepsis  Currently  extubated and had an episode of agitation now on Precedex drip  Wean Precedex drip  Fall precautions, seizure precautions    Chronic hepatitis C without hepatic coma  Prior history, has liver cirrhosis, anasarca  Started on Lasix      Thromboembolism  Patient recently had massive PE requiring tPA, had cardiac arrest  Will switch anticoagulation to Lovenox while inpatient-starting anticoagulation from today as INR dropped below 2  CTA chest on admission showed improvement in prior thromboembolism      Chronic combined systolic and diastolic congestive heart failure  As per last echo patient had 30% EF with grade 2 diastolic dysfunction  Presented with shock, sepsis and received aggressive IV hydration  Continue current dose of IV Lasix  Daily weight  Accurate charting of urine output    Essential hypertension  Hold antihypertensives in light of infection and ongoing diuresis    Polysubstance abuse  Ongoing issue  This time urine drug screen is positive for methamphetamine.        VTE Risk Mitigation (From admission, onward)         Ordered     enoxaparin injection 70 mg  Every 12 hours (non-standard times)      03/12/20 1534     IP VTE HIGH RISK PATIENT  Once      03/10/20 1148     Place OLIVIA hose  Until discontinued      03/10/20 1148     Place sequential compression device  Until discontinued      03/10/20 1148                      Isabel Savage MD  Department of Hospital Medicine   [facility]

## 2020-03-12 NOTE — ASSESSMENT & PLAN NOTE
As per last echo patient had 30% EF with grade 2 diastolic dysfunction  Presented with shock, sepsis and received aggressive IV hydration  Continue current dose of IV Lasix  Daily weight  Accurate charting of urine output

## 2020-03-12 NOTE — SUBJECTIVE & OBJECTIVE
Interval History: Patient was seen and examined bedside.  He was extubated in the morning however later he became very psychotic and was placed on Precedex drip.  Upon my interview he opens eyes, follows simple commands and cooperates with physical exam.  Denies any new symptoms.  No acute events overnight as per nursing staff.       Review of Systems   Constitutional: Negative for activity change and appetite change.   HENT: Negative for congestion and sore throat.    Eyes: Negative for discharge and visual disturbance.   Respiratory: Negative for cough and chest tightness.    Cardiovascular: Negative for chest pain and leg swelling.   Gastrointestinal: Negative for abdominal pain and nausea.   Genitourinary: Negative for dysuria and hematuria.   Musculoskeletal: Positive for back pain. Negative for myalgias.   Skin: Negative for pallor and rash.   Neurological: Negative for dizziness and weakness.   Psychiatric/Behavioral: Positive for behavioral problems. The patient is nervous/anxious.    All other systems reviewed and are negative.    Objective:     Vital Signs (Most Recent):  Temp: 97.7 °F (36.5 °C) (03/12/20 0515)  Pulse: 74 (03/12/20 1400)  Resp: 17 (03/12/20 1400)  BP: 121/85 (03/12/20 0700)  SpO2: 96 % (03/12/20 1400) Vital Signs (24h Range):  Temp:  [97.7 °F (36.5 °C)-98.4 °F (36.9 °C)] 97.7 °F (36.5 °C)  Pulse:  [] 74  Resp:  [0-26] 17  SpO2:  [89 %-100 %] 96 %  BP: (120-136)/() 121/85  Arterial Line BP: ()/(52-90) 109/67     Weight: 73.5 kg (162 lb)  Body mass index is 23.92 kg/m².    Intake/Output Summary (Last 24 hours) at 3/12/2020 1536  Last data filed at 3/12/2020 0510  Gross per 24 hour   Intake 640.78 ml   Output 3300 ml   Net -2659.22 ml      Physical Exam   Constitutional: He is oriented to person, place, and time. He appears well-developed and well-nourished.   Chronically ill-appearing gentleman, extubated, drowsy   HENT:   Head: Atraumatic.   Mouth/Throat: Oropharynx is  clear and moist.   Eyes: Pupils are equal, round, and reactive to light. EOM are normal. Scleral icterus is present.   Neck: Normal range of motion. Neck supple. No JVD present.   Right subclavian   Cardiovascular: Normal rate, regular rhythm and normal heart sounds. Exam reveals no gallop.   No murmur heard.  Mild tachycardia   Pulmonary/Chest: Effort normal and breath sounds normal. No respiratory distress. He has no wheezes.   Extubated, breathing comfortably on room air   Abdominal: Bowel sounds are normal. He exhibits distension. There is no rebound and no guarding.   Distended,  abdominal wall edema noted   Genitourinary:   Genitourinary Comments: Asif's catheter in place   Musculoskeletal: He exhibits no edema.   Lymphadenopathy:     He has no cervical adenopathy.   Neurological: He is alert and oriented to person, place, and time. No cranial nerve deficit.   Moving all extremities, pupils are equal and reactive   Skin: Skin is dry. Capillary refill takes less than 2 seconds. No rash noted.   Psychiatric: His behavior is normal.   Had 1 episode of psychosis in the morning   Nursing note and vitals reviewed.      Significant Labs: All pertinent labs within the past 24 hours have been reviewed.    Significant Imaging: I have reviewed all pertinent imaging results/findings within the past 24 hours.

## 2020-03-12 NOTE — NURSING
Pt. Extremely agitated and combative. Ripping ekg leads and oxygen off. Screaming attempting to kick doctors and nurses. Pt. Was uncontrollable. Precedex drip was ordered. Pt. Is now calm and cooperative.

## 2020-03-12 NOTE — PROGRESS NOTES
"[facility]  Adult Nutrition   Progress Note (Follow-Up)    SUMMARY     Recommendations  Recommendation/Intervention: 1. Cardiac Diet initiated. Monitor and encourage adequate intake of meals. 2. RD added Ensure Enlive TID (to provide 1050 kcal/day and 60 g/day protein).   Goals: 1. Patient to meet at least 75% of estimated energy and protein needs via PO intake of meals and supplements.   Nutrition Goal Status: new  Communication of RD Recs: reviewed with RN    Dietitian Rounds Brief  Patient resting at rounds. Still NPO at time of rounds. Diet advanced to cardiac diet after rounds. RD added Ensure Enlive TID. Will monitor PO intake and tolerance of meals and supplements.     Reason for Assessment  Reason For Assessment: RD follow-up  Diagnosis: pulmonary disease  Relevant Medical History: CHF, cirrhosis, CAD, hepatitis C, HTN, depression, severe sepsis, pneumonia, polysubstance abuse, AMS, Acute hypoxemic respiratory failure, Thromboembolism  Interdisciplinary Rounds: attended    Nutrition Risk Screen  Nutrition Risk Screen: no indicators present             Nutrition/Diet History  Spiritual, Cultural Beliefs, Adventism Practices, Values that Affect Care: yes  Food Allergies: NKFA  Factors Affecting Nutritional Intake: NPO    Anthropometrics  Temp: 97.7 °F (36.5 °C)  Height Method: Stated  Height: 5' 9" (175.3 cm)  Height (inches): 69 in  Weight Method: Stated  Weight: 73.5 kg (162 lb)  Weight (lb): 162 lb  Ideal Body Weight (IBW), Male: 160 lb  % Ideal Body Weight, Male (lb): 101.25 %  BMI (Calculated): 23.9  BMI Grade: 18.5-24.9 - normal  Weight Loss: other (see comments)(unable to accurately assess weight history at this time )       Weight History:  Wt Readings from Last 10 Encounters:   03/10/20 73.5 kg (162 lb)   02/07/20 67.3 kg (148 lb 5.9 oz)   12/28/19 75.2 kg (165 lb 12.6 oz)   12/09/19 74.8 kg (165 lb)   10/30/19 69.8 kg (153 lb 14.1 oz)   08/25/19 70.5 kg (155 lb 6.8 oz)   07/06/19 74.8 " kg (165 lb)   07/06/19 74.8 kg (165 lb)   07/04/19 73.1 kg (161 lb 1.6 oz)       Lab/Procedures/Meds: Pertinent Labs Reviewed  Clinical Chemistry:  Recent Labs   Lab 03/11/20  0354 03/12/20  0410   * 133*   K 5.3* 4.7    101   CO2 21* 26   GLU 65* 94   BUN 39* 35*   CREATININE 1.1 1.1   CALCIUM 8.4* 7.8*   PROT 7.1 6.3   ALBUMIN 2.4* 2.1*   BILITOT 2.2* 2.0*   ALKPHOS 121 94   AST 67* 82*   ALT 51* 41   ANIONGAP 11 6*   ESTGFRAFRICA >60.0 >60.0   EGFRNONAA >60.0 >60.0   MG 1.9 1.9   PHOS 4.3 4.4     CBC:   Recent Labs   Lab 03/12/20  0410 03/12/20  0808   WBC 18.22*  --   --    RBC 3.71*  --   --    HGB 9.4*  --   --    HCT 29.3*   < > 32*   *  --   --    MCV 79*  --   --    MCH 25.3*  --   --    MCHC 32.1  --   --     < > = values in this interval not displayed.     Cardiac Profile:  Recent Labs   Lab 03/10/20  0900   TROPONINI 0.031     Medications: Pertinent Medications reviewed  Scheduled Meds:   chlorhexidine  15 mL Mouth/Throat BID    enoxparin  1 mg/kg Subcutaneous Q12H    furosemide (LASIX) IV  20 mg Intravenous Q12H    mupirocin   Nasal BID    piperacillin-tazobactam (ZOSYN) IVPB  3.375 g Intravenous Q8H    vancomycin (VANCOCIN) IVPB  1,250 mg Intravenous Q18H     Continuous Infusions:   dexmedetomidine (PRECEDEX) infusion (non-titrating) 0.2 mcg/kg/hr (03/12/20 0900)    norepinephrine bitartrate-D5W      propofoL 5 mcg/kg/min (03/12/20 0329)     PRN Meds:.acetaminophen, bisacodyL, dextrose 50%, insulin aspart U-100, magnesium oxide, magnesium oxide, norepinephrine bitartrate-D5W, ondansetron, potassium chloride 10%, potassium chloride 10%, potassium, sodium phosphates, potassium, sodium phosphates, propofoL, Pharmacy to dose Vancomycin consult **AND** vancomycin - pharmacy to dose    Estimated/Assessed Needs  Weight Used For Calorie Calculations: 73.5 kg (162 lb 0.6 oz)  Energy Calorie Requirements (kcal): 1936 ([place] - intubated); 2205 - 2573 (30 - 35 once extubated)    Energy Need Method: [facility]  Protein Requirements: 110 - 147 (1.5 - 2 g/kg)   Weight Used For Protein Calculations: 73.5 kg (162 lb 0.6 oz)  Fluid Requirements (mL): 1838 (25 ml/kg or per MD)   Estimated Fluid Requirement Method: other (see comments)  RDA Method (mL): 1936       Nutrition Prescription Ordered  Current Diet Order: NPO     Evaluation of Received Nutrient/Fluid Intake  Other Protein (gm): 244(diprovan @ 9.23 ml/hr )  Energy Calories Required: not meeting needs  Protein Required: not meeting needs  Fluid Required: meeting needs     Intake/Output Summary (Last 24 hours) at 3/12/2020 1615  Last data filed at 3/12/2020 0510  Gross per 24 hour   Intake 640.78 ml   Output 3300 ml   Net -2659.22 ml      % Intake of Estimated Energy Needs: 0%  % Meal Intake: NPO    Nutrition Risk  Level of Risk/Frequency of Follow-up: high     Monitor and Evaluation  Food and Nutrient Intake: food and beverage intake, energy intake  Food and Nutrient Adminstration: diet order  Physical Activity and Function: nutrition-related ADLs and IADLs, factors affecting access to physical activity  Anthropometric Measurements: weight change, weight, body mass index  Biochemical Data, Medical Tests and Procedures: electrolyte and renal panel, glucose/endocrine profile, lipid profile, gastrointestinal profile, inflammatory profile  Nutrition-Focused Physical Findings: overall appearance     Nutrition Follow-Up  RD Follow-up?: Yes    Jacque Sousa RD 03/12/2020 4:15 PM

## 2020-03-12 NOTE — CARE UPDATE
03/11/20 2016   Patient Assessment/Suction   Level of Consciousness (AVPU) responds to pain   Respiratory Effort Unlabored   Expansion/Accessory Muscles/Retractions no use of accessory muscles   All Lung Fields Breath Sounds clear;diminished   Rhythm/Pattern, Respiratory assisted mechanically   Cough Frequency with stimulation   Cough Type assisted   Suction Method tracheal   $ Suction Charges Inline Suction Procedure Stat Charge   Secretions Amount small   Secretions Color yellow   Secretions Characteristics thick   PRE-TX-O2   O2 Device (Oxygen Therapy) ventilator   $ Is the patient on Low Flow Oxygen? Yes   Oxygen Concentration (%) 21   SpO2 96 %   Pulse Oximetry Type Continuous   $ Pulse Oximetry - Multiple Charge Pulse Oximetry - Multiple   Pulse (!) 117   Resp (!) 0   Positioning   Head of Bed (HOB) HOB elevated        Airway - Non-Surgical 03/10/20 1018 Endotracheal Tube   Placement Date/Time: 03/10/20 1018   Present Prior to Hospital Arrival?: No  Method of Intubation: Direct laryngoscopy  Inserted by: MD  Staff/Resident Name(s): [email or web address]  Airway Device: Endotracheal Tube  Airway Device Size: 7.5  Style: Cuffed  Cuff ...   Secured at 23 cm   Measured At Lips   Secured Location Left   Secured by Commercial tube barros   Bite Block left   Site Condition Dry   Status Intact;Secured   Site Assessment Clean;Dry   Vent Select   Conventional Vent Y   Charged w/in last 24h YES   Preset Conventional Ventilator Settings   Vent ID [id number]   Vent Type    Ventilation Type VC   Vent Mode Spont   Humidity HME   Vt Set 460 mL   PEEP/CPAP 5 cmH20   Pressure Support 16 cmH20   Waveform RAMP   Peak Flow 60 L/min   Plateau Set/Insp. Hold (sec) 0   Insp Rise Time  50 %   Trigger Sensitivity Flow/I-Trigger 3 L/min   Patient Ventilator Parameters   Resp Rate Total 25 br/min   Peak Airway Pressure 21 cmH2O   Mean Airway Pressure 11 cmH20   Plateau Pressure 0 cmH20   Exhaled Vt 444 mL   Total Ve 11.2 mL   Spont Ve 11.2 L   I:E  Ratio Measured 1:1.60   Tubing ID (mm) 7.5 mm   Tube Type ET   Conventional Ventilator Alarms   Alarms On Y   Resp Rate High Alarm 35 br/min   Press High Alarm 44 cmH2O   Apnea Rate 10   Apnea Volume (mL) 1 mL   Apnea Oxygen Concentration  100   Apnea Flow Rate (L/min) 74   T Apnea 20 sec(s)   Ready to Wean/Extubation Screen   FIO2<=50 (chart decimal) 0.21   MV<16L (chart vol.) 11.2   PEEP <=8 (chart #) 5   Ready to Wean Parameters   F/VT Ratio<105 (RSBI) (!) 0   Respiratory Evaluation   $ Care Plan Tech Time 15 min

## 2020-03-13 LAB
ALBUMIN SERPL BCP-MCNC: 2 G/DL (ref 3.5–5.2)
ALP SERPL-CCNC: 91 U/L (ref 55–135)
ALT SERPL W/O P-5'-P-CCNC: 35 U/L (ref 10–44)
ANION GAP SERPL CALC-SCNC: 5 MMOL/L (ref 8–16)
AST SERPL-CCNC: 41 U/L (ref 10–40)
BACTERIA SPEC AEROBE CULT: NORMAL
BASOPHILS # BLD AUTO: 0.03 K/UL (ref 0–0.2)
BASOPHILS NFR BLD: 0.2 % (ref 0–1.9)
BILIRUB SERPL-MCNC: 1.3 MG/DL (ref 0.1–1)
BUN SERPL-MCNC: 27 MG/DL (ref 6–20)
CALCIUM SERPL-MCNC: 7.3 MG/DL (ref 8.7–10.5)
CHLORIDE SERPL-SCNC: 103 MMOL/L (ref 95–110)
CO2 SERPL-SCNC: 27 MMOL/L (ref 23–29)
CREAT SERPL-MCNC: 0.8 MG/DL (ref 0.5–1.4)
DIFFERENTIAL METHOD: ABNORMAL
EOSINOPHIL # BLD AUTO: 0.3 K/UL (ref 0–0.5)
EOSINOPHIL NFR BLD: 2.2 % (ref 0–8)
ERYTHROCYTE [DISTWIDTH] IN BLOOD BY AUTOMATED COUNT: 18.7 % (ref 11.5–14.5)
EST. GFR  (AFRICAN AMERICAN): >60 ML/MIN/1.73 M^2
EST. GFR  (NON AFRICAN AMERICAN): >60 ML/MIN/1.73 M^2
GLUCOSE SERPL-MCNC: 103 MG/DL (ref 70–110)
GLUCOSE SERPL-MCNC: 106 MG/DL (ref 70–110)
GLUCOSE SERPL-MCNC: 107 MG/DL (ref 70–110)
GLUCOSE SERPL-MCNC: 111 MG/DL (ref 70–110)
GLUCOSE SERPL-MCNC: 186 MG/DL (ref 70–110)
GLUCOSE SERPL-MCNC: 71 MG/DL (ref 70–110)
GLUCOSE SERPL-MCNC: 81 MG/DL (ref 70–110)
GRAM STN SPEC: NORMAL
HCT VFR BLD AUTO: 28.1 % (ref 40–54)
HGB BLD-MCNC: 8.9 G/DL (ref 14–18)
IMM GRANULOCYTES # BLD AUTO: 0.07 K/UL (ref 0–0.04)
IMM GRANULOCYTES NFR BLD AUTO: 0.5 % (ref 0–0.5)
INR PPP: 1.7
LYMPHOCYTES # BLD AUTO: 1.5 K/UL (ref 1–4.8)
LYMPHOCYTES NFR BLD: 10.2 % (ref 18–48)
MAGNESIUM SERPL-MCNC: 1.7 MG/DL (ref 1.6–2.6)
MCH RBC QN AUTO: 25.1 PG (ref 27–31)
MCHC RBC AUTO-ENTMCNC: 31.7 G/DL (ref 32–36)
MCV RBC AUTO: 79 FL (ref 82–98)
MONOCYTES # BLD AUTO: 0.7 K/UL (ref 0.3–1)
MONOCYTES NFR BLD: 4.8 % (ref 4–15)
NEUTROPHILS # BLD AUTO: 12.4 K/UL (ref 1.8–7.7)
NEUTROPHILS NFR BLD: 82.1 % (ref 38–73)
NRBC BLD-RTO: 0 /100 WBC
PHOSPHATE SERPL-MCNC: 3 MG/DL (ref 2.7–4.5)
PLATELET # BLD AUTO: 326 K/UL (ref 150–350)
PMV BLD AUTO: 9.4 FL (ref 9.2–12.9)
POTASSIUM SERPL-SCNC: 3.8 MMOL/L (ref 3.5–5.1)
PROT SERPL-MCNC: 6.1 G/DL (ref 6–8.4)
PROTHROMBIN TIME: 18.8 SEC (ref 10.6–14.8)
RBC # BLD AUTO: 3.55 M/UL (ref 4.6–6.2)
SODIUM SERPL-SCNC: 135 MMOL/L (ref 136–145)
WBC # BLD AUTO: 15.14 K/UL (ref 3.9–12.7)

## 2020-03-13 PROCEDURE — 80053 COMPREHEN METABOLIC PANEL: CPT

## 2020-03-13 PROCEDURE — 25000003 PHARM REV CODE 250: Performed by: HOSPITALIST

## 2020-03-13 PROCEDURE — 82962 GLUCOSE BLOOD TEST: CPT

## 2020-03-13 PROCEDURE — 20000000 HC ICU ROOM

## 2020-03-13 PROCEDURE — 83735 ASSAY OF MAGNESIUM: CPT

## 2020-03-13 PROCEDURE — 85610 PROTHROMBIN TIME: CPT

## 2020-03-13 PROCEDURE — 63600175 PHARM REV CODE 636 W HCPCS: Performed by: HOSPITALIST

## 2020-03-13 PROCEDURE — 99900035 HC TECH TIME PER 15 MIN (STAT)

## 2020-03-13 PROCEDURE — 25000003 PHARM REV CODE 250

## 2020-03-13 PROCEDURE — 94761 N-INVAS EAR/PLS OXIMETRY MLT: CPT

## 2020-03-13 PROCEDURE — 85025 COMPLETE CBC W/AUTO DIFF WBC: CPT

## 2020-03-13 PROCEDURE — 84100 ASSAY OF PHOSPHORUS: CPT

## 2020-03-13 RX ORDER — POTASSIUM CHLORIDE 20 MEQ/1
60 TABLET, EXTENDED RELEASE ORAL ONCE
Status: COMPLETED | OUTPATIENT
Start: 2020-03-13 | End: 2020-03-13

## 2020-03-13 RX ORDER — MAGNESIUM SULFATE 1 G/100ML
1 INJECTION INTRAVENOUS ONCE
Status: COMPLETED | OUTPATIENT
Start: 2020-03-13 | End: 2020-03-13

## 2020-03-13 RX ADMIN — POTASSIUM CHLORIDE 60 MEQ: 20 TABLET, EXTENDED RELEASE ORAL at 04:03

## 2020-03-13 RX ADMIN — MAGNESIUM SULFATE IN DEXTROSE 1 G: 10 INJECTION, SOLUTION INTRAVENOUS at 04:03

## 2020-03-13 RX ADMIN — CHLORHEXIDINE GLUCONATE 15 ML: 1.2 RINSE ORAL at 10:03

## 2020-03-13 RX ADMIN — PIPERACILLIN AND TAZOBACTAM 3.38 G: 3; .375 INJECTION, POWDER, LYOPHILIZED, FOR SOLUTION INTRAVENOUS; PARENTERAL at 10:03

## 2020-03-13 RX ADMIN — INSULIN ASPART 2 UNITS: 100 INJECTION, SOLUTION INTRAVENOUS; SUBCUTANEOUS at 03:03

## 2020-03-13 RX ADMIN — PIPERACILLIN AND TAZOBACTAM 3.38 G: 3; .375 INJECTION, POWDER, LYOPHILIZED, FOR SOLUTION INTRAVENOUS; PARENTERAL at 03:03

## 2020-03-13 RX ADMIN — ENOXAPARIN SODIUM 70 MG: 80 INJECTION, SOLUTION INTRAVENOUS; SUBCUTANEOUS at 05:03

## 2020-03-13 RX ADMIN — PIPERACILLIN AND TAZOBACTAM 3.38 G: 3; .375 INJECTION, POWDER, LYOPHILIZED, FOR SOLUTION INTRAVENOUS; PARENTERAL at 07:03

## 2020-03-13 RX ADMIN — MUPIROCIN: 20 OINTMENT TOPICAL at 10:03

## 2020-03-13 RX ADMIN — MAGNESIUM OXIDE 800 MG: 400 TABLET ORAL at 06:03

## 2020-03-13 RX ADMIN — VANCOMYCIN HYDROCHLORIDE 1250 MG: 1 INJECTION, POWDER, LYOPHILIZED, FOR SOLUTION INTRAVENOUS at 07:03

## 2020-03-13 RX ADMIN — FUROSEMIDE 20 MG: 10 INJECTION, SOLUTION INTRAMUSCULAR; INTRAVENOUS at 10:03

## 2020-03-13 RX ADMIN — ENOXAPARIN SODIUM 70 MG: 80 INJECTION, SOLUTION INTRAVENOUS; SUBCUTANEOUS at 04:03

## 2020-03-13 NOTE — PROGRESS NOTES
[facility] Medicine  Progress Note    DOS: 03/13/2020    Patient Name: Francis Daigle  MRN: [id number]  Patient Class: IP- Inpatient   Admission Date: 3/10/2020  Length of Stay: 3 days  Attending Physician: Isabel Savage MD  Primary Care Provider: Primary Doctor No        Subjective:     Principal Problem:Acute hypoxemic respiratory failure        HPI:  Upon my entering in the room patient was already intubated, sedated.  Most of the history was obtained from ER physician and prior records.     According to ED physician patient was brought from [facility] due to reported history of confusion, aggression and he requested EMS to bring him here to [facility].  No family members are available at bedside or on the phone.  In ED patient was found to have severe hypoxia requiring intubation.  Lactic acid more than 8.  Sepsis protocol was initiated.  Subclavian line and art line was placed.  Pulmonary was consulted.     As per prior records it appears that patient has chronic HCV/cirrhosis, CHF(15% EF), recent massive pulmonary embolism with PE a cardiac arrest and thoracotomy for empyema and was recently discharged 3 weeks ago from our facility.  According to pulmonologist he saw this patient in a parking lot when he was visiting another friend.     Unable to obtain past medical history, past surgical history, family history except obtained from prior records    Overview/Hospital Course:  03/11:  Patient was seen and examined bedside.  He still remains intubated and sedated.  He failed weaning trial today.  Lactic acid has normalized and he is not hypothermic any more.  Good urine output.  Remains on broad-spectrum antibiotics.  Blood pressure and urine output is good.  Still remains tachycardic.     03/12:  Patient was seen and examined bedside.  He was extubated in the morning however later he became very psychotic and was placed on Precedex drip.  Upon my interview he opens eyes, follows  simple commands and cooperates with physical exam.  Denies any new symptoms.  No acute events overnight as per nursing staff.     03/13:  Patient was seen and examined bedside.  He was extubated yesterday and was transitioned to Precedex drip due to agitation.  Today he is back to his baseline and he is off any sedation as well as Precedex drip.  He is breathing comfortably on room air.  Cooperates with physical exam.  Denies any new symptoms.  No acute events overnight as per nursing staff except 10 beats of V-tach.     Interval History:  Patient was seen and examined bedside.  He was extubated yesterday and was transitioned to Precedex drip due to agitation.  Today he is back to his baseline and he is off any sedation as well as Precedex drip.  He is breathing comfortably on room air.  Cooperates with physical exam.  Denies any new symptoms.  No acute events overnight as per nursing staff except 10 beats of V-tach.       Review of Systems   Constitutional: Negative for activity change and appetite change.   HENT: Negative for congestion and sore throat.    Eyes: Negative for discharge and visual disturbance.   Respiratory: Negative for cough and chest tightness.    Cardiovascular: Negative for chest pain and leg swelling.   Gastrointestinal: Negative for abdominal pain and nausea.   Genitourinary: Negative for dysuria and hematuria.   Musculoskeletal: Positive for back pain. Negative for myalgias.   Skin: Negative for pallor and rash.   Neurological: Negative for dizziness and weakness.   Psychiatric/Behavioral: The patient is nervous/anxious.    All other systems reviewed and are negative.    Objective:     Vital Signs (Most Recent):  Temp: 97.7 °F (36.5 °C) (03/12/20 0515)  Pulse: (!) 111 (03/13/20 1500)  Resp: (!) 25 (03/13/20 1500)  BP: (!) 151/96 (03/13/20 1500)  SpO2: (!) 92 % (03/13/20 1000) Vital Signs (24h Range):  Pulse:  [] 111  Resp:  [18-30] 25  SpO2:  [85 %-97 %] 92 %  BP: (127-151)/(87-96)  151/96  Arterial Line BP: ()/(60-97) 168/97     Weight: 73.5 kg (162 lb)  Body mass index is 23.92 kg/m².    Intake/Output Summary (Last 24 hours) at 3/13/2020 1546  Last data filed at 3/13/2020 1300  Gross per 24 hour   Intake 877 ml   Output 4600 ml   Net -3723 ml      Physical Exam   Constitutional: He is oriented to person, place, and time. He appears well-developed and well-nourished.   Chronically ill-appearing gentleman, in no acute distress   HENT:   Head: Atraumatic.   Mouth/Throat: Oropharynx is clear and moist.   Eyes: Pupils are equal, round, and reactive to light. EOM are normal. Scleral icterus is present.   Neck: Normal range of motion. Neck supple. No JVD present.   Right subclavian   Cardiovascular: Normal rate, regular rhythm and normal heart sounds. Exam reveals no gallop.   No murmur heard.  Mild tachycardia   Pulmonary/Chest: Effort normal and breath sounds normal. No respiratory distress. He has no wheezes.   Extubated, breathing comfortably on room air   Abdominal: Bowel sounds are normal. He exhibits distension. There is no rebound and no guarding.   Distended,  abdominal wall edema noted   Genitourinary:   Genitourinary Comments: Asif's catheter in place   Musculoskeletal: He exhibits no edema.   Lymphadenopathy:     He has no cervical adenopathy.   Neurological: He is alert and oriented to person, place, and time. No cranial nerve deficit.   Moving all extremities, pupils are equal and reactive   Skin: Skin is dry. Capillary refill takes less than 2 seconds. No rash noted.   Psychiatric: He has a normal mood and affect. His behavior is normal.   Nursing note and vitals reviewed.      Significant Labs: All pertinent labs within the past 24 hours have been reviewed.    Significant Imaging: I have reviewed all pertinent imaging results/findings within the past 24 hours.      Assessment/Plan:      * Acute hypoxemic respiratory failure  Resolved, now extubated, breathing comfortably on  room air.    Severe sepsis  Resolved  Deescalate antibiotics to Augmentin upon discharge  Follow-up on blood cultures      Pneumonia  Right middle lobe pneumonia with severe sepsis and shock(sepsis and shock resolved)  Extubated now breathing on room air  Broad-spectrum antibiotics until cultures finalize.  Likely discharge tomorrow morning on Augmentin    Altered mental status  As per report from ED physician, likely due to sepsis  Currently extubated and off Precedex drip  Fall precautions, seizure precautions  Back to his baseline    Chronic hepatitis C without hepatic coma  Prior history, has liver cirrhosis, anasarca  Continue Lasix      Thromboembolism  Patient recently had massive PE requiring tPA, had cardiac arrest  Continue anticoagulation with weight based Lovenox while inpatient  CTA chest on admission showed improvement in prior thromboembolism      Chronic combined systolic and diastolic congestive heart failure  As per last echo patient had 30% EF with grade 2 diastolic dysfunction  Presented with shock, sepsis and received aggressive IV hydration  Continue current dose of IV Lasix  Daily weight  Accurate charting of urine output  Had 1 episode of nonsustained V-tach likely due to hypokalemia and hypomagnesia  Replace K and Mg aggressively    Essential hypertension  Hold antihypertensives in light of infection and ongoing diuresis.    Polysubstance abuse  Ongoing issue  This time urine drug screen is positive for methamphetamine.  Patient admits using 2 days prior to presentation        VTE Risk Mitigation (From admission, onward)         Ordered     enoxaparin injection 70 mg  Every 12 hours (non-standard times)      03/12/20 1534     IP VTE HIGH RISK PATIENT  Once      03/10/20 1148     Place OLIVIA hose  Until discontinued      03/10/20 1148     Place sequential compression device  Until discontinued      03/10/20 1148                      Isabel Savage MD  Department of Hospital Medicine    [facility]

## 2020-03-13 NOTE — PROGRESS NOTES
"Progress Note  Pulmonary/Critical Care      Admit Date: 3/10/2020      SUBJECTIVE:      Patient ID: Francis Daigle is a 44 y.o. male.    HPI/Interval history (See H&P for complete P,F,SHx) :     The patient appears to be back at baseline.    .Review of Systems   Constitutional: Positive for malaise/fatigue.        He feels cold.  He feels weak.  He feels his glucoses are low.   HENT: Negative.    Respiratory: Negative.    Cardiovascular: Negative.    Gastrointestinal: Negative.    All other systems reviewed and are negative.        OBJECTIVE:     Vital Signs Range (Last 24H):  Pulse:  []   Resp:  [15-25]   SpO2:  [94 %-98 %]   Arterial Line BP: ()/(52-76)     I & O (Last 24H):    Intake/Output Summary (Last 24 hours) at 3/13/2020 0947  Last data filed at 3/12/2020 1900  Gross per 24 hour   Intake 37 ml   Output 2100 ml   Net -2063 ml        Estimated body mass index is 23.92 kg/m² as calculated from the following:    Height as of this encounter: 5' 9" (1.753 m).    Weight as of this encounter: 73.5 kg (162 lb).    Physical Exam  GENERAL: Older appearing patient in no distress.  HEENT: Pupils equal and reactive. Extraocular movements intact. Nose intact.      Pharynx moist.  NECK: Supple.   HEART: Regular rate and rhythm. No murmur or gallop auscultated.  LUNGS:  There are crackles in the right base. Lung excursion symmetrical. No change in fremitus. No adventitial noises.  ABDOMEN: Bowel sounds present. Non-tender, no masses palpated.  : Normal anatomy.  Scrotal edema better  EXTREMITIES: Normal muscle tone and joint movement, no cyanosis or clubbing.   LYMPHATICS: No adenopathy palpated, presacral edema.  SKIN: Dry, intact, no lesions.  His lower legs less cellulitic  NEURO: Cranial nerves II-XII intact. Motor strength 5/5 bilaterally, upper and lower extremities.  PSYCH:  Manipulative    Laboratory/Diagnostic Data:      Recent Labs   Lab 03/13/20  0355   WBC 15.14*   HGB 8.9*   HCT 28.1*   PLT " 326   *   K 3.8      CO2 27   BUN 27*   CREATININE 0.8   AST 41*   ALT 35   PROT 6.1   ALBUMIN 2.0*   BILITOT 1.3*   PHOS 3.0      Recent Labs   Lab 03/13/20  0355   INR 1.7       Microbiology:    Microbiology Results (last 7 days)     Procedure Component Value Units Date/Time    Culture, Respiratory with Gram Stain [[id number]] Collected:  03/10/20 1414    Order Status:  Completed Specimen:  Respiratory from Sputum, Expectorated Updated:  03/13/20 0803     Respiratory Culture Normal respiratory shaye     Gram Stain (Respiratory) <10 epithelial cells per low power field.     Gram Stain (Respiratory) Few WBC's     Gram Stain (Respiratory) Rare Gram positive cocci     Gram Stain (Respiratory) Few budding yeast and pseudohyphae    Blood culture #2 **CANNOT BE ORDERED STAT** [[id number]] Collected:  03/10/20 1045    Order Status:  Completed Specimen:  Blood from Line, Jugular, Internal Right Updated:  03/12/20 1232     Blood Culture, Routine No Growth to date      No Growth to date      No Growth to date    Blood culture #1 **CANNOT BE ORDERED STAT** [[id number]] Collected:  03/10/20 0900    Order Status:  Completed Specimen:  Blood from Peripheral, Antecubital, Right Updated:  03/12/20 1032     Blood Culture, Routine No Growth to date      No Growth to date      No Growth to date    Gram stain [[id number]]     Order Status:  Sent Specimen:  Sputum     Respiratory Viral Panel by PCR [place]; Tracheal Aspirate [[id number]]     Order Status:  No result Specimen:  Respiratory     Blood culture [[id number]]     Order Status:  No result Specimen:  Blood     Blood culture [[id number]]     Order Status:  Canceled Specimen:  Blood           Radiology:    The patient's chest x-ray shows a little more pulmonary edema in the bases today.    ASSESSMENT/PLAN:     Active Problems:    Altered mental status, originally 2nd to methamphetamine and hypoglycemia, now back to baseline  Sepsis?, cultures negative  Severe systolic  cardiomyopathy  Hepatitis-C  Cirrhosis with ascites  Status post DVT and PE  Status post empyema with VATS  Anemia of chronic disease, slightly worse  Hyponatremia  Moderate hypoalbuminemia  Long-term prognosis is quite poor    No objection to discharge home  Would complete 5 days of antibiotics  Will give morning dose of Lasix now  DC Asif in 2 hr

## 2020-03-13 NOTE — ASSESSMENT & PLAN NOTE
As per report from ED physician, likely due to sepsis  Currently extubated and off Precedex drip  Fall precautions, seizure precautions  Back to his baseline

## 2020-03-13 NOTE — ASSESSMENT & PLAN NOTE
As per last echo patient had 30% EF with grade 2 diastolic dysfunction  Presented with shock, sepsis and received aggressive IV hydration  Continue current dose of IV Lasix  Daily weight  Accurate charting of urine output  Had 1 episode of nonsustained V-tach likely due to hypokalemia and hypomagnesia  Replace K and Mg aggressively

## 2020-03-13 NOTE — ASSESSMENT & PLAN NOTE
Ongoing issue  This time urine drug screen is positive for methamphetamine.  Patient admits using 2 days prior to presentation

## 2020-03-13 NOTE — ASSESSMENT & PLAN NOTE
Patient recently had massive PE requiring tPA, had cardiac arrest  Continue anticoagulation with weight based Lovenox while inpatient  CTA chest on admission showed improvement in prior thromboembolism

## 2020-03-13 NOTE — PROGRESS NOTES
"VANCOMYCIN PHARMACOKINETIC NOTE:  Vancomycin Day # 3    Objective:    44 y.o., male, Actual Body Weight = 73.5 kg (162 lb)    Diagnosis/Indication for Vancomycin:  Pneumonia and Sepsis   Desired Vancomycin Peak:  30-35 mcg/ml; Desired Trough: 10-15 mcg/ml    Current Vancomycin Regimen:  1250 mg IV every 18 hours    Date Time Dose # Dosage  Serum Vancomycin Level Comments   3/10/20 13:07 1 1500 mg       3/11/20 07:32 2 1250 mg       3/12/20 00:29 3 1250 mg        18:30    16.9 mcg/ml Trough     19:22 4 1250 mg           Receiving other antibiotics:    Antibiotics (From admission, onward)      Start     Stop Route Frequency Ordered    03/11/20 1115  mupirocin 2 % ointment  (MRSA Decolonization Orders ST)      03/16 0859 Nasl 2 times daily 03/11/20 1011    03/10/20 1900  piperacillin-tazobactam 3.375 g in dextrose 5 % 50 mL IVPB (ready to mix system)  (Unknown Source)     Note to Pharmacy:  Ht: 5' 9" (1.753 m)  Wt: 73.5 kg (162 lb)  Estimated Creatinine Clearance: 76.9 mL/min (based on SCr of 1.2 mg/dL).  Body mass index is 23.92 kg/m².    03/16 1859 IV Every 8 hours (non-standard times) 03/10/20 1148             The patient has the following labs:     3/12/2020 Estimated Creatinine Clearance: 83.9 mL/min (based on SCr of 1.1 mg/dL). Lab Results   Component Value Date    BUN 35 (H) 03/12/2020       Lab Results   Component Value Date    WBC 18.22 (H) 03/12/2020          Vancomycin Trough:  16.9 mcg/mL collected 18 hours after Dose # 3 (drawn correctly).    Microbiology Results (last 7 days)       Procedure Component Value Units Date/Time    Culture, Respiratory with Gram Stain [[id number]] Collected:  03/10/20 1414    Order Status:  Completed Specimen:  Respiratory from Sputum, Expectorated Updated:  03/12/20 1525     Respiratory Culture Normal respiratory shaye     Gram Stain (Respiratory) <10 epithelial cells per low power field.     Gram Stain (Respiratory) Few WBC's     Gram Stain (Respiratory) Rare Gram positive " cocci     Gram Stain (Respiratory) Few budding yeast and pseudohyphae    Blood culture #2 **CANNOT BE ORDERED STAT** [[id number]] Collected:  03/10/20 1045    Order Status:  Completed Specimen:  Blood from Line, Jugular, Internal Right Updated:  03/12/20 1232     Blood Culture, Routine No Growth to date      No Growth to date      No Growth to date    Blood culture #1 **CANNOT BE ORDERED STAT** [[id number]] Collected:  03/10/20 0900    Order Status:  Completed Specimen:  Blood from Peripheral, Antecubital, Right Updated:  03/12/20 1032     Blood Culture, Routine No Growth to date      No Growth to date      No Growth to date    Gram stain [[id number]]     Order Status:  Sent Specimen:  Sputum     Respiratory Viral Panel by PCR [place]; Tracheal Aspirate [[id number]]     Order Status:  No result Specimen:  Respiratory     Blood culture [[id number]]     Order Status:  No result Specimen:  Blood     Blood culture [[id number]]     Order Status:  Canceled Specimen:  Blood              Assessment:  Vancomycin dose =  17 mg/kg  Renal function is: stable.  Vancomycin trough is above goal range.    Plan:  Will change vancomycin to 1250 mg IV every 24 hours.   Will obtain vancomycin trough after 3 more dose(s), prior to dose due 3/15 at 20:00.    Pharmacy will continue to manage vancomycin therapy and adjust regimen as necessary.    Thank you for allowing us to participate in this patient's care.     Roland Mendoza 3/12/2020 7:54 PM  Department of Pharmacy  Ext [phone]

## 2020-03-13 NOTE — SUBJECTIVE & OBJECTIVE
Interval History:  Patient was seen and examined bedside.  He was extubated yesterday and was transitioned to Precedex drip due to agitation.  Today he is back to his baseline and he is off any sedation as well as Precedex drip.  He is breathing comfortably on room air.  Cooperates with physical exam.  Denies any new symptoms.  No acute events overnight as per nursing staff except 10 beats of V-tach.       Review of Systems   Constitutional: Negative for activity change and appetite change.   HENT: Negative for congestion and sore throat.    Eyes: Negative for discharge and visual disturbance.   Respiratory: Negative for cough and chest tightness.    Cardiovascular: Negative for chest pain and leg swelling.   Gastrointestinal: Negative for abdominal pain and nausea.   Genitourinary: Negative for dysuria and hematuria.   Musculoskeletal: Positive for back pain. Negative for myalgias.   Skin: Negative for pallor and rash.   Neurological: Negative for dizziness and weakness.   Psychiatric/Behavioral: The patient is nervous/anxious.    All other systems reviewed and are negative.    Objective:     Vital Signs (Most Recent):  Temp: 97.7 °F (36.5 °C) (03/12/20 0515)  Pulse: (!) 111 (03/13/20 1500)  Resp: (!) 25 (03/13/20 1500)  BP: (!) 151/96 (03/13/20 1500)  SpO2: (!) 92 % (03/13/20 1000) Vital Signs (24h Range):  Pulse:  [] 111  Resp:  [18-30] 25  SpO2:  [85 %-97 %] 92 %  BP: (127-151)/(87-96) 151/96  Arterial Line BP: ()/(60-97) 168/97     Weight: 73.5 kg (162 lb)  Body mass index is 23.92 kg/m².    Intake/Output Summary (Last 24 hours) at 3/13/2020 1546  Last data filed at 3/13/2020 1300  Gross per 24 hour   Intake 877 ml   Output 4600 ml   Net -3723 ml      Physical Exam   Constitutional: He is oriented to person, place, and time. He appears well-developed and well-nourished.   Chronically ill-appearing gentleman, in no acute distress   HENT:   Head: Atraumatic.   Mouth/Throat: Oropharynx is clear and  moist.   Eyes: Pupils are equal, round, and reactive to light. EOM are normal. Scleral icterus is present.   Neck: Normal range of motion. Neck supple. No JVD present.   Right subclavian   Cardiovascular: Normal rate, regular rhythm and normal heart sounds. Exam reveals no gallop.   No murmur heard.  Mild tachycardia   Pulmonary/Chest: Effort normal and breath sounds normal. No respiratory distress. He has no wheezes.   Extubated, breathing comfortably on room air   Abdominal: Bowel sounds are normal. He exhibits distension. There is no rebound and no guarding.   Distended,  abdominal wall edema noted   Genitourinary:   Genitourinary Comments: Asif's catheter in place   Musculoskeletal: He exhibits no edema.   Lymphadenopathy:     He has no cervical adenopathy.   Neurological: He is alert and oriented to person, place, and time. No cranial nerve deficit.   Moving all extremities, pupils are equal and reactive   Skin: Skin is dry. Capillary refill takes less than 2 seconds. No rash noted.   Psychiatric: He has a normal mood and affect. His behavior is normal.   Nursing note and vitals reviewed.      Significant Labs: All pertinent labs within the past 24 hours have been reviewed.    Significant Imaging: I have reviewed all pertinent imaging results/findings within the past 24 hours.

## 2020-03-13 NOTE — NURSING
Up in chair for 2-3 hrs. Then ambulated complettely around both units with nurse on room air having to stop briefly twice. Tolerated well.

## 2020-03-13 NOTE — ASSESSMENT & PLAN NOTE
Right middle lobe pneumonia with severe sepsis and shock(sepsis and shock resolved)  Extubated now breathing on room air  Broad-spectrum antibiotics until cultures finalize.  Likely discharge tomorrow morning on Augmentin

## 2020-03-14 VITALS
BODY MASS INDEX: 25.83 KG/M2 | TEMPERATURE: 98 F | RESPIRATION RATE: 22 BRPM | SYSTOLIC BLOOD PRESSURE: 130 MMHG | HEIGHT: 69 IN | WEIGHT: 174.38 LBS | OXYGEN SATURATION: 96 % | DIASTOLIC BLOOD PRESSURE: 94 MMHG | HEART RATE: 111 BPM

## 2020-03-14 PROBLEM — R65.20 SEVERE SEPSIS: Status: RESOLVED | Noted: 2019-12-10 | Resolved: 2020-03-14

## 2020-03-14 PROBLEM — A41.9 SEVERE SEPSIS: Status: RESOLVED | Noted: 2019-12-10 | Resolved: 2020-03-14

## 2020-03-14 PROBLEM — R41.82 ALTERED MENTAL STATUS: Status: RESOLVED | Noted: 2020-03-10 | Resolved: 2020-03-14

## 2020-03-14 LAB
ALBUMIN SERPL BCP-MCNC: 2.4 G/DL (ref 3.5–5.2)
ALP SERPL-CCNC: 92 U/L (ref 55–135)
ALT SERPL W/O P-5'-P-CCNC: 36 U/L (ref 10–44)
ANION GAP SERPL CALC-SCNC: 7 MMOL/L (ref 8–16)
AST SERPL-CCNC: 50 U/L (ref 10–40)
BASOPHILS # BLD AUTO: 0.04 K/UL (ref 0–0.2)
BASOPHILS NFR BLD: 0.2 % (ref 0–1.9)
BILIRUB SERPL-MCNC: 1.4 MG/DL (ref 0.1–1)
BUN SERPL-MCNC: 21 MG/DL (ref 6–20)
CALCIUM SERPL-MCNC: 7.5 MG/DL (ref 8.7–10.5)
CHLORIDE SERPL-SCNC: 100 MMOL/L (ref 95–110)
CO2 SERPL-SCNC: 28 MMOL/L (ref 23–29)
CREAT SERPL-MCNC: 0.8 MG/DL (ref 0.5–1.4)
DIFFERENTIAL METHOD: ABNORMAL
EOSINOPHIL # BLD AUTO: 0.3 K/UL (ref 0–0.5)
EOSINOPHIL NFR BLD: 1.6 % (ref 0–8)
ERYTHROCYTE [DISTWIDTH] IN BLOOD BY AUTOMATED COUNT: 19.1 % (ref 11.5–14.5)
EST. GFR  (AFRICAN AMERICAN): >60 ML/MIN/1.73 M^2
EST. GFR  (NON AFRICAN AMERICAN): >60 ML/MIN/1.73 M^2
GLUCOSE SERPL-MCNC: 112 MG/DL (ref 70–110)
GLUCOSE SERPL-MCNC: 113 MG/DL (ref 70–110)
GLUCOSE SERPL-MCNC: 87 MG/DL (ref 70–110)
GLUCOSE SERPL-MCNC: 97 MG/DL (ref 70–110)
HCT VFR BLD AUTO: 30.5 % (ref 40–54)
HGB BLD-MCNC: 9.4 G/DL (ref 14–18)
IMM GRANULOCYTES # BLD AUTO: 0.06 K/UL (ref 0–0.04)
IMM GRANULOCYTES NFR BLD AUTO: 0.4 % (ref 0–0.5)
LYMPHOCYTES # BLD AUTO: 1.2 K/UL (ref 1–4.8)
LYMPHOCYTES NFR BLD: 7.2 % (ref 18–48)
MAGNESIUM SERPL-MCNC: 1.7 MG/DL (ref 1.6–2.6)
MCH RBC QN AUTO: 25.2 PG (ref 27–31)
MCHC RBC AUTO-ENTMCNC: 30.8 G/DL (ref 32–36)
MCV RBC AUTO: 82 FL (ref 82–98)
MONOCYTES # BLD AUTO: 0.9 K/UL (ref 0.3–1)
MONOCYTES NFR BLD: 5.6 % (ref 4–15)
NEUTROPHILS # BLD AUTO: 13.7 K/UL (ref 1.8–7.7)
NEUTROPHILS NFR BLD: 85 % (ref 38–73)
NRBC BLD-RTO: 0 /100 WBC
PHOSPHATE SERPL-MCNC: 2.5 MG/DL (ref 2.7–4.5)
PLATELET # BLD AUTO: 350 K/UL (ref 150–350)
PMV BLD AUTO: 9.5 FL (ref 9.2–12.9)
POTASSIUM SERPL-SCNC: 3.7 MMOL/L (ref 3.5–5.1)
PROCALCITONIN SERPL IA-MCNC: 0.22 NG/ML (ref 0–0.5)
PROT SERPL-MCNC: 7 G/DL (ref 6–8.4)
RBC # BLD AUTO: 3.73 M/UL (ref 4.6–6.2)
SODIUM SERPL-SCNC: 135 MMOL/L (ref 136–145)
WBC # BLD AUTO: 16.08 K/UL (ref 3.9–12.7)

## 2020-03-14 PROCEDURE — 25000003 PHARM REV CODE 250: Performed by: HOSPITALIST

## 2020-03-14 PROCEDURE — 94761 N-INVAS EAR/PLS OXIMETRY MLT: CPT

## 2020-03-14 PROCEDURE — 36415 COLL VENOUS BLD VENIPUNCTURE: CPT

## 2020-03-14 PROCEDURE — 25000003 PHARM REV CODE 250: Performed by: STUDENT IN AN ORGANIZED HEALTH CARE EDUCATION/TRAINING PROGRAM

## 2020-03-14 PROCEDURE — 84145 PROCALCITONIN (PCT): CPT

## 2020-03-14 PROCEDURE — 80053 COMPREHEN METABOLIC PANEL: CPT

## 2020-03-14 PROCEDURE — 63600175 PHARM REV CODE 636 W HCPCS: Performed by: HOSPITALIST

## 2020-03-14 PROCEDURE — 83735 ASSAY OF MAGNESIUM: CPT

## 2020-03-14 PROCEDURE — 84100 ASSAY OF PHOSPHORUS: CPT

## 2020-03-14 PROCEDURE — 85025 COMPLETE CBC W/AUTO DIFF WBC: CPT

## 2020-03-14 PROCEDURE — 82962 GLUCOSE BLOOD TEST: CPT

## 2020-03-14 RX ORDER — LORAZEPAM 2 MG/ML
0.5 INJECTION INTRAMUSCULAR ONCE
Status: COMPLETED | OUTPATIENT
Start: 2020-03-14 | End: 2020-03-14

## 2020-03-14 RX ORDER — POTASSIUM CHLORIDE 20 MEQ/15ML
60 SOLUTION ORAL ONCE
Status: DISCONTINUED | OUTPATIENT
Start: 2020-03-14 | End: 2020-03-14

## 2020-03-14 RX ORDER — POTASSIUM CHLORIDE 750 MG/1
30 CAPSULE, EXTENDED RELEASE ORAL ONCE
Status: COMPLETED | OUTPATIENT
Start: 2020-03-14 | End: 2020-03-14

## 2020-03-14 RX ORDER — LEVOFLOXACIN 750 MG/1
750 TABLET ORAL DAILY
Status: DISCONTINUED | OUTPATIENT
Start: 2020-03-14 | End: 2020-03-14 | Stop reason: HOSPADM

## 2020-03-14 RX ORDER — PANTOPRAZOLE SODIUM 40 MG/1
40 TABLET, DELAYED RELEASE ORAL DAILY
Status: DISCONTINUED | OUTPATIENT
Start: 2020-03-14 | End: 2020-03-14 | Stop reason: HOSPADM

## 2020-03-14 RX ORDER — AMOXICILLIN AND CLAVULANATE POTASSIUM 500; 125 MG/1; MG/1
1 TABLET, FILM COATED ORAL 3 TIMES DAILY
Qty: 15 TABLET | Refills: 0 | Status: SHIPPED | OUTPATIENT
Start: 2020-03-14 | End: 2020-03-16 | Stop reason: CLARIF

## 2020-03-14 RX ORDER — SODIUM,POTASSIUM PHOSPHATES 280-250MG
1 POWDER IN PACKET (EA) ORAL ONCE
Status: COMPLETED | OUTPATIENT
Start: 2020-03-14 | End: 2020-03-14

## 2020-03-14 RX ORDER — MAGNESIUM SULFATE HEPTAHYDRATE 40 MG/ML
2 INJECTION, SOLUTION INTRAVENOUS ONCE
Status: COMPLETED | OUTPATIENT
Start: 2020-03-14 | End: 2020-03-14

## 2020-03-14 RX ORDER — POTASSIUM CHLORIDE 20 MEQ/1
60 TABLET, EXTENDED RELEASE ORAL ONCE
Status: COMPLETED | OUTPATIENT
Start: 2020-03-14 | End: 2020-03-14

## 2020-03-14 RX ORDER — GABAPENTIN 100 MG/1
200 CAPSULE ORAL 3 TIMES DAILY
Status: DISCONTINUED | OUTPATIENT
Start: 2020-03-14 | End: 2020-03-14 | Stop reason: HOSPADM

## 2020-03-14 RX ORDER — METOPROLOL SUCCINATE 50 MG/1
100 TABLET, EXTENDED RELEASE ORAL DAILY
Status: DISCONTINUED | OUTPATIENT
Start: 2020-03-14 | End: 2020-03-14 | Stop reason: HOSPADM

## 2020-03-14 RX ORDER — ALPRAZOLAM 0.25 MG/1
0.25 TABLET ORAL 3 TIMES DAILY PRN
Status: DISCONTINUED | OUTPATIENT
Start: 2020-03-14 | End: 2020-03-14 | Stop reason: HOSPADM

## 2020-03-14 RX ADMIN — POTASSIUM, SODIUM PHOSPHATES 280 MG-160 MG-250 MG ORAL POWDER PACKET 1 PACKET: POWDER IN PACKET at 01:03

## 2020-03-14 RX ADMIN — POTASSIUM CHLORIDE 60 MEQ: 20 TABLET, EXTENDED RELEASE ORAL at 10:03

## 2020-03-14 RX ADMIN — ENOXAPARIN SODIUM 70 MG: 80 INJECTION, SOLUTION INTRAVENOUS; SUBCUTANEOUS at 04:03

## 2020-03-14 RX ADMIN — METOPROLOL SUCCINATE 100 MG: 50 TABLET, FILM COATED, EXTENDED RELEASE ORAL at 01:03

## 2020-03-14 RX ADMIN — ALPRAZOLAM 0.25 MG: 0.25 TABLET ORAL at 08:03

## 2020-03-14 RX ADMIN — POTASSIUM, SODIUM PHOSPHATES 280 MG-160 MG-250 MG ORAL POWDER PACKET 2 PACKET: POWDER IN PACKET at 05:03

## 2020-03-14 RX ADMIN — APIXABAN 5 MG: 5 TABLET, FILM COATED ORAL at 04:03

## 2020-03-14 RX ADMIN — GABAPENTIN 200 MG: 100 CAPSULE ORAL at 04:03

## 2020-03-14 RX ADMIN — LEVOFLOXACIN 750 MG: 750 TABLET, FILM COATED ORAL at 08:03

## 2020-03-14 RX ADMIN — PANTOPRAZOLE SODIUM 40 MG: 40 TABLET, DELAYED RELEASE ORAL at 08:03

## 2020-03-14 RX ADMIN — LORAZEPAM 0.5 MG: 2 INJECTION INTRAMUSCULAR; INTRAVENOUS at 12:03

## 2020-03-14 RX ADMIN — PIPERACILLIN AND TAZOBACTAM 3.38 G: 3; .375 INJECTION, POWDER, LYOPHILIZED, FOR SOLUTION INTRAVENOUS; PARENTERAL at 03:03

## 2020-03-14 RX ADMIN — FUROSEMIDE 20 MG: 10 INJECTION, SOLUTION INTRAMUSCULAR; INTRAVENOUS at 12:03

## 2020-03-14 RX ADMIN — GABAPENTIN 200 MG: 100 CAPSULE ORAL at 08:03

## 2020-03-14 RX ADMIN — POTASSIUM CHLORIDE 30 MEQ: 750 CAPSULE, EXTENDED RELEASE ORAL at 05:03

## 2020-03-14 RX ADMIN — MAGNESIUM SULFATE 2 G: 2 INJECTION INTRAVENOUS at 08:03

## 2020-03-14 RX ADMIN — MAGNESIUM OXIDE 800 MG: 400 TABLET ORAL at 05:03

## 2020-03-14 NOTE — DISCHARGE SUMMARY
[facility] Medicine  Discharge Summary    DOS: 03/14/2020      Patient Name: Francis Daigle  MRN: [id number]  Admission Date: 3/10/2020  Hospital Length of Stay: 4 days  Discharge Date and Time:  03/14/2020 5:33 PM  Attending Physician: No att. providers found   Discharging Provider: Isabel Savage MD  Primary Care Provider: Primary Doctor Cece      HPI:   Upon my entering in the room patient was already intubated, sedated.  Most of the history was obtained from ER physician and prior records.     According to ED physician patient was brought from [place] due to reported history of confusion, aggression and he requested EMS to bring him here to [facility].  No family members are available at bedside or on the phone.  In ED patient was found to have severe hypoxia requiring intubation.  Lactic acid more than 8.  Sepsis protocol was initiated.  Subclavian line and art line was placed.  Pulmonary was consulted.     As per prior records it appears that patient has chronic HCV/cirrhosis, CHF(15% EF), recent massive pulmonary embolism with PE a cardiac arrest and thoracotomy for empyema and was recently discharged 3 weeks ago from our facility.  According to pulmonologist he saw this patient in a parking lot when he was visiting another friend.     Unable to obtain past medical history, past surgical history, family history except obtained from prior records    * No surgery found *      Hospital Course:   03/11:  Patient was seen and examined bedside.  He still remains intubated and sedated.  He failed weaning trial today.  Lactic acid has normalized and he is not hypothermic any more.  Good urine output.  Remains on broad-spectrum antibiotics.  Blood pressure and urine output is good.  Still remains tachycardic.     03/12:  Patient was seen and examined bedside.  He was extubated in the morning however later he became very psychotic and was placed on Precedex drip.  Upon my interview he opens  eyes, follows simple commands and cooperates with physical exam.  Denies any new symptoms.  No acute events overnight as per nursing staff.     03/13:  Patient was seen and examined bedside.  He was extubated yesterday and was transitioned to Precedex drip due to agitation.  Today he is back to his baseline and he is off any sedation as well as Precedex drip.  He is breathing comfortably on room air.  Cooperates with physical exam.  Denies any new symptoms.  No acute events overnight as per nursing staff except 10 beats of V-tach.     03/14:  Patient was seen and examined at bedside.  He just walked from the bathroom unassisted on room air.  Denies any new symptoms.  No acute events overnight as per nursing staff.  Patient is breathing comfortably on room air since yesterday.  He is not on any IV medicines.  He diuresed very well in last 24 hrs.  We were holding his metoprolol hands he was becoming tachycardic which got better after resuming his Toprol-XL.  No acute events overnight as per nursing staff.  Patient was cleared for discharge by intensivist.  He was discharged home in hemodynamically stable condition with a new prescription of Augmentin.  He will ready take ciprofloxacin for his SBP prophylaxis.  I also offered if he needs any other prescriptions but he said he has enough supply.  He was advised to follow-up with his PCP.  He was counseled heavily on smoking cessation and polysubstance abuse.     Review of systems: Comprehensive review of system negative except mild weakness    Physical Exam   Constitutional: He is oriented to person, place, and time. He appears well-developed and well-nourished.   Chronically ill-appearing gentleman, in no acute distress   HENT:   Head: Atraumatic.   Mouth/Throat: Oropharynx is clear and moist.   Eyes: Pupils are equal, round, and reactive to light. EOM are normal.    Neck: Normal range of motion. Neck supple. No JVD present.   Right subclavian   Cardiovascular: Normal  rate, regular rhythm and normal heart sounds. Exam reveals no gallop.   No murmur heard.  Mild tachycardia   Pulmonary/Chest: Effort normal and breath sounds normal. No respiratory distress. He has no wheezes.   Extubated, breathing comfortably on room air   Abdominal: Bowel sounds are normal. There is no rebound and no guarding.   Distended,  abdominal wall edema noted   Genitourinary:  No suprapubic tenderness  Musculoskeletal: He exhibits no edema.   Lymphadenopathy: He has no cervical adenopathy.   Neurological: He is alert and oriented to person, place, and time. No cranial nerve deficit.   Moving all extremities, pupils are equal and reactive   Skin: Skin is dry. Capillary refill takes less than 2 seconds. No rash noted.   Psychiatric: He has a normal mood and affect. His behavior is normal.     Nursing note and vitals reviewed.      Consults:   Consults (From admission, onward)        Status Ordering Provider     Inpatient consult to Anesthesiology  Once     Provider:  Dusty Brock MD    Acknowledged ISABEL SAVAGE     Inpatient consult to Anesthesiology  Once     Provider:  (Not yet assigned)    Acknowledged ISABEL SAVAGE     Inpatient consult to Hospitalist  Once     Provider:  Isabel Savage MD    Acknowledged ISABEL SAVAGE     Inpatient consult to Intensivist  Once     Provider:  (Not yet assigned)    Completed ISABEL SAVAGE     Inpatient consult to Pulmonology  Once     Provider:  Trae Steele MD    Completed JOSSELIN CALLE JR          No new Assessment & Plan notes have been filed under this hospital service since the last note was generated.  Service: Hospital Medicine    Final Active Diagnoses:    Diagnosis Date Noted POA    Pneumonia [J18.9] 12/10/2019 Yes    Chronic hepatitis C without hepatic coma [B18.2] 12/10/2019 Yes    Thromboembolism [I74.9] 03/10/2020 Yes    Chronic combined systolic and diastolic congestive heart failure [I50.42] 03/10/2020 Yes    Essential  hypertension [I10] 08/23/2019 Yes    Polysubstance abuse [F19.10] 03/11/2020 Yes      Problems Resolved During this Admission:    Diagnosis Date Noted Date Resolved POA    PRINCIPAL PROBLEM:  Acute hypoxemic respiratory failure [J96.01]  03/14/2020 Unknown    Severe sepsis [A41.9, R65.20] 12/10/2019 03/14/2020 Unknown    Hypothermia [T68.XXXA] 03/10/2020 03/11/2020 Yes    Altered mental status [R41.82] 03/10/2020 03/14/2020 Yes       Discharged Condition: good    Disposition: Home or Self Care    Follow Up:  Follow-up Information     PCP In 1 week.               Patient Instructions:      Diet Cardiac     Notify your health care provider if you experience any of the following:  temperature >100.4     Notify your health care provider if you experience any of the following:  difficulty breathing or increased cough     Activity as tolerated       Significant Diagnostic Studies: Labs:   BMP:   Recent Labs   Lab 03/13/20  0355 03/14/20  0358   GLU 81 113*   * 135*   K 3.8 3.7    100   CO2 27 28   BUN 27* 21*   CREATININE 0.8 0.8   CALCIUM 7.3* 7.5*   MG 1.7 1.7       Pending Diagnostic Studies:     None         Medications:  Reconciled Home Medications:      Medication List      START taking these medications    amoxicillin-clavulanate 500-125mg 500-125 mg Tab  Commonly known as:  AUGMENTIN  Take 1 tablet (500 mg total) by mouth 3 (three) times daily. for 5 days        CHANGE how you take these medications    ALPRAZolam 0.25 MG tablet  Commonly known as:  XANAX  Take 1 tablet by mouth   at bedtime as needed for anxiety.  What changed:    · how to take this  · when to take this  · reasons to take this        CONTINUE taking these medications    acetaminophen 325 MG tablet  Commonly known as:  TYLENOL  Take 325 mg by mouth every 6 (six) hours as needed for Pain.     ciprofloxacin HCl 500 MG tablet  Commonly known as:  CIPRO  Take 1 tablet (500 mg total) by mouth every 12 (twelve) hours. for 24 days      ELIQUIS 5 mg Tab  Generic drug:  apixaban  Take 1 tablet (5 mg total) by mouth 2 (two) times daily.     enalapril 2.5 MG tablet  Commonly known as:  VASOTEC  Take 1 tablet (2.5 mg total) by mouth once daily.     furosemide 40 MG tablet  Commonly known as:  LASIX  Take 1 tablet (40 mg total) by mouth once daily.     * gabapentin 300 MG capsule  Commonly known as:  NEURONTIN  Take 1 capsule (300 mg total) by mouth every evening.     * gabapentin 100 MG capsule  Commonly known as:  NEURONTIN  Take 1 capsule (100 mg total) by mouth 3 (three) times daily as needed (anxiety/agitation/neuropathic pain).     HYDROcodone-acetaminophen  mg per tablet  Commonly known as:  NORCO  Take 1 tablet by mouth every 6 (six) hours as needed.     lactulose 10 gram/15 mL solution  Commonly known as:  CHRONULAC  Take 15 mLs (10 g total) by mouth 3 (three) times daily as needed (constipation).     metoprolol succinate 100 MG 24 hr tablet  Commonly known as:  TOPROL-XL  Take 1 tablet (100 mg total) by mouth once daily.     pantoprazole 40 MG tablet  Commonly known as:  PROTONIX  Take 1 tablet (40 mg total) by mouth once daily.     * spironolactone 25 MG tablet  Commonly known as:  ALDACTONE  Take 1 tablet (25 mg total) by mouth once daily.     * spironolactone 25 MG tablet  Commonly known as:  ALDACTONE  Take 1 tablet (25 mg total) by mouth once daily.         * This list has 4 medication(s) that are the same as other medications prescribed for you. Read the directions carefully, and ask your doctor or other care provider to review them with you.                Indwelling Lines/Drains at time of discharge:   Lines/Drains/Airways     None                 Time spent on the discharge of patient: 29 minutes  Patient was seen and examined on the date of discharge and determined to be suitable for discharge.         Isabel Savage MD  Department of Hospital Medicine  [facility]

## 2020-03-14 NOTE — PLAN OF CARE
03/14/20 1557   Final Note   Assessment Type Final Discharge Note   Anticipated Discharge Disposition Home     Pt discharged home this date and discharge orders reviewed.  No SS / CM orders noted at this time.

## 2020-03-14 NOTE — DISCHARGE INSTRUCTIONS
Discharge instructions given to patient, patient voiced understanding, son to pick patient up, d/c home via w/c to private vehicle

## 2020-03-14 NOTE — NURSING
Am rounds,pt awake and alert, ambulating to bathroom, vitals stable, moving wellington arms and wellington legs, pupils equal and reactive, no distress noted  0730 Dr Savage at bedside, orders received, munoz cath d/c, patient ambulated to bathroom voided without difficulty, patient for possible d/c later this afternoon, voiced understanding, will have son come to pick him up.

## 2020-03-14 NOTE — CARE UPDATE
03/13/20 2152   Patient Assessment/Suction   Level of Consciousness (AVPU) alert   Respiratory Effort Unlabored   Expansion/Accessory Muscles/Retractions no use of accessory muscles   All Lung Fields Breath Sounds clear;diminished   Rhythm/Pattern, Respiratory no shortness of breath reported   Cough Frequency no cough   PRE-TX-O2   O2 Device (Oxygen Therapy) room air   SpO2 (!) 77 %   Pulse Oximetry Type Continuous   $ Pulse Oximetry - Multiple Charge Pulse Oximetry - Multiple   Pulse 108   Resp (!) 26   Positioning   Head of Bed (HOB) HOB elevated   Respiratory Evaluation   $ Care Plan Tech Time 15 min

## 2020-03-15 LAB
BACTERIA BLD CULT: NORMAL
BACTERIA BLD CULT: NORMAL

## 2020-03-16 ENCOUNTER — HOSPITAL ENCOUNTER (INPATIENT)
Facility: HOSPITAL | Age: 45
LOS: 2 days | Discharge: HOME | DRG: 291 | End: 2020-03-18
Attending: EMERGENCY MEDICINE | Admitting: INTERNAL MEDICINE
Payer: MEDICAID

## 2020-03-16 DIAGNOSIS — R79.89 TROPONIN LEVEL ELEVATED: ICD-10-CM

## 2020-03-16 DIAGNOSIS — R06.02 SHORTNESS OF BREATH: ICD-10-CM

## 2020-03-16 DIAGNOSIS — I10 ESSENTIAL HYPERTENSION: Primary | ICD-10-CM

## 2020-03-16 DIAGNOSIS — F41.9 ANXIETY: ICD-10-CM

## 2020-03-16 DIAGNOSIS — I50.42 CHRONIC COMBINED SYSTOLIC AND DIASTOLIC CONGESTIVE HEART FAILURE: ICD-10-CM

## 2020-03-16 DIAGNOSIS — I50.43 ACUTE ON CHRONIC COMBINED SYSTOLIC AND DIASTOLIC HEART FAILURE: ICD-10-CM

## 2020-03-16 DIAGNOSIS — I50.9 CONGESTIVE HEART FAILURE: ICD-10-CM

## 2020-03-16 DIAGNOSIS — E44.0 MALNUTRITION OF MODERATE DEGREE: ICD-10-CM

## 2020-03-16 LAB
ALBUMIN SERPL BCP-MCNC: 2.6 G/DL (ref 3.5–5.2)
ALP SERPL-CCNC: 97 U/L (ref 55–135)
ALT SERPL W/O P-5'-P-CCNC: 42 U/L (ref 10–44)
ANION GAP SERPL CALC-SCNC: 10 MMOL/L (ref 8–16)
AST SERPL-CCNC: 58 U/L (ref 10–40)
BASOPHILS # BLD AUTO: 0.05 K/UL (ref 0–0.2)
BASOPHILS NFR BLD: 0.3 % (ref 0–1.9)
BILIRUB SERPL-MCNC: 0.9 MG/DL (ref 0.1–1)
BNP SERPL-MCNC: 3134 PG/ML (ref 0–99)
BUN SERPL-MCNC: 19 MG/DL (ref 6–20)
CALCIUM SERPL-MCNC: 8.2 MG/DL (ref 8.7–10.5)
CHLORIDE SERPL-SCNC: 107 MMOL/L (ref 95–110)
CK MB SERPL-MCNC: 3 NG/ML (ref 0.1–6.5)
CK MB SERPL-RTO: 4.3 % (ref 0–5)
CK SERPL-CCNC: 69 U/L (ref 20–200)
CO2 SERPL-SCNC: 22 MMOL/L (ref 23–29)
CREAT SERPL-MCNC: 0.7 MG/DL (ref 0.5–1.4)
DIFFERENTIAL METHOD: ABNORMAL
EOSINOPHIL # BLD AUTO: 0.2 K/UL (ref 0–0.5)
EOSINOPHIL NFR BLD: 1.1 % (ref 0–8)
ERYTHROCYTE [DISTWIDTH] IN BLOOD BY AUTOMATED COUNT: 19.2 % (ref 11.5–14.5)
EST. GFR  (AFRICAN AMERICAN): >60 ML/MIN/1.73 M^2
EST. GFR  (NON AFRICAN AMERICAN): >60 ML/MIN/1.73 M^2
GLUCOSE SERPL-MCNC: 110 MG/DL (ref 70–110)
HCT VFR BLD AUTO: 33.8 % (ref 40–54)
HGB BLD-MCNC: 9.8 G/DL (ref 14–18)
IMM GRANULOCYTES # BLD AUTO: 0.09 K/UL (ref 0–0.04)
INR PPP: 1.3 (ref 0.8–1.2)
LYMPHOCYTES # BLD AUTO: 1.9 K/UL (ref 1–4.8)
LYMPHOCYTES NFR BLD: 11.3 % (ref 18–48)
MCH RBC QN AUTO: 25.1 PG (ref 27–31)
MCHC RBC AUTO-ENTMCNC: 29 G/DL (ref 32–36)
MCV RBC AUTO: 87 FL (ref 82–98)
MONOCYTES # BLD AUTO: 1.1 K/UL (ref 0.3–1)
MONOCYTES NFR BLD: 6.6 % (ref 4–15)
NEUTROPHILS # BLD AUTO: 13.7 K/UL (ref 1.8–7.7)
NEUTROPHILS NFR BLD: 80.2 % (ref 38–73)
NRBC BLD-RTO: 0 /100 WBC
PLATELET # BLD AUTO: 337 K/UL (ref 150–350)
PMV BLD AUTO: 9.5 FL (ref 9.2–12.9)
POTASSIUM SERPL-SCNC: 4.7 MMOL/L (ref 3.5–5.1)
PROT SERPL-MCNC: 8.1 G/DL (ref 6–8.4)
PROTHROMBIN TIME: 13.1 SEC (ref 9–12.5)
RBC # BLD AUTO: 3.9 M/UL (ref 4.6–6.2)
SODIUM SERPL-SCNC: 139 MMOL/L (ref 136–145)
T4 FREE SERPL-MCNC: 0.87 NG/DL (ref 0.71–1.51)
TROPONIN I SERPL DL<=0.01 NG/ML-MCNC: 0.02 NG/ML (ref 0–0.03)
TROPONIN I SERPL DL<=0.01 NG/ML-MCNC: 0.03 NG/ML (ref 0–0.03)
TSH SERPL DL<=0.005 MIU/L-ACNC: 7.37 UIU/ML (ref 0.4–4)
WBC # BLD AUTO: 17.13 K/UL (ref 3.9–12.7)

## 2020-03-16 PROCEDURE — 82553 CREATINE MB FRACTION: CPT

## 2020-03-16 PROCEDURE — G0378 HOSPITAL OBSERVATION PER HR: HCPCS

## 2020-03-16 PROCEDURE — 85610 PROTHROMBIN TIME: CPT

## 2020-03-16 PROCEDURE — 80053 COMPREHEN METABOLIC PANEL: CPT

## 2020-03-16 PROCEDURE — 11000001 HC ACUTE MED/SURG PRIVATE ROOM

## 2020-03-16 PROCEDURE — 96374 THER/PROPH/DIAG INJ IV PUSH: CPT

## 2020-03-16 PROCEDURE — 25000003 PHARM REV CODE 250: Performed by: SPECIALIST

## 2020-03-16 PROCEDURE — 84443 ASSAY THYROID STIM HORMONE: CPT

## 2020-03-16 PROCEDURE — 84484 ASSAY OF TROPONIN QUANT: CPT | Mod: 91

## 2020-03-16 PROCEDURE — 84439 ASSAY OF FREE THYROXINE: CPT

## 2020-03-16 PROCEDURE — 99291 CRITICAL CARE FIRST HOUR: CPT | Mod: 25

## 2020-03-16 PROCEDURE — 82550 ASSAY OF CK (CPK): CPT

## 2020-03-16 PROCEDURE — 96375 TX/PRO/DX INJ NEW DRUG ADDON: CPT

## 2020-03-16 PROCEDURE — 25000003 PHARM REV CODE 250: Performed by: PHYSICIAN ASSISTANT

## 2020-03-16 PROCEDURE — S0171 BUMETANIDE 0.5 MG: HCPCS | Performed by: SPECIALIST

## 2020-03-16 PROCEDURE — 25000003 PHARM REV CODE 250: Performed by: INTERNAL MEDICINE

## 2020-03-16 PROCEDURE — 93005 ELECTROCARDIOGRAM TRACING: CPT

## 2020-03-16 PROCEDURE — 83880 ASSAY OF NATRIURETIC PEPTIDE: CPT

## 2020-03-16 PROCEDURE — 36415 COLL VENOUS BLD VENIPUNCTURE: CPT

## 2020-03-16 PROCEDURE — 94761 N-INVAS EAR/PLS OXIMETRY MLT: CPT

## 2020-03-16 PROCEDURE — 85025 COMPLETE CBC W/AUTO DIFF WBC: CPT

## 2020-03-16 PROCEDURE — 63600175 PHARM REV CODE 636 W HCPCS: Performed by: EMERGENCY MEDICINE

## 2020-03-16 PROCEDURE — 84484 ASSAY OF TROPONIN QUANT: CPT

## 2020-03-16 RX ORDER — PANTOPRAZOLE SODIUM 40 MG/1
40 TABLET, DELAYED RELEASE ORAL DAILY
Status: DISCONTINUED | OUTPATIENT
Start: 2020-03-17 | End: 2020-03-18 | Stop reason: HOSPADM

## 2020-03-16 RX ORDER — HYDROCODONE BITARTRATE AND ACETAMINOPHEN 10; 325 MG/1; MG/1
1 TABLET ORAL EVERY 6 HOURS PRN
Status: DISCONTINUED | OUTPATIENT
Start: 2020-03-16 | End: 2020-03-18 | Stop reason: HOSPADM

## 2020-03-16 RX ORDER — FUROSEMIDE 10 MG/ML
40 INJECTION INTRAMUSCULAR; INTRAVENOUS
Status: COMPLETED | OUTPATIENT
Start: 2020-03-16 | End: 2020-03-16

## 2020-03-16 RX ORDER — EPLERENONE 25 MG/1
25 TABLET, FILM COATED ORAL DAILY
Status: DISCONTINUED | OUTPATIENT
Start: 2020-03-17 | End: 2020-03-18 | Stop reason: HOSPADM

## 2020-03-16 RX ORDER — ENALAPRIL MALEATE 2.5 MG/1
2.5 TABLET ORAL DAILY
Status: DISCONTINUED | OUTPATIENT
Start: 2020-03-17 | End: 2020-03-17

## 2020-03-16 RX ORDER — METOPROLOL SUCCINATE 50 MG/1
100 TABLET, EXTENDED RELEASE ORAL DAILY
Status: DISCONTINUED | OUTPATIENT
Start: 2020-03-17 | End: 2020-03-16

## 2020-03-16 RX ORDER — CIPROFLOXACIN 500 MG/1
500 TABLET ORAL EVERY 12 HOURS
Status: DISCONTINUED | OUTPATIENT
Start: 2020-03-16 | End: 2020-03-18 | Stop reason: HOSPADM

## 2020-03-16 RX ORDER — FUROSEMIDE 10 MG/ML
40 INJECTION INTRAMUSCULAR; INTRAVENOUS 2 TIMES DAILY
Status: DISCONTINUED | OUTPATIENT
Start: 2020-03-16 | End: 2020-03-16

## 2020-03-16 RX ORDER — GABAPENTIN 100 MG/1
100 CAPSULE ORAL 3 TIMES DAILY PRN
Status: DISCONTINUED | OUTPATIENT
Start: 2020-03-16 | End: 2020-03-18 | Stop reason: HOSPADM

## 2020-03-16 RX ORDER — ALPRAZOLAM 0.25 MG/1
0.25 TABLET ORAL ONCE
Status: COMPLETED | OUTPATIENT
Start: 2020-03-16 | End: 2020-03-16

## 2020-03-16 RX ORDER — GABAPENTIN 300 MG/1
300 CAPSULE ORAL NIGHTLY
Status: DISCONTINUED | OUTPATIENT
Start: 2020-03-16 | End: 2020-03-18 | Stop reason: HOSPADM

## 2020-03-16 RX ORDER — METOPROLOL TARTRATE 25 MG/1
25 TABLET, FILM COATED ORAL 4 TIMES DAILY
Status: DISCONTINUED | OUTPATIENT
Start: 2020-03-16 | End: 2020-03-17

## 2020-03-16 RX ADMIN — CIPROFLOXACIN 500 MG: 500 TABLET, FILM COATED ORAL at 09:03

## 2020-03-16 RX ADMIN — BUMETANIDE 0.5 MG/HR: 0.25 INJECTION INTRAMUSCULAR; INTRAVENOUS at 09:03

## 2020-03-16 RX ADMIN — APIXABAN 5 MG: 2.5 TABLET, FILM COATED ORAL at 09:03

## 2020-03-16 RX ADMIN — ALPRAZOLAM 0.25 MG: 0.25 TABLET ORAL at 10:03

## 2020-03-16 RX ADMIN — METOPROLOL TARTRATE 25 MG: 25 TABLET ORAL at 09:03

## 2020-03-16 RX ADMIN — GABAPENTIN 300 MG: 300 CAPSULE ORAL at 09:03

## 2020-03-16 RX ADMIN — HYDROCODONE BITARTRATE AND ACETAMINOPHEN 1 TABLET: 10; 325 TABLET ORAL at 09:03

## 2020-03-16 RX ADMIN — FUROSEMIDE 40 MG: 10 INJECTION, SOLUTION INTRAMUSCULAR; INTRAVENOUS at 10:03

## 2020-03-16 NOTE — ASSESSMENT & PLAN NOTE
Nutrition consulted. Encourage maximal PO intake. Diet supplementation ordered per nutrition approval. Will encourage PO and monitor closely for weight changes.

## 2020-03-16 NOTE — HPI
Patient is a 44-year-old  male with past medical history significant for nonischemic cardiomyopathy, ejection fraction around 15%, recent history of deep venous thrombosis and pulmonary embolism, chronic hepatitis-C infection, hypertension, cirrhosis of liver and history of congestive heart failure who is being admitted to Hospital Medicine under observation status from [facility] Emergency Room with increasing generalized swelling and shortness of breath.  Patient is usually under care of Dr. Altman from Cardiology at [facility].  Patient was recently hospitalized at [facility] from March 10 through March 14, 2020 related to respiratory failure and congestive heart failure requiring mechanical ventilation.  Patient states he was discharged prematurely.  Patient is continuing to experience bilateral leg swelling, reports orthopnea and paroxysmal nocturnal dyspnea and dyspnea on exertion as well.  Patient reports compliance with medications and low-sodium diet.  Patient also reports swelling of his testes now.  Patient denies any fever, chills, cough, mental status changes, any new focal neuro deficits.  Emergency room physician discussed with Dr. Altman who recommended transfer to cardiac transplant team at [facility] where patient was not accepted.  Patient will be admitted at this facility and will be monitored by Dr. Altman.

## 2020-03-16 NOTE — SUBJECTIVE & OBJECTIVE
Past Medical History:   Diagnosis Date    Anxiety     Arthritis     CHF (congestive heart failure)     Cirrhosis     Coronary artery disease     Depression     DVT (deep venous thrombosis)     Hepatitis C     Hypertension        Past Surgical History:   Procedure Laterality Date    LUNG DECORTICATION Right 2/4/2020    Procedure: DECORTICATION, LUNG;  Surgeon: Rehan Rousseau MD;  Location: [facility];  Service: Thoracic;  Laterality: Right;       Review of patient's allergies indicates:  No Known Allergies    No current facility-administered medications on file prior to encounter.      Current Outpatient Medications on File Prior to Encounter   Medication Sig    apixaban (ELIQUIS) 5 mg Tab Take 1 tablet (5 mg total) by mouth 2 (two) times daily.    ciprofloxacin HCl (CIPRO) 500 MG tablet Take 1 tablet (500 mg total) by mouth every 12 (twelve) hours. for 24 days    enalapril (VASOTEC) 2.5 MG tablet Take 1 tablet (2.5 mg total) by mouth once daily.    furosemide (LASIX) 40 MG tablet Take 1 tablet (40 mg total) by mouth once daily.    gabapentin (NEURONTIN) 100 MG capsule Take 1 capsule (100 mg total) by mouth 3 (three) times daily as needed (anxiety/agitation/neuropathic pain).    gabapentin (NEURONTIN) 300 MG capsule Take 1 capsule (300 mg total) by mouth every evening.    HYDROcodone-acetaminophen (NORCO)  mg per tablet Take 1 tablet by mouth every 6 (six) hours as needed.    lactulose (CHRONULAC) 10 gram/15 mL solution Take 15 mLs (10 g total) by mouth 3 (three) times daily as needed (constipation).    metoprolol succinate (TOPROL-XL) 100 MG 24 hr tablet Take 1 tablet (100 mg total) by mouth once daily.    pantoprazole (PROTONIX) 40 MG tablet Take 1 tablet (40 mg total) by mouth once daily.    acetaminophen (TYLENOL) 325 MG tablet Take 325 mg by mouth every 6 (six) hours as needed for Pain.    [DISCONTINUED] ALPRAZolam (XANAX) 0.25 MG tablet Take 1 tablet by mouth   at bedtime as needed  for anxiety. (Patient taking differently: Take 0.25 mg by mouth nightly as needed. )    [DISCONTINUED] amoxicillin-clavulanate 500-125mg (AUGMENTIN) 500-125 mg Tab Take 1 tablet (500 mg total) by mouth 3 (three) times daily. for 5 days    [DISCONTINUED] spironolactone (ALDACTONE) 25 MG tablet Take 1 tablet (25 mg total) by mouth once daily.    [DISCONTINUED] spironolactone (ALDACTONE) 25 MG tablet Take 1 tablet (25 mg total) by mouth once daily.     Family History     Problem Relation (Age of Onset)    Arthritis Mother    Asthma Sister    Diabetes Sister    Heart disease Mother, Maternal Grandfather    Hyperlipidemia Mother    Hypertension Mother, Father    Kidney disease Mother        Tobacco Use    Smoking status: Former Smoker     Packs/day: 2.00     Years: 20.00     Pack years: 40.00     Types: Cigarettes     Start date: 1/23/1999     Last attempt to quit: 1/23/2017     Years since quitting: 3.1    Smokeless tobacco: Never Used   Substance and Sexual Activity    Alcohol use: Not Currently    Drug use: Not Currently     Frequency: 7.0 times per week     Types: Amphetamines     Comment: quit in december 2019    Sexual activity: Not Currently     Partners: Female     Review of Systems   Respiratory: Positive for shortness of breath.    Cardiovascular: Positive for leg swelling.   Gastrointestinal: Positive for abdominal distention.   Neurological: Positive for weakness.   All other systems reviewed and are negative.    Objective:     Vital Signs (Most Recent):  Temp: 97.6 °F (36.4 °C) (03/16/20 1441)  Pulse: (!) 128 (03/16/20 1441)  Resp: 16 (03/16/20 1441)  BP: (!) 141/99 (03/16/20 1441)  SpO2: 96 % (03/16/20 1441) Vital Signs (24h Range):  Temp:  [97.5 °F (36.4 °C)-97.6 °F (36.4 °C)] 97.6 °F (36.4 °C)  Pulse:  [125-132] 128  Resp:  [16-28] 16  SpO2:  [96 %-100 %] 96 %  BP: (141-162)/() 141/99     Weight: 80 kg (176 lb 5.9 oz)  Body mass index is 26.05 kg/m².    Physical Exam   Constitutional: He  appears well-developed.   HENT:   Head: Normocephalic and atraumatic.   Nose: Nose normal. No septal deviation.   Mouth/Throat: Oropharynx is clear and moist.   Eyes: Pupils are equal, round, and reactive to light. Conjunctivae are normal.   Neck: No JVD present. No tracheal tenderness present. No tracheal deviation present. No thyroid mass present.   Cardiovascular: Normal rate, regular rhythm, S1 normal and S2 normal. Exam reveals no gallop and no friction rub.   No murmur heard.  Pulmonary/Chest: Effort normal and breath sounds normal. He has no decreased breath sounds. He has no wheezes. He has no rhonchi. He has no rales.   Abdominal: Soft. Normal appearance and bowel sounds are normal. He exhibits no distension and no mass. There is no hepatosplenomegaly, splenomegaly or hepatomegaly. There is no tenderness.   Distended abdomen with ascites.  No hepatosplenomegaly palpable.  No abdominal tenderness present at this time.   Neurological: He is alert. He has normal strength. No cranial nerve deficit or sensory deficit.   Skin: No rash noted. No cyanosis.   Nursing note and vitals reviewed.        CRANIAL NERVES     CN III, IV, VI   Pupils are equal, round, and reactive to light.       Significant Labs:   CBC:   Recent Labs   Lab 03/16/20  0900   WBC 17.13*   HGB 9.8*   HCT 33.8*        CMP:   Recent Labs   Lab 03/16/20  0900      K 4.7      CO2 22*      BUN 19   CREATININE 0.7   CALCIUM 8.2*   PROT 8.1   ALBUMIN 2.6*   BILITOT 0.9   ALKPHOS 97   AST 58*   ALT 42   ANIONGAP 10   EGFRNONAA >60     Lipid Panel: No results for input(s): CHOL, HDL, LDLCALC, TRIG, CHOLHDL in the last 48 hours.  Magnesium: No results for input(s): MG in the last 48 hours.  Troponin:   Recent Labs   Lab 03/16/20  0900   TROPONINI 0.029*     TSH:   Recent Labs   Lab 10/28/19  0524   TSH 3.671     Urine Studies: No results for input(s): COLORU, APPEARANCEUA, PHUR, SPECGRAV, PROTEINUA, GLUCUA, KETONESU,  BILIRUBINUA, OCCULTUA, NITRITE, UROBILINOGEN, LEUKOCYTESUR, RBCUA, WBCUA, BACTERIA, SQUAMEPITHEL, HYALINECASTS in the last 48 hours.    Invalid input(s): WRIGHTSUR    Significant Imaging:  CXR: Mild decrease of the bilateral infiltrates compared to the prior exam.    ECHO (01-):  · Mild concentric left ventricular hypertrophy.  · Severely decreased left ventricular systolic function. The estimated ejection fraction is 30%.  · Grade II (moderate) left ventricular diastolic dysfunction consistent with pseudonormalization.  · Septal wall has abnormal motion. Diastolic flattening of the interventricular septum consistent with right ventricle volume overload.  · Mild right ventricular enlargement.  · Mildly reduced right ventricular systolic function.  · Mild left atrial enlargement.  · Mild right atrial enlargement.  · Mild-to-moderate mitral regurgitation.  · Moderate tricuspid regurgitation.  · Mild pulmonary hypertension present.  · Trivial circumferential pericardial effusion.

## 2020-03-16 NOTE — H&P
[facility] Medicine  History & Physical    Patient Name: Francis Daigle  MRN: [id number]  Admission Date: 3/16/2020  Attending Physician: Jeffrey Porras MD   Primary Care Provider: Primary Doctor No         Patient information was obtained from patient and ER records.     Subjective:     Principal Problem:Acute on chronic combined systolic and diastolic heart failure    Chief Complaint:   Chief Complaint   Patient presents with    Shortness of Breath     CHF        HPI: Patient is a 44-year-old  male with past medical history significant for nonischemic cardiomyopathy, ejection fraction around 15%, recent history of deep venous thrombosis and pulmonary embolism, chronic hepatitis-C infection, hypertension, cirrhosis of liver and history of congestive heart failure who is being admitted to Hospital Medicine under observation status from [facility] Emergency Room with increasing generalized swelling and shortness of breath.  Patient is usually under care of Dr. Altman from Cardiology at [facility].  Patient was recently hospitalized at [facility] from March 10 through March 14, 2020 related to respiratory failure and congestive heart failure requiring mechanical ventilation.  Patient states he was discharged prematurely.  Patient is continuing to experience bilateral leg swelling, reports orthopnea and paroxysmal nocturnal dyspnea and dyspnea on exertion as well.  Patient reports compliance with medications and low-sodium diet.  Patient also reports swelling of his testes now.  Patient denies any fever, chills, cough, mental status changes, any new focal neuro deficits.  Emergency room physician discussed with Dr. Altman who recommended transfer to cardiac transplant team at [facility] where patient was not accepted.  Patient will be admitted at this facility and will be monitored by Dr. Altman.      Past  Medical History:   Diagnosis Date    Anxiety     Arthritis     CHF (congestive heart failure)     Cirrhosis     Coronary artery disease     Depression     DVT (deep venous thrombosis)     Hepatitis C     Hypertension        Past Surgical History:   Procedure Laterality Date    LUNG DECORTICATION Right 2/4/2020    Procedure: DECORTICATION, LUNG;  Surgeon: Rehan Rousseau MD;  Location: [facility];  Service: Thoracic;  Laterality: Right;       Review of patient's allergies indicates:  No Known Allergies    No current facility-administered medications on file prior to encounter.      Current Outpatient Medications on File Prior to Encounter   Medication Sig    apixaban (ELIQUIS) 5 mg Tab Take 1 tablet (5 mg total) by mouth 2 (two) times daily.    ciprofloxacin HCl (CIPRO) 500 MG tablet Take 1 tablet (500 mg total) by mouth every 12 (twelve) hours. for 24 days    enalapril (VASOTEC) 2.5 MG tablet Take 1 tablet (2.5 mg total) by mouth once daily.    furosemide (LASIX) 40 MG tablet Take 1 tablet (40 mg total) by mouth once daily.    gabapentin (NEURONTIN) 100 MG capsule Take 1 capsule (100 mg total) by mouth 3 (three) times daily as needed (anxiety/agitation/neuropathic pain).    gabapentin (NEURONTIN) 300 MG capsule Take 1 capsule (300 mg total) by mouth every evening.    HYDROcodone-acetaminophen (NORCO)  mg per tablet Take 1 tablet by mouth every 6 (six) hours as needed.    lactulose (CHRONULAC) 10 gram/15 mL solution Take 15 mLs (10 g total) by mouth 3 (three) times daily as needed (constipation).    metoprolol succinate (TOPROL-XL) 100 MG 24 hr tablet Take 1 tablet (100 mg total) by mouth once daily.    pantoprazole (PROTONIX) 40 MG tablet Take 1 tablet (40 mg total) by mouth once daily.    acetaminophen (TYLENOL) 325 MG tablet Take 325 mg by mouth every 6 (six) hours as needed for Pain.    [DISCONTINUED] ALPRAZolam (XANAX) 0.25 MG tablet Take 1 tablet by mouth   at bedtime as needed for  anxiety. (Patient taking differently: Take 0.25 mg by mouth nightly as needed. )    [DISCONTINUED] amoxicillin-clavulanate 500-125mg (AUGMENTIN) 500-125 mg Tab Take 1 tablet (500 mg total) by mouth 3 (three) times daily. for 5 days    [DISCONTINUED] spironolactone (ALDACTONE) 25 MG tablet Take 1 tablet (25 mg total) by mouth once daily.    [DISCONTINUED] spironolactone (ALDACTONE) 25 MG tablet Take 1 tablet (25 mg total) by mouth once daily.     Family History     Problem Relation (Age of Onset)    Arthritis Mother    Asthma Sister    Diabetes Sister    Heart disease Mother, Maternal Grandfather    Hyperlipidemia Mother    Hypertension Mother, Father    Kidney disease Mother        Tobacco Use    Smoking status: Former Smoker     Packs/day: 2.00     Years: 20.00     Pack years: 40.00     Types: Cigarettes     Start date: 1/23/1999     Last attempt to quit: 1/23/2017     Years since quitting: 3.1    Smokeless tobacco: Never Used   Substance and Sexual Activity    Alcohol use: Not Currently    Drug use: Not Currently     Frequency: 7.0 times per week     Types: Amphetamines     Comment: quit in december 2019    Sexual activity: Not Currently     Partners: Female     Review of Systems   Respiratory: Positive for shortness of breath.    Cardiovascular: Positive for leg swelling.   Gastrointestinal: Positive for abdominal distention.   Neurological: Positive for weakness.   All other systems reviewed and are negative.    Objective:     Vital Signs (Most Recent):  Temp: 97.6 °F (36.4 °C) (03/16/20 1441)  Pulse: (!) 128 (03/16/20 1441)  Resp: 16 (03/16/20 1441)  BP: (!) 141/99 (03/16/20 1441)  SpO2: 96 % (03/16/20 1441) Vital Signs (24h Range):  Temp:  [97.5 °F (36.4 °C)-97.6 °F (36.4 °C)] 97.6 °F (36.4 °C)  Pulse:  [125-132] 128  Resp:  [16-28] 16  SpO2:  [96 %-100 %] 96 %  BP: (141-162)/() 141/99     Weight: 80 kg (176 lb 5.9 oz)  Body mass index is 26.05 kg/m².    Physical Exam   Constitutional: He appears  well-developed.   HENT:   Head: Normocephalic and atraumatic.   Nose: Nose normal. No septal deviation.   Mouth/Throat: Oropharynx is clear and moist.   Eyes: Pupils are equal, round, and reactive to light. Conjunctivae are normal.   Neck: No JVD present. No tracheal tenderness present. No tracheal deviation present. No thyroid mass present.   Cardiovascular: Normal rate, regular rhythm, S1 normal and S2 normal. Exam reveals no gallop and no friction rub.   No murmur heard.  Pulmonary/Chest: Effort normal and breath sounds normal. He has no decreased breath sounds. He has no wheezes. He has no rhonchi. He has no rales.   Abdominal: Soft. Normal appearance and bowel sounds are normal. He exhibits no distension and no mass. There is no hepatosplenomegaly, splenomegaly or hepatomegaly. There is no tenderness.   Distended abdomen with ascites.  No hepatosplenomegaly palpable.  No abdominal tenderness present at this time.   Neurological: He is alert. He has normal strength. No cranial nerve deficit or sensory deficit.   Skin: No rash noted. No cyanosis.   Nursing note and vitals reviewed.        CRANIAL NERVES     CN III, IV, VI   Pupils are equal, round, and reactive to light.       Significant Labs:   CBC:   Recent Labs   Lab 03/16/20  0900   WBC 17.13*   HGB 9.8*   HCT 33.8*        CMP:   Recent Labs   Lab 03/16/20  0900      K 4.7      CO2 22*      BUN 19   CREATININE 0.7   CALCIUM 8.2*   PROT 8.1   ALBUMIN 2.6*   BILITOT 0.9   ALKPHOS 97   AST 58*   ALT 42   ANIONGAP 10   EGFRNONAA >60     Lipid Panel: No results for input(s): CHOL, HDL, LDLCALC, TRIG, CHOLHDL in the last 48 hours.  Magnesium: No results for input(s): MG in the last 48 hours.  Troponin:   Recent Labs   Lab 03/16/20  0900   TROPONINI 0.029*     TSH:   Recent Labs   Lab 10/28/19  0524   TSH 3.671     Urine Studies: No results for input(s): COLORU, APPEARANCEUA, PHUR, SPECGRAV, PROTEINUA, GLUCUA, KETONESU, BILIRUBINUA,  OCCULTUA, NITRITE, UROBILINOGEN, LEUKOCYTESUR, RBCUA, WBCUA, BACTERIA, SQUAMEPITHEL, HYALINECASTS in the last 48 hours.    Invalid input(s): DILLAN    Significant Imaging:  CXR: Mild decrease of the bilateral infiltrates compared to the prior exam.    ECHO (01-):  · Mild concentric left ventricular hypertrophy.  · Severely decreased left ventricular systolic function. The estimated ejection fraction is 30%.  · Grade II (moderate) left ventricular diastolic dysfunction consistent with pseudonormalization.  · Septal wall has abnormal motion. Diastolic flattening of the interventricular septum consistent with right ventricle volume overload.  · Mild right ventricular enlargement.  · Mildly reduced right ventricular systolic function.  · Mild left atrial enlargement.  · Mild right atrial enlargement.  · Mild-to-moderate mitral regurgitation.  · Moderate tricuspid regurgitation.  · Mild pulmonary hypertension present.  · Trivial circumferential pericardial effusion.      Assessment/Plan:     * Acute on chronic combined systolic and diastolic heart failure  Supplemental O2 via nasal canula; titrate O2 saturation to >92%.  Serial Cardiac enzymes × 3.  Lasix 40 mg IV q.12. Monitor electrolytes for hypokalemia and hypomagnesemia.  Consult Dr. Altman.  2-D Echocardiogram from January 29, 2020 reviewed  Daily weights.  Strict I/Os.  Fluid restriction.  Na restriction <2g/d.            Malnutrition of moderate degree  Nutrition consulted. Encourage maximal PO intake. Diet supplementation ordered per nutrition approval. Will encourage PO and monitor closely for weight changes.        Acute massive pulmonary embolism  Continue Eliquis      Acute deep vein thrombosis (DVT) of proximal vein of lower extremity  Continue Eliquis      Chronic hepatitis C without hepatic coma  Trend LFTs.      Troponin level elevated  Supplemental O2 via nasal canula; titrate O2 saturation to >92%.  Serial Cardiac enzymes ×  3.  Tele-monitoring.   Cardiology Consultation.  Check fasting lipid panel and EKG in AM.  Use Nitroglycerine PRN Chest pain.  Na restriction <2g/d.          Essential hypertension  Chronic problem. Will continue chronic medications and monitor for any changes, adjusting as needed.            DVT prophylaxis:  On Eliquis.    Jeffrey Porras MD  Department of Hospital Medicine   [facility]

## 2020-03-16 NOTE — ASSESSMENT & PLAN NOTE
Supplemental O2 via nasal canula; titrate O2 saturation to >92%.  Serial Cardiac enzymes × 3.  Lasix 40 mg IV q.12. Monitor electrolytes for hypokalemia and hypomagnesemia.  Consult Dr. Altman.  2-D Echocardiogram from January 29, 2020 reviewed  Daily weights.  Strict I/Os.  Fluid restriction.  Na restriction <2g/d.

## 2020-03-16 NOTE — ASSESSMENT & PLAN NOTE
Supplemental O2 via nasal canula; titrate O2 saturation to >92%.  Serial Cardiac enzymes × 3.  Tele-monitoring.   Cardiology Consultation.  Check fasting lipid panel and EKG in AM.  Use Nitroglycerine PRN Chest pain.  Na restriction <2g/d.

## 2020-03-16 NOTE — ED PROVIDER NOTES
Encounter Date: 3/16/2020    SCRIBE #1 NOTE: Moriah CASEY, am scribing for, and in the presence of, Gee Metcalf MD.       History     Chief Complaint   Patient presents with    Shortness of Breath     CHF       Time seen by provider: 8:55 AM on 03/16/2020    Francis Daigle is a 44 y.o. male with PMHx of CHF, CAD, HTN, PNA, DVT, cirrhosis, and chronic hepatitis C who presents to the ED with an onset of CHF. Pt c/o bilaterally leg swelling, SOB on exertion, mild cough, abdominal distention, and testicular swelling. He was recently admitted to [facility] (3/10/20) for AMS, being discharged 2 days ago. The patient denies fever or any other symptoms at this time. Current medications include Lasix 40mg; however, pt has not taken medication today. Cardiologist is Dr. Altman. PSHx of lung decortication.    The history is provided by the patient and a friend.     Review of patient's allergies indicates:  No Known Allergies  Past Medical History:   Diagnosis Date    Anxiety     Arthritis     CHF (congestive heart failure)     Cirrhosis     Coronary artery disease     Depression     DVT (deep venous thrombosis)     Hepatitis C     Hypertension      Past Surgical History:   Procedure Laterality Date    LUNG DECORTICATION Right 2/4/2020    Procedure: DECORTICATION, LUNG;  Surgeon: Rehan Rousseau MD;  Location: [facility] OR;  Service: Thoracic;  Laterality: Right;     Family History   Problem Relation Age of Onset    Arthritis Mother     Heart disease Mother     Hyperlipidemia Mother     Hypertension Mother     Kidney disease Mother     Hypertension Father     Asthma Sister     Diabetes Sister     Heart disease Maternal Grandfather      Social History     Tobacco Use    Smoking status: Former Smoker     Packs/day: 2.00     Years: 20.00     Pack years: 40.00     Types: Cigarettes     Start date: 1/23/1999     Last attempt to quit: 1/23/2017     Years since quitting: 3.1    Smokeless tobacco: Never Used    Substance Use Topics    Alcohol use: Not Currently    Drug use: Not Currently     Frequency: 7.0 times per week     Types: Amphetamines     Comment: quit in december 2019     Review of Systems   Constitutional: Negative for fever.   HENT: Negative for sore throat.    Respiratory: Positive for cough and shortness of breath.    Cardiovascular: Positive for leg swelling. Negative for chest pain.   Gastrointestinal: Positive for abdominal distention. Negative for nausea.   Genitourinary: Positive for scrotal swelling.   Musculoskeletal: Negative for back pain.   Skin: Negative for rash.   Neurological: Negative for weakness.   Hematological: Bruises/bleeds easily.   All other systems reviewed and are negative.      Physical Exam     Initial Vitals [03/16/20 0847]   BP Pulse Resp Temp SpO2   (!) 162/102 (!) 132 (!) 28 97.5 °F (36.4 °C) 99 %      MAP       --         Physical Exam    Nursing note and vitals reviewed.  Constitutional: He appears well-developed and well-nourished. He is not diaphoretic.  Non-toxic appearance. He does not have a sickly appearance. He appears ill. No distress.   Chronically ill-appearing but no acute distress   HENT:   Head: Normocephalic and atraumatic.   Eyes: EOM are normal.   Neck: Normal range of motion. Neck supple. Normal range of motion present. No neck rigidity. JVD present.   Minimal JVD.   Cardiovascular: Regular rhythm and normal heart sounds. Tachycardia present.  Exam reveals no gallop and no friction rub.    No murmur heard.  Pulmonary/Chest: Breath sounds normal. Tachypnea noted. No respiratory distress. He has no wheezes. He has no rhonchi. He has no rales.   Faint crackles of left lung base. Mild tachypnea.    Abdominal: He exhibits distension.   Genitourinary: Right testis shows swelling. Left testis shows swelling.   Musculoskeletal: Normal range of motion.   Bilateral pitting edema of lower extremities below the knees.   Neurological: He is alert and oriented to  person, place, and time.   Skin: Skin is warm and dry. No rash noted.   Psychiatric: He has a normal mood and affect. His behavior is normal. Judgment and thought content normal.         ED Course   Critical Care  Date/Time: 3/16/2020 2:16 PM  Performed by: Gee Metcalf MD  Authorized by: Gee Metcalf MD   Direct patient critical care time: 13 minutes  Additional history critical care time: 11 minutes  Ordering / reviewing critical care time: 9 minutes  Documentation critical care time: 9 minutes  Consulting other physicians critical care time: 12 minutes  Total critical care time (exclusive of procedural time) : 54 minutes  Critical care was necessary to treat or prevent imminent or life-threatening deterioration of the following conditions: cardiac failure.  Critical care was time spent personally by me on the following activities: review of old charts, pulse oximetry, ordering and review of laboratory studies, evaluation of patient's response to treatment, obtaining history from patient or surrogate, examination of patient, ordering and performing treatments and interventions, ordering and review of radiographic studies and re-evaluation of patient's condition.        Labs Reviewed   CBC W/ AUTO DIFFERENTIAL - Abnormal; Notable for the following components:       Result Value    WBC 17.13 (*)     RBC 3.90 (*)     Hemoglobin 9.8 (*)     Hematocrit 33.8 (*)     Mean Corpuscular Hemoglobin 25.1 (*)     Mean Corpuscular Hemoglobin Conc 29.0 (*)     RDW 19.2 (*)     Gran # (ANC) 13.7 (*)     Immature Grans (Abs) 0.09 (*)     Mono # 1.1 (*)     Gran% 80.2 (*)     Lymph% 11.3 (*)     All other components within normal limits   COMPREHENSIVE METABOLIC PANEL - Abnormal; Notable for the following components:    CO2 22 (*)     Calcium 8.2 (*)     Albumin 2.6 (*)     AST 58 (*)     All other components within normal limits   TROPONIN I - Abnormal; Notable for the following components:    Troponin I 0.029 (*)      All other components within normal limits   B-TYPE NATRIURETIC PEPTIDE - Abnormal; Notable for the following components:    BNP 3,134 (*)     All other components within normal limits   PROTIME-INR - Abnormal; Notable for the following components:    Prothrombin Time 13.1 (*)     INR 1.3 (*)     All other components within normal limits        ECG Results          EKG 12-lead (In process)  Result time 03/16/20 10:34:52    In process by Interface, Lab In [facility] (03/16/20 10:34:52)                 Narrative:    Test Reason : R06.02,    Vent. Rate : 128 BPM     Atrial Rate : 128 BPM     P-R Int : 166 ms          QRS Dur : 092 ms      QT Int : 290 ms       P-R-T Axes : 077 105 086 degrees     QTc Int : 423 ms    Sinus tachycardia  Rightward axis  Septal infarct ,age undetermined  Abnormal ECG  When compared with ECG of 10-MAR-2020 09:15,  Incomplete right bundle branch block is no longer Present  Septal infarct is now Present    Referred By: [email or web address]   SELF           Confirmed By:                             Imaging Results          X-Ray Chest AP Portable (Final result)  Result time 03/16/20 09:09:40    Final result by Graciela Marrufo MD (03/16/20 09:09:40)                 Impression:      Mild decrease of the bilateral infiltrates compared to the prior exam.      Electronically signed by: Graciela Marrufo MD  Date:    03/16/2020  Time:    09:09             Narrative:    EXAMINATION:  XR CHEST AP PORTABLE    CLINICAL HISTORY:  CHF;    TECHNIQUE:  Single frontal view of the chest was performed.    COMPARISON:  03/14/2020    FINDINGS:  The heart is enlarged.  There are small bilateral pleural effusions and there are bibasilar infiltrates.  The infiltrates are decreased.  The pleural effusions do not appear significantly changed.  Venous catheter has been removed                                 Medical Decision Making:   History:   Old Medical Records: I decided to obtain old medical records.  Independently Interpreted  Test(s):   I have ordered and independently interpreted EKG Reading(s) - see prior notes  Clinical Tests:   Lab Tests: Ordered and Reviewed  Radiological Study: Ordered and Reviewed  Medical Tests: Ordered and Reviewed            Scribe Attestation:   Scribe #1: I performed the above scribed service and the documentation accurately describes the services I performed. I attest to the accuracy of the note.    Attending Attestation:             Attending ED Notes:   10:45 AM  Spoke with Dr. Altman. Advised to contact heart failure transplant center at [facility].  He states that he has nothing left offer this patient due to the severity of his disease.    10:50 AM  Spoke with Dr. Porras. Advised to call cardiology at [facility] to see if they will accept patient. If patient is not accepted, patient is to be kept here.    12:19 PM  Spoke with [facility] transplant cardiology. Advised that patient should remain at [facility].  I, Dr. Metcalf, personally performed the services described in this documentation. All medical record entries made by the scribe were at my direction and in my presence.  I have reviewed the chart and agree that the record reflects my personal performance and is accurate and complete.2:17 PM 03/16/2020            ED Course as of Mar 16 1417   Mon Mar 16, 2020   0852 BP(!): 162/102 [EF]   0852 Temp: 97.5 °F (36.4 °C) [EF]   0852 Temp src: Oral [EF]   0852 Pulse(!): 132 [EF]   0852 Resp(!): 28 [EF]   0852 SpO2: 99 % [EF]   0908 EKG sinus tachycardia right axis 128 beats per minute no ST elevation or depression or T-wave inversion independently interpreted    [EF]   0909 Conclusion     · Mild concentric left ventricular hypertrophy.  · Severely decreased left ventricular systolic function. The estimated ejection fraction is 30%.  · Grade II (moderate) left ventricular diastolic dysfunction consistent with pseudonormalization.  · Septal wall has abnormal motion. Diastolic  flattening of the interventricular septum consistent with right ventricle volume overload.  · Mild right ventricular enlargement.  · Mildly reduced right ventricular systolic function.  · Mild left atrial enlargement.  · Mild right atrial enlargement.  · Mild-to-moderate mitral regurgitation.  · Moderate tricuspid regurgitation.  · Mild pulmonary hypertension present.  · Trivial circumferential pericardial effusion.        [EF]   0914 [facility] records reviewed    [EF]   0914 X-Ray Chest AP Portable [EF]   0931 WBC(!): 17.13 [EF]   0931 Hemoglobin(!): 9.8 [EF]   1003 BNP(!): 3,134 [EF]   1006 Called left msg for hospital med      [EF]   1019 Nursing informs Dr. bo is not on-call that it is in fact Dr. Porras who will be contacted now    [EF]   1021 Dr. PorrasRequests that I speak to the cardiology group for this patient    [EF]   1059 Dr espinoza asks that I speak with [facility] cards to see if they will accept as transfer, he does not need xfer to [facility] per dr espinoza    [EF]   1224 Case d/w dr jackson [facility] cards and dr harris [facility] medicine who both recommend admitting patient here. No indication for transfer. They recommend a viral load when inpatient     [EF]   1233 44-year-old CHF presents to the ER with abdominal distension testicle swelling lower extremity edema.  Discharged recently from [facility].  He will be admitted here for diuresis.  I did speak with his cardiologist who recommended calling [facility] cardiology to see if he needs to be transferred and they do not recommend transfer.  Hospital Medicine here will admit and will consult [facility] Cardiology group.    [EF]      ED Course User Index  [EF] Gee Metcalf MD                Clinical Impression:       ICD-10-CM ICD-9-CM   1. Shortness of breath R06.02 786.05         Disposition:   Disposition: Placed in Observation     ED Disposition Condition    Observation            44-year-old male with CHF  hepatitis prior cardiac arrest PE presents to the emergency room with leg swelling testicle swelling abdominal distention and shortness of breath all consistent with a CHF exacerbation.  Tachycardic in the ER but no hypoxia no distress.  Given IV Lasix.  Spoke with the patient's cardiologist who states to call [facility] Cardiology.  [facility] cardiology states the patient can be kept here as he would not be a transplant candidate due to his hepatitis.  Patient also voices that he would not wish to have a cardiac transplant.  Hospital Medicine will admit the patient.            Gee Metcalf MD  03/16/20 1418      I, Dr. Metcalf, personally performed the services described in this documentation. All medical record entries made by the scribe were at my direction and in my presence.  I have reviewed the chart and agree that the record reflects my personal performance and is accurate and complete.3:49 AM 03/28/2020           Gee Metcalf MD  03/28/20 2516

## 2020-03-17 PROBLEM — I50.9 CONGESTIVE HEART FAILURE: Status: ACTIVE | Noted: 2020-03-17

## 2020-03-17 LAB
ALBUMIN SERPL BCP-MCNC: 2.5 G/DL (ref 3.5–5.2)
ALP SERPL-CCNC: 96 U/L (ref 55–135)
ALT SERPL W/O P-5'-P-CCNC: 41 U/L (ref 10–44)
ANION GAP SERPL CALC-SCNC: 8 MMOL/L (ref 8–16)
AST SERPL-CCNC: 50 U/L (ref 10–40)
BASOPHILS # BLD AUTO: 0.07 K/UL (ref 0–0.2)
BASOPHILS NFR BLD: 0.4 % (ref 0–1.9)
BILIRUB SERPL-MCNC: 0.9 MG/DL (ref 0.1–1)
BILIRUB UR QL STRIP: NEGATIVE
BUN SERPL-MCNC: 18 MG/DL (ref 6–20)
CALCIUM SERPL-MCNC: 8.2 MG/DL (ref 8.7–10.5)
CHLORIDE SERPL-SCNC: 103 MMOL/L (ref 95–110)
CHOLEST SERPL-MCNC: 101 MG/DL (ref 120–199)
CHOLEST/HDLC SERPL: 3.6 {RATIO} (ref 2–5)
CK MB SERPL-MCNC: 3.6 NG/ML (ref 0.1–6.5)
CK MB SERPL-MCNC: 4 NG/ML (ref 0.1–6.5)
CK MB SERPL-RTO: 5.6 % (ref 0–5)
CK MB SERPL-RTO: 6.4 % (ref 0–5)
CK SERPL-CCNC: 56 U/L (ref 20–200)
CK SERPL-CCNC: 71 U/L (ref 20–200)
CLARITY UR: CLEAR
CO2 SERPL-SCNC: 28 MMOL/L (ref 23–29)
COLOR UR: YELLOW
CREAT SERPL-MCNC: 0.7 MG/DL (ref 0.5–1.4)
DIFFERENTIAL METHOD: ABNORMAL
EOSINOPHIL # BLD AUTO: 0.4 K/UL (ref 0–0.5)
EOSINOPHIL NFR BLD: 2.5 % (ref 0–8)
ERYTHROCYTE [DISTWIDTH] IN BLOOD BY AUTOMATED COUNT: 19.1 % (ref 11.5–14.5)
EST. GFR  (AFRICAN AMERICAN): >60 ML/MIN/1.73 M^2
EST. GFR  (NON AFRICAN AMERICAN): >60 ML/MIN/1.73 M^2
GLUCOSE SERPL-MCNC: 91 MG/DL (ref 70–110)
GLUCOSE UR QL STRIP: NEGATIVE
HCT VFR BLD AUTO: 32.1 % (ref 40–54)
HDLC SERPL-MCNC: 28 MG/DL (ref 40–75)
HDLC SERPL: 27.7 % (ref 20–50)
HGB BLD-MCNC: 9.3 G/DL (ref 14–18)
HGB UR QL STRIP: NEGATIVE
IMM GRANULOCYTES # BLD AUTO: 0.06 K/UL (ref 0–0.04)
IMM GRANULOCYTES NFR BLD AUTO: 0.4 % (ref 0–0.5)
KETONES UR QL STRIP: NEGATIVE
LDLC SERPL CALC-MCNC: 63.8 MG/DL (ref 63–159)
LEUKOCYTE ESTERASE UR QL STRIP: NEGATIVE
LYMPHOCYTES # BLD AUTO: 2.1 K/UL (ref 1–4.8)
LYMPHOCYTES NFR BLD: 13.5 % (ref 18–48)
MCH RBC QN AUTO: 24.8 PG (ref 27–31)
MCHC RBC AUTO-ENTMCNC: 29 G/DL (ref 32–36)
MCV RBC AUTO: 86 FL (ref 82–98)
MONOCYTES # BLD AUTO: 1.4 K/UL (ref 0.3–1)
MONOCYTES NFR BLD: 9.1 % (ref 4–15)
NEUTROPHILS # BLD AUTO: 11.6 K/UL (ref 1.8–7.7)
NEUTROPHILS NFR BLD: 74.1 % (ref 38–73)
NITRITE UR QL STRIP: NEGATIVE
NONHDLC SERPL-MCNC: 73 MG/DL
NRBC BLD-RTO: 0 /100 WBC
PH UR STRIP: 7 [PH] (ref 5–8)
PLATELET # BLD AUTO: 316 K/UL (ref 150–350)
PMV BLD AUTO: 9.1 FL (ref 9.2–12.9)
POTASSIUM SERPL-SCNC: 4.4 MMOL/L (ref 3.5–5.1)
PROT 24H UR-MRATE: NORMAL MG/SPEC (ref 0–100)
PROT SERPL-MCNC: 7.6 G/DL (ref 6–8.4)
PROT UR QL STRIP: NEGATIVE
PROT UR-MCNC: <7 MG/DL (ref 0–15)
RBC # BLD AUTO: 3.75 M/UL (ref 4.6–6.2)
SODIUM SERPL-SCNC: 139 MMOL/L (ref 136–145)
SP GR UR STRIP: 1.01 (ref 1–1.03)
TRIGL SERPL-MCNC: 46 MG/DL (ref 30–150)
TROPONIN I SERPL DL<=0.01 NG/ML-MCNC: 0.02 NG/ML (ref 0–0.03)
URINE COLLECTION DURATION: 12 HR
URINE VOLUME: 5100 ML
URN SPEC COLLECT METH UR: NORMAL
UROBILINOGEN UR STRIP-ACNC: NEGATIVE EU/DL
WBC # BLD AUTO: 15.64 K/UL (ref 3.9–12.7)

## 2020-03-17 PROCEDURE — 94761 N-INVAS EAR/PLS OXIMETRY MLT: CPT

## 2020-03-17 PROCEDURE — 82550 ASSAY OF CK (CPK): CPT

## 2020-03-17 PROCEDURE — 82553 CREATINE MB FRACTION: CPT | Mod: 91

## 2020-03-17 PROCEDURE — 80053 COMPREHEN METABOLIC PANEL: CPT

## 2020-03-17 PROCEDURE — 80061 LIPID PANEL: CPT

## 2020-03-17 PROCEDURE — 82553 CREATINE MB FRACTION: CPT

## 2020-03-17 PROCEDURE — 84484 ASSAY OF TROPONIN QUANT: CPT | Mod: 91

## 2020-03-17 PROCEDURE — 25000003 PHARM REV CODE 250: Performed by: SPECIALIST

## 2020-03-17 PROCEDURE — 82550 ASSAY OF CK (CPK): CPT | Mod: 91

## 2020-03-17 PROCEDURE — 11000001 HC ACUTE MED/SURG PRIVATE ROOM

## 2020-03-17 PROCEDURE — 81003 URINALYSIS AUTO W/O SCOPE: CPT

## 2020-03-17 PROCEDURE — 84156 ASSAY OF PROTEIN URINE: CPT

## 2020-03-17 PROCEDURE — 85025 COMPLETE CBC W/AUTO DIFF WBC: CPT

## 2020-03-17 PROCEDURE — 25000003 PHARM REV CODE 250: Performed by: INTERNAL MEDICINE

## 2020-03-17 PROCEDURE — 84484 ASSAY OF TROPONIN QUANT: CPT

## 2020-03-17 PROCEDURE — 36415 COLL VENOUS BLD VENIPUNCTURE: CPT

## 2020-03-17 RX ORDER — METOPROLOL TARTRATE 50 MG/1
50 TABLET ORAL 3 TIMES DAILY
Status: DISCONTINUED | OUTPATIENT
Start: 2020-03-17 | End: 2020-03-18 | Stop reason: HOSPADM

## 2020-03-17 RX ORDER — ENALAPRIL MALEATE 5 MG/1
5 TABLET ORAL DAILY
Status: DISCONTINUED | OUTPATIENT
Start: 2020-03-18 | End: 2020-03-18 | Stop reason: HOSPADM

## 2020-03-17 RX ADMIN — EPLERENONE 25 MG: 25 TABLET, FILM COATED ORAL at 08:03

## 2020-03-17 RX ADMIN — PANTOPRAZOLE SODIUM 40 MG: 40 TABLET, DELAYED RELEASE ORAL at 08:03

## 2020-03-17 RX ADMIN — CIPROFLOXACIN 500 MG: 500 TABLET, FILM COATED ORAL at 09:03

## 2020-03-17 RX ADMIN — METOPROLOL TARTRATE 50 MG: 50 TABLET, FILM COATED ORAL at 09:03

## 2020-03-17 RX ADMIN — APIXABAN 5 MG: 2.5 TABLET, FILM COATED ORAL at 08:03

## 2020-03-17 RX ADMIN — METOPROLOL TARTRATE 50 MG: 50 TABLET, FILM COATED ORAL at 03:03

## 2020-03-17 RX ADMIN — METOPROLOL TARTRATE 25 MG: 25 TABLET ORAL at 08:03

## 2020-03-17 RX ADMIN — HYDROCODONE BITARTRATE AND ACETAMINOPHEN 1 TABLET: 10; 325 TABLET ORAL at 09:03

## 2020-03-17 RX ADMIN — HYDROCODONE BITARTRATE AND ACETAMINOPHEN 1 TABLET: 10; 325 TABLET ORAL at 05:03

## 2020-03-17 RX ADMIN — GABAPENTIN 100 MG: 100 CAPSULE ORAL at 05:03

## 2020-03-17 RX ADMIN — CIPROFLOXACIN 500 MG: 500 TABLET, FILM COATED ORAL at 08:03

## 2020-03-17 RX ADMIN — ENALAPRIL MALEATE 2.5 MG: 2.5 TABLET ORAL at 08:03

## 2020-03-17 RX ADMIN — APIXABAN 5 MG: 2.5 TABLET, FILM COATED ORAL at 09:03

## 2020-03-17 RX ADMIN — GABAPENTIN 300 MG: 300 CAPSULE ORAL at 09:03

## 2020-03-17 NOTE — SUBJECTIVE & OBJECTIVE
Interval History: Patient continues to be SOB with significant swelling and abdominal fullness. Npo chest pain or cough or fever.     Review of Systems   Respiratory: Positive for shortness of breath.    Cardiovascular: Positive for leg swelling.   Gastrointestinal: Positive for abdominal distention.   Neurological: Positive for weakness.   All other systems reviewed and are negative.    Objective:     Vital Signs (Most Recent):  Temp: 97.4 °F (36.3 °C) (03/17/20 0724)  Pulse: 107 (03/17/20 0751)  Resp: 18 (03/17/20 0751)  BP: 118/87 (03/17/20 0724)  SpO2: 98 % (03/17/20 0751) Vital Signs (24h Range):  Temp:  [96.7 °F (35.9 °C)-98.7 °F (37.1 °C)] 97.4 °F (36.3 °C)  Pulse:  [105-132] 107  Resp:  [16-18] 18  SpO2:  [96 %-100 %] 98 %  BP: (118-154)/() 118/87     Weight: 80 kg (176 lb 5.9 oz)  Body mass index is 26.05 kg/m².    Intake/Output Summary (Last 24 hours) at 3/17/2020 1118  Last data filed at 3/17/2020 0800  Gross per 24 hour   Intake 377.2 ml   Output 2075 ml   Net -1697.8 ml      Physical Exam   Constitutional: He appears well-developed.   HENT:   Head: Normocephalic and atraumatic.   Nose: Nose normal. No septal deviation.   Mouth/Throat: Oropharynx is clear and moist.   Eyes: Pupils are equal, round, and reactive to light. Conjunctivae are normal.   Neck: No JVD present. No tracheal tenderness present. No tracheal deviation present. No thyroid mass present.   Cardiovascular: Normal rate, regular rhythm, S1 normal and S2 normal. Exam reveals no gallop and no friction rub.   No murmur heard.  Pulmonary/Chest: Effort normal and breath sounds normal. He has no decreased breath sounds. He has no wheezes. He has no rhonchi. He has no rales.   Abdominal: Soft. Normal appearance and bowel sounds are normal. He exhibits no distension and no mass. There is no hepatosplenomegaly, splenomegaly or hepatomegaly. There is no tenderness.   Distended abdomen with ascites.  No hepatosplenomegaly palpable.  No  abdominal tenderness present at this time.   Neurological: He is alert. He has normal strength. No cranial nerve deficit or sensory deficit.   Skin: No rash noted. No cyanosis.   Nursing note and vitals reviewed.      Significant Labs:   CBC:   Recent Labs   Lab 03/16/20  0900 03/17/20  0717   WBC 17.13* 15.64*   HGB 9.8* 9.3*   HCT 33.8* 32.1*    316     CMP:   Recent Labs   Lab 03/16/20  0900 03/17/20  0717    139   K 4.7 4.4    103   CO2 22* 28    91   BUN 19 18   CREATININE 0.7 0.7   CALCIUM 8.2* 8.2*   PROT 8.1 7.6   ALBUMIN 2.6* 2.5*   BILITOT 0.9 0.9   ALKPHOS 97 96   AST 58* 50*   ALT 42 41   ANIONGAP 10 8   EGFRNONAA >60 >60       Significant Imaging:  CXR: Mild decrease of the bilateral infiltrates compared to the prior exam.

## 2020-03-17 NOTE — NURSING
"Patient rounds. Bed alarm alarming. Patient states "  I do not want that alarm on" I advised patient that it is for his own safety. Patient states " I can not use the urinal while sitting or in the bed , I have to stand" Nurse advised that it is fine to call for assistance. Patient then states" I am going every few mins and I don't want it to keep alarming" I advised risk of not having bed alarm on and patient still refusing.   "

## 2020-03-17 NOTE — PLAN OF CARE
Plan of care reviewed with pt. JEFFREY throughout shift.  Continuous IV Bumex at 2ml/hr maintained. Cardiac diet. Continue to Monitor Labs. VSS. Continuous telemetry monitor maintained. Safety maintained. Will continue to monitor. No complaints at this time.

## 2020-03-17 NOTE — CARE UPDATE
03/17/20 0751   PRE-TX-O2   O2 Device (Oxygen Therapy) room air   SpO2 98 %   Pulse Oximetry Type Intermittent   $ Pulse Oximetry - Multiple Charge Pulse Oximetry - Multiple   Pulse 107   Resp 18

## 2020-03-17 NOTE — PROGRESS NOTES
[facility] Hospital Medicine  Progress Note    Patient Name: Francis Daigle  MRN: [id number]  Patient Class: OP- Observation   Admission Date: 3/16/2020  Length of Stay: 0 days  Attending Physician: Jeffrey Porras MD  Primary Care Provider: Primary Doctor No        Subjective:     Principal Problem:Acute on chronic combined systolic and diastolic heart failure        HPI:  Patient is a 44-year-old  male with past medical history significant for nonischemic cardiomyopathy, ejection fraction around 15%, recent history of deep venous thrombosis and pulmonary embolism, chronic hepatitis-C infection, hypertension, cirrhosis of liver and history of congestive heart failure who is being admitted to Hospital Medicine under observation status from [facility] Emergency Room with increasing generalized swelling and shortness of breath.  Patient is usually under care of Dr. Altman from Cardiology at [facility].  Patient was recently hospitalized at [facility] from March 10 through March 14, 2020 related to respiratory failure and congestive heart failure requiring mechanical ventilation.  Patient states he was discharged prematurely.  Patient is continuing to experience bilateral leg swelling, reports orthopnea and paroxysmal nocturnal dyspnea and dyspnea on exertion as well.  Patient reports compliance with medications and low-sodium diet.  Patient also reports swelling of his testes now.  Patient denies any fever, chills, cough, mental status changes, any new focal neuro deficits.  Emergency room physician discussed with Dr. Altman who recommended transfer to cardiac transplant team at [facility] where patient was not accepted.  Patient will be admitted at this facility and will be monitored by Dr. Altman.      Overview/Hospital Course:  No notes on file    Interval History: Patient continues to be SOB with significant  swelling and abdominal fullness. Npo chest pain or cough or fever.     Review of Systems   Respiratory: Positive for shortness of breath.    Cardiovascular: Positive for leg swelling.   Gastrointestinal: Positive for abdominal distention.   Neurological: Positive for weakness.   All other systems reviewed and are negative.    Objective:     Vital Signs (Most Recent):  Temp: 97.4 °F (36.3 °C) (03/17/20 0724)  Pulse: 107 (03/17/20 0751)  Resp: 18 (03/17/20 0751)  BP: 118/87 (03/17/20 0724)  SpO2: 98 % (03/17/20 0751) Vital Signs (24h Range):  Temp:  [96.7 °F (35.9 °C)-98.7 °F (37.1 °C)] 97.4 °F (36.3 °C)  Pulse:  [105-132] 107  Resp:  [16-18] 18  SpO2:  [96 %-100 %] 98 %  BP: (118-154)/() 118/87     Weight: 80 kg (176 lb 5.9 oz)  Body mass index is 26.05 kg/m².    Intake/Output Summary (Last 24 hours) at 3/17/2020 1118  Last data filed at 3/17/2020 0800  Gross per 24 hour   Intake 377.2 ml   Output 2075 ml   Net -1697.8 ml      Physical Exam   Constitutional: He appears well-developed.   HENT:   Head: Normocephalic and atraumatic.   Nose: Nose normal. No septal deviation.   Mouth/Throat: Oropharynx is clear and moist.   Eyes: Pupils are equal, round, and reactive to light. Conjunctivae are normal.   Neck: No JVD present. No tracheal tenderness present. No tracheal deviation present. No thyroid mass present.   Cardiovascular: Normal rate, regular rhythm, S1 normal and S2 normal. Exam reveals no gallop and no friction rub.   No murmur heard.  Pulmonary/Chest: Effort normal and breath sounds normal. He has no decreased breath sounds. He has no wheezes. He has no rhonchi. He has no rales.   Abdominal: Soft. Normal appearance and bowel sounds are normal. He exhibits no distension and no mass. There is no hepatosplenomegaly, splenomegaly or hepatomegaly. There is no tenderness.   Distended abdomen with ascites.  No hepatosplenomegaly palpable.  No abdominal tenderness present at this time.   Neurological: He is alert.  He has normal strength. No cranial nerve deficit or sensory deficit.   Skin: No rash noted. No cyanosis.   Nursing note and vitals reviewed.      Significant Labs:   CBC:   Recent Labs   Lab 03/16/20  0900 03/17/20  0717   WBC 17.13* 15.64*   HGB 9.8* 9.3*   HCT 33.8* 32.1*    316     CMP:   Recent Labs   Lab 03/16/20  0900 03/17/20  0717    139   K 4.7 4.4    103   CO2 22* 28    91   BUN 19 18   CREATININE 0.7 0.7   CALCIUM 8.2* 8.2*   PROT 8.1 7.6   ALBUMIN 2.6* 2.5*   BILITOT 0.9 0.9   ALKPHOS 97 96   AST 58* 50*   ALT 42 41   ANIONGAP 10 8   EGFRNONAA >60 >60       Significant Imaging:  CXR: Mild decrease of the bilateral infiltrates compared to the prior exam.      Assessment/Plan:      * Acute on chronic combined systolic and diastolic heart failure  Supplemental O2 via nasal canula; titrate O2 saturation to >92%.  Patient has been started on Bumex infusion and Aplerenone by Dr. Bustos.. Monitor electrolytes for hypokalemia and hypomagnesemia.  2-D Echocardiogram from January 29, 2020 reviewed  Daily weights.  Strict I/Os.  Fluid restriction.  Na restriction <2g/d.            Malnutrition of moderate degree  Nutrition consulted. Encourage maximal PO intake. Diet supplementation ordered per nutrition approval. Will encourage PO and monitor closely for weight changes.        Acute massive pulmonary embolism  Continue Eliquis      Acute deep vein thrombosis (DVT) of proximal vein of lower extremity  Continue Eliquis      Chronic hepatitis C without hepatic coma  Trend LFTs.      Troponin level elevated  Supplemental O2 via nasal canula; titrate O2 saturation to >92%.  Serial Cardiac enzymes × 3.  Tele-monitoring.   Cardiology Consultation.  Check fasting lipid panel and EKG in AM.  Use Nitroglycerine PRN Chest pain.  Na restriction <2g/d.          Essential hypertension  Chronic problem. Will continue chronic medications and monitor for any changes, adjusting as needed.            VTE Risk  Mitigation (From admission, onward)         Ordered     apixaban tablet 5 mg  2 times daily      03/16/20 5107                      Jeffrey Porras MD  Department of Hospital Medicine   [facility]

## 2020-03-17 NOTE — PLAN OF CARE
03/16/20 1947   Patient Assessment/Suction   Level of Consciousness (AVPU) alert   Respiratory Effort Unlabored   Rhythm/Pattern, Respiratory unlabored   PRE-TX-O2   O2 Device (Oxygen Therapy) room air   SpO2 96 %   Pulse Oximetry Type Intermittent   $ Pulse Oximetry - Multiple Charge Pulse Oximetry - Multiple   Pulse (!) 121   Resp 16

## 2020-03-17 NOTE — CONSULTS
[facility]  Cardiology  Progress Note    Patient Name: Francis Daigle  MRN: [id number]  Admission Date: 3/16/2020  Hospital Length of Stay: 0 days  Code Status: Prior   Attending Physician: Jeffrey Porras MD   Primary Care Physician: Primary Doctor No  Expected Discharge Date:   Principal Problem:Acute on chronic combined systolic and diastolic heart failure    Subjective:     Hospital Course diuresisng well and feels better inon iv bumex   Interval History: I reviewed echo at [facility] this demonstrated ejection fraction of approximately 40% evidence of anterior septal thinning and hypokinesis.  +2 to +3 mitral regurgitation is seen.  Tricuspid regurgitation a collar was severe however velocity was 1.92.  There was a the triangular shape to the tricuspid regurge see suggesting severe tricuspid regurgitation    ROS  Objective:     Vital Signs (Most Recent):  Temp: 97.9 °F (36.6 °C) (03/17/20 1144)  Pulse: 99 (03/17/20 1144)  Resp: 20 (03/17/20 1144)  BP: (!) 130/98 (03/17/20 1144)  SpO2: 96 % (03/17/20 1144) Vital Signs (24h Range):  Temp:  [96.7 °F (35.9 °C)-98.7 °F (37.1 °C)] 97.9 °F (36.6 °C)  Pulse:  [] 99  Resp:  [16-20] 20  SpO2:  [96 %-100 %] 96 %  BP: (118-141)/() 130/98     Weight: 80 kg (176 lb 5.9 oz)  Body mass index is 26.05 kg/m².    SpO2: 96 %  O2 Device (Oxygen Therapy): room air      Intake/Output Summary (Last 24 hours) at 3/17/2020 1241  Last data filed at 3/17/2020 1149  Gross per 24 hour   Intake 857.2 ml   Output 3475 ml   Net -2617.8 ml       Lines/Drains/Airways     Peripheral Intravenous Line                 Peripheral IV - Single Lumen 03/16/20 0858 20 G Left Antecubital 1 day                Physical Exam pulse is 105  Lungs clear  Cardiac S4 abdomen is still distended  Leg edema is less      Significant Labs:   CMP   Recent Labs   Lab 03/16/20  0900 03/17/20  0717    139   K 4.7 4.4    103   CO2 22* 28    91   BUN 19 18    CREATININE 0.7 0.7   CALCIUM 8.2* 8.2*   PROT 8.1 7.6   ALBUMIN 2.6* 2.5*   BILITOT 0.9 0.9   ALKPHOS 97 96   AST 58* 50*   ALT 42 41   ANIONGAP 10 8   ESTGFRAFRICA >60 >60   EGFRNONAA >60 >60    and CBC   Recent Labs   Lab 03/16/20  0900 03/17/20  0717   WBC 17.13* 15.64*   HGB 9.8* 9.3*   HCT 33.8* 32.1*    316       Significant Imaging:   Assessment and Plan:   Impression : heart failure reduced ejection fraction and severe tricuspid regurgitation  Plan-  1 more day of iv bumex,  Increase  Bb slightly - continue epleronone       Brief HPI: \    Active Diagnoses:    Diagnosis Date Noted POA    PRINCIPAL PROBLEM:  Acute on chronic combined systolic and diastolic heart failure [I50.43] 12/10/2019 Yes    Malnutrition of moderate degree [E44.0] 03/16/2020 Yes    Acute massive pulmonary embolism [I26.99] 12/11/2019 Yes    Chronic hepatitis C without hepatic coma [B18.2] 12/10/2019 Yes    Acute deep vein thrombosis (DVT) of proximal vein of lower extremity [I82.4Y9]  Yes    Troponin level elevated [R79.89] 10/26/2019 Yes    Essential hypertension [I10] 08/23/2019 Yes      Problems Resolved During this Admission:       VTE Risk Mitigation (From admission, onward)         Ordered     apixaban tablet 5 mg  2 times daily      03/16/20 1748                Rolando Bustos MD  Cardiology  [facility]

## 2020-03-17 NOTE — PLAN OF CARE
CM completed discharge assessment over the phone with pt. Pt verified information on facesheet as correct. Pt denies POA/LW. Reports living at listed address with his cousin. Denies having PCP. [facility] [address]. Pt denies hh/hd/dme. Pt reports being independent with ADLs and able to drive himself to apts. Pt reports that his cousin will provide transportation home upon dc. DC plan is home. CM following.       03/17/20 1130   Discharge Assessment   Assessment Type Discharge Planning Assessment   Confirmed/corrected address and phone number on facesheet? Yes   Assessment information obtained from? Patient   Communicated expected length of stay with patient/caregiver yes   Prior to hospitilization cognitive status: Alert/Oriented   Prior to hospitalization functional status: Independent   Current cognitive status: Alert/Oriented   Current Functional Status: Independent   Lives With other relative(s)   Able to Return to Prior Arrangements yes   Is patient able to care for self after discharge? Yes   Patient's perception of discharge disposition home or selfcare   Readmission Within the Last 30 Days previous discharge plan unsuccessful   If yes, most recent facility [facility]   Patient currently being followed by outpatient case management? No   Patient currently receives any other outside agency services? No   Equipment Currently Used at Home none   Do you have any problems affording any of your prescribed medications? No   Is the patient taking medications as prescribed? yes   Does the patient have transportation home? Yes   Transportation Anticipated family or friend will provide   Does the patient receive services at the [facility]? Yes   Discharge Plan A Home   DME Needed Upon Discharge  none   Patient/Family in Agreement with Plan yes   Readmission Questionnaire   At the time of your discharge, did someone talk to you about what your health problems were? No   At the time of discharge, did someone  "talk to you about what to watch out for regarding worsening of your health problem? Yes   At the time of discharge, did someone talk to you about what to do if you experienced worsening of your health problem? Yes   At the time of discharge, did someone talk to you about which medication to take when you left the hospital and which ones to stop taking? Yes   At the time of discharge, did someone talk to you about when and where to follow up with a doctor after you left the hospital? Yes   What do you believe caused you to be sick enough to be re-admitted? "Swelling"   How often do you need to have someone help you when you read instructions, pamphlets, or other written material from your doctor or pharmacy? Never   Do you have problems taking your medications as prescribed? No   Do you have any problems affording any of  your prescribed medications? No   Do you have problems obtaining/receiving your medications? No   Does the patient have transportation to healthcare appointments? No   Does the patient have family/friends to help with healtcare needs after discharge? yes   Does your caregiver provide all the help you need? No   Are you currently feeling confused? No   Are you currently having problems thinking? No   Are you currently having memory problems? No     "

## 2020-03-17 NOTE — NURSING
Patient currently feeling very anxious and requesting to have xanax .25mg ordered. ARVIND Slade NP notified. New order given and implemented.

## 2020-03-17 NOTE — NURSING
12 hour urine started at 0200. Patient previously refused to use the urinal and was unable to collect urine until this urination.

## 2020-03-17 NOTE — ASSESSMENT & PLAN NOTE
Supplemental O2 via nasal canula; titrate O2 saturation to >92%.  Patient has been started on Bumex infusion and Aplerenone by Dr. Bustos.. Monitor electrolytes for hypokalemia and hypomagnesemia.  2-D Echocardiogram from January 29, 2020 reviewed  Daily weights.  Strict I/Os.  Fluid restriction.  Na restriction <2g/d.

## 2020-03-17 NOTE — NURSING
Patient finally agrees to have bed alarm active after speaking with nurse. Agreed to keep bed alarm on as long as female nurse stands out side of room while patient uses the urinal.

## 2020-03-17 NOTE — PLAN OF CARE
Patient alert and oriented resting in bed. NAD. C/o abd pain and sob on exertion.  VSS. BSC. Urinal at bedside. 12 hour protein urine started at 0200. Bed alarm active. Plan of care reviewed with patient. Verbalizes understanding.Call light in reach. Pt free from fall or injury. Will monitor.

## 2020-03-17 NOTE — CONSULTS
[facility]  Cardiology  Consult Note    Patient Name: Francis Daigle  MRN: [id number]  Admission Date: 3/16/2020  Hospital Length of Stay: 0 days  Code Status: Prior   Attending Provider: Jeffrey Porras MD   Consulting Provider: Rolando Bustos MD  Primary Care Physician: Primary Doctor No  Principal Problem:Acute on chronic combined systolic and diastolic heart failure    Patient information was obtained from patient and ER records.     Consults  Subjective:     Chief Complaint:  Shortness of breath and swelling     HPI:  Patient has a history of pulmonary embolism, deep vein thrombosis, dilated cardiomyopathy and severe congestive failure.  He states that he has been taking his ACE-inhibitor Lasix apixaban at home as well as his Toprol XL.  He has a history of chronic drug abuse using meth for several years but he aleksey.  es using any in the past 6 months.  He denies alcohol abuse.  He does have a history of hepatitis-C untreated  .  He states that last year he had a heart attack at [facility].  His 1st episode of congestive failure was approximately 2 years ago.  He is on a low-sodium diet but occasionally eat potato chips and salty food  He denies any recent chest pain  He did have an empyema of the right chest assess stating surgery and chest tube drainage.    Past Medical History:   Diagnosis Date    Anxiety     Arthritis     CHF (congestive heart failure)     Cirrhosis     Coronary artery disease     Depression     DVT (deep venous thrombosis)     Hepatitis C     Hypertension        Past Surgical History:   Procedure Laterality Date    LUNG DECORTICATION Right 2/4/2020    Procedure: DECORTICATION, LUNG;  Surgeon: Rehan Rousseau MD;  Location: [facility] OR;  Service: Thoracic;  Laterality: Right;       Review of patient's allergies indicates:  No Known Allergies    No current facility-administered medications on file prior to encounter.      Current Outpatient Medications on File  Prior to Encounter   Medication Sig    apixaban (ELIQUIS) 5 mg Tab Take 1 tablet (5 mg total) by mouth 2 (two) times daily.    ciprofloxacin HCl (CIPRO) 500 MG tablet Take 1 tablet (500 mg total) by mouth every 12 (twelve) hours. for 24 days    enalapril (VASOTEC) 2.5 MG tablet Take 1 tablet (2.5 mg total) by mouth once daily.    furosemide (LASIX) 40 MG tablet Take 1 tablet (40 mg total) by mouth once daily.    gabapentin (NEURONTIN) 100 MG capsule Take 1 capsule (100 mg total) by mouth 3 (three) times daily as needed (anxiety/agitation/neuropathic pain).    gabapentin (NEURONTIN) 300 MG capsule Take 1 capsule (300 mg total) by mouth every evening.    HYDROcodone-acetaminophen (NORCO)  mg per tablet Take 1 tablet by mouth every 6 (six) hours as needed.    lactulose (CHRONULAC) 10 gram/15 mL solution Take 15 mLs (10 g total) by mouth 3 (three) times daily as needed (constipation).    metoprolol succinate (TOPROL-XL) 100 MG 24 hr tablet Take 1 tablet (100 mg total) by mouth once daily.    pantoprazole (PROTONIX) 40 MG tablet Take 1 tablet (40 mg total) by mouth once daily.    acetaminophen (TYLENOL) 325 MG tablet Take 325 mg by mouth every 6 (six) hours as needed for Pain.    [DISCONTINUED] ALPRAZolam (XANAX) 0.25 MG tablet Take 1 tablet by mouth   at bedtime as needed for anxiety. (Patient taking differently: Take 0.25 mg by mouth nightly as needed. )    [DISCONTINUED] amoxicillin-clavulanate 500-125mg (AUGMENTIN) 500-125 mg Tab Take 1 tablet (500 mg total) by mouth 3 (three) times daily. for 5 days    [DISCONTINUED] spironolactone (ALDACTONE) 25 MG tablet Take 1 tablet (25 mg total) by mouth once daily.    [DISCONTINUED] spironolactone (ALDACTONE) 25 MG tablet Take 1 tablet (25 mg total) by mouth once daily.     Family History     Problem Relation (Age of Onset)    Arthritis Mother    Asthma Sister    Diabetes Sister    Heart disease Mother, Maternal Grandfather    Hyperlipidemia Mother     Hypertension Mother, Father    Kidney disease Mother        Tobacco Use    Smoking status: Former Smoker     Packs/day: 2.00     Years: 20.00     Pack years: 40.00     Types: Cigarettes     Start date: 1/23/1999     Last attempt to quit: 1/23/2017     Years since quitting: 3.1    Smokeless tobacco: Never Used   Substance and Sexual Activity    Alcohol use: Not Currently    Drug use: Not Currently     Frequency: 7.0 times per week     Types: Amphetamines     Comment: quit in december 2019    Sexual activity: Not Currently     Partners: Female     Review of Systems   Constitution: Positive for decreased appetite, malaise/fatigue and weight gain.   HENT: Negative.         Carious abscessed teeth   Cardiovascular: Positive for chest pain, dyspnea on exertion, leg swelling and orthopnea.   Musculoskeletal: Positive for joint swelling.        Significant edema and history of DVT   Gastrointestinal: Positive for bloating, abdominal pain and heartburn. Negative for hematemesis and melena.   Genitourinary: Negative.    Neurological: Negative.      Objective:     Vital Signs (Most Recent):  Temp: 98.7 °F (37.1 °C) (03/16/20 1949)  Pulse: (!) 125 (03/16/20 1949)  Resp: 16 (03/16/20 1949)  BP: (!) 140/98 (03/16/20 1949)  SpO2: 96 % (03/16/20 1947) Vital Signs (24h Range):  Temp:  [97.5 °F (36.4 °C)-98.7 °F (37.1 °C)] 98.7 °F (37.1 °C)  Pulse:  [121-132] 125  Resp:  [16-28] 16  SpO2:  [96 %-100 %] 96 %  BP: (140-162)/() 140/98     Weight: 80 kg (176 lb 5.9 oz)  Body mass index is 26.05 kg/m².    SpO2: 96 %  O2 Device (Oxygen Therapy): room air      Intake/Output Summary (Last 24 hours) at 3/16/2020 2000  Last data filed at 3/16/2020 1900  Gross per 24 hour   Intake 240 ml   Output --   Net 240 ml       Lines/Drains/Airways     Peripheral Intravenous Line                 Peripheral IV - Single Lumen 03/16/20 0858 20 G Left Antecubital less than 1 day                Physical Exam blood pressure is 110/80 pulse is  125  Head neck neck veins slightly distended.  No carotid bruits  Lungs thoracotomy scar on the right chest as well as chest tube drainage holes with some discharge s in 1 of the holes  Cardiac summation gallop S3 and S4 2/6 systolic murmur left sternal border increases with inspiration  2/6 holosystolic murmur at the apex  Abdomen liver palpable below right costal margin by 3-4 fingerbreadths  Extremities good dorsalis pedis pulses +3 edema  Scrotal edema noted    Significant Labs:   CMP   Recent Labs   Lab 03/16/20  0900      K 4.7      CO2 22*      BUN 19   CREATININE 0.7   CALCIUM 8.2*   PROT 8.1   ALBUMIN 2.6*   BILITOT 0.9   ALKPHOS 97   AST 58*   ALT 42   ANIONGAP 10   ESTGFRAFRICA >60   EGFRNONAA >60   , CBC   Recent Labs   Lab 03/16/20  0900   WBC 17.13*   HGB 9.8*   HCT 33.8*       and Troponin   Recent Labs   Lab 03/16/20  0900 03/16/20  1817   TROPONINI 0.029* 0.020       Significant ImagingConclusion     · Mild concentric left ventricular hypertrophy.  · Severely decreased left ventricular systolic function. The estimated ejection fraction is 30%.  · Grade II (moderate) left ventricular diastolic dysfunction consistent with pseudonormalization.  · Septal wall has abnormal motion. Diastolic flattening of the interventricular septum consistent with right ventricle volume overload.  · Mild right ventricular enlargement.  · Mildly reduced right ventricular systolic function.  · Mild left atrial enlargement.  · Mild right atrial enlargement.  · Mild-to-moderate mitral regurgitation.  · Moderate tricuspid regurgitation.  · Mild pulmonary hypertension present.  · Trivial circumferential pericardial effusion.        ecg   Sinus tach, lo voltage  possiblr old ant septal mi   Assessment and Plan:   1 biventricular heart failure secondary to possible nonischemic dilated cardiomyopathy .  In November 2017 cardiac catheterization demonstrated normal coronaries and severe mitral  regurgitation and nonischemic dilated cardiomyopathy  Of the patient continues to have congestive failure.  [facility] had none of the edema that accumulated over the past 3 weeks  He states he is taking his medicine and falling somewhat of a low-sodium diet  2 in 2017 a result of pulmonary embolus but it turned  I out he had a necrotizing pneumonia possibly related to IV meth  3 he does have hepatitis C and possibly cirrhosis although blood ammonia is have been normal    Plan start IV Bumex and apleronoe s( he states he cannot take Aldactone) and try to get rate control with beta-blockers and Lanoxin  His overall prognosis at this time is somewhat guarded long-term  Active Diagnoses:    Diagnosis Date Noted POA    PRINCIPAL PROBLEM:  Acute on chronic combined systolic and diastolic heart failure [I50.43] 12/10/2019 Yes    Malnutrition of moderate degree [E44.0] 03/16/2020 Yes    Acute massive pulmonary embolism [I26.99] 12/11/2019 Yes    Chronic hepatitis C without hepatic coma [B18.2] 12/10/2019 Yes    Acute deep vein thrombosis (DVT) of proximal vein of lower extremity [I82.4Y9]  Yes    Troponin level elevated [R79.89] 10/26/2019 Yes    Essential hypertension [I10] 08/23/2019 Yes      Problems Resolved During this Admission:   I personally reviewed chart and imaging studies ,examined patient, and discussed with the patient her illness and treatment including diet and follow-up care. This consult was prolonged and required approximately 50% time for review and 50% time examining patient and writing notes and orders       VTE Risk Mitigation (From admission, onward)         Ordered     apixaban tablet 5 mg  2 times daily      03/16/20 0677                Thank you for your consult.    Rolando Butsos MD  Cardiology   [facility]

## 2020-03-18 VITALS
WEIGHT: 176.38 LBS | OXYGEN SATURATION: 95 % | DIASTOLIC BLOOD PRESSURE: 61 MMHG | HEART RATE: 85 BPM | RESPIRATION RATE: 18 BRPM | HEIGHT: 69 IN | TEMPERATURE: 97 F | BODY MASS INDEX: 26.12 KG/M2 | SYSTOLIC BLOOD PRESSURE: 103 MMHG

## 2020-03-18 LAB
ALBUMIN SERPL BCP-MCNC: 2.5 G/DL (ref 3.5–5.2)
ALP SERPL-CCNC: 98 U/L (ref 55–135)
ALT SERPL W/O P-5'-P-CCNC: 44 U/L (ref 10–44)
ANION GAP SERPL CALC-SCNC: 11 MMOL/L (ref 8–16)
AST SERPL-CCNC: 59 U/L (ref 10–40)
BASOPHILS # BLD AUTO: 0.08 K/UL (ref 0–0.2)
BASOPHILS NFR BLD: 0.5 % (ref 0–1.9)
BILIRUB SERPL-MCNC: 0.8 MG/DL (ref 0.1–1)
BUN SERPL-MCNC: 18 MG/DL (ref 6–20)
CALCIUM SERPL-MCNC: 8.5 MG/DL (ref 8.7–10.5)
CHLORIDE SERPL-SCNC: 94 MMOL/L (ref 95–110)
CO2 SERPL-SCNC: 33 MMOL/L (ref 23–29)
CREAT SERPL-MCNC: 0.8 MG/DL (ref 0.5–1.4)
DIFFERENTIAL METHOD: ABNORMAL
EOSINOPHIL # BLD AUTO: 0.8 K/UL (ref 0–0.5)
EOSINOPHIL NFR BLD: 5.1 % (ref 0–8)
ERYTHROCYTE [DISTWIDTH] IN BLOOD BY AUTOMATED COUNT: 19.2 % (ref 11.5–14.5)
EST. GFR  (AFRICAN AMERICAN): >60 ML/MIN/1.73 M^2
EST. GFR  (NON AFRICAN AMERICAN): >60 ML/MIN/1.73 M^2
GLUCOSE SERPL-MCNC: 71 MG/DL (ref 70–110)
HCT VFR BLD AUTO: 33.8 % (ref 40–54)
HGB BLD-MCNC: 9.6 G/DL (ref 14–18)
IMM GRANULOCYTES # BLD AUTO: 0.08 K/UL (ref 0–0.04)
IMM GRANULOCYTES NFR BLD AUTO: 0.5 % (ref 0–0.5)
LYMPHOCYTES # BLD AUTO: 2.3 K/UL (ref 1–4.8)
LYMPHOCYTES NFR BLD: 15.9 % (ref 18–48)
MCH RBC QN AUTO: 24 PG (ref 27–31)
MCHC RBC AUTO-ENTMCNC: 28.4 G/DL (ref 32–36)
MCV RBC AUTO: 85 FL (ref 82–98)
MONOCYTES # BLD AUTO: 1.3 K/UL (ref 0.3–1)
MONOCYTES NFR BLD: 8.8 % (ref 4–15)
NEUTROPHILS # BLD AUTO: 10.1 K/UL (ref 1.8–7.7)
NEUTROPHILS NFR BLD: 69.2 % (ref 38–73)
NRBC BLD-RTO: 0 /100 WBC
PLATELET # BLD AUTO: 363 K/UL (ref 150–350)
PMV BLD AUTO: 9.3 FL (ref 9.2–12.9)
POTASSIUM SERPL-SCNC: 3.8 MMOL/L (ref 3.5–5.1)
PROT SERPL-MCNC: 7.5 G/DL (ref 6–8.4)
RBC # BLD AUTO: 4 M/UL (ref 4.6–6.2)
SODIUM SERPL-SCNC: 138 MMOL/L (ref 136–145)
WBC # BLD AUTO: 14.63 K/UL (ref 3.9–12.7)

## 2020-03-18 PROCEDURE — 25000003 PHARM REV CODE 250: Performed by: NURSE PRACTITIONER

## 2020-03-18 PROCEDURE — 80053 COMPREHEN METABOLIC PANEL: CPT

## 2020-03-18 PROCEDURE — 85025 COMPLETE CBC W/AUTO DIFF WBC: CPT

## 2020-03-18 PROCEDURE — 36415 COLL VENOUS BLD VENIPUNCTURE: CPT

## 2020-03-18 PROCEDURE — 25000003 PHARM REV CODE 250: Performed by: INTERNAL MEDICINE

## 2020-03-18 PROCEDURE — S0171 BUMETANIDE 0.5 MG: HCPCS | Performed by: SPECIALIST

## 2020-03-18 PROCEDURE — 94761 N-INVAS EAR/PLS OXIMETRY MLT: CPT

## 2020-03-18 PROCEDURE — 25000003 PHARM REV CODE 250: Performed by: SPECIALIST

## 2020-03-18 RX ORDER — BUMETANIDE 1 MG/1
TABLET ORAL
Qty: 36 TABLET | Refills: 0 | Status: SHIPPED | OUTPATIENT
Start: 2020-03-18 | End: 2020-03-18

## 2020-03-18 RX ORDER — ALPRAZOLAM 0.25 MG/1
0.5 TABLET ORAL ONCE
Status: COMPLETED | OUTPATIENT
Start: 2020-03-18 | End: 2020-03-18

## 2020-03-18 RX ORDER — EPLERENONE 25 MG/1
25 TABLET, FILM COATED ORAL DAILY
Qty: 30 TABLET | Refills: 0 | Status: SHIPPED | OUTPATIENT
Start: 2020-03-19 | End: 2021-03-19

## 2020-03-18 RX ORDER — BUMETANIDE 1 MG/1
1 TABLET ORAL DAILY
Status: DISCONTINUED | OUTPATIENT
Start: 2020-03-18 | End: 2020-03-18 | Stop reason: HOSPADM

## 2020-03-18 RX ORDER — EPLERENONE 25 MG/1
25 TABLET, FILM COATED ORAL DAILY
Qty: 30 TABLET | Refills: 0 | Status: SHIPPED | OUTPATIENT
Start: 2020-03-19 | End: 2020-03-18

## 2020-03-18 RX ORDER — METOPROLOL TARTRATE 50 MG/1
50 TABLET ORAL 3 TIMES DAILY
Qty: 90 TABLET | Refills: 0 | Status: SHIPPED | OUTPATIENT
Start: 2020-03-18 | End: 2020-03-18

## 2020-03-18 RX ORDER — METOPROLOL TARTRATE 50 MG/1
50 TABLET ORAL 3 TIMES DAILY
Qty: 90 TABLET | Refills: 0 | Status: SHIPPED | OUTPATIENT
Start: 2020-03-18 | End: 2021-03-18

## 2020-03-18 RX ORDER — BUMETANIDE 1 MG/1
TABLET ORAL
Qty: 36 TABLET | Refills: 0 | Status: SHIPPED | OUTPATIENT
Start: 2020-03-18

## 2020-03-18 RX ADMIN — ALPRAZOLAM 0.5 MG: 0.25 TABLET ORAL at 01:03

## 2020-03-18 RX ADMIN — GABAPENTIN 100 MG: 100 CAPSULE ORAL at 08:03

## 2020-03-18 RX ADMIN — METOPROLOL TARTRATE 50 MG: 50 TABLET, FILM COATED ORAL at 08:03

## 2020-03-18 RX ADMIN — ENALAPRIL MALEATE 5 MG: 5 TABLET ORAL at 08:03

## 2020-03-18 RX ADMIN — HYDROCODONE BITARTRATE AND ACETAMINOPHEN 1 TABLET: 10; 325 TABLET ORAL at 04:03

## 2020-03-18 RX ADMIN — CIPROFLOXACIN 500 MG: 500 TABLET, FILM COATED ORAL at 08:03

## 2020-03-18 RX ADMIN — EPLERENONE 25 MG: 25 TABLET, FILM COATED ORAL at 08:03

## 2020-03-18 RX ADMIN — METOPROLOL TARTRATE 50 MG: 50 TABLET, FILM COATED ORAL at 03:03

## 2020-03-18 RX ADMIN — HYDROCODONE BITARTRATE AND ACETAMINOPHEN 1 TABLET: 10; 325 TABLET ORAL at 08:03

## 2020-03-18 RX ADMIN — BUMETANIDE 0.5 MG/HR: 0.25 INJECTION INTRAMUSCULAR; INTRAVENOUS at 10:03

## 2020-03-18 RX ADMIN — PANTOPRAZOLE SODIUM 40 MG: 40 TABLET, DELAYED RELEASE ORAL at 08:03

## 2020-03-18 RX ADMIN — APIXABAN 5 MG: 2.5 TABLET, FILM COATED ORAL at 08:03

## 2020-03-18 RX ADMIN — BUMETANIDE 1 MG: 1 TABLET ORAL at 02:03

## 2020-03-18 NOTE — CARE UPDATE
03/18/20 0743   Patient Assessment/Suction   Level of Consciousness (AVPU) alert   Respiratory Effort Normal;Unlabored   PRE-TX-O2   O2 Device (Oxygen Therapy) room air   SpO2 99 %   Pulse Oximetry Type Intermittent   $ Pulse Oximetry - Multiple Charge Pulse Oximetry - Multiple   Pulse 96   Resp 18

## 2020-03-18 NOTE — RESPIRATORY THERAPY
03/17/20 1944   Patient Assessment/Suction   Level of Consciousness (AVPU) alert   PRE-TX-O2   O2 Device (Oxygen Therapy) room air   SpO2 99 %   Pulse Oximetry Type Intermittent   Pulse 103   Resp 18

## 2020-03-18 NOTE — PLAN OF CARE
NAD noted. POC reviewed w pt and they verbalized understanding.  Tele  [id number].  Pt free from falls.  Pt ambulated to the bathroom.  Bed in low position, side rails up X2, bed alarm on, wheels locked, call light in reach. Will continue to monitor.

## 2020-03-18 NOTE — PLAN OF CARE
03/18/20 1453   Final Note   Assessment Type Final Discharge Note   Anticipated Discharge Disposition Home   Hospital Follow Up  Appt(s) scheduled? Yes

## 2020-03-18 NOTE — PROGRESS NOTES
[facility]  Cardiology  Progress Note    Patient Name: Francis Daigle  MRN: [id number]  Admission Date: 3/16/2020  Hospital Length of Stay: 1 days  Code Status: Prior   Attending Physician: Jeffrey Porras MD   Primary Care Physician: Primary Doctor No  Expected Discharge Date:   Principal Problem:Acute on chronic combined systolic and diastolic heart failure    Subjective:       Interval History:   Patient reports his breathing difficulty has greatly improved. His swelling has drastically decreased. Denies chest pain. States he feels ready to go home.     ROS   No significant headaches or sore throat or runny nose.   No recent changes in vision.   No recent changes in hearing.  No dysphagia or odynophagia.  Denies chest pain and shortness of breath..   Denies any cough or hemoptysis.   Denies any abdominal pain, nausea, vomiting, diarrhea or constipation.   Denies any dysuria or polyuria.   Denies any fevers or chills.     Denies bleeding diathesis    Objective:     Vital Signs (Most Recent):  Temp: 97 °F (36.1 °C) (03/18/20 0800)  Pulse: 94 (03/18/20 0800)  Resp: 17 (03/18/20 0800)  BP: 114/78 (03/18/20 0800)  SpO2: 98 % (03/18/20 0800) Vital Signs (24h Range):  Temp:  [97 °F (36.1 °C)-98 °F (36.7 °C)] 97 °F (36.1 °C)  Pulse:  [] 94  Resp:  [14-20] 17  SpO2:  [95 %-99 %] 98 %  BP: ()/(60-98) 114/78     Weight: 80 kg (176 lb 5.9 oz)  Body mass index is 26.05 kg/m².    SpO2: 98 %  O2 Device (Oxygen Therapy): room air      Intake/Output Summary (Last 24 hours) at 3/18/2020 1059  Last data filed at 3/18/2020 0800  Gross per 24 hour   Intake 1481.5 ml   Output 45382 ml   Net -8568.5 ml       Lines/Drains/Airways     Peripheral Intravenous Line                 Peripheral IV - Single Lumen 03/16/20 0858 20 G Left Antecubital 2 days                Scheduled Meds:   apixaban  5 mg Oral BID    ciprofloxacin HCl  500 mg Oral Q12H    enalapril  5 mg Oral Daily    eplerenone  25 mg Oral Daily     gabapentin  300 mg Oral QHS    metoprolol tartrate  50 mg Oral TID    pantoprazole  40 mg Oral Daily     Continuous Infusions:   bumetanide (BUMEX)0.25 mg/mL infusion 0.5 mg/hr (03/18/20 1027)     PRN Meds:.gabapentin, HYDROcodone-acetaminophen     Physical Exam  HEENT: Normocephalic, atraumatic,   Neck exam are shows plaque neck veins in the sitting position  PERRL, Conjunctiva pink, no scleral icterus.   CVS: S1S2+, RRR, no murmurs, rubs or gallops, JVP: Normal.  LUNGS: Clear  ABDOMEN: Soft, NT, BS+  EXTREMITIES: No cyanosis, clubbing or edema  NEURO: AAO X 3.       Significant Labs:   ABG: No results for input(s): PH, PCO2, HCO3, POCSATURATED, BE in the last 48 hours., Blood Culture: No results for input(s): LABBLOO in the last 48 hours., BMP:   Recent Labs   Lab 03/17/20  0717 03/18/20  0442   GLU 91 71    138   K 4.4 3.8    94*   CO2 28 33*   BUN 18 18   CREATININE 0.7 0.8   CALCIUM 8.2* 8.5*   , CMP   Recent Labs   Lab 03/17/20  0717 03/18/20  0442    138   K 4.4 3.8    94*   CO2 28 33*   GLU 91 71   BUN 18 18   CREATININE 0.7 0.8   CALCIUM 8.2* 8.5*   PROT 7.6 7.5   ALBUMIN 2.5* 2.5*   BILITOT 0.9 0.8   ALKPHOS 96 98   AST 50* 59*   ALT 41 44   ANIONGAP 8 11   ESTGFRAFRICA >60 >60   EGFRNONAA >60 >60   , CBC   Recent Labs   Lab 03/17/20  0717 03/18/20  0442   WBC 15.64* 14.63*   HGB 9.3* 9.6*   HCT 32.1* 33.8*    363*   , INR No results for input(s): INR, PROTIME in the last 48 hours., Lipid Panel   Recent Labs   Lab 03/17/20  0717   CHOL 101*   HDL 28*   LDLCALC 63.8   TRIG 46   CHOLHDL 27.7   ,   Pathology Results  (Last 10 years)    None      , Troponin   Recent Labs   Lab 03/17/20  0048 03/17/20  0717 03/17/20  1306   TROPONINI 0.016 0.023 0.016    and All pertinent lab results from the last 24 hours have been reviewed.    Significant Imaging: X-Ray: CXR: X-Ray Chest 1 View (CXR): No results found for this visit on 03/16/20.  Assessment and Plan:      IMPRESSION:  Acute on chronic Congestive heart failure reduced ejection fraction. Improved.  History of DVT/PE  History of Hep C  History of hypertension controlled at this time      PLAN:    1. Will stop his Bumex drip and switch to oral 1 mg twice daily. In 3 days, decrease to 1 mg once a day.  2. Patient is stable for discharge from our standpoint. He can follow up in the office in 2-3 weeks  3.  Encourage patient to maintain a low-salt low-fat diet and also refrain from use of recreational drugs.  4. Patient would need a BMP prior to this appointment.       Sobia Israel, WILLIS  Cardiology  [facility]    I have personally interviewed and examined the patient, I have reviewed the Nurse Practitioner's history and physical, assessment, and plan. I have personally evaluated the patient at bedside and agree with the findings.

## 2020-03-18 NOTE — SUBJECTIVE & OBJECTIVE
Interval History: Patient continues to be SOB with significant swelling and abdominal fullness. Npo chest pain or cough or fever.     Review of Systems   Respiratory: Positive for shortness of breath.    Cardiovascular: Positive for leg swelling.   Gastrointestinal: Positive for abdominal distention.   Neurological: Positive for weakness.   All other systems reviewed and are negative.    Objective:     Vital Signs (Most Recent):  Temp: 97.1 °F (36.2 °C) (03/17/20 2333)  Pulse: 94 (03/18/20 0800)  Resp: 14 (03/18/20 0446)  BP: 114/78 (03/18/20 0800)  SpO2: 98 % (03/18/20 0800) Vital Signs (24h Range):  Temp:  [97.1 °F (36.2 °C)-98 °F (36.7 °C)] 97.1 °F (36.2 °C)  Pulse:  [] 94  Resp:  [14-20] 14  SpO2:  [95 %-99 %] 98 %  BP: ()/(60-98) 114/78     Weight: 80 kg (176 lb 5.9 oz)  Body mass index is 26.05 kg/m².    Intake/Output Summary (Last 24 hours) at 3/18/2020 0912  Last data filed at 3/18/2020 0800  Gross per 24 hour   Intake 1481.5 ml   Output 61973 ml   Net -8568.5 ml      Physical Exam   Constitutional: He appears well-developed.   HENT:   Head: Normocephalic and atraumatic.   Nose: Nose normal. No septal deviation.   Mouth/Throat: Oropharynx is clear and moist.   Eyes: Pupils are equal, round, and reactive to light. Conjunctivae are normal.   Neck: No JVD present. No tracheal tenderness present. No tracheal deviation present. No thyroid mass present.   Cardiovascular: Normal rate, regular rhythm, S1 normal and S2 normal. Exam reveals no gallop and no friction rub.   No murmur heard.  Pulmonary/Chest: Effort normal and breath sounds normal. He has no decreased breath sounds. He has no wheezes. He has no rhonchi. He has no rales.   Abdominal: Soft. Normal appearance and bowel sounds are normal. He exhibits no distension and no mass. There is no hepatosplenomegaly, splenomegaly or hepatomegaly. There is no tenderness.   Distended abdomen with ascites.  No hepatosplenomegaly palpable.  No abdominal  tenderness present at this time.   Neurological: He is alert. He has normal strength. No cranial nerve deficit or sensory deficit.   Skin: No rash noted. No cyanosis.   Nursing note and vitals reviewed.      Significant Labs:   CBC:   Recent Labs   Lab 03/17/20  0717 03/18/20 0442   WBC 15.64* 14.63*   HGB 9.3* 9.6*   HCT 32.1* 33.8*    363*     CMP:   Recent Labs   Lab 03/17/20 0717 03/18/20 0442    138   K 4.4 3.8    94*   CO2 28 33*   GLU 91 71   BUN 18 18   CREATININE 0.7 0.8   CALCIUM 8.2* 8.5*   PROT 7.6 7.5   ALBUMIN 2.5* 2.5*   BILITOT 0.9 0.8   ALKPHOS 96 98   AST 50* 59*   ALT 41 44   ANIONGAP 8 11   EGFRNONAA >60 >60       Significant Imaging:  CXR: Mild decrease of the bilateral infiltrates compared to the prior exam.

## 2020-03-18 NOTE — NURSING
Pt ambulated throughout [facility] with no problems and Pt denies dizziness or any other issues. Dr Porras cleared pt for discharge. Discharge instructions provided and explained to pt, Understanding verbalized. Written prescriptions provided. IV removed catheter intact, tolerated well. VSS. Telemetry monitor removed and returned to monitor room. No complaints. Currently awaiting transportation to arrive to transport home.

## 2020-03-18 NOTE — PLAN OF CARE
Hospital follow up scheduled with cardiology. CM also requested follow up apt from [facility] will call pt to schedule.      03/18/20 8062   Discharge Assessment   Assessment Type Discharge Planning Reassessment

## 2020-03-19 LAB
ACID FAST MOD KINY STN SPEC: NORMAL
MYCOBACTERIUM SPEC QL CULT: NORMAL

## 2020-03-20 ENCOUNTER — PATIENT OUTREACH (OUTPATIENT)
Dept: ADMINISTRATIVE | Facility: CLINIC | Age: 45
End: 2020-03-20

## 2020-03-20 NOTE — PATIENT INSTRUCTIONS
C3 nurse attempted to contact patient. The following occurred:   C3 nurse attempted to contact Francis Daigle  for a TCC post hospital discharge follow up call. The patient is unable to conduct the call @ this time. The patient requested a callback.    The patient does not have a scheduled HOSFU appointment within 7-14 days post hospital discharge date 03/18/2020. Non [facility] PCP

## 2022-07-11 NOTE — PROGRESS NOTES
Progress Note  Cardiothoracic Surgery    Admit Date: 1/27/2020  Post-operative Day: 2 Days Post-Op  Hospital Day: 11    SUBJECTIVE:No complaint     Follow-up For: Procedure(s) (LRB):  DECORTICATION, LUNG (Right)    Scheduled Meds:   chlorhexidine  15 mL Mouth/Throat BID    furosemide (LASIX) IV  20 mg Intravenous BID AC    furosemide (LASIX) IV  20 mg Intravenous Once    hydrOXYzine HCl  25 mg Oral Daily    metoprolol succinate  100 mg Oral Daily    mupirocin   Nasal BID    naloxone  0.02 mg Intravenous Once    pantoprazole  40 mg Oral Daily    piperacillin-tazobactam (ZOSYN) IVPB  4.5 g Intravenous Q8H    senna-docusate 8.6-50 mg  1 tablet Oral BID    sodium polystyrene  15 g Oral Once     Infusions/Drips:   heparin (porcine) in D5W Stopped (02/04/20 0549)    HYDROmorphone in 0.9 % NaCl       PRN Meds: sodium chloride, acetaminophen, acetaminophen, acetaminophen, ALPRAZolam, calcium chloride IVPB, calcium chloride IVPB, calcium chloride IVPB, dextrose 50%, dextrose 50%, diphenhydrAMINE, furosemide, gabapentin, glucagon (human recombinant), glucose, glucose, heparin (PORCINE), heparin (PORCINE), HYDROcodone-acetaminophen, HYDROcodone-acetaminophen, magnesium oxide, magnesium sulfate IVPB, magnesium sulfate IVPB, magnesium sulfate IVPB, magnesium sulfate IVPB, ondansetron, ondansetron, potassium chloride in water, potassium chloride in water, potassium chloride in water, potassium chloride in water, potassium chloride, potassium chloride, potassium chloride, potassium chloride, promethazine (PHENERGAN) IVPB, promethazine (PHENERGAN) IVPB, sodium chloride 0.9%, sodium phosphate IVPB, sodium phosphate IVPB, sodium phosphate IVPB, sodium phosphate IVPB, sodium phosphate IVPB    Review of patient's allergies indicates:  No Known Allergies    OBJECTIVE:     Vital Signs (Most Recent)  Temp: 97.6 °F (36.4 °C) (02/06/20 1101)  Pulse: 99 (02/06/20 1300)  Resp: (!) 23 (02/06/20 1300)  BP: 105/65 (02/06/20  1300)  SpO2: 98 % (02/06/20 1300)    Admission Weight: 72.6 kg (160 lb) (01/27/20 0956)   Most Recent Weight: 63 kg (138 lb 14.2 oz) (02/06/20 0305)    Vital Signs Range (Last 24H):  Temp:  [97.4 °F (36.3 °C)-98.4 °F (36.9 °C)]   Pulse:  []   Resp:  [1-45]   BP: ()/(55-76)   SpO2:  [91 %-98 %]   Arterial Line BP: (106-157)/(48-79)     I & O (Last 24H):    Intake/Output Summary (Last 24 hours) at 2/6/2020 1713  Last data filed at 2/6/2020 0600  Gross per 24 hour   Intake 635.6 ml   Output 6025 ml   Net -5389.4 ml     Physical Exam:  NAD  BS decreased on right   CV RRR  Ext + edema    Wound/Incision:  Dressing clean and dry    Laboratory:  CBC:   Recent Labs   Lab 02/06/20  0310   WBC 28.09*   RBC 3.21*   HGB 8.6*   HCT 26.2*      MCV 82   MCH 26.8*   MCHC 32.8     BMP:   Recent Labs   Lab 02/06/20  1401   GLU 98   *   K 4.2   CL 92*   CO2 34*   BUN 14   CREATININE 0.8   CALCIUM 8.1*   MG 1.8     Microbiology Results (last 7 days)     Procedure Component Value Units Date/Time    AFB Culture & Smear [[id number]] Collected:  02/04/20 1316    Order Status:  Completed Specimen:  Tissue from Lung, Right Updated:  02/06/20 1418     AFB CULTURE STAIN No acid fast bacilli seen.     AFB CULTURE STAIN Testing performed by:     AFB CULTURE STAIN Lab [facility]     AFB CULTURE STAIN [address] [facility]     [place] [address]     AFB CULTURE STAIN Dr.Brian Gallito MD    Tissue culture [[id number]]  (Abnormal)  (Susceptibility) Collected:  02/04/20 1316    Order Status:  Completed Specimen:  Tissue Updated:  02/06/20 0837     Aerobic Culture - Tissue ENTEROBACTER CLOACAE  Many      Aerobic culture [[id number]]  (Abnormal) Collected:  02/04/20 1316    Order Status:  Completed Specimen:  Drainage from Lung, Right Updated:  02/06/20 0837     Aerobic Bacterial Culture ENTEROBACTER CLOACAE  Few  For susceptibility see order #[id number]      Culture, Body Fluid (Aerobic) w/ GS  [[id number]] Collected:  02/04/20 1316    Order Status:  Completed Specimen:  Tissue from Peritoneal Fluid Updated:  02/05/20 1112     Gram Stain Result Many WBC's      Rare Gram negative rods    Fungus culture [[id number]] Collected:  02/04/20 1316    Order Status:  Sent Specimen:  Abscess from Lung, Right Updated:  02/04/20 1328    Culture, Anaerobe [[id number]] Collected:  02/04/20 1316    Order Status:  Sent Specimen:  Abscess from Lung, Right Updated:  02/04/20 1328    Culture, Anaerobic [[id number]] Collected:  02/04/20 1316    Order Status:  Sent Specimen:  Abscess from Lung, Right Updated:  02/04/20 1327    Blood Culture #1 **CANNOT BE ORDERED STAT** [[id number]] Collected:  01/27/20 1235    Order Status:  Completed Specimen:  Blood from Peripheral, Antecubital, Right Updated:  02/01/20 1432     Blood Culture, Routine No growth after 5 days.    Blood Culture #2 **CANNOT BE ORDERED STAT** [[id number]] Collected:  01/27/20 1242    Order Status:  Completed Specimen:  Blood from Peripheral, Antecubital, Left Updated:  02/01/20 1432     Blood Culture, Routine No growth after 5 days.        Specimen (12h ago, onward)    None          Diagnostic Results:  X-Ray: Reviewed    ASSESSMENT/PLAN:     Assessment: uncomplicated post-operative course.    Plan:Continue present management       Pfizer dose 1, 2, and 3

## 2022-09-26 NOTE — PLAN OF CARE
Plan of care reviewed with patient. AAO x 4. Neuro checks performed Q 4h. Chest tube in place, MD turned off suction possible removal tomorrow. Dressing remains clean dry and intact. Chest tube output was at 1730 when suction was turned off (30 ml output this shift) Up to chair, tolerated well. No acute events. All questions answered. Safety maintained.    Pt is completing a home sleep test. Pt was instructed on how to put on the Noxturnal T3 device and associated equipment before going to bed and given the opportunity to practice putting it on before leaving the sleep center. Pt was reminded to bring the home sleep test kit back to the center tomorrow, at agreed upon time for download and reporting.

## 2023-04-07 NOTE — ASSESSMENT & PLAN NOTE
Acute, associated with SOB and chest pressure  Ativan po prn     Sparkle Dale (:  1974) is a 46 y.o. male,Established patient, here for evaluation of the following chief complaint(s): Annual Exam         ASSESSMENT/PLAN:  1. Essential hypertension  Bp at goal < 140/90  Low salt diet  labs  -     hydroCHLOROthiazide (MICROZIDE) 12.5 MG capsule; Take 1 capsule by mouth daily, Disp-30 capsule, R-5Normal  -     CBC; Future  -     AST; Future  -     Basic Metabolic Panel; Future  -     Urinalysis  -     TSH with Reflex; Future  -     Lipid, Fasting; Future  2. Chronic pain of left knee  Poss arthritis of knee  Doubt meniscus or ligament injury  The knee is stable but with nearly  Falling will re evaluate when x rays are back    -     XR KNEE LEFT (1-2 VIEWS); Future  -     acetaminophen (TYLENOL) 500 MG tablet; Take 1 tablet by mouth 4 times daily as needed for Pain, Disp-360 tablet, R-1Normal  3. Pain of left calf  Low suspicion for DVT  screen  -     D-Dimer, Quantitative; Future  -     acetaminophen (TYLENOL) 500 MG tablet; Take 1 tablet by mouth 4 times daily as needed for Pain, Disp-360 tablet, R-1Normal  4. RICKY on CPAP  Sleeps about 7 hrs a night  5. History of elevated PSA  Repeat was normal  Seen at [facility]  6. Screening for prostate cancer  See above  -     PSA Screening; Future  7. Screen for colon cancer  -     [facility] Adenike Rowley MD, Gastroenterology, [facility]      No follow-ups on file. Subjective   SUBJECTIVE/OBJECTIVE:  HPI 46 y.o. male pmh HTN fu after 2 years absence. He continues with medication for bp      Hypertension  No chest pain, headache, sob, leg edema, stroke, kidney disease     Today he reports intermittent pain but no swelling of the left knee for at least 6 months without injury. Not getting better. Pops at times. Sometimes feels like it may give out but has never fallen. Not taking anything for this.  No previous  Dx of arthritis or ligamentous or meniscal tear    He has had some intermittent pain of the left

## 2023-05-11 NOTE — NURSING
Called and spoke with son, Klaudia. Conveyed the below information from nursing. Patient is now scheduled for Friday, June 30 at 0100 pm with Dr. Jl Key. Thanks.    Pt does not want people coming in and out of his room.  He gets very agitated .  Pt is sleeping now.

## 2023-06-19 NOTE — DISCHARGE SUMMARY
[facility] Hospital Medicine  Discharge Summary      Patient Name: Francis Daigle  MRN: [id number]  Admission Date: 3/16/2020  Hospital Length of Stay: 1 days  Discharge Date and Time:  03/18/2020 2:22 PM  Attending Physician: Jeffrey Porras MD   Discharging Provider: Jeffrey Porras MD  Primary Care Provider: Primary Doctor No      HPI:   Patient is a 44-year-old  male with past medical history significant for nonischemic cardiomyopathy, ejection fraction around 15%, recent history of deep venous thrombosis and pulmonary embolism, chronic hepatitis-C infection, hypertension, cirrhosis of liver and history of congestive heart failure who is being admitted to Hospital Medicine under observation status from [facility] Emergency Room with increasing generalized swelling and shortness of breath.  Patient is usually under care of Dr. Altman from Cardiology at [facility].  Patient was recently hospitalized at [facility] from March 10 through March 14, 2020 related to respiratory failure and congestive heart failure requiring mechanical ventilation.  Patient states he was discharged prematurely.  Patient is continuing to experience bilateral leg swelling, reports orthopnea and paroxysmal nocturnal dyspnea and dyspnea on exertion as well.  Patient reports compliance with medications and low-sodium diet.  Patient also reports swelling of his testes now.  Patient denies any fever, chills, cough, mental status changes, any new focal neuro deficits.  Emergency room physician discussed with Dr. Altman who recommended transfer to cardiac transplant team at [facility] where patient was not accepted.  Patient will be admitted at this facility and will be monitored by Dr. Altman.    Hospital Course:   Patient was admitted to medicine telemetry service.  Patient was evaluated by Dr. Bustos and later on by Dr. Vines.  Patient was treated  with intravenous diuretics and later on was kept on intravenous Bumex infusion with excellent diuresis with significant weight loss.  Patient was extensively counseled regarding importance of compliance with cardiac medications and low-sodium intake. Patient to monitor daily weight, follow low salt diet and in case if gain more than 3 pounds in 24 hours, please call your doctor for further advice.  Patient's cardiac medications were adjusted.  New prescriptions given.  Patient to continue close follow-up with Cardiology team as outpatient next week.  Patient will have a basic metabolic panel checked in 7 days.  Patient voiced understanding.  Patient to improve protein intake for malnutrition.    Consults:   Consults (From admission, onward)        Status Ordering Provider     Inpatient consult to Cardiology  Once     Provider:  Jose Altman MD    Acknowledged CAROL SCHMITZ        CXR: Mild decrease of the bilateral infiltrates compared to the prior exam.    Final Active Diagnoses:    Diagnosis Date Noted POA    PRINCIPAL PROBLEM:  Acute on chronic combined systolic and diastolic heart failure [I50.43] 12/10/2019 Yes    Congestive heart failure [I50.9] 03/17/2020 Yes    Malnutrition of moderate degree [E44.0] 03/16/2020 Yes    Acute massive pulmonary embolism [I26.99] 12/11/2019 Yes    Chronic hepatitis C without hepatic coma [B18.2] 12/10/2019 Yes    Acute deep vein thrombosis (DVT) of proximal vein of lower extremity [I82.4Y9]  Yes    Troponin level elevated [R79.89] 10/26/2019 Yes    Essential hypertension [I10] 08/23/2019 Yes      Problems Resolved During this Admission:       Discharged Condition: good    Disposition:     Follow Up:  Follow-up Information     PCP In 1 week.           Korey Vines MD In 1 week.    Specialty:  Cardiology  Contact information:  [address] [place] [address]  [facility]  [place] [address]  [phone]             Please follow up.    Contact  information:  Have BMP checked in 7 days.                Patient Instructions:      BASIC METABOLIC PANEL   Standing Status: Future Standing Exp. Date: 05/17/21     Diet Cardiac   Order Comments: Patient to monitor daily weight, follow low salt diet and in case if gain more than 3 pounds in 24 hours, please call your doctor for further advice.     Other restrictions (specify):   Order Comments: PLEASE OBSERVE FALL PRECAUTIONS     Call MD for:   Order Comments: For worsening symptoms, chest pain, shortness of breath, increased abdominal pain, high grade fever, stroke or stroke like symptoms, immediately go to the nearest Emergency Room or call 911 as soon as possible.       Significant Diagnostic Studies: Labs:   CMP   Recent Labs   Lab 03/17/20  0717 03/18/20  0442    138   K 4.4 3.8    94*   CO2 28 33*   GLU 91 71   BUN 18 18   CREATININE 0.7 0.8   CALCIUM 8.2* 8.5*   PROT 7.6 7.5   ALBUMIN 2.5* 2.5*   BILITOT 0.9 0.8   ALKPHOS 96 98   AST 50* 59*   ALT 41 44   ANIONGAP 8 11   ESTGFRAFRICA >60 >60   EGFRNONAA >60 >60    and CBC   Recent Labs   Lab 03/17/20  0717 03/18/20  0442   WBC 15.64* 14.63*   HGB 9.3* 9.6*   HCT 32.1* 33.8*    363*       Pending Diagnostic Studies:     None         Medications:  Reconciled Home Medications:      Medication List      START taking these medications    bumetanide 1 MG tablet  Commonly known as:  BUMEX  Take 1 tablet twice daily for 3 days and then  Take 1 tablet daily.     eplerenone 25 MG Tab  Commonly known as:  INSPRA  Take 1 tablet (25 mg total) by mouth once daily.  Start taking on:  March 19, 2020     metoprolol tartrate 50 MG tablet  Commonly known as:  LOPRESSOR  Take 1 tablet (50 mg total) by mouth 3 (three) times daily.        CONTINUE taking these medications    acetaminophen 325 MG tablet  Commonly known as:  TYLENOL  Take 325 mg by mouth every 6 (six) hours as needed for Pain.     ciprofloxacin HCl 500 MG tablet  Commonly known as:  CIPRO  Take 1  tablet (500 mg total) by mouth every 12 (twelve) hours. for 24 days     ELIQUIS 5 mg Tab  Generic drug:  apixaban  Take 1 tablet (5 mg total) by mouth 2 (two) times daily.     enalapril 2.5 MG tablet  Commonly known as:  VASOTEC  Take 1 tablet (2.5 mg total) by mouth once daily.     * gabapentin 300 MG capsule  Commonly known as:  NEURONTIN  Take 1 capsule (300 mg total) by mouth every evening.     * gabapentin 100 MG capsule  Commonly known as:  NEURONTIN  Take 1 capsule (100 mg total) by mouth 3 (three) times daily as needed (anxiety/agitation/neuropathic pain).     HYDROcodone-acetaminophen  mg per tablet  Commonly known as:  NORCO  Take 1 tablet by mouth every 6 (six) hours as needed.     lactulose 10 gram/15 mL solution  Commonly known as:  CHRONULAC  Take 15 mLs (10 g total) by mouth 3 (three) times daily as needed (constipation).     pantoprazole 40 MG tablet  Commonly known as:  PROTONIX  Take 1 tablet (40 mg total) by mouth once daily.         * This list has 2 medication(s) that are the same as other medications prescribed for you. Read the directions carefully, and ask your doctor or other care provider to review them with you.            STOP taking these medications    ALPRAZolam 0.25 MG tablet  Commonly known as:  XANAX     furosemide 40 MG tablet  Commonly known as:  LASIX     metoprolol succinate 100 MG 24 hr tablet  Commonly known as:  TOPROL-XL            Indwelling Lines/Drains at time of discharge:   Lines/Drains/Airways     None                 Time spent on the discharge of patient: 36 minutes  Patient was seen and examined on the date of discharge and determined to be suitable for discharge.         Jeffrey Porras MD  Department of Hospital Medicine  [facility]   [Negative] : Heme/Lymph

## 2024-12-20 NOTE — ED NOTES
Pt accepted at this time by Dr. Savage for transfer to  [facility] at [facility]. # for report is [phone]   none

## (undated) DEVICE — CATHETER THORACIC 28FR 8028

## (undated) DEVICE — CONNECTOR 5-IN-1 CON2001

## (undated) DEVICE — BAG DECANTER 10-102

## (undated) DEVICE — NEEDLE SAFETY ECLIPSE 25G 1-1/2IN 305767

## (undated) DEVICE — SUTURE VICRYL 0 CT-1 27 VCP260H

## (undated) DEVICE — SUTURE VICRYL 2-0 CT-1 36 VCP945H

## (undated) DEVICE — Device

## (undated) DEVICE — POUCH INSTRUMENT 1018

## (undated) DEVICE — CATHETER THORACIC 28FR 8128

## (undated) DEVICE — SUCTION YANKAUER BULB TIP W/O VENT

## (undated) DEVICE — DRAIN CHEST DRY SUCTION

## (undated) DEVICE — STAPLER SKIN NON ROTATE PXW35

## (undated) DEVICE — SPONGE PEANUT 3/8 7103

## (undated) DEVICE — DRAPE WARMER ORS-100

## (undated) DEVICE — CONNECTOR Y 3/8 X 3/8 X 3/8 050526000

## (undated) DEVICE — SUTURE SILK 2-0 CR SH 30 C016D

## (undated) DEVICE — PAD BOVIE ADULT

## (undated) DEVICE — SUTURE VICRYL 1 TP-1 27 J650G

## (undated) DEVICE — SUTURE MONOCRYL 3-0 27 PS-2 MCP427H

## (undated) DEVICE — CLIP APPLIER SM MCS20

## (undated) DEVICE — SUTURE SILK 3-0 30 A304H

## (undated) DEVICE — TRAY GENERAL SURGERY

## (undated) DEVICE — SUTURE SILK 2-0 30 A305H

## (undated) DEVICE — SPONGE LAP 18X18

## (undated) DEVICE — PAD SCRATCH BOVIE STERILE     ESSP

## (undated) DEVICE — TIP BOVIE 6.5 0014

## (undated) DEVICE — GLOVE BIOGEL PI ORTHO PRO BROWN SZ 7

## (undated) DEVICE — SUTURE SILK 0 30 A306H

## (undated) DEVICE — PACK UNIVERSAL 88761

## (undated) DEVICE — TOWEL OR BLUE      MDT2168284

## (undated) DEVICE — DRAPE IOBAN 23X33 6651EZ

## (undated) DEVICE — CLIP APPLIER MEDIUM MCM20